# Patient Record
Sex: FEMALE | Race: WHITE | NOT HISPANIC OR LATINO | Employment: OTHER | URBAN - METROPOLITAN AREA
[De-identification: names, ages, dates, MRNs, and addresses within clinical notes are randomized per-mention and may not be internally consistent; named-entity substitution may affect disease eponyms.]

---

## 2017-01-06 ENCOUNTER — PATIENT MESSAGE (OUTPATIENT)
Dept: NEUROLOGY | Facility: CLINIC | Age: 57
End: 2017-01-06

## 2017-01-06 DIAGNOSIS — G35 MS (MULTIPLE SCLEROSIS): ICD-10-CM

## 2017-01-06 RX ORDER — GABAPENTIN 400 MG/1
800 CAPSULE ORAL 3 TIMES DAILY
Qty: 180 CAPSULE | Refills: 5 | Status: SHIPPED | OUTPATIENT
Start: 2017-01-06 | End: 2017-10-22 | Stop reason: SDUPTHER

## 2017-01-31 ENCOUNTER — LAB VISIT (OUTPATIENT)
Dept: LAB | Facility: HOSPITAL | Age: 57
End: 2017-01-31
Attending: FAMILY MEDICINE
Payer: MEDICARE

## 2017-01-31 ENCOUNTER — OFFICE VISIT (OUTPATIENT)
Dept: INTERNAL MEDICINE | Facility: CLINIC | Age: 57
End: 2017-01-31
Payer: MEDICARE

## 2017-01-31 VITALS
HEART RATE: 80 BPM | DIASTOLIC BLOOD PRESSURE: 82 MMHG | BODY MASS INDEX: 28.08 KG/M2 | WEIGHT: 158.5 LBS | TEMPERATURE: 96 F | SYSTOLIC BLOOD PRESSURE: 110 MMHG | HEIGHT: 63 IN

## 2017-01-31 DIAGNOSIS — Z11.59 NEED FOR HEPATITIS C SCREENING TEST: ICD-10-CM

## 2017-01-31 DIAGNOSIS — G35 MS (MULTIPLE SCLEROSIS): Chronic | ICD-10-CM

## 2017-01-31 DIAGNOSIS — F32.0 MAJOR DEPRESSIVE DISORDER, SINGLE EPISODE, MILD: Primary | ICD-10-CM

## 2017-01-31 PROCEDURE — 99499 UNLISTED E&M SERVICE: CPT | Mod: S$GLB,,, | Performed by: FAMILY MEDICINE

## 2017-01-31 PROCEDURE — 86803 HEPATITIS C AB TEST: CPT

## 2017-01-31 PROCEDURE — 36415 COLL VENOUS BLD VENIPUNCTURE: CPT | Mod: PO

## 2017-01-31 PROCEDURE — 99999 PR PBB SHADOW E&M-EST. PATIENT-LVL III: CPT | Mod: PBBFAC,,, | Performed by: FAMILY MEDICINE

## 2017-01-31 PROCEDURE — 99213 OFFICE O/P EST LOW 20 MIN: CPT | Mod: S$GLB,,, | Performed by: FAMILY MEDICINE

## 2017-01-31 PROCEDURE — 1159F MED LIST DOCD IN RCRD: CPT | Mod: S$GLB,,, | Performed by: FAMILY MEDICINE

## 2017-01-31 NOTE — PROGRESS NOTES
Subjective:       Patient ID: Cem Meyers is a 56 y.o. female.    Chief Complaint: Follow-up    HPI Comments: F/U:       Pt is a 56 year old here f/u pt has MS and cholesterol issues. Pt is over all doing healthy life style changes. Pt is taking zocor and paxil.     Review of Systems   Constitutional: Negative.    Respiratory: Negative.    Cardiovascular: Negative.    Neurological: Negative.    Hematological: Negative.        Objective:      Physical Exam   Constitutional: She is oriented to person, place, and time. She appears well-developed and well-nourished.   Cardiovascular: Normal rate and regular rhythm.    Pulmonary/Chest: Effort normal and breath sounds normal.   Abdominal: Soft. Bowel sounds are normal.   Neurological: She is alert and oriented to person, place, and time.   Skin: Skin is warm and dry.   Psychiatric: She has a normal mood and affect.       Assessment:       1. Major depressive disorder, single episode, mild    2. MS (multiple sclerosis)    3. Need for hepatitis C screening test        Plan:       Major depressive disorder, single episode, mild  Comments:  Will continue on the Paxil    MS (multiple sclerosis)  Comments:  Pt will continue to be followed by Neurology    Need for hepatitis C screening test  Comments:  Will do Hep c screen  Orders:  -     Hepatitis C antibody; Future; Expected date: 1/31/17

## 2017-01-31 NOTE — MR AVS SNAPSHOT
[facility] - Internal Medicine  [address] [facility] [address] [place] [address]  Phone: [phone]  Fax: [phone]                  Cem Meyers   2017 10:00 AM   Office Visit    Description:  Female : 1960   Provider:  Adonis Stubbs MD   Department:  [facility] - Internal Medicine           Reason for Visit     Follow-up           Diagnoses this Visit        Comments    Major depressive disorder, single episode, mild    -  Primary Will continue on the Paxil    MS (multiple sclerosis)     Pt will continue to be followed by Neurology    Need for hepatitis C screening test     Will do Hep c screen           To Do List           Future Appointments        Provider Department Dept Phone    3/6/2017 10:45 AM DALE Fink [facility]- Multiple Sclerosis [phone]      Goals (5 Years of Data)     None      Follow-Up and Disposition     Return in about 6 months (around 2017).      [facility]     [facility] On Call Nurse Care Line -  Assistance  Registered nurses in the [facility] provide clinical advisement, health education, appointment booking, and other advisory services.  Call for this free service at [phone].             Medications           Message regarding Medications     Verify the changes and/or additions to your medication regime listed below are the same as discussed with your clinician today.  If any of these changes or additions are incorrect, please notify your healthcare provider.             Verify that the below list of medications is an accurate representation of the medications you are currently taking.  If none reported, the list may be blank. If incorrect, please contact your healthcare provider. Carry this list with you in case of emergency.           Current Medications     baclofen (LIORESAL) 10 MG tablet TAKE 1 - 1 & 1/2 TABLETS BY MOUTH THREE TIMES A DAY AS NEEDED    cholecalciferol, vitamin D3, (VITAMIN D3) 4,000 unit Cap Take 14,000 Units by  "mouth once daily.     fish oil-omega-3 fatty acids 300-1,000 mg capsule Take 2 g by mouth once daily.    gabapentin (NEURONTIN) 400 MG capsule Take 2 capsules (800 mg total) by mouth 3 (three) times daily.    interferon beta-1a, albumin, (REBIF, WITH ALBUMIN,) 44 mcg/0.5 mL injection Inject 0.5 mLs (44 mcg total) into the skin 3 (three) times a week.    levothyroxine (SYNTHROID) 75 MCG tablet TAKE ONE TABLET BY MOUTH EVERY MORNING    multivitamin capsule Take 1 capsule by mouth once daily.    paroxetine (PAXIL) 30 MG tablet Take 1 tablet (30 mg total) by mouth once daily.    simvastatin (ZOCOR) 40 MG tablet TAKE ONE TABLET BY MOUTH EVERY DAY           Clinical Reference Information           Vital Signs - Last Recorded  Most recent update: 1/31/2017 10:26 AM by Bia August    BP Pulse Temp    110/82 (BP Location: Right arm, Patient Position: Sitting, BP Method: Manual) 80 96.2 °F (35.7 °C) (Tympanic)    Ht Wt BMI    5' 3" (1.6 m) 71.9 kg (158 lb 8.2 oz) 28.08 kg/m2      Blood Pressure          Most Recent Value    BP  110/82      Allergies as of 1/31/2017     Latex, Natural Rubber      Immunizations Administered on Date of Encounter - 1/31/2017     None      Orders Placed During Today's Visit     Future Labs/Procedures Expected by Expires    Hepatitis C antibody  1/31/2017 4/1/2018      "

## 2017-02-01 LAB — HCV AB SERPL QL IA: NEGATIVE

## 2017-03-06 ENCOUNTER — PATIENT MESSAGE (OUTPATIENT)
Dept: NEUROLOGY | Facility: CLINIC | Age: 57
End: 2017-03-06

## 2017-03-06 NOTE — TELEPHONE ENCOUNTER
----- Message from Bethanyshanna Gilman sent at 3/6/2017  1:10 PM CST -----  Contact: pt [phone]  Pt called to reschedule her apt that she could not keep for today with Dr Rouse.  Please call pt at [phone]    Thanks  [email or web address]

## 2017-03-23 ENCOUNTER — LAB VISIT (OUTPATIENT)
Dept: LAB | Facility: HOSPITAL | Age: 57
End: 2017-03-23
Attending: PSYCHIATRY & NEUROLOGY
Payer: MEDICARE

## 2017-03-23 ENCOUNTER — OFFICE VISIT (OUTPATIENT)
Dept: NEUROLOGY | Facility: CLINIC | Age: 57
End: 2017-03-23
Payer: MEDICARE

## 2017-03-23 ENCOUNTER — RESEARCH ENCOUNTER (OUTPATIENT)
Dept: RESEARCH | Facility: HOSPITAL | Age: 57
End: 2017-03-23
Payer: MEDICARE

## 2017-03-23 VITALS
DIASTOLIC BLOOD PRESSURE: 84 MMHG | WEIGHT: 160.5 LBS | BODY MASS INDEX: 28.44 KG/M2 | HEIGHT: 63 IN | SYSTOLIC BLOOD PRESSURE: 117 MMHG | HEART RATE: 64 BPM

## 2017-03-23 DIAGNOSIS — G47.30 SLEEP APNEA, UNSPECIFIED TYPE: ICD-10-CM

## 2017-03-23 DIAGNOSIS — M62.838 MUSCLE SPASM: ICD-10-CM

## 2017-03-23 DIAGNOSIS — Z79.899 ENCOUNTER FOR LONG-TERM (CURRENT) USE OF HIGH-RISK MEDICATION: ICD-10-CM

## 2017-03-23 DIAGNOSIS — E55.9 VITAMIN D INSUFFICIENCY: ICD-10-CM

## 2017-03-23 DIAGNOSIS — Z71.89 COUNSELING REGARDING GOALS OF CARE: ICD-10-CM

## 2017-03-23 DIAGNOSIS — R26.9 GAIT DISTURBANCE: ICD-10-CM

## 2017-03-23 DIAGNOSIS — G35 MS (MULTIPLE SCLEROSIS): Primary | Chronic | ICD-10-CM

## 2017-03-23 DIAGNOSIS — G35 MULTIPLE SCLEROSIS: ICD-10-CM

## 2017-03-23 DIAGNOSIS — Z00.6 RESEARCH EXAM: ICD-10-CM

## 2017-03-23 DIAGNOSIS — G35 MS (MULTIPLE SCLEROSIS): Chronic | ICD-10-CM

## 2017-03-23 DIAGNOSIS — Z00.6 RESEARCH EXAM: Primary | ICD-10-CM

## 2017-03-23 LAB
ALBUMIN SERPL BCP-MCNC: 3.4 G/DL
ALP SERPL-CCNC: 68 U/L
ALT SERPL W/O P-5'-P-CCNC: 25 U/L
AST SERPL-CCNC: 38 U/L
BASOPHILS # BLD AUTO: 0.02 K/UL
BASOPHILS NFR BLD: 0.4 %
BILIRUB DIRECT SERPL-MCNC: 0.2 MG/DL
BILIRUB SERPL-MCNC: 0.3 MG/DL
DIFFERENTIAL METHOD: ABNORMAL
EOSINOPHIL # BLD AUTO: 0 K/UL
EOSINOPHIL NFR BLD: 0.8 %
ERYTHROCYTE [DISTWIDTH] IN BLOOD BY AUTOMATED COUNT: 13.1 %
HCT VFR BLD AUTO: 36.2 %
HGB BLD-MCNC: 11.8 G/DL
LYMPHOCYTES # BLD AUTO: 1.7 K/UL
LYMPHOCYTES NFR BLD: 34.5 %
MCH RBC QN AUTO: 31.1 PG
MCHC RBC AUTO-ENTMCNC: 32.6 %
MCV RBC AUTO: 96 FL
MONOCYTES # BLD AUTO: 0.4 K/UL
MONOCYTES NFR BLD: 7.4 %
NEUTROPHILS # BLD AUTO: 2.9 K/UL
NEUTROPHILS NFR BLD: 56.7 %
PLATELET # BLD AUTO: 196 K/UL
PMV BLD AUTO: 10.4 FL
PROT SERPL-MCNC: 7.2 G/DL
RBC # BLD AUTO: 3.79 M/UL
WBC # BLD AUTO: 5.02 K/UL

## 2017-03-23 PROCEDURE — 1160F RVW MEDS BY RX/DR IN RCRD: CPT | Mod: ,,, | Performed by: PHYSICIAN ASSISTANT

## 2017-03-23 PROCEDURE — 99999 PR PBB SHADOW E&M-EST. PATIENT-LVL III: CPT | Mod: PBBFAC,,, | Performed by: PHYSICIAN ASSISTANT

## 2017-03-23 PROCEDURE — 99499 UNLISTED E&M SERVICE: CPT | Mod: S$GLB,,, | Performed by: PHYSICIAN ASSISTANT

## 2017-03-23 PROCEDURE — 36415 COLL VENOUS BLD VENIPUNCTURE: CPT

## 2017-03-23 PROCEDURE — 99215 OFFICE O/P EST HI 40 MIN: CPT | Mod: S$PBB,,, | Performed by: PHYSICIAN ASSISTANT

## 2017-03-23 PROCEDURE — 80076 HEPATIC FUNCTION PANEL: CPT

## 2017-03-23 PROCEDURE — 85025 COMPLETE CBC W/AUTO DIFF WBC: CPT

## 2017-03-23 NOTE — PROGRESS NOTES
Subject seen in clinic today by provider, Rimma Rouse PA-C and asked to participate in the following study:    Exploring the Role of Antibodies Against Myelin in the Development of Multiple Sclerosis  IRB# [id number]    Sponsor: [facility] and [facility]    : Rebeka Smith MD  Sub-Investigators: JETT Valdes MD; Jak Del Rio MD; Reynaldo Schreiber MD; Rimma Rouse PA-C; Karoline Hurst [facility]    Informed Consent  The Informed Consent was reviewed with the subject by Veronica NICHOLS. The subject was given adequate time to review the consent and ask questions. The subject verbalized understanding of all procedures and related timelines and agreed to participate in the study. The subject was given a copy of the signed consent and a scanned copy was uploaded into EPIC. The original copy will be kept in the source binder.    Inclusion/Exclusion Criteria  The subject meets all of the inclusion criteria and none of the exclusion criteria. This has been reviewed by Dr. Smith and is documented on the CRF in source binder.    Blood Draw  Collected by [facility]. Processed and stored in [employer] freezer by Veronica NICHOLS    Lumbar Puncture  Subject will return to clinic on Thursday, 4/27/17 at 2:45 pm for lumbar puncture to be preformed by Rimma Rouse PA-C. The spinal fluid informed consent will be reviewed and signed at this time.

## 2017-03-23 NOTE — PROGRESS NOTES
Subjective:       Patient ID: Cem Meyers is a 56 y.o. female who presents today for a routine clinic visit for MS.    MS HPI:  · DMT: Rebif  · Side effects from DMT? No  · Taking vitamin D3 as recommended? Yes -  Dose: 14,000 IU daily  · L knee pain--she feels due to excess walking with new puppy    SOCIAL HISTORY  Social History   Substance Use Topics    Smoking status: Never Smoker    Smokeless tobacco: Never Used    Alcohol use No     Living arrangements - the patient lives alone .  Employment Disability    MS ROS:  · Fatigue: Yes --stable   · Sleep Disturbance: Yes - has sleep apnea but states she has not gotten new parts  · Bowel Dysfunction: Yes--taking taking Miralax TIW.  Will at times have to use digital stimulation  · Spasticity: Yes - taking baclofen 1 tab, 1 tab, and 2 tabs HS. She states she does occasionally forget meds, but is helpful   · Visual Symptoms: No-stable  · Cognitive: Yes - memory  · Mood Disorder: Yes - Paxil  · Gait Disturbance: Yes - trouble now with L knee after excessive walking  · Falls: yes, in apartment(states it cluttered)  · Hand Dysfunction: No--stiffness, not QOL issue , some numbness that at times effects dexterity  · Sexual Dysfunction: Not Assessed  · Pain: gabapentin 800mg TID--takes regularly  · Skin Breakdown: No  · Tremors: No  · Dysphagia: No  · Dysarthria: No  · Heat sensitivity: Yes - does feels very fatigued after workout  · Any un-met adaptive needs? No  · Copay Assist? Yes - 0$      Objective:        1. 25 foot timed walk:   Timed 25 Foot Walk: 10/24/2016 3/23/2017   Did patient wear an AFO? No No   Was assistive device used? No No   Time for 25 Foot Walk (seconds) 5.5 5.5   Time for 25 Foot Walk (seconds) 5.5 5.3     Neurologic Exam     Mental Status   Oriented to person, place, and time.   Follows 3 step commands.   Speech: speech is normal   Level of consciousness: alert  Normal comprehension.     Cranial Nerves     CN II   Visual acuity: normal with  correction (20/20 OD and OS corrected with Snellen hand held chart at 6 ft)    CN III, IV, VI   Pupils are equal, round, and reactive to light.  Extraocular motions are normal.     CN V   Facial sensation intact.     CN VII   Facial expression full, symmetric.     CN VIII   Hearing: intact (finger rub)    CN IX, X   Palate: symmetric    CN XI   CN XI normal.     CN XII   Tongue deviation: none    Motor Exam   Muscle bulk: normal  Overall muscle tone: normal    Strength   Right deltoid: 5/5  Left deltoid: 5/5  Right triceps: 5/5  Left triceps: 5/5  Right wrist extension: 5/5  Left wrist extension: 5/5  Right interossei: 5/5  Left interossei: 5/5  Right iliopsoas: 5/5  Left iliopsoas: 4/5  Right hamstrin/5  Left hamstrin/5  Right anterior tibial: 5/5  Left anterior tibial: 5/5  Right peroneal: 5/5  Left peroneal: 5/5    Sensory Exam   Light touch normal.   Right leg light touch: numbness foot.  Left leg light touch: numbness to feet.  Right arm vibration: decreased from fingers  Left arm vibration: decreased from wrist  Right leg vibration: decreased from ankle  Left leg vibration: decreased from ankle    Gait, Coordination, and Reflexes     Gait  Gait: circumduction (slightly L hip circumduction)    Coordination   Finger to nose coordination: abnormal (L hand)  Tandem walking coordination: abnormal    Tremor   Resting tremor: absent  Action tremor: absent    Reflexes   Right brachioradialis: 2+  Left brachioradialis: 2+  Right biceps: 2+  Left biceps: 2+  Right triceps: 2+  Left triceps: 2+  Right patellar: 3+  Left patellar: 3+  Right achilles: 3+  Left achilles: 3+  Right plantar: equivocal  Left plantar: equivocal  Right ankle clonus: absent  Left ankle clonus: absent  Right pendular knee jerk: absent  Left pendular knee jerk: absent       Abnormal Heel/toe walk  Slowed on L RSM         Labs:   Ordered today CBC/LFT    Lab Results   Component Value Date    MUZWNTPR59GW 82 2016    XFIQDAUQ57VU 62  08/17/2015    XCWIKOYG28NU 77 10/06/2014     No results found for: JCVINDEX, JCVANTIBODY  No results found for: KH1GGKSV, ABSOLUTECD3, ME0DPJBT, ABSOLUTECD8, RO1JMGNP, ABSOLUTECD4, LABCD48    Diagnosis/Assessment/Plan:    1. Multiple Sclerosis  · Assessment:Patients timed walk is stable today; however, I do detect a mild L HF weakness.   · Imaging:Will plan for MRI in October 2017  · Disease Modifying Therapies: Continue Rebif and high dose Vi tD3. Will check safety labs today.    2. MS Symptom Assessment / Management  · Sleep Disturbance: Advised patient to contact insurance company regarding perferred provider for parts for sleep apnea machine  · Spasticity: Continue Baclofen  · Mood Disorder: continue Paxil  · Gait Disturbance: patient defers formal PT; however, demonstrated L HF exercises to be performed at chair level  · Pain: Continue gabapentin    Over 50% of this 40 minute visit was spent in direct face to face counseling of the patient regarding her current symptoms and management of same.  Follow up in  Patient agreed to POC today.    Attending, Dr. Smith, was available during today's encounter. Any change to plan along with cosign to appear in the EMR.     Rimma Rouse PA-C  MS Center    MS (multiple sclerosis)  -     CBC auto differential; Future; Expected date: 3/23/17  -     Hepatic function panel; Future    Sleep apnea, unspecified type    Vitamin D insufficiency    Muscle spasm    Gait disturbance

## 2017-03-23 NOTE — MR AVS SNAPSHOT
[facility]  [address]  [facility] [address]  Phone: [phone]                  Cem Magana Amar   3/23/2017 9:45 AM   Office Visit    Description:  Female : 1960   Provider:  Rimma Rouse PA-C   Department:  [facility] Multiple S[facility]           Diagnoses this Visit        Comments    MS (multiple sclerosis)    -  Primary     Sleep apnea, unspecified type         Vitamin D insufficiency         Muscle spasm         Gait disturbance                To Do List           Goals (5 Years of Data)     None      Follow-Up and Disposition     Return in about 6 months (around 2017).      [facility]     [facility] Nurse Care Line -  Assistance  Registered nurses in the [facility] provide clinical advisement, health education, appointment booking, and other advisory services.  Call for this free service at [phone].             Medications           Message regarding Medications     Verify the changes and/or additions to your medication regime listed below are the same as discussed with your clinician today.  If any of these changes or additions are incorrect, please notify your healthcare provider.             Verify that the below list of medications is an accurate representation of the medications you are currently taking.  If none reported, the list may be blank. If incorrect, please contact your healthcare provider. Carry this list with you in case of emergency.           Current Medications     baclofen (LIORESAL) 10 MG tablet TAKE 1 - 1 & 1/2 TABLETS BY MOUTH THREE TIMES A DAY AS NEEDED    cholecalciferol, vitamin D3, (VITAMIN D3) 4,000 unit Cap Take 14,000 Units by mouth once daily.     fish oil-omega-3 fatty acids 300-1,000 mg capsule Take 2 g by mouth once daily.    gabapentin (NEURONTIN) 400 MG capsule Take 2 capsules (800 mg total) by mouth 3 (three) times daily.    interferon beta-1a, albumin, (REBIF, WITH ALBUMIN,) 44  "mcg/0.5 mL injection Inject 0.5 mLs (44 mcg total) into the skin 3 (three) times a week.    levothyroxine (SYNTHROID) 75 MCG tablet TAKE ONE TABLET BY MOUTH EVERY MORNING    multivitamin capsule Take 1 capsule by mouth once daily.    paroxetine (PAXIL) 30 MG tablet Take 1 tablet (30 mg total) by mouth once daily.    simvastatin (ZOCOR) 40 MG tablet TAKE ONE TABLET BY MOUTH EVERY DAY           Clinical Reference Information           Your Vitals Were     BP Pulse Height Weight BMI    117/84 64 5' 3" (1.6 m) 72.8 kg (160 lb 8 oz) 28.43 kg/m2      Blood Pressure          Most Recent Value    BP  117/84      Allergies as of 3/23/2017     Latex, Natural Rubber      Immunizations Administered on Date of Encounter - 3/23/2017     None      Orders Placed During Today's Visit     Future Labs/Procedures Expected by Expires    CBC auto differential  3/23/2017 5/22/2018    Hepatic function panel  As directed 3/23/2018      Instructions    L hip flexion exercises as demonstrated in clinic--daily -several times a day       Language Assistance Services     ATTENTION: Language assistance services are available, free of charge. Please call [phone].      ATENCIÓN: Si habla yaañol, tiene a weiss disposición servicios gratuitos de asistencia lingüística. Llame al [phone].     OZIEL Ý: N?u b?n nói Ti?ng Vi?t, có các d?ch v? h? tr? ngôn ng? mi?n phí dành cho b?n. G?i s? [phone].         [facility] complies with applicable Federal civil rights laws and does not discriminate on the basis of race, color, national origin, age, disability, or sex.        "

## 2017-03-24 PROBLEM — Z71.89 COUNSELING REGARDING GOALS OF CARE: Status: ACTIVE | Noted: 2017-03-24

## 2017-03-24 PROBLEM — Z79.899 ENCOUNTER FOR LONG-TERM (CURRENT) USE OF HIGH-RISK MEDICATION: Status: ACTIVE | Noted: 2017-03-24

## 2017-04-11 ENCOUNTER — OFFICE VISIT (OUTPATIENT)
Dept: INTERNAL MEDICINE | Facility: CLINIC | Age: 57
End: 2017-04-11
Payer: MEDICARE

## 2017-04-11 VITALS
HEART RATE: 72 BPM | SYSTOLIC BLOOD PRESSURE: 104 MMHG | DIASTOLIC BLOOD PRESSURE: 72 MMHG | HEIGHT: 63 IN | BODY MASS INDEX: 28.24 KG/M2 | TEMPERATURE: 96 F | WEIGHT: 159.38 LBS

## 2017-04-11 DIAGNOSIS — R68.84 JAW PAIN: Primary | ICD-10-CM

## 2017-04-11 PROCEDURE — 99213 OFFICE O/P EST LOW 20 MIN: CPT | Mod: S$GLB,,, | Performed by: FAMILY MEDICINE

## 2017-04-11 PROCEDURE — 1160F RVW MEDS BY RX/DR IN RCRD: CPT | Mod: S$GLB,,, | Performed by: FAMILY MEDICINE

## 2017-04-11 PROCEDURE — 99999 PR PBB SHADOW E&M-EST. PATIENT-LVL III: CPT | Mod: PBBFAC,,, | Performed by: FAMILY MEDICINE

## 2017-04-11 NOTE — MR AVS SNAPSHOT
[facility] - Internal Medicine  [address] [facility] [address] [place] [address]  Phone: [phone]  Fax: [phone]                  Cem Meyers   2017 9:00 AM   Office Visit    Description:  Female : 1960   Provider:  Adonis Stubbs MD   Department:  [facility] - Internal Medicine           Reason for Visit     Jaw Pain           Diagnoses this Visit        Comments    Jaw pain    -  Primary Will start on NSAID and monitor           To Do List           Future Appointments        Provider Department Dept Phone    2017 2:45 PM DALE Fink- Multiple Sclerosis [phone]      Goals (5 Years of Data)     None      Follow-Up and Disposition     Return in about 6 months (around 10/11/2017).      [facility] On Call     [facility] On Call Nurse Care Line -  Assistance  Unless otherwise directed by your provider, please contact [facility], our nurse care line that is available for  assistance.     Registered nurses in the [facility] On Call Center provide: appointment scheduling, clinical advisement, health education, and other advisory services.  Call: [phone] (toll free)               Medications           Message regarding Medications     Verify the changes and/or additions to your medication regime listed below are the same as discussed with your clinician today.  If any of these changes or additions are incorrect, please notify your healthcare provider.             Verify that the below list of medications is an accurate representation of the medications you are currently taking.  If none reported, the list may be blank. If incorrect, please contact your healthcare provider. Carry this list with you in case of emergency.           Current Medications     baclofen (LIORESAL) 10 MG tablet TAKE 1 - 1 & 1/2 TABLETS BY MOUTH THREE TIMES A DAY AS NEEDED    cholecalciferol, vitamin D3, (VITAMIN D3) 4,000 unit Cap Take 14,000 Units by mouth once daily.     fish oil-omega-3  "fatty acids 300-1,000 mg capsule Take 2 g by mouth once daily.    gabapentin (NEURONTIN) 400 MG capsule Take 2 capsules (800 mg total) by mouth 3 (three) times daily.    interferon beta-1a, albumin, (REBIF, WITH ALBUMIN,) 44 mcg/0.5 mL injection Inject 0.5 mLs (44 mcg total) into the skin 3 (three) times a week.    levothyroxine (SYNTHROID) 75 MCG tablet TAKE ONE TABLET BY MOUTH EVERY MORNING    multivitamin capsule Take 1 capsule by mouth once daily.    paroxetine (PAXIL) 30 MG tablet Take 1 tablet (30 mg total) by mouth once daily.    simvastatin (ZOCOR) 40 MG tablet TAKE ONE TABLET BY MOUTH EVERY DAY           Clinical Reference Information           Your Vitals Were     BP Pulse Temp    104/72 (BP Location: Right arm, Patient Position: Sitting, BP Method: Manual) 72 95.6 °F (35.3 °C) (Tympanic)    Height Weight BMI    5' 3" (1.6 m) 72.3 kg (159 lb 6.3 oz) 28.24 kg/m2      Blood Pressure          Most Recent Value    BP  104/72      Allergies as of 4/11/2017     Latex, Natural Rubber      Immunizations Administered on Date of Encounter - 4/11/2017     None      Language Assistance Services     ATTENTION: Language assistance services are available, free of charge. Please call [phone].      ATENCIÓN: Si damionla annette, tiene a weiss disposición servicios gratuitos de asistencia lingüística. Llame al [phone].     [facility] Ý: N?u b?n nói Ti?ng Vi?t, có các d?ch v? h? tr? ngôn ng? mi?n phí dành cho b?n. G?i s? [phone].         [facility] - Internal Medicine complies with applicable Federal civil rights laws and does not discriminate on the basis of race, color, national origin, age, disability, or sex.        "

## 2017-04-11 NOTE — PROGRESS NOTES
Subjective:       Patient ID: Cem Meyers is a 56 y.o. female.    Chief Complaint: Jaw Pain (right )    HPI Comments: TMJ:      Pt is a 56 year old who reports that last 24 hours found her right jaw was hurting. Pt reports     Review of Systems   Constitutional: Negative.    Respiratory: Negative.    Cardiovascular: Negative.    Genitourinary: Negative.    Musculoskeletal:        Right jaw pain   Neurological: Negative.    Hematological: Negative.    Psychiatric/Behavioral: Negative.        Objective:      Physical Exam   Constitutional: She is oriented to person, place, and time. She appears well-developed and well-nourished.   HENT:   Head:       Cardiovascular: Normal rate and regular rhythm.    Pulmonary/Chest: Effort normal and breath sounds normal.   Neurological: She is alert and oriented to person, place, and time.   Skin: Skin is warm and dry.       Assessment:       1. Jaw pain        Plan:       Jaw pain

## 2017-04-21 DIAGNOSIS — G35 MULTIPLE SCLEROSIS: ICD-10-CM

## 2017-04-25 ENCOUNTER — PATIENT MESSAGE (OUTPATIENT)
Dept: RESEARCH | Facility: HOSPITAL | Age: 57
End: 2017-04-25

## 2017-05-22 RX ORDER — SIMVASTATIN 40 MG/1
TABLET, FILM COATED ORAL
Qty: 30 TABLET | Refills: 6 | Status: SHIPPED | OUTPATIENT
Start: 2017-05-22 | End: 2017-12-26 | Stop reason: SDUPTHER

## 2017-05-23 ENCOUNTER — PATIENT MESSAGE (OUTPATIENT)
Dept: NEUROLOGY | Facility: CLINIC | Age: 57
End: 2017-05-23

## 2017-05-23 DIAGNOSIS — G35 MULTIPLE SCLEROSIS: Primary | ICD-10-CM

## 2017-05-24 ENCOUNTER — PATIENT MESSAGE (OUTPATIENT)
Dept: NEUROLOGY | Facility: CLINIC | Age: 57
End: 2017-05-24

## 2017-05-25 ENCOUNTER — LAB VISIT (OUTPATIENT)
Dept: LAB | Facility: HOSPITAL | Age: 57
End: 2017-05-25
Attending: PSYCHIATRY & NEUROLOGY
Payer: MEDICARE

## 2017-05-25 ENCOUNTER — PATIENT MESSAGE (OUTPATIENT)
Dept: NEUROLOGY | Facility: CLINIC | Age: 57
End: 2017-05-25

## 2017-05-25 ENCOUNTER — OFFICE VISIT (OUTPATIENT)
Dept: URGENT CARE | Facility: CLINIC | Age: 57
End: 2017-05-25
Payer: MEDICARE

## 2017-05-25 VITALS
BODY MASS INDEX: 28 KG/M2 | SYSTOLIC BLOOD PRESSURE: 106 MMHG | HEIGHT: 63 IN | DIASTOLIC BLOOD PRESSURE: 68 MMHG | WEIGHT: 158.06 LBS | OXYGEN SATURATION: 98 % | TEMPERATURE: 98 F | HEART RATE: 77 BPM

## 2017-05-25 DIAGNOSIS — R39.9 UTI SYMPTOMS: ICD-10-CM

## 2017-05-25 DIAGNOSIS — S50.869A: Primary | ICD-10-CM

## 2017-05-25 DIAGNOSIS — R39.9 UTI SYMPTOMS: Primary | ICD-10-CM
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
## 2017-05-25 DIAGNOSIS — L29.9 ITCHING: ICD-10-CM

## 2017-05-25 LAB
BILIRUB UR QL STRIP: NEGATIVE
CLARITY UR: CLEAR
COLOR UR: YELLOW
GLUCOSE UR QL STRIP: NEGATIVE
HGB UR QL STRIP: NEGATIVE
KETONES UR QL STRIP: NEGATIVE
LEUKOCYTE ESTERASE UR QL STRIP: NEGATIVE
NITRITE UR QL STRIP: NEGATIVE
PH UR STRIP: 6 [PH] (ref 5–8)
PROT UR QL STRIP: NEGATIVE
SP GR UR STRIP: 1.02 (ref 1–1.03)
URN SPEC COLLECT METH UR: NORMAL

## 2017-05-25 PROCEDURE — 81003 URINALYSIS AUTO W/O SCOPE: CPT | Mod: PO

## 2017-05-25 PROCEDURE — 99214 OFFICE O/P EST MOD 30 MIN: CPT | Mod: S$GLB,,, | Performed by: NURSE PRACTITIONER

## 2017-05-25 PROCEDURE — 99999 PR PBB SHADOW E&M-EST. PATIENT-LVL IV: CPT | Mod: PBBFAC,,, | Performed by: NURSE PRACTITIONER

## 2017-05-25 PROCEDURE — 87086 URINE CULTURE/COLONY COUNT: CPT

## 2017-05-25 RX ORDER — MUPIROCIN 20 MG/G
OINTMENT TOPICAL 3 TIMES DAILY
Qty: 15 G | Refills: 0 | Status: SHIPPED | OUTPATIENT
Start: 2017-05-25 | End: 2017-08-21

## 2017-05-25 RX ORDER — METHYLPREDNISOLONE 4 MG/1
TABLET ORAL
COMMUNITY
Start: 2017-05-16 | End: 2017-06-08

## 2017-05-25 RX ORDER — CLINDAMYCIN HYDROCHLORIDE 150 MG/1
CAPSULE ORAL
COMMUNITY
Start: 2017-05-16 | End: 2017-06-08

## 2017-05-25 NOTE — PROGRESS NOTES
Subjective:       Patient ID: Cem Meyers is a 56 y.o. female.    Chief Complaint: Insect Bite    56 year old female presents to Urgent Care with reports of insect bite to left forearm that has been present for about 2 days with no improvement. Patient believes bite could be a spider bite but unsure.       Insect Bite   This is a new problem. The current episode started in the past 7 days (2 days). The problem has been gradually worsening. Associated symptoms include a rash. Pertinent negatives include no abdominal pain, chest pain, chills, fever, nausea, numbness, sore throat, vomiting or weakness. Associated symptoms comments: Left fore arm rash . Nothing aggravates the symptoms. She has tried nothing for the symptoms. The treatment provided no relief.     Review of Systems   Constitutional: Negative for appetite change, chills and fever.   HENT: Negative for ear pain, sinus pressure, sore throat and trouble swallowing.    Eyes: Negative for visual disturbance.   Respiratory: Negative for shortness of breath.    Cardiovascular: Negative for chest pain.   Gastrointestinal: Negative for abdominal pain, diarrhea, nausea and vomiting.   Endocrine: Negative for cold intolerance, polyphagia and polyuria.   Genitourinary: Negative for decreased urine volume and dysuria.   Musculoskeletal: Negative for back pain.   Skin: Positive for rash.        To left fore arm    Allergic/Immunologic: Negative for environmental allergies and food allergies.   Neurological: Negative for dizziness, tremors, weakness and numbness.   Hematological: Does not bruise/bleed easily.   Psychiatric/Behavioral: Negative for confusion and hallucinations. The patient is not nervous/anxious and is not hyperactive.    All other systems reviewed and are negative.      Objective:     Physical Exam   Constitutional: She is oriented to person, place, and time. She appears well-developed and well-nourished.   HENT:   Head: Normocephalic and  atraumatic.   Right Ear: External ear normal.   Left Ear: External ear normal.   Nose: Nose normal.   Mouth/Throat: Oropharynx is clear and moist.   Eyes: Conjunctivae and EOM are normal. Pupils are equal, round, and reactive to light.   Neck: Normal range of motion. Neck supple.   Cardiovascular: Normal rate, regular rhythm, normal heart sounds and intact distal pulses.    No murmur heard.  Pulmonary/Chest: Effort normal and breath sounds normal. She has no wheezes.   Abdominal: Soft. Bowel sounds are normal. There is no tenderness.   Musculoskeletal: Normal range of motion.   Neurological: She is alert and oriented to person, place, and time. She has normal reflexes.   Skin: Skin is warm and dry. No rash noted.        Psychiatric: She has a normal mood and affect. Her behavior is normal. Judgment and thought content normal.   Nursing note and vitals reviewed.    Use Dial antibacterial Soap daily.  Bactroban ointment to affected area with Q-tip twice daily x 7 days.  Apply heat to area every 2 hours to promote drainage and healing.  If the area of redness spreads, you develop fever 100.4 or greater, swelling or pain worsens, contact PCP or go to ER immediately.    Assessment:     1. Insect bite forearm    2. Itching      Plan:   Cem was seen today for insect bite.    Diagnoses and all orders for this visit:    Insect bite forearm    Itching    Other orders  -     mupirocin (BACTROBAN) 2 % ointment; Apply topically 3 (three) times daily.

## 2017-05-25 NOTE — TELEPHONE ENCOUNTER
Cem sent the following two message:    Spider bite no big deal. Going for labs.     And     Right leg feeling weird, like it's trying to go numb.

## 2017-05-25 NOTE — TELEPHONE ENCOUNTER
"Not feeling well. For the past week her balance has been off, left arm "not working right." Hasn't been getting enough sleep lately. Taking meds. Denies infection. Symptoms not new, but worse. Pt taking Rebif as prescribed. Admits to spider bite, redness to area with itching. Denies any other infections. Per KK, CBC, CMP, UA and Culture were ordered and scheduled at [facility]. She is also going to urgent care to have her arm looked at from spider bite.     Needs LP and Research appts need to be rescheduled.     "

## 2017-05-26 ENCOUNTER — PATIENT MESSAGE (OUTPATIENT)
Dept: NEUROLOGY | Facility: CLINIC | Age: 57
End: 2017-05-26

## 2017-05-26 ENCOUNTER — DOCUMENTATION ONLY (OUTPATIENT)
Dept: NEUROLOGY | Facility: CLINIC | Age: 57
End: 2017-05-26

## 2017-05-26 LAB — BACTERIA UR CULT: NO GROWTH

## 2017-05-26 NOTE — TELEPHONE ENCOUNTER
Cem sent the following message:    Feeling better today. Got a good night's sleep. All limbs feeling normal. Balance is a little iffy but therapy should help that. Thanks for your help!

## 2017-05-29 ENCOUNTER — TELEPHONE (OUTPATIENT)
Dept: NEUROLOGY | Facility: CLINIC | Age: 57
End: 2017-05-29

## 2017-05-29 NOTE — TELEPHONE ENCOUNTER
----- Message from Stephon Hunt sent at 5/29/2017  1:21 PM CDT -----  Contact: Sandhya in laboratory   Sandhya in the lab called in and stated that Patient's CNP was Hemolized it has to be recollected. Contact info Ext. [phone]

## 2017-05-31 ENCOUNTER — TELEPHONE (OUTPATIENT)
Dept: NEUROLOGY | Facility: CLINIC | Age: 57
End: 2017-05-31

## 2017-05-31 NOTE — TELEPHONE ENCOUNTER
----- Message from Rebeka Smith MD sent at 5/31/2017  9:53 AM CDT -----  Contact: Lab Client Services  Remberto, can you kindly f/u on this  ----- Message -----  From: Calixtomelissa Romo  Sent: 5/26/2017  10:23 AM  To: Rebeka Smith MD    Hi my name is Calixto I work in the lab client services. We had a problem with one of the labs on your patient if you could please call us at [phone] or ext [phone] that would be great. ANYONE in my department can help. Thank You.

## 2017-06-08 ENCOUNTER — OFFICE VISIT (OUTPATIENT)
Dept: DERMATOLOGY | Facility: CLINIC | Age: 57
End: 2017-06-08
Payer: MEDICARE

## 2017-06-08 ENCOUNTER — TELEPHONE (OUTPATIENT)
Dept: NEUROLOGY | Facility: CLINIC | Age: 57
End: 2017-06-08

## 2017-06-08 ENCOUNTER — PATIENT MESSAGE (OUTPATIENT)
Dept: NEUROLOGY | Facility: CLINIC | Age: 57
End: 2017-06-08
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
## 2017-06-08 DIAGNOSIS — G35 MULTIPLE SCLEROSIS: Primary | ICD-10-CM

## 2017-06-08 DIAGNOSIS — D22.9 MULTIPLE NEVI: Primary | ICD-10-CM

## 2017-06-08 PROCEDURE — 99213 OFFICE O/P EST LOW 20 MIN: CPT | Mod: S$GLB,,, | Performed by: DERMATOLOGY

## 2017-06-08 PROCEDURE — 99999 PR PBB SHADOW E&M-EST. PATIENT-LVL II: CPT | Mod: PBBFAC,,, | Performed by: DERMATOLOGY

## 2017-06-08 NOTE — PROGRESS NOTES
Subjective:       Patient ID:  Cem Meyers is a 56 y.o. female who presents for   Chief Complaint   Patient presents with    Spot     c/o spot on left upper arm x 1 month     Hx of multiple sclerosis, last seen on 9/15/16    History of Present Illness: The patient presents with chief complaint of lesion.  Location: left arm  Duration: 1 month  Signs/Symptoms: growing, darker    Prior treatments: none    The patient denies personal history of skin cancer. Father c/ hx of BCC.             Review of Systems   Constitutional: Negative for fever and chills.   Gastrointestinal: Negative for nausea and vomiting.   Skin: Negative for daily sunscreen use, activity-related sunscreen use and recent sunburn.   Hematologic/Lymphatic: Does not bruise/bleed easily.        Objective:    Physical Exam   Constitutional: She appears well-developed and well-nourished. No distress.   Neurological: She is alert and oriented to person, place, and time. She is not disoriented.   Psychiatric: She has a normal mood and affect.   Skin:   Areas Examined (abnormalities noted in diagram):   Head / Face Inspection Performed  Neck Inspection Performed  Chest / Axilla Inspection Performed  Abdomen Inspection Performed  Back Inspection Performed  RUE Inspected  LUE Inspection Performed  Nails and Digits Inspection Performed              Diagram Legend      Pigmented verrucoid papule/plaque c/w seborrheic keratosis      Evenly pigmented macule c/w junctional nevus    Assessment / Plan:        Multiple nevi    Reassurance given.  Discussed ABCDEF of melanoma and changes for patient to look for.  AAD Handout given. Discussed importance of daily use of sunscreen.             Return if symptoms worsen or fail to improve.

## 2017-06-08 NOTE — TELEPHONE ENCOUNTER
I received the following email from the pt:    Having more severe problems with left-side weakness. Fell in the tub this morning and knocked my head (ouch!) and then almost fell in the parking lot (marj I had my cane). Can I do a short course of steroids or something to try and address this? I worked out Monday but not too strenuously and I walked my pup around the property for almost an hour yesterday. I got enough sleep these past few days so I don't know what's going on.

## 2017-06-08 NOTE — PATIENT INSTRUCTIONS
Monitoring Moles  Moles, also called nevi, are small, pigmented (colored) marks on the skin. They have no known purpose. Many moles appear before age 30, but they also increase frequently as people age. Moles most often are benign (not cancer) and harmless. But some become cancerous. Thats why you need to watch the moles on your body and tell your health care provider about any concern you.    What are moles?  Moles are a type of pigmented debbie. Freckles, which often are sprinkled across the bridge of the nose, the cheeks, and the arms, are another type of pigmented debbie. Moles can appear on any part of the body. There are many types, sizes, and shapes of moles. Most moles are solid brown. In most cases, they are flat or dome-shaped, smooth, and have well-defined edges. Freckles are flat.  Why worry about moles?  Most moles are benign and dont require treatment. You can have moles removed if you dont like the way they look or feel. But moles that appear after you are 30 or that change in certain ways may become a problem. These moles may turn into melanoma, a type of skin cancer. Melanoma is one of the fastest growing cancers in the [place], but it is often curable if caught early. But this disease can be life-threatening, particularly when not diagnosed early. The more moles someone has, the higher the risk. Risk is also higher for those who have had more lifetime exposure to the sun, severe blistering sunburns, exposure to tanning beds, a prior personal history of cancer, and those with a family history of skin cancer. To manage your risk, its smart to check your moles for changes and ask your health care provider to perform a thorough skin exam when you have a physical exam. To do this, you first need to learn where your moles are. Then, be sure to check your moles each month.    Checking your moles  You can check many of your moles each month. You can do this right after you shower and before you get  dressed. Check your body from head to toe. Then, make a list of your moles. If you find any new moles or changes in your moles, call your health care provider. To check your moles, youll need:  · A full-length mirror  · A stool or chair to sit on while you check your feet  If you have a lot of moles, take digital photos of them each month. Make sure to take photos both up close and from a distance. These can help you see if any moles change over time.  When to seek medical treatment  See your health care provider if your moles hurt, itch, ooze, bleed, thicken, become crusty, or show other changes. Also, be sure to call your health care provider if your moles show any of the following signs of melanoma:  · A change in size, shape, color, or elevation  · Asymmetry (when the sides dont match)  · Ragged, notched, or blurred borders  · Varied colors within the same mole  · Size is larger than 5 mm or 6 mm in diameter (the size of a pencil eraser)  © 6084-3243 [employer]. [address] [place] [address]. All rights reserved. This information is not intended as a substitute for professional medical care. Always follow your healthcare professional's instructions.

## 2017-06-08 NOTE — TELEPHONE ENCOUNTER
Call placed to [facility] and spoke with Karson. She states that 6/20 is the soonest appt for outpatient stat MRIs. She states that [facility] in [facility] has soon openings. Pt should call [facility] location to schedule at [facility].    VM left for pt to call this office back. [employer] message also sent.

## 2017-06-09 ENCOUNTER — LAB VISIT (OUTPATIENT)
Dept: LAB | Facility: HOSPITAL | Age: 57
End: 2017-06-09
Attending: PSYCHIATRY & NEUROLOGY
Payer: MEDICARE

## 2017-06-09 DIAGNOSIS — G35 MULTIPLE SCLEROSIS: ICD-10-CM

## 2017-06-09 PROCEDURE — 87253 VIRUS INOCULATE TISSUE ADDL: CPT

## 2017-06-09 PROCEDURE — 36415 COLL VENOUS BLD VENIPUNCTURE: CPT | Mod: PO

## 2017-06-11 ENCOUNTER — PATIENT MESSAGE (OUTPATIENT)
Dept: NEUROLOGY | Facility: CLINIC | Age: 57
End: 2017-06-11

## 2017-06-18 DIAGNOSIS — G35 MS (MULTIPLE SCLEROSIS): ICD-10-CM

## 2017-06-18 RX ORDER — LEVOTHYROXINE SODIUM 75 UG/1
TABLET ORAL
Qty: 30 TABLET | Refills: 6 | Status: SHIPPED | OUTPATIENT
Start: 2017-06-18 | End: 2018-01-21 | Stop reason: SDUPTHER

## 2017-06-19 RX ORDER — PAROXETINE 30 MG/1
TABLET, FILM COATED ORAL
Qty: 30 TABLET | Refills: 5 | Status: SHIPPED | OUTPATIENT
Start: 2017-06-19 | End: 2017-11-29 | Stop reason: DRUGHIGH

## 2017-06-27 LAB — NABFERON ANTIBODY TEST: NORMAL

## 2017-07-25 RX ORDER — BACLOFEN 10 MG/1
TABLET ORAL
Qty: 135 TABLET | Refills: 5 | Status: SHIPPED | OUTPATIENT
Start: 2017-07-25 | End: 2018-01-04 | Stop reason: SDUPTHER

## 2017-08-15 ENCOUNTER — PATIENT MESSAGE (OUTPATIENT)
Dept: OBSTETRICS AND GYNECOLOGY | Facility: CLINIC | Age: 57
End: 2017-08-15

## 2017-08-15 ENCOUNTER — PATIENT MESSAGE (OUTPATIENT)
Dept: INTERNAL MEDICINE | Facility: CLINIC | Age: 57
End: 2017-08-15

## 2017-08-15 ENCOUNTER — TELEPHONE (OUTPATIENT)
Dept: OBSTETRICS AND GYNECOLOGY | Facility: CLINIC | Age: 57
End: 2017-08-15

## 2017-08-15 DIAGNOSIS — Z00.00 ANNUAL PHYSICAL EXAM: Primary | ICD-10-CM

## 2017-08-15 DIAGNOSIS — Z12.31 ENCOUNTER FOR SCREENING MAMMOGRAM FOR BREAST CANCER: Primary | ICD-10-CM

## 2017-08-21 ENCOUNTER — OFFICE VISIT (OUTPATIENT)
Dept: GASTROENTEROLOGY | Facility: CLINIC | Age: 57
End: 2017-08-21
Payer: MEDICARE

## 2017-08-21 ENCOUNTER — LAB VISIT (OUTPATIENT)
Dept: LAB | Facility: HOSPITAL | Age: 57
End: 2017-08-21
Attending: FAMILY MEDICINE
Payer: MEDICARE

## 2017-08-21 VITALS
WEIGHT: 159.63 LBS | BODY MASS INDEX: 27.25 KG/M2 | SYSTOLIC BLOOD PRESSURE: 94 MMHG | HEIGHT: 64 IN | DIASTOLIC BLOOD PRESSURE: 62 MMHG | HEART RATE: 72 BPM

## 2017-08-21 DIAGNOSIS — Z86.010 HISTORY OF COLONIC POLYPS: Primary | ICD-10-CM

## 2017-08-21 DIAGNOSIS — Z00.00 ANNUAL PHYSICAL EXAM: ICD-10-CM

## 2017-08-21 PROBLEM — Z11.59 NEED FOR HEPATITIS C SCREENING TEST: Status: RESOLVED | Noted: 2017-01-31 | Resolved: 2017-08-21

## 2017-08-21 LAB
ALBUMIN SERPL BCP-MCNC: 3.4 G/DL
ALP SERPL-CCNC: 66 U/L
ALT SERPL W/O P-5'-P-CCNC: 24 U/L
ANION GAP SERPL CALC-SCNC: 7 MMOL/L
AST SERPL-CCNC: 38 U/L
BASOPHILS # BLD AUTO: 0.02 K/UL
BASOPHILS NFR BLD: 0.5 %
BILIRUB SERPL-MCNC: 0.4 MG/DL
BUN SERPL-MCNC: 18 MG/DL
CALCIUM SERPL-MCNC: 9.7 MG/DL
CHLORIDE SERPL-SCNC: 105 MMOL/L
CHOLEST/HDLC SERPL: 3.2 {RATIO}
CO2 SERPL-SCNC: 26 MMOL/L
CREAT SERPL-MCNC: 1 MG/DL
DIFFERENTIAL METHOD: ABNORMAL
EOSINOPHIL # BLD AUTO: 0.1 K/UL
EOSINOPHIL NFR BLD: 1.1 %
ERYTHROCYTE [DISTWIDTH] IN BLOOD BY AUTOMATED COUNT: 12.8 %
EST. GFR  (AFRICAN AMERICAN): >60 ML/MIN/1.73 M^2
EST. GFR  (NON AFRICAN AMERICAN): >60 ML/MIN/1.73 M^2
GLUCOSE SERPL-MCNC: 75 MG/DL
HCT VFR BLD AUTO: 37.5 %
HDL/CHOLESTEROL RATIO: 31.5 %
HDLC SERPL-MCNC: 178 MG/DL
HDLC SERPL-MCNC: 56 MG/DL
HGB BLD-MCNC: 12.2 G/DL
LDLC SERPL CALC-MCNC: 110.6 MG/DL
LYMPHOCYTES # BLD AUTO: 1.7 K/UL
LYMPHOCYTES NFR BLD: 37.4 %
MCH RBC QN AUTO: 30.6 PG
MCHC RBC AUTO-ENTMCNC: 32.5 G/DL
MCV RBC AUTO: 94 FL
MONOCYTES # BLD AUTO: 0.4 K/UL
MONOCYTES NFR BLD: 9.7 %
NEUTROPHILS # BLD AUTO: 2.3 K/UL
NEUTROPHILS NFR BLD: 51.1 %
NONHDLC SERPL-MCNC: 122 MG/DL
PLATELET # BLD AUTO: 216 K/UL
PMV BLD AUTO: 10.7 FL
POTASSIUM SERPL-SCNC: 4.6 MMOL/L
PROT SERPL-MCNC: 7.4 G/DL
RBC # BLD AUTO: 3.99 M/UL
SODIUM SERPL-SCNC: 138 MMOL/L
TRIGL SERPL-MCNC: 57 MG/DL
WBC # BLD AUTO: 4.44 K/UL

## 2017-08-21 PROCEDURE — 80053 COMPREHEN METABOLIC PANEL: CPT

## 2017-08-21 PROCEDURE — 99999 PR PBB SHADOW E&M-EST. PATIENT-LVL III: CPT | Mod: PBBFAC,,, | Performed by: INTERNAL MEDICINE

## 2017-08-21 PROCEDURE — 80061 LIPID PANEL: CPT

## 2017-08-21 PROCEDURE — 99499 UNLISTED E&M SERVICE: CPT | Mod: S$GLB,,, | Performed by: INTERNAL MEDICINE

## 2017-08-21 PROCEDURE — 85025 COMPLETE CBC W/AUTO DIFF WBC: CPT

## 2017-08-21 PROCEDURE — 36415 COLL VENOUS BLD VENIPUNCTURE: CPT | Mod: PO

## 2017-08-21 RX ORDER — POLYETHYLENE GLYCOL 3350, SODIUM SULFATE ANHYDROUS, SODIUM BICARBONATE, SODIUM CHLORIDE, POTASSIUM CHLORIDE 236; 22.74; 6.74; 5.86; 2.97 G/4L; G/4L; G/4L; G/4L; G/4L
4 POWDER, FOR SOLUTION ORAL ONCE
Qty: 4000 ML | Refills: 0 | Status: SHIPPED | OUTPATIENT
Start: 2017-08-21 | End: 2017-08-21

## 2017-09-01 ENCOUNTER — ANESTHESIA EVENT (OUTPATIENT)
Dept: ENDOSCOPY | Facility: HOSPITAL | Age: 57
End: 2017-09-01
Payer: MEDICARE

## 2017-09-01 ENCOUNTER — SURGERY (OUTPATIENT)
Age: 57
End: 2017-09-01

## 2017-09-01 ENCOUNTER — ANESTHESIA (OUTPATIENT)
Dept: ENDOSCOPY | Facility: HOSPITAL | Age: 57
End: 2017-09-01
Payer: MEDICARE

## 2017-09-01 ENCOUNTER — HOSPITAL ENCOUNTER (OUTPATIENT)
Facility: HOSPITAL | Age: 57
Discharge: HOME | End: 2017-09-01
Attending: INTERNAL MEDICINE | Admitting: INTERNAL MEDICINE
Payer: MEDICARE

## 2017-09-01 VITALS
TEMPERATURE: 98 F | BODY MASS INDEX: 28 KG/M2 | OXYGEN SATURATION: 100 % | WEIGHT: 158 LBS | HEART RATE: 75 BPM | RESPIRATION RATE: 16 BRPM | SYSTOLIC BLOOD PRESSURE: 127 MMHG | HEIGHT: 63 IN | DIASTOLIC BLOOD PRESSURE: 79 MMHG | RESPIRATION RATE: 29 BRPM

## 2017-09-01 DIAGNOSIS — Z86.010 HISTORY OF COLON POLYPS: ICD-10-CM

## 2017-09-01 PROBLEM — Z86.0100 HISTORY OF COLON POLYPS: Status: ACTIVE | Noted: 2017-09-01

## 2017-09-01 PROCEDURE — 45385 COLONOSCOPY W/LESION REMOVAL: CPT | Mod: PT,,, | Performed by: INTERNAL MEDICINE

## 2017-09-01 PROCEDURE — 37000008 HC ANESTHESIA 1ST 15 MINUTES: Performed by: INTERNAL MEDICINE

## 2017-09-01 PROCEDURE — 45381 COLONOSCOPY SUBMUCOUS NJX: CPT | Mod: 51,,, | Performed by: INTERNAL MEDICINE

## 2017-09-01 PROCEDURE — 45381 COLONOSCOPY SUBMUCOUS NJX: CPT | Performed by: INTERNAL MEDICINE

## 2017-09-01 PROCEDURE — 27201089 HC SNARE, DISP (ANY): Performed by: INTERNAL MEDICINE

## 2017-09-01 PROCEDURE — 25000003 PHARM REV CODE 250: Performed by: INTERNAL MEDICINE

## 2017-09-01 PROCEDURE — 63600175 PHARM REV CODE 636 W HCPCS: Performed by: NURSE ANESTHETIST, CERTIFIED REGISTERED

## 2017-09-01 PROCEDURE — 88305 TISSUE EXAM BY PATHOLOGIST: CPT | Mod: 26,,, | Performed by: PATHOLOGY

## 2017-09-01 PROCEDURE — 88305 TISSUE EXAM BY PATHOLOGIST: CPT | Performed by: PATHOLOGY

## 2017-09-01 PROCEDURE — 37000009 HC ANESTHESIA EA ADD 15 MINS: Performed by: INTERNAL MEDICINE

## 2017-09-01 PROCEDURE — 45385 COLONOSCOPY W/LESION REMOVAL: CPT | Performed by: INTERNAL MEDICINE

## 2017-09-01 RX ORDER — MIDAZOLAM HYDROCHLORIDE 1 MG/ML
INJECTION, SOLUTION INTRAMUSCULAR; INTRAVENOUS
Status: DISCONTINUED | OUTPATIENT
Start: 2017-09-01 | End: 2017-09-01

## 2017-09-01 RX ORDER — SODIUM CHLORIDE, SODIUM LACTATE, POTASSIUM CHLORIDE, CALCIUM CHLORIDE 600; 310; 30; 20 MG/100ML; MG/100ML; MG/100ML; MG/100ML
INJECTION, SOLUTION INTRAVENOUS CONTINUOUS
Status: DISCONTINUED | OUTPATIENT
Start: 2017-09-01 | End: 2017-09-01 | Stop reason: HOSPADM

## 2017-09-01 RX ORDER — FENTANYL CITRATE 50 UG/ML
INJECTION, SOLUTION INTRAMUSCULAR; INTRAVENOUS
Status: DISCONTINUED | OUTPATIENT
Start: 2017-09-01 | End: 2017-09-01

## 2017-09-01 RX ADMIN — MIDAZOLAM HYDROCHLORIDE 2 MG: 1 INJECTION, SOLUTION INTRAMUSCULAR; INTRAVENOUS at 12:09

## 2017-09-01 RX ADMIN — FENTANYL CITRATE 50 MCG: 50 INJECTION, SOLUTION INTRAMUSCULAR; INTRAVENOUS at 12:09

## 2017-09-01 RX ADMIN — SODIUM CHLORIDE, SODIUM LACTATE, POTASSIUM CHLORIDE, AND CALCIUM CHLORIDE: .6; .31; .03; .02 INJECTION, SOLUTION INTRAVENOUS at 11:09

## 2017-09-01 NOTE — ANESTHESIA POSTPROCEDURE EVALUATION
"Anesthesia Post Evaluation    Patient: Cem Meyers    Procedure(s) Performed: Procedure(s) (LRB):  COLONOSCOPY (N/A)    Final Anesthesia Type: MAC  Patient location during evaluation: GI PACU  Patient participation: Yes- Able to Participate  Level of consciousness: awake and alert  Post-procedure vital signs: reviewed and stable  Pain management: adequate  Airway patency: patent  PONV status at discharge: No PONV  Anesthetic complications: no      Cardiovascular status: hemodynamically stable and blood pressure returned to baseline  Respiratory status: room air, unassisted and spontaneous ventilation  Hydration status: euvolemic  Follow-up not needed.        Visit Vitals  /72   Pulse 89   Temp 36.6 °C (97.8 °F) (Oral)   Resp 10   Ht 5' 3" (1.6 m)   Wt 71.7 kg (158 lb)   SpO2 100%   Breastfeeding? No   BMI 27.99 kg/m²       Pain/Chandler Score: No Data Recorded      "

## 2017-09-01 NOTE — TRANSFER OF CARE
"Anesthesia Transfer of Care Note    Patient: Cem Meyers    Procedure(s) Performed: Procedure(s) (LRB):  COLONOSCOPY (N/A)    Patient location: Other: GI PACU    Anesthesia Type: MAC    Transport from OR: Transported from OR on room air with adequate spontaneous ventilation    Post pain: adequate analgesia    Post assessment: no apparent anesthetic complications    Post vital signs: stable    Level of consciousness: awake    Nausea/Vomiting: no nausea/vomiting    Complications: none    Transfer of care protocol was followed      Last vitals:   Visit Vitals  /72   Pulse 89   Temp 36.6 °C (97.8 °F) (Oral)   Resp 10   Ht 5' 3" (1.6 m)   Wt 71.7 kg (158 lb)   SpO2 100%   Breastfeeding? No   BMI 27.99 kg/m²     "

## 2017-09-01 NOTE — DISCHARGE INSTRUCTIONS

## 2017-09-01 NOTE — ANESTHESIA RELEASE NOTE
"Anesthesia Release from PACU Note    Patient: Cem Meyers    Procedure(s) Performed: Procedure(s) (LRB):  COLONOSCOPY (N/A)    Anesthesia type: MAC    Post pain: Adequate analgesia    Post assessment: no apparent anesthetic complications, tolerated procedure well and no evidence of recall    Last Vitals:   Visit Vitals  /72   Pulse 89   Temp 36.6 °C (97.8 °F) (Oral)   Resp 10   Ht 5' 3" (1.6 m)   Wt 71.7 kg (158 lb)   SpO2 100%   Breastfeeding? No   BMI 27.99 kg/m²       Post vital signs: stable    Level of consciousness: awake    Nausea/Vomiting: no nausea/no vomiting    Complications: none    Airway Patency: patent    Respiratory: unassisted, spontaneous ventilation, room air    Cardiovascular: stable and blood pressure at baseline    Hydration: euvolemic  "

## 2017-09-01 NOTE — PLAN OF CARE
Dangled and dressed. No complaints. Tolerated PO's. Good verbal understanding of discharge instructions.

## 2017-09-01 NOTE — H&P (VIEW-ONLY)
Clinic Consult:  [facility] Consultation Note    Reason for Consult:  The encounter diagnosis was History of colonic polyps.    PCP: Adonis Stubbs       HPI:  This is a 57 y.o. female here for evaluation of the above issues.  Her last colonoscopy was in early 2014.  At that time she had a large serrated adenoma.  A 3 year repeat was recommended.  She denies any new gastrointestinal problems.  She has some chronic constipation issues but recently she has been adjusting her diet and notes that if she eats less meat she has better bowel movements.  She denies any weight loss, fevers, chills, nausea, vomiting, abdominal pain, diarrhea.    ROS:  CONSTITUTIONAL: Denies weight change,  fatigue, fevers, chills, night sweats.  EYES: No changes in vision.   ENT: No oral lesions or sore throat.  HEMATOLOGICAL/Lymph: Denies bleeding tendency, bruising tendency. No swellings or enlarged lymph nodes.  CARDIOVASCULAR: Denies chest pain, shortness of breath, orthopnea and edema.  RESPIRATORY: Denies cough, hemoptysis, dyspnea, and wheezing.  GI: See HPI.  : Denies dysuria and hematuria  MUSCULOSKELETAL: Denies joint pain or swelling, back pain and muscle pain.  SKIN: Denies rashes.  NEUROLOGIC: Denies headaches, seizures and numbness.  PSYCHIATRIC: Denies depression or anxiety.  ENDOCRINE: Denies heat or cold intolerance and excessive thirst or urination.    Medical History:   Past Medical History:   Diagnosis Date    Broken toe 6/2015    left big toe    Closed left arm fracture     Colon polyp     Depression     Hyperlipidemia     Hypothyroid     MS (multiple sclerosis)     Neurogenic bladder 12/6/2012    Osteoporosis     Polyneuropathy     Sleep apnea     Trouble in sleeping        Surgical History:  Past Surgical History:   Procedure Laterality Date    FRACTURE SURGERY Left 2013    wrist- [facility]    [place] Right [place]    in [place]    TONSILLECTOMY  1966       Family History:   Family History  "  Problem Relation Age of Onset    Pancreatic cancer Mother     Stomach cancer Mother     Cancer Mother      pancreatic, stomach    Hypertension Father     Heart disease Father      MI    Lupus Maternal Aunt     Rheum arthritis Maternal Aunt     Rheum arthritis Sister     Melanoma Neg Hx        Social History:   Social History   Substance Use Topics    Smoking status: Never Smoker    Smokeless tobacco: Never Used    Alcohol use No       Allergies: Reviewed    Home Medications:   Medication List with Changes/Refills   New Medications    POLYETHYLENE GLYCOL (GOLYTELY,NULYTELY) 236-22.74-6.74 -5.86 GRAM SUSPENSION    Take 4,000 mLs (4 L total) by mouth once.   Current Medications    BACLOFEN (LIORESAL) 10 MG TABLET    TAKE 1 - 1 & 1/2 TABLETS BY MOUTH THREE TIMES A DAY AS NEEDED    CALCIUM CITRATE-VITAMIN D2 1,500-200 MG-UNIT TAB        CHOLECALCIFEROL, VITAMIN D3, (VITAMIN D3) 4,000 UNIT CAP    Take 14,000 Units by mouth once daily.     FISH OIL-OMEGA-3 FATTY ACIDS 300-1,000 MG CAPSULE    Take 2 g by mouth once daily.    GABAPENTIN (NEURONTIN) 400 MG CAPSULE    Take 2 capsules (800 mg total) by mouth 3 (three) times daily.    INTERFERON BETA-1A, ALBUMIN, (REBIF) 44 MCG/0.5 ML INJECTION    Inject 0.5 mLs (44 mcg total) into the skin 3 (three) times a week.    LEVOTHYROXINE (SYNTHROID) 75 MCG TABLET    TAKE ONE TABLET BY MOUTH EVERY MORNING    MULTIVITAMIN CAPSULE    Take 1 capsule by mouth once daily.    PAROXETINE (PAXIL) 30 MG TABLET    TAKE ONE TABLET BY MOUTH ONCE DAILY    SIMVASTATIN (ZOCOR) 40 MG TABLET    TAKE ONE TABLET BY MOUTH EVERY DAY   Discontinued Medications    CALCIUM CITRATE-VITAMIN D2 1,500-200 MG-UNIT TAB        CHOLECALCIFERAL (CHOLECALCIFERAL) 100,000 IU/ML INJECTTION        CHOLECALCIFERAL (CHOLECALCIFERAL) 100,000 IU/ML INJECTTION        MUPIROCIN (BACTROBAN) 2 % OINTMENT    Apply topically 3 (three) times daily.         Physical Exam:  Vital Signs:  BP 94/62   Pulse 72   Ht 5' 3.6" " (1.615 m)   Wt 72.4 kg (159 lb 9.8 oz)   BMI 27.74 kg/m²   Body mass index is 27.74 kg/m².      GENERAL: No acute distress, A&Ox4  EYES: Anicteric, no pallor noted.  ENT: OP clear  NECK: Supple, no masses, no thyromegally.  CHEST: Equal breath sounds bilaterally, no wheezing.  CARDIOVASCULAR: Regular rate and rhythm. Murmurs, rubs and gallops absent.  ABDOMEN: soft, non-tender, non-distended, normal bowel sounds, no hepatosplenomegaly   EXTREMITIES: No clubbing, cyanosis or edema.  SKIN: Without lesion.  LYMPH: No cervical, axillary lymphadenopathy palpable.   NEUROLOGICAL: Grossly normal.    Labs: Pertinent labs reviewed.  Normal CBC earlier this year.    Endoscopy:  Not applicable    CRC Screening: Due for colonoscopy    Assessment:  1. History of colonic polyps         Recommendations:  1.  History of polyps: Schedule colonoscopy in the near future.  She has had issues with prep in the past.  We will use GoLYTELY split dose prep as well as have her take a bottle of magnesium citrate at noon the day before the procedure.  This will be combined with a low fiber diet for a week prior to the procedure.    Follow up based on the above evaluation.    Order summary:  No orders of the defined types were placed in this encounter.      Thank you so much for allowing me to participate in the care of Cem Villar MD

## 2017-09-01 NOTE — DISCHARGE SUMMARY
[facility]  Brief Operative Note     SUMMARY     Surgery Date: 9/1/2017     Surgeon(s) and Role:     * Andriy Villar MD - Primary    Assisting Surgeon: None    Pre-op Diagnosis:  History of colonic polyps [Z86.010]    Post-op Diagnosis:  Post-Op Diagnosis Codes:     * History of colonic polyps [Z86.010]    Procedure(s) (LRB):  COLONOSCOPY (N/A)    Anesthesia: Monitor Anesthesia Care    Description of the findings of the procedure: Procedure completed. See Procedure note for details.     Findings/Key Components: Procedure completed. See Procedure note for details.     Prosthesis/Implants: None    Estimated Blood Loss: less than 10         Specimens:   Specimen (12h ago through future)    Start     Ordered    09/01/17 1224  Specimen to Pathology - Surgery  Once     Comments:  1. Ascending colon polyp      09/01/17 1224          Discharge Note    SUMMARY     Admit Date: 9/1/2017    Discharge Date and Time:  09/01/2017 12:26 PM    Hospital Course (synopsis of major diagnoses, care, treatment, and services provided during the course of the hospital stay): Procedure completed. See Procedure note for details.      Final Diagnosis: Post-Op Diagnosis Codes:     * History of colonic polyps [Z86.010]    Disposition: Home or Self Care    Follow Up/Patient Instructions:     Medications:  Reconciled Home Medications:   Current Discharge Medication List      CONTINUE these medications which have NOT CHANGED    Details   baclofen (LIORESAL) 10 MG tablet TAKE 1 - 1 & 1/2 TABLETS BY MOUTH THREE TIMES A DAY AS NEEDED  Qty: 135 tablet, Refills: 5      calcium citrate-vitamin D2 1,500-200 mg-unit Tab       cholecalciferol, vitamin D3, (VITAMIN D3) 4,000 unit Cap Take 14,000 Units by mouth once daily.       fish oil-omega-3 fatty acids 300-1,000 mg capsule Take 2 g by mouth once daily.      gabapentin (NEURONTIN) 400 MG capsule Take 2 capsules (800 mg total) by mouth 3 (three) times daily.  Qty: 180 capsule,  Refills: 5    Associated Diagnoses: MS (multiple sclerosis)      interferon beta-1a, albumin, (REBIF) 44 mcg/0.5 mL injection Inject 0.5 mLs (44 mcg total) into the skin 3 (three) times a week.  Qty: 18 mL, Refills: 1    Associated Diagnoses: Multiple sclerosis      levothyroxine (SYNTHROID) 75 MCG tablet TAKE ONE TABLET BY MOUTH EVERY MORNING  Qty: 30 tablet, Refills: 6      multivitamin capsule Take 1 capsule by mouth once daily.      paroxetine (PAXIL) 30 MG tablet TAKE ONE TABLET BY MOUTH ONCE DAILY  Qty: 30 tablet, Refills: 5    Associated Diagnoses: MS (multiple sclerosis)      simvastatin (ZOCOR) 40 MG tablet TAKE ONE TABLET BY MOUTH EVERY DAY  Qty: 30 tablet, Refills: 6             Discharge Procedure Orders  Diet general     Activity as tolerated       Follow-up Information     Adonis Stubbs MD.    Specialty:  Family Medicine  Contact information:  [address] [address] [phone]

## 2017-09-01 NOTE — ANESTHESIA PREPROCEDURE EVALUATION
09/01/2017  Cem Meyers is a 57 y.o., female.    Anesthesia Evaluation    I have reviewed the Patient Summary Reports.    I have reviewed the Nursing Notes.      Review of Systems  Anesthesia Hx:  No problems with previous Anesthesia Denies Hx of Anesthetic complications  History of prior surgery of interest to airway management or planning: Previous anesthesia: General, MAC Denies Family Hx of Anesthesia complications.   Denies Personal Hx of Anesthesia complications.   Social:  Non-Smoker, No Alcohol Use    Hematology/Oncology:  Hematology Normal   Oncology Normal     Cardiovascular:  Cardiovascular Normal     Pulmonary:  Pulmonary Normal    Renal/:  Renal/ Normal     Hepatic/GI:  Hepatic/GI Normal    Musculoskeletal:  Musculoskeletal Normal    Neurological:   Neuromuscular Disease,    Endocrine:   Hypothyroidism    Dermatological:  Skin Normal    Psych:   depression          Physical Exam  General:  Well nourished    Airway/Jaw/Neck:  Airway Findings: Mouth Opening: Normal Tongue: Normal  General Airway Assessment: Adult  Mallampati: I  TM Distance: Normal, at least 6 cm      Dental:  Dental Findings: In tact   Chest/Lungs:  Chest/Lungs Findings: Clear to auscultation, Normal Respiratory Rate     Heart/Vascular:  Heart Findings: Rate: Normal  Rhythm: Regular Rhythm  Sounds: Normal        Mental Status:  Mental Status Findings:  Cooperative, Alert and Oriented         Anesthesia Plan  Type of Anesthesia, risks & benefits discussed:  Anesthesia Type:  MAC  Patient's Preference:   Intra-op Monitoring Plan: standard ASA monitors  Intra-op Monitoring Plan Comments:   Post Op Pain Control Plan:   Post Op Pain Control Plan Comments:   Induction:   IV  Beta Blocker:  Patient is not currently on a Beta-Blocker (No further documentation required).       Informed Consent: Patient understands risks and  agrees with Anesthesia plan.  Questions answered. Anesthesia consent signed with patient.  ASA Score: 2     Day of Surgery Review of History & Physical: I have interviewed and examined the patient. I have reviewed the patient's H&P dated: 9-1-17.           Ready For Surgery From Anesthesia Perspective.

## 2017-09-05 ENCOUNTER — TELEPHONE (OUTPATIENT)
Dept: RHEUMATOLOGY | Facility: CLINIC | Age: 57
End: 2017-09-05

## 2017-09-05 NOTE — TELEPHONE ENCOUNTER
Spoke with pt and she was returning a call regarding an appointment but she is scheduled for 9.27.17. Advised patient we would see her then.

## 2017-09-05 NOTE — TELEPHONE ENCOUNTER
----- Message from Thania Gil sent at 9/5/2017  2:08 PM CDT -----  Contact: pt  States she's returning a call, she has an appt on 9/27 at 10:30. Thank you

## 2017-09-11 ENCOUNTER — HOSPITAL ENCOUNTER (OUTPATIENT)
Dept: RADIOLOGY | Facility: HOSPITAL | Age: 57
Discharge: HOME | End: 2017-09-11
Attending: NURSE PRACTITIONER
Payer: MEDICARE

## 2017-09-11 VITALS — WEIGHT: 158 LBS | BODY MASS INDEX: 28 KG/M2 | HEIGHT: 63 IN

## 2017-09-11 DIAGNOSIS — Z12.31 ENCOUNTER FOR SCREENING MAMMOGRAM FOR BREAST CANCER: ICD-10-CM

## 2017-09-11 PROCEDURE — 77067 SCR MAMMO BI INCL CAD: CPT | Mod: TC

## 2017-09-11 PROCEDURE — 77067 SCR MAMMO BI INCL CAD: CPT | Mod: 26,,, | Performed by: RADIOLOGY

## 2017-09-11 PROCEDURE — 77063 BREAST TOMOSYNTHESIS BI: CPT | Mod: 26,,, | Performed by: RADIOLOGY

## 2017-09-18 ENCOUNTER — TELEPHONE (OUTPATIENT)
Dept: RHEUMATOLOGY | Facility: HOSPITAL | Age: 57
End: 2017-09-18

## 2017-09-18 NOTE — TELEPHONE ENCOUNTER
Called patient to schedule Reclast infusion; states no longer wants to have infusion; follow up scheduled with Dr DARBY for 9.27.17.

## 2017-09-24 DIAGNOSIS — G35 MULTIPLE SCLEROSIS: ICD-10-CM

## 2017-09-25 RX ORDER — INTERFERON BETA-1A 44 UG/.5ML
INJECTION, SOLUTION SUBCUTANEOUS
Qty: 1 ML | Status: SHIPPED | OUTPATIENT
Start: 2017-09-25 | End: 2018-01-16 | Stop reason: SDUPTHER

## 2017-09-27 ENCOUNTER — OFFICE VISIT (OUTPATIENT)
Dept: RHEUMATOLOGY | Facility: CLINIC | Age: 57
End: 2017-09-27
Payer: MEDICARE

## 2017-09-27 ENCOUNTER — LAB VISIT (OUTPATIENT)
Dept: LAB | Facility: HOSPITAL | Age: 57
End: 2017-09-27
Attending: INTERNAL MEDICINE
Payer: MEDICARE

## 2017-09-27 VITALS
WEIGHT: 158.31 LBS | SYSTOLIC BLOOD PRESSURE: 117 MMHG | BODY MASS INDEX: 28.04 KG/M2 | HEART RATE: 69 BPM | DIASTOLIC BLOOD PRESSURE: 78 MMHG

## 2017-09-27 DIAGNOSIS — M81.0 AGE-RELATED OSTEOPOROSIS WITHOUT CURRENT PATHOLOGICAL FRACTURE: ICD-10-CM

## 2017-09-27 DIAGNOSIS — M81.0 AGE-RELATED OSTEOPOROSIS WITHOUT CURRENT PATHOLOGICAL FRACTURE: Primary | ICD-10-CM

## 2017-09-27 LAB
ALBUMIN SERPL BCP-MCNC: 3.6 G/DL
ALP SERPL-CCNC: 70 U/L
ALT SERPL W/O P-5'-P-CCNC: 18 U/L
ANION GAP SERPL CALC-SCNC: 8 MMOL/L
AST SERPL-CCNC: 35 U/L
BILIRUB SERPL-MCNC: 0.3 MG/DL
BUN SERPL-MCNC: 20 MG/DL
CALCIUM SERPL-MCNC: 9.7 MG/DL
CHLORIDE SERPL-SCNC: 106 MMOL/L
CO2 SERPL-SCNC: 24 MMOL/L
CREAT SERPL-MCNC: 0.9 MG/DL
EST. GFR  (AFRICAN AMERICAN): >60 ML/MIN/1.73 M^2
EST. GFR  (NON AFRICAN AMERICAN): >60 ML/MIN/1.73 M^2
GLUCOSE SERPL-MCNC: 148 MG/DL
POTASSIUM SERPL-SCNC: 4.5 MMOL/L
PROT SERPL-MCNC: 7.4 G/DL
SODIUM SERPL-SCNC: 138 MMOL/L

## 2017-09-27 PROCEDURE — 99214 OFFICE O/P EST MOD 30 MIN: CPT | Mod: S$GLB,,, | Performed by: INTERNAL MEDICINE

## 2017-09-27 PROCEDURE — 36415 COLL VENOUS BLD VENIPUNCTURE: CPT | Mod: PO

## 2017-09-27 PROCEDURE — 99499 UNLISTED E&M SERVICE: CPT | Mod: S$GLB,,, | Performed by: INTERNAL MEDICINE

## 2017-09-27 PROCEDURE — 99999 PR PBB SHADOW E&M-EST. PATIENT-LVL III: CPT | Mod: PBBFAC,,, | Performed by: INTERNAL MEDICINE

## 2017-09-27 PROCEDURE — 80053 COMPREHEN METABOLIC PANEL: CPT | Mod: PO

## 2017-09-27 PROCEDURE — 3008F BODY MASS INDEX DOCD: CPT | Mod: S$GLB,,, | Performed by: INTERNAL MEDICINE

## 2017-09-27 NOTE — PROGRESS NOTES
RHEUMATOLOGY CLINIC FOLLOW UP VISIT  Chief complaints:-  To discuss treatment for osteoporosis.    HPI:-  Cem Fernandes a 57 y.o. pleasant female comes in with above chief complaints.  She has multiple medical problems as reviewed below including multiple sclerosis.  Due to multiple sclerosis and multiple other medical problems she had difficulty taking ORAL FOSAMAX once every week and sitting upright for at least 30 minutes. So she was given reclast last year. She developed arthralgias after reclast which subsided in a week.   She denies any falls or fractures since last visit .  No recurrent invasive dental procedures.     Review of Systems   Constitutional: Positive for malaise/fatigue. Negative for chills, fever and weight loss.   HENT: Negative for ear discharge, ear pain, hearing loss, nosebleeds and sore throat.    Eyes: Negative for blurred vision, double vision, photophobia, discharge and redness.   Respiratory: Negative for cough, hemoptysis, sputum production and shortness of breath.    Cardiovascular: Negative for chest pain, palpitations and claudication.   Gastrointestinal: Negative for abdominal pain, constipation, diarrhea, melena, nausea and vomiting.   Genitourinary: Negative for dysuria, frequency, hematuria and urgency.   Musculoskeletal: Negative for back pain, falls, joint pain, myalgias and neck pain.   Skin: Negative for itching and rash.   Neurological: Positive for tingling, sensory change, focal weakness and weakness. Negative for dizziness, tremors, speech change, seizures, loss of consciousness and headaches.   Endo/Heme/Allergies: Negative for environmental allergies. Does not bruise/bleed easily.   Psychiatric/Behavioral: Negative for hallucinations and memory loss. The patient does not have insomnia.        Past Medical History:   Diagnosis Date    Broken toe 6/2015    left big toe    Closed left arm fracture     Colon polyp     Depression     Hyperlipidemia      Hypothyroid     MS (multiple sclerosis)     Neurogenic bladder 12/6/2012    Osteoporosis     Polyneuropathy     Sleep apnea     Trouble in sleeping        Past Surgical History:   Procedure Laterality Date    COLONOSCOPY N/A 9/1/2017    Procedure: COLONOSCOPY;  Surgeon: Andriy Villar MD;  Location: [facility];  Service: Endoscopy;  Laterality: N/A;    FRACTURE SURGERY Left 2013    wrist- [facility]    [place] Right 1989    in [place]    TONSILLECTOMY  1966        Social History   Substance Use Topics    Smoking status: Never Smoker    Smokeless tobacco: Never Used    Alcohol use No       Family History   Problem Relation Age of Onset    Pancreatic cancer Mother     Stomach cancer Mother     Cancer Mother      pancreatic, stomach    Hypertension Father     Heart disease Father      MI    Lupus Maternal Aunt     Rheum arthritis Maternal Aunt     Rheum arthritis Sister     Melanoma Neg Hx        Allergies   Allergen Reactions    Latex, Natural Rubber Rash           Physical examination:-    There were no vitals filed for this visit.    Physical Exam   Constitutional: She is oriented to person, place, and time and well-developed, well-nourished, and in no distress. No distress.   HENT:   Head: Normocephalic and atraumatic.   Nose: Nose normal.   Mouth/Throat: Oropharynx is clear and moist. No oropharyngeal exudate.   Eyes: Conjunctivae and EOM are normal. Pupils are equal, round, and reactive to light. Right eye exhibits no discharge. Left eye exhibits no discharge. No scleral icterus.   Neck: Normal range of motion. Neck supple.   Cardiovascular: Normal rate and intact distal pulses.    Pulmonary/Chest: Effort normal. No respiratory distress. She exhibits no tenderness.   Abdominal: Soft. There is no tenderness.   Musculoskeletal:   No synovitis over small joints of hands or feet.  No effusion over the last joints.   Lymphadenopathy:     She has no cervical adenopathy.   Neurological: She is alert  and oriented to person, place, and time.   Significant motor weakness over left side from multiple sclerosis.   Skin: Skin is warm. She is not diaphoretic. No erythema. No pallor.   Psychiatric: Mood and affect normal.   Nursing note and vitals reviewed.      Labs:-  Normal vitamin D.  Normal renal functions.    Assessment/Plans:-  # Osteoporosis:-  Significant osteoporosis with difficulty taking oral alendronate therapy due to multiple medical problems including multiple sclerosis . She took reclast and had allergic reaction to it. She does not want to try it again. Try Prolia.  Discussed in detail about all adverse effects of the medication including osteonecrosis of the jaw and atypical femoral fractures.  No plans for invasive dental procedures at this time or any other future.  - Comprehensive metabolic panel; Standing     # RTC in 6 months after first prolia.  Disclaimer: This note was prepared using voice recognition system and is likely to have sound alike errors and is not proof read.  Please call me with any questions.

## 2017-09-27 NOTE — PATIENT INSTRUCTIONS
Denosumab injection  What is this medicine?  DENOSUMAB (den oh sadi mab) slows bone breakdown. Prolia is used to treat osteoporosis in women after menopause and in men. Xgeva is used to prevent bone fractures and other bone problems caused by cancer bone metastases. Xgeva is also used to treat giant cell tumor of the bone.  How should I use this medicine?  This medicine is for injection under the skin. It is given by a health care professional in a hospital or clinic setting.  If you are getting Prolia, a special MedGuide will be given to you by the pharmacist with each prescription and refill. Be sure to read this information carefully each time.  For Prolia, talk to your pediatrician regarding the use of this medicine in children. Special care may be needed. For Xgeva, talk to your pediatrician regarding the use of this medicine in children. While this drug may be prescribed for children as young as 13 years for selected conditions, precautions do apply.  What side effects may I notice from receiving this medicine?  Side effects that you should report to your doctor or health care professional as soon as possible:  · allergic reactions like skin rash, itching or hives, swelling of the face, lips, or tongue  · breathing problems  · chest pain  · fast, irregular heartbeat  · feeling faint or lightheaded, falls  · fever, chills, or any other sign of infection  · muscle spasms, tightening, or twitches  · numbness or tingling  · skin blisters or bumps, or is dry, peels, or red  · slow healing or unexplained pain in the mouth or jaw  · unusual bleeding or bruising  Side effects that usually do not require medical attention (Report these to your doctor or health care professional if they continue or are bothersome.):  · muscle pain  · stomach upset, gas  What may interact with this medicine?  Do not take this medicine with any of the following medications:  · other medicines containing denosumab  This medicine may also  interact with the following medications:  · medicines that suppress the immune system  · medicines that treat cancer  · steroid medicines like prednisone or cortisone  What if I miss a dose?  It is important not to miss your dose. Call your doctor or health care professional if you are unable to keep an appointment.  Where should I keep my medicine?  This medicine is only given in a clinic, doctor's office, or other health care setting and will not be stored at home.  What should I tell my health care provider before I take this medicine?  They need to know if you have any of these conditions:  · dental disease  · eczema  · infection or history of infections  · kidney disease or on dialysis  · low blood calcium or vitamin D  · malabsorption syndrome  · scheduled to have surgery or tooth extraction  · taking medicine that contains denosumab  · thyroid or parathyroid disease  · an unusual reaction to denosumab, other medicines, foods, dyes, or preservatives  · pregnant or trying to get pregnant  · breast-feeding  What should I watch for while using this medicine?  Visit your doctor or health care professional for regular checks on your progress. Your doctor or health care professional may order blood tests and other tests to see how you are doing.  Call your doctor or health care professional if you get a cold or other infection while receiving this medicine. Do not treat yourself. This medicine may decrease your body's ability to fight infection.  You should make sure you get enough calcium and vitamin D while you are taking this medicine, unless your doctor tells you not to. Discuss the foods you eat and the vitamins you take with your health care professional.  See your dentist regularly. Brush and floss your teeth as directed. Before you have any dental work done, tell your dentist you are receiving this medicine.  Do not become pregnant while taking this medicine or for 5 months after stopping it. Women should  inform their doctor if they wish to become pregnant or think they might be pregnant. There is a potential for serious side effects to an unborn child. Talk to your health care professional or pharmacist for more information.  NOTE:This sheet is a summary. It may not cover all possible information. If you have questions about this medicine, talk to your doctor, pharmacist, or health care provider. Copyright© 2017 [employer]        What Is Osteoporosis?  Osteoporosis is a disease that weakens the bones. Weakened bones are more likely to fracture (break). Osteoporosis affects men and women, but postmenopausal women are most at risk. To help prevent osteoporosis, you need to exercise and nourish your bones throughout your life.    Childhood  The body builds the most bone during these years. That's why boys and girls need foods rich in calcium. They also need plenty of exercise. A healthy diet and exercise helps bones grow strong.  Young adulthood to age 30  During young adulthood, bones become their strongest. This is called peak bone mass. The same good habits that kept bones healthy in childhood help keep bone healthy in adulthood.  Age 30 to menopause  Bone mass declines slightly during these years. Your body makes just enough new bone to maintain peak bone mass. To keep your bones at their peak mass, be sure to exercise and get plenty of calcium.  After menopause  Menopause is when a woman stops having monthly periods. After menopause, the body makes less estrogen (female hormone). This increases bone loss. At this point, treatment may be needed to reduce the risk for fracture. Exercise and calcium can also help keep your bones strong.  Later in life  In later years, both men and women need to take extra care of their bones. By this point, the body loses more bone than it makes. If too much bone is lost, you may be at risk for fractures. With age, the quality and quantity of bone declines. You can lessen bone  loss by staying active and increasing your calcium intake. Calcium supplements and other osteoporosis treatments do have risks, so talk to your healthcare provider if you have concerns. If you have osteoporosis, you can also learn ways to increase everyday safety.  Date Last Reviewed: 10/17/2015  ©2007 [employer]. [address] [place] [id number]. All Rights reserved. This information is not intended as a substitute for professional medical care. Always follow your healthcare providers instructions.        Preventing Osteoporosis: Meeting Your Calcium Needs    Your body needs calcium to build and repair bones. But it can't make calcium on its own. That's why it's important to eat calcium-rich foods. Some foods are naturally rich in calcium. Others have calcium added (fortified). It's best to get calcium from the foods you eat. But if you can't get enough, you may want to take calcium supplements. To meet your daily calcium needs, try the foods listed below.  Dairy Fish & beans Other sources      Source   Calcium (mg) per serving   Source   Calcium (mg) per serving   Source   Calcium (mg) per serving      Low-fat yogurt, plain   415 mg/8 oz.   Sardines, Atlantic, canned, with bones   351 mg/3 oz.   Oatmeal, instant, fortified   215 mg/1 cup   Nonfat milk   302 mg/1 cup   [place], sockeye, canned, with bones   239 mg/3 oz.   Tofu made with calcium sulfate   204 mg/3 oz.   Low-fat milk   297 mg/1 cup   Soybeans, fresh, boiled   131 mg/1/2 cup   Collards   179 mg/1/2 cup   Swiss cheese   272 mg/1 oz.   White beans, cooked   81 mg/1/2 cup   English muffin, whole wheat   175 mg/1 muffin   Cheddar cheese   205 mg/1 oz.   Navy beans, cooked   79 mg/1/2 cup   Kale   90 mg/1/2 cup   Ice cream strawberry   79 mg/1/2 cup           Orange, navel   56 mg/1 medium   Note: Calcium levels may vary depending on brand and size.  Daily calcium needs  14-18 years old: 1,300 mg  19-30 years old: 1,000 mg  31-50  years old: 1,000 mg  51-70 years old, women: 1,200 mg  51-70 years old, men: 1,000 mg  Pregnant or nursin-28 years old: 1,300 mg, 19-50 years old: 1,000 mg  Older than 70 (women and men): 1,200 mg   Date Last Reviewed: 10/17/2015  © 7728-6554 [employer]. [address] [place] [address], [place] [address]. All rights reserved. This information is not intended as a substitute for professional medical care. Always follow your healthcare professional's instructions.

## 2017-10-05 ENCOUNTER — CLINICAL SUPPORT (OUTPATIENT)
Dept: RHEUMATOLOGY | Facility: CLINIC | Age: 57
End: 2017-10-05
Payer: MEDICARE

## 2017-10-05 ENCOUNTER — TELEPHONE (OUTPATIENT)
Dept: RHEUMATOLOGY | Facility: CLINIC | Age: 57
End: 2017-10-05

## 2017-10-05 PROCEDURE — 96372 THER/PROPH/DIAG INJ SC/IM: CPT | Mod: S$GLB,,, | Performed by: INTERNAL MEDICINE

## 2017-10-05 NOTE — TELEPHONE ENCOUNTER
Missael told me that pt was not feeling good. Pt stated she was light headed after receiving prolia injection earlier this morning. Took pts vitals B/P 105/71 Pulse 70 pt states that is what it usually runs. Gave pt some water and told Susan Tristan findings. Susan stated she will see pt . Also CC Dr.T. Davis went in to assess pt pt vitals were 113/77 pulse 65 pt was starting to feel better, told Susan she was going home and rest. Susan told pt to make sure she eats and when she returns in 6 months to make sure she eats and bring someone with her to her appt. Pt verbalized understanding.

## 2017-10-05 NOTE — PROGRESS NOTES
Administered 1cc Prolia 60mg/cc to RLQ of abdomen. Pt. Tolerated well. No acute reaction noted to site. Pt. Instructed on S/S of reaction to report. Pt. Verbalized understanding.    Lot:[id number]  Exp:10/19  Manu:valarie

## 2017-10-09 ENCOUNTER — TELEPHONE (OUTPATIENT)
Dept: RHEUMATOLOGY | Facility: CLINIC | Age: 57
End: 2017-10-09

## 2017-10-09 NOTE — TELEPHONE ENCOUNTER
Called pt to check on her after getting her prolia injection. Pt states she is feeling better. No further s/s npoed after receiving her injection.

## 2017-10-13 ENCOUNTER — PATIENT MESSAGE (OUTPATIENT)
Dept: RHEUMATOLOGY | Facility: CLINIC | Age: 57
End: 2017-10-13

## 2017-10-17 ENCOUNTER — TELEPHONE (OUTPATIENT)
Dept: RHEUMATOLOGY | Facility: CLINIC | Age: 57
End: 2017-10-17

## 2017-10-17 NOTE — TELEPHONE ENCOUNTER
Called patient regarding appointment on 4.6.18 at 11 am needing to be rescheduled due to Dr. Chang being out of clinic that day. No answer, left message for pt to call clinic back.

## 2017-10-18 NOTE — TELEPHONE ENCOUNTER
Called patient regarding appointment on 4.6.18 at 11 am needing to be rescheduled due to Dr. Chang being out of clinic that day. Left message for pt to call clinic back.

## 2017-10-22 DIAGNOSIS — G35 MS (MULTIPLE SCLEROSIS): ICD-10-CM

## 2017-10-24 RX ORDER — GABAPENTIN 400 MG/1
CAPSULE ORAL
Qty: 180 CAPSULE | Refills: 5 | Status: SHIPPED | OUTPATIENT
Start: 2017-10-24 | End: 2018-03-05 | Stop reason: SDUPTHER

## 2017-10-31 ENCOUNTER — PATIENT MESSAGE (OUTPATIENT)
Dept: NEUROLOGY | Facility: CLINIC | Age: 57
End: 2017-10-31

## 2017-11-02 ENCOUNTER — OFFICE VISIT (OUTPATIENT)
Dept: INTERNAL MEDICINE | Facility: CLINIC | Age: 57
End: 2017-11-02
Payer: MEDICARE

## 2017-11-02 VITALS
HEART RATE: 86 BPM | OXYGEN SATURATION: 98 % | BODY MASS INDEX: 28.05 KG/M2 | WEIGHT: 158.31 LBS | DIASTOLIC BLOOD PRESSURE: 76 MMHG | SYSTOLIC BLOOD PRESSURE: 118 MMHG | HEIGHT: 63 IN

## 2017-11-02 DIAGNOSIS — F32.0 MAJOR DEPRESSIVE DISORDER, SINGLE EPISODE, MILD: ICD-10-CM

## 2017-11-02 DIAGNOSIS — Z86.010 HISTORY OF COLON POLYPS: ICD-10-CM

## 2017-11-02 DIAGNOSIS — M62.838 MUSCLE SPASM: ICD-10-CM

## 2017-11-02 DIAGNOSIS — M81.0 AGE-RELATED OSTEOPOROSIS WITHOUT CURRENT PATHOLOGICAL FRACTURE: ICD-10-CM

## 2017-11-02 DIAGNOSIS — E03.4 HYPOTHYROIDISM DUE TO ACQUIRED ATROPHY OF THYROID: Chronic | ICD-10-CM

## 2017-11-02 DIAGNOSIS — Z00.00 ENCOUNTER FOR PREVENTIVE HEALTH EXAMINATION: Primary | ICD-10-CM

## 2017-11-02 DIAGNOSIS — E55.9 VITAMIN D INSUFFICIENCY: ICD-10-CM

## 2017-11-02 DIAGNOSIS — R26.9 GAIT DISTURBANCE: ICD-10-CM

## 2017-11-02 DIAGNOSIS — G47.30 SLEEP APNEA, UNSPECIFIED TYPE: ICD-10-CM

## 2017-11-02 DIAGNOSIS — E78.5 HYPERLIPIDEMIA, UNSPECIFIED HYPERLIPIDEMIA TYPE: Chronic | ICD-10-CM

## 2017-11-02 DIAGNOSIS — G35 MS (MULTIPLE SCLEROSIS): Chronic | ICD-10-CM

## 2017-11-02 DIAGNOSIS — G62.9 POLYNEUROPATHY: ICD-10-CM

## 2017-11-02 PROBLEM — Z71.89 COUNSELING REGARDING GOALS OF CARE: Status: RESOLVED | Noted: 2017-03-24 | Resolved: 2017-11-02

## 2017-11-02 PROBLEM — R68.84 JAW PAIN: Status: RESOLVED | Noted: 2017-04-11 | Resolved: 2017-11-02

## 2017-11-02 PROCEDURE — 99999 PR PBB SHADOW E&M-EST. PATIENT-LVL IV: CPT | Mod: PBBFAC,,, | Performed by: NURSE PRACTITIONER

## 2017-11-02 PROCEDURE — 99499 UNLISTED E&M SERVICE: CPT | Mod: S$GLB,,, | Performed by: NURSE PRACTITIONER

## 2017-11-02 PROCEDURE — G0402 INITIAL PREVENTIVE EXAM: HCPCS | Mod: S$GLB,,, | Performed by: NURSE PRACTITIONER

## 2017-11-02 NOTE — Clinical Note
Your patient was seen today for a HRA visit. Abnormalities have been identified during this visit that may require additional testing and follow up. I have included a copy of my visit note, please review the note and feel free to contact me with any questions.  Thank you for allowing me to participate in the care of your patients.  Yana Olson NP

## 2017-11-02 NOTE — PATIENT INSTRUCTIONS
Counseling and Referral of Other Preventative  (Italic type indicates deductible and co-insurance are waived)    Patient Name: Cem Meyers  Today's Date: 11/2/2017      SERVICE LIMITATIONS RECOMMENDATION    Vaccines    · Pneumococcal (once after 65)    · Influenza (annually)    · Hepatitis B (if medium/high risk)    · Prevnar 13      Hepatitis B medium/high risk factors:       - End-stage renal disease       - Hemophiliacs who received Factor VII or         IX concentrates       - Clients of institutions for the mentally             retarded       - Persons who live in the same house as          a HepB carrier       - Homosexual men       - Illicit injectable drug abusers     Pneumococcal: N/A     Influenza: Done, repeat in one year     Hepatitis B: N/A     Prevnar 13: N/A    Mammogram (biennial age 50-74)  Annually (age 40 or over)  Recommended to patient, declined    Pap (up to age 70 and after 70 if unknown history or abnormal study last 10 years)    7/ 2015 due agin  7/ 2018     The USPSTF recommends against screening for cervical cancer in women older than age 65 years who have had adequate prior screening and are not otherwise at high risk for cervical cancer.      Colorectal cancer screening (to age 75)    · Fecal occult blood test (annual)  · Flexible sigmoidoscopy (5y)  · Screening colonoscopy (10y)  · Barium enema   9/ 2017  Due in 9/ 2020    Diabetes self-management training (no USPSTF recommendations)  Requires referral by treating physician for patient with diabetes or renal disease. 10 hours of initial DSMT sessions of no less than 30 minutes each in a continuous 12-month period. 2 hours of follow-up DSMT in subsequent years.  N/A    Bone mass measurements (age 65 & older, biennial)  Requires diagnosis related to osteoporosis or estrogen deficiency. Biennial benefit unless patient has history of long-term glucocorticoid  9/ 2016 due  9/ 2018    Glaucoma screening (no USPSTF recommendation)  Diabetes  mellitus, family history   , age 50 or over    American, age 65 or over  x 2 years - decline this year    Medical nutrition therapy for diabetes or renal disease (no recommended schedule)  Requires referral by treating physician for patient with diabetes or renal disease or kidney transplant within the past 3 years.  Can be provided in same year as diabetes self-management training (DSMT), and CMS recommends medical nutrition therapy take place after DSMT. Up to 3 hours for initial year and 2 hours in subsequent years.  N/A    Cardiovascular screening blood tests (every 5 years)  · Fasting lipid panel  Order as a panel if possible  Done this year, repeat every year    Diabetes screening tests (at least every 3 years, Medicare covers annually or at 6-month intervals for prediabetic patients)  · Fasting blood sugar (FBS) or glucose tolerance test (GTT)  Patient must be diagnosed with one of the following:       - Hypertension       - Dyslipidemia       - Obesity (BMI 30kg/m2)       - Previous elevated impaired FBS or GTT       ... or any two of the following:       - Overweight (BMI 25 but <30)       - Family history of diabetes       - Age 65 or older       - History of gestational diabetes or birth of baby weighing more than 9 pounds  Done this year, repeat every year    HIV screening (annually for increased risk patients)  · HIV-1 and HIV-2 by EIA, or JERRY, rapid antibody test or oral mucosa transudate  Patients must be at increased risk for HIV infection per USPSTF guidelines or pregnant. Tests covered annually for patient at increased risk or as requested by the patient. Pregnant patients may receive up to 3 tests during pregnancy.  Risks discussed, screening is not recommended    Smoking cessation counseling (up to 8 sessions per year)  Patients must be asymptomatic of tobacco-related conditions to receive as a preventative service.  Non-smoker    Subsequent annual wellness visit  At  least 12 months since last AWV  Return in one year     The following information is provided to all patients.  This information is to help you find resources for any of the problems found today that may be affecting your health:                Living healthy guide: www.[facility].[place].[facility]      Understanding Diabetes: www.diabetes.org      Eating healthy: www.cdc.gov/healthyweight      [facility] home safety checklist: www.cdc.gov/steadi/patient.html      Agency on Aging: www.goea.[place].[facility]      Alcoholics anonymous (AA): www.aa.org      Physical Activity: www.geraldo.nih.gov/ho5punq      Tobacco use: www.quitwithusla.org

## 2017-11-02 NOTE — PROGRESS NOTES
"Cem Meyers presented for a  Medicare AWV and comprehensive Health Risk Assessment today. The following components were reviewed and updated:    · Medical history  · Family History  · Social history  · Allergies and Current Medications  · Health Risk Assessment  · Health Maintenance  · Care Team     ** See Completed Assessments for Annual Wellness Visit within the encounter summary.**       The following assessments were completed:  · Living Situation  · CAGE  · Depression Screening  · Timed Get Up and Go  · Whisper Test  · Cognitive Function Screening  · Nutrition Screening  · ADL Screening  · PAQ Screening    Vitals:    11/02/17 1243   BP: 118/76   BP Location: Left arm   Patient Position: Sitting   BP Method: Large (Manual)   Pulse: 86   SpO2: 98%   Weight: 71.8 kg (158 lb 4.6 oz)   Height: 5' 3" (1.6 m)     Body mass index is 28.04 kg/m².  Physical Exam   Constitutional: She appears well-developed and well-nourished.   HENT:   Head: Normocephalic and atraumatic.   Eyes: Pupils are equal, round, and reactive to light.   Neck: Carotid bruit is not present.   Cardiovascular: Normal rate, regular rhythm, normal heart sounds, intact distal pulses and normal pulses.  Exam reveals no gallop.    No murmur heard.  Pulmonary/Chest: Effort normal and breath sounds normal.   Abdominal: Soft. Normal appearance and bowel sounds are normal. She exhibits no distension. There is no tenderness.   Musculoskeletal: Normal range of motion. She exhibits no edema or tenderness.   Neurological: She is alert. She exhibits normal muscle tone.   Left arm  And left leg weakness   Skin: Skin is warm, dry and intact.   Psychiatric: She has a normal mood and affect. Her speech is normal and behavior is normal. Judgment and thought content normal. Cognition and memory are normal.   Nursing note and vitals reviewed.        Diagnoses and health risks identified today and associated recommendations/orders:    1. Encounter for preventive health " examination    2. MS (multiple sclerosis)  Diagnosed 1989. Last MRI brain 10/ 2016 : Unchanged appearance of multiple scattered foci of white matter signal abnormality as detailed above, in keeping with demyelinating plaque in this patient with known history of multiple sclerosis.  No new lesions or evidence of active demyelination.  Chronic and stable. Currently on Rebif injection 3 weeks- states left side extremities weaker . Continue current treatment plan as previously prescribed with your neurologist. Pt states she will call and schedule a follow up     3. Major depressive disorder, single episode, mild  Stable and controlled on Paxil. Denies feeling depressed - in remission at this time . Continue current treatment plan as previously prescribed with your PCP.     4. Age-related osteoporosis without current pathological fracture  DEXA 8/ 2016 due again in  2 years. Stable and controlled on Prolia injection every 6 months and vitamin D and calcium daily . Continue current treatment plan as previously prescribed with your rheumatologist    5. Polyneuropathy  Stable and controlled on Neurontin. States tingling resolved but still with numbness. Continue current treatment plan as previously prescribed with your neurologist       6. Hyperlipidemia, unspecified hyperlipidemia type  Component      Latest Ref Rng & Units 8/21/2017   Cholesterol      120 - 199 mg/dL 178   Triglycerides      30 - 150 mg/dL 57   HDL      40 - 75 mg/dL 56   LDL Cholesterol      63.0 - 159.0 mg/dL 110.6   HDL/Chol Ratio      20.0 - 50.0 % 31.5   Total Cholesterol/HDL Ratio      2.0 - 5.0 3.2   Non-HDL Cholesterol      mg/dL 122   Stable and controlled on Zocor. Continue current treatment plan as previously prescribed with your PCP.     7. Hypothyroidism due to acquired atrophy of thyroid  Stable and controlled on Synthroid . Continue current treatment plan as previously prescribed with your PCP.    8. Muscle spasm  Chronic and stable on  Baclofen due to MS. States muscle spasm comes and goes. Followed by neurologist     9. Sleep apnea, unspecified type  This problem is currently not controlled.  Pt states she has been off of CPAP for a while - will need new equipment.  Feel its stable and decline appt to see pulmonologist  Please follow up with your PCP as planned to discuss adjustments to your treatment plan.    10. Vitamin D insufficiency  Component      Latest Ref Rng & Units 9/9/2016   Vit D, 25-Hydroxy      30 - 96 ng/mL 82   Stable and controlled on Vitamin D . Continue current treatment plan as previously prescribed with your rheumatologist .     11. Gait disturbance  Chronic and ongoing- worse with left side weakness. States frequent falls but no injuries. States she  Has participated in therapy in past with no significant improvement. Followed by PCP  And  neurologist    12. History of colon polyps  Stable and controlled. Last colonoscopy  9/ 2017 which was normal but  due again in  3 yrs due to a previous polyps. Followed by  GI  And PCP     Up to date in health screenings    Provided Cem with a 5-10 year written screening schedule and personal prevention plan. Recommendations were developed using the USPSTF age appropriate recommendations. Education, counseling, and referrals were provided as needed. After Visit Summary printed and given to patient which includes a list of additional screenings\tests needed.    Return in about 1 year (around 11/2/2018).    Yana Olson NP

## 2017-11-13 ENCOUNTER — PATIENT MESSAGE (OUTPATIENT)
Dept: NEUROLOGY | Facility: CLINIC | Age: 57
End: 2017-11-13

## 2017-11-29 ENCOUNTER — OFFICE VISIT (OUTPATIENT)
Dept: NEUROLOGY | Facility: CLINIC | Age: 57
End: 2017-11-29
Payer: MEDICARE

## 2017-11-29 ENCOUNTER — LAB VISIT (OUTPATIENT)
Dept: LAB | Facility: HOSPITAL | Age: 57
End: 2017-11-29
Payer: MEDICARE

## 2017-11-29 VITALS
DIASTOLIC BLOOD PRESSURE: 80 MMHG | WEIGHT: 158 LBS | BODY MASS INDEX: 28 KG/M2 | SYSTOLIC BLOOD PRESSURE: 110 MMHG | HEIGHT: 63 IN | HEART RATE: 70 BPM

## 2017-11-29 DIAGNOSIS — G35 MULTIPLE SCLEROSIS: Primary | ICD-10-CM

## 2017-11-29 DIAGNOSIS — Z71.89 COUNSELING REGARDING GOALS OF CARE: ICD-10-CM

## 2017-11-29 DIAGNOSIS — G35 MULTIPLE SCLEROSIS: ICD-10-CM

## 2017-11-29 DIAGNOSIS — E55.9 VITAMIN D INSUFFICIENCY: ICD-10-CM

## 2017-11-29 DIAGNOSIS — M62.838 MUSCLE SPASM: ICD-10-CM

## 2017-11-29 DIAGNOSIS — M79.2 NEUROPATHIC PAIN: ICD-10-CM

## 2017-11-29 DIAGNOSIS — G35 MS (MULTIPLE SCLEROSIS): ICD-10-CM

## 2017-11-29 DIAGNOSIS — R26.9 GAIT DISTURBANCE: ICD-10-CM

## 2017-11-29 DIAGNOSIS — Z79.899 ENCOUNTER FOR LONG-TERM (CURRENT) USE OF HIGH-RISK MEDICATION: ICD-10-CM

## 2017-11-29 DIAGNOSIS — F32.A DEPRESSION, UNSPECIFIED DEPRESSION TYPE: ICD-10-CM

## 2017-11-29 LAB
25(OH)D3+25(OH)D2 SERPL-MCNC: 57 NG/ML
BASOPHILS # BLD AUTO: 0.02 K/UL
BASOPHILS NFR BLD: 0.5 %
DIFFERENTIAL METHOD: NORMAL
EOSINOPHIL # BLD AUTO: 0 K/UL
EOSINOPHIL NFR BLD: 0.7 %
ERYTHROCYTE [DISTWIDTH] IN BLOOD BY AUTOMATED COUNT: 12.9 %
HCT VFR BLD AUTO: 39.7 %
HGB BLD-MCNC: 12.8 G/DL
IMM GRANULOCYTES # BLD AUTO: 0.01 K/UL
IMM GRANULOCYTES NFR BLD AUTO: 0.2 %
LYMPHOCYTES # BLD AUTO: 2 K/UL
LYMPHOCYTES NFR BLD: 44.4 %
MCH RBC QN AUTO: 31 PG
MCHC RBC AUTO-ENTMCNC: 32.2 G/DL
MCV RBC AUTO: 96 FL
MONOCYTES # BLD AUTO: 0.5 K/UL
MONOCYTES NFR BLD: 10.2 %
NEUTROPHILS # BLD AUTO: 1.9 K/UL
NEUTROPHILS NFR BLD: 44 %
NRBC BLD-RTO: 0 /100 WBC
PLATELET # BLD AUTO: 196 K/UL
PMV BLD AUTO: 10.1 FL
RBC # BLD AUTO: 4.13 M/UL
WBC # BLD AUTO: 4.41 K/UL

## 2017-11-29 PROCEDURE — 36415 COLL VENOUS BLD VENIPUNCTURE: CPT

## 2017-11-29 PROCEDURE — 99999 PR PBB SHADOW E&M-EST. PATIENT-LVL IV: CPT | Mod: PBBFAC,,, | Performed by: PHYSICIAN ASSISTANT

## 2017-11-29 PROCEDURE — 87253 VIRUS INOCULATE TISSUE ADDL: CPT

## 2017-11-29 PROCEDURE — 99215 OFFICE O/P EST HI 40 MIN: CPT | Mod: S$GLB,,, | Performed by: PHYSICIAN ASSISTANT

## 2017-11-29 PROCEDURE — 99499 UNLISTED E&M SERVICE: CPT | Mod: S$GLB,,, | Performed by: PHYSICIAN ASSISTANT

## 2017-11-29 PROCEDURE — 85025 COMPLETE CBC W/AUTO DIFF WBC: CPT

## 2017-11-29 PROCEDURE — 82306 VITAMIN D 25 HYDROXY: CPT

## 2017-11-29 RX ORDER — PAROXETINE HYDROCHLORIDE 40 MG/1
40 TABLET, FILM COATED ORAL EVERY MORNING
Qty: 30 TABLET | Refills: 5 | Status: SHIPPED | OUTPATIENT
Start: 2017-11-29 | End: 2018-06-06 | Stop reason: SDUPTHER

## 2017-11-29 NOTE — PROGRESS NOTES
Subjective:       Patient ID: Cem Meyers is a 57 y.o. female who presents today for a routine clinic visit for MS.  Last seen in March of 2017.     MS HPI:  · DMT: Rebif  · Side effects from DMT? No  · Taking vitamin D3 as recommended? Yes -  Dose: 2,000 IU daily  · Overall, patient states that she is getting weaker.   · She is going 1-2 times a week to gym  · She states she feels she is not as motivated and is more depressed--would like an increase in her Paxil.   · Continues to have numbness in hands/feet  · Seeing rheumatology and now receiving infusion for osteoporosis    SOCIAL HISTORY  Social History   Substance Use Topics    Smoking status: Never Smoker    Smokeless tobacco: Never Used    Alcohol use No     Living arrangements - the patient lives alone.      MS ROS:    · Fatigue: Yes --stable   · Sleep Disturbance: Yes - has sleep apnea but states she has not gotten new parts.   · Bowel Dysfunction: Yes--no longer needing Miralax-states she is eating better.  Will at times have to use digital stimulation  · Spasticity: Yes - taking baclofen 1 tab, 1 tab, and 2 tabs HS. She states she does occasionally forget meds, but is helpful   · Visual Symptoms: No-stable  · Cognitive: Yes - memory  · Mood Disorder: Yes - Paxil--she is worried about financial aspects of her life   · Gait Disturbance: Yes - L LE continues to be an issues  · Falls: yes, in apartment(states it cluttered)  · Hand Dysfunction: No--stiffness, not QOL issue , some numbness that at times effects dexterity  · Sexual Dysfunction: Not Assessed  · Pain: gabapentin 800mg TID--takes regularly  · Skin Breakdown: No  · Tremors: No  · Dysphagia: No  · Dysarthria: No  · Heat sensitivity: Yes -really was affected by heat this summer  · Any un-met adaptive needs? No  · Copay Assist? Yes - 0$          Objective:        1. 25 foot timed walk:6.8s today  Timed 25 Foot Walk: 10/24/2016 3/23/2017   Did patient wear an AFO? No No   Was assistive device  used? No No   Time for 25 Foot Walk (seconds) 5.5 5.5   Time for 25 Foot Walk (seconds) 5.5 5.3       Neurologic Exam     Mental Status   Oriented to person, place, and time.   Follows 3 step commands.   Speech: speech is normal   Level of consciousness: alert  Normal comprehension.     Cranial Nerves     CN II   Visual acuity: normal with correction (20/20 OD and OS corrected with Snellen hand held chart at 6 ft)    CN III, IV, VI   Pupils are equal, round, and reactive to light.  Extraocular motions are normal.     CN V   Facial sensation intact.     CN VII   Facial expression full, symmetric.     CN VIII   Hearing: intact (finger rub)    CN IX, X   Palate: symmetric    CN XI   CN XI normal.     CN XII   Tongue deviation: none    Motor Exam   Muscle bulk: normal  Overall muscle tone: normal    Strength   Right deltoid: 5/5  Left deltoid: 5/5  Right triceps: 5/5  Left triceps: 5/5  Right wrist extension: 5/5  Left wrist extension: 5/5  Right interossei: 5/5  Left interossei: 4/5  Right iliopsoas: 5/5  Left iliopsoas: 2/5  Right hamstrin/5  Left hamstrin/5  Right anterior tibial: 5/5  Left anterior tibial: 5/5  Right peroneal: 5/5  Left peroneal: 5/5L triceps 5-/5     Sensory Exam   Light touch normal.   Right leg light touch: numbness foot.  Left leg light touch: numbness to feet.  Right arm vibration: decreased from fingers  Left arm vibration: decreased from fingers  Right leg vibration: decreased from ankle  Left leg vibration: decreased from ankle    Gait, Coordination, and Reflexes     Gait  Gait: circumduction and wide-based (slightly L hip circumduction)    Coordination   Finger to nose coordination: abnormal (L hand)    Tremor   Resting tremor: absent  Action tremor: absent    Reflexes   Right brachioradialis: 2+  Left brachioradialis: 2+  Right biceps: 2+  Left biceps: 2+  Right triceps: 2+  Left triceps: 2+  Right patellar: 3+  Left patellar: 3+  Right achilles: 3+  Left achilles: 3+  Right  plantar: equivocal  Left plantar: equivocal  Right ankle clonus: absent  Left ankle clonus: absent  Right pendular knee jerk: absent  Left pendular knee jerk: absentAbnormal Heel/toe walk  Slowed on L RSM         Imaging:     Results for orders placed during the hospital encounter of 10/14/16   MRI Brain W WO Contrast    Impression      Unchanged appearance of multiple scattered foci of white matter signal abnormality as detailed above, in keeping with demyelinating plaque in this patient with known history of multiple sclerosis.  No new lesions or evidence of active demyelination.      Electronically signed by: KANE MONTOYA MD  Date:     10/14/16  Time:    11:16      Results for orders placed during the hospital encounter of 10/14/16   MRI Cervical Spine W WO Cont    Impression Multifocal signal abnormality involving the cervical cord and cervical medullary junction in keeping with demyelinating plaque in this patient with known history of multiple sclerosis.  Apparent slight interval enlargement and increase T2 signal involving the largest lesion at the level of C2-C3 although findings may be potentially related to technical differences between current and prior exams.  No associated enhancement to suggest active demyelination.      Electronically signed by: KANE MONTOYA MD  Date:     10/14/16  Time:    10:49      Results for orders placed during the hospital encounter of 12/02/13   MRI Thoracic Spine W WO Cont    Impression       1.  Stable exam demonstrating multilevel nonenhancing foci of intermediate/increased T2/stir signal within the cord consistent with demyelinating plaques this patient with known MS.    2.  No other significant findings.  See above.      Electronically signed by: EMERSON BARTH III, MD  Date:     12/03/13  Time:    13:07          Labs:     Lab Results   Component Value Date    [id number] 82 09/09/2016    YPKKVCFY04AH 62 08/17/2015    LLBMVZYY19XN 77 10/06/2014     No results  found for: JCVINDEX, JCVANTIBODY  No results found for: ME6KIOQM, ABSOLUTECD3, DQ7ROLFY, ABSOLUTECD8, KQ7QSPTI, ABSOLUTECD4, LABCD48  Lab Results   Component Value Date    WBC 4.44 08/21/2017    RBC 3.99 (L) 08/21/2017    HGB 12.2 08/21/2017    HCT 37.5 08/21/2017    MCV 94 08/21/2017    MCH 30.6 08/21/2017    MCHC 32.5 08/21/2017    RDW 12.8 08/21/2017     08/21/2017    MPV 10.7 08/21/2017    GRAN 2.3 08/21/2017    GRAN 51.1 08/21/2017    LYMPH 1.7 08/21/2017    LYMPH 37.4 08/21/2017    MONO 0.4 08/21/2017    MONO 9.7 08/21/2017    EOS 0.1 08/21/2017    BASO 0.02 08/21/2017    EOSINOPHIL 1.1 08/21/2017    BASOPHIL 0.5 08/21/2017     Sodium   Date Value Ref Range Status   09/27/2017 138 136 - 145 mmol/L Final     Potassium   Date Value Ref Range Status   09/27/2017 4.5 3.5 - 5.1 mmol/L Final     Chloride   Date Value Ref Range Status   09/27/2017 106 95 - 110 mmol/L Final     CO2   Date Value Ref Range Status   09/27/2017 24 23 - 29 mmol/L Final     Glucose   Date Value Ref Range Status   09/27/2017 148 (H) 70 - 110 mg/dL Final     BUN, Bld   Date Value Ref Range Status   09/27/2017 20 6 - 20 mg/dL Final     Creatinine   Date Value Ref Range Status   09/27/2017 0.9 0.5 - 1.4 mg/dL Final     Calcium   Date Value Ref Range Status   09/27/2017 9.7 8.7 - 10.5 mg/dL Final     Total Protein   Date Value Ref Range Status   09/27/2017 7.4 6.0 - 8.4 g/dL Final     Albumin   Date Value Ref Range Status   09/27/2017 3.6 3.5 - 5.2 g/dL Final     Total Bilirubin   Date Value Ref Range Status   09/27/2017 0.3 0.1 - 1.0 mg/dL Final     Comment:     For infants and newborns, interpretation of results should be based  on gestational age, weight and in agreement with clinical  observations.  Premature Infant recommended reference ranges:  Up to 24 hours.............<8.0 mg/dL  Up to 48 hours............<12.0 mg/dL  3-5 days..................<15.0 mg/dL  6-29 days.................<15.0 mg/dL       Alkaline Phosphatase   Date  Value Ref Range Status   09/27/2017 70 55 - 135 U/L Final     AST   Date Value Ref Range Status   09/27/2017 35 10 - 40 U/L Final     ALT   Date Value Ref Range Status   09/27/2017 18 10 - 44 U/L Final     Anion Gap   Date Value Ref Range Status   09/27/2017 8 8 - 16 mmol/L Final     eGFR if    Date Value Ref Range Status   09/27/2017 >60 >60 mL/min/1.73 m^2 Final     eGFR if non    Date Value Ref Range Status   09/27/2017 >60 >60 mL/min/1.73 m^2 Final     Comment:     Calculation used to obtain the estimated glomerular filtration  rate (eGFR) is the CKD-EPI equation. Since race is unknown   in our information system, the eGFR values for   -American and Non--American patients are given   for each creatinine result.         Diagnosis/Assessment/Plan:    1. Multiple Sclerosis  · Assessment: Patient's timed walk is slower and I do appreciate an increased weakness in L UE and LE. I have ordered labs including NAB's to check for immunity to Rebif, will get MRI's of Brain, C and T spine, referred to PT([facility]i wang-Aleja hernandez). Patient feels this is due to inactivity from worsening depression; this is unclear to me(although she does appear more depressed) and I am concerned her MS is in fact worsening. She will follow up in one month with me.   · Imaging:Brain C and T spine at [facility]. Brain and C spine MRI's ordered previously. T-spine ordered today.  · Disease Modifying Therapies: She will continue Rebif for now along with Vit D3; however, we may need to change DMT.     2. MS Symptom Assessment / Management  · Mood Disorder: increased Paxil to 40mg  · Gait Disturbance: referred to PT -Aleja Hernandez(Christi Wang)    Over 50% of this 40 minute visit was spent in direct face to face counseling of the patient about MS, DMT considerations, and MS symptom management.     Return in about 1 month (around 12/29/2017) for follow up with me(after MRI's).  Patient agreed to POC  today.    Attending, Dr. Smith, was available during today's encounter. Any change to plan along with cosign to appear in the EMR.     Rimma Rouse PA-C  [facility]      Multiple sclerosis  -     Ambulatory Referral to Physical/Occupational Therapy  -     paroxetine (PAXIL) 40 MG tablet; Take 1 tablet (40 mg total) by mouth every morning.  Dispense: 30 tablet; Refill: 5  -     Nabferon Antibody; Future; Expected date: 11/29/2017  -     Vitamin D; Future  -     CBC auto differential; Future; Expected date: 11/29/2017  -     MRI Thoracic Spine W WO Cont; Future; Expected date: 11/29/2017    Counseling regarding goals of care    Gait disturbance  -     Ambulatory Referral to Physical/Occupational Therapy    Neuropathic pain    Muscle spasm  -     Ambulatory Referral to Physical/Occupational Therapy    MS (multiple sclerosis)    Depression, unspecified depression type  -     paroxetine (PAXIL) 40 MG tablet; Take 1 tablet (40 mg total) by mouth every morning.  Dispense: 30 tablet; Refill: 5    Vitamin D insufficiency  -     Vitamin D; Future    Encounter for long-term (current) use of high-risk medication

## 2017-12-06 ENCOUNTER — TELEPHONE (OUTPATIENT)
Dept: RADIOLOGY | Facility: HOSPITAL | Age: 57
End: 2017-12-06

## 2017-12-07 ENCOUNTER — HOSPITAL ENCOUNTER (OUTPATIENT)
Dept: RADIOLOGY | Facility: HOSPITAL | Age: 57
Discharge: HOME | End: 2017-12-07
Attending: PSYCHIATRY & NEUROLOGY
Payer: MEDICARE

## 2017-12-07 ENCOUNTER — HOSPITAL ENCOUNTER (OUTPATIENT)
Dept: RADIOLOGY | Facility: HOSPITAL | Age: 57
Discharge: HOME | End: 2017-12-07
Attending: PHYSICIAN ASSISTANT
Payer: MEDICARE

## 2017-12-07 DIAGNOSIS — G35 MULTIPLE SCLEROSIS: ICD-10-CM
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
## 2017-12-07 LAB — NABFERON ANTIBODY TEST: NORMAL

## 2017-12-07 PROCEDURE — A9585 GADOBUTROL INJECTION: HCPCS | Mod: PO | Performed by: PHYSICIAN ASSISTANT

## 2017-12-07 PROCEDURE — 70553 MRI BRAIN STEM W/O & W/DYE: CPT | Mod: TC,PO

## 2017-12-07 PROCEDURE — 72156 MRI NECK SPINE W/O & W/DYE: CPT | Mod: 26,,, | Performed by: RADIOLOGY

## 2017-12-07 PROCEDURE — 70553 MRI BRAIN STEM W/O & W/DYE: CPT | Mod: 26,,, | Performed by: RADIOLOGY

## 2017-12-07 PROCEDURE — 72156 MRI NECK SPINE W/O & W/DYE: CPT | Mod: TC,PO

## 2017-12-07 PROCEDURE — 25500020 PHARM REV CODE 255: Mod: PO | Performed by: PHYSICIAN ASSISTANT

## 2017-12-07 PROCEDURE — 72157 MRI CHEST SPINE W/O & W/DYE: CPT | Mod: TC,PO

## 2017-12-07 PROCEDURE — 72157 MRI CHEST SPINE W/O & W/DYE: CPT | Mod: 26,,, | Performed by: RADIOLOGY

## 2017-12-07 RX ORDER — GADOBUTROL 604.72 MG/ML
7 INJECTION INTRAVENOUS
Status: COMPLETED | OUTPATIENT
Start: 2017-12-07 | End: 2017-12-07

## 2017-12-07 RX ADMIN — GADOBUTROL 7 ML: 604.72 INJECTION INTRAVENOUS at 02:12

## 2017-12-21 ENCOUNTER — PATIENT OUTREACH (OUTPATIENT)
Dept: ADMINISTRATIVE | Facility: HOSPITAL | Age: 57
End: 2017-12-21

## 2017-12-26 RX ORDER — SIMVASTATIN 40 MG/1
TABLET, FILM COATED ORAL
Qty: 30 TABLET | Refills: 6 | Status: SHIPPED | OUTPATIENT
Start: 2017-12-26 | End: 2018-02-23 | Stop reason: SDUPTHER

## 2017-12-29 ENCOUNTER — OFFICE VISIT (OUTPATIENT)
Dept: NEUROLOGY | Facility: CLINIC | Age: 57
End: 2017-12-29
Payer: MEDICARE

## 2017-12-29 VITALS
SYSTOLIC BLOOD PRESSURE: 107 MMHG | HEIGHT: 63 IN | HEART RATE: 74 BPM | DIASTOLIC BLOOD PRESSURE: 69 MMHG | WEIGHT: 158.63 LBS | BODY MASS INDEX: 28.11 KG/M2

## 2017-12-29 DIAGNOSIS — Z79.899 ENCOUNTER FOR LONG-TERM (CURRENT) USE OF HIGH-RISK MEDICATION: ICD-10-CM

## 2017-12-29 DIAGNOSIS — M79.2 NEUROPATHIC PAIN: ICD-10-CM

## 2017-12-29 DIAGNOSIS — G35 MULTIPLE SCLEROSIS: Primary | ICD-10-CM

## 2017-12-29 DIAGNOSIS — R26.9 GAIT DISTURBANCE: ICD-10-CM

## 2017-12-29 DIAGNOSIS — Z71.89 COUNSELING REGARDING GOALS OF CARE: ICD-10-CM

## 2017-12-29 DIAGNOSIS — F32.0 MAJOR DEPRESSIVE DISORDER, SINGLE EPISODE, MILD: ICD-10-CM

## 2017-12-29 PROCEDURE — 99499 UNLISTED E&M SERVICE: CPT | Mod: S$GLB,,, | Performed by: PHYSICIAN ASSISTANT

## 2017-12-29 PROCEDURE — 99215 OFFICE O/P EST HI 40 MIN: CPT | Mod: S$GLB,,, | Performed by: PHYSICIAN ASSISTANT

## 2017-12-29 PROCEDURE — 99999 PR PBB SHADOW E&M-EST. PATIENT-LVL III: CPT | Mod: PBBFAC,,, | Performed by: PHYSICIAN ASSISTANT

## 2017-12-29 NOTE — PATIENT INSTRUCTIONS
https://[email or web address]=sr_[email or web address]_19_[email or web address]=[email or web address]&sr=8-19-spons&keywords=hand+exerciser&psc=1    Theraputty---red/green    Weight machine---focus on triceps, hip and knee flexors

## 2017-12-29 NOTE — PROGRESS NOTES
"Subjective:       Patient ID: Cem Meyers is a 57 y.o. female who presents today for a one month follow up to review MRI    MS HPI:  · DMT: Rebif  · Side effects from DMT? No  · Taking vitamin D3 as recommended? Yes - 4,000IU/d   · She does feel improved. She feels like she is not as "down in the dumps" as she was--increase in Paxil seems to be helping.     SOCIAL HISTORY  Social History   Substance Use Topics    Smoking status: Never Smoker    Smokeless tobacco: Never Used    Alcohol use No     Living arrangements - the patient lives alone.  Employment     MS ROS:  As above        Objective:        1. 25 foot timed walk:5.5s today, was 6.8s last visit  Timed 25 Foot Walk: 10/24/2016 3/23/2017   Did patient wear an AFO? No No   Was assistive device used? No No   Time for 25 Foot Walk (seconds) 5.5 5.5   Time for 25 Foot Walk (seconds) 5.5 5.3     Neurologic Exam      Gait  Gait: circumduction and wide-based (slightly L hip circumduction)     Coordination   Finger to nose coordination: abnormal (L hand)  Right deltoid: 5/5  Left deltoid: 5/5  Right triceps: 5/5  Left triceps: 5/5  Right wrist extension: 5/5  Left wrist extension: 5/5  Right interossei: 5/5  Left interossei: 4/5  Right iliopsoas: 5/5  Left iliopsoas: 2/5  Right hamstrin/5  Left hamstrin/5  Right anterior tibial: 5/5  Left anterior tibial: 5/5  Right peroneal: 5/5  Left peroneal: 5/5L triceps 5-/5        Imaging:     Results for orders placed during the hospital encounter of 17   MRI Brain W WO Contrast    Impression 1. White matter changes much greater than expected for age with a pericallosal periventricular white distribution consistent with patient's history of multiple sclerosis without detrimental change since 10/14/16. No enhancement or diffusion positivity is noted      Electronically signed by: Ye Griffiths MD  Date:     17  Time:    16:05      Results for orders placed during the hospital encounter of 17 "   MRI Cervical Spine W WO Cont    Impression  Multilevel white matter foci of signal abnormality suggestive of demyelinating plaques consistent with the patient's history of multiple sclerosis without detrimental change since 12/2/15.        Electronically signed by: Ye Griffiths MD  Date:     12/07/17  Time:    15:37      Results for orders placed during the hospital encounter of 12/07/17   MRI Thoracic Spine W WO Cont    Impression      Multiple foci of white matter signal abnormality suggestive of demyelinating plaques consistent with the patient's history of multiple sclerosis without definite change since 12/2/3.          Electronically signed by: Ye Griffiths MD  Date:     12/07/17  Time:    15:56        Labs:     Lab Results   Component Value Date    [id number] 57 11/29/2017    [id number] 82 09/09/2016    [id number] 62 08/17/2015     No results found for: JCVINDEX, JCVANTIBODY  No results found for: RU0KGGOL, ABSOLUTECD3, EA5BONCB, ABSOLUTECD8, JI0LPAYB, ABSOLUTECD4, LABCD48  Lab Results   Component Value Date    WBC 4.41 11/29/2017    RBC 4.13 11/29/2017    HGB 12.8 11/29/2017    HCT 39.7 11/29/2017    MCV 96 11/29/2017    MCH 31.0 11/29/2017    MCHC 32.2 11/29/2017    RDW 12.9 11/29/2017     11/29/2017    MPV 10.1 11/29/2017    GRAN 1.9 11/29/2017    GRAN 44.0 11/29/2017    LYMPH 2.0 11/29/2017    LYMPH 44.4 11/29/2017    MONO 0.5 11/29/2017    MONO 10.2 11/29/2017    EOS 0.0 11/29/2017    BASO 0.02 11/29/2017    EOSINOPHIL 0.7 11/29/2017    BASOPHIL 0.5 11/29/2017     Sodium   Date Value Ref Range Status   09/27/2017 138 136 - 145 mmol/L Final     Potassium   Date Value Ref Range Status   09/27/2017 4.5 3.5 - 5.1 mmol/L Final     Chloride   Date Value Ref Range Status   09/27/2017 106 95 - 110 mmol/L Final     CO2   Date Value Ref Range Status   09/27/2017 24 23 - 29 mmol/L Final     Glucose   Date Value Ref Range Status   09/27/2017 148 (H) 70 - 110 mg/dL Final     BUN, Bld   Date Value Ref  Range Status   09/27/2017 20 6 - 20 mg/dL Final     Creatinine   Date Value Ref Range Status   09/27/2017 0.9 0.5 - 1.4 mg/dL Final     Calcium   Date Value Ref Range Status   09/27/2017 9.7 8.7 - 10.5 mg/dL Final     Total Protein   Date Value Ref Range Status   09/27/2017 7.4 6.0 - 8.4 g/dL Final     Albumin   Date Value Ref Range Status   09/27/2017 3.6 3.5 - 5.2 g/dL Final     Total Bilirubin   Date Value Ref Range Status   09/27/2017 0.3 0.1 - 1.0 mg/dL Final     Comment:     For infants and newborns, interpretation of results should be based  on gestational age, weight and in agreement with clinical  observations.  Premature Infant recommended reference ranges:  Up to 24 hours.............<8.0 mg/dL  Up to 48 hours............<12.0 mg/dL  3-5 days..................<15.0 mg/dL  6-29 days.................<15.0 mg/dL       Alkaline Phosphatase   Date Value Ref Range Status   09/27/2017 70 55 - 135 U/L Final     AST   Date Value Ref Range Status   09/27/2017 35 10 - 40 U/L Final     ALT   Date Value Ref Range Status   09/27/2017 18 10 - 44 U/L Final     Anion Gap   Date Value Ref Range Status   09/27/2017 8 8 - 16 mmol/L Final     eGFR if    Date Value Ref Range Status   09/27/2017 >60 >60 mL/min/1.73 m^2 Final     eGFR if non    Date Value Ref Range Status   09/27/2017 >60 >60 mL/min/1.73 m^2 Final     Comment:     Calculation used to obtain the estimated glomerular filtration  rate (eGFR) is the CKD-EPI equation. Since race is unknown   in our information system, the eGFR values for   -American and Non--American patients are given   for each creatinine result.         Diagnosis/Assessment/Plan:    1. Multiple Sclerosis  · Assessment: She is improved back to her baseline and T-spine MRI stable. Perhaps her recent decline was related to depression which does appear improved.   · Imaging: MRI's of Brain, C and T spine reviewed with patient in clinic(report and images)  -stable.   · Disease Modifying Therapies: continue Rebif--recent NAB testing was negative.     2. MS Symptom Assessment / Management  · Mood Disorder: Increase in Paxil to 40mg appears beneficial   · Gait Disturbance: encouraged continued work with HF/KF on L  · Hand Dysfunction: encouraged focusing on strengthening L Triceps and hand(recommended use of theraputty and hand exerciser)-written information on AVS     Over 50% of this 40 minute visit was spent in direct face to face counseling of the patient about MS, DMT considerations, and MS symptom management.   Return in about 6 months (around 6/29/2018) for follow up with Dr. Smith.  Patient agreed to POC today.    Attending, Dr. Smith, was available during today's encounter. Any change to plan along with cosign to appear in the EMR.     Rimma Rouse PA-C  [facility]        Multiple sclerosis    Counseling regarding goals of care    Encounter for long-term (current) use of high-risk medication    Gait disturbance    Neuropathic pain

## 2018-01-04 ENCOUNTER — OFFICE VISIT (OUTPATIENT)
Dept: INTERNAL MEDICINE | Facility: CLINIC | Age: 58
End: 2018-01-04
Payer: MEDICARE

## 2018-01-04 VITALS
SYSTOLIC BLOOD PRESSURE: 106 MMHG | HEART RATE: 91 BPM | TEMPERATURE: 96 F | DIASTOLIC BLOOD PRESSURE: 78 MMHG | BODY MASS INDEX: 27.77 KG/M2 | HEIGHT: 63 IN | WEIGHT: 156.75 LBS

## 2018-01-04 DIAGNOSIS — G35 MS (MULTIPLE SCLEROSIS): Chronic | ICD-10-CM

## 2018-01-04 DIAGNOSIS — E03.4 HYPOTHYROIDISM DUE TO ACQUIRED ATROPHY OF THYROID: Primary | Chronic | ICD-10-CM

## 2018-01-04 DIAGNOSIS — M62.838 MUSCLE SPASMS OF BOTH LOWER EXTREMITIES: ICD-10-CM

## 2018-01-04 DIAGNOSIS — F32.0 MAJOR DEPRESSIVE DISORDER, SINGLE EPISODE, MILD: ICD-10-CM

## 2018-01-04 PROCEDURE — 99214 OFFICE O/P EST MOD 30 MIN: CPT | Mod: S$GLB,,, | Performed by: FAMILY MEDICINE

## 2018-01-04 PROCEDURE — 99499 UNLISTED E&M SERVICE: CPT | Mod: S$GLB,,, | Performed by: FAMILY MEDICINE

## 2018-01-04 PROCEDURE — 99999 PR PBB SHADOW E&M-EST. PATIENT-LVL III: CPT | Mod: PBBFAC,,, | Performed by: FAMILY MEDICINE

## 2018-01-04 RX ORDER — BACLOFEN 10 MG/1
TABLET ORAL
Qty: 135 TABLET | Refills: 5 | Status: SHIPPED | OUTPATIENT
Start: 2018-01-04 | End: 2018-02-26 | Stop reason: SDUPTHER

## 2018-01-04 NOTE — PROGRESS NOTES
Subjective:       Patient ID: Cem Meyers is a 57 y.o. female.    Chief Complaint: Annual Exam    Annual exam      Review of Systems   Constitutional: Negative for activity change and unexpected weight change.   HENT: Negative for hearing loss, rhinorrhea and trouble swallowing.    Eyes: Negative for discharge and visual disturbance.   Respiratory: Negative for chest tightness and wheezing.    Cardiovascular: Negative for chest pain and palpitations.   Gastrointestinal: Negative for blood in stool, constipation, diarrhea and vomiting.   Endocrine: Negative for polydipsia and polyuria.   Genitourinary: Negative for difficulty urinating, dysuria, hematuria and menstrual problem.   Musculoskeletal: Negative for arthralgias, joint swelling and neck pain.   Neurological: Negative for weakness and headaches.   Psychiatric/Behavioral: Negative for confusion and dysphoric mood.       Objective:      Physical Exam   Constitutional: She is oriented to person, place, and time. She appears well-developed and well-nourished.   Cardiovascular: Normal rate and regular rhythm.    Pulmonary/Chest: Effort normal and breath sounds normal.   Abdominal: Soft. Bowel sounds are normal.   Neurological: She is alert and oriented to person, place, and time.   Skin: Skin is warm and dry.       Assessment:       1. Hypothyroidism due to acquired atrophy of thyroid    2. Major depressive disorder, single episode, mild    3. MS (multiple sclerosis)    4. Muscle spasms of both lower extremities        Plan:       Hypothyroidism due to acquired atrophy of thyroid  Comments:  Will do TSH and Free T4    Major depressive disorder, single episode, mild  Comments:  Will continue with paxil    MS (multiple sclerosis)  Comments:  Stable at this point    Muscle spasms of both lower extremities  Comments:  Will try lidoderm

## 2018-01-10 ENCOUNTER — PATIENT MESSAGE (OUTPATIENT)
Dept: NEUROLOGY | Facility: CLINIC | Age: 58
End: 2018-01-10

## 2018-01-16 DIAGNOSIS — G35 MULTIPLE SCLEROSIS: ICD-10-CM

## 2018-01-16 NOTE — TELEPHONE ENCOUNTER
It's not recommended that DMT be discontinued, is it possible to get free drug for the next couple of weeks until Assistance fund is issued???

## 2018-01-16 NOTE — TELEPHONE ENCOUNTER
"Call placed to [facility] and spoke with Janel. She states their pharmacy can send patient a "bridge" to be sure she stays on drug while waiting for assistance program. Rx to be faxed once signed.  "

## 2018-01-19 ENCOUNTER — DOCUMENTATION ONLY (OUTPATIENT)
Dept: NEUROLOGY | Facility: CLINIC | Age: 58
End: 2018-01-19

## 2018-01-19 NOTE — PROGRESS NOTES
Faxed signed [facility] [phone]

## 2018-01-21 RX ORDER — LEVOTHYROXINE SODIUM 75 UG/1
TABLET ORAL
Qty: 90 TABLET | Refills: 2 | Status: SHIPPED | OUTPATIENT
Start: 2018-01-21 | End: 2018-02-23 | Stop reason: SDUPTHER

## 2018-02-05 ENCOUNTER — OFFICE VISIT (OUTPATIENT)
Dept: CARDIOLOGY | Facility: CLINIC | Age: 58
End: 2018-02-05
Payer: MEDICARE

## 2018-02-05 VITALS
DIASTOLIC BLOOD PRESSURE: 74 MMHG | HEIGHT: 63 IN | BODY MASS INDEX: 27.46 KG/M2 | HEART RATE: 61 BPM | WEIGHT: 155 LBS | SYSTOLIC BLOOD PRESSURE: 106 MMHG

## 2018-02-05 DIAGNOSIS — G35 MS (MULTIPLE SCLEROSIS): Chronic | ICD-10-CM

## 2018-02-05 DIAGNOSIS — R07.89 OTHER CHEST PAIN: ICD-10-CM

## 2018-02-05 DIAGNOSIS — E78.5 HYPERLIPIDEMIA, UNSPECIFIED HYPERLIPIDEMIA TYPE: Primary | Chronic | ICD-10-CM

## 2018-02-05 DIAGNOSIS — E03.4 HYPOTHYROIDISM DUE TO ACQUIRED ATROPHY OF THYROID: Chronic | ICD-10-CM

## 2018-02-05 DIAGNOSIS — G47.30 SLEEP APNEA, UNSPECIFIED TYPE: ICD-10-CM

## 2018-02-05 PROCEDURE — 3008F BODY MASS INDEX DOCD: CPT | Mod: S$GLB,,, | Performed by: INTERNAL MEDICINE

## 2018-02-05 PROCEDURE — 99499 UNLISTED E&M SERVICE: CPT | Mod: S$GLB,,, | Performed by: INTERNAL MEDICINE

## 2018-02-05 PROCEDURE — 99204 OFFICE O/P NEW MOD 45 MIN: CPT | Mod: S$GLB,,, | Performed by: INTERNAL MEDICINE

## 2018-02-05 PROCEDURE — 93000 ELECTROCARDIOGRAM COMPLETE: CPT | Mod: S$GLB,,, | Performed by: NUCLEAR MEDICINE

## 2018-02-05 PROCEDURE — 99999 PR PBB SHADOW E&M-EST. PATIENT-LVL III: CPT | Mod: PBBFAC,,, | Performed by: INTERNAL MEDICINE

## 2018-02-05 NOTE — LETTER
February 5, 2018      Adonis Stubbs MD  [address]  [facility] [address]           [facility]  [id number] [place] [address]  Phone: [phone]  Fax: [phone]          Patient: Cem Meyers   MR Number: [id number]   YOB: 1960   Date of Visit: 2/5/2018       Dear Dr. Adonis Stubbs:    Thank you for referring Cem Meyers to me for evaluation. Attached you will find relevant portions of my assessment and plan of care.    If you have questions, please do not hesitate to call me. I look forward to following Cem Meyers along with you.    Sincerely,    Spencer Corey MD    Enclosure  CC:  No Recipients    If you would like to receive this communication electronically, please contact [email or web address] or [phone] to request more information on [employer] Link access.    For providers and/or their staff who would like to refer a patient to [facility], please contact us through our one-stop-shop provider referral line, [facility] , at [phone].    If you feel you have received this communication in error or would no longer like to receive these types of communications, please e-mail [email or web address]

## 2018-02-05 NOTE — PROGRESS NOTES
Subjective:   Patient ID:  Cem Meyers is a 57 y.o. female who presents for cardiac consult of Chest Pain and Establish Care      HPI  The patient came in today for cardiac consult of Chest Pain and Establish Care    Referring Provider: Adonis Stubbs MD   Reason for consult: chest pain    2/5/18  This is a 57 year old female pt PMHx HLD, hypothyroidism, MS, MAYKEL, depression presents for initial CV evaluation of chest pain.     Pt complains of left sided sharp chest pain. Happened twice so far, intermittent, happens at rest. Pt has MS with heat sensitive, does not do much activity. Has been more sedentary. Occasionally has mild SOB, diagnosed with mild MAYKEL 2008/2009, used CPAP initially but has not used since then. Very min snoring these days.     Patient feels no leg swelling, no PND, no palpitation, no syncope, no CNS symptoms.    Patient is compliant with medications.    2/5/18 - ECG - NSR, no ischemia    Past Medical History:   Diagnosis Date    Broken toe 6/2015    left big toe    Closed left arm fracture     Colon polyp     Depression     Hyperlipidemia     Hypothyroid     MS (multiple sclerosis)     Neurogenic bladder 12/6/2012    Osteoporosis     Polyneuropathy     Sleep apnea     Trouble in sleeping        Past Surgical History:   Procedure Laterality Date    COLONOSCOPY N/A 9/1/2017    Procedure: COLONOSCOPY;  Surgeon: Andriy Villar MD;  Location: [facility];  Service: Endoscopy;  Laterality: N/A;    FRACTURE SURGERY Left 2013    wrist- [facility]    [place]EE [place] Right 1989    in [place]    TONSILLECTOMY  1966       Social History   Substance Use Topics    Smoking status: Never Smoker    Smokeless tobacco: Never Used    Alcohol use No       Family History   Problem Relation Age of Onset    Pancreatic cancer Mother     Stomach cancer Mother     Cancer Mother      pancreatic, stomach    Hypertension Father     Heart disease Father      MI    Lupus Maternal Aunt     Rheum arthritis  Maternal Aunt     Rheum arthritis Sister     Melanoma Neg Hx        Patient's Medications   New Prescriptions    No medications on file   Previous Medications    BACLOFEN (LIORESAL) 10 MG TABLET    TAKE 1 - 1 & 1/2 TABLETS BY MOUTH THREE TIMES A DAY AS NEEDED    CALCIUM CITRATE-VITAMIN D2 1,500-200 MG-UNIT TAB        CHOLECALCIFEROL, VITAMIN D3, (VITAMIN D3) 4,000 UNIT CAP    Take 4,000 Units by mouth once daily.     DENOSUMAB (PROLIA) 60 MG/ML SYRG    Inject 60 mg into the skin.    FISH OIL-OMEGA-3 FATTY ACIDS 300-1,000 MG CAPSULE    Take 2 g by mouth once daily.    GABAPENTIN (NEURONTIN) 400 MG CAPSULE    TAKE TWO CAPSULES BY MOUTH THREE TIMES A DAY    INTERFERON BETA-1A, ALBUMIN, (REBIF, WITH ALBUMIN,) 44 MCG/0.5 ML INJECTION    Inject 0.5 mLs (44 mcg total) into the skin 3 (three) times a week.    LEVOTHYROXINE (SYNTHROID) 75 MCG TABLET    TAKE ONE TABLET BY MOUTH EVERY MORNING    MULTIVITAMIN CAPSULE    Take 1 capsule by mouth once daily.    PAROXETINE (PAXIL) 40 MG TABLET    Take 1 tablet (40 mg total) by mouth every morning.    SIMVASTATIN (ZOCOR) 40 MG TABLET    TAKE ONE TABLET BY MOUTH ONCE DAILY   Modified Medications    No medications on file   Discontinued Medications    No medications on file       Review of Systems   Constitutional: Negative.    HENT: Negative.    Eyes: Negative.    Respiratory: Positive for shortness of breath.    Cardiovascular: Positive for chest pain. Negative for palpitations.   Gastrointestinal: Negative.    Genitourinary: Negative.    Musculoskeletal: Negative.    Skin: Negative.    Neurological: Positive for dizziness. Negative for headaches.   Endo/Heme/Allergies: Negative.    Psychiatric/Behavioral: Negative.    All 12 systems otherwise negative.      Wt Readings from Last 3 Encounters:   02/05/18 70.3 kg (154 lb 15.7 oz)   01/04/18 71.1 kg (156 lb 12 oz)   12/29/17 71.9 kg (158 lb 9.6 oz)     Temp Readings from Last 3 Encounters:   01/04/18 96.1 °F (35.6 °C) (Tympanic)  "  09/01/17 98 °F (36.7 °C) (Oral)   05/25/17 97.5 °F (36.4 °C) (Tympanic)     BP Readings from Last 3 Encounters:   02/05/18 106/74   01/04/18 106/78   12/29/17 107/69     Pulse Readings from Last 3 Encounters:   02/05/18 61   01/04/18 91   12/29/17 74       /74 Comment: LUE  Pulse 61 Comment: by ekg today  Ht 5' 3" (1.6 m)   Wt 70.3 kg (154 lb 15.7 oz)   BMI 27.45 kg/m²     Objective:   Physical Exam   Constitutional: She is oriented to person, place, and time. She appears well-developed and well-nourished. No distress.   HENT:   Head: Normocephalic and atraumatic.   Nose: Nose normal.   Mouth/Throat: Oropharynx is clear and moist.   Eyes: Conjunctivae and EOM are normal. No scleral icterus.   Neck: Normal range of motion. Neck supple. No JVD present. No thyromegaly present.   Cardiovascular: Normal rate, regular rhythm, S1 normal and S2 normal.  Exam reveals no gallop, no S3, no S4 and no friction rub.    No murmur heard.  Pulmonary/Chest: Effort normal and breath sounds normal. No stridor. No respiratory distress. She has no wheezes. She has no rales. She exhibits no tenderness.   Abdominal: Soft. Bowel sounds are normal. She exhibits no distension and no mass. There is no tenderness. There is no rebound.   Genitourinary:   Genitourinary Comments: Deferred   Musculoskeletal: Normal range of motion. She exhibits no edema, tenderness or deformity.   Lymphadenopathy:     She has no cervical adenopathy.   Neurological: She is alert and oriented to person, place, and time. She exhibits normal muscle tone. Coordination normal.   Skin: Skin is warm and dry. No rash noted. She is not diaphoretic. No erythema. No pallor.   Psychiatric: She has a normal mood and affect. Her behavior is normal. Judgment and thought content normal.   Nursing note and vitals reviewed.      Lab Results   Component Value Date     09/27/2017    K 4.5 09/27/2017     09/27/2017    CO2 24 09/27/2017    BUN 20 09/27/2017    " CREATININE 0.9 09/27/2017     (H) 09/27/2017    AST 35 09/27/2017    ALT 18 09/27/2017    ALBUMIN 3.6 09/27/2017    PROT 7.4 09/27/2017    BILITOT 0.3 09/27/2017    WBC 4.41 11/29/2017    HGB 12.8 11/29/2017    HCT 39.7 11/29/2017    MCV 96 11/29/2017     11/29/2017    TSH 1.490 04/01/2016    CHOL 178 08/21/2017    HDL 56 08/21/2017    LDLCALC 110.6 08/21/2017    TRIG 57 08/21/2017     Assessment:      1. Hyperlipidemia, unspecified hyperlipidemia type    2. Hypothyroidism due to acquired atrophy of thyroid    3. Sleep apnea, unspecified type    4. MS (multiple sclerosis)        Plan:   1. Chest pain, atypical  - will order 2D ECHO  - if negative consider other causes of CP - MSK/esoph/pulm/anxiety    2. HLD  - cont statin    3. Hypothyrodism  - cont synthroid, check TSH    4. MS  - cont meds per PCP/Neuro    5. MAYKEL  - will refer for sleep eval    Thank you for allowing me to participate in this patient's care. Please do not hesitate to contact me with any questions or concerns. Consult note has been forwarded to the referral physician.

## 2018-02-06 ENCOUNTER — TELEPHONE (OUTPATIENT)
Dept: PULMONOLOGY | Facility: CLINIC | Age: 58
End: 2018-02-06

## 2018-02-06 NOTE — TELEPHONE ENCOUNTER
----- Message from Claudia Cheung LPN sent at 2/5/2018  1:46 PM CST -----  Regarding: sleep eval  Sleep eval needed. Please advise

## 2018-02-07 ENCOUNTER — CLINICAL SUPPORT (OUTPATIENT)
Dept: CARDIOLOGY | Facility: CLINIC | Age: 58
End: 2018-02-07
Attending: INTERNAL MEDICINE
Payer: MEDICARE

## 2018-02-07 DIAGNOSIS — E78.5 HYPERLIPIDEMIA, UNSPECIFIED HYPERLIPIDEMIA TYPE: Chronic | ICD-10-CM

## 2018-02-07 LAB
DIASTOLIC DYSFUNCTION: YES
ESTIMATED PA SYSTOLIC PRESSURE: 23.43
RETIRED EF AND QEF - SEE NOTES: 60 (ref 55–65)

## 2018-02-07 PROCEDURE — 93306 TTE W/DOPPLER COMPLETE: CPT | Mod: S$GLB,,, | Performed by: INTERNAL MEDICINE

## 2018-02-22 ENCOUNTER — TELEPHONE (OUTPATIENT)
Dept: CARDIOLOGY | Facility: CLINIC | Age: 58
End: 2018-02-22

## 2018-02-22 NOTE — TELEPHONE ENCOUNTER
----- Message from Spencer Corey MD sent at 2/7/2018  3:13 PM CST -----  Please call patient regarding normal result of heart function with normal valves. If he/she has any questions please let me know or have him/her schedule an appt to see me soon to discuss. Thank you

## 2018-02-23 ENCOUNTER — OFFICE VISIT (OUTPATIENT)
Dept: PULMONOLOGY | Facility: CLINIC | Age: 58
End: 2018-02-23
Payer: MEDICARE

## 2018-02-23 VITALS
RESPIRATION RATE: 18 BRPM | DIASTOLIC BLOOD PRESSURE: 74 MMHG | SYSTOLIC BLOOD PRESSURE: 110 MMHG | HEIGHT: 63 IN | BODY MASS INDEX: 26.98 KG/M2 | OXYGEN SATURATION: 95 % | HEART RATE: 79 BPM | WEIGHT: 152.25 LBS

## 2018-02-23 DIAGNOSIS — G47.33 MILD OBSTRUCTIVE SLEEP APNEA: Primary | ICD-10-CM

## 2018-02-23 DIAGNOSIS — G35 MS (MULTIPLE SCLEROSIS): Chronic | ICD-10-CM

## 2018-02-23 DIAGNOSIS — M62.838 MUSCLE SPASM: ICD-10-CM

## 2018-02-23 DIAGNOSIS — F32.0 MAJOR DEPRESSIVE DISORDER, SINGLE EPISODE, MILD: ICD-10-CM

## 2018-02-23 PROCEDURE — 99999 PR PBB SHADOW E&M-EST. PATIENT-LVL IV: CPT | Mod: PBBFAC,,, | Performed by: NURSE PRACTITIONER

## 2018-02-23 PROCEDURE — 3008F BODY MASS INDEX DOCD: CPT | Mod: S$GLB,,, | Performed by: NURSE PRACTITIONER

## 2018-02-23 PROCEDURE — 99204 OFFICE O/P NEW MOD 45 MIN: CPT | Mod: S$GLB,,, | Performed by: NURSE PRACTITIONER

## 2018-02-23 PROCEDURE — 99499 UNLISTED E&M SERVICE: CPT | Mod: S$GLB,,, | Performed by: NURSE PRACTITIONER

## 2018-02-23 NOTE — LETTER
February 23, 2018      Spencer Corey MD  [address]  [facility] [address]           [facility]  [address] [place] [address]  Phone: [phone]  Fax: [phone]          Patient: Cem Meyers   MR Number: [id number]   YOB: 1960   Date of Visit: 2/23/2018       Dear Dr. Spencer Corey:    Thank you for referring Cem Meyers to me for evaluation. Attached you will find relevant portions of my assessment and plan of care.    If you have questions, please do not hesitate to call me. I look forward to following Cem Meyers along with you.    Sincerely,    Madisyn Sullivan, NP    Enclosure  CC:  No Recipients    If you would like to receive this communication electronically, please contact [email or web address] or [phone] to request more information on [employer] Link access.    For providers and/or their staff who would like to refer a patient to [facility], please contact us through our one-stop-shop provider referral line, [facility] , at [phone].    If you feel you have received this communication in error or would no longer like to receive these types of communications, please e-mail [email or web address]

## 2018-02-23 NOTE — ASSESSMENT & PLAN NOTE
Stable on treatment, followed by Rebeka [place] MD and Rimma Rouse PA-C Neurology, [facility]

## 2018-02-23 NOTE — PATIENT INSTRUCTIONS
What Are Snoring and Obstructive Sleep Apnea?  If youve ever had a stuffed-up nose, you know the feeling of trying to breathe through a very narrow passageway. This is what happens in your throat when you snore. While you sleep, structures in your throat partially block your air passage, making the passage narrow and hard to breathe through. If the entire passage becomes blocked and you cant breathe at all, you have sleep apnea.      Snoring Obstructive sleep apnea   Snoring  If your throat structures are too large or the muscles relax too much during sleep, the air passage may be partially blocked. As air from the nose or mouth passes around this blockage, the throat structures vibrate, causing the familiar sound of snoring. At times, this sound can be so loud that snorers wake up others, or even themselves, during the night. Snoring gets worse as more and more of the air passage is blocked.  Obstructive sleep apnea  If the structures completely block the throat, air cant flow to the lungs at all. This is called apnea (meaning no breathing). Since the lungs arent getting fresh air, the brain tells the body to wake up just enough to tighten the muscles and unblock the air passage. With a loud gasp, breathing begins again. This process may be repeated over and over again throughout the night, making your sleep fragmented with a lighter stage of sleep. Even though you do not remember waking up many times during the night to a lighter sleep, you feel tired the next day. The lack of sleep and fresh air can also strain your lungs, heart, and other organs, leading to problems such as high blood pressure, heart attack, or stroke.  Problems in the nose and jaw  Problems in the structure of the nose may obstruct breathing. A crooked (deviated) septum or swollen turbinates can make snoring worse or lead to apnea. Also, a receding jaw may make the tongue sit too far back, so its more likely to block the airway when  youre asleep.        Date Last Reviewed: 7/18/2015  © 5315-5818 [employer]. [address] [place] [address]. All rights reserved. This information is not intended as a substitute for professional medical care. Always follow your healthcare professional's instructions.        Continuous Positive Air Pressure (CPAP)     A mask over the nose gently directs air into the throat to keep the airway open.   Continuous positive air pressure (CPAP) uses gentle air pressure to hold the airway open. CPAP is often the most effective treatment for sleep apnea and severe snoring. It works very well for many people. But keep in mind that it can take several adjustments before the setup is right for you.  How CPAP works  The CPAP machine  is a small portable pump beside the bed. The pump sends air through a hose, which is held over your nose and mouth by a mask. Mild air pressure is gently pushed through your airway. The air pressure nudges sagging tissues aside. This widens the airway so you can breathe better. CPAP may be combined with other kinds of therapy for sleep apnea.  Types of air pressure treatments  There are different types of CPAP. Your doctor or CPAP technician will help you decide which type is best for you:  · Basic CPAP keeps the pressure constant all night long.  · A bilevel device (BiPAP) provides more pressure when you breathe in and less when you breathe out. A BiPAP machine also may be set to provide automatic breaths to maintain breathing if you stop breathing while sleeping.  · An autoCPAP device automatically adjusts pressure throughout the night and in response to changes such as body position, sleep stage, and snoring.  Date Last Reviewed: 8/10/2015  © 5511-2267 [employer]. [address] [place], [place] [id number]. All rights reserved. This information is not intended as a substitute for professional medical care. Always follow your healthcare professional's  instructions.        Visiting a Sleep Clinic    Your provider has scheduled you for a sleep study.   You should be receiving a phone call from the sleep lab shortly after your study has been approved by your insurance. Please make sure you have your current phone numbers in the [facility] system. If you do not hear from anyone in the next 10 -14 business days, please call the sleep lab at [phone] to schedule your sleep study. The sleep studies are performed at [facility] seven nights a week.  When you are scheduling your sleep study, they will also make you a follow up appointment with your provider. This follow up appointment will be 10-14 days after your sleep study to review the results. If it is noted that you do have sleep apnea on your initial sleep study, you may receive a call back for a second night study with the CPAP before you come back to the office.   Are you worried about having a sleep study? Talk to your healthcare provider if you have any concerns. Learn what to expect at the sleep clinic and try to relax before you come.  Before your study  Your healthcare provider will tell you how to prepare. Ask if you should take your usual medicines. Also:  · Avoid napping.  · Avoid caffeine and alcohol.  · Take a shower and wash your hair (dont use hair conditioner, hair spray, and skin lotions).  · Eat dinner before you come to the sleep clinic. Pack a snack if you need one before bedtime.  · Bring what will make you comfortable, such as your pajamas, robe, slippers, hygiene items, and even your own pillow.  What you can expect  When you arrive at the sleep clinic, you will usually be checked in by a . A specialized sleep technologist will meet you in your room. Then you may change into your nightclothes. Small sensors are placed on your head and body with tape and gel. The sensors are then plugged into a machine that will monitor your sleep. If you need to use a  restroom, the sensors can be unplugged. A camera in your room will record your body movements. The technologist will stay in a nearby room. If you need to talk to him or her, use the intercom.  What a sleep study does  A sleep study monitors all the stages of your sleep. To do this, the following are recorded:  · Eye movements  · Heart rate, brain waves, and muscle activity  · Level of oxygen in your blood  · Breathing and snoring  · Sudden leg or body movements  If you have breathing problems, Continuous Positive Airway Pressure (CPAP) may be used. CPAP is a device that can help you breathe by adding mild air pressure to your airway passages and improve your sleep. It may be used during the second half of your study or on another night.  Getting your results  The technologist can answer some of your questions about the sleep study. But only your healthcare provider can explain the results. He or she will have the report of your sleep study within 1 to 2 weeks. Then your treatment options can be discussed with your healthcare provider, or you may be referred to a sleep disorders specialist.  Date Last Reviewed: 8/9/2015 © 2000-2017 [employer]. [address] [place] [address], [place] [address]. All rights reserved. This information is not intended as a substitute for professional medical care. Always follow your healthcare professional's instructions.

## 2018-02-23 NOTE — PROGRESS NOTES
Subjective:      Patient ID: Cem Meyers is a 57 y.o. female.    Patient Active Problem List   Diagnosis    Muscle spasm    Gait disturbance    MS (multiple sclerosis)    Hypothyroid    Hyperlipidemia    Pes planus (flat feet)    Vitamin D insufficiency    Osteoporosis    Mild obstructive sleep apnea    Major depressive disorder, single episode, mild    Polyneuropathy    Encounter for long-term (current) use of high-risk medication    History of colon polyps    Muscle spasms of both lower extremities     Problem list has been reviewed.    she has been referred by Spencer Corey MD for evaluation and management for   Chief Complaint   Patient presents with    Sleep Apnea     Chief Complaint: Sleep Apnea    HPI:  She presents for a sleep evaluation related to diagnosis of sleep apnea in about 6861-7883 while living in [place].   She saw cardiology related to left lateral chest pain with negative cardiac work up.   Patient has observed snoring only if she very tired. She never used CPAP regularly and completely stopped in 2014.   She was advised by cardiologist, Dr. Corey of importance of treating sleep apnea if still present.   She has lost 30 lbs since her sleep study in 2008.   Patient reports non restful sleep, reports she has kittens that cry at night, so she awakens to tend to the cats.  She denies morning headache, has occasional sinus or cervical headache.    She reports day time napping; duration 1 Hour  She denies recent weight gain, lost 30 lbs with exercise and calorie reduction.  Cardiovascular risk factors: hyperlipidemia  Bed time is 1400  Wake time is 1000  Sleep onset is within 15 Minutes.  Sleep maintenance difficulties related to non-restful sleep awakening with cats.   Wake after sleep onset occurs two times a night related to cats.  Nocturia occurs two times a night only if awakens to care for cats   Sleep aids :  NO  Dry mouth :  NO  Sleep walking:  NO  Sleep talking :   NO  Sleep eating: NO  Vivid Dreams :  NO  Cataplexy :  NO    McWilliams sleepiness score was 7.  Neck circumference is 33 cm. (13 inches).  Mallampati score 1    STOP - BANG Questionnaire:   1. Snoring : Do you snore loudly ?    NO  2. Tired : Do you often feel tired, fatigued, or sleepy during daytime?  NO  3. Observed: Has anyone observed you stop breathing during your sleep?    NO, not since initial sleep testing 2008, has lost 30 lbs since   4. Blood pressure : Do you have or are you being treated for high blood pressure?   NO  5. BMI :BMI more than 35 kg/m2?   NO  6. Age : Age over 50 yr old?    YES  7. Neck circumference: Neck circumference greater than 40 cm?   NO  8. Gender: Gender male?   NO    SCORE: 1    High risk of MAYKEL: Yes 5 - 8  Intermediate risk of MAYKEL: Yes 3 - 4  Low risk of MAYKEL: Yes 0 - 2    Previous Report Reviewed: lab reports and office notes     Past Medical History: The following portions of the patient's history were reviewed and updated as appropriate:   She  has a past surgical history that includes Knee surgery (Right, 1989); Fracture surgery (Left, 2013); Tonsillectomy (1966); and Colonoscopy (N/A, 9/1/2017).  Her family history includes Cancer in her mother; Heart disease in her father; Hypertension in her father; Lupus in her maternal aunt; Pancreatic cancer in her mother; Rheum arthritis in her maternal aunt and sister; Stomach cancer in her mother.  She  reports that she has never smoked. She has never used smokeless tobacco. She reports that she does not drink alcohol or use drugs.  She has a current medication list which includes the following prescription(s): baclofen, calcium citrate-vitamin d2, cholecalciferol (vitamin d3), denosumab, fish oil-omega-3 fatty acids, gabapentin, interferon beta-1a (albumin), levothyroxine, multivitamin, paroxetine, and simvastatin.  She is allergic to latex, natural rubber..    Review of Systems   Constitutional: Negative for fever, chills, weight  loss, weight gain, activity change, appetite change, fatigue and night sweats.   HENT: Negative for postnasal drip, rhinorrhea, sinus pressure, voice change and congestion.    Eyes: Negative for redness and itching.   Respiratory: Negative for snoring, cough, sputum production, chest tightness, shortness of breath, wheezing, orthopnea, asthma nighttime symptoms, dyspnea on extertion, use of rescue inhaler and somnolence.    Cardiovascular: Negative.  Negative for chest pain, palpitations and leg swelling.   Genitourinary: Negative for difficulty urinating and hematuria.   Endocrine: Negative for cold intolerance and heat intolerance.    Musculoskeletal: Negative for arthralgias, gait problem, joint swelling and myalgias.   Skin: Negative.    Gastrointestinal: Negative for nausea, vomiting, abdominal pain and acid reflux.   Neurological: Negative for dizziness, weakness, light-headedness and headaches.   Hematological: Negative for adenopathy. No excessive bruising.   All other systems reviewed and are negative.     Objective:     Physical Exam   Constitutional: She is oriented to person, place, and time. She appears well-developed and well-nourished. She is active and cooperative.  Non-toxic appearance. She does not appear ill. No distress.   HENT:   Head: Normocephalic.   Right Ear: External ear normal.   Left Ear: External ear normal.   Nose: Nose normal.   Mouth/Throat: Oropharynx is clear and moist. No oropharyngeal exudate.   Eyes: Conjunctivae are normal.   Neck: Normal range of motion. Neck supple.   Cardiovascular: Normal rate, regular rhythm, normal heart sounds and intact distal pulses.    Pulmonary/Chest: Effort normal and breath sounds normal. No stridor.   Abdominal: Soft.   Musculoskeletal: Normal range of motion.   Lymphadenopathy:     She has no cervical adenopathy.   Neurological: She is alert and oriented to person, place, and time.   Skin: Skin is warm and dry.   Psychiatric: She has a normal mood  "and affect. Her behavior is normal. Judgment and thought content normal.   Vitals reviewed.    /74   Pulse 79   Resp 18   Ht 5' 3" (1.6 m)   Wt 69 kg (152 lb 3.6 oz)   SpO2 95%   BMI 26.97 kg/m²     Personal Diagnostic Review  Review of labs, xray's, cardiology reports.   Compliance Summary  3/7/2014 - 4/5/2014 (30 days)  Days with Device Usage 2 days  Days without Device Usage 28 days  Percent Days with Device Usage 6.7%  Cumulative Usage 11 hrs. 38 mins. 49 secs.  Maximum Usage (1 Day) 6 hrs. 14 mins. 56 secs.  Average Usage (All Days) 23 mins. 17 secs.  Average Usage (Days Used) 5 hrs. 49 mins. 24 secs.  Minimum Usage (1 Day) 5 hrs. 23 mins. 53 secs.  Percent of Days with Usage >= 4 Hours 6.7%  Percent of Days with Usage < 4 Hours 93.3%  Date Range  Total Blower Time 11 hrs. 38 mins. 49 secs.  Sleep Therapy Statistics ([employer])  Average Time in Large Leak Per Day 0 secs.  Average AHI 2.3  CPAP 8.0 cmH2O    Assessment:     1. Mild obstructive sleep apnea    2. MS (multiple sclerosis)    3. Major depressive disorder, single episode, mild    4. Muscle spasm      Orders Placed This Encounter   Procedures    Polysomnogram (CPAP will be added if patient meets diagnostic criteria.)     Standing Status:   Future     Standing Expiration Date:   2/23/2019     Plan:     Discussed diagnosis, its evaluation, treatment and usual course. All questions answered.    Problem List Items Addressed This Visit     Major depressive disorder, single episode, mild     Stable, followed by neurology, on paxil          Mild obstructive sleep apnea - Primary     Diagnosis in 4011-0651 while in [place], told mild obstructive sleep apnea with treatment CPAP  8 cm.  Has not used CPAP since 2014, sporadic use from 0750-7733.   Lost 30 lbs since prior testing   After seeing cardiology 2005 for chest pain told needed re-evaluation for sleep apnea.   Proceed with PSG           Relevant Orders    Polysomnogram (CPAP will be " added if patient meets diagnostic criteria.)    MS (multiple sclerosis) (Chronic)     Stable on treatment, followed by Rebeka Smith MD and Rimma Rouse PA-C Neurology, [facility]         Muscle spasm     Improved/controlled on Balcofen              Follow-up for Sleep Study Follow Up.

## 2018-02-23 NOTE — ASSESSMENT & PLAN NOTE
Diagnosis in 4981-0698 while in [place], told mild obstructive sleep apnea with treatment CPAP  8 cm.  Has not used CPAP since 2014, sporadic use from 3354-1005.   Lost 30 lbs since prior testing   After seeing cardiology 2005 for chest pain told needed re-evaluation for sleep apnea.   Proceed with PSG

## 2018-02-25 RX ORDER — LEVOTHYROXINE SODIUM 75 UG/1
75 TABLET ORAL EVERY MORNING
Qty: 90 TABLET | Refills: 2 | Status: SHIPPED | OUTPATIENT
Start: 2018-02-25 | End: 2018-11-07 | Stop reason: SDUPTHER

## 2018-02-25 RX ORDER — SIMVASTATIN 40 MG/1
40 TABLET, FILM COATED ORAL DAILY
Qty: 90 TABLET | Refills: 3 | Status: SHIPPED | OUTPATIENT
Start: 2018-02-25 | End: 2019-03-25 | Stop reason: SDUPTHER

## 2018-02-27 RX ORDER — BACLOFEN 10 MG/1
TABLET ORAL
Qty: 135 TABLET | Refills: 5 | Status: SHIPPED | OUTPATIENT
Start: 2018-02-27 | End: 2018-10-27 | Stop reason: SDUPTHER

## 2018-03-01 ENCOUNTER — TELEPHONE (OUTPATIENT)
Dept: RHEUMATOLOGY | Facility: CLINIC | Age: 58
End: 2018-03-01

## 2018-03-01 DIAGNOSIS — M81.0 OSTEOPOROSIS WITHOUT CURRENT PATHOLOGICAL FRACTURE, UNSPECIFIED OSTEOPOROSIS TYPE: Primary | ICD-10-CM

## 2018-03-05 DIAGNOSIS — G35 MS (MULTIPLE SCLEROSIS): ICD-10-CM

## 2018-03-05 DIAGNOSIS — M79.2 NEUROPATHIC PAIN: Primary | ICD-10-CM

## 2018-03-05 NOTE — TELEPHONE ENCOUNTER
----- Message from Peter Randolph sent at 3/5/2018  3:12 PM CST -----  Contact: Dianna Hernández Mail Ramon @ [phone]   Caller is calling to get an update on the medication refill (gabapentin (NEURONTIN) 400 MG capsule ) (CITALOPRAM )

## 2018-03-06 RX ORDER — GABAPENTIN 400 MG/1
800 CAPSULE ORAL 3 TIMES DAILY
Qty: 180 CAPSULE | Refills: 5 | Status: SHIPPED | OUTPATIENT
Start: 2018-03-06 | End: 2018-04-29 | Stop reason: SDUPTHER

## 2018-03-17 ENCOUNTER — HOSPITAL ENCOUNTER (OUTPATIENT)
Dept: SLEEP MEDICINE | Facility: HOSPITAL | Age: 58
Discharge: HOME | End: 2018-03-17
Attending: FAMILY MEDICINE
Payer: MEDICARE

## 2018-03-17 DIAGNOSIS — G47.33 MILD OBSTRUCTIVE SLEEP APNEA: Primary | ICD-10-CM

## 2018-03-17 DIAGNOSIS — F51.04 PSYCHOPHYSIOLOGICAL INSOMNIA: ICD-10-CM

## 2018-03-17 DIAGNOSIS — Z72.821 INADEQUATE SLEEP HYGIENE: ICD-10-CM

## 2018-03-17 PROCEDURE — 95810 POLYSOM 6/> YRS 4/> PARAM: CPT | Mod: 26,,, | Performed by: PSYCHOLOGIST

## 2018-03-17 PROCEDURE — 95810 POLYSOM 6/> YRS 4/> PARAM: CPT

## 2018-03-21 ENCOUNTER — PATIENT MESSAGE (OUTPATIENT)
Dept: RHEUMATOLOGY | Facility: CLINIC | Age: 58
End: 2018-03-21

## 2018-03-21 DIAGNOSIS — G47.33 MILD OBSTRUCTIVE SLEEP APNEA: Primary | ICD-10-CM

## 2018-03-21 NOTE — PROGRESS NOTES
PSG 3/17/2018  The diagnostic polysomnography revealed a borderline to mild sleep apnea / hypopnea syndrome (A + H Index = 7.4 events /  hr asleep (mixed obstructive and central, mostly post - arousal / awakening) with 1,2 respiratory event - related arousals /  hr asleep for the study, plus an additional 1.3 RERAs / hr asleep (respiratory effort - related arousals). The mean SpO2  value was 92.4 %, moderate, minimum oxygen saturation during sleep was 85.0 %, and waking baseline SpO2 was 96 %.  No snoring was noted. A CPAP titration polysomnography is recommended.    Orders Placed This Encounter   Procedures    CPAP Titration (Must have dx of MAYKEL from previous sleep study.)     Standing Status:   Future     Standing Expiration Date:   3/21/2019

## 2018-03-21 NOTE — TELEPHONE ENCOUNTER
Sure. Please try to get it as soon as possible so that we could restart prolia at the earliest. Let us know when you complete the dental procedures so that we could reschedule prolia injection 4 weeks after that.

## 2018-04-16 ENCOUNTER — HOSPITAL ENCOUNTER (OUTPATIENT)
Dept: SLEEP MEDICINE | Facility: HOSPITAL | Age: 58
Discharge: HOME | End: 2018-04-16
Attending: FAMILY MEDICINE
Payer: MEDICARE

## 2018-04-16 DIAGNOSIS — G47.33 MILD OBSTRUCTIVE SLEEP APNEA: ICD-10-CM

## 2018-04-16 PROCEDURE — 95811 POLYSOM 6/>YRS CPAP 4/> PARM: CPT

## 2018-04-16 PROCEDURE — 95811 POLYSOM 6/>YRS CPAP 4/> PARM: CPT | Mod: 26,,, | Performed by: INTERNAL MEDICINE

## 2018-04-16 NOTE — Clinical Note
"SUMMARY STATEMENTS: 1. Findings related to sleep diagnoses: " This was a full night CPAP titration study.  CPAP was initiated at 4.0cm, with a Mirage FX Standard mask.  C-flex (set to 3) and heated humidification were used throughout.   " CPAP 7.0 cm was well tolerated. " Supine sleep was 26.2%. 2. EEG abnormalities: " Sleep efficiency was delayed. Wake after sleep onset was low. " Arousal was mild. REM sleep was reduced. 3. ECG abnormalities: " Heart rate variability.    RECOMMENDATIONS:   1. Treatment with CPAP 7.0 cm with standard mirage FX nasal mask, c flex set at 3 and heated humidification  Yours Sincerely,  Dr. Navneet Corcoran, Medical Director Sleep Laboratory    "

## 2018-04-20 ENCOUNTER — OFFICE VISIT (OUTPATIENT)
Dept: PULMONOLOGY | Facility: CLINIC | Age: 58
End: 2018-04-20
Payer: MEDICARE

## 2018-04-20 VITALS
WEIGHT: 157.31 LBS | RESPIRATION RATE: 18 BRPM | BODY MASS INDEX: 26.85 KG/M2 | SYSTOLIC BLOOD PRESSURE: 118 MMHG | OXYGEN SATURATION: 96 % | DIASTOLIC BLOOD PRESSURE: 78 MMHG | HEIGHT: 64 IN | HEART RATE: 64 BPM

## 2018-04-20 DIAGNOSIS — Z72.821 INADEQUATE SLEEP HYGIENE: ICD-10-CM

## 2018-04-20 DIAGNOSIS — F51.04 PSYCHOPHYSIOLOGICAL INSOMNIA: ICD-10-CM

## 2018-04-20 DIAGNOSIS — G35 MS (MULTIPLE SCLEROSIS): Chronic | ICD-10-CM

## 2018-04-20 DIAGNOSIS — M62.838 MUSCLE SPASM: ICD-10-CM

## 2018-04-20 DIAGNOSIS — F32.0 MAJOR DEPRESSIVE DISORDER, SINGLE EPISODE, MILD: ICD-10-CM

## 2018-04-20 DIAGNOSIS — G47.33 MILD OBSTRUCTIVE SLEEP APNEA: Primary | ICD-10-CM

## 2018-04-20 PROCEDURE — 99999 PR PBB SHADOW E&M-EST. PATIENT-LVL IV: CPT | Mod: PBBFAC,,, | Performed by: NURSE PRACTITIONER

## 2018-04-20 PROCEDURE — 99499 UNLISTED E&M SERVICE: CPT | Mod: S$PBB,,, | Performed by: NURSE PRACTITIONER

## 2018-04-20 PROCEDURE — 99213 OFFICE O/P EST LOW 20 MIN: CPT | Mod: S$GLB,,, | Performed by: NURSE PRACTITIONER

## 2018-04-20 NOTE — ASSESSMENT & PLAN NOTE
Takes neurontin, baclofen, and paxil  Defer any additional sleep medication to her MS doctors.  Recommend trial of melatonin

## 2018-04-20 NOTE — PROCEDURES
"SUMMARY STATEMENTS:  1. Findings related to sleep diagnoses:   This was a full night CPAP titration study.  CPAP was initiated at 4.0cm, with a Mirage FX Standard mask.  C-flex (set to 3) and heated humidification were used throughout.     CPAP 7.0 cm was well tolerated.   Supine sleep was 26.2%.  2. EEG abnormalities:   Sleep efficiency was delayed. Wake after sleep onset was low.   Arousal was mild. REM sleep was reduced.  3. ECG abnormalities:   Heart rate variability.       RECOMMENDATIONS:     1. Treatment with CPAP 7.0 cm with standard mirage FX nasal mask, c flex set at 3 and heated humidification    Yours Sincerely,    Dr. Navneet Corcoran,  Medical Director  Sleep Laboratory          See imported Sleep Study result in "Chart Review" under the   "Media tab".      (This Sleep Study was interpreted by a Board Certified Sleep   Specialist who conducted an epoch-by-epoch review of the entire   raw data recording.)     (The indication for this sleep study was reviewed and deemed   appropriate by [facility] Practice Parameters or other reasons by a   Board Certified Sleep Specialist.)    Navneet Corcoran MD      "

## 2018-04-20 NOTE — PROGRESS NOTES
Subjective:      Patient ID: Cem Meyers is a 57 y.o. female.    Chief Complaint: Sleep Apnea    HPI: Cem Meyers is here for follow up for MAYKEL with review of PSG and CPAP titration studies.  PSG 3/17/2018 revealed AHI 7.4. Cpap titration 4/16/2018 revealed 7 cm optimal.  Patient was on 8 cm when on CPAP in 2014, she recall liking that air flow and request resuming 8 cm.   When on CPAP 8 in past last download 3/7/2014 - 4/5/2014 (30 days) AHI 2.3   Begin CPAP 8 cm   [place] 9    Previous Report Reviewed: lab reports and office notes     Past Medical History: The following portions of the patient's history were reviewed and updated as appropriate:   She  has a past surgical history that includes Knee surgery (Right, 1989); Fracture surgery (Left, 2013); Tonsillectomy (1966); and Colonoscopy (N/A, 9/1/2017).  Her family history includes Cancer in her mother; Heart disease in her father; Hypertension in her father; Lupus in her maternal aunt; Pancreatic cancer in her mother; Rheum arthritis in her maternal aunt and sister; Stomach cancer in her mother.  She  reports that she has never smoked. She has never used smokeless tobacco. She reports that she does not drink alcohol or use drugs.  She has a current medication list which includes the following prescription(s): baclofen, calcium citrate-vitamin d2, cholecalciferol (vitamin d3), fish oil-omega-3 fatty acids, gabapentin, interferon beta-1a (albumin), levothyroxine, multivitamin, paroxetine, simvastatin, and denosumab.  She is allergic to latex, natural rubber.    The following portions of the patient's history were reviewed and updated as appropriate: allergies, current medications, past family history, past medical history, past social history, past surgical history and problem list.    Review of Systems   Constitutional: Negative for fever, chills, weight loss, weight gain, activity change, appetite change, fatigue and night sweats.   HENT: Negative  "for postnasal drip, rhinorrhea, sinus pressure, voice change and congestion.    Eyes: Negative for redness and itching.   Respiratory: Negative for snoring, cough, sputum production, chest tightness, shortness of breath, wheezing, orthopnea, asthma nighttime symptoms, dyspnea on extertion, use of rescue inhaler and somnolence.    Cardiovascular: Negative.  Negative for chest pain, palpitations and leg swelling.   Genitourinary: Negative for difficulty urinating and hematuria.   Endocrine: Negative for cold intolerance and heat intolerance.    Musculoskeletal: Negative for arthralgias, gait problem, joint swelling and myalgias.   Skin: Negative.    Gastrointestinal: Negative for nausea, vomiting, abdominal pain and acid reflux.   Neurological: Negative for dizziness, weakness, light-headedness and headaches.   Hematological: Negative for adenopathy. No excessive bruising.   All other systems reviewed and are negative.     Objective:   /78 (BP Location: Right arm, Patient Position: Sitting)   Pulse 64   Resp 18   Ht 5' 3.6" (1.615 m)   Wt 71.3 kg (157 lb 4.8 oz)   SpO2 96%   BMI 27.34 kg/m²   Physical Exam   Constitutional: She is oriented to person, place, and time. She appears well-developed and well-nourished. She is active and cooperative.  Non-toxic appearance. She does not appear ill. No distress.   HENT:   Head: Normocephalic.   Right Ear: External ear normal.   Left Ear: External ear normal.   Nose: Nose normal.   Mouth/Throat: Oropharynx is clear and moist. No oropharyngeal exudate.   Eyes: Conjunctivae are normal.   Neck: Normal range of motion. Neck supple.   Cardiovascular: Normal rate, regular rhythm, normal heart sounds and intact distal pulses.    Pulmonary/Chest: Effort normal and breath sounds normal. No stridor.   Abdominal: Soft.   Musculoskeletal: Normal range of motion.   Lymphadenopathy:     She has no cervical adenopathy.   Neurological: She is alert and oriented to person, place, " and time.   Skin: Skin is warm and dry.   Psychiatric: She has a normal mood and affect. Her behavior is normal. Judgment and thought content normal.   Vitals reviewed.    Personal Diagnostic Review  CPAP titration 4/16/2018  SUMMARY STATEMENTS:  1. Findings related to sleep diagnoses:  · This was a full night CPAP titration study.  CPAP was initiated at 4.0cm, with a Mirage FX Standard mask.  C-flex (set to 3) and heated humidification were used throughout.    · CPAP 7.0 cm was well tolerated.  · Supine sleep was 26.2%.  2. EEG abnormalities:  · Sleep efficiency was delayed. Wake after sleep onset was low.  · Arousal was mild. REM sleep was reduced.  3. ECG abnormalities:  · Heart rate variability.        RECOMMENDATIONS:      1. Treatment with CPAP 7.0 cm with standard mirage FX nasal mask, c flex set at 3 and heated humidification    PSG 3/17/2018   The diagnostic polysomnography revealed a borderline to mild sleep apnea / hypopnea syndrome (A + H Index = 7.4 events /  hr asleep (mixed obstructive and central, mostly post - arousal / awakening) with 1,2 respiratory event - related arousals /  hr asleep for the study, plus an additional 1.3 RERAs / hr asleep (respiratory effort - related arousals). The mean SpO2  value was 92.4 %, moderate, minimum oxygen saturation during sleep was 85.0 %, and waking baseline SpO2 was 96 %.  No snoring was noted. A CPAP titration polysomnography is recommended    Assessment:     1. Mild obstructive sleep apnea    2. Psychophysiological insomnia    3. MS (multiple sclerosis)    4. Muscle spasm    5. Major depressive disorder, single episode, mild    6. Inadequate sleep hygiene        Orders Placed This Encounter   Procedures    CPAP FOR HOME USE     Order Specific Question:   Type:     Answer:   CPAP     Order Specific Question:   CPAP setting (cmH20):     Answer:   8     Order Specific Question:   Length of need (1-99 months):     Answer:   99     Order Specific Question:    Humidification:     Answer:   Heated     Order Specific Question:   Type of mask:     Answer:   Nasal     Order Specific Question:   Headgear?     Answer:   Yes     Order Specific Question:   Tubing?     Answer:   Yes     Order Specific Question:   Humidifier chamber?     Answer:   Yes     Order Specific Question:   Chin strap?     Answer:   Yes     Order Specific Question:   Filters?     Answer:   Yes    CPAP/BIPAP SUPPLIES     90 day supply. 4 refills.     Order Specific Question:   Type of mask:     Answer:   Nasal     Order Specific Question:   Headgear?     Answer:   Yes     Order Specific Question:   Tubing?     Answer:   Yes     Order Specific Question:   Humidifier chamber?     Answer:   Yes     Order Specific Question:   Chin strap?     Answer:   Yes     Order Specific Question:   Filters?     Answer:   Yes     Order Specific Question:   Length of need (1-99 months):     Answer:   99     Plan:     Problem List Items Addressed This Visit     Inadequate sleep hygiene     Improve   Turn off all electronics at bedtime          Major depressive disorder, single episode, mild     Stable, followed by neurology, on paxil          Mild obstructive sleep apnea - Primary     PSG 3/17/2018 revealed AHI 7.4. Cpap titration 4/16/2018 revealed 7 cm optimal.  Patient was on 8 cm when on CPAP in 2014, she recall liking that air flow and request resuming 8 cm.   Begin CPAP 8 cm   [place] 9           Relevant Orders    CPAP FOR HOME USE    CPAP/BIPAP SUPPLIES    MS (multiple sclerosis) (Chronic)     Continues to follow with neurology, [place]         Muscle spasm     Improved/controlled with Balcofen         Psychophysiological insomnia     Takes neurontin, baclofen, and paxil  Defer any additional sleep medication to her MS doctors.  Recommend trial of melatonin               (DME - [facility]  Reviewed therapeutic goals for positive airway pressure therapy CPAP  Ideal is usage 100% of nights for 6 - 8 hours per night.  Minimum usage is 70% of night for at least 4 hours per night used. Pateint expressed understanding.     Follow-up in about 8 weeks (around 6/15/2018) for CPAP compliance download after initial set up.

## 2018-04-20 NOTE — PATIENT INSTRUCTIONS
What Are Snoring and Obstructive Sleep Apnea?  If youve ever had a stuffed-up nose, you know the feeling of trying to breathe through a very narrow passageway. This is what happens in your throat when you snore. While you sleep, structures in your throat partially block your air passage, making the passage narrow and hard to breathe through. If the entire passage becomes blocked and you cant breathe at all, you have sleep apnea.      Snoring Obstructive sleep apnea   Snoring  If your throat structures are too large or the muscles relax too much during sleep, the air passage may be partially blocked. As air from the nose or mouth passes around this blockage, the throat structures vibrate, causing the familiar sound of snoring. At times, this sound can be so loud that snorers wake up others, or even themselves, during the night. Snoring gets worse as more and more of the air passage is blocked.  Obstructive sleep apnea  If the structures completely block the throat, air cant flow to the lungs at all. This is called apnea (meaning no breathing). Since the lungs arent getting fresh air, the brain tells the body to wake up just enough to tighten the muscles and unblock the air passage. With a loud gasp, breathing begins again. This process may be repeated over and over again throughout the night, making your sleep fragmented with a lighter stage of sleep. Even though you do not remember waking up many times during the night to a lighter sleep, you feel tired the next day. The lack of sleep and fresh air can also strain your lungs, heart, and other organs, leading to problems such as high blood pressure, heart attack, or stroke.  Problems in the nose and jaw  Problems in the structure of the nose may obstruct breathing. A crooked (deviated) septum or swollen turbinates can make snoring worse or lead to apnea. Also, a receding jaw may make the tongue sit too far back, so its more likely to block the airway when  youre asleep.        Date Last Reviewed: 7/18/2015  © 7106-3834 [employer]. [address] [place] [address]. All rights reserved. This information is not intended as a substitute for professional medical care. Always follow your healthcare professional's instructions.        Continuous Positive Air Pressure (CPAP)     A mask over the nose gently directs air into the throat to keep the airway open.   Continuous positive air pressure (CPAP) uses gentle air pressure to hold the airway open. CPAP is often the most effective treatment for sleep apnea and severe snoring. It works very well for many people. But keep in mind that it can take several adjustments before the setup is right for you.  How CPAP works  The CPAP machine  is a small portable pump beside the bed. The pump sends air through a hose, which is held over your nose and mouth by a mask. Mild air pressure is gently pushed through your airway. The air pressure nudges sagging tissues aside. This widens the airway so you can breathe better. CPAP may be combined with other kinds of therapy for sleep apnea.  Types of air pressure treatments  There are different types of CPAP. Your doctor or CPAP technician will help you decide which type is best for you:  · Basic CPAP keeps the pressure constant all night long.  · A bilevel device (BiPAP) provides more pressure when you breathe in and less when you breathe out. A BiPAP machine also may be set to provide automatic breaths to maintain breathing if you stop breathing while sleeping.  · An autoCPAP device automatically adjusts pressure throughout the night and in response to changes such as body position, sleep stage, and snoring.  Date Last Reviewed: 8/10/2015  © 0044-0556 [employer]. [address] [place], [place] [id number]. All rights reserved. This information is not intended as a substitute for professional medical care. Always follow your healthcare professional's  instructions.

## 2018-04-20 NOTE — ASSESSMENT & PLAN NOTE
PSG 3/17/2018 revealed AHI 7.4. Cpap titration 4/16/2018 revealed 7 cm optimal.  Patient was on 8 cm when on CPAP in 2014, she recall liking that air flow and request resuming 8 cm.   Begin CPAP 8 cm   [place] 9

## 2018-04-26 DIAGNOSIS — G35 MS (MULTIPLE SCLEROSIS): ICD-10-CM

## 2018-04-26 DIAGNOSIS — M79.2 NEUROPATHIC PAIN: ICD-10-CM

## 2018-04-29 RX ORDER — GABAPENTIN 400 MG/1
CAPSULE ORAL
Qty: 180 CAPSULE | Refills: 1 | Status: SHIPPED | OUTPATIENT
Start: 2018-04-29 | End: 2018-06-27 | Stop reason: SDUPTHER

## 2018-05-08 ENCOUNTER — OFFICE VISIT (OUTPATIENT)
Dept: CARDIOLOGY | Facility: CLINIC | Age: 58
End: 2018-05-08
Payer: MEDICARE

## 2018-05-08 VITALS
SYSTOLIC BLOOD PRESSURE: 120 MMHG | DIASTOLIC BLOOD PRESSURE: 60 MMHG | HEIGHT: 63 IN | BODY MASS INDEX: 29.02 KG/M2 | HEART RATE: 72 BPM | WEIGHT: 163.81 LBS

## 2018-05-08 DIAGNOSIS — E03.4 HYPOTHYROIDISM DUE TO ACQUIRED ATROPHY OF THYROID: Chronic | ICD-10-CM

## 2018-05-08 DIAGNOSIS — I50.32 CHRONIC DIASTOLIC HEART FAILURE: ICD-10-CM

## 2018-05-08 DIAGNOSIS — E78.49 OTHER HYPERLIPIDEMIA: Chronic | ICD-10-CM

## 2018-05-08 DIAGNOSIS — R07.89 OTHER CHEST PAIN: Primary | ICD-10-CM

## 2018-05-08 DIAGNOSIS — G47.33 MILD OBSTRUCTIVE SLEEP APNEA: ICD-10-CM

## 2018-05-08 DIAGNOSIS — G35 MS (MULTIPLE SCLEROSIS): Chronic | ICD-10-CM

## 2018-05-08 PROCEDURE — 99214 OFFICE O/P EST MOD 30 MIN: CPT | Mod: S$GLB,,, | Performed by: INTERNAL MEDICINE

## 2018-05-08 PROCEDURE — 99499 UNLISTED E&M SERVICE: CPT | Mod: S$PBB,,, | Performed by: INTERNAL MEDICINE

## 2018-05-08 PROCEDURE — 3008F BODY MASS INDEX DOCD: CPT | Mod: CPTII,S$GLB,, | Performed by: INTERNAL MEDICINE

## 2018-05-08 PROCEDURE — 99999 PR PBB SHADOW E&M-EST. PATIENT-LVL III: CPT | Mod: PBBFAC,,, | Performed by: INTERNAL MEDICINE

## 2018-05-08 NOTE — PROGRESS NOTES
Subjective:   Patient ID:  Cem Meyers is a 57 y.o. female who presents for cardiac consult of Follow-up and Chest Pain      HPI  The patient came in today for cardiac consult of Follow-up and Chest Pain    Referring Provider: Adonis Stubbs MD   Reason for consult: chest pain    This is a 57 year old female pt PMHx HLD, hypothyroidism, MS, MAYKEL, depression presents for follow up CV evaluation.      2/5/18  Pt complains of left sided sharp chest pain. Happened twice so far, intermittent, happens at rest. Pt has MS with heat sensitive, does not do much activity. Has been more sedentary. Occasionally has mild SOB, diagnosed with mild MAYKEL 2008/2009, used CPAP initially but has not used since then. Very min snoring these days.     5/8/18  After last visit had 2D ECHO which revealed normal BiV function with grade 1 DD, normal valves. Had sleep study which was positive for MAYKEL and has started CPAP. Wants a nose mask instead of face mask. Walks dogs, feels fatigue but not chest pain. NO further episodes of CP, thinks it may have been due to some exercise/MS symptoms.     Patient feels no leg swelling, no PND, no palpitation, no syncope, no CNS symptoms.    Patient is compliant with medications.    2/5/18 - ECG - NSR, no ischemia    2D ECHO 2/7/18  CONCLUSIONS     1 - No wall motion abnormalities.     2 - Normal left ventricular systolic function (EF 60-65%).     3 - Impaired LV relaxation, normal LAP (grade 1 diastolic dysfunction).     4 - Normal right ventricular systolic function .     5 - The estimated PA systolic pressure is greater than 23 mmHg.         Past Medical History:   Diagnosis Date    Broken toe 6/2015    left big toe    Chronic diastolic heart failure 5/8/2018    Closed left arm fracture     Colon polyp     Depression     Hyperlipidemia     Hypothyroid     MS (multiple sclerosis)     Neurogenic bladder 12/6/2012    Osteoporosis     Polyneuropathy     Sleep apnea     Trouble in sleeping         Past Surgical History:   Procedure Laterality Date    COLONOSCOPY N/A 9/1/2017    Procedure: COLONOSCOPY;  Surgeon: Andriy Villar MD;  Location[facility];  Service: Endoscopy;  Laterality: N/A;    FRACTURE SURGERY Left 2013    wrist- [facility]    [place] Right [place]    TONSILLECTOMY  1966       Social History   Substance Use Topics    Smoking status: Never Smoker    Smokeless tobacco: Never Used    Alcohol use No       Family History   Problem Relation Age of Onset    Pancreatic cancer Mother     Stomach cancer Mother     Cancer Mother         pancreatic, stomach    Hypertension Father     Heart disease Father         MI    Lupus Maternal Aunt     Rheum arthritis Maternal Aunt     Rheum arthritis Sister     Melanoma Neg Hx        Patient's Medications   New Prescriptions    No medications on file   Previous Medications    BACLOFEN (LIORESAL) 10 MG TABLET    TAKE 1 - 1 & 1/2 TABLETS BY MOUTH THREE TIMES A DAY AS NEEDED    CALCIUM CITRATE-VITAMIN D2 1,500-200 MG-UNIT TAB        CHOLECALCIFEROL, VITAMIN D3, (VITAMIN D3) 4,000 UNIT CAP    Take 4,000 Units by mouth once daily.     DENOSUMAB (PROLIA) 60 MG/ML SYRG    Inject 60 mg into the skin.    FISH OIL-OMEGA-3 FATTY ACIDS 300-1,000 MG CAPSULE    Take 2 g by mouth once daily.    GABAPENTIN (NEURONTIN) 400 MG CAPSULE    TAKE 2 CAPSULES BY MOUTH THREE TIMES DAILY    INTERFERON BETA-1A, ALBUMIN, (REBIF, WITH ALBUMIN,) 44 MCG/0.5 ML INJECTION    Inject 0.5 mLs (44 mcg total) into the skin 3 (three) times a week.    LEVOTHYROXINE (SYNTHROID) 75 MCG TABLET    Take 1 tablet (75 mcg total) by mouth every morning.    MULTIVITAMIN CAPSULE    Take 1 capsule by mouth once daily.    PAROXETINE (PAXIL) 40 MG TABLET    Take 1 tablet (40 mg total) by mouth every morning.    SIMVASTATIN (ZOCOR) 40 MG TABLET    Take 1 tablet (40 mg total) by mouth once daily.   Modified Medications    No medications on file   Discontinued Medications    No  "medications on file       Review of Systems   Constitutional: Negative.    HENT: Negative.    Eyes: Negative.    Respiratory: Negative for shortness of breath.    Cardiovascular: Negative for chest pain and palpitations.   Gastrointestinal: Negative.    Genitourinary: Negative.    Musculoskeletal: Positive for falls.   Skin: Negative.    Neurological: Positive for dizziness. Negative for headaches.   Endo/Heme/Allergies: Negative.    Psychiatric/Behavioral: Negative.    All 12 systems otherwise negative.      Wt Readings from Last 3 Encounters:   05/08/18 74.3 kg (163 lb 12.8 oz)   04/20/18 71.3 kg (157 lb 4.8 oz)   02/23/18 69 kg (152 lb 3.6 oz)     Temp Readings from Last 3 Encounters:   01/04/18 96.1 °F (35.6 °C) (Tympanic)   09/01/17 98 °F (36.7 °C) (Oral)   05/25/17 97.5 °F (36.4 °C) (Tympanic)     BP Readings from Last 3 Encounters:   05/08/18 120/60   04/20/18 118/78   02/23/18 110/74     Pulse Readings from Last 3 Encounters:   05/08/18 72   04/20/18 64   02/23/18 79       /60 (BP Location: Left arm, Patient Position: Sitting, BP Method: Medium (Manual))   Pulse 72   Ht 5' 3" (1.6 m)   Wt 74.3 kg (163 lb 12.8 oz)   BMI 29.02 kg/m²     Objective:   Physical Exam   Constitutional: She is oriented to person, place, and time. She appears well-developed and well-nourished. No distress.   HENT:   Head: Normocephalic and atraumatic.   Nose: Nose normal.   Mouth/Throat: Oropharynx is clear and moist.   Eyes: Conjunctivae and EOM are normal. No scleral icterus.   Neck: Normal range of motion. Neck supple. No JVD present. No thyromegaly present.   Cardiovascular: Normal rate, regular rhythm, S1 normal and S2 normal.  Exam reveals no gallop, no S3, no S4 and no friction rub.    No murmur heard.  Pulmonary/Chest: Effort normal and breath sounds normal. No stridor. No respiratory distress. She has no wheezes. She has no rales. She exhibits no tenderness.   Abdominal: Soft. Bowel sounds are normal. She exhibits " no distension and no mass. There is no tenderness. There is no rebound.   Genitourinary:   Genitourinary Comments: Deferred   Musculoskeletal: Normal range of motion. She exhibits no edema, tenderness or deformity.   Lymphadenopathy:     She has no cervical adenopathy.   Neurological: She is alert and oriented to person, place, and time. She exhibits normal muscle tone. Coordination normal.   Skin: Skin is warm and dry. No rash noted. She is not diaphoretic. No erythema. No pallor.   Psychiatric: She has a normal mood and affect. Her behavior is normal. Judgment and thought content normal.   Nursing note and vitals reviewed.      Lab Results   Component Value Date     09/27/2017    K 4.5 09/27/2017     09/27/2017    CO2 24 09/27/2017    BUN 20 09/27/2017    CREATININE 0.9 09/27/2017     (H) 09/27/2017    AST 35 09/27/2017    ALT 18 09/27/2017    ALBUMIN 3.6 09/27/2017    PROT 7.4 09/27/2017    BILITOT 0.3 09/27/2017    WBC 4.41 11/29/2017    HGB 12.8 11/29/2017    HCT 39.7 11/29/2017    MCV 96 11/29/2017     11/29/2017    TSH 1.490 04/01/2016    CHOL 178 08/21/2017    HDL 56 08/21/2017    LDLCALC 110.6 08/21/2017    TRIG 57 08/21/2017     Assessment:      1. Other chest pain    2. Other hyperlipidemia    3. Hypothyroidism due to acquired atrophy of thyroid    4. Mild obstructive sleep apnea    5. MS (multiple sclerosis)    6. Chronic diastolic heart failure        Plan:   1. Chest pain, atypical - resolved  - 2D ECHO overall normal function without valve disease  - diastolic HF - euvolemic  - consider other causes of CP - MSK/esoph/pulm/anxiety    2. HLD  - cont statin    3. Hypothyrodism  - cont synthroid, check TSH    4. MS  - cont meds per PCP/Neuro    5. MAYKEL  - cont CPAP    6. Diastolic HF  - pt euvolemic  - cont to monitor    Thank you for allowing me to participate in this patient's care. Please do not hesitate to contact me with any questions or concerns. Consult note has been  forwarded to the referral physician.

## 2018-05-08 NOTE — PATIENT INSTRUCTIONS
Left- or Right- Side Congestive Heart Failure (CHF)    The heart is a large muscle. It is a pump that circulates blood throughout the body. Blood carries oxygen to all of the organs, including the brain, muscles, and skin. After your body takes the oxygen out of the blood, the blood returns to the heart. The right side of the heart collects the blood from the body and pumps it to the lungs. In the lungs, it gets fresh oxygen and gives up carbon dioxide. The oxygen-rich blood from the lungs then returns to the left side of the heart, where it is pumped back out to the rest of your body, starting the process all over.  Congestive heart failure (CHF) occurs when the heart muscle is weakened. This affects the pumping action of the heart. Heart failure can affect the right side of the heart or the left side. But heart failure may affect not only the right side of the heart or only the left side. Although it may have started on one side, it can and often eventually does affect both sides.  Right-side heart failure  When the right side of the heart is weakened, it cant handle the blood it is getting from the rest of the body. This blood returns to the heart through veins. When too much pressure builds up in the veins, fluid leaks out into the tissues. Gravity then causes that fluid to move to those parts of the body that are the lowest. So one of the first symptoms of right-side CHF can include swelling in the feet and ankles. If the condition gets worse, the swelling can even go up past the knees. Sometimes it gets so severe, the liver can get congested as well.  Left-side heart failure  When the left side of the heart is weakened, it cant handle the blood it gets from the lungs. Pressure then builds up in the veins of the lungs, causing fluid to leak into the lung tissues. This may cause CHF and pulmonary edema. This causes you to feel short of breath, weak, or dizzy. These symptoms are often worse with exertion,  such as when climbing stairs or walking up hills. Lying with your head flat is uncomfortable and can make your breathing worse. This may make sleeping difficult. You may need to use extra pillows to elevate your upper body to sleep well. The same is true when just resting during the daytime.  There are many causes of heart failure including:  · Coronary artery disease  · Past heart attack (also known as acute myocardial infarction, or AMI)  · High blood pressure  · Damaged heart valve  · Diabetes  · Obesity  · Cigarette smoking  · Alcohol abuse  Heart failure is a chronic condition. There is no cure. The purpose of medical treatment is to improve the pumping action of the heart. The main way to do this is to remove excess water from the body. A number of medicines can help reach this goal, improve symptoms, and prevent the heart from becoming weaker. Sometimes, heart failure can become so severe that a device is placed in the heart to help with pumping. Another major goal is to better treat the causes of heart failure, such as diabetes and high blood pressure, by making changes in your lifestyle and maximizing medical control when needed.  Home care  Follow these guidelines when caring for yourself at home:  · Check your weight every day. This is very important because a sudden increase in weight gain could mean worsening heart failure. Keep these things in mind:  ? Use the same scale every day.  ? Weigh yourself at the same time every day.  ? Make sure the scale is on a hard floor surface, not on a rug or carpet.  ? Keep a record of your weight every day so your healthcare provider can see it. If you are not given a log sheet for this, keep a separate journal for this purpose.   · Cut back on the amount of salt (sodium) you eat. Follow your healthcare provider's recommendation on how much salt or sodium you should have each day.  ? Avoid high-salt foods. These include olives, pickles, smoked meats, salted potato  chips, and most prepared foods.  ? Don't add salt to your food at the table. Use only small amounts of salt when cooking.  ? Read the labels carefully on food packages to learn how much salt or sodium is in each serving in the package. Remember, a can or package of food may contain more than 1 serving. So if you eat all the food in the package, you may be getting more salt than you think.  · Follow your healthcare provider's recommendations about how much fluid you should have. Be aware that some foods, such as soup, pudding, and juicy fruits like oranges or melons, contain liquid. You'll need to count the liquid in those foods as part of your daily fluid intake. Your provider can help you with this.  · Stop smoking.  · Cut back on how much alcohol you drink.  · Lose weight if you are overweight. The excess weight adds a lot of stress on the workload of the heart.  · Stay active. Talk with your provider about an exercise program that is safe for your heart.  · Keep your feet elevated to reduce swelling. Ask your provider about support hose as a preventive treatment for daytime leg swelling.  Besides taking your medicine as instructed, an important part of treatment is lifestyle changes. These include diet, physical activity, stopping smoking, and weight control.  Improve your diet by including more fresh foods, cutting back on how much sugar and saturated fat you eat, and eating fewer processed foods and less salt.  Follow-up care  Follow up with your healthcare provider, or as advised.  Make sure to keep any appointments that were made for you. These can help better control your congestive heart failure. You will need to follow up with your provider on a routine basis to make sure your heart failure is well managed.  If an X-ray, ECG, or other tests were done, you will be told of any new findings that may affect your care.  Call 911  Call 911 if you:  · Become severely short of breath  · Feel lightheaded, or feel  like you might pass out or faint  · Have chest pain or discomfort that is different than usual, the medicines your doctor told you to use for this don't help, or the pain lasts longer than 10 to 15 minutes  · You suddenly develop a rapid heart rate  When to seek medical advice  The following may be signs that your heart failure is getting worse. Call your healthcare provider right away if any of these happen:  · Sudden weight gain. This means 3 or more pounds in one day, or 5 or more pounds in 1 week.  · Trouble breathing not related to being active  · New or increased swelling of your legs or ankles  · Swelling or pain in your abdomen  · Breathing trouble at night. This means waking up short of breath or needing more pillows to breathe.  · Frequent coughing that doesnt go away  · Feeling much more tired than usual  Date Last Reviewed: 1/4/2016  © 1266-7228 [employer]. [address] [place] [address], [place] [address]. All rights reserved. This information is not intended as a substitute for professional medical care. Always follow your healthcare professional's instructions.

## 2018-05-11 ENCOUNTER — PATIENT MESSAGE (OUTPATIENT)
Dept: PULMONOLOGY | Facility: CLINIC | Age: 58
End: 2018-05-11

## 2018-05-31 ENCOUNTER — LAB VISIT (OUTPATIENT)
Dept: LAB | Facility: HOSPITAL | Age: 58
End: 2018-05-31
Attending: INTERNAL MEDICINE
Payer: MEDICARE

## 2018-05-31 ENCOUNTER — OFFICE VISIT (OUTPATIENT)
Dept: RHEUMATOLOGY | Facility: CLINIC | Age: 58
End: 2018-05-31
Payer: MEDICARE

## 2018-05-31 VITALS
WEIGHT: 158.5 LBS | SYSTOLIC BLOOD PRESSURE: 121 MMHG | DIASTOLIC BLOOD PRESSURE: 79 MMHG | BODY MASS INDEX: 28.08 KG/M2 | HEART RATE: 75 BPM

## 2018-05-31 DIAGNOSIS — M81.0 AGE-RELATED OSTEOPOROSIS WITHOUT CURRENT PATHOLOGICAL FRACTURE: Primary | ICD-10-CM

## 2018-05-31 DIAGNOSIS — E03.4 HYPOTHYROIDISM DUE TO ACQUIRED ATROPHY OF THYROID: Chronic | ICD-10-CM

## 2018-05-31 DIAGNOSIS — M81.0 AGE-RELATED OSTEOPOROSIS WITHOUT CURRENT PATHOLOGICAL FRACTURE: ICD-10-CM

## 2018-05-31 LAB
ALBUMIN SERPL BCP-MCNC: 3.6 G/DL
ALP SERPL-CCNC: 68 U/L
ALT SERPL W/O P-5'-P-CCNC: 19 U/L
ANION GAP SERPL CALC-SCNC: 9 MMOL/L
AST SERPL-CCNC: 32 U/L
BILIRUB SERPL-MCNC: 0.4 MG/DL
BUN SERPL-MCNC: 21 MG/DL
CALCIUM SERPL-MCNC: 9.8 MG/DL
CHLORIDE SERPL-SCNC: 106 MMOL/L
CO2 SERPL-SCNC: 24 MMOL/L
CREAT SERPL-MCNC: 0.9 MG/DL
EST. GFR  (AFRICAN AMERICAN): >60 ML/MIN/1.73 M^2
EST. GFR  (NON AFRICAN AMERICAN): >60 ML/MIN/1.73 M^2
GLUCOSE SERPL-MCNC: 104 MG/DL
POTASSIUM SERPL-SCNC: 3.9 MMOL/L
PROT SERPL-MCNC: 7.3 G/DL
SODIUM SERPL-SCNC: 139 MMOL/L

## 2018-05-31 PROCEDURE — 96372 THER/PROPH/DIAG INJ SC/IM: CPT | Mod: S$GLB,,, | Performed by: INTERNAL MEDICINE

## 2018-05-31 PROCEDURE — 84439 ASSAY OF FREE THYROXINE: CPT

## 2018-05-31 PROCEDURE — 99999 PR PBB SHADOW E&M-EST. PATIENT-LVL III: CPT | Mod: PBBFAC,,, | Performed by: INTERNAL MEDICINE

## 2018-05-31 PROCEDURE — 99499 UNLISTED E&M SERVICE: CPT | Mod: S$PBB,,, | Performed by: INTERNAL MEDICINE

## 2018-05-31 PROCEDURE — 99213 OFFICE O/P EST LOW 20 MIN: CPT | Mod: 25,S$GLB,, | Performed by: INTERNAL MEDICINE

## 2018-05-31 PROCEDURE — 80053 COMPREHEN METABOLIC PANEL: CPT | Mod: PO

## 2018-05-31 PROCEDURE — 84443 ASSAY THYROID STIM HORMONE: CPT

## 2018-05-31 PROCEDURE — 36415 COLL VENOUS BLD VENIPUNCTURE: CPT | Mod: PO

## 2018-05-31 PROCEDURE — 3008F BODY MASS INDEX DOCD: CPT | Mod: CPTII,S$GLB,, | Performed by: INTERNAL MEDICINE

## 2018-05-31 NOTE — PROGRESS NOTES
Allergies and medications reviewed. Per Dr. Chang, it is okay to give Prolia prior to result of labs.     Administered 1cc Prolia 60mg/cc to LLQ of abdomen. Labs reviewed: Calcium???, Creatinine??? . Pt tolerated well. No acute reaction noted to site. Pt instructed on S/S to report. Pt verbalized understanding. Patient waited 15 mins.    Lot:[id number]  Exp:08/2020  Manu:Consuelo

## 2018-05-31 NOTE — PROGRESS NOTES
RHEUMATOLOGY CLINIC FOLLOW UP VISIT  Chief complaints:-  To follow up for osteoporosis.    HPI:-  Cem Fernandes a 57 y.o. pleasant female comes in with above chief complaints.  She has multiple medical problems as reviewed below including multiple sclerosis.  Due to multiple sclerosis and multiple other medical problems she had difficulty taking ORAL FOSAMAX once every week and sitting upright for at least 30 minutes. So she was given reclast last year. She developed severe arthralgias after reclast which subsided in a week. So she was switched to prolia. She received first injection in last visit without any significant problems other than mild arthralgias for a couple of days.  She denies any falls or fractures since last visit .  No recurrent invasive dental procedures.     Review of Systems   Constitutional: Negative for chills, fever, malaise/fatigue and weight loss.   HENT: Negative for ear discharge, ear pain, hearing loss, nosebleeds and sore throat.    Eyes: Negative for blurred vision, double vision, photophobia, discharge and redness.   Respiratory: Negative for cough, hemoptysis, sputum production and shortness of breath.    Cardiovascular: Negative for chest pain, palpitations and claudication.   Gastrointestinal: Negative for abdominal pain, constipation, diarrhea, melena, nausea and vomiting.   Genitourinary: Negative for dysuria, frequency, hematuria and urgency.   Musculoskeletal: Negative for back pain, falls, joint pain, myalgias and neck pain.   Skin: Negative for itching and rash.   Neurological: Positive for sensory change and focal weakness. Negative for dizziness, tingling, tremors, speech change, seizures, loss of consciousness, weakness and headaches.   Endo/Heme/Allergies: Negative for environmental allergies. Does not bruise/bleed easily.   Psychiatric/Behavioral: Negative for hallucinations and memory loss. The patient does not have insomnia.        Past Medical History:    Diagnosis Date    Broken toe 6/2015    left big toe    Chronic diastolic heart failure 5/8/2018    Closed left arm fracture     Colon polyp     Depression     Hyperlipidemia     Hypothyroid     MS (multiple sclerosis)     Neurogenic bladder 12/6/2012    Osteoporosis     Polyneuropathy     Sleep apnea     Trouble in sleeping        Past Surgical History:   Procedure Laterality Date    COLONOSCOPY N/A 9/1/2017    Procedure: COLONOSCOPY;  Surgeon: Andriy Villar MD;  Location: [facility];  Service: Endoscopy;  Laterality: N/A;    FRACTURE SURGERY Left 2013    wrist- [facility]    [place] Right [place]    in [place]    TONSILLECTOMY  1966        Social History   Substance Use Topics    Smoking status: Never Smoker    Smokeless tobacco: Never Used    Alcohol use No       Family History   Problem Relation Age of Onset    Pancreatic cancer Mother     Stomach cancer Mother     Cancer Mother         pancreatic, stomach    Hypertension Father     Heart disease Father         MI    Lupus Maternal Aunt     Rheum arthritis Maternal Aunt     Rheum arthritis Sister     Melanoma Neg Hx        Allergies   Allergen Reactions    Latex, Natural Rubber Rash           Physical examination:-    Vitals:    05/31/18 1639   BP: 121/79   Pulse: 75   Weight: 71.9 kg (158 lb 8.2 oz)   PainSc: 0-No pain       Physical Exam   Constitutional: She is oriented to person, place, and time and well-developed, well-nourished, and in no distress. No distress.   HENT:   Head: Normocephalic and atraumatic.   Nose: Nose normal.   Mouth/Throat: Oropharynx is clear and moist. No oropharyngeal exudate.   Eyes: Conjunctivae and EOM are normal. Pupils are equal, round, and reactive to light. Right eye exhibits no discharge. Left eye exhibits no discharge. No scleral icterus.   Neck: Normal range of motion. Neck supple.   Cardiovascular: Normal rate and intact distal pulses.    Pulmonary/Chest: Effort normal. No respiratory distress. She  exhibits no tenderness.   Abdominal: Soft. There is no tenderness.   Musculoskeletal:   No synovitis over small joints of hands or feet.  No effusion over the last joints.   Lymphadenopathy:     She has no cervical adenopathy.   Neurological: She is alert and oriented to person, place, and time.   Significant motor weakness over left side from multiple sclerosis.   Skin: Skin is warm. She is not diaphoretic. No erythema. No pallor.   Psychiatric: Mood and affect normal.   Nursing note and vitals reviewed.      Labs:-  Normal vitamin D.  Normal renal functions.    Assessment/Plans:-  # Osteoporosis:-  Significant osteoporosis with intolerance to bisphosphonate now on prolia. Tolerating well. Continue prolia.   Discussed in detail about all adverse effects of the medication including osteonecrosis of the jaw and atypical femoral fractures.  No plans for invasive dental procedures at this time or any other future.  - Comprehensive metabolic panel; Standing     # RTC in 6 months  Disclaimer: This note was prepared using voice recognition system and is likely to have sound alike errors and is not proof read.  Please call me with any questions.

## 2018-06-01 LAB
T4 FREE SERPL-MCNC: 1.08 NG/DL
TSH SERPL DL<=0.005 MIU/L-ACNC: 1.65 UIU/ML

## 2018-06-06 DIAGNOSIS — G35 MULTIPLE SCLEROSIS: ICD-10-CM

## 2018-06-06 DIAGNOSIS — F32.A DEPRESSION, UNSPECIFIED DEPRESSION TYPE: ICD-10-CM

## 2018-06-06 RX ORDER — PAROXETINE HYDROCHLORIDE 40 MG/1
40 TABLET, FILM COATED ORAL EVERY MORNING
Qty: 30 TABLET | Refills: 5 | Status: SHIPPED | OUTPATIENT
Start: 2018-06-06 | End: 2018-12-05 | Stop reason: SDUPTHER

## 2018-06-06 RX ORDER — PAROXETINE HYDROCHLORIDE 40 MG/1
TABLET, FILM COATED ORAL
Qty: 30 TABLET | Refills: 2 | Status: CANCELLED | OUTPATIENT
Start: 2018-06-06

## 2018-06-06 NOTE — TELEPHONE ENCOUNTER
----- Message from Katherin Miller sent at 6/6/2018 11:19 AM CDT -----  Contact: self @ [phone]  Pt is requesting a refill for paroxetine (PAXIL) 40 MG tablet.        [address] [phone] (Phone)            [phone] (Fax)

## 2018-06-18 ENCOUNTER — PES CALL (OUTPATIENT)
Dept: ADMINISTRATIVE | Facility: CLINIC | Age: 58
End: 2018-06-18

## 2018-06-20 ENCOUNTER — OFFICE VISIT (OUTPATIENT)
Dept: PULMONOLOGY | Facility: CLINIC | Age: 58
End: 2018-06-20
Payer: MEDICARE

## 2018-06-20 VITALS
BODY MASS INDEX: 27.95 KG/M2 | SYSTOLIC BLOOD PRESSURE: 110 MMHG | HEIGHT: 63 IN | OXYGEN SATURATION: 98 % | RESPIRATION RATE: 16 BRPM | WEIGHT: 157.75 LBS | DIASTOLIC BLOOD PRESSURE: 78 MMHG | HEART RATE: 75 BPM

## 2018-06-20 DIAGNOSIS — I50.32 CHRONIC DIASTOLIC HEART FAILURE: ICD-10-CM

## 2018-06-20 DIAGNOSIS — Z72.821 INADEQUATE SLEEP HYGIENE: ICD-10-CM

## 2018-06-20 DIAGNOSIS — G47.33 OSA ON CPAP: Primary | ICD-10-CM

## 2018-06-20 DIAGNOSIS — F51.04 PSYCHOPHYSIOLOGICAL INSOMNIA: ICD-10-CM

## 2018-06-20 PROCEDURE — 99499 UNLISTED E&M SERVICE: CPT | Mod: S$PBB,,, | Performed by: NURSE PRACTITIONER

## 2018-06-20 PROCEDURE — 3008F BODY MASS INDEX DOCD: CPT | Mod: CPTII,S$GLB,, | Performed by: NURSE PRACTITIONER

## 2018-06-20 PROCEDURE — 99999 PR PBB SHADOW E&M-EST. PATIENT-LVL IV: CPT | Mod: PBBFAC,,, | Performed by: NURSE PRACTITIONER

## 2018-06-20 PROCEDURE — 99213 OFFICE O/P EST LOW 20 MIN: CPT | Mod: S$GLB,,, | Performed by: NURSE PRACTITIONER

## 2018-06-20 NOTE — PROGRESS NOTES
Subjective:      Patient ID: Cem Meyers is a 57 y.o. female.    Chief Complaint: Sleep Apnea    HPI: Cem Meyers is here for follow up for MAYKEL and CPAP complaince assessment. She is on CPAP of 8 cmH2O pressure.  She is compliant with CPAP use. Complaince download today reveals 100% of days with greater than 4 hours of device use.   Patient reports benefit from CPAP use and denies snoring and daytime sleepiness and awakening refreshed since cpap. [place] 2.   Patient reports complaint of she is burping with 8 cm cpap, she would like flow reduced, cpap 7 cm optimal with titration study, reduced today to cpap 7 cm per pt request.        Previous Report Reviewed: lab reports and office notes     Past Medical History: The following portions of the patient's history were reviewed and updated as appropriate:   She  has a past surgical history that includes Knee surgery (Right, 1989); Fracture surgery (Left, 2013); Tonsillectomy (1966); and Colonoscopy (N/A, 9/1/2017).  Her family history includes Cancer in her mother; Heart disease in her father; Hypertension in her father; Lupus in her maternal aunt; Pancreatic cancer in her mother; Rheum arthritis in her maternal aunt and sister; Stomach cancer in her mother.  She  reports that she has never smoked. She has never used smokeless tobacco. She reports that she does not drink alcohol or use drugs.  She has a current medication list which includes the following prescription(s): baclofen, calcium citrate-vitamin d2, cholecalciferol (vitamin d3), denosumab, fish oil-omega-3 fatty acids, gabapentin, interferon beta-1a (albumin), levothyroxine, multivitamin, paroxetine, and simvastatin, and the following Facility-Administered Medications: denosumab.  She is allergic to latex, natural rubber..    The following portions of the patient's history were reviewed and updated as appropriate: allergies, current medications, past family history, past medical history, past  "social history, past surgical history and problem list.    Review of Systems   Constitutional: Negative for fever, chills, weight loss, weight gain, activity change, appetite change, fatigue and night sweats.   HENT: Negative for postnasal drip, rhinorrhea, sinus pressure, voice change and congestion.    Eyes: Negative for redness and itching.   Respiratory: Negative for snoring, cough, sputum production, chest tightness, shortness of breath, wheezing, orthopnea, asthma nighttime symptoms, dyspnea on extertion, use of rescue inhaler and somnolence.    Cardiovascular: Negative.  Negative for chest pain, palpitations and leg swelling.   Genitourinary: Negative for difficulty urinating and hematuria.   Endocrine: Negative for cold intolerance and heat intolerance.    Musculoskeletal: Negative for arthralgias, gait problem, joint swelling and myalgias.   Skin: Negative.    Gastrointestinal: Negative for nausea, vomiting, abdominal pain and acid reflux.   Neurological: Negative for dizziness, weakness, light-headedness and headaches.   Hematological: Negative for adenopathy. No excessive bruising.   All other systems reviewed and are negative.     Objective:   /78 (BP Location: Right arm, Patient Position: Sitting)   Pulse 75   Resp 16   Ht 5' 3" (1.6 m)   Wt 71.6 kg (157 lb 11.8 oz)   SpO2 98%   BMI 27.94 kg/m²   Physical Exam   Constitutional: She is oriented to person, place, and time. Vital signs are normal. She appears well-developed and well-nourished. She is active and cooperative.  Non-toxic appearance. She does not appear ill. No distress.   HENT:   Head: Normocephalic.   Right Ear: External ear normal.   Left Ear: External ear normal.   Nose: Nose normal.   Mouth/Throat: Oropharynx is clear and moist. No oropharyngeal exudate.   Eyes: Conjunctivae are normal.   Neck: Normal range of motion. Neck supple.   Cardiovascular: Normal rate, regular rhythm, normal heart sounds and intact distal pulses.  " "  Pulmonary/Chest: Effort normal and breath sounds normal. No stridor.   Abdominal: Soft.   Musculoskeletal: Normal range of motion.   Lymphadenopathy:     She has no cervical adenopathy.   Neurological: She is alert and oriented to person, place, and time.   Skin: Skin is warm and dry.   Psychiatric: She has a normal mood and affect. Her behavior is normal. Judgment and thought content normal.   Vitals reviewed.    Personal Diagnostic Review    CPAP download  CPAP 8 cm  Compliance Summary  5/20/2018 - 6/18/2018 (30 days)  Days with Device Usage 30 days  Days without Device Usage 0 days  Percent Days with Device Usage 100.0%  Cumulative Usage 9 days 13 hrs. 52 mins. 55 secs.  Maximum Usage (1 Day) 10 hrs. 28 mins. 30 secs.  Average Usage (All Days) 7 hrs. 39 mins. 45 secs.  Average Usage (Days Used) 7 hrs. 39 mins. 45 secs.  Minimum Usage (1 Day) 5 hrs. 23 mins. 48 secs.  Percent of Days with Usage >= 4 Hours 100.0%  Percent of Days with Usage < 4 Hours 0.0%  Date Range  Total Blower Time 9 days 14 hrs. 12 mins. 8 secs.  CPAP Summary  Average Time in Large Leak Per Day 14 mins. 12 secs.  Average AHI 5.4  CPAP 8.0 cmH2O    Assessment:     1. MAYKEL on CPAP    2. Psychophysiological insomnia    3. Inadequate sleep hygiene    4. Chronic diastolic heart failure      Orders Placed This Encounter   Procedures    HME - OTHER     Change remotely to CPAP 7 cm  HME: [facility] location/[place]     Order Specific Question:   Type of Equipment:     Answer:   cpap     Order Specific Question:   Height:     Answer:   5' 3" (1.6 m)     Order Specific Question:   Weight:     Answer:   71.6 kg (157 lb 11.8 oz)     Plan:     Problem List Items Addressed This Visit     Chronic diastolic heart failure     Stable, followed by cardiology         RESOLVED: Inadequate sleep hygiene     Improved with turning off electronics  Resuming CPAP has improved day time sleepiness         MAYKEL on CPAP - Primary (Chronic)     Benefits and compliant " CPAP 8 cm   AHI 5.4  [place] 2  Patient request lower pressure r/t burping at night.  CPAP 7 cm optimal on CPAP titration   Changed to CPAP 7 cm             Relevant Orders    HME - OTHER    Psychophysiological insomnia     controlled on Neurontin, baclofen, and paxil              (DME - [facility]  Reviewed therapeutic goals for positive airway pressure therapy CPAP  Ideal is usage 100% of nights for 6 - 8 hours per night. Minimum usage is 70% of night for at least 4 hours per night used. Pateint expressed understanding.     Follow-up in about 6 months (around 12/20/2018) for CPAP 6 mo compliance download.

## 2018-06-20 NOTE — ASSESSMENT & PLAN NOTE
Benefits and compliant CPAP 8 cm   AHI 5.4  [place] 2  Patient request lower pressure r/t burping at night.  CPAP 7 cm optimal on CPAP titration   Changed to CPAP 7 cm

## 2018-06-21 ENCOUNTER — DOCUMENTATION ONLY (OUTPATIENT)
Dept: NEUROLOGY | Facility: CLINIC | Age: 58
End: 2018-06-21

## 2018-06-21 NOTE — PROGRESS NOTES
Notified via fax from [facility] that Rebif has been approved through 6/20/2020.

## 2018-06-27 ENCOUNTER — TELEPHONE (OUTPATIENT)
Dept: NEUROLOGY | Facility: CLINIC | Age: 58
End: 2018-06-27

## 2018-06-27 DIAGNOSIS — G35 MS (MULTIPLE SCLEROSIS): ICD-10-CM

## 2018-06-27 DIAGNOSIS — M79.2 NEUROPATHIC PAIN: ICD-10-CM

## 2018-06-27 RX ORDER — GABAPENTIN 400 MG/1
CAPSULE ORAL
Qty: 180 CAPSULE | Refills: 1 | Status: SHIPPED | OUTPATIENT
Start: 2018-06-27 | End: 2018-08-29 | Stop reason: SDUPTHER

## 2018-06-27 NOTE — TELEPHONE ENCOUNTER
----- Message from Katherin Miller sent at 6/27/2018  9:50 AM CDT -----  Contact: self @ [phone]  Pt is calling to reschedule her appt with Rimma on tomorrow.  She says she has a conflict.  Pls call with a new appt.

## 2018-07-02 ENCOUNTER — PATIENT MESSAGE (OUTPATIENT)
Dept: PULMONOLOGY | Facility: CLINIC | Age: 58
End: 2018-07-02

## 2018-07-02 DIAGNOSIS — G47.33 OSA ON CPAP: Primary | ICD-10-CM

## 2018-07-03 NOTE — TELEPHONE ENCOUNTER
Per patient request, not tolerating CPAP request lower pressures, change to auto CPAP 4-6 cm. Remote download in 3 weeks.

## 2018-07-17 ENCOUNTER — LAB VISIT (OUTPATIENT)
Dept: LAB | Facility: HOSPITAL | Age: 58
End: 2018-07-17
Payer: MEDICARE

## 2018-07-17 ENCOUNTER — OFFICE VISIT (OUTPATIENT)
Dept: NEUROLOGY | Facility: CLINIC | Age: 58
End: 2018-07-17
Payer: MEDICARE

## 2018-07-17 VITALS — WEIGHT: 155.88 LBS | BODY MASS INDEX: 27.62 KG/M2 | HEIGHT: 63 IN

## 2018-07-17 DIAGNOSIS — G47.30 SLEEP APNEA, UNSPECIFIED TYPE: ICD-10-CM

## 2018-07-17 DIAGNOSIS — Z79.899 ENCOUNTER FOR LONG-TERM (CURRENT) USE OF HIGH-RISK MEDICATION: ICD-10-CM

## 2018-07-17 DIAGNOSIS — M79.2 NEUROPATHIC PAIN: ICD-10-CM

## 2018-07-17 DIAGNOSIS — E55.9 VITAMIN D INSUFFICIENCY: ICD-10-CM

## 2018-07-17 DIAGNOSIS — F32.A DEPRESSION, UNSPECIFIED DEPRESSION TYPE: ICD-10-CM

## 2018-07-17 DIAGNOSIS — G35 MULTIPLE SCLEROSIS: Primary | ICD-10-CM

## 2018-07-17 DIAGNOSIS — R25.2 SPASTICITY: ICD-10-CM

## 2018-07-17 DIAGNOSIS — R26.9 NEUROLOGIC GAIT DYSFUNCTION: ICD-10-CM

## 2018-07-17 DIAGNOSIS — G35 MULTIPLE SCLEROSIS: ICD-10-CM

## 2018-07-17 DIAGNOSIS — Z71.89 COUNSELING REGARDING GOALS OF CARE: ICD-10-CM

## 2018-07-17 LAB
BASOPHILS # BLD AUTO: 0.03 K/UL
BASOPHILS NFR BLD: 0.6 %
DIFFERENTIAL METHOD: ABNORMAL
EOSINOPHIL # BLD AUTO: 0.1 K/UL
EOSINOPHIL NFR BLD: 0.9 %
ERYTHROCYTE [DISTWIDTH] IN BLOOD BY AUTOMATED COUNT: 12 %
HCT VFR BLD AUTO: 39.1 %
HGB BLD-MCNC: 12.5 G/DL
IMM GRANULOCYTES # BLD AUTO: 0.01 K/UL
IMM GRANULOCYTES NFR BLD AUTO: 0.2 %
LYMPHOCYTES # BLD AUTO: 1.8 K/UL
LYMPHOCYTES NFR BLD: 34.7 %
MCH RBC QN AUTO: 31.8 PG
MCHC RBC AUTO-ENTMCNC: 32 G/DL
MCV RBC AUTO: 100 FL
MONOCYTES # BLD AUTO: 0.7 K/UL
MONOCYTES NFR BLD: 12.8 %
NEUTROPHILS # BLD AUTO: 2.7 K/UL
NEUTROPHILS NFR BLD: 50.8 %
NRBC BLD-RTO: 0 /100 WBC
PLATELET # BLD AUTO: 203 K/UL
PMV BLD AUTO: 10 FL
RBC # BLD AUTO: 3.93 M/UL
WBC # BLD AUTO: 5.31 K/UL

## 2018-07-17 PROCEDURE — 36415 COLL VENOUS BLD VENIPUNCTURE: CPT

## 2018-07-17 PROCEDURE — 99999 PR PBB SHADOW E&M-EST. PATIENT-LVL III: CPT | Mod: PBBFAC,,, | Performed by: PHYSICIAN ASSISTANT

## 2018-07-17 PROCEDURE — 99215 OFFICE O/P EST HI 40 MIN: CPT | Mod: S$GLB,,, | Performed by: PHYSICIAN ASSISTANT

## 2018-07-17 PROCEDURE — 85025 COMPLETE CBC W/AUTO DIFF WBC: CPT

## 2018-07-17 PROCEDURE — 3008F BODY MASS INDEX DOCD: CPT | Mod: CPTII,S$GLB,, | Performed by: PHYSICIAN ASSISTANT

## 2018-07-17 NOTE — PATIENT INSTRUCTIONS
Please message in 2 month to schedule follow up with Dr. Smith in Mid January  Please message about PT as well-can send referral

## 2018-07-17 NOTE — PROGRESS NOTES
"Subjective:       Patient ID: Cem Meyers is a 57 y.o. female who presents today for a routine clinic visit for MS.      MS HPI:  · DMT: Rebif  · Side effects from DMT? Yes - some injection fatigue  · Taking vitamin D3 as recommended? Yes    · TIW swimming for about an hour  · Feels stable overall    SOCIAL HISTORY  Social History   Substance Use Topics    Smoking status: Never Smoker    Smokeless tobacco: Never Used    Alcohol use No     Living arrangements - the patient lives alone.  Employment:  [employer]    MS ROS:  · Fatigue: Yes --fatigued- not sleeping well.   · Sleep Disturbance: Yes -managed by Pulmonology--has not been on CPAP due to waiting for pressure to be adjusted. She states it's making her "burp"  · Bladder Dysfunction: had one incidence of urinary incontinence -felt like she waited too long and had to adjust clothing , etc.   · Bowel Dysfunction: Yes--no longer needing Miralax as long as she is drinking enough water.  Will at times have to use digital stimulation  · Spasticity: Yes - taking baclofen 1 tab, 1 tab, and 2 tabs HS. She states she does occasionally forget meds, but does feel it's helpful   · Visual Symptoms: No-stable  · Cognitive: Yes - memory  · Mood Disorder: Yes - Paxil--she is worried about financial aspects of her life   · Gait Disturbance: Yes - L LE continues to be an issue-continues to have balance issues-feels like today is not a good day  · Falls: yes, on sidewalk, has not had any falls in apartment recently but admits it's still cluttered  · Hand Dysfunction: No--stiffness, not QOL issue , some numbness that at times effects dexterity  · Sexual Dysfunction: Not Assessed  · Pain: gabapentin 800mg TID--takes regularly  · Skin Breakdown: No  · Tremors: No  · Dysphagia: No  · Dysarthria: No  · Heat sensitivity: Yes -really was affected by heat this summer  · Any un-met adaptive needs? No  · Copay Assist? Yes - 0$           Objective:        1. 25 foot timed " walk: 5.5s last visit; 5.7s today  Timed 25 Foot Walk: 10/24/2016 3/23/2017   Did patient wear an AFO? No No   Was assistive device used? No No   Time for 25 Foot Walk (seconds) 5.5 5.5   Time for 25 Foot Walk (seconds) 5.5 5.3       Neurologic Exam     Mental Status   Oriented to person, place, and time.   Follows 3 step commands.   Speech: speech is normal   Level of consciousness: alert  Normal comprehension.     Cranial Nerves     CN II   Visual acuity: normal with correction (20/20 OD and OS corrected with Snellen hand held chart at 6 ft)    CN III, IV, VI   Pupils are equal, round, and reactive to light.  Extraocular motions are normal.     CN V   Facial sensation intact.     CN VII   Facial expression full, symmetric.     CN VIII   Hearing: intact (finger rub)    CN IX, X   Palate: symmetric    CN XI   CN XI normal.     CN XII   Tongue deviation: none    Motor Exam   Muscle bulk: normal  Overall muscle tone: normal    Strength   Right deltoid: 5/5  Left deltoid: 5/5  Right triceps: 5/5  Left triceps: 5/5  Right wrist extension: 5/5  Right interossei: 5/5  Left interossei: 4/5  Right iliopsoas: 5/5  Left iliopsoas: 2/5  Right hamstrin/5  Left hamstrin/5  Right anterior tibial: 5/5  Left anterior tibial: 5/5  Right peroneal: 5/5  Left peroneal: 5/5L wrist extensors/triceps 5-/5     Sensory Exam   Light touch normal.   Right leg light touch: numbness foot.  Left leg light touch: numbness to feet.  Right arm vibration: decreased from fingers  Left arm vibration: decreased from fingers  Right leg vibration: decreased from ankle  Left leg vibration: decreased from ankle    Gait, Coordination, and Reflexes     Gait  Gait: circumduction and wide-based (slightly L hip circumduction)    Coordination   Finger to nose coordination: abnormal (L hand)    Tremor   Resting tremor: absent  Action tremor: absent    Reflexes   Right brachioradialis: 2+  Left brachioradialis: 2+  Right biceps: 2+  Left biceps: 2+  Right  triceps: 2+  Left triceps: 2+  Right patellar: 3+  Left patellar: 3+  Right achilles: 3+  Left achilles: 3+  Right plantar: equivocal  Left plantar: equivocal  Right ankle clonus: absent  Left ankle clonus: absent  Right pendular knee jerk: absent  Left pendular knee jerk: absentAbnormal Heel/toe walk  Slowed on L RSM         Imaging:     Results for orders placed during the hospital encounter of 12/07/17   MRI Brain W WO Contrast    Impression 1. White matter changes much greater than expected for age with a pericallosal periventricular white distribution consistent with patient's history of multiple sclerosis without detrimental change since 10/14/16. No enhancement or diffusion positivity is noted      Electronically signed by: Ye Griffiths MD  Date:     12/07/17  Time:    16:05      Results for orders placed during the hospital encounter of 12/07/17   MRI Cervical Spine W WO Cont    Impression  Multilevel white matter foci of signal abnormality suggestive of demyelinating plaques consistent with the patient's history of multiple sclerosis without detrimental change since 12/2/15.        Electronically signed by: Ye Griffiths MD  Date:     12/07/17  Time:    15:37      Results for orders placed during the hospital encounter of 12/07/17   MRI Thoracic Spine W WO Cont    Impression      Multiple foci of white matter signal abnormality suggestive of demyelinating plaques consistent with the patient's history of multiple sclerosis without definite change since 12/2/3.          Electronically signed by: Ye Griffiths MD  Date:     12/07/17  Time:    15:56      Labs:     Lab Results   Component Value Date    ONRYSCTZ25EZ 57 11/29/2017    JKAHGCPL13BK 82 09/09/2016    UPWPUINO27CZ 62 08/17/2015     No results found for: JCVINDEX, JCVANTIBODY  No results found for: NS3YFTRH, ABSOLUTECD3, ER2GXMQN, ABSOLUTECD8, AU4JCHJT, ABSOLUTECD4, LABCD48  Lab Results   Component Value Date    WBC 5.31 07/17/2018    RBC 3.93 (L)  07/17/2018    HGB 12.5 07/17/2018    HCT 39.1 07/17/2018     (H) 07/17/2018    MCH 31.8 (H) 07/17/2018    MCHC 32.0 07/17/2018    RDW 12.0 07/17/2018     07/17/2018    MPV 10.0 07/17/2018    GRAN 2.7 07/17/2018    GRAN 50.8 07/17/2018    LYMPH 1.8 07/17/2018    LYMPH 34.7 07/17/2018    MONO 0.7 07/17/2018    MONO 12.8 07/17/2018    EOS 0.1 07/17/2018    BASO 0.03 07/17/2018    EOSINOPHIL 0.9 07/17/2018    BASOPHIL 0.6 07/17/2018     Sodium   Date Value Ref Range Status   05/31/2018 139 136 - 145 mmol/L Final     Potassium   Date Value Ref Range Status   05/31/2018 3.9 3.5 - 5.1 mmol/L Final     Chloride   Date Value Ref Range Status   05/31/2018 106 95 - 110 mmol/L Final     CO2   Date Value Ref Range Status   05/31/2018 24 23 - 29 mmol/L Final     Glucose   Date Value Ref Range Status   05/31/2018 104 70 - 110 mg/dL Final     BUN, Bld   Date Value Ref Range Status   05/31/2018 21 (H) 6 - 20 mg/dL Final     Creatinine   Date Value Ref Range Status   05/31/2018 0.9 0.5 - 1.4 mg/dL Final     Calcium   Date Value Ref Range Status   05/31/2018 9.8 8.7 - 10.5 mg/dL Final     Total Protein   Date Value Ref Range Status   05/31/2018 7.3 6.0 - 8.4 g/dL Final     Albumin   Date Value Ref Range Status   05/31/2018 3.6 3.5 - 5.2 g/dL Final     Total Bilirubin   Date Value Ref Range Status   05/31/2018 0.4 0.1 - 1.0 mg/dL Final     Comment:     For infants and newborns, interpretation of results should be based  on gestational age, weight and in agreement with clinical  observations.  Premature Infant recommended reference ranges:  Up to 24 hours.............<8.0 mg/dL  Up to 48 hours............<12.0 mg/dL  3-5 days..................<15.0 mg/dL  6-29 days.................<15.0 mg/dL       Alkaline Phosphatase   Date Value Ref Range Status   05/31/2018 68 55 - 135 U/L Final     AST   Date Value Ref Range Status   05/31/2018 32 10 - 40 U/L Final     ALT   Date Value Ref Range Status   05/31/2018 19 10 - 44 U/L  Final     Anion Gap   Date Value Ref Range Status   05/31/2018 9 8 - 16 mmol/L Final     eGFR if    Date Value Ref Range Status   05/31/2018 >60 >60 mL/min/1.73 m^2 Final     eGFR if non    Date Value Ref Range Status   05/31/2018 >60 >60 mL/min/1.73 m^2 Final     Comment:     Calculation used to obtain the estimated glomerular filtration  rate (eGFR) is the CKD-EPI equation.          Diagnosis/Assessment/Plan:    1. Multiple Sclerosis  · Assessment: Patient appears clinically stable on Rebif, she is having some injection fatigue.   · Imaging:MRI Brain in December  · Disease Modifying Therapies: She will continue on Rebif for now; however, we did discuss changing to Avonex or Plegridy as she has remained stable on interferon for quite some time. She will consider and let us know. Continue Vit D3. LFT's normal in May, will check CBC today.     2. MS Symptom Assessment / Management  · Gait Disturbance: recommended PT-she will let us know. It is a financial hardship-I recommended applying for [facility] financial assistance. If she qualifies we will plan to refer.     Over 50% of this 40 minute visit was spent in direct face to face counseling of the patient about MS, DMT considerations, and MS symptom management.   Follow-up in about 6 months (around 1/17/2019) for follow up with Dr. Smith.  Patient agreed to POC today.    Attending, Dr. Smith, was available during today's encounter. Any change to plan along with cosign to appear in the EMR.     Rimma Rouse PA-C  [facility]        Multiple sclerosis  -     CBC auto differential; Future; Expected date: 07/17/2018  -     MRI Brain Demyelinating W W/O Contrast; Future; Expected date: 12/10/2018    Counseling regarding goals of care    Encounter for long-term (current) use of high-risk medication  -     CBC auto differential; Future; Expected date: 07/17/2018    Neurologic gait dysfunction    Vitamin D insufficiency    Sleep apnea,  unspecified type    Depression, unspecified depression type    Neuropathic pain    Spasticity

## 2018-07-23 ENCOUNTER — PATIENT MESSAGE (OUTPATIENT)
Dept: OBSTETRICS AND GYNECOLOGY | Facility: CLINIC | Age: 58
End: 2018-07-23

## 2018-07-23 ENCOUNTER — PATIENT MESSAGE (OUTPATIENT)
Dept: NEUROLOGY | Facility: CLINIC | Age: 58
End: 2018-07-23

## 2018-07-25 ENCOUNTER — PATIENT MESSAGE (OUTPATIENT)
Dept: OBSTETRICS AND GYNECOLOGY | Facility: CLINIC | Age: 58
End: 2018-07-25

## 2018-07-25 DIAGNOSIS — Z12.39 SCREENING FOR BREAST CANCER: Primary | ICD-10-CM

## 2018-08-02 ENCOUNTER — PATIENT MESSAGE (OUTPATIENT)
Dept: NEUROLOGY | Facility: CLINIC | Age: 58
End: 2018-08-02

## 2018-08-09 ENCOUNTER — OFFICE VISIT (OUTPATIENT)
Dept: OBSTETRICS AND GYNECOLOGY | Facility: CLINIC | Age: 58
End: 2018-08-09
Payer: MEDICARE

## 2018-08-09 VITALS
SYSTOLIC BLOOD PRESSURE: 121 MMHG | WEIGHT: 147.06 LBS | BODY MASS INDEX: 26.06 KG/M2 | DIASTOLIC BLOOD PRESSURE: 82 MMHG | HEIGHT: 63 IN

## 2018-08-09 DIAGNOSIS — Z12.31 ENCOUNTER FOR SCREENING MAMMOGRAM FOR BREAST CANCER: ICD-10-CM

## 2018-08-09 DIAGNOSIS — Z01.419 ENCOUNTER FOR GYNECOLOGICAL EXAMINATION WITHOUT ABNORMAL FINDING: Primary | ICD-10-CM

## 2018-08-09 PROCEDURE — 99999 PR PBB SHADOW E&M-EST. PATIENT-LVL IV: CPT | Mod: PBBFAC,,, | Performed by: NURSE PRACTITIONER

## 2018-08-09 PROCEDURE — 88175 CYTOPATH C/V AUTO FLUID REDO: CPT

## 2018-08-09 PROCEDURE — G0101 CA SCREEN;PELVIC/BREAST EXAM: HCPCS | Mod: S$GLB,,, | Performed by: NURSE PRACTITIONER

## 2018-08-09 NOTE — PROGRESS NOTES
"CC: Well woman exam    Cem Meyers is a 58 y.o. female  presents for well woman exam.  LMP: No LMP recorded. Patient is postmenopausal..  No issues, problems, or complaints.      Past Medical History:   Diagnosis Date    Broken toe 2015    left big toe    Chronic diastolic heart failure 2018    Closed left arm fracture     Colon polyp     Depression     Hyperlipidemia     Hypothyroid     MS (multiple sclerosis)     Neurogenic bladder 2012    Osteoporosis     Polyneuropathy     Sleep apnea     Trouble in sleeping      Past Surgical History:   Procedure Laterality Date    COLONOSCOPY N/A 2017    Procedure: COLONOSCOPY;  Surgeon: Andriy Villar MD;  Location[facility];  Service: Endoscopy;  Laterality: N/A;    FRACTURE SURGERY Left     wrist- [facility]    KNEE SURGERY Right     in [place]    TONSILLECTOMY  1966     Social History     Social History    Marital status:      Spouse name: N/A    Number of children: N/A    Years of education: N/A     Occupational History    Not on file.     Social History Main Topics    Smoking status: Never Smoker    Smokeless tobacco: Never Used    Alcohol use No    Drug use: No    Sexual activity: Not Currently     Other Topics Concern    Are You Pregnant Or Think You May Be? No    Breast-Feeding No     Social History Narrative    No narrative on file     Family History   Problem Relation Age of Onset    Pancreatic cancer Mother     Stomach cancer Mother     Cancer Mother         pancreatic, stomach    Hypertension Father     Heart disease Father         MI    Lupus Maternal Aunt     Rheum arthritis Maternal Aunt     Rheum arthritis Sister     Melanoma Neg Hx      OB History      Para Term  AB Living    0 0 0 0 0 0    SAB TAB Ectopic Multiple Live Births    0 0 0 0            /82   Ht 5' 3" (1.6 m)   Wt 66.7 kg (147 lb 0.8 oz)   BMI 26.05 kg/m²       ROS:  GENERAL: Denies weight gain or " weight loss. Feeling well overall.   SKIN: Denies rash or lesions.   HEAD: Denies head injury or headache.   NODES: Denies enlarged lymph nodes.   CHEST: Denies chest pain or shortness of breath.   CARDIOVASCULAR: Denies palpitations or left sided chest pain.   ABDOMEN: No abdominal pain, constipation, diarrhea, nausea, vomiting or rectal bleeding.   URINARY: No frequency, dysuria, hematuria, or burning on urination.  REPRODUCTIVE: See HPI.   BREASTS: The patient performs breast self-examination and denies pain, lumps, or nipple discharge.   HEMATOLOGIC: No easy bruisability or excessive bleeding.   MUSCULOSKELETAL: Denies joint pain or swelling.   NEUROLOGIC: Denies syncope or weakness.   PSYCHIATRIC: Denies depression, anxiety or mood swings.    PHYSICAL EXAM:  APPEARANCE: Well nourished, well developed, in no acute distress.  AFFECT: WNL, alert and oriented x 3  SKIN: No acne or hirsutism  NECK: Neck symmetric without masses or thyromegaly  NODES: No inguinal, cervical, axillary, or femoral lymph node enlargement  CHEST: Good respiratory effect  ABDOMEN: Soft.  No tenderness or masses.  No hepatosplenomegaly.  No hernias.  BREASTS: Symmetrical, no skin changes or visible lesions.  No palpable masses, nipple discharge bilaterally.  PELVIC: Normal external genitalia without lesions.  Normal hair distribution.  Adequate perineal body, normal urethral meatus.  Vagina atrophic  without lesions or discharge.  Cervix stenotic and atrophic, without lesions, discharge or tenderness.  No significant cystocele or rectocele.  Bimanual exam shows uterus to be normal size, regular, mobile and nontender.  Adnexa without masses or tenderness.    EXTREMITIES: No edema.  Physical Exam    1. Encounter for gynecological examination without abnormal finding  Liquid-based pap smear, screening   2. Encounter for screening mammogram for breast cancer      AND PLAN:    Patient was counseled today on A.C.S. Pap guidelines and  recommendations for yearly pelvic exams, mammograms and monthly self breast exams; to see her PCP for other health maintenance.

## 2018-08-09 NOTE — PATIENT INSTRUCTIONS

## 2018-08-13 ENCOUNTER — PATIENT OUTREACH (OUTPATIENT)
Dept: ADMINISTRATIVE | Facility: HOSPITAL | Age: 58
End: 2018-08-13

## 2018-08-14 ENCOUNTER — PATIENT MESSAGE (OUTPATIENT)
Dept: OBSTETRICS AND GYNECOLOGY | Facility: CLINIC | Age: 58
End: 2018-08-14

## 2018-08-21 ENCOUNTER — OFFICE VISIT (OUTPATIENT)
Dept: INTERNAL MEDICINE | Facility: CLINIC | Age: 58
End: 2018-08-21
Payer: MEDICARE

## 2018-08-21 VITALS
HEIGHT: 63 IN | WEIGHT: 159.63 LBS | DIASTOLIC BLOOD PRESSURE: 70 MMHG | HEART RATE: 74 BPM | OXYGEN SATURATION: 98 % | SYSTOLIC BLOOD PRESSURE: 104 MMHG | BODY MASS INDEX: 28.29 KG/M2 | TEMPERATURE: 97 F

## 2018-08-21 DIAGNOSIS — M62.838 MUSCLE SPASMS OF BOTH LOWER EXTREMITIES: ICD-10-CM

## 2018-08-21 DIAGNOSIS — I50.32 CHRONIC DIASTOLIC HEART FAILURE: ICD-10-CM

## 2018-08-21 DIAGNOSIS — E78.49 OTHER HYPERLIPIDEMIA: Chronic | ICD-10-CM

## 2018-08-21 DIAGNOSIS — R26.9 GAIT DISTURBANCE: ICD-10-CM

## 2018-08-21 DIAGNOSIS — G47.33 OSA ON CPAP: Chronic | ICD-10-CM

## 2018-08-21 DIAGNOSIS — M21.42 ACQUIRED PES PLANUS OF BOTH FEET: ICD-10-CM

## 2018-08-21 DIAGNOSIS — G62.9 POLYNEUROPATHY: ICD-10-CM

## 2018-08-21 DIAGNOSIS — E03.4 HYPOTHYROIDISM DUE TO ACQUIRED ATROPHY OF THYROID: Chronic | ICD-10-CM

## 2018-08-21 DIAGNOSIS — Z79.899 ENCOUNTER FOR LONG-TERM (CURRENT) USE OF HIGH-RISK MEDICATION: ICD-10-CM

## 2018-08-21 DIAGNOSIS — F51.04 PSYCHOPHYSIOLOGICAL INSOMNIA: ICD-10-CM

## 2018-08-21 DIAGNOSIS — G35 MS (MULTIPLE SCLEROSIS): Chronic | ICD-10-CM

## 2018-08-21 DIAGNOSIS — Z00.00 ENCOUNTER FOR PREVENTIVE HEALTH EXAMINATION: Primary | ICD-10-CM

## 2018-08-21 DIAGNOSIS — Z86.010 HISTORY OF COLON POLYPS: ICD-10-CM

## 2018-08-21 DIAGNOSIS — M21.41 ACQUIRED PES PLANUS OF BOTH FEET: ICD-10-CM

## 2018-08-21 DIAGNOSIS — E55.9 VITAMIN D INSUFFICIENCY: ICD-10-CM

## 2018-08-21 DIAGNOSIS — M81.0 AGE-RELATED OSTEOPOROSIS WITHOUT CURRENT PATHOLOGICAL FRACTURE: ICD-10-CM

## 2018-08-21 DIAGNOSIS — F32.0 MAJOR DEPRESSIVE DISORDER, SINGLE EPISODE, MILD: ICD-10-CM

## 2018-08-21 PROCEDURE — G0439 PPPS, SUBSEQ VISIT: HCPCS | Mod: S$GLB,,, | Performed by: PHYSICIAN ASSISTANT

## 2018-08-21 PROCEDURE — 99999 PR PBB SHADOW E&M-EST. PATIENT-LVL V: CPT | Mod: PBBFAC,,, | Performed by: PHYSICIAN ASSISTANT

## 2018-08-21 NOTE — PATIENT INSTRUCTIONS
Counseling and Referral of Other Preventative  (Italic type indicates deductible and co-insurance are waived)    Patient Name: Cem Meyers  Today's Date: 8/21/2018    Health Maintenance       Date Due Completion Date    Pneumococcal PPSV23 (Medium Risk) (1) 07/30/1979 ---    Influenza Vaccine 08/01/2018 10/30/2017 (Done)    [facility] on 10/30/2017: [facility]    [facility] 10/14/2015: [facility]    Lipid Panel 08/21/2018 8/21/2017    DEXA SCAN 09/07/2018 9/7/2016    Override on 3/27/2013: Done (REPEAT 2 YRS)    Override on 1/31/2011: Done    Mammogram 09/11/2018 9/11/2017    Override on 10/24/2012: (N/S)    Override on 10/18/2011: Done    Colonoscopy 09/01/2020 9/1/2017    Pap Smear with HPV Cotest 08/09/2021 8/9/2018    TETANUS VACCINE 09/07/2022 9/7/2012        No orders of the defined types were placed in this encounter.    The following information is provided to all patients.  This information is to help you find resources for any of the problems found today that may be affecting your health:                Living healthy guide: www.[facility].[place].gov      Understanding Diabetes: www.diabetes.org      Eating healthy: www.cdc.gov/healthyweight      CDC home safety checklist: www.cdc.gov/steadi/patient.html      Agency on Aging: www.goea.[place].[facility]      Alcoholics anonymous (AA): www.aa.org      Physical Activity: www.geraldo.nih.gov/uw2xmxk      Tobacco use: www.quitwithusla.org

## 2018-08-21 NOTE — PROGRESS NOTES
"MD Cem Rebollar presented for a  Medicare AWV and comprehensive Health Risk Assessment today. The following components were reviewed and updated:    · Medical history  · Family History  · Social history  · Allergies and Current Medications  · Health Risk Assessment  · Health Maintenance  · Care Team     ** See Completed Assessments for Annual Wellness Visit within the encounter summary.**       The following assessments were completed:  · Living Situation  · CAGE  · Depression Screening  · Timed Get Up and Go  · Whisper Test  · Cognitive Function Screening  · Nutrition Screening  · ADL Screening  · PAQ Screening    Vitals:    08/21/18 0909   BP: 104/70   BP Location: Right arm   Patient Position: Sitting   BP Method: Medium (Manual)   Pulse: 74   Temp: 96.5 °F (35.8 °C)   TempSrc: Tympanic   SpO2: 98%   Weight: 72.4 kg (159 lb 9.8 oz)   Height: 5' 3" (1.6 m)     Body mass index is 28.27 kg/m².  Physical Exam   Constitutional: She appears well-developed and well-nourished. No distress.   HENT:   Head: Normocephalic and atraumatic.   Cardiovascular: Normal rate and regular rhythm. Exam reveals no gallop and no friction rub.   No murmur heard.  Pulmonary/Chest: Effort normal and breath sounds normal. No stridor. No respiratory distress. She has no wheezes. She has no rales. She exhibits no tenderness.   Abdominal: Soft. There is no tenderness.   Skin: She is not diaphoretic.   Nursing note and vitals reviewed.        Diagnoses and health risks identified today and associated recommendations/orders:    Problem List Items Addressed This Visit     Vitamin D insufficiency    Overview     Takes Vitamin D         Current Assessment & Plan     The current medical regimen is effective;  continue present plan and medications.         Psychophysiological insomnia    Current Assessment & Plan     The current medical regimen is effective;  continue present plan and medications.         " Polyneuropathy    Overview     Treated with Neurontin         Current Assessment & Plan     The current medical regimen is effective;  continue present plan and medications.         Osteoporosis    Overview     Dexa 3/27/13 due next month patient declines ordering the test due to cost         Current Assessment & Plan     Follow up with Rheumatology          MAYKEL on CPAP (Chronic)    Overview     Diagnosis in [id number] while in [place], told mild obstructive sleep apnea with treatment CPAP  8 cm.  Has not used CPAP since 2014, sporadic use from [id number]. Lost 30 lbs since last PSG.   PSG 3/17/2018 revealed AHI 7.4. Cpap titration 4/16/2018 revealed 7 cm optimal.  Patient was on 8 cm when on CPAP in 2014, she recall liking that air flow and request resuming 8 cm.   4/20/2018 resume CPAP 8 cm            Current Assessment & Plan     The current medical regimen is effective;  continue present plan and medications.         Muscle spasms of both lower extremities    Overview     Will try lidoderm         Current Assessment & Plan     The current medical regimen is effective;  continue present plan and medications.         MS (multiple sclerosis) (Chronic)    Overview     Stable on treatment, followed by Rebeka Smith MD and Rimma Rouse PA-C Neurology, [facility]         Current Assessment & Plan     The current medical regimen is effective;  continue present plan and medications.         Major depressive disorder, single episode, mild    Overview     Treated with Paxil         Current Assessment & Plan     The current medical regimen is effective;  continue present plan and medications.         Hypothyroid (Chronic)    Overview     Treated with levothyroxin         Current Assessment & Plan     The current medical regimen is effective;  continue present plan and medications.         Hyperlipidemia (Chronic)    Overview     Due for a lipid         Current Assessment & Plan     Due for a lipid. Patient  desires to wait due to cost         History of colon polyps    Overview     Followed by GI         Current Assessment & Plan     The current medical regimen is effective;  continue present plan and medications.         Gait disturbance    Overview     Followed by neurology and uses a cane         Current Assessment & Plan     The current medical regimen is effective;  continue present plan and medications.         Encounter for long-term (current) use of high-risk medication    Overview     Followed by Neurology         Current Assessment & Plan     The current medical regimen is effective;  continue present plan and medications.         Chronic diastolic heart failure    Overview     2D echo with color flow doppler   Order: [id number]   Status:  Final result   Visible to patient:  Yes (Patient Portal) Next appt:  09/14/2018 at 09:45 AM in Radiology ([place] [id number]) Dx:  Hyperlipidemia, unspecified hyperlipi...   Details        Narrative     Date of Procedure: 02/07/2018        TEST DESCRIPTION       Aorta: The aortic root is normal in size, measuring 2.3 cm at sinotubular junction and 2.6 cm at Sinuses of Valsalva. The proximal ascending aorta is normal in size, measuring 2.5 cm across.     Left Atrium: The left atrial volume index is normal, measuring 17.35 cc/m2.     Left Ventricle: The left ventricle is normal in size, with an end-diastolic diameter of 4.5 cm, and an end-systolic diameter of 3.1 cm. LV wall thickness is normal, with the septum measuring 0.9 cm and the posterior wall measuring 0.8 cm across. Relative   wall thickness was normal at 0.36, and the LV mass index was 80.6 g/m2 consistent with normal left ventricular mass. There are no regional wall motion abnormalities. Left ventricular systolic function appears normal. Visually estimated ejection fraction   is 60-65%. The LV Doppler derived stroke volume equals 51.0 ccs.     Diastolic indices: E wave velocity 0.6 m/s, E/A ratio 0.9,  msec.,  E/e' ratio(avg) 8. There is diastolic dysfunction secondary to relaxation abnormality.     Right Atrium: The right atrium is normal in size, measuring 3.9 cm in length and 2.9 cm in width in the apical view.     Right Ventricle: The right ventricle is normal in size measuring 2.6 cm at the base in the apical right ventricle-focused view. Global right ventricular systolic function appears normal. The estimated PA systolic pressure is greater than 23 mmHg.     Aortic Valve:  Aortic valve is normal in structure with normal leaflet mobility.     Mitral Valve:  Mitral valve is normal in structure with normal leaflet mobility.     Tricuspid Valve:  Tricuspid valve is normal in structure with normal leaflet mobility.     Pulmonary Valve:  Pulmonary valve is normal in structure with normal leaflet mobility.     Intracavitary: There is no evidence of pericardial effusion, intracavity mass, thrombi, or vegetation.         CONCLUSIONS     1 - No wall motion abnormalities.     2 - Normal left ventricular systolic function (EF 60-65%).     3 - Impaired LV relaxation, normal LAP (grade 1 diastolic dysfunction).     4 - Normal right ventricular systolic function .     5 - The estimated PA systolic pressure is greater than 23 mmHg.             This document has been electronically    SIGNED BY: Aren Gold MD On: 02/07/2018 11:53       Ref Range & Units 6mo ago   EF 55 - 65 60    Diastolic Dysfunction  Yes Abnormal     Est. PA Systolic Pressure  23.43    Resulting Agency  [facility]         Specimen Collected: 02/07/18 10:00 Last Resulted: 02/07/18 11:59 Lab Flowsheet                    Current Assessment & Plan     The current medical regimen is effective;  continue present plan and medications.           Acquired pes planus of both feet    Current Assessment & Plan     The current medical regimen is effective;  continue present plan and medications.           Other Visit Diagnoses     Encounter for preventive health examination     -  Primary          Provided Cem with a 5-10 year written screening schedule and personal prevention plan. Recommendations were developed using the USPSTF age appropriate recommendations. Education, counseling, and referrals were provided as needed. After Visit Summary printed and given to patient which includes a list of additional screenings\tests needed.      Patient will need to get a fasting lipid, Mammogram, and a Dexa scan when financially able. She declines today.       No Follow-up on file.    Blake Bustos Iii, PAVelvetC

## 2018-08-24 ENCOUNTER — PATIENT MESSAGE (OUTPATIENT)
Dept: NEUROLOGY | Facility: CLINIC | Age: 58
End: 2018-08-24

## 2018-08-29 DIAGNOSIS — G35 MS (MULTIPLE SCLEROSIS): ICD-10-CM

## 2018-08-29 DIAGNOSIS — M79.2 NEUROPATHIC PAIN: ICD-10-CM

## 2018-08-29 RX ORDER — GABAPENTIN 400 MG/1
CAPSULE ORAL
Qty: 540 CAPSULE | Refills: 1 | Status: SHIPPED | OUTPATIENT
Start: 2018-08-29 | End: 2018-11-12 | Stop reason: DRUGHIGH

## 2018-09-14 ENCOUNTER — HOSPITAL ENCOUNTER (OUTPATIENT)
Dept: RADIOLOGY | Facility: HOSPITAL | Age: 58
Discharge: HOME | End: 2018-09-14
Attending: NURSE PRACTITIONER
Payer: MEDICARE

## 2018-09-14 VITALS — BODY MASS INDEX: 28.17 KG/M2 | HEIGHT: 63 IN | WEIGHT: 159 LBS

## 2018-09-14 DIAGNOSIS — Z12.39 SCREENING FOR BREAST CANCER: ICD-10-CM

## 2018-09-14 PROCEDURE — 77063 BREAST TOMOSYNTHESIS BI: CPT | Mod: 26,,, | Performed by: RADIOLOGY

## 2018-09-14 PROCEDURE — 77067 SCR MAMMO BI INCL CAD: CPT | Mod: TC,PO

## 2018-09-14 PROCEDURE — 77067 SCR MAMMO BI INCL CAD: CPT | Mod: 26,,, | Performed by: RADIOLOGY

## 2018-09-26 DIAGNOSIS — G35 MULTIPLE SCLEROSIS: ICD-10-CM

## 2018-10-03 ENCOUNTER — TELEPHONE (OUTPATIENT)
Dept: NEUROLOGY | Facility: CLINIC | Age: 58
End: 2018-10-03

## 2018-10-03 NOTE — TELEPHONE ENCOUNTER
----- Message from Cam Lugo sent at 10/3/2018 12:41 PM CDT -----  Contact: Pt. [phone]  Needs Advice    Reason for call: The patient would like to speak to someone regarding scheduling an appointment for January. Please contact the patient to discuss further.          Communication Preference:PHONE     Additional Information:

## 2018-10-12 ENCOUNTER — PATIENT MESSAGE (OUTPATIENT)
Dept: NEUROLOGY | Facility: CLINIC | Age: 58
End: 2018-10-12

## 2018-10-12 DIAGNOSIS — E03.8 OTHER SPECIFIED HYPOTHYROIDISM: Chronic | ICD-10-CM

## 2018-10-12 DIAGNOSIS — M62.838 MUSCLE SPASMS OF BOTH LOWER EXTREMITIES: ICD-10-CM

## 2018-10-12 DIAGNOSIS — I50.32 CHRONIC DIASTOLIC HEART FAILURE: ICD-10-CM

## 2018-10-12 DIAGNOSIS — G35 MS (MULTIPLE SCLEROSIS): Primary | ICD-10-CM

## 2018-10-12 DIAGNOSIS — G62.9 POLYNEUROPATHY: ICD-10-CM

## 2018-10-12 DIAGNOSIS — M79.2 NEUROPATHIC PAIN: ICD-10-CM

## 2018-10-12 DIAGNOSIS — F32.0 MAJOR DEPRESSIVE DISORDER, SINGLE EPISODE, MILD: ICD-10-CM

## 2018-10-12 DIAGNOSIS — R53.82 CHRONIC FATIGUE: ICD-10-CM

## 2018-10-16 ENCOUNTER — TELEPHONE (OUTPATIENT)
Dept: NEUROLOGY | Facility: CLINIC | Age: 58
End: 2018-10-16

## 2018-10-16 ENCOUNTER — LAB VISIT (OUTPATIENT)
Dept: LAB | Facility: HOSPITAL | Age: 58
End: 2018-10-16
Attending: PHYSICIAN ASSISTANT
Payer: MEDICARE

## 2018-10-16 DIAGNOSIS — G62.9 POLYNEUROPATHY: ICD-10-CM

## 2018-10-16 DIAGNOSIS — E03.8 OTHER SPECIFIED HYPOTHYROIDISM: Chronic | ICD-10-CM

## 2018-10-16 DIAGNOSIS — F32.0 MAJOR DEPRESSIVE DISORDER, SINGLE EPISODE, MILD: ICD-10-CM

## 2018-10-16 DIAGNOSIS — G35 MS (MULTIPLE SCLEROSIS): ICD-10-CM

## 2018-10-16 DIAGNOSIS — R53.82 CHRONIC FATIGUE: ICD-10-CM

## 2018-10-16 DIAGNOSIS — M62.838 MUSCLE SPASMS OF BOTH LOWER EXTREMITIES: ICD-10-CM

## 2018-10-16 DIAGNOSIS — M79.2 NEUROPATHIC PAIN: ICD-10-CM

## 2018-10-16 DIAGNOSIS — I50.32 CHRONIC DIASTOLIC HEART FAILURE: ICD-10-CM

## 2018-10-16 LAB
ALBUMIN SERPL BCP-MCNC: 3.7 G/DL
ALP SERPL-CCNC: 67 U/L
ALT SERPL W/O P-5'-P-CCNC: 24 U/L
ANION GAP SERPL CALC-SCNC: 9 MMOL/L
AST SERPL-CCNC: 41 U/L
BILIRUB SERPL-MCNC: 0.5 MG/DL
BUN SERPL-MCNC: 11 MG/DL
CALCIUM SERPL-MCNC: 9.5 MG/DL
CHLORIDE SERPL-SCNC: 107 MMOL/L
CO2 SERPL-SCNC: 24 MMOL/L
CREAT SERPL-MCNC: 1 MG/DL
EST. GFR  (AFRICAN AMERICAN): >60 ML/MIN/1.73 M^2
EST. GFR  (NON AFRICAN AMERICAN): >60 ML/MIN/1.73 M^2
FERRITIN SERPL-MCNC: 307 NG/ML
GLUCOSE SERPL-MCNC: 76 MG/DL
IRON SERPL-MCNC: 79 UG/DL
MAGNESIUM SERPL-MCNC: 2.3 MG/DL
POTASSIUM SERPL-SCNC: 4.2 MMOL/L
PROT SERPL-MCNC: 7.5 G/DL
SATURATED IRON: 24 %
SODIUM SERPL-SCNC: 140 MMOL/L
TOTAL IRON BINDING CAPACITY: 330 UG/DL
TRANSFERRIN SERPL-MCNC: 223 MG/DL
TSH SERPL DL<=0.005 MIU/L-ACNC: 0.61 UIU/ML

## 2018-10-16 PROCEDURE — 82728 ASSAY OF FERRITIN: CPT

## 2018-10-16 PROCEDURE — 80053 COMPREHEN METABOLIC PANEL: CPT

## 2018-10-16 PROCEDURE — 84443 ASSAY THYROID STIM HORMONE: CPT

## 2018-10-16 PROCEDURE — 83540 ASSAY OF IRON: CPT

## 2018-10-16 PROCEDURE — 36415 COLL VENOUS BLD VENIPUNCTURE: CPT | Mod: PO

## 2018-10-16 PROCEDURE — 83735 ASSAY OF MAGNESIUM: CPT

## 2018-10-16 NOTE — TELEPHONE ENCOUNTER
Left VM for patient to give our office a call about her f/u appointment with Dr. Smith.   Appointment scheduled for 1/22/2018 at 2:20PM

## 2018-10-16 NOTE — TELEPHONE ENCOUNTER
----- Message from Katherin Miller sent at 10/16/2018 10:27 AM CDT -----  Contact: self @ [phone]  Pt is returning Sammi's call.  She says her appt is ok.

## 2018-10-17 ENCOUNTER — PATIENT MESSAGE (OUTPATIENT)
Dept: NEUROLOGY | Facility: CLINIC | Age: 58
End: 2018-10-17

## 2018-10-17 ENCOUNTER — PATIENT MESSAGE (OUTPATIENT)
Dept: INTERNAL MEDICINE | Facility: CLINIC | Age: 58
End: 2018-10-17

## 2018-10-18 ENCOUNTER — PATIENT MESSAGE (OUTPATIENT)
Dept: NEUROLOGY | Facility: CLINIC | Age: 58
End: 2018-10-18

## 2018-10-25 ENCOUNTER — OFFICE VISIT (OUTPATIENT)
Dept: URGENT CARE | Facility: CLINIC | Age: 58
End: 2018-10-25
Payer: MEDICARE

## 2018-10-25 ENCOUNTER — HOSPITAL ENCOUNTER (OUTPATIENT)
Dept: RADIOLOGY | Facility: HOSPITAL | Age: 58
Discharge: HOME | End: 2018-10-25
Attending: NURSE PRACTITIONER
Payer: MEDICARE

## 2018-10-25 ENCOUNTER — TELEPHONE (OUTPATIENT)
Dept: NEUROLOGY | Facility: CLINIC | Age: 58
End: 2018-10-25

## 2018-10-25 VITALS
HEIGHT: 63 IN | DIASTOLIC BLOOD PRESSURE: 82 MMHG | TEMPERATURE: 100 F | RESPIRATION RATE: 18 BRPM | OXYGEN SATURATION: 98 % | HEART RATE: 115 BPM | SYSTOLIC BLOOD PRESSURE: 138 MMHG | BODY MASS INDEX: 27.64 KG/M2 | WEIGHT: 156 LBS

## 2018-10-25 DIAGNOSIS — R05.9 COUGH: ICD-10-CM

## 2018-10-25 DIAGNOSIS — R05.9 COUGH: Primary | ICD-10-CM

## 2018-10-25 PROCEDURE — 99214 OFFICE O/P EST MOD 30 MIN: CPT | Mod: S$PBB,,, | Performed by: NURSE PRACTITIONER

## 2018-10-25 PROCEDURE — 3008F BODY MASS INDEX DOCD: CPT | Mod: CPTII,,, | Performed by: NURSE PRACTITIONER

## 2018-10-25 PROCEDURE — 99215 OFFICE O/P EST HI 40 MIN: CPT | Mod: PBBFAC,PO,25 | Performed by: NURSE PRACTITIONER

## 2018-10-25 PROCEDURE — 71046 X-RAY EXAM CHEST 2 VIEWS: CPT | Mod: 26,,, | Performed by: RADIOLOGY

## 2018-10-25 PROCEDURE — 71046 X-RAY EXAM CHEST 2 VIEWS: CPT | Mod: TC,FY,PO

## 2018-10-25 PROCEDURE — 99999 PR PBB SHADOW E&M-EST. PATIENT-LVL V: CPT | Mod: PBBFAC,,, | Performed by: NURSE PRACTITIONER

## 2018-10-25 RX ORDER — BENZONATATE 100 MG/1
100 CAPSULE ORAL 3 TIMES DAILY PRN
Qty: 30 CAPSULE | Refills: 0 | Status: SHIPPED | OUTPATIENT
Start: 2018-10-25 | End: 2018-11-04

## 2018-10-25 RX ORDER — PROMETHAZINE HYDROCHLORIDE AND DEXTROMETHORPHAN HYDROBROMIDE 6.25; 15 MG/5ML; MG/5ML
5 SYRUP ORAL NIGHTLY PRN
Qty: 118 ML | Refills: 0 | Status: SHIPPED | OUTPATIENT
Start: 2018-10-25 | End: 2018-11-17

## 2018-10-25 NOTE — PROGRESS NOTES
"Subjective:       Patient ID: Cem Meyers is a 58 y.o. female.    Chief Complaint: Cough    Pt is a 58 year old female to clinic today with complaints of a productive cough that began 5 days ago which is "aggrivating" her MS. Pt states she is unable to rest.       Cough   This is a new problem. The current episode started in the past 7 days. The problem has been unchanged. The problem occurs every few minutes. The cough is productive of sputum. Associated symptoms include myalgias (MS left side), nasal congestion, postnasal drip and a sore throat. Pertinent negatives include no chest pain, chills, ear congestion, ear pain, fever, headaches, heartburn, hemoptysis, rash, rhinorrhea, shortness of breath, sweats, weight loss or wheezing. Nothing aggravates the symptoms. Treatments tried: sudafed. The treatment provided no relief.     Review of Systems   Constitutional: Negative for chills, diaphoresis, fatigue, fever and weight loss.   HENT: Positive for congestion, postnasal drip, sinus pressure and sore throat. Negative for ear discharge, ear pain, rhinorrhea, sinus pain, sneezing and trouble swallowing.    Respiratory: Positive for cough. Negative for hemoptysis, chest tightness, shortness of breath and wheezing.    Cardiovascular: Negative for chest pain and palpitations.   Gastrointestinal: Negative for abdominal pain, diarrhea, heartburn, nausea and vomiting.   Musculoskeletal: Positive for myalgias (MS left side). Negative for back pain.   Skin: Negative for rash.   Neurological: Negative for dizziness, light-headedness and headaches.       Objective:      Physical Exam   Constitutional: She is oriented to person, place, and time. She appears well-developed and well-nourished. No distress.   HENT:   Head: Normocephalic.   Right Ear: Tympanic membrane, external ear and ear canal normal.   Left Ear: Tympanic membrane, external ear and ear canal normal.   Nose: Nose normal. No mucosal edema or rhinorrhea. " Right sinus exhibits no maxillary sinus tenderness and no frontal sinus tenderness. Left sinus exhibits no maxillary sinus tenderness and no frontal sinus tenderness.   Mouth/Throat: Uvula is midline, oropharynx is clear and moist and mucous membranes are normal. No oropharyngeal exudate, posterior oropharyngeal edema or posterior oropharyngeal erythema.   PND noted   Eyes: Conjunctivae and EOM are normal. Pupils are equal, round, and reactive to light.   Neck: Normal range of motion. Neck supple.   Cardiovascular: Normal rate, regular rhythm and normal heart sounds. Exam reveals no gallop and no friction rub.   No murmur heard.  Pulmonary/Chest: Effort normal and breath sounds normal. No accessory muscle usage or stridor. No apnea, no tachypnea and no bradypnea. No respiratory distress. She has no decreased breath sounds. She has no wheezes. She has no rhonchi. She has no rales.   Lymphadenopathy:        Head (right side): No submental, no submandibular and no tonsillar adenopathy present.        Head (left side): No submental, no submandibular and no tonsillar adenopathy present.     She has no cervical adenopathy.   Neurological: She is alert and oriented to person, place, and time.   Skin: Skin is warm and dry. No rash noted. She is not diaphoretic.   Psychiatric: She has a normal mood and affect. Her speech is normal and behavior is normal. Thought content normal.   Nursing note and vitals reviewed.      Assessment:       1. Cough        Plan:   Cough  -     X-Ray Chest PA And Lateral; Future; Expected date: 10/25/2018  -     promethazine-dextromethorphan (PROMETHAZINE-DM) 6.25-15 mg/5 mL Syrp; Take 5 mLs by mouth nightly as needed.  Dispense: 118 mL; Refill: 0  -     benzonatate (TESSALON) 100 MG capsule; Take 1 capsule (100 mg total) by mouth 3 (three) times daily as needed for Cough (Do not take more than 6 capsules in a 24 hour period.).  Dispense: 30 capsule; Refill: 0     Will notify of abnormal xray  results.  Promethazine DM causes drowsiness, do not take while driving.    Follow prescribed treatment plan as directed.  Stay hydrated and rest.  Report to ER if symptoms worsen.  Follow up with PCP in 2-3 days or sooner if symptoms do not improve.

## 2018-10-25 NOTE — TELEPHONE ENCOUNTER
----- Message from Kd Boateng sent at 10/25/2018  9:40 AM CDT -----  Contact: Pt   MS flaring up due chest cold ,Currently in urgent care    Callback: [phone]

## 2018-10-25 NOTE — TELEPHONE ENCOUNTER
Pt went to the urgent for cold symptoms. Denies fever. Chest X-ray negative. Pt started having weakness in her left arm and leg last night, which are not new symptoms. Informed her she's likely having a pseudo exacerbation because she is ill. Pt aware to let us know if the weakness does not improve once her cold symptoms have resolved.

## 2018-10-25 NOTE — PATIENT INSTRUCTIONS

## 2018-10-27 ENCOUNTER — PATIENT MESSAGE (OUTPATIENT)
Dept: INTERNAL MEDICINE | Facility: CLINIC | Age: 58
End: 2018-10-27

## 2018-10-27 ENCOUNTER — PATIENT MESSAGE (OUTPATIENT)
Dept: NEUROLOGY | Facility: CLINIC | Age: 58
End: 2018-10-27

## 2018-10-28 RX ORDER — BACLOFEN 10 MG/1
TABLET ORAL
Qty: 135 TABLET | Refills: 4 | Status: SHIPPED | OUTPATIENT
Start: 2018-10-28 | End: 2018-10-31 | Stop reason: SDUPTHER

## 2018-10-29 ENCOUNTER — PATIENT MESSAGE (OUTPATIENT)
Dept: INTERNAL MEDICINE | Facility: CLINIC | Age: 58
End: 2018-10-29

## 2018-10-29 ENCOUNTER — PATIENT MESSAGE (OUTPATIENT)
Dept: RHEUMATOLOGY | Facility: CLINIC | Age: 58
End: 2018-10-29

## 2018-10-29 ENCOUNTER — OFFICE VISIT (OUTPATIENT)
Dept: OBSTETRICS AND GYNECOLOGY | Facility: CLINIC | Age: 58
End: 2018-10-29
Payer: MEDICARE

## 2018-10-29 VITALS
BODY MASS INDEX: 28 KG/M2 | WEIGHT: 158.06 LBS | DIASTOLIC BLOOD PRESSURE: 62 MMHG | HEIGHT: 63 IN | SYSTOLIC BLOOD PRESSURE: 112 MMHG

## 2018-10-29 DIAGNOSIS — K59.00 CONSTIPATION, UNSPECIFIED CONSTIPATION TYPE: ICD-10-CM

## 2018-10-29 DIAGNOSIS — N81.6 RECTOCELE: ICD-10-CM

## 2018-10-29 DIAGNOSIS — G35 MS (MULTIPLE SCLEROSIS): Primary | Chronic | ICD-10-CM

## 2018-10-29 PROCEDURE — 99999 PR PBB SHADOW E&M-EST. PATIENT-LVL III: CPT | Mod: PBBFAC,,, | Performed by: OBSTETRICS & GYNECOLOGY

## 2018-10-29 PROCEDURE — 99214 OFFICE O/P EST MOD 30 MIN: CPT | Mod: S$PBB,,, | Performed by: OBSTETRICS & GYNECOLOGY

## 2018-10-29 PROCEDURE — 3008F BODY MASS INDEX DOCD: CPT | Mod: CPTII,,, | Performed by: OBSTETRICS & GYNECOLOGY

## 2018-10-29 PROCEDURE — 99213 OFFICE O/P EST LOW 20 MIN: CPT | Mod: PBBFAC | Performed by: OBSTETRICS & GYNECOLOGY

## 2018-10-29 NOTE — PROGRESS NOTES
CHIEF COMPLAINT:   Chief Complaint   Patient presents with    Rectocele       HISTORY OF PRESENT ILLNESS    Cem Meyers 58 y.o. G0 complains of: possible rectocele.  She has had troubles with passing her bowel movements for a long time, worsened due to MS.    She has been manually extracting her stool for last 6mo-1yr. She  Wanted to try a new way to have a BM to help relieve her severe chronic straining to pass stool for past several years.   She has a BM this way every 2-3d.  She is not sure if the posterior buldge is BM or rectocele.   She had worked w her Neurologist for constipation relief with a multitude of remdies and meds.   May get fecal incontinence when stool is too soft w metamucil.  There is no urinary incontinence. No genital bleeding. She is not sexually active.  She get her pap smear, mmg  and HM w PCP.    HISTORY  Patient Active Problem List   Diagnosis    Gait disturbance    MS (multiple sclerosis)    Hypothyroid    Hyperlipidemia    Acquired pes planus of both feet    Vitamin D insufficiency    Osteoporosis    MAYKEL on CPAP    Major depressive disorder, single episode, mild    Polyneuropathy    Encounter for long-term (current) use of high-risk medication    History of colon polyps    Muscle spasms of both lower extremities    Psychophysiological insomnia    Chronic diastolic heart failure       Past Medical History:   Diagnosis Date    Broken toe 6/2015    left big toe    Chronic diastolic heart failure 5/8/2018    Closed left arm fracture     Colon polyp     Depression     Hyperlipidemia     Hypothyroid     MS (multiple sclerosis)     Neurogenic bladder 12/6/2012    Osteoporosis     Polyneuropathy     Sleep apnea     Trouble in sleeping        Past Surgical History:   Procedure Laterality Date    COLONOSCOPY N/A 9/1/2017    Procedure: COLONOSCOPY;  Surgeon: Andriy Villar MD;  Location: [facility];  Service: Endoscopy;  Laterality: N/A;    COLONOSCOPY N/A  9/1/2017    [facility]    COLONOSCOPY N/A 6/20/2014    [facility] at [facility]    FRACTURE SURGERY Left 2013    wrist- [facility]    [place] Right 1989    in [place]    TONSILLECTOMY  1966       Family History   Problem Relation Age of Onset    Pancreatic cancer Mother     Stomach cancer Mother     Cancer Mother         pancreatic, stomach    Hypertension Father     Heart disease Father         MI    Lupus Maternal Aunt     Rheum arthritis Maternal Aunt     Rheum arthritis Sister     Melanoma Neg Hx        Social History     Socioeconomic History    Marital status:      Spouse name: None    Number of children: None    Years of education: None    Highest education level: None   Social Needs    Financial resource strain: None    Food insecurity - worry: None    Food insecurity - inability: None    Transportation needs - medical: None    Transportation needs - non-medical: None   Occupational History    None   Tobacco Use    Smoking status: Never Smoker    Smokeless tobacco: Never Used   Substance and Sexual Activity    Alcohol use: No     Alcohol/week: 0.0 oz    Drug use: No    Sexual activity: Not Currently   Other Topics Concern    Are you pregnant or think you may be? No    Breast-feeding No   Social History Narrative    None       Current Outpatient Medications   Medication Sig Dispense Refill    AFLURIA QUAD [id number], PF, 60 mcg/0.5 mL vaccine ADM 0.5ML IM UTD  0    baclofen (LIORESAL) 10 MG tablet TAKE 1 TO 1 & 1/2 (ONE TO ONE & ONE-HALF) TABLETS BY MOUTH THREE TIMES DAILY AS NEEDED 135 tablet 4    benzonatate (TESSALON) 100 MG capsule Take 1 capsule (100 mg total) by mouth 3 (three) times daily as needed for Cough (Do not take more than 6 capsules in a 24 hour period.). 30 capsule 0    calcium citrate-vitamin D2 1,500-200 mg-unit Tab       calcium citrate-vitamin D2 1,500-200 mg-unit Tab       cholecalciferol,  vitamin D3, (VITAMIN D3) 4,000 unit Cap Take 4,000 Units by mouth once daily.       denosumab (PROLIA) 60 mg/mL Syrg Inject 60 mg into the skin.      fish oil-omega-3 fatty acids 300-1,000 mg capsule Take 2 g by mouth once daily.      gabapentin (NEURONTIN) 400 MG capsule TAKE 2 CAPSULES BY MOUTH THREE TIMES DAILY 540 capsule 1    interferon beta-1a, albumin, (REBIF, WITH ALBUMIN,) 44 mcg/0.5 mL injection Inject 0.5 mLs (44 mcg total) into the skin 3 (three) times a week. 18 mL 0    levothyroxine (SYNTHROID) 75 MCG tablet Take 1 tablet (75 mcg total) by mouth every morning. 90 tablet 2    multivitamin capsule Take 1 capsule by mouth once daily.      paroxetine (PAXIL) 40 MG tablet Take 1 tablet (40 mg total) by mouth every morning. 30 tablet 5    promethazine-dextromethorphan (PROMETHAZINE-DM) 6.25-15 mg/5 mL Syrp Take 5 mLs by mouth nightly as needed. 118 mL 0    simvastatin (ZOCOR) 40 MG tablet Take 1 tablet (40 mg total) by mouth once daily. 90 tablet 3     No current facility-administered medications for this visit.        Review of patient's allergies indicates:   Allergen Reactions    Latex, natural rubber Rash           PHYSICAL EXAM     Vitals:    10/29/18 1416   BP: 112/62       PAIN SCALE: 0/10 None    ROS:  GENERAL: No fever, chills, fatigability or weight loss.  ABDOMEN: Appetite fine. No weight loss. Denies diarrhea, abdominal pain, hematemesis or blood in stool.  URINARY: No flank pain, dysuria or hematuria.  REPRODUCTIVE: No abnormal vaginal bleeding.  BREASTS: Breasts symmetric, nontender and no lumps detected.    PE:   APPEARANCE: Well nourished, well developed, in no acute distress.  ABDOMEN: Soft. No tenderness or masses. No hepatosplenomegaly. No hernias.  PELVIC:   VULVA: No lesions. Atrophic but normal female genitalia.  URETHRAL MEATUS: Normal size and location, no lesions, no prolapse.  URETHRA: No masses, tenderness, prolapse or scarring.  VAGINA: dry and narrow,  no discharge,  there is a mild midline cystocele to -2, but no rectocele noted, incl w strong valsalva effort.  CERVIX: No lesions, normal diameter, no stenosis, no cervical motion tenderness.  UTERUS: 6 week size, regular shape, mobile, non-tender, normal position, good support.  ADNEXA: No masses, tenderness or CDS nodularity.  ANUS PERINEUM: No lesions, no relaxation, no external hemorrhoids.      DIAGNOSIS:   1. Chronic constipation  2. MS      PLAN:   Reviewed physcial findings. Pt was relieved she did not need surgery. Encouraged her to cont to work on finding a good remedy for the constinpation but may continue gentle extraction, but needs to pair this with daily kegels        MEDICATIONS PRESCRIBED:   none    COUNSELING:  Patient was counseled today on A.C.S. Pap guidelines and recommendations for yearly pelvic exams, mammograms and monthly self breast exams; to see her PCP for other health maintenance, paps and pelvics.

## 2018-10-30 ENCOUNTER — PATIENT MESSAGE (OUTPATIENT)
Dept: INTERNAL MEDICINE | Facility: CLINIC | Age: 58
End: 2018-10-30

## 2018-10-30 DIAGNOSIS — E78.49 OTHER HYPERLIPIDEMIA: Primary | Chronic | ICD-10-CM

## 2018-11-01 ENCOUNTER — OFFICE VISIT (OUTPATIENT)
Dept: CARDIOLOGY | Facility: CLINIC | Age: 58
End: 2018-11-01
Payer: MEDICARE

## 2018-11-01 VITALS
HEART RATE: 80 BPM | BODY MASS INDEX: 28.08 KG/M2 | SYSTOLIC BLOOD PRESSURE: 132 MMHG | HEIGHT: 63 IN | DIASTOLIC BLOOD PRESSURE: 86 MMHG | WEIGHT: 158.5 LBS

## 2018-11-01 DIAGNOSIS — I50.32 CHRONIC DIASTOLIC HEART FAILURE: ICD-10-CM

## 2018-11-01 DIAGNOSIS — R07.89 OTHER CHEST PAIN: ICD-10-CM

## 2018-11-01 DIAGNOSIS — G47.33 OSA ON CPAP: Chronic | ICD-10-CM

## 2018-11-01 DIAGNOSIS — E78.49 OTHER HYPERLIPIDEMIA: Primary | Chronic | ICD-10-CM

## 2018-11-01 PROCEDURE — 99213 OFFICE O/P EST LOW 20 MIN: CPT | Mod: PBBFAC | Performed by: INTERNAL MEDICINE

## 2018-11-01 PROCEDURE — 3008F BODY MASS INDEX DOCD: CPT | Mod: CPTII,S$GLB,, | Performed by: INTERNAL MEDICINE

## 2018-11-01 PROCEDURE — 99999 PR PBB SHADOW E&M-EST. PATIENT-LVL III: CPT | Mod: PBBFAC,,, | Performed by: INTERNAL MEDICINE

## 2018-11-01 PROCEDURE — 99214 OFFICE O/P EST MOD 30 MIN: CPT | Mod: S$GLB,,, | Performed by: INTERNAL MEDICINE

## 2018-11-01 RX ORDER — BACLOFEN 10 MG/1
TABLET ORAL
Qty: 135 TABLET | Refills: 4 | Status: SHIPPED | OUTPATIENT
Start: 2018-11-01 | End: 2018-11-20 | Stop reason: DRUGHIGH

## 2018-11-01 NOTE — PATIENT INSTRUCTIONS
Noncardiac Chest Pain    Based on your visit today, the healthcare provider doesnt know what is causing your chest pain. In most cases, people who come to the emergency department with chest pain dont have a problem with their heart. Instead, the pain is caused by other conditions. It's important for the healthcare team to be sure you are not having a life threatening cause for chest pain such as a heart attack, blood clot in the lungs, collapsed lung, ruptured esophagus, or tearing of the aorta. Once these major causes have been ruled out, you may have further evaluation for non-heart causes of chest pain. These may be problems with the lungs, muscles, bones, digestive tract, nerves, or mental health.  Lung problems  · Inflammation around the lungs (pleurisy)  · Collapsed lung (pneumothorax)  · Fluid around the lungs (pleural effusion)  · Lung cancer (a rare cause of chest pain)  Muscle or bone problems  · Inflamed cartilage between the ribs (costochondritis)  · Fibromyalgia  · Rheumatoid arthritis  · Chest wall strain  Digestive system problems  · Reflux  · Stomach ulcer  · Spasms of the esophagus  · Gall stones  · Gallbladder inflammation  Mental health conditions  · Panic or anxiety attacks  · Emotional distress  Your condition doesnt seem serious and your pain doesnt appear to be coming from your heart. But sometimes the signs of a serious problem take more time to appear. Watch for the warning signs listed below.  Home care  Follow these guidelines when caring for yourself at home:  · Rest today and avoid strenuous activity.  · Take any prescribed medicine as directed.  Follow-up care  Follow up with your healthcare provider, or as advised, if you dont start to feel better within 24 hours.  When to seek medical advice  Call your healthcare provider right away if any of these occur:  · A change in the type of pain. Call if it feels different, becomes more serious, lasts longer, or begins to spread into  your shoulder, arm, neck, jaw, or back.  · Shortness of breath  · You feel more pain when you breathe  · Cough with dark-colored mucus or blood  · Weakness, dizziness, or fainting  · Fever of 100.4ºF (38ºC) or higher, or as directed by your healthcare provider  · Swelling, pain, or redness in one leg  Date Last Reviewed: 12/1/2016  © 7943-7229 [employer]. [address] [place] [address], [place] [address]. All rights reserved. This information is not intended as a substitute for professional medical care. Always follow your healthcare professional's instructions.

## 2018-11-01 NOTE — PROGRESS NOTES
Subjective:   Patient ID:  Cem Meyers is a 58 y.o. female who presents for cardiac consult of Hyperlipidemia and Chest Pain      Hyperlipidemia   Associated symptoms include chest pain and myalgias. Pertinent negatives include no shortness of breath.   Chest Pain    Associated symptoms include back pain. Pertinent negatives include no dizziness, headaches, palpitations or shortness of breath.   Her past medical history is significant for hyperlipidemia.     The patient came in today for cardiac consult of Hyperlipidemia and Chest Pain    Referring Provider: Adonis Stubbs MD   Reason for consult: chest pain    This is a 57 year old female pt with current medical conditions HFpEF, HLD, hypothyroidism, MS, MAYKEL, depression presents for follow up CV evaluation.      2/5/18  Pt complains of left sided sharp chest pain. Happened twice so far, intermittent, happens at rest. Pt has MS with heat sensitive, does not do much activity. Has been more sedentary. Occasionally has mild SOB, diagnosed with mild MAYKEL 2008/2009, used CPAP initially but has not used since then. Very min snoring these days.     5/8/18  After last visit had 2D ECHO which revealed normal BiV function with grade 1 DD, normal valves. Had sleep study which was positive for MAYKEL and has started CPAP. Wants a nose mask instead of face mask. Walks dogs, feels fatigue but not chest pain. NO further episodes of CP, thinks it may have been due to some exercise/MS symptoms.     11/1/18  Pt has been having more muscle spasms and increased Baclofen/pump. Occ chest pain - feels like pressure. Has been using CPAP, overall doing ok. No palpitations/LE swelling.     Patient feels no leg swelling, no PND, no palpitation, no syncope, no CNS symptoms.    Patient is compliant with medications.    2/5/18 - ECG - NSR, no ischemia    2D ECHO 2/7/18  CONCLUSIONS     1 - No wall motion abnormalities.     2 - Normal left ventricular systolic function (EF 60-65%).     3 -  Impaired LV relaxation, normal LAP (grade 1 diastolic dysfunction).     4 - Normal right ventricular systolic function .     5 - The estimated PA systolic pressure is greater than 23 mmHg.         Past Medical History:   Diagnosis Date    Broken toe 6/2015    left big toe    Chronic diastolic heart failure 5/8/2018    Closed left arm fracture     Colon polyp     Depression     Hyperlipidemia     Hypothyroid     MS (multiple sclerosis)     Neurogenic bladder 12/6/2012    Osteoporosis     Polyneuropathy     Sleep apnea     Trouble in sleeping        Past Surgical History:   Procedure Laterality Date    COLONOSCOPY N/A 9/1/2017    Procedure: COLONOSCOPY;  Surgeon: Andriy Villar MD;  Location[facility];  Service: Endoscopy;  Laterality: N/A;    COLONOSCOPY N/A 9/1/2017    [facility] at [facility]    COLONOSCOPY N/A 6/20/2014    Per[facility] by [facility] MD at [facility] ENDO    FRACTURE SURGERY Left 2013    wrist- [facility]    [place] Right [place]    in [place]    TONSILLECTOMY  1966       Social History     Tobacco Use    Smoking status: Never Smoker    Smokeless tobacco: Never Used   Substance Use Topics    Alcohol use: No     Alcohol/week: 0.0 oz    Drug use: No       Family History   Problem Relation Age of Onset    Pancreatic cancer Mother     Stomach cancer Mother     Cancer Mother         pancreatic, stomach    Hypertension Father     Heart disease Father         MI    Lupus Maternal Aunt     Rheum arthritis Maternal Aunt     Rheum arthritis Sister     Melanoma Neg Hx           Medication List           Accurate as of 11/1/18 12:42 PM. If you have any questions, ask your nurse or doctor.               CONTINUE taking these medications    AFLURIA QUAD 7058-8058 (PF) 60 mcg/0.5 mL vaccine  Generic drug:  influenza     baclofen 10 MG tablet  Commonly known as:  LIORESAL  TAKE 1 TO 1 & 1/2 (ONE TO ONE & ONE-HALF) TABLETS BY MOUTH THREE TIMES DAILY AS NEEDED      benzonatate 100 MG capsule  Commonly known as:  TESSALON  Take 1 capsule (100 mg total) by mouth 3 (three) times daily as needed for Cough (Do not take more than 6 capsules in a 24 hour period.).     calcium citrate-vitamin D2 1,500-200 mg-unit Tab     denosumab 60 mg/mL Syrg  Commonly known as:  PROLIA     fish oil-omega-3 fatty acids 300-1,000 mg capsule     gabapentin 400 MG capsule  Commonly known as:  NEURONTIN  TAKE 2 CAPSULES BY MOUTH THREE TIMES DAILY     interferon beta-1a (albumin) 44 mcg/0.5 mL injection  Commonly known as:  REBIF (WITH ALBUMIN)  Inject 0.5 mLs (44 mcg total) into the skin 3 (three) times a week.     levothyroxine 75 MCG tablet  Commonly known as:  SYNTHROID  Take 1 tablet (75 mcg total) by mouth every morning.     multivitamin capsule     paroxetine 40 MG tablet  Commonly known as:  PAXIL  Take 1 tablet (40 mg total) by mouth every morning.     promethazine-dextromethorphan 6.25-15 mg/5 mL Syrp  Commonly known as:  PROMETHAZINE-DM  Take 5 mLs by mouth nightly as needed.     simvastatin 40 MG tablet  Commonly known as:  ZOCOR  Take 1 tablet (40 mg total) by mouth once daily.     VITAMIN D3 4,000 unit Cap  Generic drug:  cholecalciferol (vitamin D3)            Review of Systems   Constitutional: Negative.    HENT: Negative.    Eyes: Negative.    Respiratory: Negative for shortness of breath.    Cardiovascular: Positive for chest pain. Negative for palpitations.   Gastrointestinal: Negative.    Genitourinary: Negative.    Musculoskeletal: Positive for back pain, falls, joint pain and myalgias.   Skin: Negative.    Neurological: Negative for dizziness and headaches.   Endo/Heme/Allergies: Negative.    Psychiatric/Behavioral: Negative.    All 12 systems otherwise negative.      Wt Readings from Last 3 Encounters:   11/01/18 71.9 kg (158 lb 8.2 oz)   10/29/18 71.7 kg (158 lb 1.1 oz)   10/25/18 70.8 kg (155 lb 15.6 oz)     Temp Readings from Last 3 Encounters:   10/25/18 100.1 °F (37.8 °C)  "  08/21/18 96.5 °F (35.8 °C) (Tympanic)   01/04/18 96.1 °F (35.6 °C) (Tympanic)     BP Readings from Last 3 Encounters:   11/01/18 132/86   10/29/18 112/62   10/25/18 138/82     Pulse Readings from Last 3 Encounters:   11/01/18 80   10/25/18 (!) 115   08/21/18 74       /86 (BP Location: Right arm, Patient Position: Sitting, BP Method: Medium (Manual))   Pulse 80   Ht 5' 3" (1.6 m)   Wt 71.9 kg (158 lb 8.2 oz)   BMI 28.08 kg/m²     Objective:   Physical Exam   Constitutional: She is oriented to person, place, and time. She appears well-developed and well-nourished. No distress.   HENT:   Head: Normocephalic and atraumatic.   Nose: Nose normal.   Mouth/Throat: Oropharynx is clear and moist.   Eyes: Conjunctivae and EOM are normal. No scleral icterus.   Neck: Normal range of motion. Neck supple. No JVD present. No thyromegaly present.   Cardiovascular: Normal rate, regular rhythm, S1 normal and S2 normal. Exam reveals no gallop, no S3, no S4 and no friction rub.   No murmur heard.  Pulmonary/Chest: Effort normal and breath sounds normal. No stridor. No respiratory distress. She has no wheezes. She has no rales. She exhibits no tenderness.   Abdominal: Soft. Bowel sounds are normal. She exhibits no distension and no mass. There is no tenderness. There is no rebound.   Genitourinary:   Genitourinary Comments: Deferred   Musculoskeletal: Normal range of motion. She exhibits no edema, tenderness or deformity.   Lymphadenopathy:     She has no cervical adenopathy.   Neurological: She is alert and oriented to person, place, and time. She exhibits normal muscle tone. Coordination normal.   Skin: Skin is warm and dry. No rash noted. She is not diaphoretic. No erythema. No pallor.   Psychiatric: She has a normal mood and affect. Her behavior is normal. Judgment and thought content normal.   Nursing note and vitals reviewed.      Lab Results   Component Value Date     10/16/2018    K 4.2 10/16/2018     " 10/16/2018    CO2 24 10/16/2018    BUN 11 10/16/2018    CREATININE 1.0 10/16/2018    GLU 76 10/16/2018    MG 2.3 10/16/2018    AST 41 (H) 10/16/2018    ALT 24 10/16/2018    ALBUMIN 3.7 10/16/2018    PROT 7.5 10/16/2018    BILITOT 0.5 10/16/2018    WBC 5.31 07/17/2018    HGB 12.5 07/17/2018    HCT 39.1 07/17/2018     (H) 07/17/2018     07/17/2018    TSH 0.611 10/16/2018    CHOL 178 08/21/2017    HDL 56 08/21/2017    LDLCALC 110.6 08/21/2017    TRIG 57 08/21/2017     Assessment:      1. Other hyperlipidemia    2. Chronic diastolic heart failure    3. MAYKEL on CPAP    4. Other chest pain        Plan:   1. Chest pain, atypical   - 2D ECHO overall normal function without valve disease  - diastolic HF - euvolemic  - discussed stress test if more symptomatic/worse  - consider other causes of CP - MSK/esoph/pulm/anxiety    2. HLD  - cont statin    3. Hypothyrodism  - cont synthroid    4. MS  - cont meds per PCP/Neuro    5. MAYKEL  - cont CPAP    6. Diastolic HF  - pt euvolemic  - cont to monitor    Thank you for allowing me to participate in this patient's care. Please do not hesitate to contact me with any questions or concerns. Consult note has been forwarded to the referral physician.

## 2018-11-08 RX ORDER — LEVOTHYROXINE SODIUM 75 UG/1
TABLET ORAL
Qty: 90 TABLET | Refills: 1 | Status: SHIPPED | OUTPATIENT
Start: 2018-11-08 | End: 2019-05-17 | Stop reason: SDUPTHER

## 2018-11-09 ENCOUNTER — PATIENT MESSAGE (OUTPATIENT)
Dept: NEUROLOGY | Facility: CLINIC | Age: 58
End: 2018-11-09

## 2018-11-09 DIAGNOSIS — M79.2 NEUROPATHIC PAIN: ICD-10-CM

## 2018-11-09 DIAGNOSIS — G35 MULTIPLE SCLEROSIS: Primary | ICD-10-CM

## 2018-11-12 RX ORDER — GABAPENTIN 100 MG/1
CAPSULE ORAL
Qty: 60 CAPSULE | Refills: 11 | Status: SHIPPED | OUTPATIENT
Start: 2018-11-12 | End: 2019-06-21 | Stop reason: SDUPTHER

## 2018-11-12 RX ORDER — GABAPENTIN 800 MG/1
800 TABLET ORAL 3 TIMES DAILY
Qty: 90 TABLET | Refills: 11 | Status: SHIPPED | OUTPATIENT
Start: 2018-11-12 | End: 2019-12-07 | Stop reason: SDUPTHER

## 2018-11-15 ENCOUNTER — PATIENT MESSAGE (OUTPATIENT)
Dept: NEUROLOGY | Facility: CLINIC | Age: 58
End: 2018-11-15

## 2018-11-15 DIAGNOSIS — R26.9 NEUROLOGIC GAIT DYSFUNCTION: ICD-10-CM

## 2018-11-15 DIAGNOSIS — G35 MULTIPLE SCLEROSIS: Primary | ICD-10-CM

## 2018-11-20 ENCOUNTER — PATIENT MESSAGE (OUTPATIENT)
Dept: NEUROLOGY | Facility: CLINIC | Age: 58
End: 2018-11-20

## 2018-11-20 DIAGNOSIS — G35 MULTIPLE SCLEROSIS: Primary | ICD-10-CM

## 2018-11-20 DIAGNOSIS — R25.2 SPASTICITY: ICD-10-CM

## 2018-11-20 RX ORDER — BACLOFEN 10 MG/1
TABLET ORAL
Qty: 150 TABLET | Refills: 6 | Status: SHIPPED | OUTPATIENT
Start: 2018-11-20 | End: 2019-07-05 | Stop reason: SDUPTHER

## 2018-11-24 ENCOUNTER — PATIENT MESSAGE (OUTPATIENT)
Dept: NEUROLOGY | Facility: CLINIC | Age: 58
End: 2018-11-24

## 2018-11-29 ENCOUNTER — PATIENT MESSAGE (OUTPATIENT)
Dept: NEUROLOGY | Facility: CLINIC | Age: 58
End: 2018-11-29

## 2018-12-04 ENCOUNTER — OFFICE VISIT (OUTPATIENT)
Dept: RHEUMATOLOGY | Facility: CLINIC | Age: 58
End: 2018-12-04
Payer: MEDICARE

## 2018-12-04 ENCOUNTER — LAB VISIT (OUTPATIENT)
Dept: LAB | Facility: HOSPITAL | Age: 58
End: 2018-12-04
Attending: INTERNAL MEDICINE
Payer: MEDICARE

## 2018-12-04 VITALS
WEIGHT: 160.25 LBS | BODY MASS INDEX: 28.39 KG/M2 | SYSTOLIC BLOOD PRESSURE: 116 MMHG | HEART RATE: 71 BPM | HEIGHT: 63 IN | DIASTOLIC BLOOD PRESSURE: 77 MMHG

## 2018-12-04 DIAGNOSIS — E78.49 OTHER HYPERLIPIDEMIA: Chronic | ICD-10-CM

## 2018-12-04 DIAGNOSIS — M81.0 AGE-RELATED OSTEOPOROSIS WITHOUT CURRENT PATHOLOGICAL FRACTURE: ICD-10-CM

## 2018-12-04 DIAGNOSIS — M81.0 AGE-RELATED OSTEOPOROSIS WITHOUT CURRENT PATHOLOGICAL FRACTURE: Primary | ICD-10-CM

## 2018-12-04 LAB
ALBUMIN SERPL BCP-MCNC: 3.5 G/DL
ALP SERPL-CCNC: 63 U/L
ALT SERPL W/O P-5'-P-CCNC: 17 U/L
ANION GAP SERPL CALC-SCNC: 6 MMOL/L
AST SERPL-CCNC: 33 U/L
BILIRUB SERPL-MCNC: 0.4 MG/DL
BUN SERPL-MCNC: 16 MG/DL
CALCIUM SERPL-MCNC: 9.5 MG/DL
CHLORIDE SERPL-SCNC: 105 MMOL/L
CHOLEST SERPL-MCNC: 173 MG/DL
CHOLEST/HDLC SERPL: 3.3 {RATIO}
CO2 SERPL-SCNC: 27 MMOL/L
CREAT SERPL-MCNC: 1 MG/DL
EST. GFR  (AFRICAN AMERICAN): >60 ML/MIN/1.73 M^2
EST. GFR  (NON AFRICAN AMERICAN): >60 ML/MIN/1.73 M^2
GLUCOSE SERPL-MCNC: 87 MG/DL
HDLC SERPL-MCNC: 53 MG/DL
HDLC SERPL: 30.6 %
LDLC SERPL CALC-MCNC: 104.6 MG/DL
NONHDLC SERPL-MCNC: 120 MG/DL
POTASSIUM SERPL-SCNC: 4.3 MMOL/L
PROT SERPL-MCNC: 7.4 G/DL
SODIUM SERPL-SCNC: 138 MMOL/L
TRIGL SERPL-MCNC: 77 MG/DL

## 2018-12-04 PROCEDURE — 99214 OFFICE O/P EST MOD 30 MIN: CPT | Mod: HCNC,25,S$GLB, | Performed by: INTERNAL MEDICINE

## 2018-12-04 PROCEDURE — 80061 LIPID PANEL: CPT | Mod: HCNC,PO

## 2018-12-04 PROCEDURE — 99999 PR PBB SHADOW E&M-EST. PATIENT-LVL III: CPT | Mod: PBBFAC,HCNC,, | Performed by: INTERNAL MEDICINE

## 2018-12-04 PROCEDURE — 96372 THER/PROPH/DIAG INJ SC/IM: CPT | Mod: HCNC,S$GLB,, | Performed by: INTERNAL MEDICINE

## 2018-12-04 PROCEDURE — 3008F BODY MASS INDEX DOCD: CPT | Mod: CPTII,HCNC,S$GLB, | Performed by: INTERNAL MEDICINE

## 2018-12-04 PROCEDURE — 80053 COMPREHEN METABOLIC PANEL: CPT | Mod: HCNC,PO

## 2018-12-04 PROCEDURE — 36415 COLL VENOUS BLD VENIPUNCTURE: CPT | Mod: HCNC,PO

## 2018-12-04 NOTE — PROGRESS NOTES
Administered 1 cc Prolia 60mg/cc  to RUQ of abdomen. Pt tolerated well. No acute reaction noted to site. Pt instructed on S/S to report. Advised patient to wait in lobby 15 minutes after receiving injection to monitor for any reactions. Pt verbalized understanding.       Calcium: per Dr. ponce  Creatinine: per   Lot: [id number]  Exp: 01/21

## 2018-12-04 NOTE — PROGRESS NOTES
RHEUMATOLOGY CLINIC FOLLOW UP VISIT  Chief complaints:-  To follow up for osteoporosis.    HPI:-  Cem Fernandes a 58 y.o. pleasant female comes in with above chief complaints.  She has multiple medical problems as reviewed below including multiple sclerosis.  Due to multiple sclerosis and multiple other medical problems she had difficulty taking ORAL FOSAMAX once every week and sitting upright for at least 30 minutes. So she was given reclast last year. She developed severe arthralgias after reclast which subsided in a week. So she was switched to prolia. She received first injection in 10/2017 without any significant problems other than mild arthralgias for a couple of days.  She denies any falls or fractures since last visit .  No recurrent invasive dental procedures.     Review of Systems   Constitutional: Negative for chills, fever, malaise/fatigue and weight loss.   HENT: Negative for ear discharge, ear pain, hearing loss, nosebleeds and sore throat.    Eyes: Negative for blurred vision, double vision, photophobia, discharge and redness.   Respiratory: Negative for cough, hemoptysis, sputum production and shortness of breath.    Cardiovascular: Negative for chest pain, palpitations and claudication.   Gastrointestinal: Negative for abdominal pain, constipation, diarrhea, melena, nausea and vomiting.   Genitourinary: Negative for dysuria, frequency, hematuria and urgency.   Musculoskeletal: Negative for back pain, falls, joint pain, myalgias and neck pain.   Skin: Negative for itching and rash.   Neurological: Positive for sensory change and focal weakness. Negative for dizziness, tingling, tremors, speech change, seizures, loss of consciousness, weakness and headaches.   Endo/Heme/Allergies: Negative for environmental allergies. Does not bruise/bleed easily.   Psychiatric/Behavioral: Negative for hallucinations and memory loss. The patient does not have insomnia.        Past Medical History:   Diagnosis  "Date    Broken toe 6/2015    left big toe    Chronic diastolic heart failure 5/8/2018    Closed left arm fracture     Colon polyp     Depression     Hyperlipidemia     Hypothyroid     MS (multiple sclerosis)     Neurogenic bladder 12/6/2012    Osteoporosis     Polyneuropathy     Sleep apnea     Trouble in sleeping        Past Surgical History:   Procedure Laterality Date    COLONOSCOPY N/A 9/1/2017    Procedure: COLONOSCOPY;  Surgeon: Andriy Villar MD;  Location: [facility];  Service: Endoscopy;  Laterality: N/A;    COLONOSCOPY N/A 9/1/2017    [facility] ENDO    COLONOSCOPY N/A 6/20/2014    [facility] by [facility] at [facility] ENDO    FRACTURE SURGERY Left 2013    wrist- [facility]    [place] Right [place]    in [place]    TONSILLECTOMY  1966        Social History     Tobacco Use    Smoking status: Never Smoker    Smokeless tobacco: Never Used   Substance Use Topics    Alcohol use: No     Alcohol/week: 0.0 oz    Drug use: No       Family History   Problem Relation Age of Onset    Pancreatic cancer Mother     Stomach cancer Mother     Cancer Mother         pancreatic, stomach    Hypertension Father     Heart disease Father         MI    Lupus Maternal Aunt     Rheum arthritis Maternal Aunt     Rheum arthritis Sister     Melanoma Neg Hx        Allergies   Allergen Reactions    Latex, Natural Rubber Rash           Physical examination:-    Vitals:    12/04/18 1052   BP: 116/77   Pulse: 71   Weight: 72.7 kg (160 lb 4.4 oz)   Height: 5' 3" (1.6 m)       Physical Exam   Constitutional: She is oriented to person, place, and time and well-developed, well-nourished, and in no distress. No distress.   HENT:   Head: Normocephalic and atraumatic.   Nose: Nose normal.   Mouth/Throat: Oropharynx is clear and moist. No oropharyngeal exudate.   Eyes: Conjunctivae and EOM are normal. Pupils are equal, round, and reactive to light. Right eye exhibits no discharge. " Left eye exhibits no discharge. No scleral icterus.   Neck: Normal range of motion. Neck supple.   Cardiovascular: Normal rate and intact distal pulses.   Pulmonary/Chest: Effort normal. No respiratory distress. She exhibits no tenderness.   Abdominal: Soft. There is no tenderness.   Musculoskeletal:   No synovitis over small joints of hands or feet.  No effusion over the last joints.   Lymphadenopathy:     She has no cervical adenopathy.   Neurological: She is alert and oriented to person, place, and time.   Significant motor weakness over left side from multiple sclerosis.   Skin: Skin is warm. She is not diaphoretic. No erythema. No pallor.   Psychiatric: Mood and affect normal.   Nursing note and vitals reviewed.      Labs:-  Normal vitamin D.  Normal renal functions.     Assessment/Plans:-  # Osteoporosis:-  Significant osteoporosis with intolerance to bisphosphonate now on prolia. Tolerating well. Continue prolia.   Discussed in detail about all adverse effects of the medication including osteonecrosis of the jaw and atypical femoral fractures.  No plans for invasive dental procedures at this time or any other future.   - Comprehensive metabolic panel; Standing     # RTC in 6 months  Disclaimer: This note was prepared using voice recognition system and is likely to have sound alike errors and is not proof read.  Please call me with any questions.

## 2018-12-05 DIAGNOSIS — G35 MULTIPLE SCLEROSIS: ICD-10-CM

## 2018-12-05 DIAGNOSIS — F32.A DEPRESSION, UNSPECIFIED DEPRESSION TYPE: ICD-10-CM

## 2018-12-05 RX ORDER — PAROXETINE HYDROCHLORIDE 40 MG/1
TABLET, FILM COATED ORAL
Qty: 30 TABLET | Refills: 5 | Status: SHIPPED | OUTPATIENT
Start: 2018-12-05 | End: 2019-06-11 | Stop reason: SDUPTHER

## 2018-12-07 ENCOUNTER — TELEPHONE (OUTPATIENT)
Dept: NEUROLOGY | Facility: CLINIC | Age: 58
End: 2018-12-07

## 2018-12-07 NOTE — TELEPHONE ENCOUNTER
Call returned to [employer]. States that auth must be renewed for new plan year. Auth initiated via [email or web address]

## 2018-12-07 NOTE — TELEPHONE ENCOUNTER
----- Message from Peter Randolph sent at 12/7/2018  9:26 AM CST -----  Contact: [employer] [facility] [employer]  @ [phone]  Caller requesting a return call regarding a prior authorization ( if any is in the process ) for (interferon beta-1a, albumin, (REBIF, WITH ALBUMIN,) 44 mcg/0.5 mL injection ) to continue w therapy, pls call to advise

## 2018-12-17 DIAGNOSIS — G35 MULTIPLE SCLEROSIS: ICD-10-CM

## 2018-12-18 RX ORDER — INTERFERON BETA-1A 44 UG/.5ML
INJECTION, SOLUTION SUBCUTANEOUS
Qty: 6 ML | Refills: 0 | Status: SHIPPED | OUTPATIENT
Start: 2018-12-18 | End: 2019-01-12 | Stop reason: SDUPTHER

## 2018-12-21 ENCOUNTER — HOSPITAL ENCOUNTER (OUTPATIENT)
Dept: RADIOLOGY | Facility: HOSPITAL | Age: 58
Discharge: HOME | End: 2018-12-21
Attending: PHYSICIAN ASSISTANT
Payer: MEDICARE

## 2018-12-21 ENCOUNTER — OFFICE VISIT (OUTPATIENT)
Dept: PULMONOLOGY | Facility: CLINIC | Age: 58
End: 2018-12-21
Payer: MEDICARE

## 2018-12-21 VITALS
HEART RATE: 68 BPM | HEIGHT: 63 IN | OXYGEN SATURATION: 97 % | BODY MASS INDEX: 28.9 KG/M2 | DIASTOLIC BLOOD PRESSURE: 76 MMHG | WEIGHT: 163.13 LBS | RESPIRATION RATE: 16 BRPM | SYSTOLIC BLOOD PRESSURE: 116 MMHG

## 2018-12-21 DIAGNOSIS — G35 MULTIPLE SCLEROSIS: ICD-10-CM

## 2018-12-21 DIAGNOSIS — F32.0 MAJOR DEPRESSIVE DISORDER, SINGLE EPISODE, MILD: ICD-10-CM

## 2018-12-21 DIAGNOSIS — F51.04 PSYCHOPHYSIOLOGICAL INSOMNIA: ICD-10-CM

## 2018-12-21 DIAGNOSIS — G47.33 OSA ON CPAP: Primary | Chronic | ICD-10-CM

## 2018-12-21 PROCEDURE — 99999 PR PBB SHADOW E&M-EST. PATIENT-LVL IV: CPT | Mod: PBBFAC,HCNC,, | Performed by: NURSE PRACTITIONER

## 2018-12-21 PROCEDURE — 99213 OFFICE O/P EST LOW 20 MIN: CPT | Mod: HCNC,S$GLB,, | Performed by: NURSE PRACTITIONER

## 2018-12-21 PROCEDURE — A9585 GADOBUTROL INJECTION: HCPCS | Mod: HCNC | Performed by: PHYSICIAN ASSISTANT

## 2018-12-21 PROCEDURE — 3008F BODY MASS INDEX DOCD: CPT | Mod: CPTII,HCNC,S$GLB, | Performed by: NURSE PRACTITIONER

## 2018-12-21 PROCEDURE — 70553 MRI BRAIN STEM W/O & W/DYE: CPT | Mod: TC,HCNC

## 2018-12-21 PROCEDURE — 25500020 PHARM REV CODE 255: Mod: HCNC | Performed by: PHYSICIAN ASSISTANT

## 2018-12-21 RX ORDER — GADOBUTROL 604.72 MG/ML
7 INJECTION INTRAVENOUS
Status: COMPLETED | OUTPATIENT
Start: 2018-12-21 | End: 2018-12-21

## 2018-12-21 RX ADMIN — GADOBUTROL 7 ML: 604.72 INJECTION INTRAVENOUS at 08:12

## 2018-12-21 NOTE — ASSESSMENT & PLAN NOTE
Benefits and compliant auto CPAP 4-6 cm  [place] 4  Average AHI 5.4  Auto-CPAP Summary  Auto-CPAP Mean Pressure 4.8 cmH2O  Auto-CPAP Peak Average Pressure 5.6 cmH2O  Average Device Pressure <= 90% of Time 5.7 cmH2O  Continue present settings auto CPAP 4-6 cm with Nasal wisp mask  Yearly supply order  HME: [facility]

## 2018-12-21 NOTE — PROGRESS NOTES
Subjective:      Patient ID: Cem Meyers is a 58 y.o. female.    Chief Complaint: Sleep Apnea    HPI: Cem Meyers is here for follow up for MAYKEL and CPAP complaince assessment.  She is on Auto CPAP of 4-6 cmH2O pressure which is optimally controlling sleep apnea with apneic index (AHI) 5.4 events an hour.   She is compliant with CPAP use. Complaince download today reveals 73.3% of days with greater than 4 hours of device use.   Patient reports benefit from CPAP use and denies snoring and excessive daytime sleepiness.  Patient reports no complaints happy with 4-6 cm, no longer burping. Nasal wisp mask is used.   [place] 4    Previous Report Reviewed: lab reports and office notes     Past Medical History: The following portions of the patient's history were reviewed and updated as appropriate:   She  has a past surgical history that includes Knee surgery (Right, 1989); Fracture surgery (Left, 2013); Tonsillectomy (1966); and Colonoscopy (N/A, 9/1/2017).  Her family history includes Cancer in her mother; Heart disease in her father; Hypertension in her father; Lupus in her maternal aunt; Pancreatic cancer in her mother; Rheum arthritis in her maternal aunt and sister; Stomach cancer in her mother.  She  reports that  has never smoked. she has never used smokeless tobacco. She reports that she does not drink alcohol or use drugs.  She has a current medication list which includes the following prescription(s): baclofen, calcium citrate-vitamin d2, cholecalciferol (vitamin d3), fish oil-omega-3 fatty acids, gabapentin, gabapentin, levothyroxine, multivitamin, paroxetine, rebif (with albumin), and simvastatin, and the following Facility-Administered Medications: denosumab.  She is allergic to latex, natural rubber..    The following portions of the patient's history were reviewed and updated as appropriate: allergies, current medications, past family history, past medical history, past social history, past  "surgical history and problem list.    Review of Systems   Constitutional: Negative for fever, chills, weight loss, weight gain, activity change, appetite change, fatigue and night sweats.   HENT: Negative for postnasal drip, rhinorrhea, sinus pressure, voice change and congestion.    Eyes: Negative for redness and itching.   Respiratory: Negative for snoring, cough, sputum production, chest tightness, shortness of breath, wheezing, orthopnea, asthma nighttime symptoms, dyspnea on extertion, use of rescue inhaler and somnolence.    Cardiovascular: Negative.  Negative for chest pain, palpitations and leg swelling.   Genitourinary: Negative for difficulty urinating and hematuria.   Endocrine: Negative for cold intolerance and heat intolerance.    Musculoskeletal: Negative for arthralgias, gait problem, joint swelling and myalgias.   Skin: Negative.    Gastrointestinal: Negative for nausea, vomiting, abdominal pain and acid reflux.   Neurological: Negative for dizziness, weakness, light-headedness and headaches.   Hematological: Negative for adenopathy. No excessive bruising.   All other systems reviewed and are negative.     Objective:   /76   Pulse 68   Resp 16   Ht 5' 3" (1.6 m)   Wt 74 kg (163 lb 2.3 oz)   SpO2 97%   BMI 28.90 kg/m²   Physical Exam   Constitutional: She is oriented to person, place, and time. She appears well-developed and well-nourished. She is active and cooperative.  Non-toxic appearance. She does not appear ill. No distress.   HENT:   Head: Normocephalic.   Right Ear: External ear normal.   Left Ear: External ear normal.   Nose: Nose normal.   Mouth/Throat: Oropharynx is clear and moist. No oropharyngeal exudate.   Eyes: Conjunctivae are normal.   Neck: Normal range of motion. Neck supple.   Cardiovascular: Normal rate, regular rhythm, normal heart sounds and intact distal pulses.   Pulmonary/Chest: Effort normal and breath sounds normal. No stridor.   Abdominal: Soft. "   Musculoskeletal: Normal range of motion.   Lymphadenopathy:     She has no cervical adenopathy.   Neurological: She is alert and oriented to person, place, and time.   Skin: Skin is warm and dry.   Psychiatric: She has a normal mood and affect. Her behavior is normal. Judgment and thought content normal.   Vitals reviewed.    Personal Diagnostic Review  CPAP download  APAP 4-6 cm  Compliance Summary  11/19/2018 - 12/18/2018 (30 days)  Days with Device Usage 28 days  Days without Device Usage 2 days  Percent Days with Device Usage 93.3%  Cumulative Usage 5 days 9 hrs. 17 mins. 57 secs.  Maximum Usage (1 Day) 6 hrs. 41 mins. 14 secs.  Average Usage (All Days) 4 hrs. 18 mins. 35 secs.  Average Usage (Days Used) 4 hrs. 37 mins. 4 secs.  Minimum Usage (1 Day) 52 secs.  Percent of Days with Usage >= 4 Hours 73.3%  Percent of Days with Usage < 4 Hours 26.7%  Date Range  Total Blower Time 5 days 10 hrs. 11 mins. 49 secs.  Average AHI 5.4  Auto-CPAP Summary  Auto-CPAP Mean Pressure 4.8 cmH2O  Auto-CPAP Peak Average Pressure 5.6 cmH2O  Average Device Pressure <= 90% of Time 5.7 cmH2O  Average Time in Large Leak Per Day 2 mins. 41 secs.    Assessment:     1. MAYKEL on CPAP    2. Psychophysiological insomnia    3. Major depressive disorder, single episode, mild      Orders Placed This Encounter   Procedures    CPAP/BIPAP SUPPLIES     Benefits and compliant   Yearly supply order  Nasal wisp mask  90 day supply. 4 refills.  HME: [facility]     Order Specific Question:   Type of mask:     Answer:   Nasal     Order Specific Question:   Headgear?     Answer:   Yes     Order Specific Question:   Tubing?     Answer:   Yes     Order Specific Question:   Humidifier chamber?     Answer:   Yes     Order Specific Question:   Chin strap?     Answer:   Yes     Order Specific Question:   Filters?     Answer:   Yes     Order Specific Question:   Length of need (1-99 months):     Answer:   99     Plan:     Problem List Items Addressed This Visit      MAYEKL on CPAP - Primary (Chronic)     Benefits and compliant auto CPAP 4-6 cm  [place] 4  Average AHI 5.4  Auto-CPAP Summary  Auto-CPAP Mean Pressure 4.8 cmH2O  Auto-CPAP Peak Average Pressure 5.6 cmH2O  Average Device Pressure <= 90% of Time 5.7 cmH2O  Continue present settings auto CPAP 4-6 cm with Nasal wisp mask  Yearly supply order  HME: [facility]         Relevant Orders    CPAP/BIPAP SUPPLIES    Psychophysiological insomnia     Improved with turning off electronics and resuming cpap         Major depressive disorder, single episode, mild     Controlled with increased dose of Paxil 40 mg              (DME) - [facility]  Reviewed therapeutic goals for positive airway pressure therapy Auto CPAP  Ideal is usage 100% of nights for 6 - 8 hours per night. Minimum usage is 70% of night for at least 4 hours per night used.     Follow-up for CPAP 1 year compliance download.

## 2018-12-27 ENCOUNTER — TELEPHONE (OUTPATIENT)
Dept: NEUROLOGY | Facility: CLINIC | Age: 58
End: 2018-12-27

## 2018-12-27 NOTE — TELEPHONE ENCOUNTER
----- Message from Peter Randolph sent at 12/27/2018 10:54 AM CST -----  Contact: [employer] [facility] [employer] [facility] @ [phone]   Caller requesting a return call regarding the PA for the non formulary  (REBIF, )

## 2019-01-01 ENCOUNTER — PATIENT MESSAGE (OUTPATIENT)
Dept: NEUROLOGY | Facility: CLINIC | Age: 59
End: 2019-01-01

## 2019-01-02 NOTE — TELEPHONE ENCOUNTER
Called pt. She said she has a cold and thinks he may have been running fever yesterday. She said she is feeling much better and her balance has improved.

## 2019-01-03 ENCOUNTER — PATIENT MESSAGE (OUTPATIENT)
Dept: NEUROLOGY | Facility: CLINIC | Age: 59
End: 2019-01-03

## 2019-01-05 ENCOUNTER — HOSPITAL ENCOUNTER (EMERGENCY)
Facility: HOSPITAL | Age: 59
Discharge: HOME | End: 2019-01-05
Attending: EMERGENCY MEDICINE
Payer: MEDICARE

## 2019-01-05 VITALS
DIASTOLIC BLOOD PRESSURE: 81 MMHG | TEMPERATURE: 99 F | RESPIRATION RATE: 14 BRPM | OXYGEN SATURATION: 98 % | SYSTOLIC BLOOD PRESSURE: 126 MMHG | HEART RATE: 66 BPM

## 2019-01-05 DIAGNOSIS — N39.0 ACUTE LOWER UTI: Primary | ICD-10-CM

## 2019-01-05 DIAGNOSIS — R05.9 COUGH: ICD-10-CM

## 2019-01-05 LAB
ALBUMIN SERPL BCP-MCNC: 3.3 G/DL
ALP SERPL-CCNC: 66 U/L
ALT SERPL W/O P-5'-P-CCNC: 23 U/L
ANION GAP SERPL CALC-SCNC: 11 MMOL/L
AST SERPL-CCNC: 66 U/L
BACTERIA #/AREA URNS HPF: ABNORMAL /HPF
BASOPHILS # BLD AUTO: 0.02 K/UL
BASOPHILS NFR BLD: 0.3 %
BILIRUB SERPL-MCNC: 0.3 MG/DL
BILIRUB UR QL STRIP: NEGATIVE
BUN SERPL-MCNC: 17 MG/DL
CALCIUM SERPL-MCNC: 9.3 MG/DL
CHLORIDE SERPL-SCNC: 105 MMOL/L
CLARITY UR: CLEAR
CO2 SERPL-SCNC: 24 MMOL/L
COLOR UR: YELLOW
CREAT SERPL-MCNC: 1.1 MG/DL
DEPRECATED S PYO AG THROAT QL EIA: NEGATIVE
DIFFERENTIAL METHOD: ABNORMAL
EOSINOPHIL # BLD AUTO: 0.1 K/UL
EOSINOPHIL NFR BLD: 1.3 %
ERYTHROCYTE [DISTWIDTH] IN BLOOD BY AUTOMATED COUNT: 13.2 %
EST. GFR  (AFRICAN AMERICAN): >60 ML/MIN/1.73 M^2
EST. GFR  (NON AFRICAN AMERICAN): 56 ML/MIN/1.73 M^2
GLUCOSE SERPL-MCNC: 88 MG/DL
GLUCOSE UR QL STRIP: NEGATIVE
HCT VFR BLD AUTO: 37.5 %
HGB BLD-MCNC: 12 G/DL
HGB UR QL STRIP: NEGATIVE
INFLUENZA A, MOLECULAR: NEGATIVE
INFLUENZA B, MOLECULAR: NEGATIVE
KETONES UR QL STRIP: NEGATIVE
LEUKOCYTE ESTERASE UR QL STRIP: ABNORMAL
LYMPHOCYTES # BLD AUTO: 1.6 K/UL
LYMPHOCYTES NFR BLD: 20.5 %
MCH RBC QN AUTO: 31.5 PG
MCHC RBC AUTO-ENTMCNC: 32 G/DL
MCV RBC AUTO: 98 FL
MICROSCOPIC COMMENT: ABNORMAL
MONOCYTES # BLD AUTO: 1 K/UL
MONOCYTES NFR BLD: 12.8 %
NEUTROPHILS # BLD AUTO: 5 K/UL
NEUTROPHILS NFR BLD: 65.1 %
NITRITE UR QL STRIP: POSITIVE
PH UR STRIP: 6 [PH] (ref 5–8)
PLATELET # BLD AUTO: 216 K/UL
PMV BLD AUTO: 9.9 FL
POTASSIUM SERPL-SCNC: 3.8 MMOL/L
PROT SERPL-MCNC: 7.1 G/DL
PROT UR QL STRIP: NEGATIVE
RBC # BLD AUTO: 3.81 M/UL
RBC #/AREA URNS HPF: 0 /HPF (ref 0–4)
SODIUM SERPL-SCNC: 140 MMOL/L
SP GR UR STRIP: <=1.005 (ref 1–1.03)
SPECIMEN SOURCE: NORMAL
SQUAMOUS #/AREA URNS HPF: 0 /HPF
URN SPEC COLLECT METH UR: ABNORMAL
UROBILINOGEN UR STRIP-ACNC: NEGATIVE EU/DL
WBC # BLD AUTO: 7.65 K/UL
WBC #/AREA URNS HPF: 10 /HPF (ref 0–5)
WBC CLUMPS URNS QL MICRO: ABNORMAL

## 2019-01-05 PROCEDURE — 99284 EMERGENCY DEPT VISIT MOD MDM: CPT | Mod: 25,HCNC

## 2019-01-05 PROCEDURE — 87880 STREP A ASSAY W/OPTIC: CPT | Mod: HCNC

## 2019-01-05 PROCEDURE — 87502 INFLUENZA DNA AMP PROBE: CPT | Mod: HCNC

## 2019-01-05 PROCEDURE — 87081 CULTURE SCREEN ONLY: CPT | Mod: HCNC

## 2019-01-05 PROCEDURE — 81000 URINALYSIS NONAUTO W/SCOPE: CPT | Mod: HCNC

## 2019-01-05 PROCEDURE — 80053 COMPREHEN METABOLIC PANEL: CPT | Mod: HCNC

## 2019-01-05 PROCEDURE — 96365 THER/PROPH/DIAG IV INF INIT: CPT | Mod: HCNC

## 2019-01-05 PROCEDURE — 96361 HYDRATE IV INFUSION ADD-ON: CPT | Mod: HCNC

## 2019-01-05 PROCEDURE — 25000003 PHARM REV CODE 250: Mod: HCNC | Performed by: EMERGENCY MEDICINE

## 2019-01-05 PROCEDURE — 63600175 PHARM REV CODE 636 W HCPCS: Mod: HCNC | Performed by: EMERGENCY MEDICINE

## 2019-01-05 PROCEDURE — 85025 COMPLETE CBC W/AUTO DIFF WBC: CPT | Mod: HCNC

## 2019-01-05 RX ORDER — CEPHALEXIN 500 MG/1
500 CAPSULE ORAL 4 TIMES DAILY
Qty: 28 CAPSULE | Refills: 0 | Status: SHIPPED | OUTPATIENT
Start: 2019-01-05 | End: 2019-01-12

## 2019-01-05 RX ADMIN — SODIUM CHLORIDE 1000 ML: 0.9 INJECTION, SOLUTION INTRAVENOUS at 08:01

## 2019-01-05 RX ADMIN — CEFTRIAXONE 1 G: 1 INJECTION, SOLUTION INTRAVENOUS at 11:01

## 2019-01-05 NOTE — ED NOTES
Patient transported to restroom via wheelchair to collect urine specimen. Patient states she was unable to urinate because she was unable to focus.

## 2019-01-05 NOTE — ED PROVIDER NOTES
"SCRIBE #1 NOTE: I, Corinne Mack, am scribing for, and in the presence of, Juliano Callahan Jr., MD. I have scribed the entire note.      History      Chief Complaint   Patient presents with    Fever     weakness       Review of patient's allergies indicates:   Allergen Reactions    Latex, natural rubber Rash        HPI   HPI    1/5/2019, 8:13 AM   History obtained from the patient      History of Present Illness: Cem Meyers is a 58 y.o. female patient with PMHx of MS, osteoporosis, and sleep apnea who presents to the Emergency Department for subjective fever which onset gradually 3 days ago. Pt has not formally measured her temperature, but she states that she "feels hot", especially when she wakes up. Symptoms are intermittent and moderate in severity.  No sinus congestion nasal pain. Notes mild dizziness over the past 3 days when she moves around.  No chest pain.  No mitigating or exacerbating factors reported. Associated sxs include congestion, dizziness, and recent falls. Patient denies any chills, head injury, sore throat, SOB, N/V/D, abd pain, back pain, neck pain, HA, numbness, syncope, wounds, and all other sxs at this time. No prior Tx reported. No further complaints or concerns at this time.         Arrival mode: EMS    PCP: Adonis Stubbs MD       Past Medical History:  Past Medical History:   Diagnosis Date    Broken toe 6/2015    left big toe    Chronic diastolic heart failure 5/8/2018    Closed left arm fracture     Colon polyp     Depression     Hyperlipidemia     Hypothyroid     MS (multiple sclerosis)     Neurogenic bladder 12/6/2012    Osteoporosis     Polyneuropathy     Sleep apnea     Trouble in sleeping        Past Surgical History:  Past Surgical History:   Procedure Laterality Date    COLONOSCOPY N/A 9/1/2017    [facility]    COLONOSCOPY N/A 6/20/2014    [facility]    FRACTURE SURGERY Left " 2013    wrist- [facility]    KNEE SURGERY Right 1989    in [place]    TONSILLECTOMY  1966         Family History:  Family History   Problem Relation Age of Onset    Pancreatic cancer Mother     Stomach cancer Mother     Cancer Mother         pancreatic, stomach    Hypertension Father     Heart disease Father         MI    Lupus Maternal Aunt     Rheum arthritis Maternal Aunt     Rheum arthritis Sister     Melanoma Neg Hx        Social History:  Social History     Tobacco Use    Smoking status: Never Smoker    Smokeless tobacco: Never Used   Substance and Sexual Activity    Alcohol use: No     Alcohol/week: 0.0 oz    Drug use: No    Sexual activity: Not Currently       ROS   Review of Systems   Constitutional: Positive for fever (subjective). Negative for chills, diaphoresis and fatigue.   HENT: Positive for congestion. Negative for nosebleeds, rhinorrhea and tinnitus.         (-) head injury   Respiratory: Negative for cough and shortness of breath.    Cardiovascular: Negative for chest pain and leg swelling.   Gastrointestinal: Negative for abdominal pain, diarrhea, nausea and vomiting.   Musculoskeletal: Negative for back pain, neck pain and neck stiffness.        (+) falls   Skin: Negative for rash and wound.   Neurological: Positive for dizziness. Negative for syncope, weakness, light-headedness, numbness and headaches.   All other systems reviewed and are negative.    Physical Exam      Initial Vitals [01/05/19 0755]   BP Pulse Resp Temp SpO2   133/80 95 18 98.8 °F (37.1 °C) 95 %      MAP       --          Physical Exam  Nursing Notes and Vital Signs Reviewed.  Constitutional: Patient is in no acute distress. Well-developed and well-nourished.  Head: Atraumatic. Normocephalic.  Eyes: PERRL. EOM intact. Conjunctivae are not pale. No scleral icterus. No nystagmus.  ENT: Mucous membranes are moist. Oropharynx is clear and symmetric.  mild nasal congestion.  Mild sinus tenderness bilateral maxillary sinuses.   No frontal sinus tenderness.  Neck: Supple; no nuchal rigidity. Full ROM. No lymphadenopathy. No meningismus.  Cardiovascular: Regular rate. Regular rhythm. No murmurs, rubs, or gallops. Distal pulses are 2+ and symmetric.  Pulmonary/Chest: No respiratory distress. Clear to auscultation bilaterally. No wheezing or rales.  Abdominal: Soft and non-distended.  There is no tenderness.  No rebound, guarding, or rigidity.   Musculoskeletal: Moves all extremities. No obvious deformities. No edema. 5 x 5 strength in all extremities.  Skin: Warm and dry. No cellulitis.  Neurological: Patient is alert and oriented to person, place and time. Pupils ERRL and EOM normal. Cranial nerves II-XII are intact. Speech is clear and normal. No acute focal neurological deficits noted. 2 through 12 intact bilaterally.  GCS of 15.  Psychiatric: Normal affect. Good eye contact. Appropriate in content.    ED Course    Procedures  ED Vital Signs:  Vitals:    01/05/19 0755 01/05/19 0817 01/05/19 0828 01/05/19 0830   BP: 133/80 133/85 119/80 116/79   Pulse: 95 84 78 87   Resp: 18 18 15 17   Temp: 98.8 °F (37.1 °C)      TempSrc: Oral      SpO2: 95% 97% 95% 95%    01/05/19 0831 01/05/19 0901 01/05/19 0917 01/05/19 0932   BP: 117/78 126/89 123/79 120/81   Pulse: 91 80 72 73   Resp: 18 15 20 15   Temp:       TempSrc:       SpO2: (!) 94% 99% 100% 100%    01/05/19 0947 01/05/19 1009 01/05/19 1018 01/05/19 1033   BP: 129/78 125/81 129/77 123/75   Pulse: 70 78 67 68   Resp: 16 20 14 20   Temp:       TempSrc:       SpO2: 100% 98% 99% 100%       Abnormal Lab Results:  Labs Reviewed   CBC W/ AUTO DIFFERENTIAL - Abnormal; Notable for the following components:       Result Value    RBC 3.81 (*)     MCH 31.5 (*)     All other components within normal limits   COMPREHENSIVE METABOLIC PANEL - Abnormal; Notable for the following components:    Albumin 3.3 (*)     AST 66 (*)     eGFR if non  56 (*)     All other components within normal limits    URINALYSIS, REFLEX TO URINE CULTURE - Abnormal; Notable for the following components:    Specific Gravity, UA <=1.005 (*)     Nitrite, UA Positive (*)     Leukocytes, UA 1+ (*)     All other components within normal limits    Narrative:     Preferred Collection Type->Urine, Clean Catch   URINALYSIS MICROSCOPIC - Abnormal; Notable for the following components:    WBC, UA 10 (*)     WBC Clumps, UA Occasional (*)     Bacteria, UA Many (*)     All other components within normal limits    Narrative:     Preferred Collection Type->Urine, Clean Catch   INFLUENZA A & B BY MOLECULAR   THROAT SCREEN, RAPID   CULTURE, STREP A,  THROAT        All Lab Results:  Results for orders placed or performed during the hospital encounter of 01/05/19   Influenza A & B by Molecular   Result Value Ref Range    Influenza A, Molecular Negative Negative    Influenza B, Molecular Negative Negative    Flu A & B Source NP    Rapid strep screen   Result Value Ref Range    Rapid Strep A Screen Negative Negative   CBC auto differential   Result Value Ref Range    WBC 7.65 3.90 - 12.70 K/uL    RBC 3.81 (L) 4.00 - 5.40 M/uL    Hemoglobin 12.0 12.0 - 16.0 g/dL    Hematocrit 37.5 37.0 - 48.5 %    MCV 98 82 - 98 fL    MCH 31.5 (H) 27.0 - 31.0 pg    MCHC 32.0 32.0 - 36.0 g/dL    RDW 13.2 11.5 - 14.5 %    Platelets 216 150 - 350 K/uL    MPV 9.9 9.2 - 12.9 fL    Gran # (ANC) 5.0 1.8 - 7.7 K/uL    Lymph # 1.6 1.0 - 4.8 K/uL    Mono # 1.0 0.3 - 1.0 K/uL    Eos # 0.1 0.0 - 0.5 K/uL    Baso # 0.02 0.00 - 0.20 K/uL    Gran% 65.1 38.0 - 73.0 %    Lymph% 20.5 18.0 - 48.0 %    Mono% 12.8 4.0 - 15.0 %    Eosinophil% 1.3 0.0 - 8.0 %    Basophil% 0.3 0.0 - 1.9 %    Differential Method Automated    Comprehensive metabolic panel   Result Value Ref Range    Sodium 140 136 - 145 mmol/L    Potassium 3.8 3.5 - 5.1 mmol/L    Chloride 105 95 - 110 mmol/L    CO2 24 23 - 29 mmol/L    Glucose 88 70 - 110 mg/dL    BUN, Bld 17 6 - 20 mg/dL    Creatinine 1.1 0.5 - 1.4 mg/dL     Calcium 9.3 8.7 - 10.5 mg/dL    Total Protein 7.1 6.0 - 8.4 g/dL    Albumin 3.3 (L) 3.5 - 5.2 g/dL    Total Bilirubin 0.3 0.1 - 1.0 mg/dL    Alkaline Phosphatase 66 55 - 135 U/L    AST 66 (H) 10 - 40 U/L    ALT 23 10 - 44 U/L    Anion Gap 11 8 - 16 mmol/L    eGFR if African American >60 >60 mL/min/1.73 m^2    eGFR if non African American 56 (A) >60 mL/min/1.73 m^2   Urinalysis, Reflex to Urine Culture Urine, Clean Catch   Result Value Ref Range    Specimen UA Urine, Clean Catch     Color, UA Yellow Yellow, Straw, Karoline    Appearance, UA Clear Clear    pH, UA 6.0 5.0 - 8.0    Specific Gravity, UA <=1.005 (A) 1.005 - 1.030    Protein, UA Negative Negative    Glucose, UA Negative Negative    Ketones, UA Negative Negative    Bilirubin (UA) Negative Negative    Occult Blood UA Negative Negative    Nitrite, UA Positive (A) Negative    Urobilinogen, UA Negative <2.0 EU/dL    Leukocytes, UA 1+ (A) Negative   Urinalysis Microscopic   Result Value Ref Range    RBC, UA 0 0 - 4 /hpf    WBC, UA 10 (H) 0 - 5 /hpf    WBC Clumps, UA Occasional (A) None-Rare    Bacteria, UA Many (A) None-Occ /hpf    Squam Epithel, UA 0 /hpf    Microscopic Comment SEE COMMENT        Imaging Results:  Imaging Results          X-Ray Chest PA And Lateral (Final result)  Result time 01/05/19 09:12:02    Final result by Ye Sam MD (01/05/19 09:12:02)                 Impression:      No acute findings.      Electronically signed by: Ye Sam MD  Date:    01/05/2019  Time:    09:12             Narrative:    EXAMINATION:  XR CHEST PA AND LATERAL    CLINICAL HISTORY  Cough and congestion,    COMPARISON:  10/25/2018    FINDINGS:  The heart size is normal.  The lung fields are clear.  No acute cardiopulmonary infiltrative.                                      The Emergency Provider reviewed the vital signs and test results, which are outlined above.    ED Discussion     10:53 AM: Reassessed pt at this time. Pt is awake, alert, and in no  distress. Discussed with pt all pertinent ED information and results. Discussed pt dx and plan of tx. Gave pt all f/u and return to the ED instructions. All questions and concerns were addressed at this time. Pt expresses understanding of information and instructions, and is comfortable with plan to discharge. Pt is stable for discharge.    I discussed with patient and/or family/caretaker that evaluation in the ED does not suggest any emergent or life threatening medical conditions requiring immediate intervention beyond what was provided in the ED, and I believe patient is safe for discharge.  Regardless, an unremarkable evaluation in the ED does not preclude the development or presence of a serious of life threatening condition. As such, patient was instructed to return immediately for any worsening or change in current symptoms.    10:55 AM  The patient is stable nontoxic.  She has positive UA for urinary tract infection.  This likely the cause of her generalized weakness.  She is not toxic.  She is stable. She is not septic on exam.  She has very good follow-up.  I discussed all findings with the patient and family.  They verbalized understanding agreement will follow up primary care on Monday for re-evaluation.  They will Return if her symptoms worsen in any way.    ED Medication(s):  Medications   cefTRIAXone (ROCEPHIN) 1 g in dextrose 5 % 50 mL IVPB (not administered)   sodium chloride 0.9% bolus 1,000 mL (0 mLs Intravenous Stopped 1/5/19 1005)          Medication List      START taking these medications    cephALEXin 500 MG capsule  Commonly known as:  KEFLEX  Take 1 capsule (500 mg total) by mouth 4 (four) times daily. for 7 days        ASK your doctor about these medications    baclofen 10 MG tablet  Commonly known as:  LIORESAL  Take one tablet(10mg) in AM, one tablet in the afternoon(10mg), and up to 3 tablets HS(30mg) as needed.     calcium citrate-vitamin D2 1,500-200 mg-unit Tab     fish oil-omega-3  fatty acids 300-1,000 mg capsule     * gabapentin 800 MG tablet  Commonly known as:  NEURONTIN  Take 1 tablet (800 mg total) by mouth 3 (three) times daily.     * gabapentin 100 MG capsule  Commonly known as:  NEURONTIN  Take 1-2 tablets PO HS     levothyroxine 75 MCG tablet  Commonly known as:  SYNTHROID  TAKE 1 TABLET BY MOUTH IN THE MORNING     multivitamin capsule     paroxetine 40 MG tablet  Commonly known as:  PAXIL  TAKE 1 TABLET BY MOUTH ONCE DAILY IN THE MORNING     REBIF (WITH ALBUMIN) 44 mcg/0.5 mL injection  Generic drug:  interferon beta-1a (albumin)  INJECT 44MCG (1 PREFILLED SYRINGE) SUBCUTANEOUSLY THREE TIMES WEEKLY.     simvastatin 40 MG tablet  Commonly known as:  ZOCOR  Take 1 tablet (40 mg total) by mouth once daily.     VITAMIN D3 4,000 unit Cap  Generic drug:  cholecalciferol (vitamin D3)         * This list has 2 medication(s) that are the same as other medications prescribed for you. Read the directions carefully, and ask your doctor or other care provider to review them with you.               Where to Get Your Medications      You can get these medications from any pharmacy    Bring a paper prescription for each of these medications  · cephALEXin 500 MG capsule         Follow-up Information     Adonis Stubbs MD In 2 days.    Specialty:  Family Medicine  Contact information:  [address] [facility] [address] [phone]                     Medical Decision Making    Medical Decision Making:   Clinical Tests:   Lab Tests: Ordered and Reviewed  Radiological Study: Ordered and Reviewed           Scribe Attestation:   Scribe #1: I performed the above scribed service and the documentation accurately describes the services I performed. I attest to the accuracy of the note 01/05/2019 10:54 AM.    Attending:   Physician Attestation Statement for Scribe #1: I, Juliano Callaahn Jr., MD, personally performed the services described in this documentation, as scribed by Corinne Mack, in my  presence, and it is both accurate and complete.          Clinical Impression       ICD-10-CM ICD-9-CM   1. Acute lower UTI N39.0 599.0   2. Cough R05 786.2       Disposition:   Disposition: Discharged  Condition: Stable           Juliano Callahan Jr., MD  01/05/19 1055

## 2019-01-05 NOTE — DISCHARGE INSTRUCTIONS
Keflex as prescribed for infection.  Cultures pending on her urine.  Drink plenty of fluids.  Motrin Tylenol for fever and body aches.  Return as needed for any worsening symptoms, problems, questions or concerns.

## 2019-01-07 LAB — BACTERIA THROAT CULT: NORMAL

## 2019-01-08 ENCOUNTER — OFFICE VISIT (OUTPATIENT)
Dept: INTERNAL MEDICINE | Facility: CLINIC | Age: 59
End: 2019-01-08
Payer: MEDICARE

## 2019-01-08 VITALS
DIASTOLIC BLOOD PRESSURE: 80 MMHG | HEIGHT: 63 IN | SYSTOLIC BLOOD PRESSURE: 102 MMHG | WEIGHT: 158.94 LBS | OXYGEN SATURATION: 98 % | TEMPERATURE: 97 F | HEART RATE: 81 BPM | BODY MASS INDEX: 28.16 KG/M2

## 2019-01-08 DIAGNOSIS — N39.0 URINARY TRACT INFECTION WITHOUT HEMATURIA, SITE UNSPECIFIED: ICD-10-CM

## 2019-01-08 DIAGNOSIS — Z09 HOSPITAL DISCHARGE FOLLOW-UP: Primary | ICD-10-CM

## 2019-01-08 PROCEDURE — 99999 PR PBB SHADOW E&M-EST. PATIENT-LVL V: CPT | Mod: PBBFAC,HCNC,, | Performed by: NURSE PRACTITIONER

## 2019-01-08 PROCEDURE — 99213 OFFICE O/P EST LOW 20 MIN: CPT | Mod: HCNC,S$GLB,, | Performed by: NURSE PRACTITIONER

## 2019-01-08 PROCEDURE — 99213 PR OFFICE/OUTPT VISIT, EST, LEVL III, 20-29 MIN: ICD-10-PCS | Mod: HCNC,S$GLB,, | Performed by: NURSE PRACTITIONER

## 2019-01-08 PROCEDURE — 99999 PR PBB SHADOW E&M-EST. PATIENT-LVL V: ICD-10-PCS | Mod: PBBFAC,HCNC,, | Performed by: NURSE PRACTITIONER

## 2019-01-08 PROCEDURE — 3008F PR BODY MASS INDEX (BMI) DOCUMENTED: ICD-10-PCS | Mod: CPTII,HCNC,S$GLB, | Performed by: NURSE PRACTITIONER

## 2019-01-08 PROCEDURE — 3008F BODY MASS INDEX DOCD: CPT | Mod: CPTII,HCNC,S$GLB, | Performed by: NURSE PRACTITIONER

## 2019-01-08 NOTE — PROGRESS NOTES
Subjective:       Patient ID: Cem Meyers is a 58 y.o. female.    Chief Complaint: Follow-up (UTI)    Patient presents for a hospital follow up with UTI.  Taking Keflex.  Feeling a little better but still having some urinary symptoms.   MS symptoms has improved.        Dysuria    This is a new problem. The current episode started yesterday. The problem occurs intermittently. The problem has been rapidly improving. The quality of the pain is described as aching. The pain is at a severity of 0/10. The patient is experiencing no pain. The maximum temperature recorded prior to her arrival was 100 - 100.9 F. The fever has been present for less than 1 day. She is not sexually active. There is no history of pyelonephritis. Associated symptoms include behavior changes, frequency, hesitancy and urgency. Pertinent negatives include no chills, discharge, flank pain, hematuria, nausea, possible pregnancy, sweats, vomiting, weight loss, constipation, rash or withholding. She has tried antibiotics for the symptoms. The treatment provided significant relief. There is no history of catheterization, diabetes insipidus, diabetes mellitus, genitourinary reflux, hypertension, kidney stones, recurrent UTIs, a single kidney, STD, urinary stasis or a urological procedure.     Review of Systems   Constitutional: Negative for chills and weight loss.   Gastrointestinal: Negative for constipation, nausea and vomiting.   Genitourinary: Positive for dysuria, frequency, hesitancy and urgency. Negative for flank pain and hematuria.   Skin: Negative for rash.       Objective:      Physical Exam   Constitutional: She is oriented to person, place, and time. Vital signs are normal. She appears well-developed and well-nourished.   HENT:   Head: Normocephalic and atraumatic.   Neck: Normal range of motion.   Cardiovascular: Normal rate and regular rhythm.   Pulmonary/Chest: Effort normal and breath sounds normal.   Musculoskeletal: Normal range  of motion. She exhibits no tenderness.   Neurological: She is alert and oriented to person, place, and time.   Skin: Skin is warm.   Psychiatric: She has a normal mood and affect. Her behavior is normal.       Assessment:       1. Hospital discharge follow-up    2. Urinary tract infection without hematuria, site unspecified        Plan:         Hospital discharge follow-up    Urinary tract infection without hematuria, site unspecified  -     Urinalysis; Future; Expected date: 01/15/2019      Recheck urine next after completion of antibiotic.  Continue medications. Follow up if symptoms change.

## 2019-01-12 DIAGNOSIS — G35 MULTIPLE SCLEROSIS: ICD-10-CM

## 2019-01-15 ENCOUNTER — LAB VISIT (OUTPATIENT)
Dept: LAB | Facility: HOSPITAL | Age: 59
End: 2019-01-15
Attending: NURSE PRACTITIONER
Payer: MEDICARE

## 2019-01-15 DIAGNOSIS — N39.0 URINARY TRACT INFECTION WITHOUT HEMATURIA, SITE UNSPECIFIED: ICD-10-CM

## 2019-01-15 LAB
BILIRUB UR QL STRIP: NEGATIVE
CLARITY UR: CLEAR
COLOR UR: YELLOW
GLUCOSE UR QL STRIP: NEGATIVE
HGB UR QL STRIP: NEGATIVE
KETONES UR QL STRIP: NEGATIVE
LEUKOCYTE ESTERASE UR QL STRIP: NEGATIVE
NITRITE UR QL STRIP: NEGATIVE
PH UR STRIP: 7 [PH] (ref 5–8)
PROT UR QL STRIP: NEGATIVE
SP GR UR STRIP: <=1.005 (ref 1–1.03)
URN SPEC COLLECT METH UR: NORMAL

## 2019-01-15 PROCEDURE — 81003 URINALYSIS AUTO W/O SCOPE: CPT | Mod: HCNC

## 2019-01-21 RX ORDER — INTERFERON BETA-1A 44 UG/.5ML
INJECTION, SOLUTION SUBCUTANEOUS
Qty: 6 ML | Refills: 4 | Status: SHIPPED | OUTPATIENT
Start: 2019-01-21 | End: 2019-03-18

## 2019-01-22 ENCOUNTER — OFFICE VISIT (OUTPATIENT)
Dept: NEUROLOGY | Facility: CLINIC | Age: 59
End: 2019-01-22
Payer: MEDICARE

## 2019-01-22 VITALS
BODY MASS INDEX: 27.73 KG/M2 | WEIGHT: 156.5 LBS | DIASTOLIC BLOOD PRESSURE: 99 MMHG | HEIGHT: 63 IN | HEART RATE: 89 BPM | SYSTOLIC BLOOD PRESSURE: 137 MMHG

## 2019-01-22 DIAGNOSIS — R26.9 GAIT DISTURBANCE: ICD-10-CM

## 2019-01-22 DIAGNOSIS — F32.A DEPRESSION, UNSPECIFIED DEPRESSION TYPE: Primary | ICD-10-CM

## 2019-01-22 DIAGNOSIS — G35 MS (MULTIPLE SCLEROSIS): ICD-10-CM

## 2019-01-22 DIAGNOSIS — R26.9 NEUROLOGIC GAIT DYSFUNCTION: ICD-10-CM

## 2019-01-22 DIAGNOSIS — D84.9 IMMUNOSUPPRESSION: ICD-10-CM

## 2019-01-22 DIAGNOSIS — Z71.89 COUNSELING REGARDING GOALS OF CARE: ICD-10-CM

## 2019-01-22 DIAGNOSIS — R25.2 SPASTICITY: ICD-10-CM

## 2019-01-22 DIAGNOSIS — Z11.4 ENCOUNTER FOR SCREENING FOR HIV: ICD-10-CM

## 2019-01-22 PROCEDURE — 3008F BODY MASS INDEX DOCD: CPT | Mod: HCNC,CPTII,S$GLB, | Performed by: PSYCHIATRY & NEUROLOGY

## 2019-01-22 PROCEDURE — 99215 PR OFFICE/OUTPT VISIT, EST, LEVL V, 40-54 MIN: ICD-10-PCS | Mod: HCNC,S$GLB,, | Performed by: PSYCHIATRY & NEUROLOGY

## 2019-01-22 PROCEDURE — 99499 RISK ADDL DX/OHS AUDIT: ICD-10-PCS | Mod: HCNC,S$GLB,, | Performed by: PSYCHIATRY & NEUROLOGY

## 2019-01-22 PROCEDURE — 3008F PR BODY MASS INDEX (BMI) DOCUMENTED: ICD-10-PCS | Mod: HCNC,CPTII,S$GLB, | Performed by: PSYCHIATRY & NEUROLOGY

## 2019-01-22 PROCEDURE — 99999 PR PBB SHADOW E&M-EST. PATIENT-LVL IV: ICD-10-PCS | Mod: PBBFAC,HCNC,, | Performed by: PSYCHIATRY & NEUROLOGY

## 2019-01-22 PROCEDURE — 99499 UNLISTED E&M SERVICE: CPT | Mod: HCNC,S$GLB,, | Performed by: PSYCHIATRY & NEUROLOGY

## 2019-01-22 PROCEDURE — 99999 PR PBB SHADOW E&M-EST. PATIENT-LVL IV: CPT | Mod: PBBFAC,HCNC,, | Performed by: PSYCHIATRY & NEUROLOGY

## 2019-01-22 PROCEDURE — 99215 OFFICE O/P EST HI 40 MIN: CPT | Mod: HCNC,S$GLB,, | Performed by: PSYCHIATRY & NEUROLOGY

## 2019-01-22 RX ORDER — ARIPIPRAZOLE 5 MG/1
5 TABLET ORAL DAILY
Qty: 30 TABLET | Refills: 11 | Status: SHIPPED | OUTPATIENT
Start: 2019-01-22 | End: 2019-02-05

## 2019-01-24 ENCOUNTER — PATIENT MESSAGE (OUTPATIENT)
Dept: NEUROLOGY | Facility: CLINIC | Age: 59
End: 2019-01-24

## 2019-01-25 ENCOUNTER — PATIENT MESSAGE (OUTPATIENT)
Dept: NEUROLOGY | Facility: CLINIC | Age: 59
End: 2019-01-25

## 2019-01-25 ENCOUNTER — LAB VISIT (OUTPATIENT)
Dept: LAB | Facility: HOSPITAL | Age: 59
End: 2019-01-25
Attending: PSYCHIATRY & NEUROLOGY
Payer: MEDICARE

## 2019-01-25 DIAGNOSIS — D84.9 IMMUNOSUPPRESSION: ICD-10-CM

## 2019-01-25 DIAGNOSIS — G35 MS (MULTIPLE SCLEROSIS): ICD-10-CM

## 2019-01-25 PROCEDURE — 36415 COLL VENOUS BLD VENIPUNCTURE: CPT | Mod: HCNC

## 2019-01-25 PROCEDURE — 86480 TB TEST CELL IMMUN MEASURE: CPT | Mod: HCNC

## 2019-01-28 PROBLEM — F32.A DEPRESSION: Status: ACTIVE | Noted: 2019-01-28

## 2019-01-28 PROBLEM — D84.9 IMMUNOSUPPRESSION: Status: ACTIVE | Noted: 2019-01-28

## 2019-01-29 LAB
M TB IFN-G CD4+ BCKGRND COR BLD-ACNC: 0 IU/ML
MITOGEN IGNF BCKGRD COR BLD-ACNC: >10 IU/ML
MITOGEN IGNF BCKGRD COR BLD-ACNC: NEGATIVE [IU]/ML
NIL: 0.03 IU/ML
TB2 - NIL: 0 IU/ML

## 2019-01-31 ENCOUNTER — TELEPHONE (OUTPATIENT)
Dept: NEUROLOGY | Facility: CLINIC | Age: 59
End: 2019-01-31

## 2019-01-31 DIAGNOSIS — G35 MULTIPLE SCLEROSIS: Primary | ICD-10-CM

## 2019-02-01 NOTE — TELEPHONE ENCOUNTER
----- Message from Nancy Martínez RN sent at 1/25/2019  3:34 PM CST -----      ----- Message -----  From: Rebeka Smith MD  Sent: 1/22/2019   4:18 PM  To: Sarah ARCINIEGA Staff    Team, transitioning to Ocrevus: referring to ID; labs this week; would like to schedule in [place];

## 2019-02-05 ENCOUNTER — OFFICE VISIT (OUTPATIENT)
Dept: INFECTIOUS DISEASES | Facility: CLINIC | Age: 59
End: 2019-02-05
Payer: MEDICARE

## 2019-02-05 ENCOUNTER — CLINICAL SUPPORT (OUTPATIENT)
Dept: INFECTIOUS DISEASES | Facility: CLINIC | Age: 59
End: 2019-02-05
Payer: MEDICARE

## 2019-02-05 VITALS
DIASTOLIC BLOOD PRESSURE: 80 MMHG | SYSTOLIC BLOOD PRESSURE: 111 MMHG | HEART RATE: 75 BPM | BODY MASS INDEX: 29.06 KG/M2 | TEMPERATURE: 99 F | HEIGHT: 63 IN | WEIGHT: 164 LBS

## 2019-02-05 DIAGNOSIS — G35 MS (MULTIPLE SCLEROSIS): Chronic | ICD-10-CM

## 2019-02-05 DIAGNOSIS — Z23 NEED FOR HEPATITIS B VACCINATION: ICD-10-CM

## 2019-02-05 DIAGNOSIS — D84.9 IMMUNOCOMPROMISED: ICD-10-CM

## 2019-02-05 DIAGNOSIS — Z23 NEED FOR VACCINATION FOR PNEUMOCOCCUS: ICD-10-CM

## 2019-02-05 DIAGNOSIS — Z71.85 VACCINE COUNSELING: ICD-10-CM

## 2019-02-05 DIAGNOSIS — G35 MS (MULTIPLE SCLEROSIS): Primary | Chronic | ICD-10-CM

## 2019-02-05 PROCEDURE — 90670 PNEUMOCOCCAL CONJUGATE VACCINE 13-VALENT LESS THAN 5YO & GREATER THAN: ICD-10-PCS | Mod: HCNC,S$GLB,, | Performed by: INTERNAL MEDICINE

## 2019-02-05 PROCEDURE — 3008F PR BODY MASS INDEX (BMI) DOCUMENTED: ICD-10-PCS | Mod: HCNC,CPTII,S$GLB, | Performed by: INTERNAL MEDICINE

## 2019-02-05 PROCEDURE — 99499 RISK ADDL DX/OHS AUDIT: ICD-10-PCS | Mod: HCNC,S$GLB,, | Performed by: INTERNAL MEDICINE

## 2019-02-05 PROCEDURE — 99204 PR OFFICE/OUTPT VISIT, NEW, LEVL IV, 45-59 MIN: ICD-10-PCS | Mod: HCNC,25,S$GLB, | Performed by: INTERNAL MEDICINE

## 2019-02-05 PROCEDURE — 99999 PR PBB SHADOW E&M-EST. PATIENT-LVL III: CPT | Mod: PBBFAC,HCNC,, | Performed by: INTERNAL MEDICINE

## 2019-02-05 PROCEDURE — 99204 OFFICE O/P NEW MOD 45 MIN: CPT | Mod: HCNC,25,S$GLB, | Performed by: INTERNAL MEDICINE

## 2019-02-05 PROCEDURE — 3008F BODY MASS INDEX DOCD: CPT | Mod: HCNC,CPTII,S$GLB, | Performed by: INTERNAL MEDICINE

## 2019-02-05 PROCEDURE — 90739 HEPATITIS B (RECOMBINANT) ADJUVANTED, 2 DOSE: ICD-10-PCS | Mod: HCNC,S$GLB,, | Performed by: INTERNAL MEDICINE

## 2019-02-05 PROCEDURE — 99499 UNLISTED E&M SERVICE: CPT | Mod: HCNC,S$GLB,, | Performed by: INTERNAL MEDICINE

## 2019-02-05 PROCEDURE — 99999 PR PBB SHADOW E&M-EST. PATIENT-LVL III: ICD-10-PCS | Mod: PBBFAC,HCNC,, | Performed by: INTERNAL MEDICINE

## 2019-02-05 PROCEDURE — 99999 PR PBB SHADOW E&M-EST. PATIENT-LVL I: ICD-10-PCS | Mod: PBBFAC,HCNC,,

## 2019-02-05 PROCEDURE — G0010 PR ADMIN HEPATITIS B VACCINE: ICD-10-PCS | Mod: HCNC,S$GLB,, | Performed by: INTERNAL MEDICINE

## 2019-02-05 PROCEDURE — G0009 ADMIN PNEUMOCOCCAL VACCINE: HCPCS | Mod: HCNC,S$GLB,, | Performed by: INTERNAL MEDICINE

## 2019-02-05 PROCEDURE — 90670 PCV13 VACCINE IM: CPT | Mod: HCNC,S$GLB,, | Performed by: INTERNAL MEDICINE

## 2019-02-05 PROCEDURE — 90739 HEPB VACC 2/4 DOSE ADULT IM: CPT | Mod: HCNC,S$GLB,, | Performed by: INTERNAL MEDICINE

## 2019-02-05 PROCEDURE — 99999 PR PBB SHADOW E&M-EST. PATIENT-LVL I: CPT | Mod: PBBFAC,HCNC,,

## 2019-02-05 PROCEDURE — G0010 ADMIN HEPATITIS B VACCINE: HCPCS | Mod: HCNC,S$GLB,, | Performed by: INTERNAL MEDICINE

## 2019-02-05 PROCEDURE — G0009 PNEUMOCOCCAL CONJUGATE VACCINE 13-VALENT LESS THAN 5YO & GREATER THAN: ICD-10-PCS | Mod: HCNC,S$GLB,, | Performed by: INTERNAL MEDICINE

## 2019-02-05 NOTE — PROGRESS NOTES
Pre Biologic Response Modifier Therapy Consult  BMR Recipient Evaluation    Requesting Physician: Dr. Smith    Reason for Visit: Vaccine counseling    History of Present Illness  58 y.o. female with advanced Multiple Sclerosis presents for vaccine counseling.  Patient has been on interferon beta with continued decline in her functional status.  She is planning on starting ocrelizumab    Patient denies any recent fever, chills, or infective infective illnesses.    1) Do you have a history of:         YES NO   Diabetes   []        [x]     Autoimmune disease  [x]        []   Cancer              []        [x]   Surgical Removal of Spleen []        [x]       2) Have you had recurrent infections:             YES NO  Sinus infections  [x]        []   Lung infections  []        [x]              Urinary Tract Infections []        [x]                                              Intestinal Infections  []        [x]      Skin Infections   []        [x]       Musculoskeletal Infections    []        [x]   Reproductive Infections []        [x]   Periodontal Disease  []        [x]        3)Have you ever had: YES     NO       Chicken Pox   [x]         []          Shingles   []         [x]            Orolabial Herpes             []         [x]          Genital Herpes  []         [x]           Genital Warts   [x]         []             Cytomegalovirus  []         [x]          Dar-Barr Virus  []         [x]            Hepatitis A   []         [x]          Hepatitis B   []         [x]          Hepatitis C   []         [x]            Syphilis   []         [x]          Gonorrhea   []         [x]         Chlamydia    []         [x]           Parasites / worms  []         [x]         Fungal Infections  []         [x]         Bloodstream Infections []         [x]             4) Tuberculosis             YES NO  Exposure to person with active TB?  []         [x]   Have you ever had a positive PPD?      []         [x]   If yes, what  treatment did you receive:     5) Travel    What states have you lived in?  [place], [place], [place]     What countries have you visited for more than 2 weeks?       [place], [place], [place], [place], [place], [place]                            YES     NO  Did you have any associated infections?   []         [x]     Are you planning to travel outside of [place]?    [x]          []     6) Animal Exposure                  YES NO  Do you have pets living in your house?   [x]         []   If yes, describe: One dog and 2 cats    Do you spend time or live on a farm?    []         [x]   If yes, which ones:    Do you have a fish tank?         []  [x]    Do you have a litter box?     [x]         []     Do you fish or hunt?      []         [x]   Do you clean or skin fish or animals?    []         [x]     Consume raw or undercooked meat, fish, shellfish?  []         [x]       7) What occupations have you had?  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   []         [x]   Do you hike, camp, or spend time in wooded areas?  []         [x]       9) The patient's immunization history was reviewed.     Have you ever received:  YES NO DATES  Routine Childhood vaccines  [x]         []       Influenza vaccine   [x]         []     Prevnar    []         [x]     Pneumovax    []         [x]     Tetanus-diptheria -pertussis  [x]         []     Hepatitis A vaccine series       []         [x]     Hepatitis B vaccine series         []         [x]       Zoster vaccine    []         [x]            Review of Systems   Constitution: Negative for chills, decreased appetite, fever, weakness, malaise/fatigue, night sweats, weight gain and weight loss.   HENT: Negative for congestion, ear pain, hearing loss, hoarse voice, sore throat and tinnitus.    Eyes: Negative for blurred vision, redness and visual disturbance.   Cardiovascular: Negative for chest  pain, leg swelling and palpitations.   Respiratory: Negative for cough, hemoptysis, shortness of breath and sputum production.    Hematologic/Lymphatic: Negative for adenopathy. Does not bruise/bleed easily.   Skin: Negative for dry skin, itching, rash and suspicious lesions.   Musculoskeletal: Positive for neck pain. Negative for back pain, joint pain and myalgias.   Gastrointestinal: Negative for abdominal pain, constipation, diarrhea, heartburn, nausea and vomiting.   Genitourinary: Positive for hesitancy. Negative for dysuria, flank pain, frequency, hematuria and urgency.   Neurological: Positive for dizziness, numbness and paresthesias. Negative for headaches.   Psychiatric/Behavioral: Positive for memory loss. Negative for depression. The patient does not have insomnia and is not nervous/anxious.      Objective:   Physical Exam   Constitutional: She is oriented to person, place, and time. She appears well-developed and well-nourished. No distress.   HENT:   Head: Normocephalic and atraumatic.   Eyes: Conjunctivae and EOM are normal.   Neck: Normal range of motion. Neck supple.   Pulmonary/Chest: Effort normal. No respiratory distress.   Abdominal: Soft. She exhibits no distension.   Musculoskeletal: Normal range of motion. She exhibits no edema.   Walks with cane   Neurological: She is alert and oriented to person, place, and time.   Skin: Skin is warm and dry. No rash noted. She is not diaphoretic. No erythema.   Psychiatric: She has a normal mood and affect. Her behavior is normal.      Significant labs reviewed:  HepA Ab neg  HepBs Ab neg  HepBs Ag neg  HepBc Ab neg  HepC Ab neg    HIV neg  RPR neg  Quant gold neg    Assessment:   58-year-old female  - MS, on interferon beta, plans to start ocrevus  - Vaccine counseling - need for pneumonia, hepatitis b, shingles vaccination    Plan:   Counseling:  - I discussed with the patient the risk for increased susceptibility to infections following BRM therapy  including increased risk for infection.    - Specific guidance has been provided to the patient regarding the patients occupation, hobbies and activities to avoid future infectious complications including but not limited to avoiding undercooked meats and seafood, proper hygiene, and contact with animals.  - The patients has been counseled on the importance of vaccinations including but not limited to a yearly flu vaccine.    Immunizations:  Based on the patients immunization history and serologies, immunizations were ordered:  - Prevnar  - Pneumovax 2 months  - Hepatitis B 0, 1 months  - Shingrix 0, 2 months wrote prescription for pharmacy              The patient was encouraged to contact us about any problems that may develop after immunization and possible side effects were reviewed.     - No immunity to hepatitis A, but patient has allergy to latex which is a component of twinrix and hepatitis A vaccine.       Alessandra Horan MD MPH  Infectious Diseases [facility]

## 2019-02-05 NOTE — LETTER
February 5, 2019      Rebeka Smith MD  [address]  [facility] [address]           [facility]  [address]  [facility] [address]  Phone: [phone]  Fax: [phone]          Patient: Cem Meyers   MR Number: [id number]   YOB: 1960   Date of Visit: 2/5/2019       Dear Dr. Rebeka Smith:    Thank you for referring Cem Meyers to me for evaluation. Attached you will find relevant portions of my assessment and plan of care.    If you have questions, please do not hesitate to call me. I look forward to following Cem Meyers along with you.    Sincerely,    Alessandra Horan MD    Enclosure  CC:  No Recipients    If you would like to receive this communication electronically, please contact [email or web address] or [phone] to request more information on [facility] Link access.    For providers and/or their staff who would like to refer a patient to [facility], please contact us through our one-stop-shop provider referral line, [facility], at [phone].    If you feel you have received this communication in error or would no longer like to receive these types of communications, please e-mail [email or web address]

## 2019-02-05 NOTE — PROGRESS NOTES
Ms. Meyers has received first dose of  Heplisav and Prevnar 13 vaccines  Tolerated well  Left unit in NAD

## 2019-02-05 NOTE — PATIENT INSTRUCTIONS
Today:  - Heplisav (Hepatitis B)  - Prevnar (Pneumonia #1)    2 months:  - Pneumovax (Pneumonia #2)  - Heplisav (Hepatitis B #2)    Shingrix 0, 2 months (Shingles)

## 2019-02-15 NOTE — TELEPHONE ENCOUNTER
Pt approved for free drug through [employer]. Left VM with pt requesting return call to schedule initial Ocrevus infusions. Message sent pre-service requesting status update on administration cost.

## 2019-02-18 ENCOUNTER — TELEPHONE (OUTPATIENT)
Dept: NEUROLOGY | Facility: CLINIC | Age: 59
End: 2019-02-18

## 2019-02-18 NOTE — TELEPHONE ENCOUNTER
----- Message from Maryann Moran sent at 2/18/2019  8:15 AM CST -----  Needs Advice    Reason for call:asking to speak with someone in Dr. Smith's office regarding the use of rebif maybe needed. Please call.        Communication Preference:pt @ [phone]    Additional Information:

## 2019-02-18 NOTE — TELEPHONE ENCOUNTER
Pt is scheduled for her initial Ocrevus infusions on 2/25/19 and 3/11/19 at [facility]. Left  for pt requesting return call. Also sent [employer] message advising pt to stop Rebif on Friday. Pt to receive free drug. Will fax order to [employer] to ship upfront doses to Travis at [facility] chemo pharmacy.

## 2019-02-20 ENCOUNTER — PATIENT MESSAGE (OUTPATIENT)
Dept: NEUROLOGY | Facility: CLINIC | Age: 59
End: 2019-02-20

## 2019-02-22 NOTE — TELEPHONE ENCOUNTER
I spoke with Ngozi at [facility]. Advised Ngozi and Travis, Pharmacist, Ocrevus upfront shipment will arrive on Tuesday per denys Bartlett at [facility]. Pt has been rescheduled to infuse on Wednesday, 2/27/19 and 3/13/19. Pt is aware of new appts dates and times. Confirmed pt does not need to be seen prior to infusion.

## 2019-02-26 ENCOUNTER — OFFICE VISIT (OUTPATIENT)
Dept: INTERNAL MEDICINE | Facility: CLINIC | Age: 59
End: 2019-02-26
Payer: MEDICARE

## 2019-02-26 ENCOUNTER — PATIENT MESSAGE (OUTPATIENT)
Dept: NEUROLOGY | Facility: CLINIC | Age: 59
End: 2019-02-26

## 2019-02-26 VITALS
SYSTOLIC BLOOD PRESSURE: 112 MMHG | WEIGHT: 164.44 LBS | HEART RATE: 74 BPM | HEIGHT: 63 IN | BODY MASS INDEX: 29.14 KG/M2 | DIASTOLIC BLOOD PRESSURE: 86 MMHG | TEMPERATURE: 97 F | OXYGEN SATURATION: 96 %

## 2019-02-26 DIAGNOSIS — Z87.440 HISTORY OF UTI: Primary | ICD-10-CM

## 2019-02-26 DIAGNOSIS — R82.90 ABNORMAL FINDING IN URINE: ICD-10-CM

## 2019-02-26 DIAGNOSIS — M77.11 RIGHT LATERAL EPICONDYLITIS: ICD-10-CM

## 2019-02-26 DIAGNOSIS — G35 MS (MULTIPLE SCLEROSIS): Chronic | ICD-10-CM

## 2019-02-26 DIAGNOSIS — D84.9 IMMUNOSUPPRESSION: ICD-10-CM

## 2019-02-26 PROCEDURE — 99499 UNLISTED E&M SERVICE: CPT | Mod: HCNC,S$GLB,, | Performed by: FAMILY MEDICINE

## 2019-02-26 PROCEDURE — 3008F BODY MASS INDEX DOCD: CPT | Mod: HCNC,CPTII,S$GLB, | Performed by: FAMILY MEDICINE

## 2019-02-26 PROCEDURE — 99999 PR PBB SHADOW E&M-EST. PATIENT-LVL IV: ICD-10-PCS | Mod: PBBFAC,HCNC,, | Performed by: FAMILY MEDICINE

## 2019-02-26 PROCEDURE — 99213 OFFICE O/P EST LOW 20 MIN: CPT | Mod: HCNC,S$GLB,, | Performed by: FAMILY MEDICINE

## 2019-02-26 PROCEDURE — 99999 PR PBB SHADOW E&M-EST. PATIENT-LVL IV: CPT | Mod: PBBFAC,HCNC,, | Performed by: FAMILY MEDICINE

## 2019-02-26 PROCEDURE — 99499 RISK ADDL DX/OHS AUDIT: ICD-10-PCS | Mod: HCNC,S$GLB,, | Performed by: FAMILY MEDICINE

## 2019-02-26 PROCEDURE — 3008F PR BODY MASS INDEX (BMI) DOCUMENTED: ICD-10-PCS | Mod: HCNC,CPTII,S$GLB, | Performed by: FAMILY MEDICINE

## 2019-02-26 PROCEDURE — 99213 PR OFFICE/OUTPT VISIT, EST, LEVL III, 20-29 MIN: ICD-10-PCS | Mod: HCNC,S$GLB,, | Performed by: FAMILY MEDICINE

## 2019-02-26 RX ORDER — FAMOTIDINE 10 MG/ML
20 INJECTION INTRAVENOUS
Status: CANCELLED | OUTPATIENT
Start: 2019-02-26

## 2019-02-26 RX ORDER — SODIUM CHLORIDE 0.9 % (FLUSH) 0.9 %
10 SYRINGE (ML) INJECTION
Status: CANCELLED | OUTPATIENT
Start: 2019-02-26

## 2019-02-26 RX ORDER — ACETAMINOPHEN 325 MG/1
1000 TABLET ORAL
Status: CANCELLED | OUTPATIENT
Start: 2019-02-26

## 2019-02-26 RX ORDER — HEPARIN 100 UNIT/ML
100 SYRINGE INTRAVENOUS
Status: CANCELLED | OUTPATIENT
Start: 2019-02-26

## 2019-02-26 NOTE — PATIENT INSTRUCTIONS
Understanding Lateral Epicondylitis    Tendons are strong bands of tissue that connect muscles to bones. Lateral epicondylitis affects the tendons that connect muscles in the forearm to the lateral epicondyle. This is the bony knob on the outer side of the elbow. The condition occurs if the extensor tendons of the wrist become red and swollen (irritated). This can cause pain in the elbow, forearm, and wrist. Because the condition is sometimes caused by playing tennis, it is also known as tennis elbow.  How to say it  LA-tuhr-april bx-ut-HED-duh-LY-tis   What causes lateral epicondylitis?  The condition most often occurs because of overuse. This can be from any activity that repeatedly puts stress on the forearm extensor muscles or tendons and wrist. For instance, playing tennis, lifting weights, cutting meat, painting, and typing can all cause the condition. Wear and tear of the tendons from aging or an injury to the tendons can also cause the condition.  Symptoms of lateral epicondylitis  The most common symptom is pain. You may feel it on the outer side of the elbow and down the back of the forearm. It may be worse when moving or using the elbow, forearm, or wrist. You may also feel pain when gripping or lifting things.  Treatment for lateral epicondylitis  Treatments may include:  · Resting the elbow, forearm, and wrist. Youll need to avoid movements that can make your symptoms worse. You also may need to avoid certain sports and types of work for a time. This helps relieve symptoms and prevent further damage to the tendons.  · Changing the action that caused the problem. For instance, if the tendons were damaged from playing tennis, it may help to change your playing technique or use different equipment. This helps prevent further damage to the tendons.  · Using cold packs. Putting an ice pack on the injured area can help reduce pain and swelling.  · Taking pain medicines. Taking prescription or  over-the-counter pain medicines may help reduce pain and swelling.    · Wearing a brace. This helps reduce strain on the muscles and tendons in the forearm, which may relieve symptoms. It is very important to wear the brace properly.  · Doing exercises and physical therapy. These help improve strength and range of motion in the elbow, forearm, and wrist.  · Getting shots of medicine into the injured area. These may help relieve symptoms for a time.  · Having surgery. This may be an option if other treatments fail to relieve symptoms. In many cases, the surgeon removes the damaged tissue.  Possible complications of lateral epicondylitis  If the tendons involved dont heal properly, symptoms may return or get worse. To help prevent this, follow your treatment plan as directed.  When to call your healthcare provider  Call your healthcare provider right away if you have any of these:  · Fever of 100.4°F (38°C) or higher, or as directed  · Redness, swelling, or warmth in the elbow or forearm that gets worse  · Symptoms that dont get better with treatment, or get worse  · New symptoms   Date Last Reviewed: 3/10/2016  © 4719-7445 [employer]. [address] [place] [address]. All rights reserved. This information is not intended as a substitute for professional medical care. Always follow your healthcare professional's instructions.

## 2019-02-26 NOTE — PROGRESS NOTES
"Subjective:   Patient ID: Cem Meyers is a 58 y.o. female.  Chief Complaint:  Urinary Tract Infection and Joint Swelling (Right)      PCP Dr. Stubbs.    Patient presents with 2 concerns  History UTI.  Immunosuppressed on multiple medications multiple sclerosis.  January had fever and exacerbation of MS.  Felt to be UTI related.  Urinalysis abnormal.  Culture not done.  Treated with Keflex.  Repeat urinalysis 1/15/2019 normal.  Planning to restart additional immunosuppressive medications and concerned has underlying infection although no symptoms.    Right elbow pain.  Months.  Lateral epicondyle.  Thinks tennis elbow.  Questions what else she can do.  No trauma.  No additional complaints concerns today.      Review of Systems   Constitutional: Negative for chills and fever.   Respiratory: Negative for cough and shortness of breath.    Cardiovascular: Negative for chest pain and leg swelling.   Gastrointestinal: Negative for abdominal pain, diarrhea, nausea and vomiting.   Genitourinary: Negative for decreased urine volume, difficulty urinating, dysuria, enuresis, flank pain, frequency, hematuria and urgency.   Musculoskeletal: Positive for arthralgias (Right elbow). Negative for myalgias.   Skin: Negative for rash.   Neurological: Positive for weakness (Stable MS). Negative for headaches.   Psychiatric/Behavioral: Negative for sleep disturbance.     Objective:   /86 (BP Location: Left arm, Patient Position: Sitting)   Pulse 74   Temp 96.9 °F (36.1 °C) (Tympanic)   Ht 5' 3" (1.6 m)   Wt 74.6 kg (164 lb 7.4 oz)   SpO2 96%   BMI 29.13 kg/m²     Physical Exam   Constitutional: Vital signs are normal. She appears well-developed and well-nourished.   Cardiovascular: Normal rate and regular rhythm.   Pulmonary/Chest: Effort normal. No respiratory distress.   Musculoskeletal: She exhibits no edema.        Right elbow: She exhibits normal range of motion, no swelling, no effusion and no deformity. Tenderness " found. Lateral epicondyle tenderness noted.   FROM in elbow.  No gross abnormality noted.  No redness, warmth or swelling.  Tenderness to palpation Lateral Epicondyle area  Increase pain with pronation and supination.  Improved with Brachioradialis support  Pain increased with resisted wrist extension   Skin: Skin is warm and dry. No rash noted.   Psychiatric: She has a normal mood and affect. Her behavior is normal. Judgment and thought content normal.   Nursing note and vitals reviewed.    Assessment:       ICD-10-CM ICD-9-CM   1. History of UTI Z87.440 V13.02   2. Abnormal finding in urine  R82.90 791.9   3. Immunosuppression D89.9 279.9   4. MS (multiple sclerosis) G35 340   5. Right lateral epicondylitis M77.11 726.32     Plan:   History of UTI  Abnormal finding in urine   Immunosuppression  MS (multiple sclerosis)  -     Urinalysis; Future; Expected date: 02/26/2019  -     Urine culture; Future; Expected date: 02/26/2019  Check urine studies.    Treat as indicated.      Right lateral epicondylitis  Patient education/ handout.    Decrease trigger activities, brachioradialis Port, Tylenol / Motrin for pain, ice pack to 3 times daily  If no significant improvement recommend physical therapy.      Ffollow up with all specialists as scheduled.    Return to clinic as needed.

## 2019-02-27 ENCOUNTER — INFUSION (OUTPATIENT)
Dept: INFUSION THERAPY | Facility: HOSPITAL | Age: 59
End: 2019-02-27
Attending: INTERNAL MEDICINE
Payer: MEDICARE

## 2019-02-27 VITALS
WEIGHT: 164 LBS | TEMPERATURE: 98 F | HEART RATE: 68 BPM | RESPIRATION RATE: 18 BRPM | HEIGHT: 63 IN | OXYGEN SATURATION: 96 % | BODY MASS INDEX: 29.06 KG/M2 | DIASTOLIC BLOOD PRESSURE: 81 MMHG | SYSTOLIC BLOOD PRESSURE: 114 MMHG

## 2019-02-27 DIAGNOSIS — G35 MS (MULTIPLE SCLEROSIS): Primary | ICD-10-CM

## 2019-02-27 PROCEDURE — 96366 THER/PROPH/DIAG IV INF ADDON: CPT | Mod: HCNC

## 2019-02-27 PROCEDURE — S0028 INJECTION, FAMOTIDINE, 20 MG: HCPCS | Mod: HCNC | Performed by: INTERNAL MEDICINE

## 2019-02-27 PROCEDURE — 25000003 PHARM REV CODE 250: Mod: HCNC | Performed by: PSYCHIATRY & NEUROLOGY

## 2019-02-27 PROCEDURE — 63600175 PHARM REV CODE 636 W HCPCS: Mod: HCNC | Performed by: INTERNAL MEDICINE

## 2019-02-27 PROCEDURE — 25000003 PHARM REV CODE 250: Mod: HCNC | Performed by: INTERNAL MEDICINE

## 2019-02-27 PROCEDURE — 96375 TX/PRO/DX INJ NEW DRUG ADDON: CPT | Mod: HCNC

## 2019-02-27 PROCEDURE — 96368 THER/DIAG CONCURRENT INF: CPT | Mod: HCNC

## 2019-02-27 PROCEDURE — 63600175 PHARM REV CODE 636 W HCPCS: Mod: HCNC | Performed by: PSYCHIATRY & NEUROLOGY

## 2019-02-27 PROCEDURE — 96367 TX/PROPH/DG ADDL SEQ IV INF: CPT | Mod: HCNC

## 2019-02-27 PROCEDURE — 96365 THER/PROPH/DIAG IV INF INIT: CPT | Mod: HCNC

## 2019-02-27 RX ORDER — ACETAMINOPHEN 500 MG
1000 TABLET ORAL
Status: COMPLETED | OUTPATIENT
Start: 2019-02-27 | End: 2019-02-27

## 2019-02-27 RX ORDER — FAMOTIDINE 10 MG/ML
20 INJECTION INTRAVENOUS
Status: DISCONTINUED | OUTPATIENT
Start: 2019-02-27 | End: 2019-02-27

## 2019-02-27 RX ORDER — FAMOTIDINE 20 MG/50ML
20 INJECTION, SOLUTION INTRAVENOUS
Status: CANCELLED
Start: 2019-02-27 | End: 2019-02-27

## 2019-02-27 RX ORDER — METHYLPREDNISOLONE SOD SUCC 125 MG
100 VIAL (EA) INJECTION
Status: COMPLETED | OUTPATIENT
Start: 2019-02-27 | End: 2019-02-27

## 2019-02-27 RX ORDER — ACETAMINOPHEN 500 MG
1000 TABLET ORAL
Status: CANCELLED | OUTPATIENT
Start: 2019-02-27

## 2019-02-27 RX ORDER — SODIUM CHLORIDE 0.9 % (FLUSH) 0.9 %
10 SYRINGE (ML) INJECTION
Status: CANCELLED | OUTPATIENT
Start: 2019-02-27

## 2019-02-27 RX ORDER — FAMOTIDINE 20 MG/50ML
20 INJECTION, SOLUTION INTRAVENOUS
Status: COMPLETED | OUTPATIENT
Start: 2019-02-27 | End: 2019-02-27

## 2019-02-27 RX ORDER — FAMOTIDINE 10 MG/ML
20 INJECTION INTRAVENOUS
Status: CANCELLED | OUTPATIENT
Start: 2019-02-27

## 2019-02-27 RX ORDER — HEPARIN 100 UNIT/ML
100 SYRINGE INTRAVENOUS
Status: CANCELLED | OUTPATIENT
Start: 2019-02-27

## 2019-02-27 RX ORDER — METHYLPREDNISOLONE SOD SUCC 125 MG
100 VIAL (EA) INJECTION
Status: CANCELLED
Start: 2019-02-27 | End: 2019-02-27

## 2019-02-27 RX ADMIN — METHYLPREDNISOLONE SODIUM SUCCINATE 100 MG: 125 INJECTION, POWDER, FOR SOLUTION INTRAMUSCULAR; INTRAVENOUS at 09:02

## 2019-02-27 RX ADMIN — DIPHENHYDRAMINE HYDROCHLORIDE 50 MG: 50 INJECTION INTRAMUSCULAR; INTRAVENOUS at 09:02

## 2019-02-27 RX ADMIN — ACETAMINOPHEN 1000 MG: 500 TABLET ORAL at 09:02

## 2019-02-27 RX ADMIN — FAMOTIDINE 20 MG: 20 INJECTION, SOLUTION INTRAVENOUS at 09:02

## 2019-02-27 NOTE — PLAN OF CARE
Problem: Fall Injury Risk  Goal: Absence of Fall and Fall-Related Injury    Intervention: Identify and Manage Contributors to Fall Injury Risk  Reviewed Ocrevus protocol with pt and reviewed side effects. Also discussed fall risk with pt.

## 2019-02-27 NOTE — PLAN OF CARE
Problem: Fall Injury Risk  Goal: Absence of Fall and Fall-Related Injury    Intervention: Promote Injury-Free Environment  Instructed pt to call for assistance when ambulating.

## 2019-02-27 NOTE — PLAN OF CARE
"Problem: Adult Inpatient Plan of Care  Goal: Plan of Care Review  Outcome: Ongoing (interventions implemented as appropriate)  Pt states, " I feel tired, weak and I also have numbness and tingling"      "

## 2019-02-27 NOTE — PLAN OF CARE
Problem: Adult Inpatient Plan of Care  Goal: Patient-Specific Goal (Individualization)  Outcome: Ongoing (interventions implemented as appropriate)  Pt likes dayana

## 2019-03-11 ENCOUNTER — PATIENT MESSAGE (OUTPATIENT)
Dept: NEUROLOGY | Facility: CLINIC | Age: 59
End: 2019-03-11

## 2019-03-18 ENCOUNTER — PATIENT MESSAGE (OUTPATIENT)
Dept: NEUROLOGY | Facility: CLINIC | Age: 59
End: 2019-03-18

## 2019-03-18 ENCOUNTER — OFFICE VISIT (OUTPATIENT)
Dept: INTERNAL MEDICINE | Facility: CLINIC | Age: 59
End: 2019-03-18
Payer: MEDICARE

## 2019-03-18 VITALS
BODY MASS INDEX: 27.73 KG/M2 | HEIGHT: 63 IN | TEMPERATURE: 96 F | OXYGEN SATURATION: 97 % | DIASTOLIC BLOOD PRESSURE: 88 MMHG | HEART RATE: 78 BPM | SYSTOLIC BLOOD PRESSURE: 108 MMHG | WEIGHT: 156.5 LBS

## 2019-03-18 DIAGNOSIS — J04.0 LARYNGITIS: Primary | ICD-10-CM

## 2019-03-18 PROCEDURE — 99999 PR PBB SHADOW E&M-EST. PATIENT-LVL IV: CPT | Mod: PBBFAC,HCNC,, | Performed by: PHYSICIAN ASSISTANT

## 2019-03-18 PROCEDURE — 3008F PR BODY MASS INDEX (BMI) DOCUMENTED: ICD-10-PCS | Mod: HCNC,CPTII,S$GLB, | Performed by: PHYSICIAN ASSISTANT

## 2019-03-18 PROCEDURE — 3008F BODY MASS INDEX DOCD: CPT | Mod: HCNC,CPTII,S$GLB, | Performed by: PHYSICIAN ASSISTANT

## 2019-03-18 PROCEDURE — 99213 OFFICE O/P EST LOW 20 MIN: CPT | Mod: HCNC,S$GLB,, | Performed by: PHYSICIAN ASSISTANT

## 2019-03-18 PROCEDURE — 99213 PR OFFICE/OUTPT VISIT, EST, LEVL III, 20-29 MIN: ICD-10-PCS | Mod: HCNC,S$GLB,, | Performed by: PHYSICIAN ASSISTANT

## 2019-03-18 PROCEDURE — 99999 PR PBB SHADOW E&M-EST. PATIENT-LVL IV: ICD-10-PCS | Mod: PBBFAC,HCNC,, | Performed by: PHYSICIAN ASSISTANT

## 2019-03-18 NOTE — PROGRESS NOTES
"  Subjective:      Patient ID: Cem Meyers is a 58 y.o. female.    Chief Complaint: URI and Sore Throat    URI    This is a new problem. The current episode started in the past 7 days. The problem has been gradually worsening. There has been no fever. Associated symptoms include congestion, coughing, rhinorrhea, sneezing and a sore throat. Pertinent negatives include no abdominal pain, chest pain, diarrhea, dysuria, ear pain, headaches, joint pain, joint swelling, nausea, neck pain, plugged ear sensation, rash, sinus pain, swollen glands, vomiting or wheezing. Associated symptoms comments: hoarseness. Treatments tried: promethazine DM, nyquil. The treatment provided no relief.       Review of Systems   Constitutional: Negative for activity change, appetite change, chills, fatigue, fever and unexpected weight change.   HENT: Positive for congestion, postnasal drip, rhinorrhea, sneezing and sore throat. Negative for dental problem, drooling, ear discharge, ear pain, facial swelling, hearing loss, mouth sores, nosebleeds, sinus pressure, sinus pain, tinnitus and trouble swallowing.    Eyes: Negative for pain, discharge, redness, itching and visual disturbance.   Respiratory: Positive for cough. Negative for chest tightness, shortness of breath and wheezing.    Cardiovascular: Negative for chest pain, palpitations and leg swelling.   Gastrointestinal: Negative for abdominal pain, constipation, diarrhea, nausea and vomiting.   Endocrine: Negative.    Genitourinary: Negative.  Negative for difficulty urinating and dysuria.   Musculoskeletal: Positive for myalgias. Negative for joint pain, neck pain and neck stiffness.   Skin: Negative for rash.   Allergic/Immunologic: Negative for environmental allergies, food allergies and immunocompromised state.   Neurological: Negative for dizziness, weakness and headaches.     Objective:   /88   Pulse 78   Temp 96.4 °F (35.8 °C) (Tympanic)   Ht 5' 3" (1.6 m)   Wt 71 " kg (156 lb 8.4 oz)   SpO2 97%   BMI 27.73 kg/m²     Physical Exam   Constitutional: She is oriented to person, place, and time. She appears well-developed and well-nourished. No distress.   HENT:   Head: Normocephalic and atraumatic.   Right Ear: Hearing, tympanic membrane, external ear and ear canal normal. No tenderness.   Left Ear: Hearing, tympanic membrane, external ear and ear canal normal. No tenderness.   Nose: Mucosal edema and rhinorrhea present. No sinus tenderness. Right sinus exhibits no maxillary sinus tenderness and no frontal sinus tenderness. Left sinus exhibits no maxillary sinus tenderness and no frontal sinus tenderness.   Mouth/Throat: Uvula is midline and mucous membranes are normal. No oral lesions. Normal dentition. No dental abscesses, uvula swelling or dental caries. No oropharyngeal exudate, posterior oropharyngeal edema, posterior oropharyngeal erythema or tonsillar abscesses. No tonsillar exudate.   Eyes: Conjunctivae and EOM are normal. Pupils are equal, round, and reactive to light. Right eye exhibits no discharge. Left eye exhibits no discharge.   Neck: Normal range of motion. Neck supple.   Cardiovascular: Normal rate, regular rhythm and normal heart sounds. Exam reveals no gallop and no friction rub.   No murmur heard.  Pulmonary/Chest: Effort normal and breath sounds normal. No respiratory distress. She has no wheezes. She has no rales.   Lymphadenopathy:     She has no cervical adenopathy.   Neurological: She is alert and oriented to person, place, and time.   Skin: Skin is warm. No rash noted. She is not diaphoretic. No erythema. No pallor.   Psychiatric: She has a normal mood and affect. Her behavior is normal. Judgment and thought content normal.   Nursing note and vitals reviewed.      Assessment:     1. Laryngitis      Plan:   Laryngitis    -mucinex DM  -hot tea, honey, gargles  -OTC symptomatic relief discussed.    Follow-up if symptoms worsen or fail to improve.

## 2019-03-18 NOTE — PATIENT INSTRUCTIONS
Over-the-counter supportive tx for colds and cough:   -start flonase nasal spray (fluticasone) 1 spray each nostril once/day. Continue use for 2-3 weeks.   -Try OTC saline nasal spray a few times a day or nedi-pot using warm filtered water    - refrain from smoking, drink plenty of fluids, hot tea with honey, rest, take medications as prescribed, and use a humidifier or steam in the bathroom.   -Shower in the morning and evening to wash away any allergens and help reduce the production of mucus.   - Zinc lozenge, Emergen-C, or Airborne,  to boost immune system.     -No more than 10 in 24 hours.  -Cepacol sore throat/ cough drops  -Take tylenol or Advil for fever, sore throat, and muscle aches.  -warm salt water gargles or Listerine gargles for sore throat frequently throughout the day.  - Try OTC Mucinex (guafenesin) for cough with thick mucous.   -DM is for dextromethorphan. This is a cough suppressant.   -Phenylephrine/pseudophedrine or anything labeled with a D after it is for congestion.   Avoid decongestants if diagnosed with hypertension or arrhythmias. To avoid  rebound congestion, refrain from taking decongestant for longer than 5 day.    -For prevention,extremely important to quit smoking, get annual influenza vaccines, reduce exposure to air pollution, and to frequently wash hands to avoid spread of infection.       Self-Care for Sore Throats    Sore throats happen for many reasons, such as colds, allergies, and infections caused by viruses or bacteria. In any case, your throat becomes red and sore. Your goal for self-care is to reduce your discomfort while giving your throat a chance to heal.  Moisten and soothe your throat  Tips include the following:  · Try a sip of water first thing after waking up.  · Keep your throat moist by drinking 6 or more glasses of clear liquids every day.  · Run a cool-air humidifier in your room overnight.  · Avoid cigarette smoke.   · Suck on throat lozenges, cough drops,  hard candy, ice chips, or frozen fruit-juice bars. Use the sugar-free versions if your diet or medical condition requires them.  Gargle to ease irritation  Gargling every hour or 2 can ease irritation. Try gargling with 1 of these solutions:  · 1/4 teaspoon of salt in 1/2 cup of warm water  · An over-the-counter anesthetic gargle  Use medicine for more relief  Over-the-counter medicine can reduce sore throat symptoms. Ask your pharmacist if you have questions about which medicine to use:  · Ease pain with anesthetic sprays. Aspirin or an aspirin substitute also helps. Remember, never give aspirin to anyone 18 or younger, or if you are already taking blood thinners.   · For sore throats caused by allergies, try antihistamines to block the allergic reaction.  · Remember: unless a sore throat is caused by a bacterial infection, antibiotics wont help you.  Prevent future sore throats  Prevention tips include the following:  · Stop smoking or reduce contact with secondhand smoke. Smoke irritates the tender throat lining.  · Limit contact with pets and with allergy-causing substances, such as pollen and mold.  · When youre around someone with a sore throat or cold, wash your hands often to keep viruses or bacteria from spreading.  · Dont strain your vocal cords.  Call your healthcare provider  Contact your healthcare provider if you have:  · A temperature over 101°F (38.3°C)  · White spots on the throat  · Great difficulty swallowing  · Trouble breathing  · A skin rash  · Recent exposure to someone else with strep bacteria  · Severe hoarseness and swollen glands in the neck or jaw   Date Last Reviewed: 8/1/2016 © 2000-2017 [employer]. [address] [place] [address], [place] [address]. All rights reserved. This information is not intended as a substitute for professional medical care. Always follow your healthcare professional's instructions.        Laryngitis    Laryngitis is a swelling of the tissues around  the vocal cords. Symptoms include a hoarse (scratchy) voice. The voice may be lost completely. It may be caused by a viral illness, such as a head or chest cold. It may also be due to overuse and strain of the voice. Smoking, drinking alcohol, acid reflux, allergies, or inhaling harsh chemicals may also lead to symptoms. This condition will usually resolve in 1-2 weeks.  Home care  · Rest your voice until it recovers. Talk as little as possible. If your symptoms are severe, rest at home for a day or so.  · Breathing cool steam from a humidifier/vaporizer or in a steamy shower may be helpful.  · Drink plenty of fluids to stay well hydrated.  · Do not smoke  Follow-up care  Follow up with your healthcare provider or this facility if you have not improved after one week.  When to seek medical advice  Contact your healthcare provider for any of the following:  · Severe pain with swallowing  · Trouble opening mouth  · Neck swelling, neck pain, or trouble moving neck  · Noisy breathing or trouble breathing  · Fever of 100.4°F (38.ºC) or higher, or as directed by your healthcare provider  · Drooling  · Symptoms do not resolve in 2 weeks  Date Last Reviewed: 4/26/2015  © 9556-5733 [employer]. [address] [place] [address], [place] [address]. All rights reserved. This information is not intended as a substitute for professional medical care. Always follow your healthcare professional's instructions.

## 2019-03-25 RX ORDER — SIMVASTATIN 40 MG/1
TABLET, FILM COATED ORAL
Qty: 90 TABLET | Refills: 2 | Status: SHIPPED | OUTPATIENT
Start: 2019-03-25 | End: 2020-01-13

## 2019-04-02 ENCOUNTER — HOSPITAL ENCOUNTER (OUTPATIENT)
Dept: RADIOLOGY | Facility: HOSPITAL | Age: 59
Discharge: HOME | End: 2019-04-02
Attending: PHYSICIAN ASSISTANT
Payer: MEDICARE

## 2019-04-02 ENCOUNTER — TELEPHONE (OUTPATIENT)
Dept: NEUROLOGY | Facility: CLINIC | Age: 59
End: 2019-04-02

## 2019-04-02 ENCOUNTER — OFFICE VISIT (OUTPATIENT)
Dept: INTERNAL MEDICINE | Facility: CLINIC | Age: 59
End: 2019-04-02
Payer: MEDICARE

## 2019-04-02 ENCOUNTER — PATIENT MESSAGE (OUTPATIENT)
Dept: NEUROLOGY | Facility: CLINIC | Age: 59
End: 2019-04-02

## 2019-04-02 VITALS
WEIGHT: 157.44 LBS | DIASTOLIC BLOOD PRESSURE: 72 MMHG | SYSTOLIC BLOOD PRESSURE: 102 MMHG | BODY MASS INDEX: 27.89 KG/M2 | HEIGHT: 63 IN | TEMPERATURE: 97 F | HEART RATE: 65 BPM | OXYGEN SATURATION: 97 %

## 2019-04-02 DIAGNOSIS — M53.3 COCCYGODYNIA: ICD-10-CM
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
## 2019-04-02 PROCEDURE — 72220 X-RAY EXAM SACRUM TAILBONE: CPT | Mod: 26,HCNC,, | Performed by: RADIOLOGY

## 2019-04-02 PROCEDURE — 3008F BODY MASS INDEX DOCD: CPT | Mod: HCNC,CPTII,S$GLB, | Performed by: PHYSICIAN ASSISTANT

## 2019-04-02 PROCEDURE — 72220 X-RAY EXAM SACRUM TAILBONE: CPT | Mod: TC,HCNC

## 2019-04-02 PROCEDURE — 3008F PR BODY MASS INDEX (BMI) DOCUMENTED: ICD-10-PCS | Mod: HCNC,CPTII,S$GLB, | Performed by: PHYSICIAN ASSISTANT

## 2019-04-02 PROCEDURE — 99999 PR PBB SHADOW E&M-EST. PATIENT-LVL V: CPT | Mod: PBBFAC,HCNC,, | Performed by: PHYSICIAN ASSISTANT

## 2019-04-02 PROCEDURE — 99214 OFFICE O/P EST MOD 30 MIN: CPT | Mod: HCNC,S$GLB,, | Performed by: PHYSICIAN ASSISTANT

## 2019-04-02 PROCEDURE — 72220 XR SACRUM AND COCCYX: ICD-10-PCS | Mod: 26,HCNC,, | Performed by: RADIOLOGY

## 2019-04-02 PROCEDURE — 99999 PR PBB SHADOW E&M-EST. PATIENT-LVL V: ICD-10-PCS | Mod: PBBFAC,HCNC,, | Performed by: PHYSICIAN ASSISTANT

## 2019-04-02 PROCEDURE — 99214 PR OFFICE/OUTPT VISIT, EST, LEVL IV, 30-39 MIN: ICD-10-PCS | Mod: HCNC,S$GLB,, | Performed by: PHYSICIAN ASSISTANT

## 2019-04-02 RX ORDER — IBUPROFEN 200 MG
2 CAPSULE ORAL DAILY
COMMUNITY

## 2019-04-02 NOTE — TELEPHONE ENCOUNTER
I received a call from Brain at [facility], which is her secondary, approving her Ocrevus from 2/18/19-2/18/2020. After that they will require the pt to infuse at an non hospital outpatient infusion center or at home. Reference # [id number]. Informed pre-service of the above information.

## 2019-04-02 NOTE — TELEPHONE ENCOUNTER
----- Message from Israel Youssef sent at 4/2/2019  9:10 AM CDT -----  Needs Advice    Reason for call: Brain is asking to speak w/ the nurse regarding Ocrevus        Communication Preference: Brain (RN) w/ LEANDRA @ [phone]    Additional Information:

## 2019-04-02 NOTE — PROGRESS NOTES
Subjective:      Patient ID: Cem Meyers is a 58 y.o. female.    Chief Complaint: Fall (x 1 week); Back Pain; and Rectal Pain    Fall   The accident occurred 5 to 7 days ago. The fall occurred while standing. She landed on concrete. The point of impact was the buttocks. The pain is present in the buttocks. The pain is at a severity of 2/10. The symptoms are aggravated by pressure on injury. Pertinent negatives include no abdominal pain, bowel incontinence, fever, headaches, hearing loss, hematuria, loss of consciousness, nausea, numbness, tingling, visual change or vomiting. She has tried NSAID and ice for the symptoms. The treatment provided mild relief.       Review of Systems   Constitutional: Negative for activity change, appetite change, chills, diaphoresis, fatigue, fever and unexpected weight change.   HENT: Negative.  Negative for congestion, hearing loss, postnasal drip, rhinorrhea, sore throat, trouble swallowing and voice change.    Eyes: Negative.  Negative for visual disturbance.   Respiratory: Negative.  Negative for cough, choking, chest tightness and shortness of breath.    Cardiovascular: Negative for chest pain, palpitations and leg swelling.   Gastrointestinal: Negative for abdominal distention, abdominal pain, blood in stool, bowel incontinence, constipation, diarrhea, nausea and vomiting.   Endocrine: Negative for cold intolerance, heat intolerance, polydipsia and polyuria.   Genitourinary: Negative.  Negative for difficulty urinating, frequency and hematuria.   Musculoskeletal: Positive for arthralgias. Negative for back pain, gait problem, joint swelling, myalgias, neck pain and neck stiffness.   Skin: Negative for color change, pallor, rash and wound.   Neurological: Negative for dizziness, tingling, tremors, loss of consciousness, weakness, light-headedness, numbness and headaches.   Hematological: Negative for adenopathy.   Psychiatric/Behavioral: Negative for behavioral problems,  "confusion, self-injury, sleep disturbance and suicidal ideas. The patient is not nervous/anxious.      Objective:   /72   Pulse 65   Temp 97.3 °F (36.3 °C) (Tympanic)   Ht 5' 3" (1.6 m)   Wt 71.4 kg (157 lb 6.5 oz)   SpO2 97%   BMI 27.88 kg/m²     Physical Exam   Constitutional: She appears well-developed and well-nourished. No distress.   HENT:   Head: Normocephalic and atraumatic.   Cardiovascular: Normal rate, regular rhythm and normal heart sounds. Exam reveals no gallop and no friction rub.   No murmur heard.  Pulmonary/Chest: Effort normal and breath sounds normal. No stridor. No respiratory distress. She has no wheezes.   Musculoskeletal:        Lumbar back: She exhibits decreased range of motion, tenderness, bony tenderness, swelling, edema and pain. She exhibits no deformity, no laceration, no spasm and normal pulse.        Back:    Skin: Skin is warm. Capillary refill takes less than 2 seconds. She is not diaphoretic.   Psychiatric: She has a normal mood and affect. Her behavior is normal. Judgment and thought content normal.   Nursing note and vitals reviewed.    X-Ray Sacrum And Coccyx  Narrative: EXAMINATION:  XR SACRUM AND COCCYX    CLINICAL HISTORY:  Unspecified fall, initial encounter    TECHNIQUE:  Two or three views of the sacrum and coccyx were performed.    COMPARISON:  None    FINDINGS:  No acute fractures or subluxations demonstrated.  Bilateral SI joints appear grossly within normal limits.  Impression: No acute findings.    Electronically signed by: Jesus Bonilla MD  Date:    04/02/2019  Time:    14:57    Assessment:     1. Fall, initial encounter    2. Coccygodynia      Plan:   Fall, initial encounter  -     X-Ray Sacrum And Coccyx; Future; Expected date: 04/02/2019    Coccygodynia    -alternate ice and heat  -NSAID (with food) or tylenol for pain  -Educational handout on over-the-counter medications and at-home conservative care, pertinent to the patients diagnosis today, was " handed to the patient and discussed in detail.    Follow up if symptoms worsen or fail to improve.

## 2019-04-02 NOTE — PATIENT INSTRUCTIONS
Treatment for Bone Bruise (Bone Contusion)  A bone bruise is an injury to a bone that is less severe than a bone fracture. Bone bruises are fairly common. They can happen to people of all ages. Any type of bone in your body can get a bone bruise. Other injuries often happen along with a bone bruise, such as damage to nearby ligaments.  Types of treatment  Treatment for a bone bruise may include:  · Resting the bone or joint  · Putting an ice pack on the area several times a day  · Raising the injury above the level of your heart to reduce swelling  · Taking medicine to reduce pain and swelling  · Wearing a brace or other device to limit movement, if needed  Your doctor may give you advice about your diet. This is because eating a diet that is rich in calcium, vitamin D, and protein can help you heal. Your doctor may ask you to not use certain over-the-counter medicines for pain. Some of these may delay normal bone healing. If you smoke, your doctor will advise you to stop smoking. Smoking can also delay bone healing.  Your health care provider will tell you how long you should avoid putting weight on your bone. Most bone bruises slowly heal over 2 to 4 months. A larger bone bruise may take longer to heal. You may not be able to return to sports activities for weeks or months. If your symptoms dont go away, your health care provider may give you an MRI.  Possible complications of a bone bruise  Most bone bruises heal without any problems. If your bone bruise is very large, your body may have trouble getting blood flow back to the area. This can cause avascular necrosis of the bone. This leads to death of that part of the bone.     When to call the health care provider  Call your health care provider if your symptoms dont start to get better in a few days. Call him or her right away if you have any severe symptoms, such as a high fever.      Date Last Reviewed: 7/21/2015  © 3338-2635 [employer] [address]  [place] [address]. All rights reserved. This information is not intended as a substitute for professional medical care. Always follow your healthcare professional's instructions.        Understanding Coccygodynia    Coccygodynia is pain at the lowest tip of the spine (the coccyx, or tailbone). This is sometimes called a bruised tailbone. Tailbone pain can be very uncomfortable. It can also interfere with daily activities, such as driving.     How to say it  [id number]   What causes coccygodynia?  Causes of tailbone pain include:  · Injury to the tailbone from a blow or fall  · A bone spur on the tailbone  · Poor posture while sitting  · Sitting for a long time in an uncomfortable position  · Vaginal childbirth  · Arthritis  In some cases, the cause of the pain cant be found.  Symptoms of coccygodynia  You may feel:  · A dull ache or sharp pain in the tailbone area, near the top of the buttocks  · Muscle spasms in the lower back or pelvic area  · A sense of pressure in the rectum  Pain may be triggered by things like walking or standing up from sitting. It may hurt more if you sit for long periods. Things that put pressure on the tailbone, such as riding a horse or having sex, may also trigger the pain.  Treatment for coccygodynia  Tailbone pain often goes away by itself within a few months. Treatment focuses on reducing pain and protecting the tailbone. Treatments can include:  · Over-the-counter or prescription pain medicine to help relieve pain and swelling  · Warm or cold to help relieve symptoms for a time, such as a heating pad, hot water bottle, or ice pack  · Keeping pressure off the tailbone to help the area heal by shifting weight forward onto your hipbones and thighs when sitting or sitting on a special cushion  · Medicine injected into the area to help relieve severe symptoms  · Physical therapy to help strengthen the structures around the tailbone  If no other treatments  work, you may need surgery to remove all or part of the coccyx.     When to call your healthcare provider  Call your healthcare provider right away if you have any of these:  · Pain that continues for more than 2 months or gets worse  · Pain that limits your usual activities  · Pain that doesnt get better by trying the self-care treatments described above  · Fever of 100.4°F (38°C) or higher, or as directed  · Redness or swelling  · A new sore in the cleft of the buttocks, especially one that contains or drains pus  · Other new symptoms   Date Last Reviewed: 3/10/2016  © 9684-6618 [employer]. [address] [place] [address], [place] [address]. All rights reserved. This information is not intended as a substitute for professional medical care. Always follow your healthcare professional's instructions.

## 2019-04-08 ENCOUNTER — INFUSION (OUTPATIENT)
Dept: INFUSION THERAPY | Facility: HOSPITAL | Age: 59
End: 2019-04-08
Attending: INTERNAL MEDICINE
Payer: MEDICARE

## 2019-04-08 VITALS
TEMPERATURE: 99 F | BODY MASS INDEX: 27.82 KG/M2 | HEIGHT: 63 IN | DIASTOLIC BLOOD PRESSURE: 88 MMHG | SYSTOLIC BLOOD PRESSURE: 139 MMHG | RESPIRATION RATE: 18 BRPM | OXYGEN SATURATION: 96 % | WEIGHT: 157 LBS | HEART RATE: 60 BPM

## 2019-04-08 DIAGNOSIS — G35 MS (MULTIPLE SCLEROSIS): Primary | ICD-10-CM

## 2019-04-08 PROCEDURE — 25000003 PHARM REV CODE 250: Mod: HCNC | Performed by: PSYCHIATRY & NEUROLOGY

## 2019-04-08 PROCEDURE — S0028 INJECTION, FAMOTIDINE, 20 MG: HCPCS | Mod: HCNC | Performed by: INTERNAL MEDICINE

## 2019-04-08 PROCEDURE — 63600175 PHARM REV CODE 636 W HCPCS: Mod: HCNC | Performed by: PSYCHIATRY & NEUROLOGY

## 2019-04-08 PROCEDURE — 63600175 PHARM REV CODE 636 W HCPCS: Mod: HCNC | Performed by: INTERNAL MEDICINE

## 2019-04-08 PROCEDURE — 96366 THER/PROPH/DIAG IV INF ADDON: CPT | Mod: HCNC

## 2019-04-08 PROCEDURE — 96367 TX/PROPH/DG ADDL SEQ IV INF: CPT | Mod: HCNC

## 2019-04-08 PROCEDURE — 96365 THER/PROPH/DIAG IV INF INIT: CPT | Mod: HCNC

## 2019-04-08 PROCEDURE — 96368 THER/DIAG CONCURRENT INF: CPT | Mod: HCNC

## 2019-04-08 PROCEDURE — 25000003 PHARM REV CODE 250: Mod: HCNC | Performed by: INTERNAL MEDICINE

## 2019-04-08 PROCEDURE — 96375 TX/PRO/DX INJ NEW DRUG ADDON: CPT | Mod: HCNC

## 2019-04-08 RX ORDER — ACETAMINOPHEN 500 MG
1000 TABLET ORAL
Status: COMPLETED | OUTPATIENT
Start: 2019-04-08 | End: 2019-04-08

## 2019-04-08 RX ORDER — METHYLPREDNISOLONE SOD SUCC 125 MG
100 VIAL (EA) INJECTION
Status: CANCELLED
Start: 2019-04-08

## 2019-04-08 RX ORDER — FAMOTIDINE 20 MG/50ML
20 INJECTION, SOLUTION INTRAVENOUS
Status: COMPLETED | OUTPATIENT
Start: 2019-04-08 | End: 2019-04-08

## 2019-04-08 RX ORDER — ACETAMINOPHEN 500 MG
1000 TABLET ORAL
Status: CANCELLED | OUTPATIENT
Start: 2019-04-08

## 2019-04-08 RX ORDER — HEPARIN 100 UNIT/ML
100 SYRINGE INTRAVENOUS
Status: CANCELLED | OUTPATIENT
Start: 2019-04-08

## 2019-04-08 RX ORDER — SODIUM CHLORIDE 0.9 % (FLUSH) 0.9 %
10 SYRINGE (ML) INJECTION
Status: CANCELLED | OUTPATIENT
Start: 2019-04-08

## 2019-04-08 RX ORDER — FAMOTIDINE 20 MG/50ML
20 INJECTION, SOLUTION INTRAVENOUS
Status: CANCELLED
Start: 2019-04-08

## 2019-04-08 RX ORDER — METHYLPREDNISOLONE SOD SUCC 125 MG
100 VIAL (EA) INJECTION
Status: COMPLETED | OUTPATIENT
Start: 2019-04-08 | End: 2019-04-08

## 2019-04-08 RX ADMIN — METHYLPREDNISOLONE SODIUM SUCCINATE 100 MG: 125 INJECTION, POWDER, FOR SOLUTION INTRAMUSCULAR; INTRAVENOUS at 09:04

## 2019-04-08 RX ADMIN — FAMOTIDINE 20 MG: 20 INJECTION, SOLUTION INTRAVENOUS at 09:04

## 2019-04-08 RX ADMIN — ACETAMINOPHEN 1000 MG: 500 TABLET ORAL at 09:04

## 2019-04-08 RX ADMIN — DIPHENHYDRAMINE HYDROCHLORIDE 50 MG: 50 INJECTION, SOLUTION INTRAMUSCULAR; INTRAVENOUS at 09:04

## 2019-04-08 NOTE — PLAN OF CARE
Problem: Fall Injury Risk  Goal: Absence of Fall and Fall-Related Injury    Intervention: Promote Injury-Free Environment  Instructed pt to call for assistance when ambulating

## 2019-04-08 NOTE — PLAN OF CARE
"Problem: Adult Inpatient Plan of Care  Goal: Plan of Care Review  Outcome: Ongoing (interventions implemented as appropriate)  Pt states, " I always feel tired and I am not sure if this medication is helping"      "

## 2019-04-08 NOTE — DISCHARGE INSTRUCTIONS
.  [facility]  [address] [facility]  [address]  [phone] phone     [phone] fax  Hours of Operation: Monday- Friday 8:00am- 5:00pm  After hours phone  [phone]  Hematology / Oncology Physicians on call      Dr. Nikolas Buckner    Please call with any concerns regarding your appointment today.  .FALL PREVENTION   Falls often occur due to slipping, tripping or losing your balance. Here are ways to reduce your risk of falling again.   Was there anything that caused your fall that can be fixed, removed or replaced?   Make your home safe by keeping walkways clear of objects you may trip over.   Use non-slip pads under rugs.   Do not walk in poorly lit areas.   Do not stand on chairs or wobbly ladders.   Use caution when reaching overhead or looking upward. This position can cause a loss of balance.   Be sure your shoes fit properly, have non-slip bottoms and are in good condition.   Be cautious when going up and down stairs, curbs, and when walking on uneven sidewalks.   If your balance is poor, consider using a cane or walker.   If your fall was related to alcohol use, stop or limit alcohol intake.   If your fall was related to use of sleeping medicines, talk to your doctor about this. You may need to reduce your dosage at bedtime if you awaken during the night to go to the bathroom.   To reduce the need for nighttime bathroom trips:   Avoid drinking fluids for several hours before going to bed   Empty your bladder before going to bed   Men can keep a urinal at the bedside   © 1328-3607 [employer], [address] [place] [address]. All rights reserved. This information is not intended as a substitute for professional medical care. Always follow your healthcare professional's instructions.    .WAYS TO HELP PREVENT INFECTION         WASH YOUR HANDS OFTEN DURING THE DAY, ESPECIALLY BEFORE YOU EAT, AFTER USING  THE BATHROOM, AND AFTER TOUCHING ANIMALS     STAY AWAY FROM PEOPLE WHO HAVE ILLNESSES YOU CAN CATCH; SUCH AS COLDS, FLU, CHICKEN POX     TRY TO AVOID CROWDS     STAY AWAY FROM CHILDREN WHO RECENTLY HAVE RECEIVED LIVE VIRUS VACCINES     MAINTAIN GOOD MOUTH CARE     DO NOT SQUEEZE OR SCRATCH PIMPLES     CLEAN CUTS & SCRAPES RIGHT AWAY AND DAILY UNTIL HEALED WITH WARM WATER, SOAP & AN ANTISEPTIC     AVOID CONTACT WITH LITTER BOXES, BIRD CAGES, & FISH TANKS     AVOID STANDING WATER, IE., BIRD BATHS, FLOWER POTS/VASES, OR HUMIDIFIERS     WEAR GLOVES WHEN GARDENING OR CLEANING UP AFTER OTHERS, ESPECIALLY BABIES & SMALL CHILDREN     DO NOT EAT RAW FISH, SEAFOOD, MEAT, OR EGGS  .HOME CARE AFTER CHEMOTHERAPY   Meals   Many patients feel sick and lose their appetites during treatment. Eat small meals several times a day. Choose bland foods with little taste or smell if you have problems with nausea. Be sure to cook all food thoroughly. This kills bacteria and helps you avoid intestinal infection. Soft foods are easier to swallow and digest.   Activity   Exercise keeps you strong and keeps your heart and lungs active. Talk to your doctor about an appropriate exercise program for you.   Skin Care   To prevent a skin infection, bathe or shower once a day. Use a moisturizing soap and wash with warm water. Avoid very hot or cold water. Chemotherapy can make your skin dry . Apply moisturizing lotion to help relieve dry skin. Some drugs used in high doses can cause slight burns to appear (like sunburn). Ask for a special cream to help relieve the burn and protect your skin.   Prevent Mouth Sores   During chemotherapy, many people get mouth sores. Do the following to help prevent mouth sores or to ease discomfort.   Brush your teeth with a soft-bristle toothbrush after every meal.  Don't use dental floss if your platelet count is below 50,000. Your doctor or nurse will tell you if this is the case.  Use an oral swab or  "special soft toothbrush if your gums bleed during regular brushing.  Use mouthwash as directed. If you can't tolerate commercial mouthwash, use salt and baking soda to clean your mouth. Mix 1 teaspoon of salt and 1 teaspoon of baking soda into a glass of water. Swish and spit.  Call your doctor or return to this facility if you develop any of the following:   Sore throat   White patches in the mouth or throat   Fever of 100.4ºF (38ºC) or higher, or as directed by your healthcare provider  © 3297-8222 [employer] [facility], [address] [place] [address], [place] [address]. All rights reserved. This information is not intended as a substitute for professional medical care. Always follow your healthcare professional's     .Support Groups/Classes    Support groups and classes are being offered at the   [facility] and [facility]!!    "Cooking with Cancer" (Nutrition Class):  Second Wednesday of each month   at noon at the [facility].  Metastatic Support Group:  Third Tuesday of each month   at noon at the [facility].  Next Steps Class/Group: Second and fourth Thursday of each month at noon at the [facility].  [facility] (Breast Cancer Support Group): First Tuesday of each month   at 5:30pm at the [facility] location.  Vanessa [facility]: [facility]: Second and third Tuesday of each month from 7:30am - 2pm.  [facility]: First and fourth Tuesday of each month from 7:30am - 2pm    If you are interested in attending or would like more information please ask our social workers or your nurse!    "

## 2019-04-17 ENCOUNTER — TELEPHONE (OUTPATIENT)
Dept: NEUROLOGY | Facility: CLINIC | Age: 59
End: 2019-04-17

## 2019-04-17 ENCOUNTER — PATIENT MESSAGE (OUTPATIENT)
Dept: NEUROLOGY | Facility: CLINIC | Age: 59
End: 2019-04-17

## 2019-04-17 DIAGNOSIS — R39.9 UTI SYMPTOMS: ICD-10-CM

## 2019-04-17 DIAGNOSIS — G35 MULTIPLE SCLEROSIS: Primary | ICD-10-CM

## 2019-04-17 NOTE — TELEPHONE ENCOUNTER
----- Message from Katherin Miller sent at 4/17/2019 11:14 AM CDT -----  Contact: self @ [phone]  Pt cancelled her appt with Rimma on yesterday, 4-16-19.  Pt is calling to have the appt rescheduled.  Pls call with a new appt.

## 2019-04-18 ENCOUNTER — LAB VISIT (OUTPATIENT)
Dept: LAB | Facility: HOSPITAL | Age: 59
End: 2019-04-18
Attending: PSYCHIATRY & NEUROLOGY
Payer: MEDICARE

## 2019-04-18 DIAGNOSIS — R39.9 UTI SYMPTOMS: ICD-10-CM

## 2019-04-18 DIAGNOSIS — G35 MULTIPLE SCLEROSIS: ICD-10-CM

## 2019-04-18 LAB
ALBUMIN SERPL BCP-MCNC: 3.6 G/DL (ref 3.5–5.2)
ALP SERPL-CCNC: 85 U/L (ref 55–135)
ALT SERPL W/O P-5'-P-CCNC: 16 U/L (ref 10–44)
ANION GAP SERPL CALC-SCNC: 8 MMOL/L (ref 8–16)
AST SERPL-CCNC: 29 U/L (ref 10–40)
BASOPHILS # BLD AUTO: 0.04 K/UL (ref 0–0.2)
BASOPHILS NFR BLD: 1 % (ref 0–1.9)
BILIRUB SERPL-MCNC: 0.3 MG/DL (ref 0.1–1)
BUN SERPL-MCNC: 14 MG/DL (ref 6–20)
CALCIUM SERPL-MCNC: 9.7 MG/DL (ref 8.7–10.5)
CHLORIDE SERPL-SCNC: 108 MMOL/L (ref 95–110)
CO2 SERPL-SCNC: 27 MMOL/L (ref 23–29)
CREAT SERPL-MCNC: 1 MG/DL (ref 0.5–1.4)
DIFFERENTIAL METHOD: ABNORMAL
EOSINOPHIL # BLD AUTO: 0 K/UL (ref 0–0.5)
EOSINOPHIL NFR BLD: 1 % (ref 0–8)
ERYTHROCYTE [DISTWIDTH] IN BLOOD BY AUTOMATED COUNT: 13.2 % (ref 11.5–14.5)
ERYTHROCYTE [SEDIMENTATION RATE] IN BLOOD BY WESTERGREN METHOD: 40 MM/HR (ref 0–36)
EST. GFR  (AFRICAN AMERICAN): >60 ML/MIN/1.73 M^2
EST. GFR  (NON AFRICAN AMERICAN): >60 ML/MIN/1.73 M^2
GLUCOSE SERPL-MCNC: 73 MG/DL (ref 70–110)
HCT VFR BLD AUTO: 40.8 % (ref 37–48.5)
HGB BLD-MCNC: 12.8 G/DL (ref 12–16)
IMM GRANULOCYTES # BLD AUTO: 0.01 K/UL (ref 0–0.04)
IMM GRANULOCYTES NFR BLD AUTO: 0.3 % (ref 0–0.5)
LYMPHOCYTES # BLD AUTO: 1.6 K/UL (ref 1–4.8)
LYMPHOCYTES NFR BLD: 41.6 % (ref 18–48)
MCH RBC QN AUTO: 31.5 PG (ref 27–31)
MCHC RBC AUTO-ENTMCNC: 31.4 G/DL (ref 32–36)
MCV RBC AUTO: 101 FL (ref 82–98)
MONOCYTES # BLD AUTO: 0.6 K/UL (ref 0.3–1)
MONOCYTES NFR BLD: 14.2 % (ref 4–15)
NEUTROPHILS # BLD AUTO: 1.6 K/UL (ref 1.8–7.7)
NEUTROPHILS NFR BLD: 41.9 % (ref 38–73)
NRBC BLD-RTO: 0 /100 WBC
PLATELET # BLD AUTO: 245 K/UL (ref 150–350)
PMV BLD AUTO: 11 FL (ref 9.2–12.9)
POTASSIUM SERPL-SCNC: 4.2 MMOL/L (ref 3.5–5.1)
PROT SERPL-MCNC: 7.3 G/DL (ref 6–8.4)
RBC # BLD AUTO: 4.06 M/UL (ref 4–5.4)
SODIUM SERPL-SCNC: 143 MMOL/L (ref 136–145)
WBC # BLD AUTO: 3.87 K/UL (ref 3.9–12.7)

## 2019-04-18 PROCEDURE — 85025 COMPLETE CBC W/AUTO DIFF WBC: CPT | Mod: HCNC

## 2019-04-18 PROCEDURE — 36415 COLL VENOUS BLD VENIPUNCTURE: CPT | Mod: HCNC

## 2019-04-18 PROCEDURE — 80053 COMPREHEN METABOLIC PANEL: CPT | Mod: HCNC

## 2019-04-18 PROCEDURE — 85652 RBC SED RATE AUTOMATED: CPT | Mod: HCNC

## 2019-04-24 ENCOUNTER — OFFICE VISIT (OUTPATIENT)
Dept: NEUROLOGY | Facility: CLINIC | Age: 59
End: 2019-04-24
Payer: MEDICARE

## 2019-04-24 ENCOUNTER — TELEPHONE (OUTPATIENT)
Dept: NEUROLOGY | Facility: CLINIC | Age: 59
End: 2019-04-24

## 2019-04-24 ENCOUNTER — PATIENT MESSAGE (OUTPATIENT)
Dept: NEUROLOGY | Facility: CLINIC | Age: 59
End: 2019-04-24

## 2019-04-24 VITALS
HEART RATE: 62 BPM | DIASTOLIC BLOOD PRESSURE: 73 MMHG | SYSTOLIC BLOOD PRESSURE: 115 MMHG | WEIGHT: 158.75 LBS | HEIGHT: 63 IN | BODY MASS INDEX: 28.13 KG/M2

## 2019-04-24 DIAGNOSIS — R26.9 GAIT DISTURBANCE: ICD-10-CM

## 2019-04-24 DIAGNOSIS — G35 MULTIPLE SCLEROSIS: ICD-10-CM

## 2019-04-24 DIAGNOSIS — R41.89 COGNITIVE CHANGE: ICD-10-CM

## 2019-04-24 DIAGNOSIS — Z79.899 OTHER LONG TERM (CURRENT) DRUG THERAPY: ICD-10-CM

## 2019-04-24 DIAGNOSIS — R53.83 FATIGUE, UNSPECIFIED TYPE: Primary | ICD-10-CM

## 2019-04-24 DIAGNOSIS — Z29.89 PROPHYLACTIC IMMUNOTHERAPY: ICD-10-CM

## 2019-04-24 DIAGNOSIS — G35 MULTIPLE SCLEROSIS: Primary | ICD-10-CM

## 2019-04-24 DIAGNOSIS — Z71.89 COUNSELING REGARDING GOALS OF CARE: ICD-10-CM

## 2019-04-24 DIAGNOSIS — Z79.899 HIGH RISK MEDICATION USE: ICD-10-CM

## 2019-04-24 PROCEDURE — 99215 OFFICE O/P EST HI 40 MIN: CPT | Mod: HCNC,S$GLB,, | Performed by: CLINICAL NURSE SPECIALIST

## 2019-04-24 PROCEDURE — 3008F BODY MASS INDEX DOCD: CPT | Mod: HCNC,CPTII,S$GLB, | Performed by: CLINICAL NURSE SPECIALIST

## 2019-04-24 PROCEDURE — 99215 PR OFFICE/OUTPT VISIT, EST, LEVL V, 40-54 MIN: ICD-10-PCS | Mod: HCNC,S$GLB,, | Performed by: CLINICAL NURSE SPECIALIST

## 2019-04-24 PROCEDURE — 3008F PR BODY MASS INDEX (BMI) DOCUMENTED: ICD-10-PCS | Mod: HCNC,CPTII,S$GLB, | Performed by: CLINICAL NURSE SPECIALIST

## 2019-04-24 PROCEDURE — 99999 PR PBB SHADOW E&M-EST. PATIENT-LVL IV: ICD-10-PCS | Mod: PBBFAC,HCNC,, | Performed by: CLINICAL NURSE SPECIALIST

## 2019-04-24 PROCEDURE — 99999 PR PBB SHADOW E&M-EST. PATIENT-LVL IV: CPT | Mod: PBBFAC,HCNC,, | Performed by: CLINICAL NURSE SPECIALIST

## 2019-04-24 RX ORDER — AMANTADINE HYDROCHLORIDE 100 MG/1
TABLET ORAL
Qty: 60 TABLET | Refills: 3 | Status: SHIPPED | OUTPATIENT
Start: 2019-04-24 | End: 2019-04-29 | Stop reason: SDUPTHER

## 2019-04-24 NOTE — Clinical Note
Cem Carballo asked about getting signed up for housekeeping assistance through [employer]. Do you know anything about this? Can you or Linette look into this for her? Thanks!Karoline

## 2019-04-24 NOTE — PROGRESS NOTES
"Subjective:       Patient ID: Cem Meyers is a 58 y.o. female who presents today for a fit-in clinic visit for MS.  The history has been provided by the patient. She was last seen by Dr. Smith in January 2019. She is 35 minutes late for her appt today.     MS HPI:  · DMT: Ocrevus  · Taking vitamin D3 as recommended? Yes -  Dose: 4000 units daily   · PT--she did not start; she said that no one ever called her  · Fatigue--Energy level is not where it used to be. She is tired all the time.   · Balance is not good. She is falling once every few weeks. Her walk getting up to clinic today from the parking garage was slower than normal. Walking in general is slow. She does not typically use any assistive devices at home. The cane makes her elbow hurt.   · She feels like she just can't "think myself through basic activities, such as unpacking boxes after her recent move.    · In general, she just doesn't feel well.   · She would like to get housekeeping assistance through [employer].   · She had viral bronchitis in between her 2 Ocrevus infusions.   · She did not tolerate Abilify (took one dose of it). She cannot recall now how it   · She has had more numbness and tingling in her arms, hands, and lower legs. She has a sense of heaviness in her arms and legs.     SOCIAL HISTORY  Social History     Tobacco Use    Smoking status: Never Smoker    Smokeless tobacco: Never Used   Substance Use Topics    Alcohol use: No     Alcohol/week: 0.0 oz    Drug use: No     Living arrangements - the patient lives alone.  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 25 foot timed walk: 7.8 seconds today without assist; was 6.0 seconds at last visit without assist   Timed 25 Foot Walk: 10/24/2016 3/23/2017   Did patient wear an AFO? No No   Was assistive device used? No No   Time for 25 Foot Walk (seconds) 5.5 5.5   Time for 25 Foot Walk (seconds) 5.5 5.3 "     Neurologic Exam     Mental Status   Oriented to person, place, and time.   Follows 3 step commands.   Speech: speech is normal   Level of consciousness: alert  Knowledge: good.   Normal comprehension.     Cranial Nerves     CN III, IV, VI   Extraocular motions are normal.   Nystagmus: none     CN VII   Facial expression full, symmetric.     CN VIII   Hearing: intact    CN IX, X   Palate: symmetric    CN XI   CN XI normal.     CN XII   Tongue deviation: none    Motor Exam   Muscle bulk: normal  Overall muscle tone: normal    Strength   Right neck flexion: 5/5  Left neck flexion: 5/5  Right neck extension: 5/5  Left neck extension: 5/5  Right deltoid: 5/5  Left deltoid: 5/5  Right biceps: 5/5  Left biceps: 4/5  Right triceps: 5/5  Left triceps: 5/5  Right wrist flexion: 5/5  Right wrist extension: 5/5  Right interossei: 5/5  Left interossei: 4/5  Right iliopsoas: 5/5  Left iliopsoas: 1/5  Right quadriceps: 5/5  Left quadriceps: 4/5  Right hamstrin/5  Left hamstrin/5  Right anterior tibial: 5/5  Left anterior tibial: 3/5  Right peroneal: 5/5  Left peroneal: 5/5L wrist extensors/triceps 5-/5     Sensory Exam   Light touch normal.   Right leg light touch: numbness foot.  Left leg light touch: numbness to feet.  Right arm vibration: decreased from fingers  Left arm vibration: decreased from fingers  Right leg vibration: decreased from ankle  Left leg vibration: decreased from ankle    Gait, Coordination, and Reflexes     Gait  Gait: circumduction and wide-based (slightly L hip circumduction)    Coordination   Romberg: positive  Finger to nose coordination: abnormal (L hand)  Heel to shin coordination: abnormal (impaired left heel to right shin)    Tremor   Resting tremor: absent  Action tremor: absent    Reflexes   Right brachioradialis: 2+  Left brachioradialis: 2+  Right biceps: 2+  Left biceps: 2+  Right triceps: 2+  Left triceps: 2+  Right patellar: 3+  Left patellar: 3+  Right achilles: 3+  Left achilles:  3+  Right plantar: equivocal  Left plantar: equivocal    Slow rapid sequential movements on left side.              Imaging:     No new imaging to review today.   Labs:     Lab Results   Component Value Date    [id number] 57 11/29/2017    [id number] 82 09/09/2016    [id number] 62 08/17/2015     Lab Results   Component Value Date    WBC 3.87 (L) 04/18/2019    RBC 4.06 04/18/2019    HGB 12.8 04/18/2019    HCT 40.8 04/18/2019     (H) 04/18/2019    MCH 31.5 (H) 04/18/2019    MCHC 31.4 (L) 04/18/2019    RDW 13.2 04/18/2019     04/18/2019    MPV 11.0 04/18/2019    GRAN 1.6 (L) 04/18/2019    GRAN 41.9 04/18/2019    LYMPH 1.6 04/18/2019    LYMPH 41.6 04/18/2019    MONO 0.6 04/18/2019    MONO 14.2 04/18/2019    EOS 0.0 04/18/2019    BASO 0.04 04/18/2019    EOSINOPHIL 1.0 04/18/2019    BASOPHIL 1.0 04/18/2019     Sodium   Date Value Ref Range Status   04/18/2019 143 136 - 145 mmol/L Final     Potassium   Date Value Ref Range Status   04/18/2019 4.2 3.5 - 5.1 mmol/L Final     Chloride   Date Value Ref Range Status   04/18/2019 108 95 - 110 mmol/L Final     CO2   Date Value Ref Range Status   04/18/2019 27 23 - 29 mmol/L Final     Glucose   Date Value Ref Range Status   04/18/2019 73 70 - 110 mg/dL Final     BUN, Bld   Date Value Ref Range Status   04/18/2019 14 6 - 20 mg/dL Final     Creatinine   Date Value Ref Range Status   04/18/2019 1.0 0.5 - 1.4 mg/dL Final     Calcium   Date Value Ref Range Status   04/18/2019 9.7 8.7 - 10.5 mg/dL Final     Total Protein   Date Value Ref Range Status   04/18/2019 7.3 6.0 - 8.4 g/dL Final     Albumin   Date Value Ref Range Status   04/18/2019 3.6 3.5 - 5.2 g/dL Final     Total Bilirubin   Date Value Ref Range Status   04/18/2019 0.3 0.1 - 1.0 mg/dL Final     Comment:     For infants and newborns, interpretation of results should be based  on gestational age, weight and in agreement with clinical  observations.  Premature Infant recommended reference ranges:  Up to 24  hours.............<8.0 mg/dL  Up to 48 hours............<12.0 mg/dL  3-5 days..................<15.0 mg/dL  6-29 days.................<15.0 mg/dL       Alkaline Phosphatase   Date Value Ref Range Status   04/18/2019 85 55 - 135 U/L Final     AST   Date Value Ref Range Status   04/18/2019 29 10 - 40 U/L Final     ALT   Date Value Ref Range Status   04/18/2019 16 10 - 44 U/L Final     Anion Gap   Date Value Ref Range Status   04/18/2019 8 8 - 16 mmol/L Final     eGFR if    Date Value Ref Range Status   04/18/2019 >60.0 >60 mL/min/1.73 m^2 Final     eGFR if non    Date Value Ref Range Status   04/18/2019 >60.0 >60 mL/min/1.73 m^2 Final     Comment:     Calculation used to obtain the estimated glomerular filtration  rate (eGFR) is the CKD-EPI equation.        Lab Results   Component Value Date    COLORU Yellow 04/18/2019    APPEARANCEUA Clear 04/18/2019    PHUR 7.0 04/18/2019    SPECGRAV <=1.005 04/18/2019    PROTEINUA Negative 04/18/2019    GLUCUA Negative 04/18/2019    KETONESU Negative 04/18/2019    BILIRUBINUA Negative 04/18/2019    OCCULTUA Negative 04/18/2019    NITRITE Negative 04/18/2019    UROBILINOGEN Negative 01/05/2019    LEUKOCYTESUR 1+ (A) 04/18/2019     Diagnosis/Assessment/Plan:    1. Multiple Sclerosis  · Assessment: Cem's walk time is slower, and her left side seems weaker today. Overall, she is having more cognitive issues and worsened fatigue. We may consider giving her some steroids.    · Imaging: may consider new spine MRIs; will discuss with Dr. Smith.   · Disease Modifying Therapies: Continue Ocrevus and Vitamin D for now. She will need CBC, CD20 in August and hepatitis B labs in July. Will check Vitamin D level with next labs.     2. MS Symptom Assessment / Management  · Fatigue: Will start amantadine 100mg twice daily as needed. Rx sent to pharmacy.   · Cognitive: Order for NP testing entered.   · Mood Disorder: Continue Paxil.  · Gait Disturbance: PT  reordered at [facility] today.   · Adaptive Needs: She is interested in getting housekeeping assistance being offered through [employer]. I will ask our , Kait Oseguera, for assistance in setting this up.     Over 50% of this 40 minute visit was spent in direct face to face counseling of the patient about MS, DMT considerations, and MS symptom management. The patient agrees with the plan of care. She will follow up with JASON Bass in 4 weeks.     There are no diagnoses linked to this encounter.

## 2019-04-25 ENCOUNTER — PATIENT MESSAGE (OUTPATIENT)
Dept: NEUROLOGY | Facility: CLINIC | Age: 59
End: 2019-04-25

## 2019-04-25 ENCOUNTER — TELEPHONE (OUTPATIENT)
Dept: PSYCHIATRY | Facility: CLINIC | Age: 59
End: 2019-04-25

## 2019-04-25 DIAGNOSIS — G35 MULTIPLE SCLEROSIS: ICD-10-CM

## 2019-04-25 DIAGNOSIS — R53.83 FATIGUE, UNSPECIFIED TYPE: ICD-10-CM

## 2019-04-25 NOTE — TELEPHONE ENCOUNTER
SW received referral from ALEXANDRE Cornejo to help pt investigate a housekeeping benefit through her [facility] Medicare Advantage plan.  SW did look over benefit booklet but could not locate said services.  Phoned pt and left VM asking that she call back.

## 2019-04-26 ENCOUNTER — HOSPITAL ENCOUNTER (OUTPATIENT)
Dept: RADIOLOGY | Facility: HOSPITAL | Age: 59
Discharge: HOME | End: 2019-04-26
Attending: CLINICAL NURSE SPECIALIST
Payer: MEDICARE

## 2019-04-26 DIAGNOSIS — G35 MULTIPLE SCLEROSIS: ICD-10-CM

## 2019-04-26 PROCEDURE — 72157 MRI THORACIC SPINE DEMYELINATING W W/O CONTRAST: ICD-10-PCS | Mod: 26,HCNC,, | Performed by: RADIOLOGY

## 2019-04-26 PROCEDURE — 72156 MRI NECK SPINE W/O & W/DYE: CPT | Mod: TC,HCNC

## 2019-04-26 PROCEDURE — 72157 MRI CHEST SPINE W/O & W/DYE: CPT | Mod: TC,HCNC

## 2019-04-26 PROCEDURE — A9585 GADOBUTROL INJECTION: HCPCS | Mod: HCNC | Performed by: CLINICAL NURSE SPECIALIST

## 2019-04-26 PROCEDURE — 70553 MRI BRAIN STEM W/O & W/DYE: CPT | Mod: TC,HCNC

## 2019-04-26 PROCEDURE — 70553 MRI BRAIN STEM W/O & W/DYE: CPT | Mod: 26,HCNC,, | Performed by: RADIOLOGY

## 2019-04-26 PROCEDURE — 70553 MRI BRAIN DEMYELINATING W/ WO CONTRAST: ICD-10-PCS | Mod: 26,HCNC,, | Performed by: RADIOLOGY

## 2019-04-26 PROCEDURE — 72157 MRI CHEST SPINE W/O & W/DYE: CPT | Mod: 26,HCNC,, | Performed by: RADIOLOGY

## 2019-04-26 PROCEDURE — 72156 MRI NECK SPINE W/O & W/DYE: CPT | Mod: 26,HCNC,, | Performed by: RADIOLOGY

## 2019-04-26 PROCEDURE — 72156 MRI CERVICAL SPINE DEMYELINATING W W/O CONTRAST: ICD-10-PCS | Mod: 26,HCNC,, | Performed by: RADIOLOGY

## 2019-04-26 PROCEDURE — 25500020 PHARM REV CODE 255: Mod: HCNC | Performed by: CLINICAL NURSE SPECIALIST

## 2019-04-26 RX ORDER — GADOBUTROL 604.72 MG/ML
8 INJECTION INTRAVENOUS
Status: COMPLETED | OUTPATIENT
Start: 2019-04-26 | End: 2019-04-26

## 2019-04-26 RX ADMIN — GADOBUTROL 8 ML: 604.72 INJECTION INTRAVENOUS at 05:04

## 2019-04-29 ENCOUNTER — TELEPHONE (OUTPATIENT)
Dept: PSYCHIATRY | Facility: CLINIC | Age: 59
End: 2019-04-29

## 2019-04-29 ENCOUNTER — TELEPHONE (OUTPATIENT)
Dept: NEUROLOGY | Facility: CLINIC | Age: 59
End: 2019-04-29

## 2019-04-29 RX ORDER — AMANTADINE HYDROCHLORIDE 100 MG/1
TABLET ORAL
Qty: 60 TABLET | Refills: 3 | Status: SHIPPED | OUTPATIENT
Start: 2019-04-29 | End: 2019-06-03 | Stop reason: SDUPTHER

## 2019-04-29 NOTE — TELEPHONE ENCOUNTER
Spoke with pt by phone about [employer] homemaker services.  SW explained that this is not a free benefit offered by [employer], but rather a service ([employer]) that helps connect their members to private pay services in the community.  Pt is also not eligible for homemaker services through the [place] on A[employer].

## 2019-05-02 ENCOUNTER — PATIENT MESSAGE (OUTPATIENT)
Dept: NEUROLOGY | Facility: CLINIC | Age: 59
End: 2019-05-02

## 2019-05-13 ENCOUNTER — PATIENT MESSAGE (OUTPATIENT)
Dept: INTERNAL MEDICINE | Facility: CLINIC | Age: 59
End: 2019-05-13

## 2019-05-13 ENCOUNTER — PATIENT MESSAGE (OUTPATIENT)
Dept: NEUROLOGY | Facility: CLINIC | Age: 59
End: 2019-05-13

## 2019-05-17 ENCOUNTER — PATIENT MESSAGE (OUTPATIENT)
Dept: INTERNAL MEDICINE | Facility: CLINIC | Age: 59
End: 2019-05-17

## 2019-05-17 ENCOUNTER — TELEPHONE (OUTPATIENT)
Dept: INTERNAL MEDICINE | Facility: CLINIC | Age: 59
End: 2019-05-17

## 2019-05-17 DIAGNOSIS — E03.8 OTHER SPECIFIED HYPOTHYROIDISM: Primary | Chronic | ICD-10-CM

## 2019-05-17 NOTE — TELEPHONE ENCOUNTER
Per Hilda, pharmacist at patient's preferred [facility], patient requesting the pended rx refill.    Dr. Stubbs: Please see pended request. Confirmed patient's preferred pharmacy, allergies, and current medications.

## 2019-05-17 NOTE — TELEPHONE ENCOUNTER
----- Message from Kym Mcgrath sent at 5/17/2019 11:25 AM CDT -----  Contact: Iker Mcqueen  Type:  Pharmacy Calling to Clarify an RX    Name of Caller: Chitra  Pharmacy Name: [facility]  Prescription Name: Levothyroxine  What do they need to clarify?: refill authorization  Best Call Back Number: [phone]  Additional Information: Pt is out of medication.    Thanks,   Kym Mcgrath

## 2019-05-20 ENCOUNTER — PATIENT MESSAGE (OUTPATIENT)
Dept: NEUROLOGY | Facility: CLINIC | Age: 59
End: 2019-05-20

## 2019-05-20 RX ORDER — LEVOTHYROXINE SODIUM 75 UG/1
75 TABLET ORAL EVERY MORNING
Qty: 90 TABLET | Refills: 0 | Status: SHIPPED | OUTPATIENT
Start: 2019-05-20 | End: 2019-08-16 | Stop reason: SDUPTHER

## 2019-05-21 ENCOUNTER — PATIENT MESSAGE (OUTPATIENT)
Dept: INTERNAL MEDICINE | Facility: CLINIC | Age: 59
End: 2019-05-21

## 2019-05-22 DIAGNOSIS — M81.0 AGE-RELATED OSTEOPOROSIS WITHOUT CURRENT PATHOLOGICAL FRACTURE: Primary | ICD-10-CM

## 2019-05-24 ENCOUNTER — OFFICE VISIT (OUTPATIENT)
Dept: INTERNAL MEDICINE | Facility: CLINIC | Age: 59
End: 2019-05-24
Payer: MEDICARE

## 2019-05-24 ENCOUNTER — LAB VISIT (OUTPATIENT)
Dept: LAB | Facility: HOSPITAL | Age: 59
End: 2019-05-24
Attending: FAMILY MEDICINE
Payer: MEDICARE

## 2019-05-24 VITALS
SYSTOLIC BLOOD PRESSURE: 110 MMHG | OXYGEN SATURATION: 96 % | BODY MASS INDEX: 27.88 KG/M2 | DIASTOLIC BLOOD PRESSURE: 78 MMHG | WEIGHT: 157.44 LBS | TEMPERATURE: 95 F | HEART RATE: 63 BPM

## 2019-05-24 DIAGNOSIS — Z00.00 ANNUAL PHYSICAL EXAM: ICD-10-CM

## 2019-05-24 DIAGNOSIS — Z00.00 ANNUAL PHYSICAL EXAM: Primary | ICD-10-CM

## 2019-05-24 DIAGNOSIS — I50.32 CHRONIC DIASTOLIC HEART FAILURE: ICD-10-CM

## 2019-05-24 DIAGNOSIS — F32.0 MAJOR DEPRESSIVE DISORDER, SINGLE EPISODE, MILD: ICD-10-CM

## 2019-05-24 LAB
CHOLEST SERPL-MCNC: 169 MG/DL (ref 120–199)
CHOLEST/HDLC SERPL: 3.4 {RATIO} (ref 2–5)
HDLC SERPL-MCNC: 50 MG/DL (ref 40–75)
HDLC SERPL: 29.6 % (ref 20–50)
LDLC SERPL CALC-MCNC: 104.8 MG/DL (ref 63–159)
NONHDLC SERPL-MCNC: 119 MG/DL
T4 FREE SERPL-MCNC: 0.9 NG/DL (ref 0.71–1.51)
TRIGL SERPL-MCNC: 71 MG/DL (ref 30–150)
TSH SERPL DL<=0.005 MIU/L-ACNC: 2.04 UIU/ML (ref 0.4–4)

## 2019-05-24 PROCEDURE — 36415 COLL VENOUS BLD VENIPUNCTURE: CPT | Mod: HCNC

## 2019-05-24 PROCEDURE — 99396 PR PREVENTIVE VISIT,EST,40-64: ICD-10-PCS | Mod: HCNC,S$GLB,, | Performed by: FAMILY MEDICINE

## 2019-05-24 PROCEDURE — 84443 ASSAY THYROID STIM HORMONE: CPT | Mod: HCNC

## 2019-05-24 PROCEDURE — 99396 PREV VISIT EST AGE 40-64: CPT | Mod: HCNC,S$GLB,, | Performed by: FAMILY MEDICINE

## 2019-05-24 PROCEDURE — 99999 PR PBB SHADOW E&M-EST. PATIENT-LVL III: CPT | Mod: PBBFAC,HCNC,, | Performed by: FAMILY MEDICINE

## 2019-05-24 PROCEDURE — 80061 LIPID PANEL: CPT | Mod: HCNC

## 2019-05-24 PROCEDURE — 84439 ASSAY OF FREE THYROXINE: CPT | Mod: HCNC

## 2019-05-24 PROCEDURE — 99999 PR PBB SHADOW E&M-EST. PATIENT-LVL III: ICD-10-PCS | Mod: PBBFAC,HCNC,, | Performed by: FAMILY MEDICINE

## 2019-05-24 NOTE — PROGRESS NOTES
Subjective:       Patient ID: Cem Meyers is a 58 y.o. female.    Chief Complaint: Follow-up    Annual exam:      Pt is a 58 year old who is here for annual. Pt has MS and is stable on thyroid medications as well as Paxil for depression    Review of Systems   Constitutional: Positive for activity change. Negative for unexpected weight change.   HENT: Negative for hearing loss, rhinorrhea and trouble swallowing.    Eyes: Negative for discharge and visual disturbance.   Respiratory: Negative for chest tightness and wheezing.    Cardiovascular: Negative for chest pain and palpitations.   Gastrointestinal: Negative for blood in stool, constipation, diarrhea and vomiting.   Endocrine: Negative for polydipsia and polyuria.   Genitourinary: Negative for difficulty urinating, dysuria, hematuria and menstrual problem.   Musculoskeletal: Negative for arthralgias, joint swelling and neck pain.   Neurological: Negative for weakness and headaches.   Psychiatric/Behavioral: Negative for confusion and dysphoric mood.       Objective:      Physical Exam   Constitutional: She is oriented to person, place, and time. She appears well-developed and well-nourished.   Cardiovascular: Normal rate and regular rhythm. Exam reveals no friction rub.   No murmur heard.  Pulmonary/Chest: Effort normal and breath sounds normal. No stridor. She has no wheezes.   Abdominal: Soft. Bowel sounds are normal. There is no tenderness. There is no guarding.   Neurological: She is alert and oriented to person, place, and time.   Skin: Skin is warm and dry.       Assessment:       1. Annual physical exam    2. Major depressive disorder, single episode, mild    3. Chronic diastolic heart failure        Plan:       Annual physical exam  Comments:  Will do Lipid and TSH in Oct.  Orders:  -     T4, free; Future; Expected date: 05/24/2019  -     TSH; Future; Expected date: 05/24/2019  -     Lipid panel; Future; Expected date: 05/24/2019    Major  depressive disorder, single episode, mild  Comments:  Stable on paxil    Chronic diastolic heart failure  Comments:  stable

## 2019-05-31 ENCOUNTER — TELEPHONE (OUTPATIENT)
Dept: PSYCHIATRY | Facility: CLINIC | Age: 59
End: 2019-05-31

## 2019-05-31 NOTE — TELEPHONE ENCOUNTER
SW intern spoke with pt by phone. Pt explained that her copay for PT would be ~$15/visit. Pt stated that she looked into receiving PT through [facility]. Pt is looking for financial assistance program ideas. This SW intern stated that she would be back in touch with pt later on.

## 2019-06-03 ENCOUNTER — PATIENT MESSAGE (OUTPATIENT)
Dept: NEUROLOGY | Facility: CLINIC | Age: 59
End: 2019-06-03

## 2019-06-03 DIAGNOSIS — R53.83 FATIGUE, UNSPECIFIED TYPE: ICD-10-CM

## 2019-06-03 DIAGNOSIS — G35 MULTIPLE SCLEROSIS: ICD-10-CM

## 2019-06-03 RX ORDER — AMANTADINE HYDROCHLORIDE 100 MG/1
100 TABLET ORAL DAILY
Qty: 30 TABLET | Refills: 2 | Status: SHIPPED | OUTPATIENT
Start: 2019-06-03 | End: 2020-07-06

## 2019-06-04 ENCOUNTER — OFFICE VISIT (OUTPATIENT)
Dept: INTERNAL MEDICINE | Facility: CLINIC | Age: 59
End: 2019-06-04
Payer: MEDICARE

## 2019-06-04 VITALS
DIASTOLIC BLOOD PRESSURE: 76 MMHG | SYSTOLIC BLOOD PRESSURE: 110 MMHG | HEART RATE: 66 BPM | WEIGHT: 158.06 LBS | TEMPERATURE: 98 F | BODY MASS INDEX: 28 KG/M2 | HEIGHT: 63 IN

## 2019-06-04 DIAGNOSIS — M62.838 MUSCLE SPASMS OF BOTH LOWER EXTREMITIES: ICD-10-CM

## 2019-06-04 DIAGNOSIS — E55.9 VITAMIN D INSUFFICIENCY: ICD-10-CM

## 2019-06-04 DIAGNOSIS — M81.0 AGE-RELATED OSTEOPOROSIS WITHOUT CURRENT PATHOLOGICAL FRACTURE: ICD-10-CM

## 2019-06-04 DIAGNOSIS — G47.33 OSA ON CPAP: Chronic | ICD-10-CM

## 2019-06-04 DIAGNOSIS — D84.9 IMMUNOSUPPRESSION: ICD-10-CM

## 2019-06-04 DIAGNOSIS — E78.49 OTHER HYPERLIPIDEMIA: Chronic | ICD-10-CM

## 2019-06-04 DIAGNOSIS — G62.9 POLYNEUROPATHY: ICD-10-CM

## 2019-06-04 DIAGNOSIS — R26.9 NEUROLOGIC GAIT DYSFUNCTION: ICD-10-CM

## 2019-06-04 DIAGNOSIS — I50.32 CHRONIC DIASTOLIC HEART FAILURE: ICD-10-CM

## 2019-06-04 DIAGNOSIS — G35 MS (MULTIPLE SCLEROSIS): Chronic | ICD-10-CM

## 2019-06-04 DIAGNOSIS — F51.04 PSYCHOPHYSIOLOGICAL INSOMNIA: ICD-10-CM

## 2019-06-04 DIAGNOSIS — E03.8 OTHER SPECIFIED HYPOTHYROIDISM: Chronic | ICD-10-CM

## 2019-06-04 DIAGNOSIS — F32.0 MAJOR DEPRESSIVE DISORDER, SINGLE EPISODE, MILD: ICD-10-CM

## 2019-06-04 DIAGNOSIS — Z86.010 HISTORY OF COLON POLYPS: ICD-10-CM

## 2019-06-04 DIAGNOSIS — Z00.00 ENCOUNTER FOR PREVENTIVE HEALTH EXAMINATION: Primary | ICD-10-CM

## 2019-06-04 PROCEDURE — G0439 PR MEDICARE ANNUAL WELLNESS SUBSEQUENT VISIT: ICD-10-PCS | Mod: HCNC,S$GLB,, | Performed by: PHYSICIAN ASSISTANT

## 2019-06-04 PROCEDURE — 99499 UNLISTED E&M SERVICE: CPT | Mod: HCNC,S$GLB,, | Performed by: PHYSICIAN ASSISTANT

## 2019-06-04 PROCEDURE — G0439 PPPS, SUBSEQ VISIT: HCPCS | Mod: HCNC,S$GLB,, | Performed by: PHYSICIAN ASSISTANT

## 2019-06-04 PROCEDURE — 99999 PR PBB SHADOW E&M-EST. PATIENT-LVL III: ICD-10-PCS | Mod: PBBFAC,HCNC,, | Performed by: PHYSICIAN ASSISTANT

## 2019-06-04 PROCEDURE — 99999 PR PBB SHADOW E&M-EST. PATIENT-LVL III: CPT | Mod: PBBFAC,HCNC,, | Performed by: PHYSICIAN ASSISTANT

## 2019-06-04 PROCEDURE — 99499 RISK ADDL DX/OHS AUDIT: ICD-10-PCS | Mod: HCNC,S$GLB,, | Performed by: PHYSICIAN ASSISTANT

## 2019-06-04 NOTE — PATIENT INSTRUCTIONS
Counseling and Referral of Other Preventative  (Italic type indicates deductible and co-insurance are waived)    Patient Name: Cem Meyers  Today's Date: 6/4/2019    Health Maintenance       Date Due Completion Date    DEXA SCAN 09/07/2018 9/7/2016    Override on 3/27/2013: Done (REPEAT 2 YRS)    Override on 1/31/2011: Done    Pneumococcal Vaccine (Highest Risk) (2 of 3 - PPSV23) 04/02/2019 2/5/2019    Influenza Vaccine 08/01/2019 10/3/2018    Override on 10/30/2017: [facility]    [facility]14[facility]    Mammogram 09/14/2019 9/14/2018    Override on 10/24/2012: (N/S)    Override on 10/18/2011: Done    Lipid Panel 05/24/2020 5/24/2019    Colonoscopy 09/01/2020 9/1/2017    Pap Smear with HPV Cotest 08/09/2021 8/9/2018    TETANUS VACCINE 09/07/2022 9/7/2012        No orders of the defined types were placed in this encounter.    The following information is provided to all patients.  This information is to help you find resources for any of the problems found today that may be affecting your health:                Living healthy guide: www.[facility].[place].gov      Understanding Diabetes: www.diabetes.org      Eating healthy: www.cdc.gov/healthyweight      [facility] home safety checklist: www.cdc.gov/steadi/patient.html      Agency on Aging: www.goea.[place].[facility]      Alcoholics anonymous (AA): www.aa.org      Physical Activity: www.geraldo.nih.gov/qm2qndn      Tobacco use: www.quitwithusla.org

## 2019-06-04 NOTE — PROGRESS NOTES
"Cem Meyers presented for a  Medicare AWV and comprehensive Health Risk Assessment today. The following components were reviewed and updated:    · Medical history  · Family History  · Social history  · Allergies and Current Medications  · Health Risk Assessment  · Health Maintenance  · Care Team     ** See Completed Assessments for Annual Wellness Visit within the encounter summary.**       The following assessments were completed:  · Living Situation  · CAGE  · Depression Screening  · Timed Get Up and Go  · Whisper Test  · Cognitive Function Screening  · Nutrition Screening  · ADL Screening  · PAQ Screening    Patient Care Team:  Adonis Stubbs MD as PCP - General (Family Medicine)  Too Chang MD as Consulting Physician (Rheumatology)  Madisyn Sullivan NP as Nurse Practitioner (Pulmonary Disease)  Rebeka Smith MD as Consulting Physician (Neurology)  Spencer Corey MD as Consulting Physician (Cardiology)  Winter Khalil MD as Consulting Physician (Dermatology)  Aure Martinez MD as Consulting Physician (Gynecology)  Jennifer Downey NP as Nurse Practitioner (Gynecology)  Rimma Rouse PA-C as Physician Assistant (Neurology)    Vitals:    06/04/19 1110   BP: 110/76   Pulse: 66   Temp: 98.3 °F (36.8 °C)   Weight: 71.7 kg (158 lb 1.1 oz)   Height: 5' 3" (1.6 m)     Body mass index is 28 kg/m².     Physical Exam   Constitutional: She is oriented to person, place, and time. She appears well-developed and well-nourished. No distress.   HENT:   Head: Normocephalic and atraumatic.   Eyes: EOM are normal. No scleral icterus.   Neck: Neck supple.   Cardiovascular: Normal rate and regular rhythm.   Pulmonary/Chest: Effort normal and breath sounds normal. No stridor. No respiratory distress. She has no decreased breath sounds. She has no wheezes. She has no rhonchi. She has no rales.   Musculoskeletal: Normal range of motion.   Neurological: She is alert and oriented to person, place, and time.   Skin: " Skin is warm and dry. No rash noted.   Psychiatric: She has a normal mood and affect. Her speech is normal and behavior is normal. Thought content normal.         Diagnoses and health risks identified today and associated recommendations/orders:    1. Encounter for preventive health examination    2. Chronic diastolic heart failure  Stable. 2D Echo 2/7/18. Continue current treatment plan as prescribed by your PCP and cardiologist and f/u with them for further management.    3. Major depressive disorder, single episode, mild  Stable. PHQ-2 score today = 0. Pt taking Paxil. Continue current treatment plan as prescribed by your PCP and f/u with your PCP for further management.    4. MS (multiple sclerosis)  Stable. Brain MRI 4/26/19. Pt taking amantadine and Rebif. Continue current treatment plan as prescribed by your PCP and neurologist and f/u with them for further management.    5. Muscle spasms of both lower extremities  Stable. Pt taking baclofen. Continue current treatment plan as prescribed by your PCP and neurologist and f/u with them for further management.    6. Neurologic gait dysfunction  Stable. See dx # 4 above. Pt reports she uses a cane PRN. Continue current treatment plan as prescribed by your PCP and neurologist and f/u with them for further management.    7. Immunosuppression  Stable. See dx # 4 above. Continue current treatment plan as prescribed by your PCP and neurologist and f/u with them for further management.    8. Other hyperlipidemia  Stable. Pt taking simvastatin. Continue current treatment plan as prescribed by your PCP and cardiologist and f/u with them for further management.  Component      Latest Ref Rng & Units 5/24/2019 12/4/2018   Cholesterol      120 - 199 mg/dL 169 173   Triglycerides      30 - 150 mg/dL 71 77   HDL      40 - 75 mg/dL 50 53   LDL Cholesterol External      63.0 - 159.0 mg/dL 104.8 104.6   Hdl/Cholesterol Ratio      20.0 - 50.0 % 29.6 30.6   Total Cholesterol/HDL  Ratio      2.0 - 5.0 3.4 3.3   Non-HDL Cholesterol      mg/dL 119 120     9. Polyneuropathy  Stable. Pt taking gabapentin. Continue current treatment plan as prescribed by your PCP and neurologist and f/u with them for further management.    10. Other specified hypothyroidism  Controlled. Pt taking Synthroid. Continue current treatment plan as prescribed by your PCP and f/u with your PCP for further management.  Component      Latest Ref Rng & Units 5/24/2019   TSH      0.400 - 4.000 uIU/mL 2.039     11. Vitamin D insufficiency  Controlled on most recent available labs. Pt taking vit D suppl. Continue current treatment plan as prescribed by your PCP and f/u with your PCP for further management.  Component      Latest Ref Rng & Units 11/29/2017   Vit D, 25-Hydroxy      30 - 96 ng/mL 57     12. Age-related osteoporosis without current pathological fracture  Stable. Pt with intolerance to bisphosphonate, now on Prolia, Ca suppl, and vit D suppl. Continue current treatment plan as prescribed by your PCP and rheumatologist and f/u with them for further management.    13. MAYKEL on CPAP, 14. Psychophysiological insomnia  Stable. Sleep study 4/16/18. Pt using CPAP. Pt reports she has no difficulty sleeping currently. Continue current treatment plan as prescribed by your PCP and pulmonologist and f/u with them for further management.    15. History of colon polyps  Stable. Colonoscopy 9/1/17 with recommended repeat in 3 years. Continue current treatment plan as prescribed by your PCP and f/u with your PCP for further management.    Please obtain any medical records from past / present outside medical providers and give to PCP for review and further medical recommendations.    Provided Cem with a 5-10 year written screening schedule and personal prevention plan. Recommendations were developed using the USPSTF age appropriate recommendations. Education, counseling, and referrals were provided as needed. After Visit Summary  printed and given to patient which includes a list of additional screenings\tests needed.    Follow up in about 1 year (around 6/4/2020) for HRA. F/u with PCP Dr. Stubbs and specialists as recommended for health management - pt to schedule appts as discussed. Will forward today's note to PCP as well as neuro Rimma Rouse PA-C as per pt request.    JASON Caruso     I offered to discuss end of life issues, including information on how to make advance directives that the patient could use to name someone who would make medical decisions on their behalf if they became too ill to make themselves.    ___Patient declined  _X_Patient is interested, I provided paper work and offered to discuss.

## 2019-06-05 ENCOUNTER — INFUSION (OUTPATIENT)
Dept: RHEUMATOLOGY | Facility: HOSPITAL | Age: 59
End: 2019-06-05
Attending: PSYCHIATRY & NEUROLOGY
Payer: MEDICARE

## 2019-06-05 ENCOUNTER — LAB VISIT (OUTPATIENT)
Dept: LAB | Facility: HOSPITAL | Age: 59
End: 2019-06-05
Attending: PSYCHIATRY & NEUROLOGY
Payer: MEDICARE

## 2019-06-05 ENCOUNTER — OFFICE VISIT (OUTPATIENT)
Dept: RHEUMATOLOGY | Facility: CLINIC | Age: 59
End: 2019-06-05
Payer: MEDICARE

## 2019-06-05 VITALS
DIASTOLIC BLOOD PRESSURE: 79 MMHG | HEIGHT: 63 IN | WEIGHT: 159.19 LBS | SYSTOLIC BLOOD PRESSURE: 117 MMHG | BODY MASS INDEX: 28.21 KG/M2 | HEART RATE: 63 BPM

## 2019-06-05 VITALS — BODY MASS INDEX: 28.2 KG/M2 | WEIGHT: 159.19 LBS

## 2019-06-05 DIAGNOSIS — M81.0 AGE-RELATED OSTEOPOROSIS WITHOUT CURRENT PATHOLOGICAL FRACTURE: Primary | ICD-10-CM

## 2019-06-05 DIAGNOSIS — M81.0 AGE-RELATED OSTEOPOROSIS WITHOUT CURRENT PATHOLOGICAL FRACTURE: ICD-10-CM

## 2019-06-05 DIAGNOSIS — M71.329 BURSAL CYST OF OLECRANON: ICD-10-CM

## 2019-06-05 LAB
ALBUMIN SERPL BCP-MCNC: 3.7 G/DL (ref 3.5–5.2)
ALP SERPL-CCNC: 73 U/L (ref 55–135)
ALT SERPL W/O P-5'-P-CCNC: 20 U/L (ref 10–44)
ANION GAP SERPL CALC-SCNC: 9 MMOL/L (ref 8–16)
AST SERPL-CCNC: 34 U/L (ref 10–40)
BILIRUB SERPL-MCNC: 0.4 MG/DL (ref 0.1–1)
BUN SERPL-MCNC: 11 MG/DL (ref 6–20)
CALCIUM SERPL-MCNC: 10.1 MG/DL (ref 8.7–10.5)
CHLORIDE SERPL-SCNC: 106 MMOL/L (ref 95–110)
CO2 SERPL-SCNC: 27 MMOL/L (ref 23–29)
CREAT SERPL-MCNC: 1.1 MG/DL (ref 0.5–1.4)
EST. GFR  (AFRICAN AMERICAN): >60 ML/MIN/1.73 M^2
EST. GFR  (NON AFRICAN AMERICAN): 56 ML/MIN/1.73 M^2
GLUCOSE SERPL-MCNC: 93 MG/DL (ref 70–110)
POTASSIUM SERPL-SCNC: 4 MMOL/L (ref 3.5–5.1)
PROT SERPL-MCNC: 7.5 G/DL (ref 6–8.4)
SODIUM SERPL-SCNC: 142 MMOL/L (ref 136–145)

## 2019-06-05 PROCEDURE — 20605 DRAIN/INJ JOINT/BURSA W/O US: CPT | Mod: HCNC,RT,S$GLB, | Performed by: INTERNAL MEDICINE

## 2019-06-05 PROCEDURE — 3008F BODY MASS INDEX DOCD: CPT | Mod: HCNC,CPTII,S$GLB, | Performed by: INTERNAL MEDICINE

## 2019-06-05 PROCEDURE — 36415 COLL VENOUS BLD VENIPUNCTURE: CPT | Mod: HCNC

## 2019-06-05 PROCEDURE — 99214 PR OFFICE/OUTPT VISIT, EST, LEVL IV, 30-39 MIN: ICD-10-PCS | Mod: 25,HCNC,S$GLB, | Performed by: INTERNAL MEDICINE

## 2019-06-05 PROCEDURE — 3008F PR BODY MASS INDEX (BMI) DOCUMENTED: ICD-10-PCS | Mod: HCNC,CPTII,S$GLB, | Performed by: INTERNAL MEDICINE

## 2019-06-05 PROCEDURE — 99999 PR PBB SHADOW E&M-EST. PATIENT-LVL III: ICD-10-PCS | Mod: PBBFAC,HCNC,, | Performed by: INTERNAL MEDICINE

## 2019-06-05 PROCEDURE — 96372 THER/PROPH/DIAG INJ SC/IM: CPT | Mod: HCNC

## 2019-06-05 PROCEDURE — 20605 INTERMEDIATE JOINT ASPIRATION/INJECTION: R OLECRANON BURSA: ICD-10-PCS | Mod: HCNC,RT,S$GLB, | Performed by: INTERNAL MEDICINE

## 2019-06-05 PROCEDURE — 63600175 PHARM REV CODE 636 W HCPCS: Mod: HCNC,JG | Performed by: INTERNAL MEDICINE

## 2019-06-05 PROCEDURE — 80053 COMPREHEN METABOLIC PANEL: CPT | Mod: HCNC

## 2019-06-05 PROCEDURE — 99214 OFFICE O/P EST MOD 30 MIN: CPT | Mod: 25,HCNC,S$GLB, | Performed by: INTERNAL MEDICINE

## 2019-06-05 PROCEDURE — 99999 PR PBB SHADOW E&M-EST. PATIENT-LVL III: CPT | Mod: PBBFAC,HCNC,, | Performed by: INTERNAL MEDICINE

## 2019-06-05 RX ORDER — TRIAMCINOLONE ACETONIDE 40 MG/ML
40 INJECTION, SUSPENSION INTRA-ARTICULAR; INTRAMUSCULAR
Status: DISCONTINUED | OUTPATIENT
Start: 2019-06-05 | End: 2019-06-05 | Stop reason: HOSPADM

## 2019-06-05 RX ORDER — METHYLPREDNISOLONE SOD SUCC 125 MG
100 VIAL (EA) INJECTION
Status: CANCELLED
Start: 2019-06-05

## 2019-06-05 RX ORDER — SODIUM CHLORIDE 0.9 % (FLUSH) 0.9 %
10 SYRINGE (ML) INJECTION
Status: CANCELLED | OUTPATIENT
Start: 2019-06-05

## 2019-06-05 RX ORDER — HEPARIN 100 UNIT/ML
100 SYRINGE INTRAVENOUS
Status: CANCELLED | OUTPATIENT
Start: 2019-06-05

## 2019-06-05 RX ORDER — ACETAMINOPHEN 500 MG
1000 TABLET ORAL
Status: CANCELLED | OUTPATIENT
Start: 2019-06-05

## 2019-06-05 RX ORDER — FAMOTIDINE 20 MG/50ML
20 INJECTION, SOLUTION INTRAVENOUS
Status: CANCELLED
Start: 2019-06-05

## 2019-06-05 RX ADMIN — TRIAMCINOLONE ACETONIDE 40 MG: 40 INJECTION, SUSPENSION INTRA-ARTICULAR; INTRAMUSCULAR at 02:06

## 2019-06-05 RX ADMIN — DENOSUMAB 60 MG: 60 INJECTION SUBCUTANEOUS at 11:06

## 2019-06-05 NOTE — PROGRESS NOTES
RHEUMATOLOGY CLINIC FOLLOW UP VISIT  Chief complaints:-  To follow up for osteoporosis.    HPI:-  Cem Fernandes a 58 y.o. pleasant female comes in with above chief complaints.  She has multiple medical problems as reviewed below including multiple sclerosis.  Due to multiple sclerosis and multiple other medical problems she had difficulty taking ORAL FOSAMAX once every week and sitting upright for at least 30 minutes. So she was given reclast. She developed severe arthralgias after reclast which subsided in a week. So she was switched to prolia. She received first injection in 10/2017 without any significant problems other than mild arthralgias for a couple of days.  She denies any falls or fractures since last visit .  No recurrent invasive dental procedures.  She complains of swelling under the right elbow-olecranon bursa without any associated pain.  The swelling interferes with many of her usual activities.  She denies any episodes of infection.  No history of gout.    Review of Systems   Constitutional: Negative for chills, fever, malaise/fatigue and weight loss.   HENT: Negative for ear discharge, ear pain, hearing loss, nosebleeds and sore throat.    Eyes: Negative for blurred vision, double vision, photophobia, discharge and redness.   Respiratory: Negative for cough, hemoptysis, sputum production and shortness of breath.    Cardiovascular: Negative for chest pain, palpitations and claudication.   Gastrointestinal: Negative for abdominal pain, constipation, diarrhea, melena, nausea and vomiting.   Genitourinary: Negative for dysuria, frequency, hematuria and urgency.   Musculoskeletal: Negative for back pain, falls, joint pain, myalgias and neck pain.   Skin: Negative for itching and rash.   Neurological: Positive for sensory change and focal weakness. Negative for dizziness, tingling, tremors, speech change, seizures, loss of consciousness, weakness and headaches.   Endo/Heme/Allergies: Negative for  "environmental allergies. Does not bruise/bleed easily.   Psychiatric/Behavioral: Negative for hallucinations and memory loss. The patient does not have insomnia.        Past Medical History:   Diagnosis Date    Broken toe 6/2015    left big toe    Chronic diastolic heart failure 5/8/2018    Closed left arm fracture     Colon polyp     Depression     Hyperlipidemia     Hypothyroid     Immunosuppression 1/28/2019    MS (multiple sclerosis)     Neurogenic bladder 12/6/2012    Osteoporosis     Polyneuropathy     Sleep apnea     Trouble in sleeping        Past Surgical History:   Procedure Laterality Date    COLONOSCOPY N/A 9/1/2017    [facility]    COLONOSCOPY N/A 6/20/2014    [facility]    FRACTURE SURGERY Left 2013    wrist- [facility]    KNEE SURGERY Right 1989    in [place]    TONSILLECTOMY  1966        Social History     Tobacco Use    Smoking status: Never Smoker    Smokeless tobacco: Never Used   Substance Use Topics    Alcohol use: No     Alcohol/week: 0.0 oz     Frequency: Never     Binge frequency: Never    Drug use: No       Family History   Problem Relation Age of Onset    Pancreatic cancer Mother     Stomach cancer Mother     Cancer Mother         pancreatic, stomach    Hypertension Father     Heart disease Father         MI    Lupus Maternal Aunt     Rheum arthritis Maternal Aunt     Rheum arthritis Sister     Melanoma Neg Hx        Allergies   Allergen Reactions    Latex, Natural Rubber Rash           Physical examination:-    Vitals:    06/05/19 1033   BP: 117/79   Pulse: 63   Weight: 72.2 kg (159 lb 2.8 oz)   Height: 5' 3" (1.6 m)   PainSc: 0-No pain       Physical Exam   Constitutional: She is oriented to person, place, and time and well-developed, well-nourished, and in no distress. No distress.   HENT:   Head: Normocephalic and atraumatic.   Nose: Nose normal.   Mouth/Throat: Oropharynx is clear and " moist. No oropharyngeal exudate.   Eyes: Pupils are equal, round, and reactive to light. Conjunctivae and EOM are normal. Right eye exhibits no discharge. Left eye exhibits no discharge. No scleral icterus.   Neck: Normal range of motion. Neck supple.   Cardiovascular: Normal rate and intact distal pulses.   Pulmonary/Chest: Effort normal. No respiratory distress. She exhibits no tenderness.   Abdominal: Soft. There is no tenderness.   Musculoskeletal:   No synovitis over small joints of hands or feet.  No effusion over the last joints. Non tender swollen right olecranon bursa.   Lymphadenopathy:     She has no cervical adenopathy.   Neurological: She is alert and oriented to person, place, and time.   Significant motor weakness over left side from multiple sclerosis.   Skin: Skin is warm. She is not diaphoretic. No erythema. No pallor.   Psychiatric: Mood and affect normal.   Nursing note and vitals reviewed.      Labs:-  Normal vitamin D.  Normal renal functions.      Assessment/Plans:-  1. Age-related osteoporosis without current pathological fracture    2. Bursal cyst of olecranon      Problem List Items Addressed This Visit        Orthopedic    Osteoporosis - Primary    Overview     Dexa 3/27/13 due next month patient declines ordering the test due to cost         Current Assessment & Plan     Significant osteoporosis with intolerance to bisphosphonate now on prolia. Tolerating well. Continue prolia.   Discussed in detail about all adverse effects of the medication including osteonecrosis of the jaw and atypical femoral fractures.  No plans for invasive dental procedures at this time or any other future.          Bursal cyst of olecranon    Current Assessment & Plan     Chronic, persistent, interfering with activities.  Drain and inject Kenalog.             Intermediate Joint Aspiration/Injection: R olecranon bursa  Date/Time: 6/5/2019 2:06 PM  Performed by: Too Chang MD  Authorized by: Too  MD Bernardo     Consent Done?:  Yes (Verbal)  Indications:  Pain and joint swelling  Site marked: The procedure site was marked    Timeout: Prior to procedure the correct patient, procedure, and site was verified      Location:  Elbow  Site:  R olecranon bursa  Prep: Patient was prepped and draped in usual sterile fashion    Ultrasonic Guidance for needle placement: No  Needle size:  22 G  Approach:  Posterolateral  Medications:  40 mg triamcinolone acetonide 40 mg/mL  Aspirate amount (ml):  4  Aspirate:  Bloody  Patient tolerance:  Patient tolerated the procedure well with no immediate complications         Follow up in about 6 months (around 12/5/2019).  Disclaimer: This note was prepared using voice recognition system and is likely to have sound alike errors .  Please call me with any questions

## 2019-06-05 NOTE — ASSESSMENT & PLAN NOTE
Significant osteoporosis with intolerance to bisphosphonate now on prolia. Tolerating well. Continue prolia.   Discussed in detail about all adverse effects of the medication including osteonecrosis of the jaw and atypical femoral fractures.  No plans for invasive dental procedures at this time or any other future.

## 2019-06-05 NOTE — NURSING
Prolia 60 mg q 6 months  Last dose given-10/5/19    Any invasive dental procedures in past 3 months or upcoming 3 months: denies    Last Rheumatology provider visit- Seen by Dr. DARBY on 6/5/19    Recent labs? 6/5/19;  CKD pt needing repeat labs in 10 days-No   Lab Results   Component Value Date    CALCIUM 10.1 06/05/2019     Lab Results   Component Value Date    CREATININE 1.1 06/05/2019     Lab Results   Component Value Date    ESTGFRAFRICA >60 06/05/2019     Lab Results   Component Value Date    EGFRNONAA 56 (A) 06/05/2019     Lab Results   Component Value Date    RQYZBZWN46SD 57 11/29/2017          Lot #- [id number]  Expiration Date- 06/21      Prolia 60 mg/ml administered SQ to Left lower abdominal quadrant. Tolerated without any complaints. No redness, swelling, or drainage noted to site. Instructed to remain in clinic 15 minutes after administration to monitor for any s/sx of reaction. Pt instructed on signs and symptoms of reaction to report. Verbalizes understanding.

## 2019-06-07 ENCOUNTER — PATIENT MESSAGE (OUTPATIENT)
Dept: NEUROLOGY | Facility: CLINIC | Age: 59
End: 2019-06-07

## 2019-06-11 DIAGNOSIS — F32.A DEPRESSION, UNSPECIFIED DEPRESSION TYPE: ICD-10-CM

## 2019-06-11 DIAGNOSIS — G35 MULTIPLE SCLEROSIS: ICD-10-CM

## 2019-06-11 RX ORDER — PAROXETINE HYDROCHLORIDE 40 MG/1
TABLET, FILM COATED ORAL
Qty: 30 TABLET | Refills: 5 | Status: SHIPPED | OUTPATIENT
Start: 2019-06-11 | End: 2019-12-17 | Stop reason: SDUPTHER

## 2019-06-18 ENCOUNTER — PATIENT MESSAGE (OUTPATIENT)
Dept: NEUROLOGY | Facility: CLINIC | Age: 59
End: 2019-06-18

## 2019-06-21 ENCOUNTER — PATIENT MESSAGE (OUTPATIENT)
Dept: NEUROLOGY | Facility: CLINIC | Age: 59
End: 2019-06-21

## 2019-06-21 DIAGNOSIS — G35 MULTIPLE SCLEROSIS: ICD-10-CM

## 2019-06-21 DIAGNOSIS — M79.2 NEUROPATHIC PAIN: ICD-10-CM

## 2019-06-21 RX ORDER — GABAPENTIN 100 MG/1
CAPSULE ORAL
Qty: 30 CAPSULE | Refills: 11 | Status: SHIPPED | OUTPATIENT
Start: 2019-06-21 | End: 2019-12-18 | Stop reason: SDUPTHER

## 2019-06-28 ENCOUNTER — OFFICE VISIT (OUTPATIENT)
Dept: NEUROLOGY | Facility: CLINIC | Age: 59
End: 2019-06-28
Payer: MEDICARE

## 2019-06-28 VITALS
HEART RATE: 73 BPM | DIASTOLIC BLOOD PRESSURE: 84 MMHG | SYSTOLIC BLOOD PRESSURE: 120 MMHG | HEIGHT: 63 IN | WEIGHT: 165.38 LBS | BODY MASS INDEX: 29.3 KG/M2

## 2019-06-28 DIAGNOSIS — Z71.89 COUNSELING REGARDING GOALS OF CARE: ICD-10-CM

## 2019-06-28 DIAGNOSIS — R25.2 SPASTICITY: ICD-10-CM

## 2019-06-28 DIAGNOSIS — M79.2 NEUROPATHIC PAIN: ICD-10-CM

## 2019-06-28 DIAGNOSIS — R53.83 FATIGUE, UNSPECIFIED TYPE: ICD-10-CM

## 2019-06-28 DIAGNOSIS — G35 MULTIPLE SCLEROSIS: Primary | ICD-10-CM

## 2019-06-28 DIAGNOSIS — F32.A DEPRESSION, UNSPECIFIED DEPRESSION TYPE: ICD-10-CM

## 2019-06-28 DIAGNOSIS — Z79.899 ENCOUNTER FOR LONG-TERM (CURRENT) USE OF HIGH-RISK MEDICATION: ICD-10-CM

## 2019-06-28 DIAGNOSIS — R26.9 NEUROLOGIC GAIT DYSFUNCTION: ICD-10-CM

## 2019-06-28 PROCEDURE — 99999 PR PBB SHADOW E&M-EST. PATIENT-LVL III: CPT | Mod: PBBFAC,HCNC,, | Performed by: PHYSICIAN ASSISTANT

## 2019-06-28 PROCEDURE — 99499 RISK ADDL DX/OHS AUDIT: ICD-10-PCS | Mod: HCNC,S$GLB,, | Performed by: PHYSICIAN ASSISTANT

## 2019-06-28 PROCEDURE — 99215 PR OFFICE/OUTPT VISIT, EST, LEVL V, 40-54 MIN: ICD-10-PCS | Mod: HCNC,S$GLB,, | Performed by: PHYSICIAN ASSISTANT

## 2019-06-28 PROCEDURE — 99999 PR PBB SHADOW E&M-EST. PATIENT-LVL III: ICD-10-PCS | Mod: PBBFAC,HCNC,, | Performed by: PHYSICIAN ASSISTANT

## 2019-06-28 PROCEDURE — 99499 UNLISTED E&M SERVICE: CPT | Mod: HCNC,S$GLB,, | Performed by: PHYSICIAN ASSISTANT

## 2019-06-28 PROCEDURE — 99215 OFFICE O/P EST HI 40 MIN: CPT | Mod: HCNC,S$GLB,, | Performed by: PHYSICIAN ASSISTANT

## 2019-06-28 PROCEDURE — 3008F BODY MASS INDEX DOCD: CPT | Mod: HCNC,CPTII,S$GLB, | Performed by: PHYSICIAN ASSISTANT

## 2019-06-28 PROCEDURE — 3008F PR BODY MASS INDEX (BMI) DOCUMENTED: ICD-10-PCS | Mod: HCNC,CPTII,S$GLB, | Performed by: PHYSICIAN ASSISTANT

## 2019-06-28 NOTE — Clinical Note
Discontinue Ocrevus. Want to plan to restart Rebif in August-she has follow up in 4 months with Dr. BETH

## 2019-06-28 NOTE — PROGRESS NOTES
"Subjective:       Patient ID: Cem Meyers is a 58 y.o. female who presents today for a routine clinic visit for MS.      MS HPI:  · DMT: Ocrevus(2/27 and 4/8/2019)  · Side effects from DMT? No  · Taking vitamin D3 as recommended? Yes    · She feels like she is worse on Ocrevus--and would like to get back on REbif  · SHe is having more vertigo and balance issues -  · She overall felt better on Rebif-"it was doing something for me that I didn't realize" -she has not been able start PT due to co-pay     SOCIAL HISTORY  Social History     Tobacco Use    Smoking status: Never Smoker    Smokeless tobacco: Never Used   Substance Use Topics    Alcohol use: No     Alcohol/week: 0.0 oz     Frequency: Never     Binge frequency: Never    Drug use: No     Living arrangements - the patient lives alone.  Employment     MS ROS:  ·   MS REVIEW OF SYMPTOMS 5/31/2019   Do you feel abnormally tired on most days? No-amantadine QD-100mg   Do you feel you generally sleep well? Yes   Do you have difficulty controlling your bladder?  No   Do you have difficulty controlling your bowels?  No   Do you have frequent muscle cramps, tightness or spasms in your limbs?  No-stable on Baclofen 10mg BID and up to 30mg HS   Do you have new visual symptoms?  No   Do you have worsening difficulty with your memory or thinking? No   Do you have worsening symptoms of anxiety or depression?  No-stable on Paxil   For patients who walk, Do you have more difficulty walking?  No   Have you fallen since your last visit?  Yes   For patients who use wheelchairs: Do you have any skin wounds or breakdown? Not Applicable   Do you have difficulty using your hands?  Yes   Do you have shooting or burning pain? No--stable on gabapentin   Do you have difficulty with sexual function?  Yes   If you are sexually active, are you using birth control? Y/N  N/A Not Applicable   Do you often choke when swallowing liquids or solid food?  No   Do you experience worsening " symptoms when overheated? Yes   Do you need any new equipment such as a wheelchair, walker or shower chair? No   Do you receive co-pay financial assistance for your principal MS medicine? Yes   Would you be interested in participating in an MS research trial in the future? Yes   Do you feel you have adequate family/friend support?  Yes   Do you have health insurance?   Yes   Are you currently employed? No   Do you receive SSDI/SSI?  Yes   Do you use marijuana or cannabis products? No   Have you been diagnosed with a urinary tract infection since your last visit here? No   Have you been diagnosed with a respiratory tract infection since your last visit here? No   Have you been to the emergency room since your last visit here? No   Have you been hospitalized since your last visit here?  No     FSS SCORE & INTERPRETATION 5/31/2019   FSS SCORE  37   FSS SCORE INTERPRETATION May be suffering from fatigue     MS JAZMIN-D SCORE & INTERPRETATION 5/31/2019   JAZMIN-D SCORE  0   JAZMIN-D INTERPRETATION  No indication of Depression     MS TOR-7 SCORE & INTERPRETATION 5/31/2019   TOR-7 SCORE  0   TOR-7 SCORE INTERPRETATION Normal     PEQ MS MOS PAIN EFFECTS SCORE & INTERPRETATION 5/31/2019   PES SCORE 12   PES SCORE INTERPRETATION Scores can range from 6-30.  Items are scaled so that higher scores indicate a greater impact of pain on a patients mood and behavior.     No flowsheet data found.  MS BLADDER CONTROL SCORE & INTERPRETATION 5/31/2019   BLCS SCORE 0   BLCS SCORE INTERPRETATION  Scores can range from 0-22, with higher scores indicating greater bladder control problems.     MS BOWEL CONTROL SCORE & INTERPRETATION 5/31/2019   BWCS SCORE 3   BWCS SCORE INTERPRETATION Scores can range from 0-26, with higher scores indicating greater bowel control problems.     PEQ MS IMPACT OF VISUAL IMPAIRMENT SCORE & INTERPRETATION 5/31/2019   HEIDI SCALE SCORE  0   HEIDI SCORE INTERPRETATION Scores can range from 0-15, with higher scores  indicating greater impact of visual problems on daily activites.     MS PDQ SCORE & INTERPRETATION 5/31/2019   PDQ RETROSPECTIVE MEMORY SUBSCALE 12   PDQ ATTENTION/CONCENTRATION SUBSCALE 6   PDQ PROSPECTIVE MEMORY SUBSCALE 10   PDQ PLANNING/ORGANIZATION SUBSCALE 7   PDQ TOTAL SCORE 35   PDQ SCORE INTERPRETATION Scores can range from 0-80, with higher scores indicating greater perceived cognitive impairment.     MSSS SCORE & INTERPRETATION 5/31/2019   MSSS TANGIBLE SUPPORT SUBSCALE 37.5   MSSS EMOTIONAL/INFORMATIONAL SUPPORT SUBSCALE 37.5   MSSS AFFECTIONATE SUPPORT SUBSCALE 41.67   MSSS POSITIVE SOCIAL INTERACTION SUBSCALE 41.67   MSSS TOTAL SCORE 39.59   MSSS SCORE INTERPRETATION Scores can range from 0-100, with higher scores indicating greater perceived support.             Objective:        1. 25 foot timed walk: 6 .8s today; was 7.8s last visit and 6.0s  previous  Timed 25 Foot Walk: 10/24/2016 3/23/2017   Did patient wear an AFO? No No   Was assistive device used? No No   Time for 25 Foot Walk (seconds) 5.5 5.5   Time for 25 Foot Walk (seconds) 5.5 5.3       Neurologic Exam     Mental Status   Oriented to person, place, and time.   Follows 3 step commands.   Speech: speech is normal   Level of consciousness: alert  Normal comprehension.     Cranial Nerves     CN II   Visual acuity: normal with correction (20/30 OD and 20/20OS corrected with Snellen hand held chart at 6 ft)    CN III, IV, VI   Pupils are equal, round, and reactive to light.  Extraocular motions are normal.     CN V   Facial sensation intact.     CN VII   Facial expression full, symmetric.     CN VIII   Hearing: intact (finger rub)    CN IX, X   Palate: symmetric    CN XI   CN XI normal.     CN XII   Tongue deviation: none    Motor Exam   Muscle bulk: normal  Overall muscle tone: normal    Strength   Right deltoid: 5/5  Left deltoid: 5/5  Right triceps: 5/5  Left triceps: 5/5  Right wrist extension: 5/5  Right interossei: 5/5  Left interossei:  4/5  Right iliopsoas: 5/5  Left iliopsoas: 2/5  Left quadriceps: 5/5  Right hamstrin/5  Left hamstrin/5  Right anterior tibial: 5/5  Left anterior tibial: 5/5  Right peroneal: 5/5  Left peroneal: 4/5L wrist extensors/triceps 5-/5     Sensory Exam   Light touch normal.   Right leg light touch: numbness foot.  Left leg light touch: numbness to feet.  Right arm vibration: decreased from fingers  Left arm vibration: decreased from wrist  Right leg vibration: decreased from toes  Left leg vibration: decreased from ankle    Gait, Coordination, and Reflexes     Gait  Gait: circumduction and wide-based (slightly L hip circumduction)    Coordination   Finger to nose coordination: abnormal (L hand)  Tandem walking coordination: abnormal    Tremor   Resting tremor: absent  Action tremor: absent    Reflexes   Right plantar: equivocal  Left plantar: equivocal  Right ankle clonus: absent  Left ankle clonus: absent  Right pendular knee jerk: absent  Left pendular knee jerk: absentSlowed on L RSM             Imaging:       Results for orders placed during the hospital encounter of 19   MRI Brain Demyelinating W W/O Contrast    Impression Findings in the cerebral white matter which are typical for multiple sclerosis.  No new or enhancing lesions to suggest active disease.    Electronically signed by resident: Hieu Marroquin  Date:    2019  Time:    21:06    Electronically signed by: Min Norton MD  Date:    2019  Time:    22:20     Results for orders placed during the hospital encounter of 19   MRI Cervical Spine Demyelinating W W/O Contrast    Impression Stable appearance of patchy T2/STIR intramedullary signal throughout the cervical cord and the visualized portions of the thoracic cord, keeping with prior episodes of demyelination.  No new lesions.      Electronically signed by: Min Norton MD  Date:    2019  Time:    15:45     Results for orders placed during the hospital encounter of  04/26/19   MRI Thoracic Spine Demyelinating W W/O Contrast    Impression Stable appearance of T2/FLAIR signal intramedullary signal throughout the thoracic cord, in keeping with multiple sclerosis.  No new or enhancing lesion.  No evidence of significant cord atrophy.      Electronically signed by: Min Norton MD  Date:    04/28/2019  Time:    15:30       Labs:     Lab Results   Component Value Date    CVACYLWC79RP 57 11/29/2017    KCPAJJRF39XS 82 09/09/2016    KQMJPSTM13QC 62 08/17/2015     No results found for: JCVINDEX, JCVANTIBODY  No results found for: EG6PSABK, ABSOLUTECD3, FI4HLMEJ, ABSOLUTECD8, VW6MKVQC, ABSOLUTECD4, LABCD48  Lab Results   Component Value Date    WBC 3.87 (L) 04/18/2019    RBC 4.06 04/18/2019    HGB 12.8 04/18/2019    HCT 40.8 04/18/2019     (H) 04/18/2019    MCH 31.5 (H) 04/18/2019    MCHC 31.4 (L) 04/18/2019    RDW 13.2 04/18/2019     04/18/2019    MPV 11.0 04/18/2019    GRAN 1.6 (L) 04/18/2019    GRAN 41.9 04/18/2019    LYMPH 1.6 04/18/2019    LYMPH 41.6 04/18/2019    MONO 0.6 04/18/2019    MONO 14.2 04/18/2019    EOS 0.0 04/18/2019    BASO 0.04 04/18/2019    EOSINOPHIL 1.0 04/18/2019    BASOPHIL 1.0 04/18/2019     Sodium   Date Value Ref Range Status   06/05/2019 142 136 - 145 mmol/L Final     Potassium   Date Value Ref Range Status   06/05/2019 4.0 3.5 - 5.1 mmol/L Final     Chloride   Date Value Ref Range Status   06/05/2019 106 95 - 110 mmol/L Final     CO2   Date Value Ref Range Status   06/05/2019 27 23 - 29 mmol/L Final     Glucose   Date Value Ref Range Status   06/05/2019 93 70 - 110 mg/dL Final     BUN, Bld   Date Value Ref Range Status   06/05/2019 11 6 - 20 mg/dL Final     Creatinine   Date Value Ref Range Status   06/05/2019 1.1 0.5 - 1.4 mg/dL Final     Calcium   Date Value Ref Range Status   06/05/2019 10.1 8.7 - 10.5 mg/dL Final     Total Protein   Date Value Ref Range Status   06/05/2019 7.5 6.0 - 8.4 g/dL Final     Albumin   Date Value Ref Range Status    06/05/2019 3.7 3.5 - 5.2 g/dL Final     Total Bilirubin   Date Value Ref Range Status   06/05/2019 0.4 0.1 - 1.0 mg/dL Final     Comment:     For infants and newborns, interpretation of results should be based  on gestational age, weight and in agreement with clinical  observations.  Premature Infant recommended reference ranges:  Up to 24 hours.............<8.0 mg/dL  Up to 48 hours............<12.0 mg/dL  3-5 days..................<15.0 mg/dL  6-29 days.................<15.0 mg/dL       Alkaline Phosphatase   Date Value Ref Range Status   06/05/2019 73 55 - 135 U/L Final     AST   Date Value Ref Range Status   06/05/2019 34 10 - 40 U/L Final     ALT   Date Value Ref Range Status   06/05/2019 20 10 - 44 U/L Final     Anion Gap   Date Value Ref Range Status   06/05/2019 9 8 - 16 mmol/L Final     eGFR if    Date Value Ref Range Status   06/05/2019 >60 >60 mL/min/1.73 m^2 Final     eGFR if non    Date Value Ref Range Status   06/05/2019 56 (A) >60 mL/min/1.73 m^2 Final     Comment:     Calculation used to obtain the estimated glomerular filtration  rate (eGFR) is the CKD-EPI equation.        Diagnosis/Assessment/Plan:    1. Multiple Sclerosis  · Assessment: Patient is improved today from previous visit; however, she subjectively feels worse on Ocrevus and would like to return to [facility].   · Imaging:recent MRI's in April were stable-discussed with patient  · Disease Modifying Therapies: Patient is requesting to return to [facility]-Would like to wait 4 months from last Ocrevus dose which would be August. Will plan to have patient return to clinic to restart in 4 months    2. MS Symptom Assessment / Management  · Gait Disturbance: would like to refer to vestibular therapy-she is working on applying for financial assistance for co-pays. If this is not successful-she is willing to attend up to 3 sessions to be evaluated and then instructed on appropriate HEP  · Fatigue: will plan to discontinue  amantadine(after discussing with Dr. Smith) as this can cause some dizziness-will switch to modafinil if patient is in agreement(message sent) after she holds amantadine for a couple of weeks and evaluates dizziness for improvement.       Our visit today lasted 40 minutes, and 100% of this time was spent face to face with the patient. Over 50% of this visit included discussion of the treatment plan/medication changes/symptom management/exam findings/imaging results/coordination of care. The patient agrees with the plan of care.     Follow up in about 4 months (around 10/28/2019) for follow up with Dr. Smith.  Patient agreed to POC today.    Attending, Dr. Smith, was available during today's encounter and participated in the medical decision making.     Rimma Rouse PA-C  [facility]      Problem List Items Addressed This Visit        Psychiatric    Depression       Other    Neurologic gait dysfunction    Overview     Followed by neurology and uses a cane           Other Visit Diagnoses     Multiple sclerosis    -  Primary    Counseling regarding goals of care        Encounter for long-term (current) use of high-risk medication        Neuropathic pain        Fatigue, unspecified type        Spasticity

## 2019-07-01 ENCOUNTER — PATIENT MESSAGE (OUTPATIENT)
Dept: PSYCHIATRY | Facility: CLINIC | Age: 59
End: 2019-07-01

## 2019-07-05 DIAGNOSIS — G35 MULTIPLE SCLEROSIS: ICD-10-CM

## 2019-07-05 DIAGNOSIS — R25.2 SPASTICITY: ICD-10-CM

## 2019-07-05 RX ORDER — BACLOFEN 10 MG/1
TABLET ORAL
Qty: 150 TABLET | Refills: 6 | Status: SHIPPED | OUTPATIENT
Start: 2019-07-05 | End: 2020-02-18

## 2019-07-17 ENCOUNTER — LAB VISIT (OUTPATIENT)
Dept: LAB | Facility: HOSPITAL | Age: 59
End: 2019-07-17
Attending: CLINICAL NURSE SPECIALIST
Payer: MEDICARE

## 2019-07-17 DIAGNOSIS — Z79.899 OTHER LONG TERM (CURRENT) DRUG THERAPY: ICD-10-CM

## 2019-07-17 DIAGNOSIS — G35 MULTIPLE SCLEROSIS: ICD-10-CM

## 2019-07-17 LAB — 25(OH)D3+25(OH)D2 SERPL-MCNC: 57 NG/ML (ref 30–96)

## 2019-07-17 PROCEDURE — 86704 HEP B CORE ANTIBODY TOTAL: CPT | Mod: HCNC

## 2019-07-17 PROCEDURE — 86706 HEP B SURFACE ANTIBODY: CPT | Mod: HCNC

## 2019-07-17 PROCEDURE — 87340 HEPATITIS B SURFACE AG IA: CPT | Mod: HCNC

## 2019-07-17 PROCEDURE — 82306 VITAMIN D 25 HYDROXY: CPT | Mod: HCNC

## 2019-07-17 PROCEDURE — 36415 COLL VENOUS BLD VENIPUNCTURE: CPT | Mod: HCNC

## 2019-07-18 LAB
HBV CORE AB SERPL QL IA: NEGATIVE
HBV SURFACE AB SER-ACNC: NEGATIVE M[IU]/ML
HBV SURFACE AG SERPL QL IA: NEGATIVE

## 2019-08-16 ENCOUNTER — TELEPHONE (OUTPATIENT)
Dept: NEUROLOGY | Facility: CLINIC | Age: 59
End: 2019-08-16

## 2019-08-16 DIAGNOSIS — E03.8 OTHER SPECIFIED HYPOTHYROIDISM: Chronic | ICD-10-CM

## 2019-08-19 ENCOUNTER — LAB VISIT (OUTPATIENT)
Dept: LAB | Facility: HOSPITAL | Age: 59
End: 2019-08-19
Payer: MEDICARE

## 2019-08-19 DIAGNOSIS — G35 MULTIPLE SCLEROSIS: ICD-10-CM

## 2019-08-19 LAB
BASOPHILS # BLD AUTO: 0.03 K/UL (ref 0–0.2)
BASOPHILS NFR BLD: 0.7 % (ref 0–1.9)
DIFFERENTIAL METHOD: ABNORMAL
EOSINOPHIL # BLD AUTO: 0 K/UL (ref 0–0.5)
EOSINOPHIL NFR BLD: 1 % (ref 0–8)
ERYTHROCYTE [DISTWIDTH] IN BLOOD BY AUTOMATED COUNT: 12.4 % (ref 11.5–14.5)
HCT VFR BLD AUTO: 41.2 % (ref 37–48.5)
HGB BLD-MCNC: 12.7 G/DL (ref 12–16)
IMM GRANULOCYTES # BLD AUTO: 0.01 K/UL (ref 0–0.04)
IMM GRANULOCYTES NFR BLD AUTO: 0.2 % (ref 0–0.5)
LYMPHOCYTES # BLD AUTO: 1.9 K/UL (ref 1–4.8)
LYMPHOCYTES NFR BLD: 45.1 % (ref 18–48)
MCH RBC QN AUTO: 31.8 PG (ref 27–31)
MCHC RBC AUTO-ENTMCNC: 30.8 G/DL (ref 32–36)
MCV RBC AUTO: 103 FL (ref 82–98)
MONOCYTES # BLD AUTO: 0.5 K/UL (ref 0.3–1)
MONOCYTES NFR BLD: 11.7 % (ref 4–15)
NEUTROPHILS # BLD AUTO: 1.7 K/UL (ref 1.8–7.7)
NEUTROPHILS NFR BLD: 41.3 % (ref 38–73)
NRBC BLD-RTO: 0 /100 WBC
PLATELET # BLD AUTO: 205 K/UL (ref 150–350)
PMV BLD AUTO: 10.7 FL (ref 9.2–12.9)
RBC # BLD AUTO: 4 M/UL (ref 4–5.4)
WBC # BLD AUTO: 4.19 K/UL (ref 3.9–12.7)

## 2019-08-19 PROCEDURE — 85025 COMPLETE CBC W/AUTO DIFF WBC: CPT | Mod: HCNC

## 2019-08-19 PROCEDURE — 86356 MONONUCLEAR CELL ANTIGEN: CPT | Mod: HCNC

## 2019-08-19 PROCEDURE — 36415 COLL VENOUS BLD VENIPUNCTURE: CPT | Mod: HCNC

## 2019-08-19 RX ORDER — LEVOTHYROXINE SODIUM 75 UG/1
TABLET ORAL
Qty: 90 TABLET | Refills: 1 | Status: SHIPPED | OUTPATIENT
Start: 2019-08-19 | End: 2020-02-17

## 2019-08-23 LAB — CD20 CELLS NFR SPEC: NORMAL %

## 2019-08-26 PROBLEM — Z00.00 ANNUAL PHYSICAL EXAM: Status: RESOLVED | Noted: 2017-03-24 | Resolved: 2019-08-26

## 2019-09-13 ENCOUNTER — OFFICE VISIT (OUTPATIENT)
Dept: INTERNAL MEDICINE | Facility: CLINIC | Age: 59
End: 2019-09-13
Payer: MEDICARE

## 2019-09-13 VITALS
OXYGEN SATURATION: 98 % | SYSTOLIC BLOOD PRESSURE: 126 MMHG | HEIGHT: 63 IN | HEART RATE: 72 BPM | BODY MASS INDEX: 28.95 KG/M2 | DIASTOLIC BLOOD PRESSURE: 64 MMHG | WEIGHT: 163.38 LBS | TEMPERATURE: 96 F

## 2019-09-13 DIAGNOSIS — R39.9 UTI SYMPTOMS: Primary | ICD-10-CM

## 2019-09-13 PROCEDURE — 99213 PR OFFICE/OUTPT VISIT, EST, LEVL III, 20-29 MIN: ICD-10-PCS | Mod: HCNC,S$GLB,, | Performed by: NURSE PRACTITIONER

## 2019-09-13 PROCEDURE — 99999 PR PBB SHADOW E&M-EST. PATIENT-LVL IV: CPT | Mod: PBBFAC,HCNC,, | Performed by: NURSE PRACTITIONER

## 2019-09-13 PROCEDURE — 3008F PR BODY MASS INDEX (BMI) DOCUMENTED: ICD-10-PCS | Mod: HCNC,CPTII,S$GLB, | Performed by: NURSE PRACTITIONER

## 2019-09-13 PROCEDURE — 3008F BODY MASS INDEX DOCD: CPT | Mod: HCNC,CPTII,S$GLB, | Performed by: NURSE PRACTITIONER

## 2019-09-13 PROCEDURE — 99213 OFFICE O/P EST LOW 20 MIN: CPT | Mod: HCNC,S$GLB,, | Performed by: NURSE PRACTITIONER

## 2019-09-13 PROCEDURE — 99999 PR PBB SHADOW E&M-EST. PATIENT-LVL IV: ICD-10-PCS | Mod: PBBFAC,HCNC,, | Performed by: NURSE PRACTITIONER

## 2019-09-13 RX ORDER — PHENAZOPYRIDINE HYDROCHLORIDE 200 MG/1
200 TABLET, FILM COATED ORAL 3 TIMES DAILY PRN
Qty: 9 TABLET | Refills: 0 | Status: SHIPPED | OUTPATIENT
Start: 2019-09-13 | End: 2019-09-16

## 2019-09-13 NOTE — PROGRESS NOTES
Subjective:       Patient ID: Cem Meyers is a 59 y.o. female.    Chief Complaint: Urinary Tract Infection (possible UTI)    Urinary Tract Infection    This is a new problem. The current episode started in the past 7 days. The problem occurs every urination. The problem has been gradually worsening. The quality of the pain is described as aching. There has been no fever. Associated symptoms include frequency and urgency. Pertinent negatives include no behavior changes, chills, discharge, flank pain, hematuria, hesitancy, nausea, possible pregnancy, sweats, vomiting, weight loss, bubble bath use, constipation, rash or withholding. She has tried increased fluids for the symptoms. The treatment provided mild relief.           Past Medical History:   Diagnosis Date    Broken toe 6/2015    left big toe    Chronic diastolic heart failure 5/8/2018    Closed left arm fracture     Colon polyp     Depression     Hyperlipidemia     Hypothyroid     Immunosuppression 1/28/2019    MS (multiple sclerosis)     Neurogenic bladder 12/6/2012    Osteoporosis     Polyneuropathy     Sleep apnea     Trouble in sleeping      Past Surgical History:   Procedure Laterality Date    COLONOSCOPY N/A 9/1/2017    [facility]    COLONOSCOPY N/A 6/20/2014    Per[facility] at [facility]    FRACTURE SURGERY Left 2013    wrist- [facility]    KNEE SURGERY Right 1989    in [place]    TONSILLECTOMY  1966     Social History     Socioeconomic History    Marital status:      Spouse name: Not on file    Number of children: Not on file    Years of education: Not on file    Highest education level: Not on file   Occupational History    Not on file   Social Needs    Financial resource strain: Somewhat hard    Food insecurity:     Worry: Never true     Inability: Never true    Transportation needs:     Medical: No     Non-medical: No   Tobacco Use    Smoking status: Never  Smoker    Smokeless tobacco: Never Used   Substance and Sexual Activity    Alcohol use: No     Alcohol/week: 0.0 oz     Frequency: Never     Binge frequency: Never    Drug use: No    Sexual activity: Not Currently   Lifestyle    Physical activity:     Days per week: 0 days     Minutes per session: 0 min    Stress: Only a little   Relationships    Social connections:     Talks on phone: Never     Gets together: Once a week     Attends [employer] service: Not on file     Active member of club or organization: No     Attends meetings of clubs or organizations: Never     Relationship status:    Other Topics Concern    Are you pregnant or think you may be? No    Breast-feeding No   Social History Narrative    Not on file     Review of patient's allergies indicates:   Allergen Reactions    Latex, natural rubber Rash     Current Outpatient Medications   Medication Sig    amantadine HCl 100 mg Tab Take 1 tablet (100 mg total) by mouth once daily.    baclofen (LIORESAL) 10 MG tablet TAKE 1 TABLET IN THE MORNING, 1 IN THE AFTERNOON, AND UP TO 3 TABLETS AT BEDTIME AS NEEDED    calcium citrate (CALCITRATE) 200 mg (950 mg) tablet Take 2 tablets by mouth once daily.    cholecalciferol, vitamin D3, (VITAMIN D3) 4,000 unit Cap Take 4,000 Units by mouth once daily.     fish oil-omega-3 fatty acids 300-1,000 mg capsule Take 2 g by mouth once daily.    gabapentin (NEURONTIN) 100 MG capsule Take 1 tablets PO HS    gabapentin (NEURONTIN) 800 MG tablet Take 1 tablet (800 mg total) by mouth 3 (three) times daily.    levothyroxine (SYNTHROID) 75 MCG tablet TAKE 1 TABLET BY MOUTH IN THE MORNING    multivitamin capsule Take 1 capsule by mouth once daily.    paroxetine (PAXIL) 40 MG tablet TAKE 1 TABLET BY MOUTH IN THE MORNING    simvastatin (ZOCOR) 40 MG tablet TAKE 1 TABLET BY MOUTH ONCE DAILY    sodium chloride 0.9% SolP 250 mL with ocrelizumab 30 mg/mL Soln Infuse Ocrevus 300mg in 250mL 0.9%NaCl every 2 weeks  x2 doses.     No current facility-administered medications for this visit.            Review of Systems   Constitutional: Negative for activity change, appetite change, chills, diaphoresis, fatigue, fever, unexpected weight change and weight loss.   HENT: Negative for congestion, ear pain, postnasal drip, rhinorrhea, sinus pressure, sinus pain, sneezing, sore throat, tinnitus, trouble swallowing and voice change.    Eyes: Negative for photophobia, pain and visual disturbance.   Respiratory: Negative for cough, chest tightness, shortness of breath and wheezing.    Cardiovascular: Negative for chest pain, palpitations and leg swelling.   Gastrointestinal: Negative for abdominal distention, abdominal pain, constipation, diarrhea, nausea and vomiting.   Genitourinary: Positive for frequency and urgency. Negative for decreased urine volume, difficulty urinating, dysuria, flank pain, hematuria and hesitancy.   Musculoskeletal: Negative for arthralgias, back pain, joint swelling, neck pain and neck stiffness.   Skin: Negative for rash.   Allergic/Immunologic: Negative for immunocompromised state.   Neurological: Negative for dizziness, tremors, seizures, syncope, facial asymmetry, speech difficulty, weakness, light-headedness, numbness and headaches.   Hematological: Negative for adenopathy. Does not bruise/bleed easily.   Psychiatric/Behavioral: Negative for confusion and sleep disturbance.       Objective:      Physical Exam   Constitutional: She is oriented to person, place, and time.   Cardiovascular: Normal rate, regular rhythm and normal heart sounds.   Pulmonary/Chest: Effort normal and breath sounds normal.   Abdominal: Soft. Bowel sounds are normal. There is tenderness in the suprapubic area.   Musculoskeletal: Normal range of motion.   Neurological: She is alert and oriented to person, place, and time.   Skin: Skin is warm and dry.   Psychiatric: She has a normal mood and affect.       Assessment:     Vitals:     09/13/19 1456   BP: 126/64   Pulse: 72   Temp: (!) 95.6 °F (35.3 °C)         1. UTI symptoms        Plan:   UTI symptoms  -     Urinalysis; Future; Expected date: 09/13/2019  -     Urine culture; Future; Expected date: 09/13/2019      As above  Push fluids  Recommendations to follow

## 2019-10-10 ENCOUNTER — TELEPHONE (OUTPATIENT)
Dept: OBSTETRICS AND GYNECOLOGY | Facility: CLINIC | Age: 59
End: 2019-10-10

## 2019-10-10 DIAGNOSIS — Z12.31 BREAST CANCER SCREENING BY MAMMOGRAM: Primary | ICD-10-CM

## 2019-10-10 NOTE — TELEPHONE ENCOUNTER
----- Message from [place] sent at 10/10/2019 10:59 AM CDT -----  Contact: self- [phone]  Would like to consult with the nurse, Patient would like to get an Order for a mammogram, Please call back at [phone], Thanks sj  .Type:  Mammogram    Caller is requesting to schedule their annual mammogram appointment.  Order is not listed in EPIC.  Please enter order and contact patient to schedule.  Name of Caller: Ms Meyers  Where would they like the mammogram performed? [facility]  Would the patient rather a call back or a response via [facility]? CallBack  Best Call Back Number:[phone]  Additional Information: Patient would like to get this Order as soon as possible

## 2019-10-11 ENCOUNTER — HOSPITAL ENCOUNTER (OUTPATIENT)
Dept: RADIOLOGY | Facility: HOSPITAL | Age: 59
Discharge: HOME | End: 2019-10-11
Attending: NURSE PRACTITIONER
Payer: MEDICARE

## 2019-10-11 VITALS — HEIGHT: 63 IN | BODY MASS INDEX: 28.95 KG/M2 | WEIGHT: 163.38 LBS

## 2019-10-11 DIAGNOSIS — Z12.31 BREAST CANCER SCREENING BY MAMMOGRAM: ICD-10-CM

## 2019-10-11 PROCEDURE — 77067 MAMMO DIGITAL SCREENING BILAT WITH TOMOSYNTHESIS_CAD: ICD-10-PCS | Mod: 26,HCNC,, | Performed by: RADIOLOGY

## 2019-10-11 PROCEDURE — 77063 MAMMO DIGITAL SCREENING BILAT WITH TOMOSYNTHESIS_CAD: ICD-10-PCS | Mod: 26,HCNC,, | Performed by: RADIOLOGY

## 2019-10-11 PROCEDURE — 77063 BREAST TOMOSYNTHESIS BI: CPT | Mod: 26,HCNC,, | Performed by: RADIOLOGY

## 2019-10-11 PROCEDURE — 77063 BREAST TOMOSYNTHESIS BI: CPT | Mod: TC,HCNC

## 2019-10-11 PROCEDURE — 77067 SCR MAMMO BI INCL CAD: CPT | Mod: 26,HCNC,, | Performed by: RADIOLOGY

## 2019-10-26 ENCOUNTER — PATIENT OUTREACH (OUTPATIENT)
Dept: ADMINISTRATIVE | Facility: OTHER | Age: 59
End: 2019-10-26

## 2019-10-29 ENCOUNTER — LAB VISIT (OUTPATIENT)
Dept: LAB | Facility: HOSPITAL | Age: 59
End: 2019-10-29
Attending: PSYCHIATRY & NEUROLOGY
Payer: MEDICARE

## 2019-10-29 ENCOUNTER — OFFICE VISIT (OUTPATIENT)
Dept: NEUROLOGY | Facility: CLINIC | Age: 59
End: 2019-10-29
Payer: MEDICARE

## 2019-10-29 VITALS
SYSTOLIC BLOOD PRESSURE: 115 MMHG | BODY MASS INDEX: 29.28 KG/M2 | WEIGHT: 165.25 LBS | DIASTOLIC BLOOD PRESSURE: 80 MMHG | HEART RATE: 65 BPM | HEIGHT: 63 IN

## 2019-10-29 DIAGNOSIS — Z71.89 COUNSELING REGARDING GOALS OF CARE: ICD-10-CM

## 2019-10-29 DIAGNOSIS — G35 MS (MULTIPLE SCLEROSIS): ICD-10-CM

## 2019-10-29 DIAGNOSIS — Z29.89 PROPHYLACTIC IMMUNOTHERAPY: ICD-10-CM

## 2019-10-29 DIAGNOSIS — E53.8 B12 DEFICIENCY: ICD-10-CM

## 2019-10-29 DIAGNOSIS — G35 MS (MULTIPLE SCLEROSIS): Primary | ICD-10-CM

## 2019-10-29 DIAGNOSIS — R26.9 GAIT DISTURBANCE: ICD-10-CM

## 2019-10-29 LAB
ALBUMIN SERPL BCP-MCNC: 3.9 G/DL (ref 3.5–5.2)
ALP SERPL-CCNC: 70 U/L (ref 55–135)
ALT SERPL W/O P-5'-P-CCNC: 19 U/L (ref 10–44)
AST SERPL-CCNC: 41 U/L (ref 10–40)
BASOPHILS # BLD AUTO: 0.04 K/UL (ref 0–0.2)
BASOPHILS NFR BLD: 0.8 % (ref 0–1.9)
BILIRUB DIRECT SERPL-MCNC: 0.1 MG/DL (ref 0.1–0.3)
BILIRUB SERPL-MCNC: 0.2 MG/DL (ref 0.1–1)
DIFFERENTIAL METHOD: ABNORMAL
EOSINOPHIL # BLD AUTO: 0 K/UL (ref 0–0.5)
EOSINOPHIL NFR BLD: 0.8 % (ref 0–8)
ERYTHROCYTE [DISTWIDTH] IN BLOOD BY AUTOMATED COUNT: 12.3 % (ref 11.5–14.5)
HCT VFR BLD AUTO: 42.8 % (ref 37–48.5)
HGB BLD-MCNC: 12.9 G/DL (ref 12–16)
IMM GRANULOCYTES # BLD AUTO: 0 K/UL (ref 0–0.04)
IMM GRANULOCYTES NFR BLD AUTO: 0 % (ref 0–0.5)
LYMPHOCYTES # BLD AUTO: 1.7 K/UL (ref 1–4.8)
LYMPHOCYTES NFR BLD: 33.7 % (ref 18–48)
MCH RBC QN AUTO: 31.2 PG (ref 27–31)
MCHC RBC AUTO-ENTMCNC: 30.1 G/DL (ref 32–36)
MCV RBC AUTO: 104 FL (ref 82–98)
MONOCYTES # BLD AUTO: 0.6 K/UL (ref 0.3–1)
MONOCYTES NFR BLD: 11 % (ref 4–15)
NEUTROPHILS # BLD AUTO: 2.8 K/UL (ref 1.8–7.7)
NEUTROPHILS NFR BLD: 53.7 % (ref 38–73)
NRBC BLD-RTO: 0 /100 WBC
PLATELET # BLD AUTO: 190 K/UL (ref 150–350)
PMV BLD AUTO: 10.2 FL (ref 9.2–12.9)
PROT SERPL-MCNC: 7.5 G/DL (ref 6–8.4)
RBC # BLD AUTO: 4.13 M/UL (ref 4–5.4)
VIT B12 SERPL-MCNC: 421 PG/ML (ref 210–950)
WBC # BLD AUTO: 5.11 K/UL (ref 3.9–12.7)

## 2019-10-29 PROCEDURE — 99499 RISK ADDL DX/OHS AUDIT: ICD-10-PCS | Mod: HCNC,S$GLB,, | Performed by: PSYCHIATRY & NEUROLOGY

## 2019-10-29 PROCEDURE — 99215 OFFICE O/P EST HI 40 MIN: CPT | Mod: HCNC,S$GLB,, | Performed by: PSYCHIATRY & NEUROLOGY

## 2019-10-29 PROCEDURE — 82607 VITAMIN B-12: CPT | Mod: HCNC

## 2019-10-29 PROCEDURE — 99999 PR PBB SHADOW E&M-EST. PATIENT-LVL III: ICD-10-PCS | Mod: PBBFAC,HCNC,, | Performed by: PSYCHIATRY & NEUROLOGY

## 2019-10-29 PROCEDURE — 86356 MONONUCLEAR CELL ANTIGEN: CPT | Mod: HCNC

## 2019-10-29 PROCEDURE — 99499 UNLISTED E&M SERVICE: CPT | Mod: HCNC,S$GLB,, | Performed by: PSYCHIATRY & NEUROLOGY

## 2019-10-29 PROCEDURE — 3008F PR BODY MASS INDEX (BMI) DOCUMENTED: ICD-10-PCS | Mod: HCNC,CPTII,S$GLB, | Performed by: PSYCHIATRY & NEUROLOGY

## 2019-10-29 PROCEDURE — 3008F BODY MASS INDEX DOCD: CPT | Mod: HCNC,CPTII,S$GLB, | Performed by: PSYCHIATRY & NEUROLOGY

## 2019-10-29 PROCEDURE — 80076 HEPATIC FUNCTION PANEL: CPT | Mod: HCNC

## 2019-10-29 PROCEDURE — 99215 PR OFFICE/OUTPT VISIT, EST, LEVL V, 40-54 MIN: ICD-10-PCS | Mod: HCNC,S$GLB,, | Performed by: PSYCHIATRY & NEUROLOGY

## 2019-10-29 PROCEDURE — 36415 COLL VENOUS BLD VENIPUNCTURE: CPT | Mod: HCNC

## 2019-10-29 PROCEDURE — 85025 COMPLETE CBC W/AUTO DIFF WBC: CPT | Mod: HCNC

## 2019-10-29 PROCEDURE — 99999 PR PBB SHADOW E&M-EST. PATIENT-LVL III: CPT | Mod: PBBFAC,HCNC,, | Performed by: PSYCHIATRY & NEUROLOGY

## 2019-10-29 NOTE — PROGRESS NOTES
"Subjective:        Patient ID: Cem Meyers is a 59 y.o. female who presents today for a routine clinic visit for MS.      MS HPI:  · DMT: Ocrevus--initial doses April 2019  · Side effects from DMT? No  · Taking vitamin D3 as recommended? Yes -  Dose:   · Feels walking is worse and she is stiffer;   · Wants to go back on Rebif b/c she "felt better, and I walked better" on Rebif.   · She not exercising like she "should" she states    Medications:  Current Outpatient Medications   Medication Sig    amantadine HCl 100 mg Tab Take 1 tablet (100 mg total) by mouth once daily.    baclofen (LIORESAL) 10 MG tablet TAKE 1 TABLET IN THE MORNING, 1 IN THE AFTERNOON, AND UP TO 3 TABLETS AT BEDTIME AS NEEDED    calcium citrate (CALCITRATE) 200 mg (950 mg) tablet Take 2 tablets by mouth once daily.    cholecalciferol, vitamin D3, (VITAMIN D3) 4,000 unit Cap Take 4,000 Units by mouth once daily.     fish oil-omega-3 fatty acids 300-1,000 mg capsule Take 2 g by mouth once daily.    gabapentin (NEURONTIN) 100 MG capsule Take 1 tablets PO HS    gabapentin (NEURONTIN) 800 MG tablet Take 1 tablet (800 mg total) by mouth 3 (three) times daily.    levothyroxine (SYNTHROID) 75 MCG tablet TAKE 1 TABLET BY MOUTH IN THE MORNING    multivitamin capsule Take 1 capsule by mouth once daily.    paroxetine (PAXIL) 40 MG tablet TAKE 1 TABLET BY MOUTH IN THE MORNING    simvastatin (ZOCOR) 40 MG tablet TAKE 1 TABLET BY MOUTH ONCE DAILY    sodium chloride 0.9% SolP 250 mL with ocrelizumab 30 mg/mL Soln Infuse Ocrevus 300mg in 250mL 0.9%NaCl every 2 weeks x2 doses. (Patient not taking: Reported on 10/29/2019)     No current facility-administered medications for this visit.        SOCIAL HISTORY  Social History     Tobacco Use    Smoking status: Never Smoker    Smokeless tobacco: Never Used   Substance Use Topics    Alcohol use: No     Alcohol/week: 0.0 standard drinks     Frequency: Never     Binge frequency: Never    Drug use: No "       Living arrangements - the patient lives alone.    MS ROS:  MS REVIEW OF SYMPTOMS 10/29/2019   Do you feel abnormally tired on most days? No   Do you feel you generally sleep well? Yes   Do you have difficulty controlling your bladder?  -   Do you have difficulty controlling your bowels?  Yes   Do you have frequent muscle cramps, tightness or spasms in your limbs?  Yes   Do you have new visual symptoms?  No   Do you have worsening difficulty with your memory or thinking? Yes   Do you have worsening symptoms of anxiety or depression?  Yes   For patients who walk, Do you have more difficulty walking?  Yes   Have you fallen since your last visit?  Yes   For patients who use wheelchairs: Do you have any skin wounds or breakdown? Not Applicable   Do you have difficulty using your hands?  Yes   Do you have shooting or burning pain? No   Do you have difficulty with sexual function?  -   If you are sexually active, are you using birth control? Y/N  N/A Not Applicable   Do you often choke when swallowing liquids or solid food?  No   Do you experience worsening symptoms when overheated? Yes   Do you need any new equipment such as a wheelchair, walker or shower chair? No   Do you receive co-pay financial assistance for your principal MS medicine? No   Would you be interested in participating in an MS research trial in the future? No   Do you feel you have adequate family/friend support?  Yes   Do you have health insurance?   Yes   Are you currently employed? No   Do you receive SSDI/SSI?  Yes   Do you use marijuana or cannabis products? No   Have you been diagnosed with a urinary tract infection since your last visit here? No   Have you been diagnosed with a respiratory tract infection since your last visit here? No   Have you been to the emergency room since your last visit here? No   Have you been hospitalized since your last visit here?  No           Objective:        1. 25 foot timed walk:  7.2s without assist; favors  LLE;   Was 6.8s last visit;     Neurologic Exam  MS: intact  CN: no ERNESTO, no dysarthria  Motor:4+/5 extensors LUE, left HF 3/5, left KF and DF 4/5;   Reflexes: left reflex preponderance  Sens: moderate decrease in vibration all 4s.   Gait: spastic, wide based, unsteady, favoring LLE;     Imaging:       Results for orders placed during the hospital encounter of 04/26/19   MRI Brain Demyelinating W W/O Contrast    Impression Findings in the cerebral white matter which are typical for multiple sclerosis.  No new or enhancing lesions to suggest active disease.    Electronically signed by resident: Hieu Marroquin  Date:    04/27/2019  Time:    21:06    Electronically signed by: Min Norton MD  Date:    04/27/2019  Time:    22:20     Results for orders placed during the hospital encounter of 04/26/19   MRI Cervical Spine Demyelinating W W/O Contrast    Impression Stable appearance of patchy T2/STIR intramedullary signal throughout the cervical cord and the visualized portions of the thoracic cord, keeping with prior episodes of demyelination.  No new lesions.      Electronically signed by: Min Norton MD  Date:    04/28/2019  Time:    15:45     Results for orders placed during the hospital encounter of 04/26/19   MRI Thoracic Spine Demyelinating W W/O Contrast    Impression Stable appearance of T2/FLAIR signal intramedullary signal throughout the thoracic cord, in keeping with multiple sclerosis.  No new or enhancing lesion.  No evidence of significant cord atrophy.      Electronically signed by: Min Norton MD  Date:    04/28/2019  Time:    15:30         Labs:     Lab Results   Component Value Date    JOIHDPVC18QY 57 07/17/2019    YAPRYGTU21JJ 57 11/29/2017    SJVWFSFQ36GX 82 09/09/2016     No results found for: JCVINDEX, JCVANTIBODY  No results found for: HI1NJDAR, ABSOLUTECD3, IJ1VPFYU, ABSOLUTECD8, CR6ZJXMX, ABSOLUTECD4, LABCD48  Lab Results   Component Value Date    WBC 5.11 10/29/2019    RBC 4.13 10/29/2019     HGB 12.9 10/29/2019    HCT 42.8 10/29/2019     (H) 10/29/2019    MCH 31.2 (H) 10/29/2019    MCHC 30.1 (L) 10/29/2019    RDW 12.3 10/29/2019     10/29/2019    MPV 10.2 10/29/2019    GRAN 2.8 10/29/2019    GRAN 53.7 10/29/2019    LYMPH 1.7 10/29/2019    LYMPH 33.7 10/29/2019    MONO 0.6 10/29/2019    MONO 11.0 10/29/2019    EOS 0.0 10/29/2019    BASO 0.04 10/29/2019    EOSINOPHIL 0.8 10/29/2019    BASOPHIL 0.8 10/29/2019     Sodium   Date Value Ref Range Status   06/05/2019 142 136 - 145 mmol/L Final     Potassium   Date Value Ref Range Status   06/05/2019 4.0 3.5 - 5.1 mmol/L Final     Chloride   Date Value Ref Range Status   06/05/2019 106 95 - 110 mmol/L Final     CO2   Date Value Ref Range Status   06/05/2019 27 23 - 29 mmol/L Final     Glucose   Date Value Ref Range Status   06/05/2019 93 70 - 110 mg/dL Final     BUN, Bld   Date Value Ref Range Status   06/05/2019 11 6 - 20 mg/dL Final     Creatinine   Date Value Ref Range Status   06/05/2019 1.1 0.5 - 1.4 mg/dL Final     Calcium   Date Value Ref Range Status   06/05/2019 10.1 8.7 - 10.5 mg/dL Final     Total Protein   Date Value Ref Range Status   10/29/2019 7.5 6.0 - 8.4 g/dL Final     Albumin   Date Value Ref Range Status   10/29/2019 3.9 3.5 - 5.2 g/dL Final     Total Bilirubin   Date Value Ref Range Status   10/29/2019 0.2 0.1 - 1.0 mg/dL Final     Comment:     For infants and newborns, interpretation of results should be based  on gestational age, weight and in agreement with clinical  observations.  Premature Infant recommended reference ranges:  Up to 24 hours.............<8.0 mg/dL  Up to 48 hours............<12.0 mg/dL  3-5 days..................<15.0 mg/dL  6-29 days.................<15.0 mg/dL       Alkaline Phosphatase   Date Value Ref Range Status   10/29/2019 70 55 - 135 U/L Final     AST   Date Value Ref Range Status   10/29/2019 41 (H) 10 - 40 U/L Final     ALT   Date Value Ref Range Status   10/29/2019 19 10 - 44 U/L Final      Anion Gap   Date Value Ref Range Status   06/05/2019 9 8 - 16 mmol/L Final     eGFR if    Date Value Ref Range Status   06/05/2019 >60 >60 mL/min/1.73 m^2 Final     eGFR if non    Date Value Ref Range Status   06/05/2019 56 (A) >60 mL/min/1.73 m^2 Final     Comment:     Calculation used to obtain the estimated glomerular filtration  rate (eGFR) is the CKD-EPI equation.           Lab Results   Component Value Date    HEPBSAG Negative 07/17/2019    HEPBSAB Negative 07/17/2019    HEPBCAB Negative 07/17/2019         Impression:       Labs reviewed: N/A  Imaging tests reviewed: N/A    Diagnosis of MS: Yes      MS Diagnosis based on:    Progression:  Number of relapses in the past year:  0  Clinical Progression:  Worsened  Type:  Progressive  MRI Progression:  N/A    Plan:   Switch Disease Modifying Therapy FROM:  Other            Ocrevus   TO:  Rebif   Monitoring labs ordered?:  Yes            REbif labs   Imaging labs ordered?:  No    Additional Impression:            Pt has subjective sense of worsening on Ocrevus and her exam shows slower timed walk; I do not think Rebif is best choice, but she feels strongly that she wants to go back to this medication for now;  After shared decision making, will restart Rebif; If she continues to progress however, will have low threshold to change again.      F/u 3mo with Rimma Rouse PA-C    worsening  Labs today  rebif however not sure best choice; what she wants'  Consider clad  Advised cane  Baclofen earlier  Defers PT due to cost;       Over 50% of this 40 minute visit was spent in direct face to face counseling of the patient about MS, DMT considerations, and MS symptom management.     Problem List Items Addressed This Visit        1 - High    MS (multiple sclerosis) - Primary (Chronic)    Relevant Medications    interferon beta-1a, albumin, (REBIF, WITH ALBUMIN,) 22 mcg/0.5 mL injection    Other Relevant Orders    Vitamin B12 (Completed)     Rituxan Sensitivity (Completed)    CBC auto differential (Completed)    Hepatic function panel (Completed)       2     Gait disturbance       Unprioritized    Prophylactic immunotherapy      Other Visit Diagnoses     B12 deficiency        Relevant Orders    Vitamin B12 (Completed)    Rituxan Sensitivity (Completed)    CBC auto differential (Completed)    Hepatic function panel (Completed)    Counseling regarding goals of care              Rebeka Smith MD

## 2019-10-30 ENCOUNTER — TELEPHONE (OUTPATIENT)
Dept: PHARMACY | Facility: CLINIC | Age: 59
End: 2019-10-30

## 2019-10-30 NOTE — TELEPHONE ENCOUNTER
Informed Patient  that [facility] received prescription for REBIF and prior authorization is required.  [facility] will be back in touch once insurance determination is received.

## 2019-11-01 LAB — CD20 CELLS NFR SPEC: NORMAL %

## 2019-11-05 ENCOUNTER — CLINICAL SUPPORT (OUTPATIENT)
Dept: CARDIOLOGY | Facility: CLINIC | Age: 59
End: 2019-11-05
Payer: MEDICARE

## 2019-11-05 ENCOUNTER — OFFICE VISIT (OUTPATIENT)
Dept: CARDIOLOGY | Facility: CLINIC | Age: 59
End: 2019-11-05
Payer: MEDICARE

## 2019-11-05 VITALS
DIASTOLIC BLOOD PRESSURE: 80 MMHG | WEIGHT: 166 LBS | BODY MASS INDEX: 29.41 KG/M2 | HEART RATE: 67 BPM | SYSTOLIC BLOOD PRESSURE: 102 MMHG

## 2019-11-05 DIAGNOSIS — I50.32 CHRONIC DIASTOLIC HEART FAILURE: Primary | ICD-10-CM

## 2019-11-05 DIAGNOSIS — E03.8 OTHER SPECIFIED HYPOTHYROIDISM: Chronic | ICD-10-CM

## 2019-11-05 DIAGNOSIS — E78.49 OTHER HYPERLIPIDEMIA: Chronic | ICD-10-CM

## 2019-11-05 DIAGNOSIS — G35 MS (MULTIPLE SCLEROSIS): Chronic | ICD-10-CM

## 2019-11-05 DIAGNOSIS — I50.32 CHRONIC DIASTOLIC HEART FAILURE: ICD-10-CM

## 2019-11-05 DIAGNOSIS — G47.33 OSA ON CPAP: Chronic | ICD-10-CM

## 2019-11-05 PROBLEM — Z29.89 PROPHYLACTIC IMMUNOTHERAPY: Status: ACTIVE | Noted: 2019-11-05

## 2019-11-05 PROCEDURE — 93005 ELECTROCARDIOGRAM TRACING: CPT | Mod: HCNC,S$GLB,, | Performed by: INTERNAL MEDICINE

## 2019-11-05 PROCEDURE — 3008F BODY MASS INDEX DOCD: CPT | Mod: HCNC,CPTII,S$GLB, | Performed by: INTERNAL MEDICINE

## 2019-11-05 PROCEDURE — 3008F PR BODY MASS INDEX (BMI) DOCUMENTED: ICD-10-PCS | Mod: HCNC,CPTII,S$GLB, | Performed by: INTERNAL MEDICINE

## 2019-11-05 PROCEDURE — 99214 PR OFFICE/OUTPT VISIT, EST, LEVL IV, 30-39 MIN: ICD-10-PCS | Mod: HCNC,S$GLB,, | Performed by: INTERNAL MEDICINE

## 2019-11-05 PROCEDURE — 99999 PR PBB SHADOW E&M-EST. PATIENT-LVL III: ICD-10-PCS | Mod: PBBFAC,HCNC,, | Performed by: INTERNAL MEDICINE

## 2019-11-05 PROCEDURE — 93010 ELECTROCARDIOGRAM REPORT: CPT | Mod: HCNC,S$GLB,, | Performed by: INTERNAL MEDICINE

## 2019-11-05 PROCEDURE — 99214 OFFICE O/P EST MOD 30 MIN: CPT | Mod: HCNC,S$GLB,, | Performed by: INTERNAL MEDICINE

## 2019-11-05 PROCEDURE — 93010 EKG 12-LEAD: ICD-10-PCS | Mod: HCNC,S$GLB,, | Performed by: INTERNAL MEDICINE

## 2019-11-05 PROCEDURE — 93005 EKG 12-LEAD: ICD-10-PCS | Mod: HCNC,S$GLB,, | Performed by: INTERNAL MEDICINE

## 2019-11-05 PROCEDURE — 99999 PR PBB SHADOW E&M-EST. PATIENT-LVL III: CPT | Mod: PBBFAC,HCNC,, | Performed by: INTERNAL MEDICINE

## 2019-11-05 NOTE — PROGRESS NOTES
Subjective:   Patient ID:  Cem Meyers is a 59 y.o. female who presents for cardiac consult of Hyperlipidemia (annual visit)      Hyperlipidemia   Associated symptoms include chest pain and myalgias. Pertinent negatives include no shortness of breath.   Chest Pain    Associated symptoms include back pain. Pertinent negatives include no dizziness, headaches, palpitations or shortness of breath.   Her past medical history is significant for hyperlipidemia.     The patient came in today for cardiac consult of Hyperlipidemia (annual visit)    Referring Provider: Adonis Stubbs MD   Reason for consult: chest pain    Cem Meyers is a 59 y.o. female pt with current medical conditions HFpEF, HLD, hypothyroidism, MS, MAYKEL, depression presents for follow up CV evaluation.      2/5/18  Pt complains of left sided sharp chest pain. Happened twice so far, intermittent, happens at rest. Pt has MS with heat sensitive, does not do much activity. Has been more sedentary. Occasionally has mild SOB, diagnosed with mild MAYKEL 2008/2009, used CPAP initially but has not used since then. Very min snoring these days.     5/8/18  After last visit had 2D ECHO which revealed normal BiV function with grade 1 DD, normal valves. Had sleep study which was positive for MAYKEL and has started CPAP. Wants a nose mask instead of face mask. Walks dogs, feels fatigue but not chest pain. NO further episodes of CP, thinks it may have been due to some exercise/MS symptoms.     11/1/18  Pt has been having more muscle spasms and increased Baclofen/pump. Occ chest pain - feels like pressure. Has been using CPAP, overall doing ok. No palpitations/LE swelling.     11/5/19  Has been doing well lately. She continues to work out, runs and tries to get HR up to 140s. No CP/SOB. Has been doing well with CPAP.   ECG - NSR, low voltage    Patient feels no leg swelling, no PND, no palpitation, no syncope, no CNS symptoms.    Patient is compliant with  medications.    2/5/18 - ECG - NSR, no ischemia    2D ECHO 2/7/18  CONCLUSIONS     1 - No wall motion abnormalities.     2 - Normal left ventricular systolic function (EF 60-65%).     3 - Impaired LV relaxation, normal LAP (grade 1 diastolic dysfunction).     4 - Normal right ventricular systolic function .     5 - The estimated PA systolic pressure is greater than 23 mmHg.         Past Medical History:   Diagnosis Date    Broken toe 6/2015    left big toe    Chronic diastolic heart failure 5/8/2018    Closed left arm fracture     Colon polyp     Depression     Hyperlipidemia     Hypothyroid     Immunosuppression 1/28/2019    MS (multiple sclerosis)     Neurogenic bladder 12/6/2012    Osteoporosis     Polyneuropathy     Sleep apnea     Trouble in sleeping        Past Surgical History:   Procedure Laterality Date    COLONOSCOPY N/A 9/1/2017    Procedure: COLONOSCOPY;  Surgeon: Andriy Villar MD;  [facility];  Service: Endoscopy;  Laterality: N/A;    FRACTURE SURGERY Left 2013    wrist- [facility]    KNEE SURGERY Right 1989    in [place]    TONSILLECTOMY  1966       Social History     Tobacco Use    Smoking status: Never Smoker    Smokeless tobacco: Never Used   Substance Use Topics    Alcohol use: No     Alcohol/week: 0.0 standard drinks     Frequency: Never     Binge frequency: Never    Drug use: No       Family History   Problem Relation Age of Onset    Pancreatic cancer Mother     Stomach cancer Mother     Cancer Mother         pancreatic, stomach    Hypertension Father     Heart disease Father         MI    Lupus Maternal Aunt     Rheum arthritis Maternal Aunt     Rheum arthritis Sister     Melanoma Neg Hx        Patient's Medications   New Prescriptions    No medications on file   Previous Medications    AMANTADINE  MG TAB    Take 1 tablet (100 mg total) by mouth once daily.    BACLOFEN (LIORESAL) 10 MG TABLET    TAKE 1 TABLET IN THE MORNING, 1 IN THE AFTERNOON, AND UP TO  3 TABLETS AT BEDTIME AS NEEDED    CALCIUM CITRATE (CALCITRATE) 200 MG (950 MG) TABLET    Take 2 tablets by mouth once daily.    CHOLECALCIFEROL, VITAMIN D3, (VITAMIN D3) 4,000 UNIT CAP    Take 4,000 Units by mouth once daily.     FISH OIL-OMEGA-3 FATTY ACIDS 300-1,000 MG CAPSULE    Take 2 g by mouth once daily.    GABAPENTIN (NEURONTIN) 100 MG CAPSULE    Take 1 tablets PO HS    GABAPENTIN (NEURONTIN) 800 MG TABLET    Take 1 tablet (800 mg total) by mouth 3 (three) times daily.    INTERFERON BETA-1A, ALBUMIN, (REBIF, WITH ALBUMIN,) 22 MCG/0.5 ML INJECTION    Inject 0.5 mLs (22 mcg total) into the skin 3 (three) times a week.    LEVOTHYROXINE (SYNTHROID) 75 MCG TABLET    TAKE 1 TABLET BY MOUTH IN THE MORNING    MULTIVITAMIN CAPSULE    Take 1 capsule by mouth once daily.    PAROXETINE (PAXIL) 40 MG TABLET    TAKE 1 TABLET BY MOUTH IN THE MORNING    SIMVASTATIN (ZOCOR) 40 MG TABLET    TAKE 1 TABLET BY MOUTH ONCE DAILY    SODIUM CHLORIDE 0.9% SOLP 250 ML WITH OCRELIZUMAB 30 MG/ML SOLN    Infuse Ocrevus 300mg in 250mL 0.9%NaCl every 2 weeks x2 doses.   Modified Medications    No medications on file   Discontinued Medications    No medications on file       Review of Systems   Constitutional: Negative.    HENT: Negative.    Eyes: Negative.    Respiratory: Negative for shortness of breath.    Cardiovascular: Positive for chest pain. Negative for palpitations.   Gastrointestinal: Negative.    Genitourinary: Negative.    Musculoskeletal: Positive for back pain, falls, joint pain and myalgias.   Skin: Negative.    Neurological: Negative for dizziness and headaches.   Endo/Heme/Allergies: Negative.    Psychiatric/Behavioral: Negative.    All 12 systems otherwise negative.      Wt Readings from Last 3 Encounters:   11/05/19 75.3 kg (166 lb 0.1 oz)   10/29/19 75 kg (165 lb 3.8 oz)   10/11/19 74.1 kg (163 lb 5.8 oz)     Temp Readings from Last 3 Encounters:   09/13/19 (!) 95.6 °F (35.3 °C) (Tympanic)   06/04/19 98.3 °F (36.8 °C)    05/24/19 (!) 95.1 °F (35.1 °C) (Tympanic)     BP Readings from Last 3 Encounters:   11/05/19 102/80   10/29/19 115/80   09/13/19 126/64     Pulse Readings from Last 3 Encounters:   11/05/19 67   10/29/19 65   09/13/19 72       /80 (BP Location: Right arm, Patient Position: Sitting, BP Method: Medium (Manual))   Pulse 67   Wt 75.3 kg (166 lb 0.1 oz)   BMI 29.41 kg/m²     Objective:   Physical Exam   Constitutional: She is oriented to person, place, and time. She appears well-developed and well-nourished. No distress.   HENT:   Head: Normocephalic and atraumatic.   Nose: Nose normal.   Mouth/Throat: Oropharynx is clear and moist.   Eyes: Conjunctivae and EOM are normal. No scleral icterus.   Neck: Normal range of motion. Neck supple. No JVD present. No thyromegaly present.   Cardiovascular: Normal rate, regular rhythm, S1 normal and S2 normal. Exam reveals no gallop, no S3, no S4 and no friction rub.   No murmur heard.  Pulmonary/Chest: Effort normal and breath sounds normal. No stridor. No respiratory distress. She has no wheezes. She has no rales. She exhibits no tenderness.   Abdominal: Soft. Bowel sounds are normal. She exhibits no distension and no mass. There is no tenderness. There is no rebound.   Genitourinary:   Genitourinary Comments: Deferred   Musculoskeletal: Normal range of motion. She exhibits no edema, tenderness or deformity.   Lymphadenopathy:     She has no cervical adenopathy.   Neurological: She is alert and oriented to person, place, and time. She exhibits normal muscle tone. Coordination normal.   Skin: Skin is warm and dry. No rash noted. She is not diaphoretic. No erythema. No pallor.   Psychiatric: She has a normal mood and affect. Her behavior is normal. Judgment and thought content normal.   Nursing note and vitals reviewed.      Lab Results   Component Value Date     06/05/2019    K 4.0 06/05/2019     06/05/2019    CO2 27 06/05/2019    BUN 11 06/05/2019     CREATININE 1.1 06/05/2019    GLU 93 06/05/2019    MG 2.3 10/16/2018    AST 41 (H) 10/29/2019    ALT 19 10/29/2019    ALBUMIN 3.9 10/29/2019    PROT 7.5 10/29/2019    BILITOT 0.2 10/29/2019    WBC 5.11 10/29/2019    HGB 12.9 10/29/2019    HCT 42.8 10/29/2019     (H) 10/29/2019     10/29/2019    TSH 2.039 05/24/2019    CHOL 169 05/24/2019    HDL 50 05/24/2019    LDLCALC 104.8 05/24/2019    TRIG 71 05/24/2019     Assessment:      1. Chronic diastolic heart failure    2. MAYKEL on CPAP    3. Other hyperlipidemia    4. Other specified hypothyroidism    5. MS (multiple sclerosis)        Plan:   1. Chest pain, atypical  - resolved   - 2D ECHO overall normal function without valve disease  - diastolic HF - euvolemic  - discussed stress test if more symptomatic/worse  - consider other causes of CP - MSK/esoph/pulm/anxiety    2. HLD  - cont statin    3. Hypothyrodism  - cont synthroid    4. MS  - cont meds per PCP/Neuro    5. MAYKEL  - cont CPAP    6. Diastolic HF  - pt euvolemic  - cont to monitor  - mild venous insuff    Thank you for allowing me to participate in this patient's care. Please do not hesitate to contact me with any questions or concerns. Consult note has been forwarded to the referral physician.

## 2019-11-05 NOTE — PATIENT INSTRUCTIONS
Understanding Chronic Venous Insufficiency  Problems with the veins in the legs may lead to chronic venous insufficiency (CVI). CVI means that there is a long-term problem with the veins not being able to pump blood back to your heart. When this happens, blood stays in the legs and causes swelling and aching.   Two problems that may lead to chronic venous insufficiency are:  · Damaged valves. Valves keep blood flowing from the legs through the blood vessels and back to the heart. When the valves are damaged, blood does not flow as well.   · Deep vein thrombosis (DVT). Blood clots may form in the deep veins of the legs. This may cause pain, redness, and swelling in the legs. It may also block the flow of blood back to the heart. Seek immediate medical care if you have these symptoms.  · A blood clot in the leg can also break off and travel to the lungs. This is called pulmonary embolism (PE). In the lungs, the clot can cut off the flow of blood. This may cause chest pain, trouble breathing, sweating, a fast heartbeat, coughing (may cough up blood), and fainting. It is a medical emergency and may cause death. Call 911 if you have these symptoms.  · Healthcare providers call the two conditions, DVT and PE, venous thromboembolism (VTE).  CVI cant be cured, but you can control leg swelling to reduce the likelihood of ulcers (sores).  Recognizing the symptoms  Be aware of the following:  · If you stand or sit with your feet down for long periods, your legs may ache or feel heavy.  · Swollen ankles are possibly the most common symptom of CVI.  · As swelling increases, the skin over your ankles may show red spots or a brownish tinge. The skin may feel leathery or scaly, and may start to itch.  · If swelling is not controlled, an ulcer (open wound) may form.  What you can do  Reduce your risk of developing ulcers by doing the following:  · Increase blood flow back to your heart by elevating your legs, exercising daily,  and wearing elastic stockings.  · Boost blood flow in your legs by losing excess weight.  · If you must stand or sit in one place for a period of time, keep your blood moving by wiggling your toes, shifting your body position, and rising up on the balls of your feet.    Date Last Reviewed: 5/1/2016  © 1496-9406 [employer]. [address] [place] [address], [place] [address]. All rights reserved. This information is not intended as a substitute for professional medical care. Always follow your healthcare professional's instructions.

## 2019-12-02 ENCOUNTER — OFFICE VISIT (OUTPATIENT)
Dept: OPHTHALMOLOGY | Facility: CLINIC | Age: 59
End: 2019-12-02
Payer: MEDICARE

## 2019-12-02 DIAGNOSIS — H52.7 REFRACTIVE ERROR: ICD-10-CM

## 2019-12-02 DIAGNOSIS — Z83.511 FAMILY HISTORY OF GLAUCOMA: ICD-10-CM

## 2019-12-02 DIAGNOSIS — H26.9 CORTICAL CATARACT OF BOTH EYES: Primary | ICD-10-CM

## 2019-12-02 DIAGNOSIS — H40.013 OPEN ANGLE WITH BORDERLINE FINDINGS, LOW RISK, BILATERAL: ICD-10-CM

## 2019-12-02 DIAGNOSIS — G35 MS (MULTIPLE SCLEROSIS): Chronic | ICD-10-CM

## 2019-12-02 PROCEDURE — 92004 PR EYE EXAM, NEW PATIENT,COMPREHESV: ICD-10-PCS | Mod: HCNC,S$GLB,, | Performed by: OPHTHALMOLOGY

## 2019-12-02 PROCEDURE — 92250 FUNDUS PHOTOGRAPHY W/I&R: CPT | Mod: HCNC,S$GLB,, | Performed by: OPHTHALMOLOGY

## 2019-12-02 PROCEDURE — 99999 PR PBB SHADOW E&M-EST. PATIENT-LVL II: CPT | Mod: PBBFAC,HCNC,, | Performed by: OPHTHALMOLOGY

## 2019-12-02 PROCEDURE — 92250 COLOR FUNDUS PHOTOGRAPHY - OU - BOTH EYES: ICD-10-PCS | Mod: HCNC,S$GLB,, | Performed by: OPHTHALMOLOGY

## 2019-12-02 PROCEDURE — 92004 COMPRE OPH EXAM NEW PT 1/>: CPT | Mod: HCNC,S$GLB,, | Performed by: OPHTHALMOLOGY

## 2019-12-02 PROCEDURE — 99999 PR PBB SHADOW E&M-EST. PATIENT-LVL II: ICD-10-PCS | Mod: PBBFAC,HCNC,, | Performed by: OPHTHALMOLOGY

## 2019-12-02 NOTE — PROGRESS NOTES
SUBJECTIVE:   Cem Meyers is a 59 y.o. female   Corrected distance visual acuity was 20/20 -1 in the right eye and 20/25 in the left eye.   No chief complaint on file.       HPI:  HPI     The patient is here for a full work up. The patient has a family HX of   glaucoma so just wanted to be checked for that. The patient denies any   ocular pain or problems at this time.    Referred by Self    1. Refractive Error  2. MS   HX Optic Neuritis  3. +FM HX COAG (PGM)    Last edited by Snow Springer on 12/2/2019  9:59 AM. (History)        Assessment /Plan :  1. Cortical cataract of both eyes very mild   2. MS (multiple sclerosis) mild GOCT changes with IOP of 12-13  Long discussion with pt that this could represent old optic neuritis or LTG and should be monitiored   3. Open angle with borderline findings, low risk, bilateral    4. Family history of glaucoma      Return to clinic in 3 months  or as needed.  With IOP Check and HVF 24-2

## 2019-12-04 ENCOUNTER — TELEPHONE (OUTPATIENT)
Dept: NEUROLOGY | Facility: CLINIC | Age: 59
End: 2019-12-04

## 2019-12-04 NOTE — TELEPHONE ENCOUNTER
----- Message from Nini Lim sent at 12/4/2019 12:12 PM CST -----  Contact: pt at [phone]  Sarah pt-Call is in ref to getting approval for Rebis medication.  Pt is having muscle spasms in the neck and needs to be on something soon. Please call with update.

## 2019-12-07 ENCOUNTER — TELEPHONE (OUTPATIENT)
Dept: NEUROLOGY | Facility: HOSPITAL | Age: 59
End: 2019-12-07

## 2019-12-07 DIAGNOSIS — M79.2 NEUROPATHIC PAIN: Primary | ICD-10-CM

## 2019-12-07 DIAGNOSIS — M79.2 NEUROPATHIC PAIN: ICD-10-CM

## 2019-12-07 DIAGNOSIS — G35 MULTIPLE SCLEROSIS: ICD-10-CM

## 2019-12-07 RX ORDER — GABAPENTIN 800 MG/1
800 TABLET ORAL 3 TIMES DAILY
Qty: 30 TABLET | Refills: 0 | Status: SHIPPED | OUTPATIENT
Start: 2019-12-07 | End: 2019-12-12 | Stop reason: SDUPTHER

## 2019-12-07 NOTE — TELEPHONE ENCOUNTER
Received call from pharmacy - pt is taking 800 mg tablet of gabapentin, although 100 mg tablets are documented as current.  On further review, pt had 800 mg tablets prescribed 11/2018 and states that these make the biggest difference but she has run out.    Will give one month supply and direct further refills to MS clinic.    Humphrey Bravo MD

## 2019-12-09 ENCOUNTER — INFUSION (OUTPATIENT)
Dept: RHEUMATOLOGY | Facility: HOSPITAL | Age: 59
End: 2019-12-09
Attending: INTERNAL MEDICINE
Payer: MEDICARE

## 2019-12-09 ENCOUNTER — LAB VISIT (OUTPATIENT)
Dept: LAB | Facility: HOSPITAL | Age: 59
End: 2019-12-09
Payer: MEDICARE

## 2019-12-09 ENCOUNTER — OFFICE VISIT (OUTPATIENT)
Dept: RHEUMATOLOGY | Facility: CLINIC | Age: 59
End: 2019-12-09
Payer: MEDICARE

## 2019-12-09 VITALS
WEIGHT: 169.56 LBS | DIASTOLIC BLOOD PRESSURE: 90 MMHG | HEART RATE: 68 BPM | BODY MASS INDEX: 30.03 KG/M2 | SYSTOLIC BLOOD PRESSURE: 124 MMHG | OXYGEN SATURATION: 99 %

## 2019-12-09 DIAGNOSIS — M81.0 AGE-RELATED OSTEOPOROSIS WITHOUT CURRENT PATHOLOGICAL FRACTURE: Primary | ICD-10-CM

## 2019-12-09 DIAGNOSIS — M81.0 AGE-RELATED OSTEOPOROSIS WITHOUT CURRENT PATHOLOGICAL FRACTURE: ICD-10-CM

## 2019-12-09 DIAGNOSIS — G35 MULTIPLE SCLEROSIS: Primary | ICD-10-CM

## 2019-12-09 LAB
ALBUMIN SERPL BCP-MCNC: 3.6 G/DL (ref 3.5–5.2)
ALP SERPL-CCNC: 67 U/L (ref 55–135)
ALT SERPL W/O P-5'-P-CCNC: 15 U/L (ref 10–44)
ANION GAP SERPL CALC-SCNC: 8 MMOL/L (ref 8–16)
AST SERPL-CCNC: 30 U/L (ref 10–40)
BILIRUB SERPL-MCNC: 0.3 MG/DL (ref 0.1–1)
BUN SERPL-MCNC: 11 MG/DL (ref 6–20)
CALCIUM SERPL-MCNC: 9.6 MG/DL (ref 8.7–10.5)
CHLORIDE SERPL-SCNC: 105 MMOL/L (ref 95–110)
CO2 SERPL-SCNC: 27 MMOL/L (ref 23–29)
CREAT SERPL-MCNC: 1 MG/DL (ref 0.5–1.4)
EST. GFR  (AFRICAN AMERICAN): >60 ML/MIN/1.73 M^2
EST. GFR  (NON AFRICAN AMERICAN): >60 ML/MIN/1.73 M^2
GLUCOSE SERPL-MCNC: 115 MG/DL (ref 70–110)
POTASSIUM SERPL-SCNC: 4.2 MMOL/L (ref 3.5–5.1)
PROT SERPL-MCNC: 7.4 G/DL (ref 6–8.4)
SODIUM SERPL-SCNC: 140 MMOL/L (ref 136–145)

## 2019-12-09 PROCEDURE — 36415 COLL VENOUS BLD VENIPUNCTURE: CPT | Mod: HCNC

## 2019-12-09 PROCEDURE — 99214 OFFICE O/P EST MOD 30 MIN: CPT | Mod: HCNC,S$GLB,, | Performed by: INTERNAL MEDICINE

## 2019-12-09 PROCEDURE — 99999 PR PBB SHADOW E&M-EST. PATIENT-LVL III: ICD-10-PCS | Mod: PBBFAC,HCNC,, | Performed by: INTERNAL MEDICINE

## 2019-12-09 PROCEDURE — 3008F PR BODY MASS INDEX (BMI) DOCUMENTED: ICD-10-PCS | Mod: HCNC,CPTII,S$GLB, | Performed by: INTERNAL MEDICINE

## 2019-12-09 PROCEDURE — 99999 PR PBB SHADOW E&M-EST. PATIENT-LVL III: CPT | Mod: PBBFAC,HCNC,, | Performed by: INTERNAL MEDICINE

## 2019-12-09 PROCEDURE — 63600175 PHARM REV CODE 636 W HCPCS: Mod: JG,HCNC | Performed by: INTERNAL MEDICINE

## 2019-12-09 PROCEDURE — 96372 THER/PROPH/DIAG INJ SC/IM: CPT | Mod: HCNC

## 2019-12-09 PROCEDURE — 80053 COMPREHEN METABOLIC PANEL: CPT | Mod: HCNC

## 2019-12-09 PROCEDURE — 3008F BODY MASS INDEX DOCD: CPT | Mod: HCNC,CPTII,S$GLB, | Performed by: INTERNAL MEDICINE

## 2019-12-09 PROCEDURE — 99214 PR OFFICE/OUTPT VISIT, EST, LEVL IV, 30-39 MIN: ICD-10-PCS | Mod: HCNC,S$GLB,, | Performed by: INTERNAL MEDICINE

## 2019-12-09 RX ORDER — SODIUM CHLORIDE 0.9 % (FLUSH) 0.9 %
10 SYRINGE (ML) INJECTION
Status: CANCELLED | OUTPATIENT
Start: 2019-12-09

## 2019-12-09 RX ORDER — HEPARIN 100 UNIT/ML
100 SYRINGE INTRAVENOUS
Status: CANCELLED | OUTPATIENT
Start: 2019-12-09

## 2019-12-09 RX ORDER — FAMOTIDINE 20 MG/50ML
20 INJECTION, SOLUTION INTRAVENOUS
Status: CANCELLED
Start: 2019-12-09

## 2019-12-09 RX ORDER — ACETAMINOPHEN 500 MG
1000 TABLET ORAL
Status: CANCELLED | OUTPATIENT
Start: 2019-12-09

## 2019-12-09 RX ORDER — METHYLPREDNISOLONE SOD SUCC 125 MG
100 VIAL (EA) INJECTION
Status: CANCELLED
Start: 2019-12-09

## 2019-12-09 RX ADMIN — DENOSUMAB 60 MG: 60 INJECTION SUBCUTANEOUS at 11:12

## 2019-12-09 NOTE — NURSING
Calcium and creatinine noted. Patient denies dental work in the last 3 months. Instructed to not have dental work for the next 3 months.Prolia given without difficulties.Bandaid applied. Patient instructed to stay in the clinic for 15 minutes. Patient verbalized understanding and will notify nurse with any complaints.

## 2019-12-09 NOTE — PROGRESS NOTES
RHEUMATOLOGY CLINIC FOLLOW UP VISIT  Chief complaints:-  To follow up for osteoporosis.    HPI:-  Cem Fernandes a 59 y.o. pleasant female comes in with above chief complaints.  She has multiple medical problems as reviewed below including multiple sclerosis.  Due to multiple sclerosis and multiple other medical problems she had difficulty taking ORAL FOSAMAX once every week and sitting upright for at least 30 minutes. So she was given reclast. She developed severe arthralgias after reclast which subsided in a week. So she was switched to prolia. She received first injection in 10/2017 without any significant problems other than mild arthralgias for a couple of days.  She denies any falls or fractures since last visit .  No recurrent invasive dental procedures.      Review of Systems   Constitutional: Negative for chills, fever, malaise/fatigue and weight loss.   HENT: Negative for ear discharge, ear pain, hearing loss, nosebleeds and sore throat.    Eyes: Negative for blurred vision, double vision, photophobia, discharge and redness.   Respiratory: Negative for cough, hemoptysis, sputum production and shortness of breath.    Cardiovascular: Negative for chest pain, palpitations and claudication.   Gastrointestinal: Negative for abdominal pain, constipation, diarrhea, melena, nausea and vomiting.   Genitourinary: Negative for dysuria, frequency, hematuria and urgency.   Musculoskeletal: Negative for back pain, falls, joint pain, myalgias and neck pain.   Skin: Negative for itching and rash.   Neurological: Positive for sensory change and focal weakness. Negative for dizziness, tingling, tremors, speech change, seizures, loss of consciousness, weakness and headaches.   Endo/Heme/Allergies: Negative for environmental allergies. Does not bruise/bleed easily.   Psychiatric/Behavioral: Negative for hallucinations and memory loss. The patient does not have insomnia.        Past Medical History:   Diagnosis Date     Broken toe 6/2015    left big toe    Chronic diastolic heart failure 5/8/2018    Closed left arm fracture     Colon polyp     Depression     Hyperlipidemia     Hypothyroid     Immunosuppression 1/28/2019    MS (multiple sclerosis)     Neurogenic bladder 12/6/2012    Osteoporosis     Polyneuropathy     Sleep apnea     Trouble in sleeping        Past Surgical History:   Procedure Laterality Date    COLONOSCOPY N/A 9/1/2017    Procedure: COLONOSCOPY;  Surgeon: Andriy Villar MD;  [facility];  Service: Endoscopy;  Laterality: N/A;    FRACTURE SURGERY Left 2013    wrist- [facility]    KNEE SURGERY Right 1989    in [place]    TONSILLECTOMY  1966        Social History     Tobacco Use    Smoking status: Never Smoker    Smokeless tobacco: Never Used   Substance Use Topics    Alcohol use: No     Alcohol/week: 0.0 standard drinks     Frequency: Never     Binge frequency: Never    Drug use: No       Family History   Problem Relation Age of Onset    Pancreatic cancer Mother     Stomach cancer Mother     Cancer Mother         pancreatic, stomach    Hypertension Father     Heart disease Father         MI    Lupus Maternal Aunt     Rheum arthritis Maternal Aunt     Rheum arthritis Sister     Melanoma Neg Hx        Allergies   Allergen Reactions    Latex, Natural Rubber Rash           Physical examination:-    Vitals:    12/09/19 1045   BP: (!) 124/90   Pulse: 68   SpO2: 99%   Weight: 76.9 kg (169 lb 8.5 oz)   PainSc: 0-No pain       Physical Exam   Constitutional: She is oriented to person, place, and time and well-developed, well-nourished, and in no distress. No distress.   HENT:   Head: Normocephalic and atraumatic.   Nose: Nose normal.   Mouth/Throat: Oropharynx is clear and moist. No oropharyngeal exudate.   Eyes: Pupils are equal, round, and reactive to light. Conjunctivae and EOM are normal. Right eye exhibits no discharge. Left eye exhibits no discharge. No scleral icterus.   Neck: Normal  range of motion. Neck supple.   Cardiovascular: Normal rate and intact distal pulses.   Pulmonary/Chest: Effort normal. No respiratory distress. She exhibits no tenderness.   Abdominal: Soft. There is no tenderness.   Musculoskeletal:   No synovitis over small joints of hands or feet.  No effusion over the last joints. Non tender swollen right olecranon bursa.   Lymphadenopathy:     She has no cervical adenopathy.   Neurological: She is alert and oriented to person, place, and time.   Significant motor weakness over left side from multiple sclerosis.   Skin: Skin is warm. She is not diaphoretic. No erythema. No pallor.   Psychiatric: Mood and affect normal.   Nursing note and vitals reviewed.      Labs:-  Normal vitamin D.  Normal renal functions.      Assessment/Plans:-  1. Age-related osteoporosis without current pathological fracture    - tolerating prolia without any complication - last dose was June 2019  - CMP checked today - okay to get another dose today  - Repeat DEXA scan scheduled to be done prior to next visit  - No plans for any invasive dental procedure in the near future     Follow up in about 6 months (around 6/9/2020).     Disclaimer: This note was prepared using voice recognition system and is likely to have sound alike errors .  Please call me with any questions

## 2019-12-09 NOTE — TELEPHONE ENCOUNTER
----- Message from Emily Kincaid sent at 11/25/2019  9:45 AM CST -----  Regarding: RE: Rebif Denial  Faxed.  ----- Message -----  From: Sydney Red PharmD  Sent: 11/22/2019   3:37 PM CST  To: Emily Kincaid  Subject: FW: Rebif Denial                                 In case Alex hasn't gotten to it yet.     ----- Message -----  From: Annie Juarez RN  Sent: 11/21/2019  12:17 PM CST  To: Alex Ortega PharmD, Sydney Red PharmD  Subject: FW: Rebif Denial                                 Hi Jennifer and Alex    Do you have a copy of the denial letter you can fax me to [phone]?    Thanks    Annie  ----- Message -----  From: Rebeka Smith MD  Sent: 11/16/2019  12:07 PM CST  To: Alex Ortega PharmD, Sydney Red PharmD, #  Subject: RE: Rebif Denial                                 Recommend that we appeal  ----- Message -----  From: Alex Ortega PharmD  Sent: 11/14/2019  11:28 AM CST  To: Rebeka Smith MD, #  Subject: Rebif Denial                                     Good morning,     The prior authorization for Rebif was denied by the patient's insurance since it is not on formulary. Formulary alternatives include: Betaseron, Copaxone, Gilenya, Glatiramer, and Tecfidera.     Please let us know if you would like [facility] to appeal the Rebif or send a prescription for a formulary alternative if appropriate.     Thanks in advance,     Alex Ortega PharmD  Clinical Pharmacist  [facility]  P: [phone]

## 2019-12-09 NOTE — TELEPHONE ENCOUNTER
Received denial from [employer] in regards to pt's Rebif. Rebif is not [employer]'s formulary list. [employer]'s formulary drugs are Betaseron, Copaxone, Gilenya and Tecfidera. Per, Dr. Sarah ernst to initiate Betaseron. Pt is open to moving forward with Betaseron as Rebif is denied. Educated on dosage, route, frequency and side effects.

## 2019-12-11 ENCOUNTER — TELEPHONE (OUTPATIENT)
Dept: PHARMACY | Facility: CLINIC | Age: 59
End: 2019-12-11

## 2019-12-11 RX ORDER — INTERFERON BETA-1B 0.3 MG
KIT SUBCUTANEOUS
Qty: 1 BOX | Refills: 1 | Status: SHIPPED | OUTPATIENT
Start: 2019-12-11 | End: 2020-03-11 | Stop reason: SDUPTHER

## 2019-12-11 NOTE — TELEPHONE ENCOUNTER
LVM for callback to inform patient that [facility] received prescription for Betaseron and prior authorization is required.  [facility] will be back in touch once insurance determination is received.

## 2019-12-12 RX ORDER — GABAPENTIN 800 MG/1
TABLET ORAL
Qty: 90 TABLET | Refills: 2 | Status: SHIPPED | OUTPATIENT
Start: 2019-12-12 | End: 2020-03-18

## 2019-12-17 DIAGNOSIS — F32.A DEPRESSION, UNSPECIFIED DEPRESSION TYPE: ICD-10-CM

## 2019-12-17 DIAGNOSIS — G35 MULTIPLE SCLEROSIS: ICD-10-CM

## 2019-12-17 DIAGNOSIS — M79.2 NEUROPATHIC PAIN: ICD-10-CM

## 2019-12-18 RX ORDER — PAROXETINE HYDROCHLORIDE 40 MG/1
TABLET, FILM COATED ORAL
Qty: 30 TABLET | Refills: 12 | Status: SHIPPED | OUTPATIENT
Start: 2019-12-18 | End: 2020-07-29 | Stop reason: SDUPTHER

## 2019-12-18 RX ORDER — GABAPENTIN 100 MG/1
CAPSULE ORAL
Qty: 60 CAPSULE | Refills: 5 | Status: SHIPPED | OUTPATIENT
Start: 2019-12-18 | End: 2020-03-17 | Stop reason: SDUPTHER

## 2019-12-18 NOTE — TELEPHONE ENCOUNTER
DOCUMENTATION ONLY:  Prior authorization for Betaseron approved from 12/18/19 to 12/31/20    Co-pay: $2032.77    Patient Assistance is required

## 2019-12-23 ENCOUNTER — OFFICE VISIT (OUTPATIENT)
Dept: PULMONOLOGY | Facility: CLINIC | Age: 59
End: 2019-12-23
Payer: MEDICARE

## 2019-12-23 VITALS
DIASTOLIC BLOOD PRESSURE: 68 MMHG | BODY MASS INDEX: 30.69 KG/M2 | HEART RATE: 70 BPM | HEIGHT: 63 IN | SYSTOLIC BLOOD PRESSURE: 110 MMHG | RESPIRATION RATE: 20 BRPM | OXYGEN SATURATION: 99 % | WEIGHT: 173.19 LBS

## 2019-12-23 DIAGNOSIS — G35 MS (MULTIPLE SCLEROSIS): Chronic | ICD-10-CM

## 2019-12-23 DIAGNOSIS — F32.A DEPRESSION, UNSPECIFIED DEPRESSION TYPE: ICD-10-CM

## 2019-12-23 DIAGNOSIS — G47.33 OSA ON CPAP: Primary | Chronic | ICD-10-CM

## 2019-12-23 DIAGNOSIS — F51.04 PSYCHOPHYSIOLOGICAL INSOMNIA: ICD-10-CM

## 2019-12-23 DIAGNOSIS — I50.32 CHRONIC DIASTOLIC HEART FAILURE: ICD-10-CM

## 2019-12-23 DIAGNOSIS — E66.9 OBESITY (BMI 30.0-34.9): ICD-10-CM

## 2019-12-23 DIAGNOSIS — M62.838 MUSCLE SPASMS OF BOTH LOWER EXTREMITIES: ICD-10-CM

## 2019-12-23 DIAGNOSIS — G62.9 POLYNEUROPATHY: ICD-10-CM

## 2019-12-23 PROBLEM — E66.811 OBESITY (BMI 30.0-34.9): Status: ACTIVE | Noted: 2019-12-23

## 2019-12-23 PROCEDURE — 99999 PR PBB SHADOW E&M-EST. PATIENT-LVL IV: ICD-10-PCS | Mod: PBBFAC,HCNC,, | Performed by: NURSE PRACTITIONER

## 2019-12-23 PROCEDURE — 3008F BODY MASS INDEX DOCD: CPT | Mod: HCNC,CPTII,S$GLB, | Performed by: NURSE PRACTITIONER

## 2019-12-23 PROCEDURE — 99214 PR OFFICE/OUTPT VISIT, EST, LEVL IV, 30-39 MIN: ICD-10-PCS | Mod: HCNC,S$GLB,, | Performed by: NURSE PRACTITIONER

## 2019-12-23 PROCEDURE — 99214 OFFICE O/P EST MOD 30 MIN: CPT | Mod: HCNC,S$GLB,, | Performed by: NURSE PRACTITIONER

## 2019-12-23 PROCEDURE — 3008F PR BODY MASS INDEX (BMI) DOCUMENTED: ICD-10-PCS | Mod: HCNC,CPTII,S$GLB, | Performed by: NURSE PRACTITIONER

## 2019-12-23 PROCEDURE — 99999 PR PBB SHADOW E&M-EST. PATIENT-LVL IV: CPT | Mod: PBBFAC,HCNC,, | Performed by: NURSE PRACTITIONER

## 2019-12-23 NOTE — ASSESSMENT & PLAN NOTE
Encouraged calorie reduction and 30 minutes of exercise daily. Discussed impact of obesity on general health.  Gained 10 lbs since September 2019, lost her dog and stopped walking

## 2019-12-23 NOTE — ASSESSMENT & PLAN NOTE
Dx: Osteoarthritis of lumbar spine, unspecified spinal osteoarthritis complication status (P43.644)  Acute left-sided low back pain, with sciatica presence unspecified (M54.5)    Authorized # of Visits:  2        Next MD visit: none scheduled  Fall Risk: s Improved with turning off electronics and resuming CPAP  No longer awakens during night, improved with CPAP

## 2019-12-23 NOTE — ASSESSMENT & PLAN NOTE
Benefits and compliant auto CPAP 4-6 cm   AHI 4.5  [place] 6   Nasal wisp mask   Yearly supply order   HME: [facility]   Continue auto CPAP 4-6 cm   Follow up December 2020 yearly supply order/compliance download

## 2019-12-23 NOTE — PROGRESS NOTES
Subjective:      Patient ID: Cem Meyers is a 59 y.o. female.    Chief Complaint: Sleep Apnea    HPI: Cem Meyers is here for follow up for MAYKEL and CPAP complaince assessment.  She is on Auto CPAP of 4-6 cmH2O pressure which is optimally controlling sleep apnea with apneic index (AHI) 4.5 events an hour.   She is compliant with CPAP use. Complaince download today reveals 90% of days with greater than 4 hours of device use.   Patient reports benefit from CPAP use and denies snoring and excessive daytime sleepiness.  Patient reports no complaints happy with 4-6 cm, no longer burping. Nasal wisp mask is used.   [place] 6    Previous Report Reviewed: lab reports and office notes     Past Medical History: The following portions of the patient's history were reviewed and updated as appropriate:   She  has a past surgical history that includes Knee surgery (Right, 1989); Fracture surgery (Left, 2013); Tonsillectomy (1966); and Colonoscopy (N/A, 9/1/2017).  Her family history includes Cancer in her mother; Heart disease in her father; Hypertension in her father; Lupus in her maternal aunt; Pancreatic cancer in her mother; Rheum arthritis in her maternal aunt and sister; Stomach cancer in her mother.  She  reports that she has never smoked. She has never used smokeless tobacco. She reports that she does not drink alcohol or use drugs.  She has a current medication list which includes the following prescription(s): amantadine hcl, baclofen, calcium citrate, cholecalciferol (vitamin d3), fish oil-omega-3 fatty acids, gabapentin, gabapentin, betaseron, levothyroxine, multivitamin, paroxetine, simvastatin, and sodium chloride 0.9% SolP 250 mL with ocrelizumab 30 mg/mL Soln.  She is allergic to latex, natural rubber..    The following portions of the patient's history were reviewed and updated as appropriate: allergies, current medications, past family history, past medical history, past social history, past  "surgical history and problem list.    Review of Systems   Constitutional: Negative for fever, chills, weight loss, weight gain, activity change, appetite change, fatigue and night sweats.   HENT: Negative for postnasal drip, rhinorrhea, sinus pressure, voice change and congestion.    Eyes: Negative for redness and itching.   Respiratory: Negative for snoring, cough, sputum production, chest tightness, shortness of breath, wheezing, orthopnea, asthma nighttime symptoms, dyspnea on extertion, use of rescue inhaler and somnolence.    Cardiovascular: Negative.  Negative for chest pain, palpitations and leg swelling.   Genitourinary: Negative for difficulty urinating and hematuria.   Endocrine: Negative for cold intolerance and heat intolerance.    Musculoskeletal: Negative for arthralgias, gait problem, joint swelling and myalgias.   Skin: Negative.    Gastrointestinal: Negative for nausea, vomiting, abdominal pain and acid reflux.   Neurological: Negative for dizziness, weakness, light-headedness and headaches.   Hematological: Negative for adenopathy. No excessive bruising.   All other systems reviewed and are negative.     Objective:   /68   Pulse 70   Resp 20   Ht 5' 3" (1.6 m)   Wt 78.5 kg (173 lb 2.7 oz)   SpO2 99%   BMI 30.68 kg/m²   Physical Exam   Constitutional: She is oriented to person, place, and time. She appears well-developed and well-nourished. She is active and cooperative.  Non-toxic appearance. She does not appear ill. No distress.   HENT:   Head: Normocephalic.   Right Ear: External ear normal.   Left Ear: External ear normal.   Nose: Nose normal.   Mouth/Throat: Oropharynx is clear and moist. No oropharyngeal exudate.   Eyes: Conjunctivae are normal.   Neck: Normal range of motion. Neck supple.   Cardiovascular: Normal rate, regular rhythm, normal heart sounds and intact distal pulses.   Pulmonary/Chest: Effort normal and breath sounds normal. No stridor.   Abdominal: Soft. "   Musculoskeletal: Normal range of motion.   Lymphadenopathy:     She has no cervical adenopathy.   Neurological: She is alert and oriented to person, place, and time.   Skin: Skin is warm and dry.   Psychiatric: She has a normal mood and affect. Her behavior is normal. Judgment and thought content normal.   Vitals reviewed.    Personal Diagnostic Review  CPAP download  APAP 4-6 cm  Compliance Summary  11/18/2019 - 12/17/2019 (30 days)  Days with Device Usage 28 days  Days without Device Usage 2 days  Percent Days with Device Usage 93.3%  Cumulative Usage 8 days 12 hrs. 11 secs.  Maximum Usage (1 Day) 12 hrs. 31 mins. 16 secs.  Average Usage (All Days) 6 hrs. 48 mins.  Average Usage (Days Used) 7 hrs. 17 mins. 8 secs.  Minimum Usage (1 Day) 2 hrs. 41 mins. 23 secs.  Percent of Days with Usage >= 4 Hours 90.0%  Percent of Days with Usage < 4 Hours 10.0%  Date Range  Total Blower Time 8 days 12 hrs. 24 secs.  Average AHI 4.5  Auto-CPAP Summary  Auto-CPAP Mean Pressure 5.0 cmH2O  Auto-CPAP Peak Average Pressure 6.0 cmH2O  Average Device Pressure <= 90% of Time 6.0 cmH2O  Average Time in Large Leak Per Day 0 secs.    Assessment:     1. MAYKEL on CPAP    2. Polyneuropathy    3. Psychophysiological insomnia    4. Muscle spasms of both lower extremities    5. MS (multiple sclerosis)    6. Chronic diastolic heart failure    7. Depression, unspecified depression type    8. Obesity (BMI 30.0-34.9)      Orders Placed This Encounter   Procedures    CPAP/BIPAP SUPPLIES     Benefits and compliant  90 day supply. 4 refills.  Updated yearly supply order  [facility]     Order Specific Question:   Type of mask:     Answer:   Nasal     Order Specific Question:   Headgear?     Answer:   Yes     Order Specific Question:   Tubing?     Answer:   Yes     Order Specific Question:   Humidifier chamber?     Answer:   Yes     Order Specific Question:   Chin strap?     Answer:   Yes     Order Specific Question:   Filters?     Answer:   Yes      Order Specific Question:   Cushions?     Answer:   Yes     Order Specific Question:   Length of need (1-99 months):     Answer:   99     Plan:     Problem List Items Addressed This Visit     Psychophysiological insomnia     Improved with turning off electronics and resuming CPAP  No longer awakens during night, improved with CPAP          Polyneuropathy     Treated with Neurontin           MAYKEL on CPAP - Primary (Chronic)     Benefits and compliant auto CPAP 4-6 cm   AHI 4.5  [place] 6   Nasal wisp mask   Yearly supply order   HME: [facility]   Continue auto CPAP 4-6 cm   Follow up December 2020 yearly supply order/compliance download            Relevant Orders    CPAP/BIPAP SUPPLIES    Obesity (BMI 30.0-34.9)     Encouraged calorie reduction and 30 minutes of exercise daily. Discussed impact of obesity on general health.  Gained 10 lbs since September 2019, lost her dog and stopped walking              Muscle spasms of both lower extremities     Followed by neurologist and primary care provider          MS (multiple sclerosis) (Chronic)     Stable on treatment, followed by Rebeka Smith MD and Rimma Rouse PA-C Neurology, [facility] [place]           Depression     Followed by neurology          Chronic diastolic heart failure     Followed by cardiology              Follow up for CPAP 1 year compliance download.

## 2019-12-30 ENCOUNTER — TELEPHONE (OUTPATIENT)
Dept: PHARMACY | Facility: CLINIC | Age: 59
End: 2019-12-30

## 2019-12-30 NOTE — TELEPHONE ENCOUNTER
Call to complete initial consult for Betaseron. No answer, left voicemail.     Jhon Greenberg, PharmD  Clinical Pharmacist  [facility]  P: [phone]

## 2020-01-03 ENCOUNTER — TELEPHONE (OUTPATIENT)
Dept: NEUROLOGY | Facility: CLINIC | Age: 60
End: 2020-01-03

## 2020-01-03 NOTE — TELEPHONE ENCOUNTER
----- Message from Mami Oneill MA sent at 1/3/2020 11:30 AM CST -----  Contact: [phone]  pt states that she just found out that she has to mix Betaseron and wanted to know if she has trouble mixing it, does she have to wait a long period of time before starting a different therapy. [phone]

## 2020-01-03 NOTE — TELEPHONE ENCOUNTER
Returned call, informed once Betaseron is received a Nurse educator will come out to her home to train her on the medication. Pt verbalized understanding.

## 2020-01-07 NOTE — TELEPHONE ENCOUNTER
FOR DOCUMENTATION ONLY:    Financial Assistance for Betaseron approved from 01/07/2020 to 01/06/2021    Source: [employer]     ID: [id number]  BIN: [id number]  PCN: [id number]  GRP: [id number]    Amount: $5600.00

## 2020-01-10 ENCOUNTER — TELEPHONE (OUTPATIENT)
Dept: NEUROLOGY | Facility: CLINIC | Age: 60
End: 2020-01-10

## 2020-01-10 NOTE — TELEPHONE ENCOUNTER
----- Message from Arabella Erickson sent at 1/10/2020  2:00 PM CST -----  Contact: Pt [phone]  Reason: Experiencing extreme delay with delivery of Betaseron from [facility]. Pt would like to know if it can be sent to [facility] instead.

## 2020-01-13 RX ORDER — SIMVASTATIN 40 MG/1
TABLET, FILM COATED ORAL
Qty: 90 TABLET | Refills: 1 | Status: SHIPPED | OUTPATIENT
Start: 2020-01-13 | End: 2020-07-12

## 2020-01-16 NOTE — TELEPHONE ENCOUNTER
Spoke with [employer] representative, who confirmed they will be reaching out to pt to set up injection training within the next 1-2 days as pt has received her medication.

## 2020-01-28 ENCOUNTER — PATIENT OUTREACH (OUTPATIENT)
Dept: ADMINISTRATIVE | Facility: OTHER | Age: 60
End: 2020-01-28

## 2020-01-29 ENCOUNTER — OFFICE VISIT (OUTPATIENT)
Dept: NEUROLOGY | Facility: CLINIC | Age: 60
End: 2020-01-29
Payer: MEDICARE

## 2020-01-29 VITALS
BODY MASS INDEX: 30.72 KG/M2 | HEART RATE: 78 BPM | SYSTOLIC BLOOD PRESSURE: 109 MMHG | WEIGHT: 173.38 LBS | HEIGHT: 63 IN | DIASTOLIC BLOOD PRESSURE: 70 MMHG

## 2020-01-29 DIAGNOSIS — F32.A DEPRESSION, UNSPECIFIED DEPRESSION TYPE: ICD-10-CM

## 2020-01-29 DIAGNOSIS — M79.2 NEUROPATHIC PAIN: ICD-10-CM

## 2020-01-29 DIAGNOSIS — Z71.89 COUNSELING REGARDING GOALS OF CARE: ICD-10-CM

## 2020-01-29 DIAGNOSIS — G35 MULTIPLE SCLEROSIS: Primary | ICD-10-CM

## 2020-01-29 DIAGNOSIS — G47.33 OSA ON CPAP: Chronic | ICD-10-CM

## 2020-01-29 DIAGNOSIS — E66.9 OBESITY (BMI 30.0-34.9): ICD-10-CM

## 2020-01-29 DIAGNOSIS — R26.9 NEUROLOGIC GAIT DYSFUNCTION: ICD-10-CM

## 2020-01-29 DIAGNOSIS — E55.9 VITAMIN D INSUFFICIENCY: ICD-10-CM

## 2020-01-29 DIAGNOSIS — Z79.899 ENCOUNTER FOR LONG-TERM (CURRENT) USE OF HIGH-RISK MEDICATION: ICD-10-CM

## 2020-01-29 DIAGNOSIS — R25.2 SPASTICITY: ICD-10-CM

## 2020-01-29 PROCEDURE — 3008F BODY MASS INDEX DOCD: CPT | Mod: HCNC,CPTII,S$GLB, | Performed by: PHYSICIAN ASSISTANT

## 2020-01-29 PROCEDURE — 99215 PR OFFICE/OUTPT VISIT, EST, LEVL V, 40-54 MIN: ICD-10-PCS | Mod: HCNC,S$GLB,, | Performed by: PHYSICIAN ASSISTANT

## 2020-01-29 PROCEDURE — 3008F PR BODY MASS INDEX (BMI) DOCUMENTED: ICD-10-PCS | Mod: HCNC,CPTII,S$GLB, | Performed by: PHYSICIAN ASSISTANT

## 2020-01-29 PROCEDURE — 99999 PR PBB SHADOW E&M-EST. PATIENT-LVL III: ICD-10-PCS | Mod: PBBFAC,HCNC,, | Performed by: PHYSICIAN ASSISTANT

## 2020-01-29 PROCEDURE — 99215 OFFICE O/P EST HI 40 MIN: CPT | Mod: HCNC,S$GLB,, | Performed by: PHYSICIAN ASSISTANT

## 2020-01-29 PROCEDURE — 99999 PR PBB SHADOW E&M-EST. PATIENT-LVL III: CPT | Mod: PBBFAC,HCNC,, | Performed by: PHYSICIAN ASSISTANT

## 2020-01-29 NOTE — PROGRESS NOTES
Subjective:        Patient ID: Cem Meyers is a 59 y.o. female who presents today for a routine clinic visit for MS.  Last visit 10/29/2019 [facility] with Dr. Smith.     MS HPI:  · DMT: interferon beta-1b SQ-initiated this month(1/16/2020)  · Side effects from DMT? Yes - tiredness, brain fog, headaches  · Taking vitamin D3 as recommended? Yes - 4,000IU/d   · Taking Ibuprofen for headaches. She feels it's better to inject mid day for side effects. She prefers Rebif, but this was denied by her insurance.   · Unsure from an MS perspective if Betaseron is helpful at this point.  · Going to gym twice weekly  · Her dog passed away since last visit she she has not been walking as much as prior.     Medications:  Current Outpatient Medications   Medication Sig    amantadine HCl 100 mg Tab Take 1 tablet (100 mg total) by mouth once daily.    baclofen (LIORESAL) 10 MG tablet TAKE 1 TABLET IN THE MORNING, 1 IN THE AFTERNOON, AND UP TO 3 TABLETS AT BEDTIME AS NEEDED    calcium citrate (CALCITRATE) 200 mg (950 mg) tablet Take 2 tablets by mouth once daily.    cholecalciferol, vitamin D3, (VITAMIN D3) 4,000 unit Cap Take 4,000 Units by mouth once daily.     fish oil-omega-3 fatty acids 300-1,000 mg capsule Take 2 g by mouth once daily.    gabapentin (NEURONTIN) 100 MG capsule TAKE 1 TO 2 CAPSULES BY MOUTH AT BEDTIME    gabapentin (NEURONTIN) 800 MG tablet TAKE 1 TABLET BY MOUTH THREE TIMES DAILY    interferon beta-1b (BETASERON) 0.3 mg Kit Titration - Inject subcutaneous every other day:   Weeks 1-2: 0.0625 mg (0.25 mL),   Weeks 3-4: 0.125 mg (0.5 mL),   Weeks 5-6: 0.1875 mg (0.75 mL),   Weeks 7+: 0.25 mg (1 mL)    levothyroxine (SYNTHROID) 75 MCG tablet TAKE 1 TABLET BY MOUTH IN THE MORNING    multivitamin capsule Take 1 capsule by mouth once daily.    paroxetine (PAXIL) 40 MG tablet TAKE 1 TABLET BY MOUTH ONCE DAILY IN THE MORNING    simvastatin (ZOCOR) 40 MG tablet TAKE 1 TABLET BY MOUTH ONCE DAILY     "sodium chloride 0.9% SolP 250 mL with ocrelizumab 30 mg/mL Soln Infuse Ocrevus 300mg in 250mL 0.9%NaCl every 2 weeks x2 doses. (Patient not taking: Reported on 10/29/2019)     No current facility-administered medications for this visit.        SOCIAL HISTORY  Social History     Tobacco Use    Smoking status: Never Smoker    Smokeless tobacco: Never Used   Substance Use Topics    Alcohol use: No     Alcohol/week: 0.0 standard drinks     Frequency: Never     Binge frequency: Never    Drug use: No       Living arrangements - the patient lives alone.    MS ROS:    MS REVIEW OF SYMPTOMS 1/26/2020   Do you feel abnormally tired on most days? Yes--related to Betaseron   Do you feel you generally sleep well? Yes--CPAP nightly   Do you have difficulty controlling your bladder?  No   Do you have difficulty controlling your bowels?  Yes--same no actual change   Do you have frequent muscle cramps, tightness or spasms in your limbs?  Yes--10mg/10mg/30mg Baclofen-mornings are an issue for her. She takes last Baclofen at 10-12 PM and wakes up about 10-11 AM   Do you have new visual symptoms?  No   Do you have worsening difficulty with your memory or thinking? Yes-related to Betaseron   Do you have worsening symptoms of anxiety or depression?  No   For patients who walk, Do you have more difficulty walking?  Yes   Have you fallen since your last visit?  Yes-but can't recall particular instance.    For patients who use wheelchairs: Do you have any skin wounds or breakdown? Not Applicable   Do you have difficulty using your hands?  Yes-does have some "a little"  trouble mixing Betaseron   Do you have shooting or burning pain? No   Do you have difficulty with sexual function?  No   If you are sexually active, are you using birth control? Y/N  N/A Not Applicable   Do you often choke when swallowing liquids or solid food?  No   Do you experience worsening symptoms when overheated? Yes   Do you need any new equipment such as a " wheelchair, walker or shower chair? No   Do you receive co-pay financial assistance for your principal MS medicine? Yes   Would you be interested in participating in an MS research trial in the future? No   For patients on Gilenya, Tecfidera, Aubagio, Rituxan, Ocrevus, Tysabri, Lemtrada or Methotrexate, are you aware that you should NOT receive live virus vaccines?  Not Applicable   Do you feel you have adequate family/friend support?  Yes   Do you have health insurance?   Yes   Are you currently employed? No   Do you receive SSDI/SSI?  Yes   Do you use marijuana or cannabis products? No   Have you been diagnosed with a urinary tract infection since your last visit here? No   Have you been diagnosed with a respiratory tract infection since your last visit here? No   Have you been to the emergency room since your last visit here? No   Have you been hospitalized since your last visit here?  No            Objective:        1. 25 foot timed walk:  Timed 25 Foot Walk: 10/29/2019 2020   Did patient wear an AFO? No No   Was assistive device used? No No   Time for 25 Foot Walk (seconds) 7.2 6.7   Time for 25 Foot Walk (seconds) - -       Neurologic Exam     Mental Status   Oriented to person, place, and time.   Follows 3 step commands.   Speech: speech is normal   Level of consciousness: alert  Normal comprehension.     Cranial Nerves     CN VII   Facial expression full, symmetric.     CN VIII   Hearing: intact (finger rub)    Motor Exam   Muscle bulk: normal  Right arm tone: normal  Left arm tone: normal  Right leg tone: normal  Left leg tone: spastic    Strength   Right deltoid: 5/5  Left deltoid: 5/5  Right triceps: 5/5  Left triceps: 5/5  Right wrist extension: 5/5  Right interossei: 5/5  Left interossei: 4/5  Right iliopsoas: 5/5  Left iliopsoas: 2/5  Left quadriceps: 5/5  Right hamstrin/5  Left hamstrin/5  Right anterior tibial: 5/5  Left anterior tibial: 4/5  Right peroneal: 5/5  Left peroneal: 4/5L  wrist extensors/triceps 5-/5     Sensory Exam   Right leg light touch: numbness foot.  Left leg light touch: numbness to feet.    Gait, Coordination, and Reflexes     Gait  Gait: circumduction and wide-based (slightly L hip circumduction)    Coordination   Finger-nose-finger test: L hand.  Tandem walking coordination: abnormal    Tremor   Resting tremor: absent  Action tremor: absent    Reflexes   Right ankle clonus: absent  Left ankle clonus: absent  Right pendular knee jerk: absent  Left pendular knee jerk: absentSlowed on L RSM         Imaging:       Results for orders placed during the hospital encounter of 04/26/19   MRI Brain Demyelinating W W/O Contrast    Impression Findings in the cerebral white matter which are typical for multiple sclerosis.  No new or enhancing lesions to suggest active disease.    Electronically signed by resident: Hieu Marroquin  Date:    04/27/2019  Time:    21:06    Electronically signed by: Min Norton MD  Date:    04/27/2019  Time:    22:20     Results for orders placed during the hospital encounter of 04/26/19   MRI Cervical Spine Demyelinating W W/O Contrast    Impression Stable appearance of patchy T2/STIR intramedullary signal throughout the cervical cord and the visualized portions of the thoracic cord, keeping with prior episodes of demyelination.  No new lesions.      Electronically signed by: Min Norton MD  Date:    04/28/2019  Time:    15:45     Results for orders placed during the hospital encounter of 04/26/19   MRI Thoracic Spine Demyelinating W W/O Contrast    Impression Stable appearance of T2/FLAIR signal intramedullary signal throughout the thoracic cord, in keeping with multiple sclerosis.  No new or enhancing lesion.  No evidence of significant cord atrophy.      Electronically signed by: Min Norton MD  Date:    04/28/2019  Time:    15:30         Labs:     Lab Results   Component Value Date    [id number] 57 07/17/2019    XOASEDTJ24IO 57 11/29/2017     GLUEPELR23XW 82 09/09/2016     No results found for: JCVINDEX, JCVANTIBODY  No results found for: LO7UKFAF, ABSOLUTECD3, CO1FSVBB, ABSOLUTECD8, ML8FKIIU, ABSOLUTECD4, LABCD48  Lab Results   Component Value Date    WBC 5.11 10/29/2019    RBC 4.13 10/29/2019    HGB 12.9 10/29/2019    HCT 42.8 10/29/2019     (H) 10/29/2019    MCH 31.2 (H) 10/29/2019    MCHC 30.1 (L) 10/29/2019    RDW 12.3 10/29/2019     10/29/2019    MPV 10.2 10/29/2019    GRAN 2.8 10/29/2019    GRAN 53.7 10/29/2019    LYMPH 1.7 10/29/2019    LYMPH 33.7 10/29/2019    MONO 0.6 10/29/2019    MONO 11.0 10/29/2019    EOS 0.0 10/29/2019    BASO 0.04 10/29/2019    EOSINOPHIL 0.8 10/29/2019    BASOPHIL 0.8 10/29/2019     Sodium   Date Value Ref Range Status   12/09/2019 140 136 - 145 mmol/L Final     Potassium   Date Value Ref Range Status   12/09/2019 4.2 3.5 - 5.1 mmol/L Final     Chloride   Date Value Ref Range Status   12/09/2019 105 95 - 110 mmol/L Final     CO2   Date Value Ref Range Status   12/09/2019 27 23 - 29 mmol/L Final     Glucose   Date Value Ref Range Status   12/09/2019 115 (H) 70 - 110 mg/dL Final     BUN, Bld   Date Value Ref Range Status   12/09/2019 11 6 - 20 mg/dL Final     Creatinine   Date Value Ref Range Status   12/09/2019 1.0 0.5 - 1.4 mg/dL Final     Calcium   Date Value Ref Range Status   12/09/2019 9.6 8.7 - 10.5 mg/dL Final     Total Protein   Date Value Ref Range Status   12/09/2019 7.4 6.0 - 8.4 g/dL Final     Albumin   Date Value Ref Range Status   12/09/2019 3.6 3.5 - 5.2 g/dL Final     Total Bilirubin   Date Value Ref Range Status   12/09/2019 0.3 0.1 - 1.0 mg/dL Final     Comment:     For infants and newborns, interpretation of results should be based  on gestational age, weight and in agreement with clinical  observations.  Premature Infant recommended reference ranges:  Up to 24 hours.............<8.0 mg/dL  Up to 48 hours............<12.0 mg/dL  3-5 days..................<15.0 mg/dL  6-29  days.................<15.0 mg/dL       Alkaline Phosphatase   Date Value Ref Range Status   12/09/2019 67 55 - 135 U/L Final     AST   Date Value Ref Range Status   12/09/2019 30 10 - 40 U/L Final     ALT   Date Value Ref Range Status   12/09/2019 15 10 - 44 U/L Final     Anion Gap   Date Value Ref Range Status   12/09/2019 8 8 - 16 mmol/L Final     eGFR if    Date Value Ref Range Status   12/09/2019 >60 >60 mL/min/1.73 m^2 Final     eGFR if non    Date Value Ref Range Status   12/09/2019 >60 >60 mL/min/1.73 m^2 Final     Comment:     Calculation used to obtain the estimated glomerular filtration  rate (eGFR) is the CKD-EPI equation.           Lab Results   Component Value Date    HEPBSAG Negative 07/17/2019    HEPBSAB Negative 07/17/2019    HEPBCAB Negative 07/17/2019         Impression:       Labs reviewed: Yes  Imaging tests reviewed: No    Diagnosis of MS: Yes      MS Diagnosis based on:    Progression:  Number of relapses in the past year:  0  Clinical Progression:  Clinically Stable  MRI Progression:  N/A    Plan:   Monitoring labs ordered?:  Yes   Imaging labs ordered?:  No    Additional Impression:      Over 50% of this 40 minute visit was spent in direct face to face counseling of the patient about MS, DMT considerations, and MS symptom management.     Problem List Items Addressed This Visit        Psychiatric    Depression       Endocrine    Vitamin D insufficiency    Obesity (BMI 30.0-34.9)       Other    MAYKEL on CPAP (Chronic)    Neurologic gait dysfunction      Other Visit Diagnoses     Multiple sclerosis    -  Primary    Relevant Orders    Hepatic function panel    CBC auto differential    Encounter for long-term (current) use of high-risk medication        Relevant Orders    Hepatic function panel    CBC auto differential    Counseling regarding goals of care        Neuropathic pain        Spasticity            Follow up in about 2 months (around 3/29/2020) for follow up  with me. via VIDEO VISIT  Patient agreed to POC today.    Attending, Dr. Smith, was available during today's encounter.     Rimma Rouse PA-C  [facility]

## 2020-02-13 ENCOUNTER — PES CALL (OUTPATIENT)
Dept: ADMINISTRATIVE | Facility: CLINIC | Age: 60
End: 2020-02-13

## 2020-02-17 DIAGNOSIS — E03.8 OTHER SPECIFIED HYPOTHYROIDISM: Chronic | ICD-10-CM

## 2020-02-17 DIAGNOSIS — R25.2 SPASTICITY: ICD-10-CM

## 2020-02-17 DIAGNOSIS — G35 MULTIPLE SCLEROSIS: ICD-10-CM

## 2020-02-17 RX ORDER — LEVOTHYROXINE SODIUM 75 UG/1
TABLET ORAL
Qty: 90 TABLET | Refills: 0 | Status: SHIPPED | OUTPATIENT
Start: 2020-02-17 | End: 2020-05-25

## 2020-02-18 RX ORDER — BACLOFEN 10 MG/1
TABLET ORAL
Qty: 150 TABLET | Refills: 4 | Status: SHIPPED | OUTPATIENT
Start: 2020-02-18 | End: 2020-03-17 | Stop reason: SDUPTHER

## 2020-03-03 ENCOUNTER — TELEPHONE (OUTPATIENT)
Dept: NEUROLOGY | Facility: CLINIC | Age: 60
End: 2020-03-03

## 2020-03-03 NOTE — TELEPHONE ENCOUNTER
----- Message from Jennifer Maurice MA sent at 3/3/2020 11:44 AM CST -----  Contact: Pharmacy/[phone]  Pharmacy verify refill request form was received      interferon beta-1b (BETASERON) 0.3 mg Kit

## 2020-03-05 ENCOUNTER — TELEPHONE (OUTPATIENT)
Dept: NEUROLOGY | Facility: CLINIC | Age: 60
End: 2020-03-05

## 2020-03-05 ENCOUNTER — TELEPHONE (OUTPATIENT)
Dept: PHARMACY | Facility: CLINIC | Age: 60
End: 2020-03-05

## 2020-03-05 NOTE — TELEPHONE ENCOUNTER
----- Message from Bia Bal sent at 3/5/2020 11:14 AM CST -----  Contact: [facility]   pharmacy is calling to check the status of the pts refill request that they have sent over since Feb 28,2020 so they can get the prescription filled medication called  betaseron injection 3/26/2020 mg. Can you please call pharmacy at [phone].    JOSHUA

## 2020-03-10 ENCOUNTER — TELEPHONE (OUTPATIENT)
Dept: NEUROLOGY | Facility: CLINIC | Age: 60
End: 2020-03-10

## 2020-03-10 ENCOUNTER — LAB VISIT (OUTPATIENT)
Dept: LAB | Facility: HOSPITAL | Age: 60
End: 2020-03-10
Payer: MEDICARE

## 2020-03-10 DIAGNOSIS — G35 MULTIPLE SCLEROSIS: Primary | ICD-10-CM

## 2020-03-10 DIAGNOSIS — G35 MULTIPLE SCLEROSIS: ICD-10-CM

## 2020-03-10 LAB
ALBUMIN SERPL BCP-MCNC: 3.4 G/DL (ref 3.5–5.2)
ALP SERPL-CCNC: 61 U/L (ref 55–135)
ALT SERPL W/O P-5'-P-CCNC: 17 U/L (ref 10–44)
AST SERPL-CCNC: 34 U/L (ref 10–40)
BASOPHILS # BLD AUTO: 0.03 K/UL (ref 0–0.2)
BASOPHILS NFR BLD: 0.6 % (ref 0–1.9)
BILIRUB DIRECT SERPL-MCNC: 0.1 MG/DL (ref 0.1–0.3)
BILIRUB SERPL-MCNC: 0.3 MG/DL (ref 0.1–1)
DIFFERENTIAL METHOD: ABNORMAL
EOSINOPHIL # BLD AUTO: 0 K/UL (ref 0–0.5)
EOSINOPHIL NFR BLD: 0.4 % (ref 0–8)
ERYTHROCYTE [DISTWIDTH] IN BLOOD BY AUTOMATED COUNT: 12.4 % (ref 11.5–14.5)
HCT VFR BLD AUTO: 37.9 % (ref 37–48.5)
HGB BLD-MCNC: 11.9 G/DL (ref 12–16)
IMM GRANULOCYTES # BLD AUTO: 0.01 K/UL (ref 0–0.04)
IMM GRANULOCYTES NFR BLD AUTO: 0.2 % (ref 0–0.5)
LYMPHOCYTES # BLD AUTO: 2.1 K/UL (ref 1–4.8)
LYMPHOCYTES NFR BLD: 44.2 % (ref 18–48)
MCH RBC QN AUTO: 31.6 PG (ref 27–31)
MCHC RBC AUTO-ENTMCNC: 31.4 G/DL (ref 32–36)
MCV RBC AUTO: 101 FL (ref 82–98)
MONOCYTES # BLD AUTO: 0.5 K/UL (ref 0.3–1)
MONOCYTES NFR BLD: 10.2 % (ref 4–15)
NEUTROPHILS # BLD AUTO: 2.1 K/UL (ref 1.8–7.7)
NEUTROPHILS NFR BLD: 44.4 % (ref 38–73)
NRBC BLD-RTO: 0 /100 WBC
PLATELET # BLD AUTO: 205 K/UL (ref 150–350)
PMV BLD AUTO: 10.9 FL (ref 9.2–12.9)
PROT SERPL-MCNC: 6.9 G/DL (ref 6–8.4)
RBC # BLD AUTO: 3.77 M/UL (ref 4–5.4)
WBC # BLD AUTO: 4.71 K/UL (ref 3.9–12.7)

## 2020-03-10 PROCEDURE — 80076 HEPATIC FUNCTION PANEL: CPT | Mod: HCNC

## 2020-03-10 PROCEDURE — 36415 COLL VENOUS BLD VENIPUNCTURE: CPT | Mod: HCNC

## 2020-03-10 PROCEDURE — 85025 COMPLETE CBC W/AUTO DIFF WBC: CPT | Mod: HCNC

## 2020-03-11 ENCOUNTER — TELEPHONE (OUTPATIENT)
Dept: NEUROLOGY | Facility: CLINIC | Age: 60
End: 2020-03-11

## 2020-03-15 ENCOUNTER — PATIENT MESSAGE (OUTPATIENT)
Dept: NEUROLOGY | Facility: CLINIC | Age: 60
End: 2020-03-15

## 2020-03-16 ENCOUNTER — TELEPHONE (OUTPATIENT)
Dept: NEUROLOGY | Facility: CLINIC | Age: 60
End: 2020-03-16

## 2020-03-16 NOTE — TELEPHONE ENCOUNTER
Spoke with patient and notified her that I scheduled her virtual visit for the same date and time.

## 2020-03-16 NOTE — TELEPHONE ENCOUNTER
----- Message from Maryann Moran sent at 3/16/2020 10:42 AM CDT -----  Contact: pt @ [phone]  Asking to speak with someone regarding her Video appt with Dr. Rouse, says she had canceled but was not aware that she could use her phone. Please call.

## 2020-03-17 DIAGNOSIS — M79.2 NEUROPATHIC PAIN: ICD-10-CM

## 2020-03-17 DIAGNOSIS — G35 MULTIPLE SCLEROSIS: ICD-10-CM

## 2020-03-17 DIAGNOSIS — R25.2 SPASTICITY: ICD-10-CM

## 2020-03-17 RX ORDER — GABAPENTIN 100 MG/1
CAPSULE ORAL
Qty: 180 CAPSULE | Refills: 0 | Status: SHIPPED | OUTPATIENT
Start: 2020-03-17 | End: 2020-07-06

## 2020-03-17 RX ORDER — BACLOFEN 10 MG/1
TABLET ORAL
Qty: 450 TABLET | Refills: 0 | Status: SHIPPED | OUTPATIENT
Start: 2020-03-17 | End: 2020-06-01

## 2020-03-17 NOTE — TELEPHONE ENCOUNTER
----- Message from Israel Youssef sent at 3/17/2020  1:15 PM CDT -----  Contact: pt @ [phone]  Pt is asking for 3 months supply of baclofen (LIORESAL) 10 MG tablet and gabapentin (NEURONTIN) 100 MG capsule, instead of one month supply      [address] [place] - [address] [facility]  [address] [facility]  [place] [address]  Phone: [phone] Fax: [phone]

## 2020-03-18 DIAGNOSIS — G35 MULTIPLE SCLEROSIS: ICD-10-CM

## 2020-03-18 DIAGNOSIS — M79.2 NEUROPATHIC PAIN: ICD-10-CM

## 2020-03-18 RX ORDER — GABAPENTIN 800 MG/1
800 TABLET ORAL 3 TIMES DAILY
Qty: 180 TABLET | Refills: 1 | Status: SHIPPED | OUTPATIENT
Start: 2020-03-18 | End: 2021-01-18 | Stop reason: SDUPTHER

## 2020-03-18 RX ORDER — GABAPENTIN 800 MG/1
TABLET ORAL
Qty: 90 TABLET | Refills: 5 | Status: SHIPPED | OUTPATIENT
Start: 2020-03-18 | End: 2020-03-18 | Stop reason: SDUPTHER

## 2020-03-18 NOTE — TELEPHONE ENCOUNTER
----- Message from Katherin Miller sent at 3/18/2020  2:28 PM CDT -----  Contact: self @ [phone]  Pt is req a refill for gabapentin (NEURONTIN) 800 MG tablet.  Pt would like a 90 day supply.     [address] [phone] (Phone)         [phone] (Fax)

## 2020-03-19 ENCOUNTER — OFFICE VISIT (OUTPATIENT)
Dept: OPHTHALMOLOGY | Facility: CLINIC | Age: 60
End: 2020-03-19
Payer: MEDICARE

## 2020-03-19 DIAGNOSIS — H26.9 CORTICAL CATARACT OF BOTH EYES: ICD-10-CM

## 2020-03-19 DIAGNOSIS — H40.013 OPEN ANGLE WITH BORDERLINE FINDINGS, LOW RISK, BILATERAL: Primary | ICD-10-CM

## 2020-03-19 PROCEDURE — 92012 INTRM OPH EXAM EST PATIENT: CPT | Mod: HCNC,S$GLB,, | Performed by: OPHTHALMOLOGY

## 2020-03-19 PROCEDURE — 99999 PR PBB SHADOW E&M-EST. PATIENT-LVL II: CPT | Mod: PBBFAC,HCNC,, | Performed by: OPHTHALMOLOGY

## 2020-03-19 PROCEDURE — 92012 PR EYE EXAM, EST PATIENT,INTERMED: ICD-10-PCS | Mod: HCNC,S$GLB,, | Performed by: OPHTHALMOLOGY

## 2020-03-19 PROCEDURE — 92083 HUMPHREY VISUAL FIELD - OU - BOTH EYES: ICD-10-PCS | Mod: HCNC,S$GLB,, | Performed by: OPHTHALMOLOGY

## 2020-03-19 PROCEDURE — 92083 EXTENDED VISUAL FIELD XM: CPT | Mod: HCNC,S$GLB,, | Performed by: OPHTHALMOLOGY

## 2020-03-19 PROCEDURE — 99999 PR PBB SHADOW E&M-EST. PATIENT-LVL II: ICD-10-PCS | Mod: PBBFAC,HCNC,, | Performed by: OPHTHALMOLOGY

## 2020-04-01 ENCOUNTER — OFFICE VISIT (OUTPATIENT)
Dept: NEUROLOGY | Facility: CLINIC | Age: 60
End: 2020-04-01
Payer: MEDICARE

## 2020-04-01 DIAGNOSIS — Z79.899 ENCOUNTER FOR LONG-TERM (CURRENT) USE OF HIGH-RISK MEDICATION: ICD-10-CM

## 2020-04-01 DIAGNOSIS — G35 MULTIPLE SCLEROSIS: Primary | ICD-10-CM

## 2020-04-01 PROCEDURE — 99213 OFFICE O/P EST LOW 20 MIN: CPT | Mod: HCNC,95,, | Performed by: PHYSICIAN ASSISTANT

## 2020-04-01 PROCEDURE — 99213 PR OFFICE/OUTPT VISIT, EST, LEVL III, 20-29 MIN: ICD-10-PCS | Mod: HCNC,95,, | Performed by: PHYSICIAN ASSISTANT

## 2020-04-01 NOTE — PROGRESS NOTES
Subjective:          Patient ID: Cem Meyers is a 59 y.o. female who presents today for a routine video visit for MS.      MS HPI:  · DMT: interferon beta-1b SQ-initiated mid January  · Side effects from DMT? Yes - missing some doses due to difficulty with FM coor and devices  · Taking vitamin D3 as recommended? Yes - 4,000IU Dose:   · She is not having a good response from Betaseron    Medications:  Current Outpatient Medications   Medication Sig    amantadine HCl 100 mg Tab Take 1 tablet (100 mg total) by mouth once daily.    baclofen (LIORESAL) 10 MG tablet TAKE ONE TABLET BY MOUTH IN THE MORNING, ONE TABLET IN THE AFTERNOON, AND UP TO THREE TABLETS AT BEDTIME AS NEEDED    calcium citrate (CALCITRATE) 200 mg (950 mg) tablet Take 2 tablets by mouth once daily.    cholecalciferol, vitamin D3, (VITAMIN D3) 4,000 unit Cap Take 4,000 Units by mouth once daily.     fish oil-omega-3 fatty acids 300-1,000 mg capsule Take 2 g by mouth once daily.    gabapentin (NEURONTIN) 100 MG capsule TAKE 1 TO 2 CAPSULES BY MOUTH AT BEDTIME    gabapentin (NEURONTIN) 800 MG tablet Take 1 tablet (800 mg total) by mouth 3 (three) times daily.    interferon beta-1b (BETASERON) 0.3 mg Kit Inject 0.25 mg into the skin every other day.    levothyroxine (SYNTHROID) 75 MCG tablet TAKE 1 TABLET BY MOUTH IN THE MORNING    multivitamin capsule Take 1 capsule by mouth once daily.    paroxetine (PAXIL) 40 MG tablet TAKE 1 TABLET BY MOUTH ONCE DAILY IN THE MORNING    simvastatin (ZOCOR) 40 MG tablet TAKE 1 TABLET BY MOUTH ONCE DAILY    sodium chloride 0.9% SolP 250 mL with ocrelizumab 30 mg/mL Soln Infuse Ocrevus 300mg in 250mL 0.9%NaCl every 2 weeks x2 doses. (Patient not taking: Reported on 1/29/2020)     No current facility-administered medications for this visit.        SOCIAL HISTORY  Social History     Tobacco Use    Smoking status: Never Smoker    Smokeless tobacco: Never Used   Substance Use Topics    Alcohol use: No      Alcohol/week: 0.0 standard drinks     Frequency: Never     Binge frequency: Never    Drug use: No       Living arrangements - the patient lives alone.    ROS:    REVIEW OF SYMPTOMS 4/1/2020   Do you feel abnormally tired on most days? No-has not needed amantadine, but Betaseron does cause fatigue   Do you feel you generally sleep well? Yes   Do you have difficulty controlling your bladder?  No   Do you have difficulty controlling your bowels?  Yes--uses Miralax every other day   Do you have frequent muscle cramps, tightness or spasms in your limbs?  Yes--baclofen    Do you have new visual symptoms?  No-now seeing ophtho(Nina)   Do you have worsening difficulty with your memory or thinking? Yes-some ST memory -no worse   Do you have worsening symptoms of anxiety or depression?  No stable on Paxil   For patients who walk, Do you have more difficulty walking?  Yes   Have you fallen since your last visit?  Yes-two falls   For patients who use wheelchairs: Do you have any skin wounds or breakdown? Not Applicable   Do you have difficulty using your hands?  Yes   Do you have shooting or burning pain? No   Do you have difficulty with sexual function?  No   If you are sexually active, are you using birth control? Y/N  N/A Not Applicable   Do you often choke when swallowing liquids or solid food?  No   Do you experience worsening symptoms when overheated? Yes   Do you need any new equipment such as a wheelchair, walker or shower chair? No   Do you receive co-pay financial assistance for your principal MS medicine? Yes   Would you be interested in participating in an MS research trial in the future? No   For patients on Gilenya, Tecfidera, Aubagio, Rituxan, Ocrevus, Tysabri, Lemtrada or Methotrexate, are you aware that you should NOT receive live virus vaccines?  Not Applicable   Do you feel you have adequate family/friend support?  Yes   Do you have health insurance?   Yes   Are you currently employed? No   Do you receive  SSDI/SSI?  Yes   Do you use marijuana or cannabis products? No   Have you been diagnosed with a urinary tract infection since your last visit here? No   Have you been diagnosed with a respiratory tract infection since your last visit here? No   Have you been to the emergency room since your last visit here? No   Have you been hospitalized since your last visit here?  No                Objective:        1. 25 foot timed walk:  Timed 25 Foot Walk: 10/29/2019 1/29/2020   Did patient wear an AFO? No No   Was assistive device used? No No   Time for 25 Foot Walk (seconds) 7.2 6.7   Time for 25 Foot Walk (seconds) - -       Neurologic Exam     Mental Status   Oriented to person, place, and time.   Speech: speech is normal   Level of consciousness: alert  Normal comprehension.     Remainder of exam deferred today    Imaging:       Results for orders placed during the hospital encounter of 04/26/19   MRI Brain Demyelinating W W/O Contrast    Impression Findings in the cerebral white matter which are typical for multiple sclerosis.  No new or enhancing lesions to suggest active disease.    Electronically signed by resident: Hieu Marroquin  Date:    04/27/2019  Time:    21:06    Electronically signed by: Min Norton MD  Date:    04/27/2019  Time:    22:20     Results for orders placed during the hospital encounter of 04/26/19   MRI Cervical Spine Demyelinating W W/O Contrast    Impression Stable appearance of patchy T2/STIR intramedullary signal throughout the cervical cord and the visualized portions of the thoracic cord, keeping with prior episodes of demyelination.  No new lesions.      Electronically signed by: Min Norton MD  Date:    04/28/2019  Time:    15:45     Results for orders placed during the hospital encounter of 04/26/19   MRI Thoracic Spine Demyelinating W W/O Contrast    Impression Stable appearance of T2/FLAIR signal intramedullary signal throughout the thoracic cord, in keeping with multiple sclerosis.   No new or enhancing lesion.  No evidence of significant cord atrophy.      Electronically signed by: Min Norton MD  Date:    04/28/2019  Time:    15:30         Labs:     Lab Results   Component Value Date    [id number] 57 07/17/2019    FOPOEBVH08UB 57 11/29/2017    ULPMIGJW28EV 82 09/09/2016     No results found for: JCVINDEX, JCVANTIBODY  No results found for: BI5PXJEY, ABSOLUTECD3, DU4FSJNN, ABSOLUTECD8, QD1KOGCK, ABSOLUTECD4, LABCD48  Lab Results   Component Value Date    WBC 4.71 03/10/2020    RBC 3.77 (L) 03/10/2020    HGB 11.9 (L) 03/10/2020    HCT 37.9 03/10/2020     (H) 03/10/2020    MCH 31.6 (H) 03/10/2020    MCHC 31.4 (L) 03/10/2020    RDW 12.4 03/10/2020     03/10/2020    MPV 10.9 03/10/2020    GRAN 2.1 03/10/2020    GRAN 44.4 03/10/2020    LYMPH 2.1 03/10/2020    LYMPH 44.2 03/10/2020    MONO 0.5 03/10/2020    MONO 10.2 03/10/2020    EOS 0.0 03/10/2020    BASO 0.03 03/10/2020    EOSINOPHIL 0.4 03/10/2020    BASOPHIL 0.6 03/10/2020     Sodium   Date Value Ref Range Status   12/09/2019 140 136 - 145 mmol/L Final     Potassium   Date Value Ref Range Status   12/09/2019 4.2 3.5 - 5.1 mmol/L Final     Chloride   Date Value Ref Range Status   12/09/2019 105 95 - 110 mmol/L Final     CO2   Date Value Ref Range Status   12/09/2019 27 23 - 29 mmol/L Final     Glucose   Date Value Ref Range Status   12/09/2019 115 (H) 70 - 110 mg/dL Final     BUN, Bld   Date Value Ref Range Status   12/09/2019 11 6 - 20 mg/dL Final     Creatinine   Date Value Ref Range Status   12/09/2019 1.0 0.5 - 1.4 mg/dL Final     Calcium   Date Value Ref Range Status   12/09/2019 9.6 8.7 - 10.5 mg/dL Final     Total Protein   Date Value Ref Range Status   03/10/2020 6.9 6.0 - 8.4 g/dL Final     Albumin   Date Value Ref Range Status   03/10/2020 3.4 (L) 3.5 - 5.2 g/dL Final     Total Bilirubin   Date Value Ref Range Status   03/10/2020 0.3 0.1 - 1.0 mg/dL Final     Comment:     For infants and newborns, interpretation of  results should be based  on gestational age, weight and in agreement with clinical  observations.  Premature Infant recommended reference ranges:  Up to 24 hours.............<8.0 mg/dL  Up to 48 hours............<12.0 mg/dL  3-5 days..................<15.0 mg/dL  6-29 days.................<15.0 mg/dL       Alkaline Phosphatase   Date Value Ref Range Status   03/10/2020 61 55 - 135 U/L Final     AST   Date Value Ref Range Status   03/10/2020 34 10 - 40 U/L Final     ALT   Date Value Ref Range Status   03/10/2020 17 10 - 44 U/L Final     Anion Gap   Date Value Ref Range Status   12/09/2019 8 8 - 16 mmol/L Final     eGFR if    Date Value Ref Range Status   12/09/2019 >60 >60 mL/min/1.73 m^2 Final     eGFR if non    Date Value Ref Range Status   12/09/2019 >60 >60 mL/min/1.73 m^2 Final     Comment:     Calculation used to obtain the estimated glomerular filtration  rate (eGFR) is the CKD-EPI equation.        Lab Results   Component Value Date    HEPBSAG Negative 07/17/2019    HEPBSAB Negative 07/17/2019    HEPBCAB Negative 07/17/2019           MS Impression and Plan:     NEURO MULTIPLE SCLEROSIS IMPRESSION:   MS Status:     MRI Progression comment:  Due for annual MRI now-will defer to August due to COVID crisis  Plan:     DMT:  Switch Disease Modifying therapy    Switch Disease Modifying Therapy FROM:  Interferon beta-1b SQ    TO:  Interferon beta-1a IM    Symptom Management:  No change in symptom management     Next Imaging Due: 8/1/2020     Next Labs Due: 7/1/2020     Patient is not able to tolerate frequent injections and is having coordination issues managing Betaseron injections. She did not previously have these issues with Rebif so we will now return to Rebif.     Our visit today lasted 20 minutes, and 100% of this time was spent face to face with the patient. Over 50% of this visit included discussion of the treatment plan/medication changes/symptom management/exam  findings/imaging results/coordination of care. The patient agrees with the plan of care.    Problem List Items Addressed This Visit     None      Visit Diagnoses     Multiple sclerosis    -  Primary    Relevant Orders    MRI Brain Demyelinating Without Contrast    CBC auto differential    Hepatic function panel    Encounter for long-term (current) use of high-risk medication        Relevant Orders    CBC auto differential    Hepatic function panel        Follow up in about 3 months (around 7/1/2020) for follow up with me.  Patient agreed to POC today.    Attending, Dr. Smith, was available during today's encounter.     Rimma Rouse PA-C  [facility]

## 2020-04-03 ENCOUNTER — TELEPHONE (OUTPATIENT)
Dept: PHARMACY | Facility: CLINIC | Age: 60
End: 2020-04-03

## 2020-04-03 ENCOUNTER — PATIENT MESSAGE (OUTPATIENT)
Dept: NEUROLOGY | Facility: CLINIC | Age: 60
End: 2020-04-03

## 2020-04-03 DIAGNOSIS — G35 MULTIPLE SCLEROSIS: Primary | ICD-10-CM

## 2020-04-08 ENCOUNTER — TELEPHONE (OUTPATIENT)
Dept: NEUROLOGY | Facility: CLINIC | Age: 60
End: 2020-04-08

## 2020-04-08 NOTE — TELEPHONE ENCOUNTER
----- Message from Peter Randolph sent at 4/8/2020  9:46 AM CDT -----  Contact: Robin tena [facility] @ [phone] ext [phone]  Caller calling to get the direction on the Ecociclus device, pls call or  fax to: [phone]

## 2020-04-08 NOTE — TELEPHONE ENCOUNTER
Received approval from [employer] for 30 day supply of Rebif, denied 90 day supply. Approval good through 12/31/2020

## 2020-04-09 ENCOUNTER — TELEPHONE (OUTPATIENT)
Dept: PHARMACY | Facility: CLINIC | Age: 60
End: 2020-04-09

## 2020-04-09 NOTE — TELEPHONE ENCOUNTER
Call for initial Rebif consult. No answer, LVM. $0 copay with mek[employer] assistance.     Jhon Greenberg, PharmD  Clinical Pharmacist  [facility]  P: [phone]

## 2020-04-09 NOTE — TELEPHONE ENCOUNTER
Initial Rebif consult completed on .Rebif 8.8/22mcg kit will be shipped on  to arrive at patient's home on  via [employer]. $0.00 copay.  Start date 4/15. Address confirmed. Confirmed 2 patient identifiers - name and . Therapy Appropriate.    Injection experience: Has used Rebif before in the past, but stopped and had to change for betaseron for a time. She much prefers Rebif, as she had no side effects with it. Provider has already arranged for shipment of Rebiject autoinjector that uses prefilled syringes, but prescriptions sent were for Rebif prefilled autoinjectors. Will assess if she likes using the autoinjectors and see if we can change prescriptions as appropriate. Patient had no questions or concerns regarding injections.    Counseled patient on administration directions:    - Titration: 8.8mcg three times weekly x 2 weeks; then  22mcg tiw x 2 weeks  · Patient  was advised to keep a calendar to stay compliant.    Maintenance dose: inject 44mcg subcutaneously three times weekly   Store in refrigerator, do not let freeze   Wash hands with soap and water before and after injection.   Monthly RX will come with gauze, bandaids, and alcohol swabs.   Patient may inject outer surface of the arms, abdomen (except 2-inch area around the navel), buttocks, and thighs. Patient should rotate injection sites.    Patient is to wipe down the injection site with the alcohol pad, wait to dry.    · Rebidose Pen: - Remove cap from pen. Place the pen flat against the skin then push down on the injector button and release - there will be an initial click; in 10 seconds you will hear a second click indicating injection is complete.   Patient will use sharps container; once full, per [place] law, may lock the sharps container and place in the trash. Pharmacy will replace the sharps at no additional charge.    Patient was counseled on possible side effects:   · Flu-like symptoms: headache, weakness, fever, shakes, aches,  pain sweating- may pre-medicate with ibuprofen/naproxen or APAP. May lessen after a few months of therapy.   · Upset stomach, dizziness, back pain, sleepiness and dry mouth  · ER precautions advised for signs and symptoms of allergic reaction  · Serious side effects: depression, liver problems, seizures, infection, thyroid problems  · Highlighted the importance of laboratory monitoring: CBC and liver function testing at 1-, 3-, and 6 months, then periodically thereafter.    Patient verbalized understanding. Patient did not have any further questions. Consultation included: indication; goals of treatment; administration; storage and handling; side effects; how to handle side effects; the importance of compliance; how to handle missed doses; the importance of keeping all follow up appointments.  Patient understands to report any medication changes to [facility] and provider. All questions answered and addressed to patients satisfaction. [facility] to contact patient in 3 weeks for refills.    Jhon Greenberg, PharmD  Clinical Pharmacist  [facility]  P: [phone]    [facility] sent 4/9 at 10:38AM

## 2020-05-05 ENCOUNTER — PATIENT MESSAGE (OUTPATIENT)
Dept: NEUROLOGY | Facility: CLINIC | Age: 60
End: 2020-05-05

## 2020-05-05 DIAGNOSIS — G35 MULTIPLE SCLEROSIS: Primary | ICD-10-CM

## 2020-05-07 ENCOUNTER — TELEPHONE (OUTPATIENT)
Dept: PHARMACY | Facility: CLINIC | Age: 60
End: 2020-05-07

## 2020-05-07 DIAGNOSIS — G35 MULTIPLE SCLEROSIS: ICD-10-CM

## 2020-05-07 NOTE — TELEPHONE ENCOUNTER
----- Message from Nanda Olson sent at 5/7/2020 11:57 AM CDT -----  Contact: patient  [phone] patient called said to please send over script to [facility] pharmacy waiting on the office thanks.

## 2020-05-08 ENCOUNTER — LAB VISIT (OUTPATIENT)
Dept: LAB | Facility: HOSPITAL | Age: 60
End: 2020-05-08
Attending: PHYSICIAN ASSISTANT
Payer: MEDICARE

## 2020-05-08 DIAGNOSIS — G35 MULTIPLE SCLEROSIS: ICD-10-CM

## 2020-05-08 DIAGNOSIS — Z79.899 ENCOUNTER FOR LONG-TERM (CURRENT) USE OF HIGH-RISK MEDICATION: ICD-10-CM

## 2020-05-08 LAB
ALBUMIN SERPL BCP-MCNC: 3.6 G/DL (ref 3.5–5.2)
ALP SERPL-CCNC: 63 U/L (ref 55–135)
ALT SERPL W/O P-5'-P-CCNC: 22 U/L (ref 10–44)
AST SERPL-CCNC: 37 U/L (ref 10–40)
BASOPHILS # BLD AUTO: 0.03 K/UL (ref 0–0.2)
BASOPHILS NFR BLD: 0.6 % (ref 0–1.9)
BILIRUB DIRECT SERPL-MCNC: 0.1 MG/DL (ref 0.1–0.3)
BILIRUB SERPL-MCNC: 0.3 MG/DL (ref 0.1–1)
DIFFERENTIAL METHOD: ABNORMAL
EOSINOPHIL # BLD AUTO: 0 K/UL (ref 0–0.5)
EOSINOPHIL NFR BLD: 0.6 % (ref 0–8)
ERYTHROCYTE [DISTWIDTH] IN BLOOD BY AUTOMATED COUNT: 12.6 % (ref 11.5–14.5)
HCT VFR BLD AUTO: 39 % (ref 37–48.5)
HGB BLD-MCNC: 12 G/DL (ref 12–16)
IMM GRANULOCYTES # BLD AUTO: 0.01 K/UL (ref 0–0.04)
IMM GRANULOCYTES NFR BLD AUTO: 0.2 % (ref 0–0.5)
LYMPHOCYTES # BLD AUTO: 1.8 K/UL (ref 1–4.8)
LYMPHOCYTES NFR BLD: 32.7 % (ref 18–48)
MCH RBC QN AUTO: 31.6 PG (ref 27–31)
MCHC RBC AUTO-ENTMCNC: 30.8 G/DL (ref 32–36)
MCV RBC AUTO: 103 FL (ref 82–98)
MONOCYTES # BLD AUTO: 0.5 K/UL (ref 0.3–1)
MONOCYTES NFR BLD: 8.9 % (ref 4–15)
NEUTROPHILS # BLD AUTO: 3.1 K/UL (ref 1.8–7.7)
NEUTROPHILS NFR BLD: 57 % (ref 38–73)
NRBC BLD-RTO: 0 /100 WBC
PLATELET # BLD AUTO: 201 K/UL (ref 150–350)
PMV BLD AUTO: 10.6 FL (ref 9.2–12.9)
PROT SERPL-MCNC: 7.1 G/DL (ref 6–8.4)
RBC # BLD AUTO: 3.8 M/UL (ref 4–5.4)
WBC # BLD AUTO: 5.41 K/UL (ref 3.9–12.7)

## 2020-05-08 PROCEDURE — 80076 HEPATIC FUNCTION PANEL: CPT | Mod: HCNC

## 2020-05-08 PROCEDURE — 85025 COMPLETE CBC W/AUTO DIFF WBC: CPT | Mod: HCNC

## 2020-05-08 PROCEDURE — 36415 COLL VENOUS BLD VENIPUNCTURE: CPT | Mod: HCNC

## 2020-05-08 NOTE — TELEPHONE ENCOUNTER
----- Message from Pk Garcia sent at 5/8/2020  2:42 PM CDT -----  Contact: taniya pharm tech   Pt needs a refill on medication interferon beta-1a, albumin, (REBIF REBIDOSE) 44 mcg/0.5 mL PnHattie        Contact info

## 2020-05-08 NOTE — TELEPHONE ENCOUNTER
Returned patients call. Explained that once we get her resulted labs and provider's review we can send the rx to [facility]. Patient verbalized understanding

## 2020-05-08 NOTE — TELEPHONE ENCOUNTER
----- Message from Bia Bal sent at 5/8/2020  2:33 PM CDT -----  Contact: pt   Pt is calling to follow up on a prescription that needs to be faxed over to [facility] phone number [phone] interferon beta-1a, albumin, (REBIF REBIDOSE) 44 mcg/0.5 mL PnIj. Pt said its urgent she needs her medication by Monday next week so she can start her shots. Can you please call pt at [phone]    JOSHUA

## 2020-05-13 ENCOUNTER — TELEPHONE (OUTPATIENT)
Dept: RHEUMATOLOGY | Facility: CLINIC | Age: 60
End: 2020-05-13

## 2020-05-13 ENCOUNTER — OFFICE VISIT (OUTPATIENT)
Dept: RHEUMATOLOGY | Facility: CLINIC | Age: 60
End: 2020-05-13
Payer: MEDICARE

## 2020-05-13 DIAGNOSIS — M65.841 STENOSING TENOSYNOVITIS OF FINGER OF RIGHT HAND: ICD-10-CM

## 2020-05-13 DIAGNOSIS — M81.0 AGE-RELATED OSTEOPOROSIS WITHOUT CURRENT PATHOLOGICAL FRACTURE: Primary | ICD-10-CM

## 2020-05-13 DIAGNOSIS — G35 MULTIPLE SCLEROSIS: Primary | ICD-10-CM

## 2020-05-13 PROCEDURE — 99214 PR OFFICE/OUTPT VISIT, EST, LEVL IV, 30-39 MIN: ICD-10-PCS | Mod: 95,HCNC,, | Performed by: INTERNAL MEDICINE

## 2020-05-13 PROCEDURE — 99499 UNLISTED E&M SERVICE: CPT | Mod: HCNC,95,, | Performed by: INTERNAL MEDICINE

## 2020-05-13 PROCEDURE — 99499 RISK ADDL DX/OHS AUDIT: ICD-10-PCS | Mod: HCNC,95,, | Performed by: INTERNAL MEDICINE

## 2020-05-13 PROCEDURE — 99214 OFFICE O/P EST MOD 30 MIN: CPT | Mod: 95,HCNC,, | Performed by: INTERNAL MEDICINE

## 2020-05-13 NOTE — TELEPHONE ENCOUNTER
Spoke with pt and scheduled labs for 12.16.20 at 9.45 am, Dr. DARBY at 10.15 am and prolia at 10.45 am. Pt verbalized understanding

## 2020-05-13 NOTE — PROGRESS NOTES
RHEUMATOLOGY FOLLOW UP - TELE VISIT     The patient location is: [place]  The chief complaint leading to consultation is:  FOLLOW-UP FOR OSTEOPOROSIS.  Visit type: Virtual visit with synchronous audio and video  Total time spent with patient: 10 MINUTES  Each patient to whom he or she provides medical services by telemedicine is:  (1) informed of the relationship between the physician and patient and the respective role of any other health care provider with respect to management of the patient; and (2) notified that he or she may decline to receive medical services by telemedicine and may withdraw from such care at any time.    HPI:-  Cem Fernandes a 59 y.o. pleasant female seen today through My chart video visit for follow up.  She reports doing well other than increased drowsiness from gabapentin which is used to treat her neuropathy secondary to multiple sclerosis.  She denies any fracture but had frequent falls due to drowsiness from gabapentin.  She also reports intermittent triggering of right 4th finger for the past month.  No injury.  No joint pain.  No swelling.  No plans for invasive dental procedure.    Review of Systems   Constitutional: Negative for chills and fever.   HENT: Negative for congestion and sore throat.    Eyes: Negative for blurred vision and redness.   Respiratory: Negative for cough and shortness of breath.    Cardiovascular: Negative for chest pain and leg swelling.   Gastrointestinal: Negative for abdominal pain.   Genitourinary: Negative for dysuria.   Musculoskeletal: Negative for back pain, falls, joint pain, myalgias and neck pain.        Intermittent triggering of right 4th finger   Skin: Negative for rash.   Neurological: Negative for headaches.   Endo/Heme/Allergies: Does not bruise/bleed easily.   Psychiatric/Behavioral: Negative for memory loss. The patient does not have insomnia.        Past Medical History:   Diagnosis Date    Broken toe 6/2015    left big toe    Chronic  diastolic heart failure 5/8/2018    Closed left arm fracture     Colon polyp     Depression     Hyperlipidemia     Hypothyroid     Immunosuppression 1/28/2019    MS (multiple sclerosis)     Neurogenic bladder 12/6/2012    Osteoporosis     Polyneuropathy     Sleep apnea     Trouble in sleeping        Past Surgical History:   Procedure Laterality Date    COLONOSCOPY N/A 9/1/2017    Procedure: COLONOSCOPY;  Surgeon: Andriy Villar MD;  Location: [facility];  Service: Endoscopy;  Laterality: N/A;    FRACTURE SURGERY Left 2013    wrist- [facility]    KNEE SURGERY Right 1989    in [place]    TONSILLECTOMY  1966        Social History     Tobacco Use    Smoking status: Never Smoker    Smokeless tobacco: Never Used   Substance Use Topics    Alcohol use: No     Alcohol/week: 0.0 standard drinks     Frequency: Never     Binge frequency: Never    Drug use: No       Family History   Problem Relation Age of Onset    Pancreatic cancer Mother     Stomach cancer Mother     Cancer Mother         pancreatic, stomach    Hypertension Father     Heart disease Father         MI    Lupus Maternal Aunt     Rheum arthritis Maternal Aunt     Rheum arthritis Sister     Melanoma Neg Hx        Review of patient's allergies indicates:   Allergen Reactions    Latex, natural rubber Rash       Physical exam:-    GEN: awake, alert, non-diaphoretic, no psychomotor agitation, no acute distress    HEENT :Head: atraumatic, normocephalic, no rashes noted, no lesions noted;    Eyes: NO redness, discharge, swelling, or lesions    Nose: NO redness, swelling, discharge, deformity, or impetigo/crusting    Skin: no lesions, wounds, erythema, or cyanosis noted on face or hands    Cardiopulmonary: no increased respiratory effort, speaking in clear sentences    MSK: normal ROM in the neck, Upper extremities, Lower extremities  Good ROM of hands, fist formation 100% and , no obvious synovitis.  Mild triggering of right fourth finger    Good  ambulation in front of the camera    Neuro: cranial nerves grossly normal, speech normal rate and rhythm, orientation arrived to appointment on time with no prompting, moved both upper extremities equally    Pysch:  appearance, behavior, and attitude- well groomed, pleasant, cooperative    Medication List with Changes/Refills   Current Medications    AMANTADINE  MG TAB    Take 1 tablet (100 mg total) by mouth once daily.    BACLOFEN (LIORESAL) 10 MG TABLET    TAKE ONE TABLET BY MOUTH IN THE MORNING, ONE TABLET IN THE AFTERNOON, AND UP TO THREE TABLETS AT BEDTIME AS NEEDED    CALCIUM CITRATE (CALCITRATE) 200 MG (950 MG) TABLET    Take 2 tablets by mouth once daily.    CHOLECALCIFEROL, VITAMIN D3, (VITAMIN D3) 4,000 UNIT CAP    Take 4,000 Units by mouth once daily.     FISH OIL-OMEGA-3 FATTY ACIDS 300-1,000 MG CAPSULE    Take 2 g by mouth once daily.    GABAPENTIN (NEURONTIN) 100 MG CAPSULE    TAKE 1 TO 2 CAPSULES BY MOUTH AT BEDTIME    GABAPENTIN (NEURONTIN) 800 MG TABLET    Take 1 tablet (800 mg total) by mouth 3 (three) times daily.    INTERFERON BETA-1A, ALBUMIN, (REBIF, WITH ALBUMIN,) 44 MCG/0.5 ML INJECTION    Inject 0.5 mLs (44 mcg total) into the skin 3 (three) times a week.    LEVOTHYROXINE (SYNTHROID) 75 MCG TABLET    TAKE 1 TABLET BY MOUTH IN THE MORNING    MULTIVITAMIN CAPSULE    Take 1 capsule by mouth once daily.    PAROXETINE (PAXIL) 40 MG TABLET    TAKE 1 TABLET BY MOUTH ONCE DAILY IN THE MORNING    SIMVASTATIN (ZOCOR) 40 MG TABLET    TAKE 1 TABLET BY MOUTH ONCE DAILY       Assessment/Plans:-  1. Age-related osteoporosis without current pathological fracture      Problem List Items Addressed This Visit        Orthopedic    Osteoporosis - Primary    Current Assessment & Plan     Significant osteoporosis with intolerance to bisphosphonate now on prolia. Tolerating well. Continue prolia.   Discussed in detail about all adverse effects of the medication including osteonecrosis of the jaw and  atypical femoral fractures.  No plans for invasive dental procedures at this time or any other future.          Stenosing tenosynovitis of finger of right hand    Current Assessment & Plan     Advised conservative therapy.               Follow up in about 7 months (around 12/13/2020).    Disclaimer: This note was prepared using voice recognition system and is likely to have sound alike errors and is not proof read.  Please call me with any questions.

## 2020-05-13 NOTE — TELEPHONE ENCOUNTER
----- Message from Jhon Greenberg, PharmD sent at 5/13/2020 10:14 AM CDT -----  Regarding: Rebif preferred prescription  Good morning,    During a call to confirm that Mrs. Meyers had received her Rebif from her preferred pharmacy, she states that she received the Rebif Rebidose. She reports that she prefers to get the Rebif pre-filled syringes, as she likes to use the Rebiject autoinjector. Would it be possible to send a prescription to [facility] for the prefilled syringe? Thanks very much.     Regards,    Jhon Greenberg, PharmD  Clinical Pharmacist  [facility]  P: [phone]

## 2020-05-13 NOTE — TELEPHONE ENCOUNTER
----- Message from Too Chang MD sent at 5/13/2020 12:55 PM CDT -----  Follow up in December with her prolia . Keep all appointments for labs, dex and prolia in  June.

## 2020-05-23 DIAGNOSIS — E03.8 OTHER SPECIFIED HYPOTHYROIDISM: Chronic | ICD-10-CM

## 2020-05-25 RX ORDER — LEVOTHYROXINE SODIUM 75 UG/1
TABLET ORAL
Qty: 90 TABLET | Refills: 0 | Status: SHIPPED | OUTPATIENT
Start: 2020-05-25 | End: 2020-08-23

## 2020-05-28 ENCOUNTER — PATIENT MESSAGE (OUTPATIENT)
Dept: INTERNAL MEDICINE | Facility: CLINIC | Age: 60
End: 2020-05-28

## 2020-05-31 DIAGNOSIS — E78.49 OTHER HYPERLIPIDEMIA: Primary | Chronic | ICD-10-CM

## 2020-06-04 ENCOUNTER — TELEPHONE (OUTPATIENT)
Dept: NEUROLOGY | Facility: CLINIC | Age: 60
End: 2020-06-04

## 2020-06-04 NOTE — TELEPHONE ENCOUNTER
----- Message from Chandni Johnson sent at 6/4/2020  1:29 PM CDT -----  Contact: [facility]  Reason: calling to speak with staff pertaining to script received for mutual pt     Communication: [phone]

## 2020-06-11 ENCOUNTER — INFUSION (OUTPATIENT)
Dept: INFUSION THERAPY | Facility: HOSPITAL | Age: 60
End: 2020-06-11
Attending: PSYCHIATRY & NEUROLOGY
Payer: MEDICARE

## 2020-06-11 DIAGNOSIS — M81.0 AGE-RELATED OSTEOPOROSIS WITHOUT CURRENT PATHOLOGICAL FRACTURE: Primary | ICD-10-CM

## 2020-06-11 PROCEDURE — 63600175 PHARM REV CODE 636 W HCPCS: Mod: JG,HCNC | Performed by: INTERNAL MEDICINE

## 2020-06-11 PROCEDURE — 96372 THER/PROPH/DIAG INJ SC/IM: CPT | Mod: HCNC

## 2020-06-11 RX ADMIN — DENOSUMAB 60 MG: 60 INJECTION SUBCUTANEOUS at 10:06

## 2020-06-11 NOTE — DISCHARGE INSTRUCTIONS
FALL PREVENTION   Falls often occur due to slipping, tripping or losing your balance. Here are ways to reduce your risk of falling again.   Was there anything that caused your fall that can be fixed, removed or replaced?   Make your home safe by keeping walkways clear of objects you may trip over.   Use non-slip pads under rugs.   Do not walk in poorly lit areas.   Do not stand on chairs or wobbly ladders.   Use caution when reaching overhead or looking upward. This position can cause a loss of balance.   Be sure your shoes fit properly, have non-slip bottoms and are in good condition.   Be cautious when going up and down stairs, curbs, and when walking on uneven sidewalks.   If your balance is poor, consider using a cane or walker.   If your fall was related to alcohol use, stop or limit alcohol intake.   If your fall was related to use of sleeping medicines, talk to your doctor about this. You may need to reduce your dosage at bedtime if you awaken during the night to go to the bathroom.   To reduce the need for nighttime bathroom trips:   Avoid drinking fluids for several hours before going to bed   Empty your bladder before going to bed   Men can keep a urinal at the bedside   © 0166-3410 [facility], [address] [place] [address], [place] [address]. All rights reserved. This information is not intended as a substitute for professional medical care. Always follow your healthcare professional's instructions.      Remember to take your calcium with vitamin D supplement as directed.   Do not have any dental work for 3 months following your injection today.

## 2020-06-11 NOTE — NURSING
Any invasive dental procedures in past 3 months or upcoming 3 months: denies    Last Rheumatology provider visit- Seen by Dr. DARBY on 5/13/20    Recent labs? Today CKD pt needing repeat labs in 10 days-no  Lab Results   Component Value Date    CALCIUM 9.5 06/11/2020     Lab Results   Component Value Date    CREATININE 0.9 06/11/2020     Lab Results   Component Value Date    ESTGFRAFRICA >60 06/11/2020     Lab Results   Component Value Date    EGFRNONAA >60 06/11/2020     Lab Results   Component Value Date    [id number] 57 07/17/2019                Prolia 60 mg/ml administered SQ to Left lower abdominal quadrant. Tolerated without any complaints. No redness, swelling, or drainage noted to site. Instructed to remain in clinic 15 minutes after administration to monitor for any s/sx of reaction. Pt instructed on signs and symptoms of reaction to report. Verbalizes understanding.

## 2020-06-29 ENCOUNTER — TELEPHONE (OUTPATIENT)
Dept: PSYCHIATRY | Facility: CLINIC | Age: 60
End: 2020-06-29

## 2020-07-06 ENCOUNTER — OFFICE VISIT (OUTPATIENT)
Dept: NEUROLOGY | Facility: CLINIC | Age: 60
End: 2020-07-06
Payer: MEDICARE

## 2020-07-06 VITALS — WEIGHT: 180 LBS | BODY MASS INDEX: 31.89 KG/M2

## 2020-07-06 DIAGNOSIS — R26.9 NEUROLOGIC GAIT DYSFUNCTION: ICD-10-CM

## 2020-07-06 DIAGNOSIS — M79.2 NEUROPATHIC PAIN: ICD-10-CM

## 2020-07-06 DIAGNOSIS — G35 MS (MULTIPLE SCLEROSIS): Primary | Chronic | ICD-10-CM

## 2020-07-06 DIAGNOSIS — Z71.89 COUNSELING REGARDING GOALS OF CARE: ICD-10-CM

## 2020-07-06 DIAGNOSIS — R25.2 SPASTICITY: ICD-10-CM

## 2020-07-06 PROBLEM — D84.9 IMMUNOSUPPRESSION: Status: RESOLVED | Noted: 2019-01-28 | Resolved: 2020-07-06

## 2020-07-06 PROCEDURE — 3008F BODY MASS INDEX DOCD: CPT | Mod: CPTII,95,, | Performed by: PHYSICIAN ASSISTANT

## 2020-07-06 PROCEDURE — 3008F PR BODY MASS INDEX (BMI) DOCUMENTED: ICD-10-PCS | Mod: CPTII,95,, | Performed by: PHYSICIAN ASSISTANT

## 2020-07-06 PROCEDURE — 99213 OFFICE O/P EST LOW 20 MIN: CPT | Mod: 95,,, | Performed by: PHYSICIAN ASSISTANT

## 2020-07-06 PROCEDURE — 99213 PR OFFICE/OUTPT VISIT, EST, LEVL III, 20-29 MIN: ICD-10-PCS | Mod: 95,,, | Performed by: PHYSICIAN ASSISTANT

## 2020-07-06 NOTE — PROGRESS NOTES
Subjective:          Patient ID: Cem Meyers is a 59 y.o. female who presents today for a routine video visit for MS. [facility] visit to [facility] in April 2020 with this provider.      MS HPI:  · DMT: interferon beta-1a SQ-returned to Rebif  · Side effects from DMT? No  · Taking vitamin D3 as recommended? Yes - 4,000IU/d     The patient location is: home  The chief complaint leading to consultation is: MS follow up     Patient has returned to the gym and is doing some walking.   She has transitioned back to Rebif using Rebiject and she does find this much easier to self inject. Tolerating well.   She is not taking Amantadine or Gabapentin(100mg)-she does not feel like this is needed.       Visit type: audiovisual    Face to Face time with patient: 18 minutes  30 minutes of total time spent on the encounter, which includes face to face time and non-face to face time preparing to see the patient (eg, review of tests), Obtaining and/or reviewing separately obtained history, Documenting clinical information in the electronic or other health record, Independently interpreting results (not separately reported) and communicating results to the patient/family/caregiver, or Care coordination (not separately reported).         Each patient to whom he or she provides medical services by telemedicine is:  (1) informed of the relationship between the physician and patient and the respective role of any other health care provider with respect to management of the patient; and (2) notified that he or she may decline to receive medical services by telemedicine and may withdraw from such care at any time.        Medications:  Current Outpatient Medications   Medication Sig    amantadine HCl 100 mg Tab Take 1 tablet (100 mg total) by mouth once daily. (Patient not taking: Reported on 4/1/2020)    baclofen (LIORESAL) 10 MG tablet TAKE TWO TABLETs BY MOUTH IN THE MORNING, TWO TABLET IN THE AFTERNOON, AND UP TO THREE TABLETS AT  BEDTIME AS NEEDED    calcium citrate (CALCITRATE) 200 mg (950 mg) tablet Take 2 tablets by mouth once daily.    cholecalciferol, vitamin D3, (VITAMIN D3) 4,000 unit Cap Take 4,000 Units by mouth once daily.     fish oil-omega-3 fatty acids 300-1,000 mg capsule Take 2 g by mouth once daily.    gabapentin (NEURONTIN) 100 MG capsule TAKE 1 TO 2 CAPSULES BY MOUTH AT BEDTIME    gabapentin (NEURONTIN) 800 MG tablet Take 1 tablet (800 mg total) by mouth 3 (three) times daily.    interferon beta-1a, albumin, (REBIF, WITH ALBUMIN,) 44 mcg/0.5 mL injection Inject 0.5 mLs (44 mcg total) into the skin 3 (three) times a week.    levothyroxine (SYNTHROID) 75 MCG tablet TAKE 1 TABLET BY MOUTH IN THE MORNING    multivitamin capsule Take 1 capsule by mouth once daily.    paroxetine (PAXIL) 40 MG tablet TAKE 1 TABLET BY MOUTH ONCE DAILY IN THE MORNING    simvastatin (ZOCOR) 40 MG tablet TAKE 1 TABLET BY MOUTH ONCE DAILY     No current facility-administered medications for this visit.        SOCIAL HISTORY  Social History     Tobacco Use    Smoking status: Never Smoker    Smokeless tobacco: Never Used   Substance Use Topics    Alcohol use: No     Alcohol/week: 0.0 standard drinks     Frequency: Never     Binge frequency: Never    Drug use: No       Living arrangements - the patient lives alone.    ROS:  As above             Objective:        1. 25 foot timed walk:  Timed 25 Foot Walk: 10/29/2019 1/29/2020   Did patient wear an AFO? No No   Was assistive device used? No No   Time for 25 Foot Walk (seconds) 7.2 6.7   Time for 25 Foot Walk (seconds) - -       Neurologic Exam     Mental Status   Oriented to person, place, and time.   Follows 3 step commands.   Speech: speech is normal   Level of consciousness: alert  Normal comprehension.     Cranial Nerves     CN VII   Facial expression full, symmetric.     CN VIII   Hearing: intact (to conversation)    CN XII   Tongue deviation: none    Motor Exam   Muscle bulk:  normal  Right arm tone: normal  Left arm tone: normal  Right leg tone: normal  Left leg tone: spasticMMT not performed due to nature of video visit; however, full AROM performed UE's. Weakness noted L LE observed during ambulation     Sensory Exam   Right leg light touch: numbness foot.  Left leg light touch: numbness to feet.    Gait, Coordination, and Reflexes     Gait  Gait: circumduction and wide-based (slightly L hip circumduction)    Coordination   Finger-nose-finger test: L hand.  Tandem walking coordination: abnormal    Tremor   Resting tremor: absentSlowed on L RSM         Imaging:     No results found for this or any previous visit.  No results found for this or any previous visit.  No results found for this or any previous visit.  Results for orders placed during the hospital encounter of 04/26/19   MRI Brain Demyelinating W W/O Contrast    Impression Findings in the cerebral white matter which are typical for multiple sclerosis.  No new or enhancing lesions to suggest active disease.    Electronically signed by resident: Hieu Marroquin  Date:    04/27/2019  Time:    21:06    Electronically signed by: Min Norton MD  Date:    04/27/2019  Time:    22:20     Results for orders placed during the hospital encounter of 04/26/19   MRI Cervical Spine Demyelinating W W/O Contrast    Impression Stable appearance of patchy T2/STIR intramedullary signal throughout the cervical cord and the visualized portions of the thoracic cord, keeping with prior episodes of demyelination.  No new lesions.      Electronically signed by: Min Norton MD  Date:    04/28/2019  Time:    15:45     Results for orders placed during the hospital encounter of 04/26/19   MRI Thoracic Spine Demyelinating W W/O Contrast    Impression Stable appearance of T2/FLAIR signal intramedullary signal throughout the thoracic cord, in keeping with multiple sclerosis.  No new or enhancing lesion.  No evidence of significant cord  atrophy.      Electronically signed by: Min Norton MD  Date:    04/28/2019  Time:    15:30         Labs:     Lab Results   Component Value Date    KKCTESFD39BX 57 07/17/2019    EXPBOEDO16MY 57 11/29/2017    MLQLCCSQ58SB 82 09/09/2016     No results found for: JCVINDEX, JCVANTIBODY  No results found for: XD1VCTJW, ABSOLUTECD3, NN4RSWRG, ABSOLUTECD8, GV6EWXUF, ABSOLUTECD4, LABCD48  Lab Results   Component Value Date    WBC 5.41 05/08/2020    RBC 3.80 (L) 05/08/2020    HGB 12.0 05/08/2020    HCT 39.0 05/08/2020     (H) 05/08/2020    MCH 31.6 (H) 05/08/2020    MCHC 30.8 (L) 05/08/2020    RDW 12.6 05/08/2020     05/08/2020    MPV 10.6 05/08/2020    GRAN 3.1 05/08/2020    GRAN 57.0 05/08/2020    LYMPH 1.8 05/08/2020    LYMPH 32.7 05/08/2020    MONO 0.5 05/08/2020    MONO 8.9 05/08/2020    EOS 0.0 05/08/2020    BASO 0.03 05/08/2020    EOSINOPHIL 0.6 05/08/2020    BASOPHIL 0.6 05/08/2020     Sodium   Date Value Ref Range Status   06/11/2020 135 (L) 136 - 145 mmol/L Final     Potassium   Date Value Ref Range Status   06/11/2020 4.2 3.5 - 5.1 mmol/L Final     Chloride   Date Value Ref Range Status   06/11/2020 101 95 - 110 mmol/L Final     CO2   Date Value Ref Range Status   06/11/2020 25 23 - 29 mmol/L Final     Glucose   Date Value Ref Range Status   06/11/2020 91 70 - 110 mg/dL Final     BUN, Bld   Date Value Ref Range Status   06/11/2020 14 6 - 20 mg/dL Final     Creatinine   Date Value Ref Range Status   06/11/2020 0.9 0.5 - 1.4 mg/dL Final     Calcium   Date Value Ref Range Status   06/11/2020 9.5 8.7 - 10.5 mg/dL Final     Total Protein   Date Value Ref Range Status   06/11/2020 7.0 6.0 - 8.4 g/dL Final     Albumin   Date Value Ref Range Status   06/11/2020 3.4 (L) 3.5 - 5.2 g/dL Final     Total Bilirubin   Date Value Ref Range Status   06/11/2020 0.4 0.1 - 1.0 mg/dL Final     Comment:     For infants and newborns, interpretation of results should be based  on gestational age, weight and in agreement  with clinical  observations.  Premature Infant recommended reference ranges:  Up to 24 hours.............<8.0 mg/dL  Up to 48 hours............<12.0 mg/dL  3-5 days..................<15.0 mg/dL  6-29 days.................<15.0 mg/dL       Alkaline Phosphatase   Date Value Ref Range Status   06/11/2020 62 55 - 135 U/L Final     AST   Date Value Ref Range Status   06/11/2020 32 10 - 40 U/L Final     ALT   Date Value Ref Range Status   06/11/2020 17 10 - 44 U/L Final     Anion Gap   Date Value Ref Range Status   06/11/2020 9 8 - 16 mmol/L Final     eGFR if    Date Value Ref Range Status   06/11/2020 >60 >60 mL/min/1.73 m^2 Final     eGFR if non    Date Value Ref Range Status   06/11/2020 >60 >60 mL/min/1.73 m^2 Final     Comment:     Calculation used to obtain the estimated glomerular filtration  rate (eGFR) is the CKD-EPI equation.        Lab Results   Component Value Date    HEPBSAG Negative 07/17/2019    HEPBSAB Negative 07/17/2019    HEPBCAB Negative 07/17/2019           MS Impression and Plan:     NEURO MULTIPLE SCLEROSIS IMPRESSION:   MS Status:     Number of relapses in the past year?:  0    Clinical Progression:  Clinically Stable    MRI Progression comment:  Will schedule previously ordered MRI for August 2020  Plan:     DMT:  No change in management    DMT comment:  She has transitioned back to Rebif well and is able to use injector without issue    : removed amantadine and gabapentin 100 mg from med list.     Next Imaging Due: 8/6/2020     Next Labs Due: 11/6/2020    Our video visit today lasted 18 minutes, and 100% of this time was spent face to face with the patient. Over 50% of this visit included discussion of the treatment plan/medication changes/symptom management/exam findings/imaging/coordination of care. The patient agrees with the plan of care.    Problem List Items Addressed This Visit        Neuro    MS (multiple sclerosis) - Primary (Chronic)       Other     Neurologic gait dysfunction      Other Visit Diagnoses     Counseling regarding goals of care        Spasticity        Neuropathic pain            Follow up in about 4 months (around 11/6/2020) for follow up with Dr. Smith.  Patient agreed to POC today.    Attending, Dr. Smith, was available during today's encounter.     Rimma Rouse PA-C  [facility]

## 2020-07-12 RX ORDER — SIMVASTATIN 40 MG/1
TABLET, FILM COATED ORAL
Qty: 90 TABLET | Refills: 0 | Status: SHIPPED | OUTPATIENT
Start: 2020-07-12 | End: 2020-10-10

## 2020-07-13 ENCOUNTER — HOSPITAL ENCOUNTER (EMERGENCY)
Facility: HOSPITAL | Age: 60
Discharge: HOME | End: 2020-07-13
Attending: EMERGENCY MEDICINE
Payer: MEDICARE

## 2020-07-13 VITALS
WEIGHT: 179.88 LBS | RESPIRATION RATE: 16 BRPM | HEART RATE: 91 BPM | TEMPERATURE: 97 F | DIASTOLIC BLOOD PRESSURE: 69 MMHG | OXYGEN SATURATION: 97 % | SYSTOLIC BLOOD PRESSURE: 114 MMHG | HEIGHT: 63 IN | BODY MASS INDEX: 31.87 KG/M2

## 2020-07-13 DIAGNOSIS — B02.9 HERPES ZOSTER WITHOUT COMPLICATION: Primary | ICD-10-CM

## 2020-07-13 PROCEDURE — 99284 EMERGENCY DEPT VISIT MOD MDM: CPT | Mod: HCNC

## 2020-07-13 RX ORDER — VALACYCLOVIR HYDROCHLORIDE 1 G/1
1000 TABLET, FILM COATED ORAL 3 TIMES DAILY
Qty: 21 TABLET | Refills: 0 | Status: SHIPPED | OUTPATIENT
Start: 2020-07-13 | End: 2020-11-10 | Stop reason: ALTCHOICE

## 2020-07-13 RX ORDER — HYDROCODONE BITARTRATE AND ACETAMINOPHEN 5; 325 MG/1; MG/1
1 TABLET ORAL EVERY 4 HOURS PRN
Qty: 11 TABLET | Refills: 0 | Status: SHIPPED | OUTPATIENT
Start: 2020-07-13 | End: 2020-07-15

## 2020-07-13 NOTE — ED PROVIDER NOTES
"SCRIBE #1 NOTE: ICarito, am scribing for, and in the presence of, Lukas Boston MD. I have scribed the entire note.       History     Chief Complaint   Patient presents with    Fall     While on treadmill 3 days ago.  Complaining of abdominal pain radiating to her back.     Review of patient's allergies indicates:   Allergen Reactions    Latex, natural rubber Rash         History of Present Illness     HPI    7/13/2020, 2:50 AM  History obtained from the patient      History of Present Illness: Cem Meyers is a 59 y.o. female patient with a PMHx of MS, HLD, hypothyroid, closed L arm fx, colon polyp, polyneuropathy, osteoporosis, neurogenic bladder, chronic diastolic heart failure, immunosuppression and sleep apnea who presents to the Emergency Department for evaluation s/p fall which onset suddenly 4 days PTA. Pt fell after pushing her "bad left leg" too hard. Pt has a chronically weak LLE from MS. Pt reports that she was fine after the fall. Pt reports that yesterday she noticed blisters and red spots on the R side of her abdomen and back. Symptoms are constant and moderate in severity. No mitigating or exacerbating factors reported. No associated sxs. Patient denies any n/v/d, fever, chills, CP, SOB and all other sxs at this time. No prior Tx. No further complaints or concerns at this time.       Arrival mode: Personal vehicle      PCP: Adonis Stubbs MD        Past Medical History:  Past Medical History:   Diagnosis Date    Broken toe 6/2015    left big toe    Chronic diastolic heart failure 5/8/2018    Closed left arm fracture     Colon polyp     Depression     Hyperlipidemia     Hypothyroid     Immunosuppression 1/28/2019    MS (multiple sclerosis)     Neurogenic bladder 12/6/2012    Osteoporosis     Polyneuropathy     Sleep apnea     Trouble in sleeping        Past Surgical History:  Past Surgical History:   Procedure Laterality Date    COLONOSCOPY N/A 9/1/2017    Procedure: " COLONOSCOPY;  Surgeon: Andriy Villar MD;  [facility];  Service: Endoscopy;  Laterality: N/A;    FRACTURE SURGERY Left 2013    wrist- [facility]    KNEE SURGERY Right 1989    in [place]    TONSILLECTOMY  1966         Family History:  Family History   Problem Relation Age of Onset    Pancreatic cancer Mother     Stomach cancer Mother     Cancer Mother         pancreatic, stomach    Hypertension Father     Heart disease Father         MI    Lupus Maternal Aunt     Rheum arthritis Maternal Aunt     Rheum arthritis Sister     Melanoma Neg Hx        Social History:  Social History     Tobacco Use    Smoking status: Never Smoker    Smokeless tobacco: Never Used   Substance and Sexual Activity    Alcohol use: No     Alcohol/week: 0.0 standard drinks     Frequency: Never     Binge frequency: Never    Drug use: No    Sexual activity: Not Currently        Review of Systems     Review of Systems   Constitutional: Negative for activity change, appetite change, chills, diaphoresis and fever.   HENT: Negative for congestion, drooling, ear pain, mouth sores, rhinorrhea, sinus pain, sore throat and trouble swallowing.    Eyes: Negative for pain and discharge.   Respiratory: Negative for cough, chest tightness, shortness of breath, wheezing and stridor.    Cardiovascular: Negative for chest pain, palpitations and leg swelling.   Gastrointestinal: Negative for abdominal distention, abdominal pain, blood in stool, constipation, diarrhea, nausea and vomiting.   Genitourinary: Negative for difficulty urinating, dysuria, flank pain, frequency, hematuria and urgency.   Musculoskeletal: Negative for arthralgias, back pain and myalgias.   Skin: Negative for pallor, rash and wound.   Neurological: Negative for dizziness, syncope, weakness, light-headedness and numbness.        (+) fall   All other systems reviewed and are negative.     Physical Exam     Initial Vitals [07/13/20 0240]   BP Pulse Resp Temp SpO2   114/69 91  "16 97.3 °F (36.3 °C) 97 %      MAP       --          Physical Exam  Nursing Notes and Vital Signs Reviewed.  Constitutional: Patient is in no acute distress. Well-developed and well-nourished.  Head: Atraumatic. Normocephalic.  Eyes: PERRL. EOM intact. Conjunctivae are not pale. No scleral icterus.  ENT: Mucous membranes are moist. Oropharynx is clear and symmetric.    Neck: Supple. Full ROM. No lymphadenopathy.  Cardiovascular: Regular rate. Regular rhythm. No murmurs, rubs, or gallops. Distal pulses are 2+ and symmetric.  Pulmonary/Chest: No respiratory distress. Clear to auscultation bilaterally. No wheezing or rales.  Abdominal: Soft and non-distended.  There is no tenderness.  No rebound, guarding, or rigidity. Good bowel sounds.  Genitourinary: No CVA tenderness  Musculoskeletal: Moves all extremities. No obvious deformities. No edema. No calf tenderness.  Skin: Warm and dry. At R T-12 dermatome there is an erythematous base with ulcerative lesions scattered throughout R dermatome, consistent with shingles. The lesions are mildly painful.  Neurological:  Alert, awake, and appropriate.  Normal speech.  No acute focal neurological deficits are appreciated.  Psychiatric: Normal affect. Good eye contact. Appropriate in content.     ED Course   Procedures  ED Vital Signs:  Vitals:    07/13/20 0240   BP: 114/69   Pulse: 91   Resp: 16   Temp: 97.3 °F (36.3 °C)   TempSrc: Temporal   SpO2: 97%   Weight: 81.6 kg (179 lb 14.3 oz)   Height: 5' 3" (1.6 m)       Abnormal Lab Results:  Labs Reviewed - No data to display     All Lab Results:  None.    Imaging Results:  Imaging Results    None                     The Emergency Provider reviewed the vital signs and test results, which are outlined above.     ED Discussion       2:58 AM: Reassessed pt at this time.  Pt states her condition has improved at this time. Discussed with pt all pertinent ED information and results. Discussed pt dx and plan of tx. Gave pt all f/u and " return to the ED instructions. All questions and concerns were addressed at this time. Pt expresses understanding of information and instructions, and is comfortable with plan to discharge. Pt is stable for discharge.    I discussed with patient and/or family/caretaker that evaluation in the ED does not suggest any emergent or life threatening medical conditions requiring immediate intervention beyond what was provided in the ED, and I believe patient is safe for discharge.  Regardless, an unremarkable evaluation in the ED does not preclude the development or presence of a serious of life threatening condition. As such, patient was instructed to return immediately for any worsening or change in current symptoms.    I discussed with patient and/or family/caretaker that evaluation in the ED does not suggest any emergent or life threatening medical conditions requiring immediate intervention beyond what was provided in the ED, and I believe patient is safe for discharge.  Regardless, an unremarkable evaluation in the ED does not preclude the development or presence of a serious of life threatening condition. As such, patient was instructed to return immediately for any worsening or change in current symptoms.                   ED Medication(s):  Medications - No data to display    New Prescriptions    HYDROCODONE-ACETAMINOPHEN (NORCO) 5-325 MG PER TABLET    Take 1 tablet by mouth every 4 (four) hours as needed for Pain.    VALACYCLOVIR (VALTREX) 1000 MG TABLET    Take 1 tablet (1,000 mg total) by mouth 3 (three) times daily. for 7 days       Follow-up Information     Adonis Stubbs MD. Schedule an appointment as soon as possible for a visit in 2 days.    Specialty: Family Medicine  Why: For re-evaluation and further treatment  Contact information:  [address]  [place] [address]  [phone]             [facility]. Go today.    Specialty: Emergency Medicine  Why: If symptoms worsen, For  re-evaluation and further treatment, As needed  Contact information:  [address] [facility] [address] [phone]                     Scribe Attestation:   Scribe #1: I performed the above scribed service and the documentation accurately describes the services I performed. I attest to the accuracy of the note.     Attending:   Physician Attestation Statement for Scribe #1: I, Lukas Boston MD, personally performed the services described in this documentation, as scribed by Carito Hunt, in my presence, and it is both accurate and complete.           Clinical Impression       ICD-10-CM ICD-9-CM   1. Herpes zoster without complication  B02.9 053.9       Disposition:   Disposition: Discharged  Condition: Stable         Lukas Boston MD  07/13/20 0638

## 2020-07-20 ENCOUNTER — OFFICE VISIT (OUTPATIENT)
Dept: INTERNAL MEDICINE | Facility: CLINIC | Age: 60
End: 2020-07-20
Payer: MEDICARE

## 2020-07-20 VITALS
DIASTOLIC BLOOD PRESSURE: 80 MMHG | HEART RATE: 108 BPM | SYSTOLIC BLOOD PRESSURE: 118 MMHG | OXYGEN SATURATION: 98 % | WEIGHT: 164.44 LBS | HEIGHT: 63 IN | BODY MASS INDEX: 29.14 KG/M2 | TEMPERATURE: 97 F

## 2020-07-20 DIAGNOSIS — B02.8 HERPES ZOSTER WITH OTHER COMPLICATION: Primary | ICD-10-CM

## 2020-07-20 PROCEDURE — 99999 PR PBB SHADOW E&M-EST. PATIENT-LVL V: CPT | Mod: PBBFAC,HCNC,, | Performed by: PHYSICIAN ASSISTANT

## 2020-07-20 PROCEDURE — 99999 PR PBB SHADOW E&M-EST. PATIENT-LVL V: ICD-10-PCS | Mod: PBBFAC,HCNC,, | Performed by: PHYSICIAN ASSISTANT

## 2020-07-20 PROCEDURE — 99213 OFFICE O/P EST LOW 20 MIN: CPT | Mod: HCNC,S$GLB,, | Performed by: PHYSICIAN ASSISTANT

## 2020-07-20 PROCEDURE — 3008F BODY MASS INDEX DOCD: CPT | Mod: HCNC,CPTII,S$GLB, | Performed by: PHYSICIAN ASSISTANT

## 2020-07-20 PROCEDURE — 99213 PR OFFICE/OUTPT VISIT, EST, LEVL III, 20-29 MIN: ICD-10-PCS | Mod: HCNC,S$GLB,, | Performed by: PHYSICIAN ASSISTANT

## 2020-07-20 PROCEDURE — 3008F PR BODY MASS INDEX (BMI) DOCUMENTED: ICD-10-PCS | Mod: HCNC,CPTII,S$GLB, | Performed by: PHYSICIAN ASSISTANT

## 2020-07-20 RX ORDER — MUPIROCIN 20 MG/G
OINTMENT TOPICAL 2 TIMES DAILY
Qty: 1 TUBE | Refills: 0 | Status: SHIPPED | OUTPATIENT
Start: 2020-07-20 | End: 2020-07-23 | Stop reason: SDUPTHER

## 2020-07-20 NOTE — PATIENT INSTRUCTIONS
-hibiclens over the counter soap    Shingles (Herpes Zoster)     Talk to your healthcare provider about the shingles vaccine.     Shingles is also called herpes zoster. It is a painful skin rash caused by the herpes zoster virus. This is the same virus that causes chickenpox. After a person has chickenpox, the virus remains inactive in the nerve cells. Years later, the virus can become active again and travel to the skin. Most people have shingles only once, but it is possible to have it more than once.  What are the risk factors for shingles?  Anyone who has ever had chickenpox can develop shingles. But your risk is greater if you:  · Are 50 years of age or older  · Have an illness that weakens your immune system, such as HIV/AIDS  · Have cancer, especially Hodgkin disease or lymphoma  · Take medicines that weaken your immune system  What are the symptoms of shingles?  · The first sign of shingles is usually pain, burning, tingling, or itching on one part of your face or body. You may also feel as if you have the flu, with fever and chills.  · A red rash with small blisters appears within a few days. The rash may appear as follows:   ¨ The blisters can occur anywhere, but theyre most common on the back, chest, or abdomen.  ¨ They usually appear on only one side of the body, spreading along the nerve pathway where the virus was inactive.   ¨ The rash can also form around an eye, along one side of the face or neck, or in the mouth.  ¨ In a few people, usually those with weakened immune systems, shingles appear on more than one part of the body at once.  · After a few days, the blisters become dry and form a crust. The crust falls off in days to weeks. The blisters generally do not leave scars.  How is shingles treated?  For most people, shingles heals on its own in a few weeks. But treatment is recommended to help relieve pain, speed healing, and reduce the risk of complications. Antiviral medicines are prescribed  within the first 72 hours of the appearance of the rash. To lessen symptoms:  · Apply ice packs (wrapped in a thin towel) or cool compresses, or soak in a cool bath.  · Use calamine lotion to calm itchy skin.  · Ask your healthcare provider about over-the-counter pain relievers. If your pain is severe, your healthcare provider may prescribe stronger pain medicines.  What are the complications of shingles?  Shingles often goes away with no lasting effects. But some people have serious problems long after the blisters have healed:  · Postherpetic neuralgia. This is the most common complication. It is severe nerve pain at the place where the rash used to be. It can last for months, or even years after you have had shingles. Medicines can be prescribed to help relieve the pain and improve quality of life.  · Bacterial infection. Shingles blisters may become infected with bacteria. Antibiotic medicine is used to treat the infection.  · Eye problems. A person with shingles on the face should see his or her healthcare provider right away. Shingles can cause serious problems with vision, and even blindness.  Very rarely shingles can also lead to pneumonia, hearing problems, brain inflammation, or even death.   When to seek medical care  Contact your healthcare provider if you experience any of the following:  · Symptoms that dont go away with treatment  · A rash or blisters near your eye  · Increased drainage, fever, or rash after treatment, or severe pain that doesnt go away   How can shingles be prevented?  You can only get shingles if you have had chicken pox in the past. Those who have never had chickenpox can get the virus from you. Although instead of developing shingles, the person may get chickenpox. Until your blisters form scabs, avoid contact with others, especially the following:  · Pregnant women who have never had chickenpox or the vaccine  · Infants who were born early (prematurely) or who had low weight at  birth  · People with weak immune system (for example, people receiving chemotherapy for cancer, people who have had organ transplants, or people with HIV infections)     The shingles vaccine  If youre 60 years of age or older, ask your healthcare provider if you should receive the shingles vaccine. The vaccine makes it less likely that you will develop shingles. If you do develop shingles, your symptoms will likely be milder than if you hadnt been vaccinated. Note: Although the vaccine is licensed for people 50 years of age or older, the CDC does not recommend the vaccine for those who are 50 to 59 years old.   Date Last Reviewed: 10/1/2016  © 3855-4641 [employer] [address] [place] [address], [place] [address]. All rights reserved. This information is not intended as a substitute for professional medical care. Always follow your healthcare professional's instructions.        Shingles  Shingles is a viral infection caused by the same virus as chicken pox. Anyone who has had chicken pox may get shingles later in life. The virus stays in the body, but remains dormant (asleep). Shingles often occurs in older persons or persons with lowered immunity. But it can affect anyone at any age.  Shingles starts as a tingling patch of skin on one side of the body. Small, painful blisters may then appear. The rash does not spread to the rest of the body.  Exposure to shingles cannot cause shingles. However, it can cause chicken pox in anyone who has not had chicken pox or has not been vaccinated. The contagious period ends when all blisters have crusted over (generally about 2 weeks after the illness begins).  After the blisters heal, the affected skin may be sensitive or painful for months (neuralgia). This often gradually goes away.  A shingles vaccine is available. This can help prevent shingles or make it less painful. It is generally recommended for adults over the age of 60 who have had chicken pox in the past,  but who have never had shingles. Adults over 60 who have had neither chicken pox nor shingles can prevent both diseases with the chicken pox vaccine. Ask your healthcare provider about these vaccines.  Home care  · Medicines may be prescribed to help relieve pain. Take these medicines as directed. Ask your healthcare provider or pharmacist before using over-the-counter medicines for helping treat pain and itching.  · In certain cases, antiviral medicines may be prescribed to reduce pain, shorten the illness, and prevent neuralgia. Take these medicines as directed.  · Compresses made from a solution of cool water mixed with cornstarch or baking soda may help relieve pain and itching.   · Gently wash skin daily with soap and water to help prevent infection.  Be certain to rinse off all of the soap, which can be irritating.  · Trim fingernails and try not to scratch. Scratching the sores may leave scars.  · Stay home from work or school until all blisters have formed a crust and you are no longer contagious.  Follow-up care  Follow up with your healthcare provider or as directed by our staff.  When to seek medical advice  · Fever of 100.4°F (38°C) or higher, or as directed by your healthcare provider  · Affected skin is on the face or neck and any of the following occur:  ¨ Headache  ¨ Eye pain  ¨ Changes in vision  ¨ Sores near the eye  ¨ Weakness of facial muscles  · Pain, redness, or swelling of a joint  · Signs of skin infection: colored drainage from the sores, warmth, increasing redness, or increasing pain  Date Last Reviewed: 9/25/2015  © 2489-9129 [employer]. [address] [place] [address], [place] [address]. All rights reserved. This information is not intended as a substitute for professional medical care. Always follow your healthcare professional's instructions.

## 2020-07-20 NOTE — PROGRESS NOTES
Subjective:      Patient ID: Cem Meyers is a 59 y.o. female.    Chief Complaint: Herpes Zoster (recovery)    Diagnosed with shingles 1 week ago in ER. Finished valtrex.   Rash  This is a new problem. The problem has been gradually improving since onset. Location: right abdomen  The rash is characterized by blistering (now it has crusted over). Pertinent negatives include no congestion, cough, diarrhea, fatigue, fever, rhinorrhea, shortness of breath, sore throat or vomiting. Treatments tried: valtrex. The treatment provided moderate relief. There is no history of allergies, asthma, eczema or varicella.     Patient Active Problem List   Diagnosis    Neurologic gait dysfunction    MS (multiple sclerosis)    Hypothyroid    Hyperlipidemia    Acquired pes planus of both feet    Vitamin D insufficiency    Osteoporosis    MAYKEL on CPAP    Major depressive disorder, single episode, mild    Polyneuropathy    History of colon polyps    Muscle spasms of both lower extremities    Psychophysiological insomnia    Chronic diastolic heart failure    Depression    Bursal cyst of olecranon    Prophylactic immunotherapy    Obesity (BMI 30.0-34.9)    Stenosing tenosynovitis of finger of right hand         Review of Systems   Constitutional: Negative for activity change, appetite change, chills, diaphoresis, fatigue, fever and unexpected weight change.   HENT: Negative.  Negative for congestion, hearing loss, postnasal drip, rhinorrhea, sore throat, trouble swallowing and voice change.    Eyes: Negative.  Negative for visual disturbance.   Respiratory: Negative.  Negative for cough, choking, chest tightness and shortness of breath.    Cardiovascular: Negative for chest pain, palpitations and leg swelling.   Gastrointestinal: Negative for abdominal distention, abdominal pain, blood in stool, constipation, diarrhea, nausea and vomiting.   Endocrine: Negative for cold intolerance, heat intolerance, polydipsia and  "polyuria.   Genitourinary: Negative.  Negative for difficulty urinating and frequency.   Musculoskeletal: Positive for arthralgias and myalgias. Negative for back pain, gait problem, joint swelling, neck pain and neck stiffness.   Skin: Positive for rash. Negative for color change, pallor and wound.   Neurological: Negative for dizziness, tremors, weakness, light-headedness, numbness and headaches.   Hematological: Negative for adenopathy.   Psychiatric/Behavioral: Negative for behavioral problems, confusion, self-injury, sleep disturbance and suicidal ideas. The patient is not nervous/anxious.      Objective:   /80 (BP Location: Left arm, Patient Position: Sitting, BP Method: Medium (Manual))   Pulse 108   Temp 96.8 °F (36 °C) (Tympanic)   Ht 5' 3" (1.6 m)   Wt 74.6 kg (164 lb 7.4 oz)   SpO2 98%   BMI 29.13 kg/m²     Physical Exam  Vitals signs reviewed.   Constitutional:       Appearance: She is well-developed.   HENT:      Head: Normocephalic and atraumatic.      Right Ear: External ear normal.      Left Ear: External ear normal.      Nose: Nose normal.   Eyes:      Conjunctiva/sclera: Conjunctivae normal.      Pupils: Pupils are equal, round, and reactive to light.   Neck:      Musculoskeletal: Normal range of motion and neck supple.   Cardiovascular:      Rate and Rhythm: Normal rate and regular rhythm.      Heart sounds: Normal heart sounds. No murmur. No friction rub. No gallop.    Pulmonary:      Effort: Pulmonary effort is normal. No respiratory distress.      Breath sounds: Normal breath sounds. No wheezing or rales.   Chest:      Chest wall: No tenderness.   Abdominal:      General: There is no distension.      Palpations: Abdomen is soft.      Tenderness: There is no abdominal tenderness.   Musculoskeletal: Normal range of motion.   Lymphadenopathy:      Cervical: No cervical adenopathy.   Skin:     General: Skin is warm and dry.      Findings: Rash present. Rash is crusting and vesicular. "          Neurological:      Mental Status: She is alert and oriented to person, place, and time.   Psychiatric:         Behavior: Behavior normal.         Thought Content: Thought content normal.         Judgment: Judgment normal.             Some areas with yellow honey crusts noted    Assessment:     1. Herpes zoster with other complication      Plan:   Herpes zoster with other complication  -     mupirocin (BACTROBAN) 2 % ointment; Apply topically 2 (two) times daily. for 10 days  Dispense: 1 Tube; Refill: 0    Educational handout on over-the-counter medications and at-home conservative care, pertinent to the patients diagnosis today, was handed to the patient and discussed in detail.  -gentle cleansing with hibiclens    Follow up if symptoms worsen or fail to improve.

## 2020-07-22 ENCOUNTER — LAB VISIT (OUTPATIENT)
Dept: PRIMARY CARE CLINIC | Facility: CLINIC | Age: 60
End: 2020-07-22
Payer: MEDICARE

## 2020-07-22 DIAGNOSIS — Z00.6 RESEARCH STUDY PATIENT: Primary | ICD-10-CM

## 2020-07-22 DIAGNOSIS — Z03.818 ENCOUNTER FOR OBSERVATION FOR SUSPECTED EXPOSURE TO OTHER BIOLOGICAL AGENTS RULED OUT: ICD-10-CM

## 2020-07-22 DIAGNOSIS — Z01.84 ENCOUNTER FOR ANTIBODY RESPONSE EXAMINATION: ICD-10-CM

## 2020-07-22 LAB — SARS-COV-2 IGG SERPLBLD QL IA.RAPID: NEGATIVE

## 2020-07-22 PROCEDURE — U0003 INFECTIOUS AGENT DETECTION BY NUCLEIC ACID (DNA OR RNA); SEVERE ACUTE RESPIRATORY SYNDROME CORONAVIRUS 2 (SARS-COV-2) (CORONAVIRUS DISEASE [COVID-19]), AMPLIFIED PROBE TECHNIQUE, MAKING USE OF HIGH THROUGHPUT TECHNOLOGIES AS DESCRIBED BY CMS-2020-01-R: HCPCS | Mod: HCNC

## 2020-07-22 PROCEDURE — 86769 SARS-COV-2 COVID-19 ANTIBODY: CPT | Mod: HCNC

## 2020-07-22 NOTE — RESEARCH
Date of Consent: 7/22/2020    Sponsor: [facility]    Study Title/IRB Number: Observational study of Sars-CoV2 Immunoglobulin G (IgG) seroprevalence among the [facility] population over time 2020.163  Principle Investigator: Sarah Guadarrama, PhD    Did the patient need translation services? No   name: N/A    Prior to the Informed Consent (IC) being signed, or any study protocol required data collection, testing, procedure, or intervention being performed, the following was done and/or discussed:   Patient was given a paper copy of the IC for review    Patient was given study FAQ   Purpose of the study and qualifications to participate    Study design and tests or procedures done at this visit   Confidentiality and HIPAA Authorization for Release of Medical Records for the research trial/ subject's rights/research related injury   Risk, Benefits, Alternative Treatments, Compensation and Costs   Participation in the research trial is voluntary and patient may withdraw at anytime   Contact information for study related questions    Patient verbalizes understanding of the above: Yes  Contact information for PI and IRB given to patient: Yes  Patient able to adequately summarize: the purpose of the study, the risks associated with the study, and all procedures, testing, and follow-ups associated with the study: Yes    The consent was discussed verbally with the patient and all questions were answered satisfactorily. Patient gave verbal consent for the Seroprevalence research study with an IRB approval date of 06/23/2020.      The Consent, Consent Witness and name of Clinical Research Coordinator consenting was captured and documented in [employer].    All Inclusion and Exclusion Criteria reviewed, subject meets all Inclusion criteria and does not meet any Exclusion Criteria at this time.     Patient Eligibility was confirmed.    Patient responded to survey questions.    The following biospecimen  collection procedures were collected:    -Nasopharyngeal Swab Collection  -Blood collection

## 2020-07-23 DIAGNOSIS — B02.8 HERPES ZOSTER WITH OTHER COMPLICATION: ICD-10-CM

## 2020-07-23 RX ORDER — MUPIROCIN 20 MG/G
OINTMENT TOPICAL 2 TIMES DAILY
Qty: 1 TUBE | Refills: 0 | Status: SHIPPED | OUTPATIENT
Start: 2020-07-23 | End: 2020-08-02

## 2020-07-24 LAB — SARS-COV-2 RNA RESP QL NAA+PROBE: NOT DETECTED

## 2020-07-29 ENCOUNTER — OFFICE VISIT (OUTPATIENT)
Dept: INTERNAL MEDICINE | Facility: CLINIC | Age: 60
End: 2020-07-29
Payer: MEDICARE

## 2020-07-29 VITALS
OXYGEN SATURATION: 98 % | SYSTOLIC BLOOD PRESSURE: 112 MMHG | TEMPERATURE: 97 F | HEART RATE: 110 BPM | HEIGHT: 63 IN | DIASTOLIC BLOOD PRESSURE: 82 MMHG | BODY MASS INDEX: 29.49 KG/M2 | WEIGHT: 166.44 LBS

## 2020-07-29 DIAGNOSIS — E03.8 OTHER SPECIFIED HYPOTHYROIDISM: Chronic | ICD-10-CM

## 2020-07-29 DIAGNOSIS — F32.0 MAJOR DEPRESSIVE DISORDER, SINGLE EPISODE, MILD: ICD-10-CM

## 2020-07-29 DIAGNOSIS — Z00.00 ANNUAL PHYSICAL EXAM: Primary | ICD-10-CM

## 2020-07-29 DIAGNOSIS — I50.32 CHRONIC DIASTOLIC HEART FAILURE: ICD-10-CM

## 2020-07-29 DIAGNOSIS — G35 MULTIPLE SCLEROSIS: ICD-10-CM

## 2020-07-29 DIAGNOSIS — Z12.11 ENCOUNTER FOR SCREENING COLONOSCOPY: ICD-10-CM

## 2020-07-29 DIAGNOSIS — F32.A DEPRESSION, UNSPECIFIED DEPRESSION TYPE: ICD-10-CM

## 2020-07-29 PROCEDURE — 3008F BODY MASS INDEX DOCD: CPT | Mod: HCNC,CPTII,S$GLB, | Performed by: FAMILY MEDICINE

## 2020-07-29 PROCEDURE — 3008F PR BODY MASS INDEX (BMI) DOCUMENTED: ICD-10-PCS | Mod: HCNC,CPTII,S$GLB, | Performed by: FAMILY MEDICINE

## 2020-07-29 PROCEDURE — 99396 PREV VISIT EST AGE 40-64: CPT | Mod: HCNC,S$GLB,, | Performed by: FAMILY MEDICINE

## 2020-07-29 PROCEDURE — 99396 PR PREVENTIVE VISIT,EST,40-64: ICD-10-PCS | Mod: HCNC,S$GLB,, | Performed by: FAMILY MEDICINE

## 2020-07-29 PROCEDURE — 99999 PR PBB SHADOW E&M-EST. PATIENT-LVL IV: ICD-10-PCS | Mod: PBBFAC,HCNC,, | Performed by: FAMILY MEDICINE

## 2020-07-29 PROCEDURE — 99999 PR PBB SHADOW E&M-EST. PATIENT-LVL IV: CPT | Mod: PBBFAC,HCNC,, | Performed by: FAMILY MEDICINE

## 2020-07-29 RX ORDER — PAROXETINE HYDROCHLORIDE 40 MG/1
40 TABLET, FILM COATED ORAL EVERY MORNING
Qty: 90 TABLET | Refills: 2 | Status: SHIPPED | OUTPATIENT
Start: 2020-07-29 | End: 2021-02-11 | Stop reason: SDUPTHER

## 2020-07-29 RX ORDER — BETAMETHASONE VALERATE 1.2 MG/G
CREAM TOPICAL 2 TIMES DAILY
Qty: 45 G | Refills: 1 | Status: SHIPPED | OUTPATIENT
Start: 2020-07-29 | End: 2020-11-10 | Stop reason: ALTCHOICE

## 2020-07-29 NOTE — PROGRESS NOTES
Subjective:       Patient ID: Cem Meyers is a 59 y.o. female.    Chief Complaint: Follow-up    Pt is a 59 year old who is just getting over shingles. It is improving. Pt other chronic conditions are well controlled. Pt does not colonoscopy    Rash  This is a new problem. The current episode started 1 to 4 weeks ago. The problem has been gradually improving since onset. The affected locations include the torso. The rash is characterized by burning and pain. She was exposed to nothing. Associated symptoms include fatigue. Pertinent negatives include no anorexia, congestion, cough, diarrhea, eye pain, facial edema, fever, joint pain, nail changes, rhinorrhea, shortness of breath, sore throat or vomiting. Past treatments include analgesics and antibiotic cream. The treatment provided moderate relief. Her past medical history is significant for varicella. There is no history of allergies, asthma or eczema.     Review of Systems   Constitutional: Positive for fatigue. Negative for fever.   HENT: Negative for nasal congestion, rhinorrhea and sore throat.    Eyes: Negative for pain.   Respiratory: Negative for cough and shortness of breath.    Gastrointestinal: Negative for anorexia, diarrhea and vomiting.   Musculoskeletal: Negative for joint pain.   Integumentary:  Positive for rash. Negative for nail changes.         Objective:      Physical Exam  Constitutional:       Appearance: Normal appearance.   Neck:      Musculoskeletal: Normal range of motion and neck supple.   Cardiovascular:      Rate and Rhythm: Normal rate and regular rhythm.      Pulses: Normal pulses.      Heart sounds: Normal heart sounds.   Pulmonary:      Effort: Pulmonary effort is normal.      Breath sounds: Normal breath sounds.   Skin:     General: Skin is warm and dry.          Neurological:      General: No focal deficit present.      Mental Status: She is alert and oriented to person, place, and time.         Assessment:       1. Annual  physical exam    2. Major depressive disorder, single episode, mild    3. Chronic diastolic heart failure    4. Encounter for screening colonoscopy    5. Other specified hypothyroidism        Plan:       Annual physical exam    Major depressive disorder, single episode, mild    Chronic diastolic heart failure    Encounter for screening colonoscopy  -     Case request GI: COLONOSCOPY    Other specified hypothyroidism  -     T4, free; Future; Expected date: 07/29/2020  -     TSH; Future; Expected date: 07/29/2020    Other orders  -     betamethasone valerate 0.1% (VALISONE) 0.1 % Crea; Apply topically 2 (two) times daily.  Dispense: 45 g; Refill: 1

## 2020-07-30 ENCOUNTER — LAB VISIT (OUTPATIENT)
Dept: LAB | Facility: HOSPITAL | Age: 60
End: 2020-07-30
Attending: FAMILY MEDICINE
Payer: MEDICARE

## 2020-07-30 DIAGNOSIS — E03.8 OTHER SPECIFIED HYPOTHYROIDISM: Chronic | ICD-10-CM

## 2020-07-30 LAB
T4 FREE SERPL-MCNC: 1.11 NG/DL (ref 0.71–1.51)
TSH SERPL DL<=0.005 MIU/L-ACNC: 1.17 UIU/ML (ref 0.4–4)

## 2020-07-30 PROCEDURE — 36415 COLL VENOUS BLD VENIPUNCTURE: CPT | Mod: HCNC

## 2020-07-30 PROCEDURE — 84439 ASSAY OF FREE THYROXINE: CPT | Mod: HCNC

## 2020-07-30 PROCEDURE — 84443 ASSAY THYROID STIM HORMONE: CPT | Mod: HCNC

## 2020-08-12 ENCOUNTER — HOSPITAL ENCOUNTER (OUTPATIENT)
Dept: RADIOLOGY | Facility: HOSPITAL | Age: 60
Discharge: HOME | End: 2020-08-12
Attending: PHYSICIAN ASSISTANT
Payer: MEDICARE

## 2020-08-12 DIAGNOSIS — G35 MULTIPLE SCLEROSIS: ICD-10-CM

## 2020-08-12 PROCEDURE — 70551 MRI BRAIN STEM W/O DYE: CPT | Mod: 26,HCNC,, | Performed by: RADIOLOGY

## 2020-08-12 PROCEDURE — 70551 MRI BRAIN STEM W/O DYE: CPT | Mod: TC,HCNC

## 2020-08-12 PROCEDURE — 70551 MRI BRAIN DEMYELINATING WITHOUT CONTRAST: ICD-10-PCS | Mod: 26,HCNC,, | Performed by: RADIOLOGY

## 2020-08-13 ENCOUNTER — PES CALL (OUTPATIENT)
Dept: ADMINISTRATIVE | Facility: CLINIC | Age: 60
End: 2020-08-13

## 2020-08-13 ENCOUNTER — TELEPHONE (OUTPATIENT)
Dept: OBSTETRICS AND GYNECOLOGY | Facility: CLINIC | Age: 60
End: 2020-08-13

## 2020-08-13 DIAGNOSIS — Z12.31 ENCOUNTER FOR SCREENING MAMMOGRAM FOR BREAST CANCER: Primary | ICD-10-CM

## 2020-08-18 ENCOUNTER — OFFICE VISIT (OUTPATIENT)
Dept: INTERNAL MEDICINE | Facility: CLINIC | Age: 60
End: 2020-08-18
Payer: MEDICARE

## 2020-08-18 VITALS
HEIGHT: 63 IN | DIASTOLIC BLOOD PRESSURE: 74 MMHG | HEART RATE: 103 BPM | WEIGHT: 164.25 LBS | OXYGEN SATURATION: 97 % | BODY MASS INDEX: 29.1 KG/M2 | TEMPERATURE: 98 F | SYSTOLIC BLOOD PRESSURE: 110 MMHG

## 2020-08-18 DIAGNOSIS — F32.0 MAJOR DEPRESSIVE DISORDER, SINGLE EPISODE, MILD: ICD-10-CM

## 2020-08-18 DIAGNOSIS — D84.9 IMMUNOSUPPRESSION: ICD-10-CM

## 2020-08-18 DIAGNOSIS — Z00.00 ENCOUNTER FOR PREVENTIVE HEALTH EXAMINATION: Primary | ICD-10-CM

## 2020-08-18 DIAGNOSIS — G35 MS (MULTIPLE SCLEROSIS): Chronic | ICD-10-CM

## 2020-08-18 DIAGNOSIS — M62.838 MUSCLE SPASMS OF BOTH LOWER EXTREMITIES: ICD-10-CM

## 2020-08-18 DIAGNOSIS — M21.42 ACQUIRED PES PLANUS OF BOTH FEET: ICD-10-CM

## 2020-08-18 DIAGNOSIS — R26.9 ABNORMALITY OF GAIT AND MOBILITY: ICD-10-CM

## 2020-08-18 DIAGNOSIS — F51.04 PSYCHOPHYSIOLOGICAL INSOMNIA: ICD-10-CM

## 2020-08-18 DIAGNOSIS — M21.41 ACQUIRED PES PLANUS OF BOTH FEET: ICD-10-CM

## 2020-08-18 DIAGNOSIS — G47.33 OSA ON CPAP: Chronic | ICD-10-CM

## 2020-08-18 DIAGNOSIS — E66.9 OBESITY (BMI 30.0-34.9): ICD-10-CM

## 2020-08-18 DIAGNOSIS — M81.0 AGE-RELATED OSTEOPOROSIS WITHOUT CURRENT PATHOLOGICAL FRACTURE: ICD-10-CM

## 2020-08-18 DIAGNOSIS — G62.9 POLYNEUROPATHY: ICD-10-CM

## 2020-08-18 DIAGNOSIS — M71.329 BURSAL CYST OF OLECRANON: ICD-10-CM

## 2020-08-18 DIAGNOSIS — M65.841 STENOSING TENOSYNOVITIS OF FINGER OF RIGHT HAND: ICD-10-CM

## 2020-08-18 DIAGNOSIS — Z29.89 PROPHYLACTIC IMMUNOTHERAPY: ICD-10-CM

## 2020-08-18 DIAGNOSIS — F33.0 MILD EPISODE OF RECURRENT MAJOR DEPRESSIVE DISORDER: ICD-10-CM

## 2020-08-18 DIAGNOSIS — E03.4 HYPOTHYROIDISM DUE TO ACQUIRED ATROPHY OF THYROID: Chronic | ICD-10-CM

## 2020-08-18 DIAGNOSIS — Z99.89 DEPENDENCE ON OTHER ENABLING MACHINES AND DEVICES: ICD-10-CM

## 2020-08-18 DIAGNOSIS — R26.9 NEUROLOGIC GAIT DYSFUNCTION: ICD-10-CM

## 2020-08-18 DIAGNOSIS — E78.2 MIXED HYPERLIPIDEMIA: Chronic | ICD-10-CM

## 2020-08-18 DIAGNOSIS — Z86.010 HISTORY OF COLON POLYPS: ICD-10-CM

## 2020-08-18 DIAGNOSIS — E55.9 VITAMIN D INSUFFICIENCY: ICD-10-CM

## 2020-08-18 DIAGNOSIS — I50.32 CHRONIC DIASTOLIC HEART FAILURE: ICD-10-CM

## 2020-08-18 PROCEDURE — G0439 PR MEDICARE ANNUAL WELLNESS SUBSEQUENT VISIT: ICD-10-PCS | Mod: HCNC,S$GLB,, | Performed by: PHYSICIAN ASSISTANT

## 2020-08-18 PROCEDURE — 99499 RISK ADDL DX/OHS AUDIT: ICD-10-PCS | Mod: HCNC,S$GLB,, | Performed by: PHYSICIAN ASSISTANT

## 2020-08-18 PROCEDURE — 99999 PR PBB SHADOW E&M-EST. PATIENT-LVL V: ICD-10-PCS | Mod: PBBFAC,HCNC,, | Performed by: PHYSICIAN ASSISTANT

## 2020-08-18 PROCEDURE — G0439 PPPS, SUBSEQ VISIT: HCPCS | Mod: HCNC,S$GLB,, | Performed by: PHYSICIAN ASSISTANT

## 2020-08-18 PROCEDURE — 99499 UNLISTED E&M SERVICE: CPT | Mod: HCNC,S$GLB,, | Performed by: PHYSICIAN ASSISTANT

## 2020-08-18 PROCEDURE — 99999 PR PBB SHADOW E&M-EST. PATIENT-LVL V: CPT | Mod: PBBFAC,HCNC,, | Performed by: PHYSICIAN ASSISTANT

## 2020-08-18 RX ORDER — GLUCOSAMINE/CHONDRO SU A 500-400 MG
1 TABLET ORAL DAILY
COMMUNITY
End: 2023-10-04

## 2020-08-18 NOTE — PATIENT INSTRUCTIONS
Counseling and Referral of Other Preventative  (Italic type indicates deductible and co-insurance are waived)    Patient Name: Cem Meyers  Today's Date: 8/18/2020    Health Maintenance       Date Due Completion Date    Shingles Vaccine (1 of 2) 07/30/2011 ---    Mammogram 10/11/2020 10/11/2019    Override on 10/24/2012: (N/S)    Override on 10/18/2011: Done    Influenza Vaccine (1) 09/01/2020 9/26/2019    Colorectal Cancer Screening 09/01/2020 9/1/2017    Lipid Panel 06/11/2021 6/11/2020    Cervical Cancer Screening 08/09/2021 8/9/2018    DEXA SCAN 06/11/2022 6/11/2020    Override on 3/27/2013: Done (REPEAT 2 YRS)    Override on 1/31/2011: Done    TETANUS VACCINE 09/07/2022 9/7/2012        No orders of the defined types were placed in this encounter.    The following information is provided to all patients.  This information is to help you find resources for any of the problems found today that may be affecting your health:                Living healthy guide: www.[facility].[place].gov      Understanding Diabetes: www.diabetes.org      Eating healthy: www.cdc.gov/healthyweight      CDC home safety checklist: www.cdc.gov/steadi/patient.html      Agency on Aging: www.goea.[place].[facility]      Alcoholics anonymous (AA): www.aa.org      Physical Activity: www.geraldo.nih.gov/ju2jmmq      Tobacco use: www.quitwithusla.org

## 2020-08-18 NOTE — PROGRESS NOTES
"  Cem Meyers presented for a  Medicare AWV and comprehensive Health Risk Assessment today. The following components were reviewed and updated:    · Medical history  · Family History  · Social history  · Allergies and Current Medications  · Health Risk Assessment  · Health Maintenance  · Care Team     ** See Completed Assessments for Annual Wellness Visit within the encounter summary.**         The following assessments were completed:  · Living Situation  · CAGE  · Depression Screening  · Timed Get Up and Go  · Whisper Test  · Cognitive Function Screening  · Nutrition Screening  · ADL Screening  · PAQ Screening        Vitals:    08/18/20 1419   BP: 110/74   BP Location: Left arm   Patient Position: Sitting   BP Method: Large (Manual)   Pulse: 103   Temp: 98.1 °F (36.7 °C)   TempSrc: Tympanic   SpO2: 97%   Weight: 74.5 kg (164 lb 3.9 oz)   Height: 5' 3" (1.6 m)     Body mass index is 29.09 kg/m².  Physical Exam  Vitals signs and nursing note reviewed.   Constitutional:       General: She is not in acute distress.     Appearance: She is well-developed. She is not diaphoretic.   HENT:      Head: Normocephalic and atraumatic.   Cardiovascular:      Rate and Rhythm: Normal rate and regular rhythm.      Heart sounds: Normal heart sounds. No murmur. No friction rub. No gallop.    Pulmonary:      Effort: Pulmonary effort is normal. No respiratory distress.      Breath sounds: Normal breath sounds. No wheezing or rales.   Musculoskeletal: Normal range of motion.   Skin:     General: Skin is warm.      Capillary Refill: Capillary refill takes less than 2 seconds.      Findings: No rash.   Neurological:      Mental Status: She is alert and oriented to person, place, and time.   Psychiatric:         Behavior: Behavior normal.         Thought Content: Thought content normal.         Judgment: Judgment normal.                   Diagnoses and health risks identified today and associated recommendations/orders:    1. Encounter for " preventive health examination  -completed today  -up to date on healthcare maintenance.   -colonoscopy due this year.     2. Dependence on other enabling machines and devices  -uses a can due to her MS    3. Abnormality of gait and mobility  -Stable and controlled. Uses a cane. Continue current treatment plan as previously prescribed with your neurologist.     4. MS (multiple sclerosis)  Stable on treatment, followed by Rebeka Smith MD and Rimma Rouse PA-C Neurology, [facility]  MRI 8/21/2020: Impression: Findings in the cerebral white matter which are typical for multiple sclerosis.  No new lesions identified.  No restricted diffusion.    5. Hypothyroidism due to acquired atrophy of thyroid  Lab Results   Component Value Date    TSH 1.170 07/30/2020     -Stable and controlled on levothyroxine. Continue current treatment plan as previously prescribed with your PCP.       6. Mixed hyperlipidemia  -Stable and controlled on zocor. Continue current treatment plan as previously prescribed with your PCP.   Lab Results   Component Value Date    CHOL 169 06/11/2020    CHOL 169 05/24/2019    CHOL 173 12/04/2018     Lab Results   Component Value Date    HDL 55 06/11/2020    HDL 50 05/24/2019    HDL 53 12/04/2018     Lab Results   Component Value Date    LDLCALC 100.2 06/11/2020    LDLCALC 104.8 05/24/2019    LDLCALC 104.6 12/04/2018     Lab Results   Component Value Date    TRIG 69 06/11/2020    TRIG 71 05/24/2019    TRIG 77 12/04/2018     Lab Results   Component Value Date    CHOLHDL 32.5 06/11/2020    CHOLHDL 29.6 05/24/2019    CHOLHDL 30.6 12/04/2018       7. MAYKEL on CPAP  Diagnosis in 1961[address] while in [place], told mild obstructive sleep apnea with treatment CPAP  8 cm.  Has not used CPAP since 2014, sporadic use from 9400-1518. Lost 30 lbs since last PSG.   PSG 3/17/2018 revealed AHI 7.4. Cpap titration 4/16/2018 revealed 7 cm optimal.  Patient was on 8 cm when on CPAP in 2014, she recall liking that air  flow and request resuming 8 cm.   4/20/2018 resume CPAP 8 cm   On auto CPAP 4-6 cm, nasal wisp mask.   HME: [facility]   -Stable and controlled on CPAP. Continue current treatment plan as previously prescribed with your pulmonologist.     8. Neurologic gait dysfunction  -Stable and controlled. Continue current treatment plan as previously prescribed with your neurologist.    9. Acquired pes planus of both feet  -Stable and controlled. Continue current treatment plan as previously prescribed with your PCP.     10. Vitamin D insufficiency  -Stable and controlled on vitamin D supplement. Continue current treatment plan as previously prescribed with your PCP.     11. Age-related osteoporosis without current pathological fracture  -DEXA 6/11/2020  -Stable and controlled on prolia, vitamin D, and calcium supplement. Continue current treatment plan as previously prescribed with your PCP.     12. Major depressive disorder, single episode, mild  -Stable and controlled on paxil. Continue current treatment plan as previously prescribed with your PCP.     13. Polyneuropathy  -Stable and controlled on gabapentin. Continue current treatment plan as previously prescribed with your PCP.     14. History of colon polyps  -repeat due next month  -colonscopy 9/1/2017    15. Muscle spasms of both lower extremities  -Stable and controlled on baclofen. Continue current treatment plan as previously prescribed with your PCP.     16. Psychophysiological insomnia  -Stable and controlled. Continue current treatment plan as previously prescribed with your PCP.     17. Chronic diastolic heart failure  2D echo with color flow doppler   Order: [id number]   Status:  Final result   Visible to patient:  Yes (Patient Portal) Next appt:  09/14/2018 at 09:45 AM in Radiology ([facility]) Dx:  Hyperlipidemia, unspecified hyperlipi...   Details        Narrative     Date of Procedure: 02/07/2018        TEST DESCRIPTION       Aorta: The aortic root is  normal in size, measuring 2.3 cm at sinotubular junction and 2.6 cm at Sinuses of Valsalva. The proximal ascending aorta is normal in size, measuring 2.5 cm across.     Left Atrium: The left atrial volume index is normal, measuring 17.35 cc/m2.     Left Ventricle: The left ventricle is normal in size, with an end-diastolic diameter of 4.5 cm, and an end-systolic diameter of 3.1 cm. LV wall thickness is normal, with the septum measuring 0.9 cm and the posterior wall measuring 0.8 cm across. Relative   wall thickness was normal at 0.36, and the LV mass index was 80.6 g/m2 consistent with normal left ventricular mass. There are no regional wall motion abnormalities. Left ventricular systolic function appears normal. Visually estimated ejection fraction   is 60-65%. The LV Doppler derived stroke volume equals 51.0 ccs.     Diastolic indices: E wave velocity 0.6 m/s, E/A ratio 0.9,  msec., E/e' ratio(avg) 8. There is diastolic dysfunction secondary to relaxation abnormality.     Right Atrium: The right atrium is normal in size, measuring 3.9 cm in length and 2.9 cm in width in the apical view.     Right Ventricle: The right ventricle is normal in size measuring 2.6 cm at the base in the apical right ventricle-focused view. Global right ventricular systolic function appears normal. The estimated PA systolic pressure is greater than 23 mmHg.     Aortic Valve:  Aortic valve is normal in structure with normal leaflet mobility.     Mitral Valve:  Mitral valve is normal in structure with normal leaflet mobility.     Tricuspid Valve:  Tricuspid valve is normal in structure with normal leaflet mobility.     Pulmonary Valve:  Pulmonary valve is normal in structure with normal leaflet mobility.     Intracavitary: There is no evidence of pericardial effusion, intracavity mass, thrombi, or vegetation.         CONCLUSIONS     1 - No wall motion abnormalities.     2 - Normal left ventricular systolic function (EF 60-65%).     3  - Impaired LV relaxation, normal LAP (grade 1 diastolic dysfunction).     4 - Normal right ventricular systolic function .     5 - The estimated PA systolic pressure is greater than 23 mmHg.             This document has been electronically    SIGNED BY: Aren Gold MD On: 02/07/2018 11:53       Ref Range & Units 6mo ago   EF 55 - 65 60    Diastolic Dysfunction  Yes Abnormal     Est. PA Systolic Pressure  23.43    Resulting Agency  [facility]         Specimen Collected: 02/07/18 10:00 Last Resulted: 02/07/18 11:59 Lab Flowsheet           -Stable and controlled. Continue current treatment plan as previously prescribed with your cardiologist.    18. Mild episode of recurrent major depressive disorder  -Stable and controlled on paxil. Continue current treatment plan as previously prescribed with your PCP.     19. Bursal cyst of olecranon  -Stable and controlled. No pain or flare ups. Continue current treatment plan as previously prescribed with your PCP.     20. Prophylactic immunotherapy  -Stable and controlled on interferon. Continue current treatment plan as previously prescribed with your neurologist.     21. Obesity (BMI 30.0-34.9)  -exercise limited due to fatigue from MS. But tries to do 15 minutes twice a day for 2-3 times a week.     22. Stenosing tenosynovitis of finger of right hand  -Stable and controlled. Continue current treatment plan as previously prescribed with your PCP.     23. Immunosuppression  -Stable and controlled on interferon for her MS. Continue current treatment plan as previously prescribed with your rheumatologist.     Provided Cem with a 5-10 year written screening schedule and personal prevention plan. Recommendations were developed using the USPSTF age appropriate recommendations. Education, counseling, and referrals were provided as needed. After Visit Summary printed and given to patient which includes a list of additional screenings\tests needed.    Follow up in about 1 year  (around 8/18/2021), or if symptoms worsen or fail to improve.    Maria De Jesus Green PA-C  I offered to discuss end of life issues, including information on how to make advance directives that the patient could use to name someone who would make medical decisions on their behalf if they became too ill to make themselves.    ___Patient declined  _X_Patient is interested, I provided paper work and offered to discuss.

## 2020-08-22 DIAGNOSIS — E03.8 OTHER SPECIFIED HYPOTHYROIDISM: Chronic | ICD-10-CM

## 2020-08-23 RX ORDER — LEVOTHYROXINE SODIUM 75 UG/1
TABLET ORAL
Qty: 90 TABLET | Refills: 0 | Status: SHIPPED | OUTPATIENT
Start: 2020-08-23 | End: 2020-11-27 | Stop reason: SDUPTHER

## 2020-09-23 ENCOUNTER — PATIENT OUTREACH (OUTPATIENT)
Dept: ADMINISTRATIVE | Facility: OTHER | Age: 60
End: 2020-09-23

## 2020-09-23 NOTE — PROGRESS NOTES
Health Maintenance Due   Topic Date Due    Shingles Vaccine (1 of 2) 07/30/2011    Influenza Vaccine (1) 08/01/2020    Colorectal Cancer Screening  09/01/2020     Updates were requested from care everywhere.  Chart was reviewed for overdue Proactive [facility] Encounters ([facility]) topics (CRS, Breast Cancer Screening, Eye exam)  Health Maintenance has been updated.  LINKS immunization registry triggered.  Immunizations were reconciled.

## 2020-10-01 ENCOUNTER — IMMUNIZATION (OUTPATIENT)
Dept: INTERNAL MEDICINE | Facility: CLINIC | Age: 60
End: 2020-10-01
Payer: MEDICARE

## 2020-10-01 ENCOUNTER — OFFICE VISIT (OUTPATIENT)
Dept: OPHTHALMOLOGY | Facility: CLINIC | Age: 60
End: 2020-10-01
Payer: MEDICARE

## 2020-10-01 DIAGNOSIS — H40.013 OPEN ANGLE WITH BORDERLINE FINDINGS, LOW RISK, BILATERAL: Primary | ICD-10-CM

## 2020-10-01 DIAGNOSIS — H26.9 CORTICAL CATARACT OF BOTH EYES: ICD-10-CM

## 2020-10-01 PROCEDURE — G0008 ADMIN INFLUENZA VIRUS VAC: HCPCS | Mod: HCNC,S$GLB,, | Performed by: FAMILY MEDICINE

## 2020-10-01 PROCEDURE — 92133 CPTRZD OPH DX IMG PST SGM ON: CPT | Mod: HCNC,S$GLB,, | Performed by: OPHTHALMOLOGY

## 2020-10-01 PROCEDURE — 92012 PR EYE EXAM, EST PATIENT,INTERMED: ICD-10-PCS | Mod: HCNC,S$GLB,, | Performed by: OPHTHALMOLOGY

## 2020-10-01 PROCEDURE — 99999 PR PBB SHADOW E&M-EST. PATIENT-LVL II: ICD-10-PCS | Mod: PBBFAC,HCNC,, | Performed by: OPHTHALMOLOGY

## 2020-10-01 PROCEDURE — 90686 FLU VACCINE (QUAD) GREATER THAN OR EQUAL TO 3YO PRESERVATIVE FREE IM: ICD-10-PCS | Mod: HCNC,S$GLB,, | Performed by: FAMILY MEDICINE

## 2020-10-01 PROCEDURE — 99999 PR PBB SHADOW E&M-EST. PATIENT-LVL III: CPT | Mod: PBBFAC,HCNC,,

## 2020-10-01 PROCEDURE — 99999 PR PBB SHADOW E&M-EST. PATIENT-LVL II: CPT | Mod: PBBFAC,HCNC,, | Performed by: OPHTHALMOLOGY

## 2020-10-01 PROCEDURE — 92133 POSTERIOR SEGMENT OCT OPTIC NERVE(OCULAR COHERENCE TOMOGRAPHY) - OU - BOTH EYES: ICD-10-PCS | Mod: HCNC,S$GLB,, | Performed by: OPHTHALMOLOGY

## 2020-10-01 PROCEDURE — 90686 IIV4 VACC NO PRSV 0.5 ML IM: CPT | Mod: HCNC,S$GLB,, | Performed by: FAMILY MEDICINE

## 2020-10-01 PROCEDURE — 99999 PR PBB SHADOW E&M-EST. PATIENT-LVL III: ICD-10-PCS | Mod: PBBFAC,HCNC,,

## 2020-10-01 PROCEDURE — 92012 INTRM OPH EXAM EST PATIENT: CPT | Mod: HCNC,S$GLB,, | Performed by: OPHTHALMOLOGY

## 2020-10-01 PROCEDURE — G0008 FLU VACCINE (QUAD) GREATER THAN OR EQUAL TO 3YO PRESERVATIVE FREE IM: ICD-10-PCS | Mod: HCNC,S$GLB,, | Performed by: FAMILY MEDICINE

## 2020-10-01 NOTE — PROGRESS NOTES
"SUBJECTIVE  Cem Meyers is 60 y.o. female  Corrected distance visual acuity was 20/20 in the right eye and 20/30 in the left eye.   Chief Complaint   Patient presents with    Glaucoma Suspect          HPI     Here for IOP check, GOCT review.  No HVF ordered.  No complaints    Pt has had less fatigue, no episodes of hashmarks in vision.  No   complaints    . MS x 1989 (sees [facility])  HX "Optic Neuritis" episodes - dark hashmarks in vision lasting several   hours when fatigued   2. COAG suspect OU(+FM HX COAG PGM)  GOCT changes OU - old optic neuritis vs LTG  3. Mild Cortical cataract    Last edited by Kaya Perez on 10/1/2020  3:26 PM. (History)         Assessment /Plan :  1. Open angle with borderline findings, low risk, bilateral No evidence of glaucoma at this time but based on risk factors recommend to continue monitoring.     2. Cortical cataract of both eyes monitor for now     Return to clinic in 6 months  or as needed.  With Dilation, HVF 24-2 and SDP          "

## 2020-10-02 ENCOUNTER — OFFICE VISIT (OUTPATIENT)
Dept: URGENT CARE | Facility: CLINIC | Age: 60
End: 2020-10-02
Payer: MEDICARE

## 2020-10-02 VITALS
DIASTOLIC BLOOD PRESSURE: 80 MMHG | BODY MASS INDEX: 29.06 KG/M2 | HEART RATE: 89 BPM | OXYGEN SATURATION: 98 % | RESPIRATION RATE: 18 BRPM | WEIGHT: 164 LBS | HEIGHT: 63 IN | SYSTOLIC BLOOD PRESSURE: 115 MMHG | TEMPERATURE: 98 F

## 2020-10-02 DIAGNOSIS — R39.15 URINARY URGENCY: Primary | ICD-10-CM

## 2020-10-02 LAB
BILIRUB UR QL STRIP: NEGATIVE
GLUCOSE UR QL STRIP: NEGATIVE
KETONES UR QL STRIP: NEGATIVE
LEUKOCYTE ESTERASE UR QL STRIP: NEGATIVE
PH, POC UA: 5.5
POC BLOOD, URINE: NEGATIVE
POC NITRATES, URINE: NEGATIVE
PROT UR QL STRIP: NEGATIVE
SP GR UR STRIP: 1.01 (ref 1–1.03)
UROBILINOGEN UR STRIP-ACNC: NORMAL (ref 0.1–1.1)

## 2020-10-02 PROCEDURE — 99214 PR OFFICE/OUTPT VISIT, EST, LEVL IV, 30-39 MIN: ICD-10-PCS | Mod: 25,S$GLB,, | Performed by: PHYSICIAN ASSISTANT

## 2020-10-02 PROCEDURE — 81003 POCT URINALYSIS, DIPSTICK, AUTOMATED, W/O SCOPE: ICD-10-PCS | Mod: QW,S$GLB,, | Performed by: PHYSICIAN ASSISTANT

## 2020-10-02 PROCEDURE — 81003 URINALYSIS AUTO W/O SCOPE: CPT | Mod: QW,S$GLB,, | Performed by: PHYSICIAN ASSISTANT

## 2020-10-02 PROCEDURE — 99214 OFFICE O/P EST MOD 30 MIN: CPT | Mod: 25,S$GLB,, | Performed by: PHYSICIAN ASSISTANT

## 2020-10-02 PROCEDURE — 87086 URINE CULTURE/COLONY COUNT: CPT | Mod: HCNC

## 2020-10-02 NOTE — PROGRESS NOTES
"Subjective:       Patient ID: Cem Meyers is a 60 y.o. female.    Vitals:  height is 5' 3" (1.6 m) and weight is 74.4 kg (164 lb). Her temporal temperature is 98 °F (36.7 °C). Her blood pressure is 115/80 and her pulse is 89. Her respiration is 18 and oxygen saturation is 98%.     Chief Complaint: Urinary Tract Infection    Pt present to clinic with symptoms for 1 day. Pt states she had an episode of urinary incontinence while sleeping. Pt also reports having urgency and full bladder sensation today and was not able to make it to the restroom in time, which caused her to have another accident. Pt does have hx of MS and states this has happened to her once before in the past. She also has episodes of bowel incontinence in the past due to her MS. She denies hematuria, dysuria, fever/chills, abdominal or pelvic pain, increased numbness/tingling/weakness.     Urinary Tract Infection   This is a new problem. The current episode started yesterday. Associated symptoms include urgency. Pertinent negatives include no chills, frequency, hematuria, nausea, vomiting or rash.       Constitution: Negative for chills and fever.   Neck: Negative for painful lymph nodes.   Gastrointestinal: Negative for abdominal pain, nausea and vomiting.   Genitourinary: Positive for urgency and bladder incontinence. Negative for dysuria, frequency, urine decreased, hematuria, history of kidney stones, painful menstruation, irregular menstruation, missed menses, heavy menstrual bleeding, ovarian cysts, genital trauma, vaginal pain, vaginal discharge, vaginal bleeding, vaginal odor, painful intercourse, genital sore, painful ejaculation and pelvic pain.   Musculoskeletal: Negative for back pain.   Skin: Negative for rash, lesion and erythema.   Hematologic/Lymphatic: Negative for swollen lymph nodes.       Objective:      Physical Exam   Constitutional: She is oriented to person, place, and time. She appears well-developed.   HENT:   Head: " Normocephalic and atraumatic. Head is without abrasion, without contusion and without laceration.   Ears:   Right Ear: External ear normal.   Left Ear: External ear normal.   Nose: Nose normal.   Mouth/Throat: Oropharynx is clear and moist and mucous membranes are normal.   Eyes: Pupils are equal, round, and reactive to light. Conjunctivae, EOM and lids are normal.   Neck: Trachea normal, full passive range of motion without pain and phonation normal. Neck supple.   Cardiovascular: Normal rate, regular rhythm and normal heart sounds.   Pulmonary/Chest: Effort normal and breath sounds normal. No stridor. No respiratory distress.   Musculoskeletal: Normal range of motion.   Neurological: She is alert and oriented to person, place, and time.   Skin: Skin is warm, dry, intact and no rash. Capillary refill takes less than 2 seconds. abrasion, burn, bruising, erythema and ecchymosisPsychiatric: Her speech is normal and behavior is normal. Judgment and thought content normal.   Nursing note and vitals reviewed.    Results for orders placed or performed in visit on 10/02/20   POCT Urinalysis, Dipstick, Automated, W/O Scope   Result Value Ref Range    POC Blood, Urine Negative Negative    POC Bilirubin, Urine Negative Negative    POC Urobilinogen, Urine normal 0.1 - 1.1    POC Ketones, Urine Negative Negative    POC Protein, Urine Negative Negative    POC Nitrates, Urine Negative Negative    POC Glucose, Urine Negative Negative    pH, UA 5.5     POC Specific Gravity, Urine 1.010 1.003 - 1.029    POC Leukocytes, Urine Negative Negative           Assessment:       1. Urinary urgency        Plan:       No evidence of infection at this time. Will send for culture and treat with antibiotics if appropriate. Pt instructed to monitor symptoms closely and f/u with PCP or go to ER if symptoms worsen. Pt voiced understanding.     Urinary urgency  -     POCT Urinalysis, Dipstick, Automated, W/O Scope  -     Urine culture

## 2020-10-04 ENCOUNTER — TELEPHONE (OUTPATIENT)
Dept: URGENT CARE | Facility: CLINIC | Age: 60
End: 2020-10-04

## 2020-10-04 LAB — BACTERIA UR CULT: NO GROWTH

## 2020-10-06 ENCOUNTER — TELEPHONE (OUTPATIENT)
Dept: URGENT CARE | Facility: CLINIC | Age: 60
End: 2020-10-06

## 2020-10-12 ENCOUNTER — HOSPITAL ENCOUNTER (OUTPATIENT)
Dept: RADIOLOGY | Facility: HOSPITAL | Age: 60
Discharge: HOME | End: 2020-10-12
Attending: NURSE PRACTITIONER
Payer: MEDICARE

## 2020-10-12 DIAGNOSIS — Z12.31 ENCOUNTER FOR SCREENING MAMMOGRAM FOR BREAST CANCER: ICD-10-CM

## 2020-10-12 PROCEDURE — 77063 BREAST TOMOSYNTHESIS BI: CPT | Mod: 26,HCNC,, | Performed by: RADIOLOGY

## 2020-10-12 PROCEDURE — 77063 MAMMO DIGITAL SCREENING BILAT WITH TOMO: ICD-10-PCS | Mod: 26,HCNC,, | Performed by: RADIOLOGY

## 2020-10-12 PROCEDURE — 77067 SCR MAMMO BI INCL CAD: CPT | Mod: TC,HCNC

## 2020-10-12 PROCEDURE — 77067 SCR MAMMO BI INCL CAD: CPT | Mod: 26,HCNC,, | Performed by: RADIOLOGY

## 2020-10-12 PROCEDURE — 77067 MAMMO DIGITAL SCREENING BILAT WITH TOMO: ICD-10-PCS | Mod: 26,HCNC,, | Performed by: RADIOLOGY

## 2020-10-14 ENCOUNTER — HOSPITAL ENCOUNTER (OUTPATIENT)
Dept: RADIOLOGY | Facility: HOSPITAL | Age: 60
Discharge: HOME | End: 2020-10-14
Attending: NURSE PRACTITIONER
Payer: MEDICARE

## 2020-10-14 ENCOUNTER — OFFICE VISIT (OUTPATIENT)
Dept: SURGERY | Facility: CLINIC | Age: 60
End: 2020-10-14
Payer: MEDICARE

## 2020-10-14 VITALS — BODY MASS INDEX: 29.02 KG/M2 | HEIGHT: 63 IN | TEMPERATURE: 97 F | WEIGHT: 163.81 LBS

## 2020-10-14 DIAGNOSIS — R92.8 ABNORMAL MAMMOGRAM: ICD-10-CM

## 2020-10-14 DIAGNOSIS — R92.8 ABNORMAL MAMMOGRAM OF LEFT BREAST: Primary | ICD-10-CM

## 2020-10-14 PROCEDURE — 77061 BREAST TOMOSYNTHESIS UNI: CPT | Mod: 26,LT,, | Performed by: RADIOLOGY

## 2020-10-14 PROCEDURE — 77065 MAMMO DIGITAL DIAGNOSTIC LEFT WITH TOMO: ICD-10-PCS | Mod: 26,LT,, | Performed by: RADIOLOGY

## 2020-10-14 PROCEDURE — 77061 MAMMO DIGITAL DIAGNOSTIC LEFT WITH TOMO: ICD-10-PCS | Mod: 26,LT,, | Performed by: RADIOLOGY

## 2020-10-14 PROCEDURE — 99204 PR OFFICE/OUTPT VISIT, NEW, LEVL IV, 45-59 MIN: ICD-10-PCS | Mod: S$GLB,,, | Performed by: SURGERY

## 2020-10-14 PROCEDURE — 77065 DX MAMMO INCL CAD UNI: CPT | Mod: TC,LT

## 2020-10-14 PROCEDURE — 3008F BODY MASS INDEX DOCD: CPT | Mod: CPTII,S$GLB,, | Performed by: SURGERY

## 2020-10-14 PROCEDURE — 77065 DX MAMMO INCL CAD UNI: CPT | Mod: 26,LT,, | Performed by: RADIOLOGY

## 2020-10-14 PROCEDURE — 99999 PR PBB SHADOW E&M-EST. PATIENT-LVL IV: CPT | Mod: PBBFAC,,, | Performed by: SURGERY

## 2020-10-14 PROCEDURE — 99999 PR PBB SHADOW E&M-EST. PATIENT-LVL IV: ICD-10-PCS | Mod: PBBFAC,,, | Performed by: SURGERY

## 2020-10-14 PROCEDURE — 99204 OFFICE O/P NEW MOD 45 MIN: CPT | Mod: S$GLB,,, | Performed by: SURGERY

## 2020-10-14 PROCEDURE — 3008F PR BODY MASS INDEX (BMI) DOCUMENTED: ICD-10-PCS | Mod: CPTII,S$GLB,, | Performed by: SURGERY

## 2020-10-14 NOTE — LETTER
October 19, 2020      Jennifer Downey, JOSIAH  [address]  [place] [address]           [facility]  [address]  [place] [address]  Phone: [phone]  Fax: [phone]          Patient: Cem Meyers   MR Number: [id number]   YOB: 1960   Date of Visit: 10/14/2020       Dear Jennifer Downey:    Thank you for referring Cem Meyers to me for evaluation. Attached you will find relevant portions of my assessment and plan of care.    If you have questions, please do not hesitate to call me. I look forward to following Cem Meyers along with you.    Sincerely,    Robin Sepulveda MD    Enclosure  CC:  No Recipients    If you would like to receive this communication electronically, please contact [email or web address] or [phone] to request more information on [facility] [employer] [facility] access.    For providers and/or their staff who would like to refer a patient to [facility], please contact us through our one-stop-shop provider referral line, [facility] , at [phone].    If you feel you have received this communication in error or would no longer like to receive these types of communications, please e-mail [email or web address]

## 2020-10-20 NOTE — PROGRESS NOTES
History & Physical    SUBJECTIVE:     History of Present Illness:  Patient is a 60 y.o. female referred for abnormal mammogram of the left breast. She denies any breast masses, nipple discharge, breast pain or any changes. No family history of breast cancer or ovarian cancer. Menarche age 11, G0 Menopause at 45, no HRT    Chief Complaint   Patient presents with    Abnormal Mammogram     left breast       Review of patient's allergies indicates:   Allergen Reactions    Latex, natural rubber Rash       Current Outpatient Medications   Medication Sig Dispense Refill    baclofen (LIORESAL) 10 MG tablet TAKE TWO TABLETs BY MOUTH IN THE MORNING, TWO TABLET IN THE AFTERNOON, AND UP TO THREE TABLETS AT BEDTIME AS NEEDED 630 tablet 1    calcium citrate (CALCITRATE) 200 mg (950 mg) tablet Take 2 tablets by mouth once daily.      cholecalciferol, vitamin D3, (VITAMIN D3) 4,000 unit Cap Take 4,000 Units by mouth once daily.       denosumab (PROLIA SUBQ) Inject into the skin.      fish oil-omega-3 fatty acids 300-1,000 mg capsule Take 2 g by mouth once daily.      gabapentin (NEURONTIN) 800 MG tablet Take 1 tablet (800 mg total) by mouth 3 (three) times daily. 180 tablet 1    glucosamine-chondroitin 500-400 mg tablet Take 1 tablet by mouth 3 (three) times daily.      interferon beta-1a, albumin, (REBIF, WITH ALBUMIN,) 44 mcg/0.5 mL injection Inject 0.5 mLs (44 mcg total) into the skin 3 (three) times a week. 18 mL 1    levothyroxine (SYNTHROID) 75 MCG tablet TAKE 1 TABLET BY MOUTH IN THE MORNING 90 tablet 0    multivitamin capsule Take 1 capsule by mouth once daily.      paroxetine (PAXIL) 40 MG tablet Take 1 tablet (40 mg total) by mouth every morning. 90 tablet 2    simvastatin (ZOCOR) 40 MG tablet Take 1 tablet by mouth once daily 90 tablet 1    betamethasone valerate 0.1% (VALISONE) 0.1 % Crea Apply topically 2 (two) times daily. 45 g 1    valACYclovir (VALTREX) 1000 MG tablet Take 1 tablet (1,000 mg total)  by mouth 3 (three) times daily. for 7 days (Patient not taking: Reported on 7/29/2020) 21 tablet 0     No current facility-administered medications for this visit.        Past Medical History:   Diagnosis Date    Back pain     Broken toe 6/2015    left big toe    Chronic diastolic heart failure 5/8/2018    Closed left arm fracture     Colon polyp     Depression     Hyperlipidemia     Hypothyroid     Immunosuppression 1/28/2019    MS (multiple sclerosis)     Neurogenic bladder 12/6/2012    Osteoporosis     Polyneuropathy     Sleep apnea     Trouble in sleeping      Past Surgical History:   Procedure Laterality Date    COLONOSCOPY N/A 9/1/2017    Procedure: COLONOSCOPY;  Surgeon: Andriy Villar MD;  Location: [facility];  Service: Endoscopy;  Laterality: N/A;    FRACTURE SURGERY Left 2013    wrist- [facility]    [place] Right 1989    in [place]    TONSILLECTOMY  1966     Family History   Problem Relation Age of Onset    Pancreatic cancer Mother     Stomach cancer Mother     Cancer Mother         pancreatic, stomach    Hypertension Father     Heart disease Father         MI    Lupus Maternal Aunt     Rheum arthritis Maternal Aunt     Rheum arthritis Sister     Melanoma Neg Hx      Social History     Tobacco Use    Smoking status: Never Smoker    Smokeless tobacco: Never Used   Substance Use Topics    Alcohol use: No     Alcohol/week: 0.0 standard drinks     Frequency: Never     Binge frequency: Never    Drug use: No        Review of Systems:  Review of Systems   Constitutional: Negative for activity change, appetite change, chills, diaphoresis, fatigue, fever and unexpected weight change.   HENT: Negative for congestion, dental problem, hearing loss, rhinorrhea, sore throat and trouble swallowing.    Eyes: Negative for discharge and visual disturbance.   Respiratory: Negative for cough, chest tightness, shortness of breath and wheezing.    Cardiovascular: Negative for chest pain,  "palpitations and leg swelling.   Gastrointestinal: Negative for abdominal distention, abdominal pain, blood in stool, constipation, diarrhea, nausea and vomiting.   Endocrine: Positive for polyuria. Negative for cold intolerance, heat intolerance, polydipsia and polyphagia.   Genitourinary: Negative for difficulty urinating, dysuria, frequency, hematuria and urgency.   Musculoskeletal: Positive for neck pain. Negative for arthralgias, gait problem, joint swelling and myalgias.   Skin: Negative for color change, pallor, rash and wound.   Neurological: Positive for weakness. Negative for dizziness, syncope, light-headedness, numbness and headaches.   Hematological: Negative for adenopathy. Does not bruise/bleed easily.   Psychiatric/Behavioral: Negative for confusion, decreased concentration, dysphoric mood and sleep disturbance. The patient is not nervous/anxious.        OBJECTIVE:     Vital Signs (Most Recent)  Temp: 97.4 °F (36.3 °C) (10/14/20 0935)  5' 3" (1.6 m)  74.3 kg (163 lb 12.8 oz)     Physical Exam:  Physical Exam  Vitals signs reviewed.   Constitutional:       General: She is not in acute distress.     Appearance: She is well-developed. She is not diaphoretic.   HENT:      Head: Normocephalic and atraumatic.      Right Ear: External ear normal.      Left Ear: External ear normal.   Eyes:      General: No scleral icterus.     Conjunctiva/sclera: Conjunctivae normal.      Pupils: Pupils are equal, round, and reactive to light.   Neck:      Musculoskeletal: Normal range of motion and neck supple.      Thyroid: No thyromegaly.      Trachea: No tracheal deviation.   Cardiovascular:      Rate and Rhythm: Normal rate and regular rhythm.      Heart sounds: Normal heart sounds. No murmur. No friction rub. No gallop.    Pulmonary:      Effort: Pulmonary effort is normal. No respiratory distress.      Breath sounds: Normal breath sounds. No wheezing or rales.   Chest:      Chest wall: No tenderness.      Breasts:    "      Right: No inverted nipple, mass, nipple discharge, skin change or tenderness.         Left: No inverted nipple, mass, nipple discharge, skin change or tenderness.   Abdominal:      General: Bowel sounds are normal. There is no distension.      Palpations: Abdomen is soft.      Tenderness: There is no abdominal tenderness.      Hernia: No hernia is present.   Musculoskeletal: Normal range of motion.         General: No tenderness or deformity.   Lymphadenopathy:      Cervical: No cervical adenopathy.   Skin:     General: Skin is warm and dry.      Coloration: Skin is not pale.      Findings: No erythema or rash.   Neurological:      Mental Status: She is alert and oriented to person, place, and time.   Psychiatric:         Behavior: Behavior normal.         Thought Content: Thought content normal.         Judgment: Judgment normal.         Diagnostic Results:  Result:   Mammo Digital Diagnostic Left with Tj     History:  Patient is 60 y.o. and is seen for a diagnostic mammogram.     Films Compared:  Compared to: 10/12/2020 Mammo Digital Screening Bilat w/ Tj, 10/11/2019 Mammo Digital Screening Bilat w/ Tj, 09/14/2018 Mammo Digital Screening Bilat with Tomosynthesis_CAD, 09/11/2017 Mammo Digital Screening Bilat with Tomosynthesis_CAD, 08/11/2016 Mammo Digital Screening Bilat with Tomosynthesis_CAD, 01/15/2015 Mammo Digital Screening Bilateral With CAD, and 10/29/2013 Mammo Digital Screening Bilat with CAD     Findings:  This procedure was performed using tomosynthesis. Computer-aided detection was utilized in the interpretation of this examination.  The left breast is heterogeneously dense, which may obscure small masses.     There are fine linear or fine-linear branching calcifications seen in the lower inner quadrant of the left breast.      Impression:  Left  Calcifications: Left breast calcifications at the lower inner position. Assessment: 4 - Suspicious finding. Stereotactic Biopsy is recommended.       BI-RADS Category:   Overall: 4 - Suspicious     Recommendation:  Stereotactic Biopsy is recommended.     Your estimated lifetime risk of breast cancer (to age 85) based on Tyrer-Cuzick risk assessment model is Tyrer-Cuzick: 10.99 %. According to the American Cancer Society, patients with a lifetime breast cancer risk of 20% or higher might benefit from supplemental screening tests.                            ASSESSMENT/PLAN:     59 y/o female with abnormal mammogram of the left breast    PLAN:Plan     Left breast stereotactic biopsy  Call patient with results

## 2020-10-28 ENCOUNTER — HOSPITAL ENCOUNTER (OUTPATIENT)
Dept: RADIOLOGY | Facility: HOSPITAL | Age: 60
Discharge: HOME | End: 2020-10-28
Attending: SURGERY
Payer: MEDICARE

## 2020-10-28 DIAGNOSIS — R92.8 ABNORMAL MAMMOGRAM OF LEFT BREAST: ICD-10-CM

## 2020-10-28 PROCEDURE — 19081 BX BREAST 1ST LESION STRTCTC: CPT | Mod: HCNC,LT

## 2020-10-28 PROCEDURE — 88305 TISSUE EXAM BY PATHOLOGIST: CPT | Mod: HCNC | Performed by: PATHOLOGY

## 2020-10-28 PROCEDURE — 19081 BX BREAST 1ST LESION STRTCTC: CPT | Mod: HCNC,LT,, | Performed by: RADIOLOGY

## 2020-10-28 PROCEDURE — 88305 TISSUE EXAM BY PATHOLOGIST: ICD-10-PCS | Mod: 26,HCNC,, | Performed by: PATHOLOGY

## 2020-10-28 PROCEDURE — 19081 MAMMO BREAST STEREOTACTIC BREAST BIOPSY LEFT: ICD-10-PCS | Mod: HCNC,LT,, | Performed by: RADIOLOGY

## 2020-10-28 PROCEDURE — 88305 TISSUE EXAM BY PATHOLOGIST: CPT | Mod: 26,HCNC,, | Performed by: PATHOLOGY

## 2020-10-30 LAB
FINAL PATHOLOGIC DIAGNOSIS: NORMAL
GROSS: NORMAL

## 2020-11-02 ENCOUNTER — PATIENT MESSAGE (OUTPATIENT)
Dept: PSYCHIATRY | Facility: CLINIC | Age: 60
End: 2020-11-02

## 2020-11-03 DIAGNOSIS — I50.32 CHRONIC DIASTOLIC HEART FAILURE: Primary | ICD-10-CM

## 2020-11-03 DIAGNOSIS — E78.49 OTHER HYPERLIPIDEMIA: ICD-10-CM

## 2020-11-06 ENCOUNTER — PATIENT MESSAGE (OUTPATIENT)
Dept: CARDIOLOGY | Facility: CLINIC | Age: 60
End: 2020-11-06

## 2020-11-08 ENCOUNTER — PATIENT OUTREACH (OUTPATIENT)
Dept: ADMINISTRATIVE | Facility: OTHER | Age: 60
End: 2020-11-08

## 2020-11-08 NOTE — PROGRESS NOTES
Care Everywhere: updated  Immunization: updated  Health Maintenance: updated  Media Review: review for outside colon cancer report   Legacy Review:   Order placed:   Upcoming appts:  Colonoscopy case request 7/29/2020

## 2020-11-10 ENCOUNTER — OFFICE VISIT (OUTPATIENT)
Dept: NEUROLOGY | Facility: CLINIC | Age: 60
End: 2020-11-10
Payer: MEDICARE

## 2020-11-10 VITALS — WEIGHT: 165 LBS | BODY MASS INDEX: 29.23 KG/M2

## 2020-11-10 DIAGNOSIS — G35 MS (MULTIPLE SCLEROSIS): Primary | ICD-10-CM

## 2020-11-10 DIAGNOSIS — G47.33 OSA ON CPAP: Chronic | ICD-10-CM

## 2020-11-10 DIAGNOSIS — E55.9 VITAMIN D INSUFFICIENCY: ICD-10-CM

## 2020-11-10 DIAGNOSIS — R25.2 SPASTICITY: ICD-10-CM

## 2020-11-10 DIAGNOSIS — R26.9 NEUROLOGIC GAIT DYSFUNCTION: ICD-10-CM

## 2020-11-10 DIAGNOSIS — M79.2 NEUROPATHIC PAIN: ICD-10-CM

## 2020-11-10 DIAGNOSIS — Z79.899 ENCOUNTER FOR LONG-TERM (CURRENT) USE OF HIGH-RISK MEDICATION: ICD-10-CM

## 2020-11-10 DIAGNOSIS — F32.0 MAJOR DEPRESSIVE DISORDER, SINGLE EPISODE, MILD: ICD-10-CM

## 2020-11-10 DIAGNOSIS — E03.8 OTHER SPECIFIED HYPOTHYROIDISM: Chronic | ICD-10-CM

## 2020-11-10 PROCEDURE — 99215 OFFICE O/P EST HI 40 MIN: CPT | Mod: HCNC,95,, | Performed by: PHYSICIAN ASSISTANT

## 2020-11-10 PROCEDURE — 3008F BODY MASS INDEX DOCD: CPT | Mod: HCNC,CPTII,95, | Performed by: PHYSICIAN ASSISTANT

## 2020-11-10 PROCEDURE — 99499 UNLISTED E&M SERVICE: CPT | Mod: 95,,, | Performed by: PHYSICIAN ASSISTANT

## 2020-11-10 PROCEDURE — 99215 PR OFFICE/OUTPT VISIT, EST, LEVL V, 40-54 MIN: ICD-10-PCS | Mod: HCNC,95,, | Performed by: PHYSICIAN ASSISTANT

## 2020-11-10 PROCEDURE — 3008F PR BODY MASS INDEX (BMI) DOCUMENTED: ICD-10-PCS | Mod: HCNC,CPTII,95, | Performed by: PHYSICIAN ASSISTANT

## 2020-11-10 PROCEDURE — 99499 RISK ADDL DX/OHS AUDIT: ICD-10-PCS | Mod: 95,,, | Performed by: PHYSICIAN ASSISTANT

## 2020-11-10 NOTE — Clinical Note
Labs at [facility] in the afternoon not on a Thursday in a month  4 month f/u with Dr. Smith

## 2020-11-10 NOTE — PROGRESS NOTES
Subjective:        The patient location is: home([place])   The chief complaint leading to consultation is: MS follow up    Visit type: audiovisual    Face to Face time with patient: 25  35 minutes of total time spent on the encounter, which includes face to face time and non-face to face time preparing to see the patient (eg, review of tests), Obtaining and/or reviewing separately obtained history, Documenting clinical information in the electronic or other health record, Independently interpreting results (not separately reported) and communicating results to the patient/family/caregiver, or Care coordination (not separately reported).         Each patient to whom he or she provides medical services by telemedicine is:  (1) informed of the relationship between the physician and patient and the respective role of any other health care provider with respect to management of the patient; and (2) notified that he or she may decline to receive medical services by telemedicine and may withdraw from such care at any time.    Notes:     Patient ID: Cem Meyers is a 60 y.o. female who presents today for a routine clinic visit for MS.  Last visit with [facility] 7/2020 with this provider.     MS HPI:  · DMT: interferon beta-1b SQ  · Side effects from DMT? No  · Taking vitamin D3 as recommended? Yes - 4,000IU/d   · Stable-but not feeling that good -tingling in arms/legs--800mg TID  · Has added metamucil every other day and this has been beneficial     Medications: Med list/allergies reviewed by provider and updated  Current Outpatient Medications   Medication Sig    baclofen (LIORESAL) 10 MG tablet TAKE TWO TABLETs BY MOUTH IN THE MORNING, TWO TABLET IN THE AFTERNOON, AND UP TO THREE TABLETS AT BEDTIME AS NEEDED    betamethasone valerate 0.1% (VALISONE) 0.1 % Crea Apply topically 2 (two) times daily.    calcium citrate (CALCITRATE) 200 mg (950 mg) tablet Take 2 tablets by mouth once daily.    cholecalciferol,  vitamin D3, (VITAMIN D3) 4,000 unit Cap Take 4,000 Units by mouth once daily.     denosumab (PROLIA SUBQ) Inject into the skin.    fish oil-omega-3 fatty acids 300-1,000 mg capsule Take 2 g by mouth once daily.    gabapentin (NEURONTIN) 800 MG tablet Take 1 tablet (800 mg total) by mouth 3 (three) times daily.    glucosamine-chondroitin 500-400 mg tablet Take 1 tablet by mouth 3 (three) times daily.    interferon beta-1a, albumin, (REBIF, WITH ALBUMIN,) 44 mcg/0.5 mL injection Inject 0.5 mLs (44 mcg total) into the skin 3 (three) times a week.    levothyroxine (SYNTHROID) 75 MCG tablet TAKE 1 TABLET BY MOUTH IN THE MORNING    multivitamin capsule Take 1 capsule by mouth once daily.    paroxetine (PAXIL) 40 MG tablet Take 1 tablet (40 mg total) by mouth every morning.    simvastatin (ZOCOR) 40 MG tablet Take 1 tablet by mouth once daily    valACYclovir (VALTREX) 1000 MG tablet Take 1 tablet (1,000 mg total) by mouth 3 (three) times daily. for 7 days (Patient not taking: Reported on 7/29/2020)     No current facility-administered medications for this visit.        SOCIAL HISTORY  Social History     Tobacco Use    Smoking status: Never Smoker    Smokeless tobacco: Never Used   Substance Use Topics    Alcohol use: No     Alcohol/week: 0.0 standard drinks     Frequency: Never     Binge frequency: Never    Drug use: No       Living arrangements - the patient lives with their family.    ROS:    REVIEW OF SYMPTOMS 11/2/2020   Do you feel abnormally tired on most days? Yes, end of day fatigue--will occasionally take afternoon naps-4 days a week   Do you feel you generally sleep well? Yes--sleeping better with CPAP   Do you have difficulty controlling your bladder?  Yes--urgency, intermittent incontinence rarely   Do you have difficulty controlling your bowels?  Yes-but metamucil every other day has been helpful   Do you have frequent muscle cramps, tightness or spasms in your limbs?  Yes-baclofen   Do you have  new visual symptoms?  No   Do you have worsening difficulty with your memory or thinking? Yes   Do you have worsening symptoms of anxiety or depression?  No   For patients who walk, Do you have more difficulty walking?  Yes   Have you fallen since your last visit?  Yes--fell in the house -tripped over run   For patients who use wheelchairs: Do you have any skin wounds or breakdown? Not Applicable   Do you have difficulty using your hands?  Yes   Do you have shooting or burning pain? No   Do you have difficulty with sexual function?  No   If you are sexually active, are you using birth control? Y/N  N/A Not Applicable   Do you often choke when swallowing liquids or solid food?  No   Do you experience worsening symptoms when overheated? Yes   Do you need any new equipment such as a wheelchair, walker or shower chair? Yes   Do you receive co-pay financial assistance for your principal MS medicine? Yes   Would you be interested in participating in an MS research trial in the future? No   For patients on Gilenya, Tecfidera, Aubagio, Rituxan, Ocrevus, Tysabri, Lemtrada or Methotrexate, are you aware that you should NOT receive live virus vaccines?  Not Applicable   Do you feel you have adequate family/friend support?  Yes   Do you have health insurance?   Yes   Are you currently employed? No   Do you receive SSDI/SSI?  Yes   Do you use marijuana or cannabis products? No   Have you been diagnosed with a urinary tract infection since your last visit here? No   Have you been diagnosed with a respiratory tract infection since your last visit here? No   Have you been to the emergency room since your last visit here? Yes   Have you been hospitalized since your last visit here?  No                Objective:        1. 25 foot timed walk:  Timed 25 Foot Walk: 10/29/2019 1/29/2020   Did patient wear an AFO? No No   Was assistive device used? No No   Time for 25 Foot Walk (seconds) 7.2 6.7   Time for 25 Foot Walk (seconds) - -      Neurologic Exam     Mental Status   Oriented to person, place, and time.   Follows 3 step commands.   Speech: speech is normal   Level of consciousness: alert  Normal comprehension.     Cranial Nerves     CN II   Visual acuity: (glasses)    CN VII   Facial expression full, symmetric.     CN VIII   Hearing: intact (to conversation via video)    CN XII   Tongue deviation: none    Motor Exam   Muscle bulk: normal  Right arm tone: normal  Left arm tone: normal  Right leg tone: normal  Left leg tone: spasticMMT not performed due to nature of video visit; however, full AROM performed UE's. Weakness noted L LE observed during ambulation     Sensory Exam   Right leg light touch: numbness foot.  Left leg light touch: numbness to feet.    Gait, Coordination, and Reflexes     Gait  Gait: circumduction and wide-based (slightly L hip circumduction)    Coordination   Finger-nose-finger test: L hand.  Tandem walking coordination: abnormal    Tremor   Resting tremor: absentSlowed on L RSM  Difficulty with toe walking, marching in place         Imaging:     Results for orders placed during the hospital encounter of 08/12/20   MRI Brain Demyelinating Without Contrast    Impression Findings in the cerebral white matter which are typical for multiple sclerosis.  No new lesions identified.  No restricted diffusion..      Electronically signed by: Kyle Keith DO  Date:    08/12/2020  Time:    11:42     No results found for this or any previous visit.  No results found for this or any previous visit.  Results for orders placed during the hospital encounter of 04/26/19   MRI Brain Demyelinating W W/O Contrast    Impression Findings in the cerebral white matter which are typical for multiple sclerosis.  No new or enhancing lesions to suggest active disease.    Electronically signed by resident: Hieu Marroquin  Date:    04/27/2019  Time:    21:06    Electronically signed by: Min Norton MD  Date:    04/27/2019  Time:    22:20      Results for orders placed during the hospital encounter of 04/26/19   MRI Cervical Spine Demyelinating W W/O Contrast    Impression Stable appearance of patchy T2/STIR intramedullary signal throughout the cervical cord and the visualized portions of the thoracic cord, keeping with prior episodes of demyelination.  No new lesions.      Electronically signed by: Min Norton MD  Date:    04/28/2019  Time:    15:45     Results for orders placed during the hospital encounter of 04/26/19   MRI Thoracic Spine Demyelinating W W/O Contrast    Impression Stable appearance of T2/FLAIR signal intramedullary signal throughout the thoracic cord, in keeping with multiple sclerosis.  No new or enhancing lesion.  No evidence of significant cord atrophy.      Electronically signed by: Min Norton MD  Date:    04/28/2019  Time:    15:30         Labs:     Lab Results   Component Value Date    MHJBSLHI53GM 57 07/17/2019    TXEHNCAO15WU 57 11/29/2017    [id number] 82 09/09/2016     No results found for: JCVINDEX, JCVANTIBODY  No results found for: JP8PKJYX, ABSOLUTECD3, HH7JYNUR, ABSOLUTECD8, ET0TISWK, ABSOLUTECD4, LABCD48  Lab Results   Component Value Date    WBC 5.41 05/08/2020    RBC 3.80 (L) 05/08/2020    HGB 12.0 05/08/2020    HCT 39.0 05/08/2020     (H) 05/08/2020    MCH 31.6 (H) 05/08/2020    MCHC 30.8 (L) 05/08/2020    RDW 12.6 05/08/2020     05/08/2020    MPV 10.6 05/08/2020    GRAN 3.1 05/08/2020    GRAN 57.0 05/08/2020    LYMPH 1.8 05/08/2020    LYMPH 32.7 05/08/2020    MONO 0.5 05/08/2020    MONO 8.9 05/08/2020    EOS 0.0 05/08/2020    BASO 0.03 05/08/2020    EOSINOPHIL 0.6 05/08/2020    BASOPHIL 0.6 05/08/2020     Sodium   Date Value Ref Range Status   06/11/2020 135 (L) 136 - 145 mmol/L Final     Potassium   Date Value Ref Range Status   06/11/2020 4.2 3.5 - 5.1 mmol/L Final     Chloride   Date Value Ref Range Status   06/11/2020 101 95 - 110 mmol/L Final     CO2   Date Value Ref Range Status    06/11/2020 25 23 - 29 mmol/L Final     Glucose   Date Value Ref Range Status   06/11/2020 91 70 - 110 mg/dL Final     BUN   Date Value Ref Range Status   06/11/2020 14 6 - 20 mg/dL Final     Creatinine   Date Value Ref Range Status   06/11/2020 0.9 0.5 - 1.4 mg/dL Final     Calcium   Date Value Ref Range Status   06/11/2020 9.5 8.7 - 10.5 mg/dL Final     Total Protein   Date Value Ref Range Status   06/11/2020 7.0 6.0 - 8.4 g/dL Final     Albumin   Date Value Ref Range Status   06/11/2020 3.4 (L) 3.5 - 5.2 g/dL Final     Total Bilirubin   Date Value Ref Range Status   06/11/2020 0.4 0.1 - 1.0 mg/dL Final     Comment:     For infants and newborns, interpretation of results should be based  on gestational age, weight and in agreement with clinical  observations.  Premature Infant recommended reference ranges:  Up to 24 hours.............<8.0 mg/dL  Up to 48 hours............<12.0 mg/dL  3-5 days..................<15.0 mg/dL  6-29 days.................<15.0 mg/dL       Alkaline Phosphatase   Date Value Ref Range Status   06/11/2020 62 55 - 135 U/L Final     AST   Date Value Ref Range Status   06/11/2020 32 10 - 40 U/L Final     ALT   Date Value Ref Range Status   06/11/2020 17 10 - 44 U/L Final     Anion Gap   Date Value Ref Range Status   06/11/2020 9 8 - 16 mmol/L Final     eGFR if    Date Value Ref Range Status   06/11/2020 >60 >60 mL/min/1.73 m^2 Final     eGFR if non    Date Value Ref Range Status   06/11/2020 >60 >60 mL/min/1.73 m^2 Final     Comment:     Calculation used to obtain the estimated glomerular filtration  rate (eGFR) is the CKD-EPI equation.        Lab Results   Component Value Date    HEPBSAG Negative 07/17/2019    HEPBSAB Negative 07/17/2019    HEPBCAB Negative 07/17/2019           MS Impression and Plan:     NEURO MULTIPLE SCLEROSIS IMPRESSION:   MS Status:     Number of relapses in the past year?:  0    Clinical Progression:  Clinically Stable    MRI Progression:   Stable    MRI Progression comment:  MRI stable in August 2020  Plan:     DMT:  No change in management    DMT comment:  Continue Rebif-safety labs ordered, along with Vit D3 screening.    Symptom Management:  Implement change in symptom management    Implement Change in Symptom Management:  Pain (will increase gabapentin 800mg QID)     Next Imaging Due: 8/10/2021     Next Labs Due: 12/10/2020      Problem List Items Addressed This Visit        Neuro    MS (multiple sclerosis) - Primary (Chronic)    Relevant Orders    CBC Auto Differential    Hepatic Function Panel    Vitamin D    Neuropathic pain     Increase gabapentin 800mg to QID from TID            Psychiatric    Major depressive disorder, single episode, mild       Endocrine    Hypothyroid (Chronic)    Vitamin D insufficiency     Vit D 3 screening lab ordered-continue current dose         Relevant Orders    Vitamin D       Other    MAYKEL on CPAP (Chronic)    Neurologic gait dysfunction    Spasticity      Other Visit Diagnoses     Encounter for long-term (current) use of high-risk medication        Relevant Orders    CBC Auto Differential    Hepatic Function Panel          Follow up in about 4 months (around 3/10/2021) for follow up with Dr. Smith.  Patient agreed to POC today.    Attending, Dr. Smith, was available during today's encounter.     Rimma Rouse PA-C  [facility]

## 2020-11-18 ENCOUNTER — PATIENT MESSAGE (OUTPATIENT)
Dept: SURGERY | Facility: HOSPITAL | Age: 60
End: 2020-11-18

## 2020-11-18 DIAGNOSIS — R92.8 ABNORMAL MAMMOGRAM OF LEFT BREAST: Primary | ICD-10-CM

## 2020-11-19 ENCOUNTER — TELEPHONE (OUTPATIENT)
Dept: SURGERY | Facility: CLINIC | Age: 60
End: 2020-11-19

## 2020-11-19 ENCOUNTER — HOSPITAL ENCOUNTER (OUTPATIENT)
Dept: CARDIOLOGY | Facility: HOSPITAL | Age: 60
Discharge: HOME | End: 2020-11-19
Attending: INTERNAL MEDICINE
Payer: MEDICARE

## 2020-11-19 ENCOUNTER — OFFICE VISIT (OUTPATIENT)
Dept: CARDIOLOGY | Facility: CLINIC | Age: 60
End: 2020-11-19
Payer: MEDICARE

## 2020-11-19 VITALS
WEIGHT: 165 LBS | SYSTOLIC BLOOD PRESSURE: 112 MMHG | HEART RATE: 103 BPM | BODY MASS INDEX: 29.23 KG/M2 | RESPIRATION RATE: 16 BRPM | OXYGEN SATURATION: 94 % | HEIGHT: 63 IN | DIASTOLIC BLOOD PRESSURE: 80 MMHG

## 2020-11-19 DIAGNOSIS — G47.33 OSA ON CPAP: Chronic | ICD-10-CM

## 2020-11-19 DIAGNOSIS — E03.8 OTHER SPECIFIED HYPOTHYROIDISM: Chronic | ICD-10-CM

## 2020-11-19 DIAGNOSIS — I50.32 CHRONIC DIASTOLIC HEART FAILURE: Primary | ICD-10-CM

## 2020-11-19 DIAGNOSIS — I50.32 CHRONIC DIASTOLIC HEART FAILURE: ICD-10-CM

## 2020-11-19 DIAGNOSIS — G35 MS (MULTIPLE SCLEROSIS): Chronic | ICD-10-CM

## 2020-11-19 DIAGNOSIS — E78.49 OTHER HYPERLIPIDEMIA: ICD-10-CM

## 2020-11-19 DIAGNOSIS — E78.2 MIXED HYPERLIPIDEMIA: Chronic | ICD-10-CM

## 2020-11-19 PROCEDURE — 99214 OFFICE O/P EST MOD 30 MIN: CPT | Mod: HCNC,25,S$GLB, | Performed by: INTERNAL MEDICINE

## 2020-11-19 PROCEDURE — 3008F BODY MASS INDEX DOCD: CPT | Mod: HCNC,CPTII,S$GLB, | Performed by: INTERNAL MEDICINE

## 2020-11-19 PROCEDURE — 93010 ELECTROCARDIOGRAM REPORT: CPT | Mod: HCNC,,, | Performed by: INTERNAL MEDICINE

## 2020-11-19 PROCEDURE — 99999 PR PBB SHADOW E&M-EST. PATIENT-LVL IV: ICD-10-PCS | Mod: PBBFAC,HCNC,, | Performed by: INTERNAL MEDICINE

## 2020-11-19 PROCEDURE — 99999 PR PBB SHADOW E&M-EST. PATIENT-LVL IV: CPT | Mod: PBBFAC,HCNC,, | Performed by: INTERNAL MEDICINE

## 2020-11-19 PROCEDURE — 99214 PR OFFICE/OUTPT VISIT, EST, LEVL IV, 30-39 MIN: ICD-10-PCS | Mod: HCNC,25,S$GLB, | Performed by: INTERNAL MEDICINE

## 2020-11-19 PROCEDURE — 93005 ELECTROCARDIOGRAM TRACING: CPT | Mod: HCNC

## 2020-11-19 PROCEDURE — 3008F PR BODY MASS INDEX (BMI) DOCUMENTED: ICD-10-PCS | Mod: HCNC,CPTII,S$GLB, | Performed by: INTERNAL MEDICINE

## 2020-11-19 PROCEDURE — 93010 EKG 12-LEAD: ICD-10-PCS | Mod: HCNC,,, | Performed by: INTERNAL MEDICINE

## 2020-11-19 NOTE — TELEPHONE ENCOUNTER
----- Message from Robin Sepulveda MD sent at 11/18/2020  9:24 PM CST -----  Regarding: mammogram/breast exam  Patient needs appt for mammogram left breast in 6 months and follow up in clinic for breast exam at that time. thanks

## 2020-11-19 NOTE — TELEPHONE ENCOUNTER
Pathology results discussed with patient    Final Pathologic Diagnosis LEFT BREAST, LOWER INNER CALCIFICATIONS, BIOPSY:   Benign breast tissue with fibroadenomatoid nodules and cystic apocrine   metaplasia.   Microcalcifications are identified in association with apocrine metaplasia   and stromal fibrosis.   Negative for atypia or malignancy.   Comment:  Dr. Ligia Alvarado also reviewed this case and agrees with the   diagnosis.      [employer]     Mammogram and breast exam in 6 months

## 2020-11-19 NOTE — PROGRESS NOTES
Subjective:   Patient ID:  Cem Meyers is a 60 y.o. female who presents for cardiac consult of Follow-up      Hyperlipidemia  Associated symptoms include chest pain and myalgias. Pertinent negatives include no shortness of breath.   Chest Pain   Associated symptoms include back pain. Pertinent negatives include no dizziness, headaches, palpitations or shortness of breath.   Her past medical history is significant for hyperlipidemia.   Follow-up  Associated symptoms include chest pain and myalgias. Pertinent negatives include no headaches.     The patient came in today for cardiac consult of Follow-up    Referring Provider: Adonis Stubbs MD   Reason for consult: chest pain    Cem Meyers is a 60 y.o. female pt with current medical conditions HFpEF, HLD, hypothyroidism, MS, MAYKEL, depression presents for follow up CV evaluation.      2/5/18  Pt complains of left sided sharp chest pain. Happened twice so far, intermittent, happens at rest. Pt has MS with heat sensitive, does not do much activity. Has been more sedentary. Occasionally has mild SOB, diagnosed with mild MAYKEL 2008/2009, used CPAP initially but has not used since then. Very min snoring these days.     5/8/18  After last visit had 2D ECHO which revealed normal BiV function with grade 1 DD, normal valves. Had sleep study which was positive for MAYKEL and has started CPAP. Wants a nose mask instead of face mask. Walks dogs, feels fatigue but not chest pain. NO further episodes of CP, thinks it may have been due to some exercise/MS symptoms.     11/1/18  Pt has been having more muscle spasms and increased Baclofen/pump. Occ chest pain - feels like pressure. Has been using CPAP, overall doing ok. No palpitations/LE swelling.     11/5/19  Has been doing well lately. She continues to work out, runs and tries to get HR up to 140s. No CP/SOB. Has been doing well with CPAP.   ECG - NSR, low voltage    11/19/20  She has been doing well since last year,  working out once a week. No CP/SOB. Using CPAP. No MS flareups. She has to take gabapentin 4 x/day for MS.   ECG - NSR     Patient feels no leg swelling, no PND, no palpitation, no syncope, no CNS symptoms.    Patient is compliant with medications.    2/5/18 - ECG - NSR, no ischemia    2D ECHO 2/7/18  CONCLUSIONS     1 - No wall motion abnormalities.     2 - Normal left ventricular systolic function (EF 60-65%).     3 - Impaired LV relaxation, normal LAP (grade 1 diastolic dysfunction).     4 - Normal right ventricular systolic function .     5 - The estimated PA systolic pressure is greater than 23 mmHg.         Past Medical History:   Diagnosis Date    Back pain     Broken toe 6/2015    left big toe    Chronic diastolic heart failure 5/8/2018    Closed left arm fracture     Colon polyp     Depression     Hyperlipidemia     Hypothyroid     Immunosuppression 1/28/2019    MS (multiple sclerosis)     Neurogenic bladder 12/6/2012    Osteoporosis     Polyneuropathy     Sleep apnea     Trouble in sleeping        Past Surgical History:   Procedure Laterality Date    COLONOSCOPY N/A 9/1/2017    Procedure: COLONOSCOPY;  Surgeon: Andriy Villar MD;  Location[facility];  Service: Endoscopy;  Laterality: N/A;    FRACTURE SURGERY Left 2013    wrist- [facility]    KNEE SURGERY Right 1989    in [place]    TONSILLECTOMY  1966       Social History     Tobacco Use    Smoking status: Never Smoker    Smokeless tobacco: Never Used   Substance Use Topics    Alcohol use: No     Alcohol/week: 0.0 standard drinks     Frequency: Never     Binge frequency: Never    Drug use: No       Family History   Problem Relation Age of Onset    Pancreatic cancer Mother     Stomach cancer Mother     Cancer Mother         pancreatic, stomach    Hypertension Father     Heart disease Father         MI    Lupus Maternal Aunt     Rheum arthritis Maternal Aunt     Rheum arthritis Sister     Melanoma Neg Hx        Patient's Medications    New Prescriptions    No medications on file   Previous Medications    BACLOFEN (LIORESAL) 10 MG TABLET    TAKE TWO TABLETs BY MOUTH IN THE MORNING, TWO TABLET IN THE AFTERNOON, AND UP TO THREE TABLETS AT BEDTIME AS NEEDED    CALCIUM CITRATE (CALCITRATE) 200 MG (950 MG) TABLET    Take 2 tablets by mouth once daily.    CHOLECALCIFEROL, VITAMIN D3, (VITAMIN D3) 4,000 UNIT CAP    Take 4,000 Units by mouth once daily.     DENOSUMAB (PROLIA SUBQ)    Inject into the skin.    FISH OIL-OMEGA-3 FATTY ACIDS 300-1,000 MG CAPSULE    Take 2 g by mouth once daily.    GABAPENTIN (NEURONTIN) 800 MG TABLET    Take 1 tablet (800 mg total) by mouth 3 (three) times daily.    GLUCOSAMINE-CHONDROITIN 500-400 MG TABLET    Take 1 tablet by mouth once daily.     LEVOTHYROXINE (SYNTHROID) 75 MCG TABLET    TAKE 1 TABLET BY MOUTH IN THE MORNING    MULTIVITAMIN CAPSULE    Take 1 capsule by mouth once daily.    PAROXETINE (PAXIL) 40 MG TABLET    Take 1 tablet (40 mg total) by mouth every morning.    REBIF, WITH ALBUMIN, 44 MCG/0.5 ML INJECTION    INJECT 0.5ML (44MCG TOTAL) SUBCUTANEOUSLY 3 TIMES A WEEK    SIMVASTATIN (ZOCOR) 40 MG TABLET    Take 1 tablet by mouth once daily   Modified Medications    No medications on file   Discontinued Medications    No medications on file       Review of Systems   Constitutional: Negative.    HENT: Negative.    Eyes: Negative.    Respiratory: Negative for shortness of breath.    Cardiovascular: Positive for chest pain. Negative for palpitations.   Gastrointestinal: Negative.    Genitourinary: Negative.    Musculoskeletal: Positive for back pain, falls, joint pain and myalgias.   Skin: Negative.    Neurological: Negative for dizziness and headaches.   Endo/Heme/Allergies: Negative.    Psychiatric/Behavioral: Negative.    All 12 systems otherwise negative.      Wt Readings from Last 3 Encounters:   11/19/20 74.8 kg (165 lb)   11/10/20 74.8 kg (165 lb)   10/14/20 74.3 kg (163 lb 12.8 oz)     Temp Readings from  "Last 3 Encounters:   10/14/20 97.4 °F (36.3 °C) (Oral)   10/02/20 98 °F (36.7 °C) (Temporal)   08/18/20 98.1 °F (36.7 °C) (Tympanic)     BP Readings from Last 3 Encounters:   11/19/20 112/80   10/02/20 115/80   08/18/20 110/74     Pulse Readings from Last 3 Encounters:   11/19/20 103   10/02/20 89   08/18/20 103       /80 (BP Location: Left arm, Patient Position: Sitting, BP Method: Large (Manual))   Pulse 103   Resp 16   Ht 5' 3" (1.6 m)   Wt 74.8 kg (165 lb)   SpO2 (!) 94%   BMI 29.23 kg/m²     Objective:   Physical Exam   Constitutional: She is oriented to person, place, and time. She appears well-developed and well-nourished. No distress.   HENT:   Head: Normocephalic and atraumatic.   Nose: Nose normal.   Mouth/Throat: Oropharynx is clear and moist.   Eyes: Conjunctivae and EOM are normal. No scleral icterus.   Neck: Normal range of motion. Neck supple. No JVD present. No thyromegaly present.   Cardiovascular: Normal rate, regular rhythm, S1 normal and S2 normal. Exam reveals no gallop, no S3, no S4 and no friction rub.   No murmur heard.  Pulmonary/Chest: Effort normal and breath sounds normal. No stridor. No respiratory distress. She has no wheezes. She has no rales. She exhibits no tenderness.   Abdominal: Soft. Bowel sounds are normal. She exhibits no distension and no mass. There is no abdominal tenderness. There is no rebound.   Genitourinary:    Genitourinary Comments: Deferred     Musculoskeletal: Normal range of motion.         General: No tenderness, deformity or edema.   Lymphadenopathy:     She has no cervical adenopathy.   Neurological: She is alert and oriented to person, place, and time. She exhibits normal muscle tone. Coordination normal.   Skin: Skin is warm and dry. No rash noted. She is not diaphoretic. No erythema. No pallor.   Psychiatric: She has a normal mood and affect. Her behavior is normal. Judgment and thought content normal.   Nursing note and vitals reviewed.      Lab " Results   Component Value Date     (L) 06/11/2020    K 4.2 06/11/2020     06/11/2020    CO2 25 06/11/2020    BUN 14 06/11/2020    CREATININE 0.9 06/11/2020    GLU 91 06/11/2020    MG 2.3 10/16/2018    AST 32 06/11/2020    ALT 17 06/11/2020    ALBUMIN 3.4 (L) 06/11/2020    PROT 7.0 06/11/2020    BILITOT 0.4 06/11/2020    WBC 5.41 05/08/2020    HGB 12.0 05/08/2020    HCT 39.0 05/08/2020     (H) 05/08/2020     05/08/2020    TSH 1.170 07/30/2020    CHOL 169 06/11/2020    HDL 55 06/11/2020    LDLCALC 100.2 06/11/2020    TRIG 69 06/11/2020     Assessment:      1. Chronic diastolic heart failure    2. Mixed hyperlipidemia    3. MS (multiple sclerosis)    4. Other specified hypothyroidism    5. MAYKEL on CPAP        Plan:   1. Chest pain, atypical  - resolved   - 2D ECHO overall normal function without valve disease  - diastolic HF - euvolemic  - discussed stress test if more symptomatic/worse  - consider other causes of CP - MSK/esoph/pulm/anxiety    2. HLD  - cont statin    3. Hypothyrodism, TSH 1.170  - cont synthroid, needs to take in early AM empty stomach     4. MS  - cont meds per PCP/Neuro    5. MAYKEL  - cont CPAP    6. Diastolic HF  - pt euvolemic  - cont to monitor  - mild venous insuff    Thank you for allowing me to participate in this patient's care. Please do not hesitate to contact me with any questions or concerns. Consult note has been forwarded to the referral physician.

## 2020-11-27 DIAGNOSIS — E03.8 OTHER SPECIFIED HYPOTHYROIDISM: Chronic | ICD-10-CM

## 2020-11-27 RX ORDER — LEVOTHYROXINE SODIUM 75 UG/1
75 TABLET ORAL
Qty: 90 TABLET | Refills: 0 | Status: SHIPPED | OUTPATIENT
Start: 2020-11-27 | End: 2021-02-11 | Stop reason: SDUPTHER

## 2020-11-27 NOTE — TELEPHONE ENCOUNTER
----- Message from Kirstin Ramirez sent at 11/27/2020  1:39 PM CST -----  Please call pt @ [phone] regarding medication synthroid , pt have questions for nurse.

## 2020-12-01 ENCOUNTER — OFFICE VISIT (OUTPATIENT)
Dept: INTERNAL MEDICINE | Facility: CLINIC | Age: 60
End: 2020-12-01
Payer: MEDICARE

## 2020-12-01 VITALS
SYSTOLIC BLOOD PRESSURE: 110 MMHG | BODY MASS INDEX: 29.41 KG/M2 | OXYGEN SATURATION: 96 % | DIASTOLIC BLOOD PRESSURE: 80 MMHG | WEIGHT: 166 LBS | HEART RATE: 92 BPM | TEMPERATURE: 98 F | HEIGHT: 63 IN

## 2020-12-01 DIAGNOSIS — G35 MS (MULTIPLE SCLEROSIS): ICD-10-CM

## 2020-12-01 DIAGNOSIS — Z12.11 SCREENING FOR COLON CANCER: ICD-10-CM

## 2020-12-01 DIAGNOSIS — M81.0 AGE-RELATED OSTEOPOROSIS WITHOUT CURRENT PATHOLOGICAL FRACTURE: ICD-10-CM

## 2020-12-01 DIAGNOSIS — Z86.19 HISTORY OF SHINGLES: ICD-10-CM

## 2020-12-01 DIAGNOSIS — E03.9 ACQUIRED HYPOTHYROIDISM: Primary | Chronic | ICD-10-CM

## 2020-12-01 DIAGNOSIS — G62.9 POLYNEUROPATHY: ICD-10-CM

## 2020-12-01 DIAGNOSIS — R26.9 NEUROLOGIC GAIT DYSFUNCTION: ICD-10-CM

## 2020-12-01 DIAGNOSIS — E78.49 OTHER HYPERLIPIDEMIA: Chronic | ICD-10-CM

## 2020-12-01 DIAGNOSIS — F32.0 MAJOR DEPRESSIVE DISORDER, SINGLE EPISODE, MILD: ICD-10-CM

## 2020-12-01 DIAGNOSIS — Z86.010 HISTORY OF COLON POLYPS: ICD-10-CM

## 2020-12-01 PROBLEM — F32.A DEPRESSION: Status: RESOLVED | Noted: 2019-01-28 | Resolved: 2020-12-01

## 2020-12-01 PROBLEM — E66.811 OBESITY (BMI 30.0-34.9): Status: RESOLVED | Noted: 2019-12-23 | Resolved: 2020-12-01

## 2020-12-01 PROBLEM — E66.9 OBESITY (BMI 30.0-34.9): Status: RESOLVED | Noted: 2019-12-23 | Resolved: 2020-12-01

## 2020-12-01 PROCEDURE — 3008F PR BODY MASS INDEX (BMI) DOCUMENTED: ICD-10-PCS | Mod: HCNC,CPTII,S$GLB, | Performed by: FAMILY MEDICINE

## 2020-12-01 PROCEDURE — 99999 PR PBB SHADOW E&M-EST. PATIENT-LVL IV: ICD-10-PCS | Mod: PBBFAC,HCNC,, | Performed by: FAMILY MEDICINE

## 2020-12-01 PROCEDURE — 1126F AMNT PAIN NOTED NONE PRSNT: CPT | Mod: HCNC,S$GLB,, | Performed by: FAMILY MEDICINE

## 2020-12-01 PROCEDURE — 99999 PR PBB SHADOW E&M-EST. PATIENT-LVL IV: CPT | Mod: PBBFAC,HCNC,, | Performed by: FAMILY MEDICINE

## 2020-12-01 PROCEDURE — 99214 OFFICE O/P EST MOD 30 MIN: CPT | Mod: HCNC,S$GLB,, | Performed by: FAMILY MEDICINE

## 2020-12-01 PROCEDURE — 99214 PR OFFICE/OUTPT VISIT, EST, LEVL IV, 30-39 MIN: ICD-10-PCS | Mod: HCNC,S$GLB,, | Performed by: FAMILY MEDICINE

## 2020-12-01 PROCEDURE — 1126F PR PAIN SEVERITY QUANTIFIED, NO PAIN PRESENT: ICD-10-PCS | Mod: HCNC,S$GLB,, | Performed by: FAMILY MEDICINE

## 2020-12-01 PROCEDURE — 3008F BODY MASS INDEX DOCD: CPT | Mod: HCNC,CPTII,S$GLB, | Performed by: FAMILY MEDICINE

## 2020-12-01 NOTE — PROGRESS NOTES
Subjective:   Patient ID: Cem Meyers is a 60 y.o. female.  Chief Complaint:  Establish Care    Patient presents to [facility] care.    Previous PCP Dr. Stubbs.    Also followed by neurology for multiple sclerosis and polyneuropathy.  Had annual wellness visit August 2020     Medical history for colon  - Acquired hypothyroidism.  Last TSH 1.170 and normal.  Reports compliance with Synthroid 75 mcg daily.  No symptoms hypo or hyperglycemia.    - Hyperlipidemia.  Last lipid panel with .  Close to goal on simvastatin 40 mg daily.  Reports compliance.  Denies side effects.  No chest pain or claudication.    - Major depressive disorder.  On Paxil 40 mg daily.  Reports compliance.  Denies side effects.  Symptoms well controlled.  Happy with medication.  Wants to continue.    - History of shingles.  Resulted with significant scarring, but otherwise healed.  Questions when she can obtain the shingles vaccine.    - History of colon polyps.  Due for repeat colonoscopy.  - Multiple sclerosis and neuropathy followed by neurology.    - Osteoporosis followed by rheumatology.    No new complaints or concerns today.    Review of Systems   Constitutional: Positive for activity change. Negative for appetite change, chills, diaphoresis, fatigue, fever and unexpected weight change.   HENT: Positive for rhinorrhea. Negative for hearing loss and trouble swallowing.    Eyes: Negative for discharge and visual disturbance.   Respiratory: Negative for cough, chest tightness, shortness of breath and wheezing.    Cardiovascular: Negative for chest pain, palpitations and leg swelling.   Gastrointestinal: Negative for abdominal distention, abdominal pain, blood in stool, constipation, diarrhea, nausea and vomiting.   Endocrine: Positive for polydipsia and polyuria. Negative for cold intolerance and heat intolerance.   Genitourinary: Negative for difficulty urinating, dysuria, hematuria, menstrual problem and pelvic pain.  "  Musculoskeletal: Positive for gait problem and neck pain. Negative for arthralgias, back pain, joint swelling and myalgias.   Skin: Negative for rash.   Neurological: Negative for dizziness, tremors, weakness, light-headedness, numbness and headaches.   Psychiatric/Behavioral: Negative for agitation, behavioral problems, confusion, decreased concentration, dysphoric mood, hallucinations, self-injury, sleep disturbance and suicidal ideas. The patient is not nervous/anxious and is not hyperactive.      Objective:   /80 (BP Location: Left arm, Patient Position: Sitting, BP Method: Large (Manual))   Pulse 92   Temp 98.1 °F (36.7 °C) (Tympanic)   Ht 5' 3" (1.6 m)   Wt 75.3 kg (166 lb 0.1 oz)   SpO2 96%   BMI 29.41 kg/m²     Physical Exam  Vitals signs and nursing note reviewed.   Constitutional:       General: She is not in acute distress.     Appearance: Normal appearance. She is well-developed and normal weight. She is not ill-appearing or toxic-appearing.   Eyes:      General: No scleral icterus.     Conjunctiva/sclera: Conjunctivae normal.      Right eye: Right conjunctiva is not injected.      Left eye: Left conjunctiva is not injected.   Neck:      Thyroid: No thyroid mass, thyromegaly or thyroid tenderness.      Vascular: No carotid bruit or JVD.   Cardiovascular:      Rate and Rhythm: Normal rate and regular rhythm.      Pulses:           Radial pulses are 2+ on the right side and 2+ on the left side.      Heart sounds: Normal heart sounds. No murmur. No friction rub. No gallop.    Pulmonary:      Effort: Pulmonary effort is normal. No tachypnea, accessory muscle usage or respiratory distress.      Breath sounds: Normal breath sounds. No wheezing, rhonchi or rales.   Abdominal:      General: There is no distension.      Palpations: Abdomen is soft. There is no hepatomegaly.      Tenderness: There is no abdominal tenderness. There is no guarding or rebound.   Musculoskeletal:      Right lower leg: No " edema.      Left lower leg: No edema.   Skin:     General: Skin is warm and dry.      Capillary Refill: Capillary refill takes less than 2 seconds.      Findings: No abrasion, bruising, ecchymosis, rash or wound.   Neurological:      Mental Status: She is alert.      Coordination: Coordination abnormal.      Gait: Gait abnormal.   Psychiatric:         Attention and Perception: Attention and perception normal.         Mood and Affect: Mood and affect normal. Mood is not anxious or depressed.         Speech: Speech normal.         Behavior: Behavior normal. Behavior is cooperative.         Thought Content: Thought content normal.         Cognition and Memory: Cognition and memory normal.         Judgment: Judgment normal.       Assessment:       ICD-10-CM ICD-9-CM   1. Acquired hypothyroidism  E03.9 244.9   2. Other hyperlipidemia  E78.49 272.4   3. Major depressive disorder, single episode, mild  F32.0 296.21   4. History of shingles  Z86.19 V12.09   5. Screening for colon cancer  Z12.11 V76.51   6. History of colon polyps  Z86.010 V12.72   7. MS (multiple sclerosis)  G35 340   8. Neurologic gait dysfunction  R26.9 781.2   9. Polyneuropathy  G62.9 356.9   10. Age-related osteoporosis without current pathological fracture  M81.0 733.01     Plan:   Acquired hypothyroidism  Controlled.  Asymptomatic.  TSH at goal.    Continue Synthroid 75 mcg daily.      Other hyperlipidemia  Controlled.  Asymptomatic.  LDL at goal.    Continue simvastatin 40 mg daily.      Major depressive disorder, single episode, mild  Stable, no side effects, mood and symptoms well controlled on present medication .  Continue Paxil 40 mg daily.    History of shingles  Outbreak occurred July 2020.    Recommend start shingles vaccine series in July 2021.    If any recurrent outbreak before then, call within 24-48 hours for re treatment with Valtrex.      Screening for colon cancer  History of colon polyps  -     Case Request Endoscopy:  COLONOSCOPY    MS (multiple sclerosis)  Neurologic gait dysfunction  Polyneuropathy  Reported clinically stable.    Continue all current medications.    Follow-up neurology as scheduled.      Age-related osteoporosis without current pathological fracture  Vitamin-D and calcium supplement is adequate.    Continue Prolia per rheumatology.      Return to clinic 6 months sooner as needed.

## 2020-12-02 ENCOUNTER — TELEPHONE (OUTPATIENT)
Dept: ENDOSCOPY | Facility: HOSPITAL | Age: 60
End: 2020-12-02

## 2020-12-02 DIAGNOSIS — Z12.11 COLON CANCER SCREENING: ICD-10-CM

## 2020-12-02 RX ORDER — SODIUM, POTASSIUM,MAG SULFATES 17.5-3.13G
1 SOLUTION, RECONSTITUTED, ORAL ORAL DAILY
Qty: 1 KIT | Refills: 0 | Status: SHIPPED | OUTPATIENT
Start: 2020-12-02 | End: 2020-12-04

## 2020-12-02 NOTE — TELEPHONE ENCOUNTER
Location Screening:    If answers yes to any of the following, schedule at [place] ONLY. If No, OK for either location.    1. Is there a diagnosis of heart failure, severe heart valve disease (aortic stenosis) or mechanical valve? yes  a. Is the Left Ventricle Ejection Fraction <30% ? no       2. Does the pt have pulmonary hypertension? no   a. Is pulmonary arterial pressure gradient >50mmHg? not applicable   b. Is there evidence of right ventricular dysfunction? not applicable    3. Does the pt have achalasia? no    4. Any history of negative reaction to anesthesia? no   a. Myasthenia gravis? no   b. Malignant hyperthermia? no   c. Other? no    5. Is procedure for esophageal banding? no      COVID Screening    1. Have you had a fever in the last 7 days or have you used fever reducing medicines for a fever in the last 7 days?  no    2. Are you experiencing shortness of breath, cough, muscle aches, loss of taste or loss of smell?  no    If answered yes to questions 1 and 2, the patient must seek medical attention with their PCP.  Do not schedule their procedure.     3. Are you residing with anyone who has tested positive for Covid?  no    If answered yes to question 3, recommend 14 day self-quarantine from the date of relative's positive test and place special needs note in the depot.  Wait to schedule.     ENDO screening    1. Have you been admitted for cardiac, kidney or pulmonary causes to the hospital in the past 3 months? no   If yes, schedule an appointment with PCP before scheduling endoscopic procedure.     2. Have you had a stent placed in the last 12 months? no   If yes, for a screening visit, cancel and message the ordering provider.  The patient will need a new order when the time is appropriate.     3. Have you had a stroke or heart attack in the past 6 months? no   If yes, cancel and refer patient to ordering provider for clearance, also message ordering provider to inform.     4. Have you had any chest  "pain in the past 3 months? no   If yes, Have you been evaluated by your PCP and/or cardiologist and it was determined to not be heart related? no   If No, Pt needs to be seen by PCP or Cardiologist .  Pt can be scheduled once clearance obtained by either of those providers.     5. Do you take prescription weight loss medications?  no   If yes, must stop for 2 weeks prior to procedure.     6. Have you been diagnosed with diverticulitis within the past 3 months? no   If yes, must have been seen by GI within the last 3 months, if not schedule with GI [facility].    If pt has been seen by GI, schedule procedure 8-12 weeks post antibiotic treatment.     7. Are you on Dialysis? no  If yes, schedule procedure for the day AFTER dialysis.  Appt time should be 9am or later, patient arrival time is 2 hours prior.  Nulytely or miralax prep for all patients with kidney disease.     8. Are you diabetic?  no   If yes, schedule morning appt. Advise pt to hold all diabetic meds day of procedure.     9. If pt is older than 80 years of age and HAS NOT been seen by GI or PCP within the last 6 months, needs appt with GI [facility].   If pt has been seen by the GI provider or PCP within the past 6  months AND meets criteria, schedule procedure AND send message to the endoscopist.     10. Is patient on a "high risk" medication (blood thinner/antiplatelet agent)?  no   If yes, has cardiac clearance been obtained within the last 60 days? N/A   If no, a new clearance needs to be obtained.     Final Questions:    1.I have reviewed the last colonoscopy for recommendations regarding next procedure bowel prep.  yes  2. I have reviewed medications and allergies.  Yes  3. I have verified the pharmacy information and appropriate prep sent if needed. yes  4. Prep instructions have been mailed or sent to portal per patient request. yes        All schedulers will have ability to reach out to the ordering GI provider to clarify any issues.     "

## 2020-12-14 ENCOUNTER — PATIENT OUTREACH (OUTPATIENT)
Dept: ADMINISTRATIVE | Facility: OTHER | Age: 60
End: 2020-12-14

## 2020-12-15 ENCOUNTER — TELEPHONE (OUTPATIENT)
Dept: RHEUMATOLOGY | Facility: CLINIC | Age: 60
End: 2020-12-15

## 2020-12-16 ENCOUNTER — OFFICE VISIT (OUTPATIENT)
Dept: RHEUMATOLOGY | Facility: CLINIC | Age: 60
End: 2020-12-16
Payer: MEDICARE

## 2020-12-16 ENCOUNTER — INFUSION (OUTPATIENT)
Dept: INFUSION THERAPY | Facility: HOSPITAL | Age: 60
End: 2020-12-16
Attending: FAMILY MEDICINE
Payer: MEDICARE

## 2020-12-16 DIAGNOSIS — M81.0 AGE-RELATED OSTEOPOROSIS WITHOUT CURRENT PATHOLOGICAL FRACTURE: Primary | ICD-10-CM

## 2020-12-16 PROCEDURE — 99214 PR OFFICE/OUTPT VISIT, EST, LEVL IV, 30-39 MIN: ICD-10-PCS | Mod: 95,HCNC,, | Performed by: INTERNAL MEDICINE

## 2020-12-16 PROCEDURE — 99214 OFFICE O/P EST MOD 30 MIN: CPT | Mod: 95,HCNC,, | Performed by: INTERNAL MEDICINE

## 2020-12-16 PROCEDURE — 96372 THER/PROPH/DIAG INJ SC/IM: CPT | Mod: HCNC

## 2020-12-16 PROCEDURE — 63600175 PHARM REV CODE 636 W HCPCS: Mod: JG,HCNC | Performed by: INTERNAL MEDICINE

## 2020-12-16 RX ADMIN — DENOSUMAB 60 MG: 60 INJECTION SUBCUTANEOUS at 10:12

## 2020-12-16 NOTE — PROGRESS NOTES
RHEUMATOLOGY FOLLOW UP - TELE VISIT     The patient location is: [place]  The chief complaint leading to consultation is:  FOLLOW-UP FOR OSTEOPOROSIS.  Visit type: Virtual visit with synchronous audio and video  Total time spent with patient: 10 MINUTES  Each patient to whom he or she provides medical services by telemedicine is:  (1) informed of the relationship between the physician and patient and the respective role of any other health care provider with respect to management of the patient; and (2) notified that he or she may decline to receive medical services by telemedicine and may withdraw from such care at any time.    HPI:-  Cem Fernandes a 60 y.o. pleasant female seen today through My chart video visit for follow up.  She reports doing well today .  She denies any fracture but had frequent falls --longstanding history of MS on high-dose gabapentin.  She also reports intermittent triggering of right 4th finger for the past month.  No injury.  No joint pain.  No swelling.  No plans for invasive dental procedure.    Review of Systems   Constitutional: Negative for chills and fever.   HENT: Negative for congestion and sore throat.    Eyes: Negative for blurred vision and redness.   Respiratory: Negative for cough and shortness of breath.    Cardiovascular: Negative for chest pain and leg swelling.   Gastrointestinal: Negative for abdominal pain.   Genitourinary: Negative for dysuria.   Musculoskeletal: Negative for back pain, falls, joint pain, myalgias and neck pain.        Intermittent triggering of right 4th finger   Skin: Negative for rash.   Neurological: Negative for headaches.   Endo/Heme/Allergies: Does not bruise/bleed easily.   Psychiatric/Behavioral: Negative for memory loss. The patient does not have insomnia.        Past Medical History:   Diagnosis Date    Back pain     Broken toe 6/2015    left big toe    Chronic diastolic heart failure 5/8/2018    Closed left arm fracture     Colon  polyp     Depression     Hyperlipidemia     Hypothyroid     Immunosuppression 1/28/2019    MS (multiple sclerosis)     Neurogenic bladder 12/6/2012    Osteoporosis     Polyneuropathy     Sleep apnea     Trouble in sleeping        Past Surgical History:   Procedure Laterality Date    COLONOSCOPY N/A 9/1/2017    Procedure: COLONOSCOPY;  Surgeon: Andriy Villar MD;  Location[facility];  Service: Endoscopy;  Laterality: N/A;    FRACTURE SURGERY Left 2013    wrist- [facility]    KNEE SURGERY Right 1989    in [place]    TONSILLECTOMY  1966        Social History     Tobacco Use    Smoking status: Never Smoker    Smokeless tobacco: Never Used   Substance Use Topics    Alcohol use: No     Alcohol/week: 0.0 standard drinks     Frequency: Never     Binge frequency: Never    Drug use: No       Family History   Problem Relation Age of Onset    Pancreatic cancer Mother     Stomach cancer Mother     Cancer Mother         pancreatic, stomach    Hypertension Father     Heart disease Father         MI    Lupus Maternal Aunt     Rheum arthritis Maternal Aunt     Rheum arthritis Sister     Melanoma Neg Hx        Review of patient's allergies indicates:   Allergen Reactions    Latex, natural rubber Rash       Physical exam:-    GEN: awake, alert, non-diaphoretic, no psychomotor agitation, no acute distress    HEENT :Head: atraumatic, normocephalic, no rashes noted, no lesions noted;    Eyes: NO redness, discharge, swelling, or lesions    Nose: NO redness, swelling, discharge, deformity, or impetigo/crusting    Skin: no lesions, wounds, erythema, or cyanosis noted on face or hands    Cardiopulmonary: no increased respiratory effort, speaking in clear sentences    MSK: normal ROM in the neck, Upper extremities, Lower extremities  Good ROM of hands, fist formation 100% and , no obvious synovitis.  Mild triggering of right fourth finger    Good ambulation in front of the camera    Neuro: cranial nerves grossly normal,  speech normal rate and rhythm, orientation arrived to appointment on time with no prompting, moved both upper extremities equally    Pysch:  appearance, behavior, and attitude- well groomed, pleasant, cooperative    Medication List with Changes/Refills   Current Medications    BACLOFEN (LIORESAL) 10 MG TABLET    TAKE 2 TABLETS BY MOUTH ONCE DAILY IN THE MORNING, 2 TABLETS IN THE AFTERNOON, AND UP TO 3 TABLETS AT BEDTIME AS NEEDED    CALCIUM CITRATE (CALCITRATE) 200 MG (950 MG) TABLET    Take 2 tablets by mouth once daily.    CHOLECALCIFEROL, VITAMIN D3, (VITAMIN D3) 4,000 UNIT CAP    Take 4,000 Units by mouth once daily.     DENOSUMAB (PROLIA SUBQ)    Inject into the skin.    FISH OIL-OMEGA-3 FATTY ACIDS 300-1,000 MG CAPSULE    Take 2 g by mouth once daily.    GABAPENTIN (NEURONTIN) 800 MG TABLET    Take 1 tablet (800 mg total) by mouth 3 (three) times daily.    GLUCOSAMINE-CHONDROITIN 500-400 MG TABLET    Take 1 tablet by mouth once daily.     LEVOTHYROXINE (SYNTHROID) 75 MCG TABLET    Take 1 tablet (75 mcg total) by mouth before breakfast.    MULTIVITAMIN CAPSULE    Take 1 capsule by mouth once daily.    PAROXETINE (PAXIL) 40 MG TABLET    Take 1 tablet (40 mg total) by mouth every morning.    REBIF, WITH ALBUMIN, 44 MCG/0.5 ML INJECTION    INJECT 0.5ML (44MCG TOTAL) SUBCUTANEOUSLY 3 TIMES A WEEK    SIMVASTATIN (ZOCOR) 40 MG TABLET    Take 1 tablet by mouth once daily       Assessment/Plans:-  1. Age-related osteoporosis without current pathological fracture     Stable osteoporosis on Prolia.  Continue Prolia every 6 months.  Advised all dental precautions CMP stable from today.  Problem List Items Addressed This Visit     Osteoporosis - Primary (Chronic)          Follow up in about 6 months (around 6/16/2021) for Prolia.    Disclaimer: This note was prepared using voice recognition system and is likely to have sound alike errors and is not proof read.  Please call me with any questions.

## 2020-12-16 NOTE — NURSING
Any invasive dental procedures in past 3 months or upcoming 3 months: Denies    Last Rheumatology provider visit- Seen by Dr. DARBY on 12/16    Recent labs? yes CKD pt needing repeat labs in 10 days- denies  Lab Results   Component Value Date    CALCIUM 9.7 12/16/2020     Lab Results   Component Value Date    CREATININE 1.1 12/16/2020     Lab Results   Component Value Date    ESTGFRAFRICA >60 12/16/2020     Lab Results   Component Value Date    EGFRNONAA 55 (A) 12/16/2020     Lab Results   Component Value Date    VIGCKNMK22RQ 57 07/17/2019                Prolia 60 mg/ml administered SQ to Right lower abdominal quadrant. Tolerated without any complaints. No redness, swelling, or drainage noted to site. Instructed to remain in clinic 15 minutes after administration to monitor for any s/sx of reaction. Pt instructed on signs and symptoms of reaction to report. Verbalizes understanding.

## 2020-12-16 NOTE — Clinical Note
Okay to get Prolia today before lab results become available.  Please schedule follow-up in 6 months with CMP and Prolia appointment.  Thanks.

## 2020-12-23 ENCOUNTER — OFFICE VISIT (OUTPATIENT)
Dept: PULMONOLOGY | Facility: CLINIC | Age: 60
End: 2020-12-23
Payer: MEDICARE

## 2020-12-23 VITALS
WEIGHT: 163 LBS | BODY MASS INDEX: 28.88 KG/M2 | DIASTOLIC BLOOD PRESSURE: 84 MMHG | HEIGHT: 63 IN | RESPIRATION RATE: 16 BRPM | HEART RATE: 74 BPM | SYSTOLIC BLOOD PRESSURE: 120 MMHG | OXYGEN SATURATION: 96 %

## 2020-12-23 DIAGNOSIS — R25.2 SPASTICITY: ICD-10-CM

## 2020-12-23 DIAGNOSIS — G47.33 OSA ON CPAP: Primary | Chronic | ICD-10-CM

## 2020-12-23 DIAGNOSIS — F32.0 MAJOR DEPRESSIVE DISORDER, SINGLE EPISODE, MILD: Chronic | ICD-10-CM

## 2020-12-23 DIAGNOSIS — G35 MS (MULTIPLE SCLEROSIS): Chronic | ICD-10-CM

## 2020-12-23 DIAGNOSIS — I50.32 CHRONIC DIASTOLIC HEART FAILURE: ICD-10-CM

## 2020-12-23 PROCEDURE — 3008F BODY MASS INDEX DOCD: CPT | Mod: HCNC,CPTII,S$GLB, | Performed by: NURSE PRACTITIONER

## 2020-12-23 PROCEDURE — 3008F PR BODY MASS INDEX (BMI) DOCUMENTED: ICD-10-PCS | Mod: HCNC,CPTII,S$GLB, | Performed by: NURSE PRACTITIONER

## 2020-12-23 PROCEDURE — 99999 PR PBB SHADOW E&M-EST. PATIENT-LVL IV: ICD-10-PCS | Mod: PBBFAC,HCNC,, | Performed by: NURSE PRACTITIONER

## 2020-12-23 PROCEDURE — 99214 OFFICE O/P EST MOD 30 MIN: CPT | Mod: HCNC,S$GLB,, | Performed by: NURSE PRACTITIONER

## 2020-12-23 PROCEDURE — 99999 PR PBB SHADOW E&M-EST. PATIENT-LVL IV: CPT | Mod: PBBFAC,HCNC,, | Performed by: NURSE PRACTITIONER

## 2020-12-23 PROCEDURE — 99214 PR OFFICE/OUTPT VISIT, EST, LEVL IV, 30-39 MIN: ICD-10-PCS | Mod: HCNC,S$GLB,, | Performed by: NURSE PRACTITIONER

## 2020-12-23 NOTE — ASSESSMENT & PLAN NOTE
Benefits and compliant with Auto CPAP 4-6 cm  AHI 6.9   Nasal wisp mask, thinking about changing to dream wear nasal mask, will call [facility]  Updated supply order  [facility]: [place]   Follow up December 2021 for yearly CPAP compliance download and supply order.

## 2020-12-23 NOTE — PROGRESS NOTES
Subjective:      Patient ID: Cem Meyers is a 60 y.o. female.    Chief Complaint: Sleep Apnea    HPI: Cem Meyers is here for follow up for MAYKEL with yearly CPAP complaince assessment.  She is on Auto CPAP of 4-6 cmH2O pressure which is optimally controlling sleep apnea with apneic index (AHI) 6.9 events an hour.   She is compliant with CPAP use. Complaince download today reveals 100% of days with greater than 4 hours of device use.   Patient reports benefit from CPAP use.  Patient reports no complaints.  Nasal wisp mask is used.   [place] [address]     Previous Report Reviewed: lab reports and office notes     Past Medical History: The following portions of the patient's history were reviewed and updated as appropriate:   She  has a past surgical history that includes Knee surgery (Right, 1989); Fracture surgery (Left, 2013); Tonsillectomy (1966); and Colonoscopy (N/A, 9/1/2017).  Her family history includes Cancer in her mother; Heart disease in her father; Hypertension in her father; Lupus in her maternal aunt; Pancreatic cancer in her mother; Rheum arthritis in her maternal aunt and sister; Stomach cancer in her mother.  She  reports that she has never smoked. She has never used smokeless tobacco. She reports that she does not drink alcohol or use drugs.  She has a current medication list which includes the following prescription(s): baclofen, calcium citrate, cholecalciferol (vitamin d3), denosumab, fish oil-omega-3 fatty acids, gabapentin, glucosamine-chondroitin, levothyroxine, multivitamin, paroxetine, rebif (with albumin), and simvastatin.  She is allergic to latex, natural rubber.    The following portions of the patient's history were reviewed and updated as appropriate: allergies, current medications, past family history, past medical history, past social history, past surgical history and problem list.    Review of Systems   Constitutional: Negative for fever, chills, weight loss, weight gain,  "activity change, appetite change, fatigue and night sweats.   HENT: Negative for postnasal drip, rhinorrhea, sinus pressure, voice change and congestion.    Eyes: Negative for redness and itching.   Respiratory: Negative for snoring, cough, sputum production, chest tightness, shortness of breath, wheezing, orthopnea, asthma nighttime symptoms, dyspnea on extertion, use of rescue inhaler and somnolence.    Cardiovascular: Negative.  Negative for chest pain, palpitations and leg swelling.   Genitourinary: Negative for difficulty urinating and hematuria.   Endocrine: Negative for cold intolerance and heat intolerance.    Musculoskeletal: Negative for arthralgias, gait problem, joint swelling and myalgias.   Skin: Negative.    Gastrointestinal: Negative for nausea, vomiting, abdominal pain and acid reflux.   Neurological: Negative for dizziness, weakness, light-headedness and headaches.   Hematological: Negative for adenopathy. No excessive bruising.   All other systems reviewed and are negative.     Objective:   /84   Pulse 74   Resp 16   Ht 5' 3" (1.6 m)   Wt 73.9 kg (163 lb 0.5 oz)   SpO2 96%   BMI 28.88 kg/m²   Physical Exam  Vitals signs reviewed.   Constitutional:       General: She is not in acute distress.     Appearance: She is well-developed. She is not ill-appearing or toxic-appearing.   HENT:      Head: Normocephalic.      Right Ear: External ear normal.      Left Ear: External ear normal.      Nose: Nose normal.      Mouth/Throat:      Pharynx: No oropharyngeal exudate.   Eyes:      Conjunctiva/sclera: Conjunctivae normal.   Neck:      Musculoskeletal: Normal range of motion and neck supple.   Cardiovascular:      Rate and Rhythm: Normal rate and regular rhythm.      Heart sounds: Normal heart sounds.   Pulmonary:      Effort: Pulmonary effort is normal.      Breath sounds: Normal breath sounds. No stridor.   Abdominal:      Palpations: Abdomen is soft.   Musculoskeletal: Normal range of motion. "   Lymphadenopathy:      Cervical: No cervical adenopathy.   Skin:     General: Skin is warm and dry.   Neurological:      Mental Status: She is alert and oriented to person, place, and time.   Psychiatric:         Behavior: Behavior normal. Behavior is cooperative.         Thought Content: Thought content normal.         Judgment: Judgment normal.       Personal Diagnostic Review  CPAP download  APAP 4-6 cm    Assessment:     1. MAYKEL on CPAP    2. Chronic diastolic heart failure    3. Spasticity    4. Major depressive disorder, single episode, mild    5. MS (multiple sclerosis)      Orders Placed This Encounter   Procedures    CPAP/BIPAP SUPPLIES     Benefits and compliant  90 day supply. 4 refills  [facility]: [facility]     Order Specific Question:   Length of need (1-99 months):     Answer:   99     Order Specific Question:   Choose ONE mask type and its corresponding cushions and/or pillows:     Answer:    Nasal Cushion Mask, 1 per 90 days:  Nasal Cushions, (6 per 90 days)     Order Specific Question:   Choose EITHER Heated or Non-Heated Tubjing     Answer:    Non-Heated Tubing, 1 per 90 days     Order Specific Question:   Number of Days Needed:     Answer:   99     Order Specific Question:   All other supplies as needed as listed below:     Answer:    Headgear, 1 per 180 days     Order Specific Question:   All other supplies as needed as listed below:     Answer:    Chin Strap, 1 per 180 days     Order Specific Question:   All other supplies as needed as listed below:     Answer:    Disposable Filter, 6 per 90 days     Order Specific Question:   All other supplies as needed as listed below:     Answer:    Non-Disposable Filter, 1 per 180 days     Order Specific Question:   All other supplies as needed as listed below:     Answer:    Humidifier Chamber, 1 per 180 days     Plan:     Problem List Items Addressed This Visit     Spasticity     MS diagnosis, Managed by Neurology          MAYKEL on CPAP - Primary (Chronic)     Benefits and compliant with Auto CPAP 4-6 cm  AHI 6.9   Nasal wisp mask, thinking about changing to dream wear nasal mask, will call [facility]  Updated supply order  [facility]: [facility]   Follow up December 2021 for yearly CPAP compliance download and supply order.             Relevant Orders    CPAP/BIPAP SUPPLIES    MS (multiple sclerosis) (Chronic)     Stable on treatment, followed by Rebeka Smith MD and Rimma Rouse PA-C Neurology, [facility] [place]           Major depressive disorder, single episode, mild (Chronic)     Controlled, on paxil   And having a cat as a pet         Chronic diastolic heart failure     managed by cardiology             Follow up in about 1 year (around 12/23/2021) for CPAP 1 year compliance download.

## 2020-12-31 ENCOUNTER — TELEPHONE (OUTPATIENT)
Dept: ENDOSCOPY | Facility: HOSPITAL | Age: 60
End: 2020-12-31

## 2020-12-31 NOTE — TELEPHONE ENCOUNTER
Endoscopy pre op call complete. Instructions reviewed with patient. Patient expressed understanding.

## 2021-01-03 ENCOUNTER — LAB VISIT (OUTPATIENT)
Dept: OTOLARYNGOLOGY | Facility: CLINIC | Age: 61
End: 2021-01-03
Payer: MEDICARE

## 2021-01-03 DIAGNOSIS — Z12.11 COLON CANCER SCREENING: ICD-10-CM

## 2021-01-03 LAB — SARS-COV-2 RNA RESP QL NAA+PROBE: NOT DETECTED

## 2021-01-03 PROCEDURE — U0003 INFECTIOUS AGENT DETECTION BY NUCLEIC ACID (DNA OR RNA); SEVERE ACUTE RESPIRATORY SYNDROME CORONAVIRUS 2 (SARS-COV-2) (CORONAVIRUS DISEASE [COVID-19]), AMPLIFIED PROBE TECHNIQUE, MAKING USE OF HIGH THROUGHPUT TECHNOLOGIES AS DESCRIBED BY CMS-2020-01-R: HCPCS | Mod: HCNC

## 2021-01-06 ENCOUNTER — ANESTHESIA (OUTPATIENT)
Dept: ENDOSCOPY | Facility: HOSPITAL | Age: 61
End: 2021-01-06
Payer: MEDICARE

## 2021-01-06 ENCOUNTER — ANESTHESIA EVENT (OUTPATIENT)
Dept: ENDOSCOPY | Facility: HOSPITAL | Age: 61
End: 2021-01-06
Payer: MEDICARE

## 2021-01-06 ENCOUNTER — HOSPITAL ENCOUNTER (OUTPATIENT)
Facility: HOSPITAL | Age: 61
Discharge: HOME | End: 2021-01-06
Attending: INTERNAL MEDICINE | Admitting: INTERNAL MEDICINE
Payer: MEDICARE

## 2021-01-06 VITALS
RESPIRATION RATE: 18 BRPM | SYSTOLIC BLOOD PRESSURE: 128 MMHG | DIASTOLIC BLOOD PRESSURE: 87 MMHG | WEIGHT: 165.81 LBS | TEMPERATURE: 97 F | BODY MASS INDEX: 29.38 KG/M2 | HEIGHT: 63 IN | OXYGEN SATURATION: 100 % | HEART RATE: 60 BPM

## 2021-01-06 DIAGNOSIS — Z86.010 HISTORY OF COLON POLYPS: Primary | ICD-10-CM

## 2021-01-06 PROCEDURE — 37000008 HC ANESTHESIA 1ST 15 MINUTES: Mod: HCNC | Performed by: INTERNAL MEDICINE

## 2021-01-06 PROCEDURE — G0105 COLORECTAL SCRN; HI RISK IND: HCPCS | Mod: HCNC,,, | Performed by: INTERNAL MEDICINE

## 2021-01-06 PROCEDURE — G0105 COLORECTAL SCRN; HI RISK IND: ICD-10-PCS | Mod: HCNC,,, | Performed by: INTERNAL MEDICINE

## 2021-01-06 PROCEDURE — G0105 COLORECTAL SCRN; HI RISK IND: HCPCS | Mod: HCNC | Performed by: INTERNAL MEDICINE

## 2021-01-06 PROCEDURE — 63600175 PHARM REV CODE 636 W HCPCS: Mod: HCNC | Performed by: NURSE ANESTHETIST, CERTIFIED REGISTERED

## 2021-01-06 PROCEDURE — 25000003 PHARM REV CODE 250: Mod: HCNC | Performed by: NURSE ANESTHETIST, CERTIFIED REGISTERED

## 2021-01-06 PROCEDURE — 37000009 HC ANESTHESIA EA ADD 15 MINS: Mod: HCNC | Performed by: INTERNAL MEDICINE

## 2021-01-06 RX ORDER — SODIUM CHLORIDE, SODIUM LACTATE, POTASSIUM CHLORIDE, CALCIUM CHLORIDE 600; 310; 30; 20 MG/100ML; MG/100ML; MG/100ML; MG/100ML
INJECTION, SOLUTION INTRAVENOUS CONTINUOUS PRN
Status: DISCONTINUED | OUTPATIENT
Start: 2021-01-06 | End: 2021-01-06

## 2021-01-06 RX ORDER — LIDOCAINE HYDROCHLORIDE 10 MG/ML
INJECTION, SOLUTION EPIDURAL; INFILTRATION; INTRACAUDAL; PERINEURAL
Status: DISCONTINUED | OUTPATIENT
Start: 2021-01-06 | End: 2021-01-06

## 2021-01-06 RX ORDER — PROPOFOL 10 MG/ML
VIAL (ML) INTRAVENOUS
Status: DISCONTINUED | OUTPATIENT
Start: 2021-01-06 | End: 2021-01-06

## 2021-01-06 RX ORDER — SODIUM CHLORIDE, SODIUM LACTATE, POTASSIUM CHLORIDE, CALCIUM CHLORIDE 600; 310; 30; 20 MG/100ML; MG/100ML; MG/100ML; MG/100ML
INJECTION, SOLUTION INTRAVENOUS CONTINUOUS
Status: DISCONTINUED | OUTPATIENT
Start: 2021-01-06 | End: 2021-01-06 | Stop reason: HOSPADM

## 2021-01-06 RX ADMIN — PROPOFOL 20 MG: 10 INJECTION, EMULSION INTRAVENOUS at 08:01

## 2021-01-06 RX ADMIN — LIDOCAINE HYDROCHLORIDE 50 MG: 10 INJECTION, SOLUTION EPIDURAL; INFILTRATION; INTRACAUDAL; PERINEURAL at 08:01

## 2021-01-06 RX ADMIN — PROPOFOL 80 MG: 10 INJECTION, EMULSION INTRAVENOUS at 08:01

## 2021-01-06 RX ADMIN — SODIUM CHLORIDE, SODIUM LACTATE, POTASSIUM CHLORIDE, AND CALCIUM CHLORIDE: 600; 310; 30; 20 INJECTION, SOLUTION INTRAVENOUS at 08:01

## 2021-01-06 RX ADMIN — PROPOFOL 30 MG: 10 INJECTION, EMULSION INTRAVENOUS at 08:01

## 2021-01-11 ENCOUNTER — DOCUMENTATION ONLY (OUTPATIENT)
Dept: NEUROLOGY | Facility: CLINIC | Age: 61
End: 2021-01-11

## 2021-02-05 ENCOUNTER — PATIENT MESSAGE (OUTPATIENT)
Dept: NEUROLOGY | Facility: CLINIC | Age: 61
End: 2021-02-05

## 2021-02-11 DIAGNOSIS — R25.2 SPASTICITY: ICD-10-CM

## 2021-02-11 DIAGNOSIS — M79.2 NEUROPATHIC PAIN: ICD-10-CM

## 2021-02-11 DIAGNOSIS — G35 MULTIPLE SCLEROSIS: ICD-10-CM

## 2021-02-11 DIAGNOSIS — F32.A DEPRESSION, UNSPECIFIED DEPRESSION TYPE: ICD-10-CM

## 2021-02-11 DIAGNOSIS — E03.8 OTHER SPECIFIED HYPOTHYROIDISM: Chronic | ICD-10-CM

## 2021-02-11 RX ORDER — SIMVASTATIN 40 MG/1
40 TABLET, FILM COATED ORAL DAILY
Qty: 90 TABLET | Refills: 3 | Status: SHIPPED | OUTPATIENT
Start: 2021-02-11 | End: 2022-01-13

## 2021-02-11 RX ORDER — PAROXETINE HYDROCHLORIDE 40 MG/1
40 TABLET, FILM COATED ORAL EVERY MORNING
Qty: 90 TABLET | Refills: 3 | Status: SHIPPED | OUTPATIENT
Start: 2021-02-11 | End: 2022-01-19

## 2021-02-11 RX ORDER — LEVOTHYROXINE SODIUM 75 UG/1
75 TABLET ORAL
Qty: 90 TABLET | Refills: 3 | Status: SHIPPED | OUTPATIENT
Start: 2021-02-11 | End: 2021-03-01 | Stop reason: SDUPTHER

## 2021-02-11 RX ORDER — GABAPENTIN 800 MG/1
800 TABLET ORAL 3 TIMES DAILY
Qty: 90 TABLET | Refills: 0 | Status: SHIPPED | OUTPATIENT
Start: 2021-02-11 | End: 2021-02-25 | Stop reason: SDUPTHER

## 2021-02-11 RX ORDER — BACLOFEN 10 MG/1
TABLET ORAL
Qty: 630 TABLET | Refills: 0 | Status: SHIPPED | OUTPATIENT
Start: 2021-02-11 | End: 2021-05-15

## 2021-02-25 ENCOUNTER — PATIENT MESSAGE (OUTPATIENT)
Dept: NEUROLOGY | Facility: CLINIC | Age: 61
End: 2021-02-25

## 2021-02-25 DIAGNOSIS — G35 MULTIPLE SCLEROSIS: ICD-10-CM

## 2021-02-25 DIAGNOSIS — M79.2 NEUROPATHIC PAIN: ICD-10-CM

## 2021-02-25 RX ORDER — GABAPENTIN 800 MG/1
800 TABLET ORAL 3 TIMES DAILY
Qty: 21 TABLET | Refills: 0 | Status: SHIPPED | OUTPATIENT
Start: 2021-02-25 | End: 2021-03-29

## 2021-03-01 ENCOUNTER — PATIENT MESSAGE (OUTPATIENT)
Dept: INTERNAL MEDICINE | Facility: CLINIC | Age: 61
End: 2021-03-01

## 2021-03-01 ENCOUNTER — PATIENT MESSAGE (OUTPATIENT)
Dept: NEUROLOGY | Facility: CLINIC | Age: 61
End: 2021-03-01

## 2021-03-01 DIAGNOSIS — E03.8 OTHER SPECIFIED HYPOTHYROIDISM: Chronic | ICD-10-CM

## 2021-03-01 RX ORDER — LEVOTHYROXINE SODIUM 75 UG/1
75 TABLET ORAL
Qty: 30 TABLET | Refills: 0 | Status: SHIPPED | OUTPATIENT
Start: 2021-03-01 | End: 2021-07-26 | Stop reason: SDUPTHER

## 2021-03-02 ENCOUNTER — DOCUMENTATION ONLY (OUTPATIENT)
Dept: NEUROLOGY | Facility: CLINIC | Age: 61
End: 2021-03-02

## 2021-03-05 ENCOUNTER — TELEPHONE (OUTPATIENT)
Dept: NEUROLOGY | Facility: CLINIC | Age: 61
End: 2021-03-05

## 2021-03-10 ENCOUNTER — SPECIALTY PHARMACY (OUTPATIENT)
Dept: PHARMACY | Facility: CLINIC | Age: 61
End: 2021-03-10

## 2021-03-10 ENCOUNTER — OFFICE VISIT (OUTPATIENT)
Dept: NEUROLOGY | Facility: CLINIC | Age: 61
End: 2021-03-10
Payer: MEDICARE

## 2021-03-10 ENCOUNTER — LAB VISIT (OUTPATIENT)
Dept: LAB | Facility: HOSPITAL | Age: 61
End: 2021-03-10
Attending: PSYCHIATRY & NEUROLOGY
Payer: MEDICARE

## 2021-03-10 VITALS
BODY MASS INDEX: 29.92 KG/M2 | TEMPERATURE: 98 F | WEIGHT: 168.88 LBS | HEIGHT: 63 IN | DIASTOLIC BLOOD PRESSURE: 92 MMHG | HEART RATE: 91 BPM | SYSTOLIC BLOOD PRESSURE: 137 MMHG

## 2021-03-10 DIAGNOSIS — G35 MS (MULTIPLE SCLEROSIS): Primary | ICD-10-CM

## 2021-03-10 DIAGNOSIS — G35 MS (MULTIPLE SCLEROSIS): ICD-10-CM

## 2021-03-10 DIAGNOSIS — G35 MULTIPLE SCLEROSIS: ICD-10-CM

## 2021-03-10 PROCEDURE — 99215 PR OFFICE/OUTPT VISIT, EST, LEVL V, 40-54 MIN: ICD-10-PCS | Mod: S$GLB,,, | Performed by: PSYCHIATRY & NEUROLOGY

## 2021-03-10 PROCEDURE — 36415 COLL VENOUS BLD VENIPUNCTURE: CPT | Performed by: PSYCHIATRY & NEUROLOGY

## 2021-03-10 PROCEDURE — 99999 PR PBB SHADOW E&M-EST. PATIENT-LVL III: ICD-10-PCS | Mod: PBBFAC,,, | Performed by: PSYCHIATRY & NEUROLOGY

## 2021-03-10 PROCEDURE — 99499 UNLISTED E&M SERVICE: CPT | Mod: S$GLB,,, | Performed by: PSYCHIATRY & NEUROLOGY

## 2021-03-10 PROCEDURE — 3008F BODY MASS INDEX DOCD: CPT | Mod: CPTII,S$GLB,, | Performed by: PSYCHIATRY & NEUROLOGY

## 2021-03-10 PROCEDURE — 1126F PR PAIN SEVERITY QUANTIFIED, NO PAIN PRESENT: ICD-10-PCS | Mod: S$GLB,,, | Performed by: PSYCHIATRY & NEUROLOGY

## 2021-03-10 PROCEDURE — 99215 OFFICE O/P EST HI 40 MIN: CPT | Mod: S$GLB,,, | Performed by: PSYCHIATRY & NEUROLOGY

## 2021-03-10 PROCEDURE — 99999 PR PBB SHADOW E&M-EST. PATIENT-LVL III: CPT | Mod: PBBFAC,,, | Performed by: PSYCHIATRY & NEUROLOGY

## 2021-03-10 PROCEDURE — 1126F AMNT PAIN NOTED NONE PRSNT: CPT | Mod: S$GLB,,, | Performed by: PSYCHIATRY & NEUROLOGY

## 2021-03-10 PROCEDURE — 86480 TB TEST CELL IMMUN MEASURE: CPT | Performed by: PSYCHIATRY & NEUROLOGY

## 2021-03-10 PROCEDURE — 3008F PR BODY MASS INDEX (BMI) DOCUMENTED: ICD-10-PCS | Mod: CPTII,S$GLB,, | Performed by: PSYCHIATRY & NEUROLOGY

## 2021-03-10 PROCEDURE — 99499 RISK ADDL DX/OHS AUDIT: ICD-10-PCS | Mod: S$GLB,,, | Performed by: PSYCHIATRY & NEUROLOGY

## 2021-03-10 RX ORDER — TERIFLUNOMIDE 14 MG/1
TABLET, FILM COATED ORAL
Qty: 30 TABLET | Refills: 4 | OUTPATIENT
Start: 2021-03-10 | End: 2021-09-09 | Stop reason: SDUPTHER

## 2021-03-11 ENCOUNTER — PATIENT MESSAGE (OUTPATIENT)
Dept: RESEARCH | Facility: HOSPITAL | Age: 61
End: 2021-03-11

## 2021-03-11 LAB
GAMMA INTERFERON BACKGROUND BLD IA-ACNC: 0.03 IU/ML
M TB IFN-G CD4+ BCKGRND COR BLD-ACNC: 9.6 IU/ML
MITOGEN IGNF BCKGRD COR BLD-ACNC: 0.01 IU/ML
TB GOLD PLUS: POSITIVE
TB2 - NIL: 0 IU/ML

## 2021-03-15 ENCOUNTER — OFFICE VISIT (OUTPATIENT)
Dept: URGENT CARE | Facility: CLINIC | Age: 61
End: 2021-03-15
Payer: MEDICARE

## 2021-03-15 VITALS
TEMPERATURE: 98 F | RESPIRATION RATE: 20 BRPM | OXYGEN SATURATION: 96 % | SYSTOLIC BLOOD PRESSURE: 126 MMHG | HEIGHT: 63 IN | HEART RATE: 88 BPM | BODY MASS INDEX: 29.77 KG/M2 | WEIGHT: 168 LBS | DIASTOLIC BLOOD PRESSURE: 79 MMHG

## 2021-03-15 DIAGNOSIS — N39.0 URINARY TRACT INFECTION WITHOUT HEMATURIA, SITE UNSPECIFIED: Primary | ICD-10-CM

## 2021-03-15 DIAGNOSIS — R39.15 URINARY URGENCY: ICD-10-CM

## 2021-03-15 LAB
BILIRUB UR QL STRIP: NEGATIVE
GLUCOSE UR QL STRIP: NEGATIVE
KETONES UR QL STRIP: NEGATIVE
LEUKOCYTE ESTERASE UR QL STRIP: POSITIVE
PH, POC UA: 5
POC BLOOD, URINE: NEGATIVE
POC NITRATES, URINE: NEGATIVE
PROT UR QL STRIP: POSITIVE
SP GR UR STRIP: 1.02 (ref 1–1.03)
UROBILINOGEN UR STRIP-ACNC: NORMAL (ref 0.1–1.1)

## 2021-03-15 PROCEDURE — 87088 URINE BACTERIA CULTURE: CPT | Performed by: PHYSICIAN ASSISTANT

## 2021-03-15 PROCEDURE — 99000 SPECIMEN HANDLING OFFICE-LAB: CPT | Mod: S$GLB,,, | Performed by: FAMILY MEDICINE

## 2021-03-15 PROCEDURE — 3008F PR BODY MASS INDEX (BMI) DOCUMENTED: ICD-10-PCS | Mod: CPTII,S$GLB,, | Performed by: PHYSICIAN ASSISTANT

## 2021-03-15 PROCEDURE — 99213 OFFICE O/P EST LOW 20 MIN: CPT | Mod: 25,S$GLB,, | Performed by: PHYSICIAN ASSISTANT

## 2021-03-15 PROCEDURE — 81003 POCT URINALYSIS, DIPSTICK, AUTOMATED, W/O SCOPE: ICD-10-PCS | Mod: QW,S$GLB,, | Performed by: PHYSICIAN ASSISTANT

## 2021-03-15 PROCEDURE — 1125F PR PAIN SEVERITY QUANTIFIED, PAIN PRESENT: ICD-10-PCS | Mod: S$GLB,,, | Performed by: PHYSICIAN ASSISTANT

## 2021-03-15 PROCEDURE — 87077 CULTURE AEROBIC IDENTIFY: CPT | Performed by: PHYSICIAN ASSISTANT

## 2021-03-15 PROCEDURE — 81003 URINALYSIS AUTO W/O SCOPE: CPT | Mod: QW,S$GLB,, | Performed by: PHYSICIAN ASSISTANT

## 2021-03-15 PROCEDURE — 99000 PR SPECIMEN HANDLING,DR OFF->LAB: ICD-10-PCS | Mod: S$GLB,,, | Performed by: FAMILY MEDICINE

## 2021-03-15 PROCEDURE — 87186 SC STD MICRODIL/AGAR DIL: CPT | Performed by: PHYSICIAN ASSISTANT

## 2021-03-15 PROCEDURE — 99213 PR OFFICE/OUTPT VISIT, EST, LEVL III, 20-29 MIN: ICD-10-PCS | Mod: 25,S$GLB,, | Performed by: PHYSICIAN ASSISTANT

## 2021-03-15 PROCEDURE — 87086 URINE CULTURE/COLONY COUNT: CPT | Performed by: PHYSICIAN ASSISTANT

## 2021-03-15 PROCEDURE — 3008F BODY MASS INDEX DOCD: CPT | Mod: CPTII,S$GLB,, | Performed by: PHYSICIAN ASSISTANT

## 2021-03-15 PROCEDURE — 1125F AMNT PAIN NOTED PAIN PRSNT: CPT | Mod: S$GLB,,, | Performed by: PHYSICIAN ASSISTANT

## 2021-03-15 RX ORDER — NITROFURANTOIN 25; 75 MG/1; MG/1
100 CAPSULE ORAL 2 TIMES DAILY
Qty: 14 CAPSULE | Refills: 0 | Status: SHIPPED | OUTPATIENT
Start: 2021-03-15 | End: 2021-03-22

## 2021-03-18 ENCOUNTER — TELEPHONE (OUTPATIENT)
Dept: URGENT CARE | Facility: CLINIC | Age: 61
End: 2021-03-18

## 2021-03-19 LAB — BACTERIA UR CULT: ABNORMAL

## 2021-03-20 ENCOUNTER — TELEPHONE (OUTPATIENT)
Dept: URGENT CARE | Facility: CLINIC | Age: 61
End: 2021-03-20

## 2021-03-26 ENCOUNTER — TELEPHONE (OUTPATIENT)
Dept: NEUROLOGY | Facility: CLINIC | Age: 61
End: 2021-03-26

## 2021-03-26 ENCOUNTER — TELEPHONE (OUTPATIENT)
Dept: RESEARCH | Facility: HOSPITAL | Age: 61
End: 2021-03-26

## 2021-03-26 NOTE — TELEPHONE ENCOUNTER
----- Message from Lalita Thrasher sent at 3/26/2021 12:49 PM CDT -----  Regarding: [employer] Start Form  Good afternoon Dr. Smith and staff,     Action Required:      I have faxed over  assistance forms to your office at fax number [phone]. The forms need to be completed by the physician for the patient's [employer] prescription assistance. Please complete the prescription sections of the forms.      Once completed, you can fax them to [facility]. Any questions or concerns regarding the program or completion of the forms, please reach out to Lalita WHEELER at [facility].      Thank you,    Lalita Thrasher  [facility] [phone] (U)

## 2021-03-29 ENCOUNTER — TELEPHONE (OUTPATIENT)
Dept: RESEARCH | Facility: HOSPITAL | Age: 61
End: 2021-03-29

## 2021-04-05 ENCOUNTER — OFFICE VISIT (OUTPATIENT)
Dept: OPHTHALMOLOGY | Facility: CLINIC | Age: 61
End: 2021-04-05
Payer: MEDICARE

## 2021-04-05 DIAGNOSIS — H26.9 CORTICAL CATARACT OF BOTH EYES: ICD-10-CM

## 2021-04-05 DIAGNOSIS — Z83.511 FAMILY HISTORY OF GLAUCOMA: ICD-10-CM

## 2021-04-05 DIAGNOSIS — H40.013 OPEN ANGLE WITH BORDERLINE FINDINGS, LOW RISK, BILATERAL: Primary | ICD-10-CM

## 2021-04-05 DIAGNOSIS — G35 MS (MULTIPLE SCLEROSIS): ICD-10-CM

## 2021-04-05 PROCEDURE — 92083 EXTENDED VISUAL FIELD XM: CPT | Mod: S$GLB,,, | Performed by: OPHTHALMOLOGY

## 2021-04-05 PROCEDURE — 92083 HUMPHREY VISUAL FIELD - OU - BOTH EYES: ICD-10-PCS | Mod: S$GLB,,, | Performed by: OPHTHALMOLOGY

## 2021-04-05 PROCEDURE — 99212 PR OFFICE/OUTPT VISIT, EST, LEVL II, 10-19 MIN: ICD-10-PCS | Mod: S$GLB,,, | Performed by: OPHTHALMOLOGY

## 2021-04-05 PROCEDURE — 99999 PR PBB SHADOW E&M-EST. PATIENT-LVL III: CPT | Mod: PBBFAC,,, | Performed by: OPHTHALMOLOGY

## 2021-04-05 PROCEDURE — 99212 OFFICE O/P EST SF 10 MIN: CPT | Mod: S$GLB,,, | Performed by: OPHTHALMOLOGY

## 2021-04-05 PROCEDURE — 99999 PR PBB SHADOW E&M-EST. PATIENT-LVL III: ICD-10-PCS | Mod: PBBFAC,,, | Performed by: OPHTHALMOLOGY

## 2021-04-05 RX ORDER — INTERFERON BETA-1A 44 UG/.5ML
INJECTION, SOLUTION SUBCUTANEOUS
COMMUNITY
Start: 2021-03-26 | End: 2021-06-01

## 2021-04-14 ENCOUNTER — NURSE TRIAGE (OUTPATIENT)
Dept: ADMINISTRATIVE | Facility: CLINIC | Age: 61
End: 2021-04-14

## 2021-04-21 ENCOUNTER — LAB VISIT (OUTPATIENT)
Dept: LAB | Facility: HOSPITAL | Age: 61
End: 2021-04-21
Attending: PSYCHIATRY & NEUROLOGY
Payer: MEDICARE

## 2021-04-21 DIAGNOSIS — G35 MS (MULTIPLE SCLEROSIS): ICD-10-CM

## 2021-04-21 LAB
ANISOCYTOSIS BLD QL SMEAR: SLIGHT
BASOPHILS # BLD AUTO: 0.04 K/UL (ref 0–0.2)
BASOPHILS NFR BLD: 0.8 % (ref 0–1.9)
DIFFERENTIAL METHOD: ABNORMAL
EOSINOPHIL # BLD AUTO: 0.1 K/UL (ref 0–0.5)
EOSINOPHIL NFR BLD: 1.3 % (ref 0–8)
ERYTHROCYTE [DISTWIDTH] IN BLOOD BY AUTOMATED COUNT: 12.4 % (ref 11.5–14.5)
GIANT PLATELETS BLD QL SMEAR: PRESENT
HCT VFR BLD AUTO: 37 % (ref 37–48.5)
HGB BLD-MCNC: 11.6 G/DL (ref 12–16)
HYPOCHROMIA BLD QL SMEAR: ABNORMAL
IMM GRANULOCYTES # BLD AUTO: 0.01 K/UL (ref 0–0.04)
IMM GRANULOCYTES NFR BLD AUTO: 0.2 % (ref 0–0.5)
LYMPHOCYTES # BLD AUTO: 2.7 K/UL (ref 1–4.8)
LYMPHOCYTES NFR BLD: 50 % (ref 18–48)
MCH RBC QN AUTO: 31.3 PG (ref 27–31)
MCHC RBC AUTO-ENTMCNC: 31.4 G/DL (ref 32–36)
MCV RBC AUTO: 100 FL (ref 82–98)
MONOCYTES # BLD AUTO: 0.4 K/UL (ref 0.3–1)
MONOCYTES NFR BLD: 8.1 % (ref 4–15)
NEUTROPHILS # BLD AUTO: 2.1 K/UL (ref 1.8–7.7)
NEUTROPHILS NFR BLD: 39.6 % (ref 38–73)
NRBC BLD-RTO: 0 /100 WBC
OVALOCYTES BLD QL SMEAR: ABNORMAL
PLATELET # BLD AUTO: 271 K/UL (ref 150–450)
PLATELET BLD QL SMEAR: ABNORMAL
PMV BLD AUTO: 10.7 FL (ref 9.2–12.9)
POIKILOCYTOSIS BLD QL SMEAR: SLIGHT
POLYCHROMASIA BLD QL SMEAR: ABNORMAL
RBC # BLD AUTO: 3.71 M/UL (ref 4–5.4)
SCHISTOCYTES BLD QL SMEAR: ABNORMAL
WBC # BLD AUTO: 5.3 K/UL (ref 3.9–12.7)
WBC TOXIC VACUOLES BLD QL SMEAR: PRESENT

## 2021-04-21 PROCEDURE — 85025 COMPLETE CBC W/AUTO DIFF WBC: CPT | Performed by: PSYCHIATRY & NEUROLOGY

## 2021-04-21 PROCEDURE — 36415 COLL VENOUS BLD VENIPUNCTURE: CPT | Performed by: PSYCHIATRY & NEUROLOGY

## 2021-04-21 PROCEDURE — 80076 HEPATIC FUNCTION PANEL: CPT | Performed by: PSYCHIATRY & NEUROLOGY

## 2021-04-22 ENCOUNTER — OFFICE VISIT (OUTPATIENT)
Dept: URGENT CARE | Facility: CLINIC | Age: 61
End: 2021-04-22
Payer: MEDICARE

## 2021-04-22 VITALS
TEMPERATURE: 98 F | HEIGHT: 63 IN | HEART RATE: 70 BPM | OXYGEN SATURATION: 97 % | DIASTOLIC BLOOD PRESSURE: 85 MMHG | WEIGHT: 168 LBS | BODY MASS INDEX: 29.77 KG/M2 | SYSTOLIC BLOOD PRESSURE: 120 MMHG
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
## 2021-04-22 DIAGNOSIS — S00.462A INSECT BITE OF LEFT EAR WITH LOCAL REACTION, INITIAL ENCOUNTER: Primary | ICD-10-CM

## 2021-04-22 LAB
ALBUMIN SERPL BCP-MCNC: 3.4 G/DL (ref 3.5–5.2)
ALP SERPL-CCNC: 57 U/L (ref 55–135)
ALT SERPL W/O P-5'-P-CCNC: 18 U/L (ref 10–44)
AST SERPL-CCNC: 31 U/L (ref 10–40)
BILIRUB DIRECT SERPL-MCNC: 0.2 MG/DL (ref 0.1–0.3)
BILIRUB SERPL-MCNC: 0.3 MG/DL (ref 0.1–1)
PROT SERPL-MCNC: 7.3 G/DL (ref 6–8.4)

## 2021-04-22 PROCEDURE — 99213 OFFICE O/P EST LOW 20 MIN: CPT | Mod: S$GLB,,, | Performed by: PHYSICIAN ASSISTANT

## 2021-04-22 PROCEDURE — 99213 PR OFFICE/OUTPT VISIT, EST, LEVL III, 20-29 MIN: ICD-10-PCS | Mod: S$GLB,,, | Performed by: PHYSICIAN ASSISTANT

## 2021-04-22 PROCEDURE — 3008F PR BODY MASS INDEX (BMI) DOCUMENTED: ICD-10-PCS | Mod: CPTII,S$GLB,, | Performed by: PHYSICIAN ASSISTANT

## 2021-04-22 PROCEDURE — 3008F BODY MASS INDEX DOCD: CPT | Mod: CPTII,S$GLB,, | Performed by: PHYSICIAN ASSISTANT

## 2021-04-22 PROCEDURE — 1126F PR PAIN SEVERITY QUANTIFIED, NO PAIN PRESENT: ICD-10-PCS | Mod: S$GLB,,, | Performed by: PHYSICIAN ASSISTANT

## 2021-04-22 PROCEDURE — 1126F AMNT PAIN NOTED NONE PRSNT: CPT | Mod: S$GLB,,, | Performed by: PHYSICIAN ASSISTANT

## 2021-04-22 RX ORDER — HYDROCORTISONE 25 MG/G
OINTMENT TOPICAL 2 TIMES DAILY
Qty: 20 G | Refills: 0 | Status: SHIPPED | OUTPATIENT
Start: 2021-04-22 | End: 2021-06-01

## 2021-04-22 RX ORDER — NEOMYCIN SULFATE, POLYMYXIN B SULFATE, HYDROCORTISONE 3.5; 10000; 1 MG/ML; [USP'U]/ML; MG/ML
3 SOLUTION/ DROPS AURICULAR (OTIC) 3 TIMES DAILY
Qty: 10 ML | Refills: 0 | Status: SHIPPED | OUTPATIENT
Start: 2021-04-22 | End: 2021-04-29

## 2021-04-25 ENCOUNTER — TELEPHONE (OUTPATIENT)
Dept: URGENT CARE | Facility: CLINIC | Age: 61
End: 2021-04-25

## 2021-04-26 ENCOUNTER — PATIENT MESSAGE (OUTPATIENT)
Dept: NEUROLOGY | Facility: CLINIC | Age: 61
End: 2021-04-26

## 2021-04-26 DIAGNOSIS — R89.9 ABNORMAL LABORATORY TEST: Primary | ICD-10-CM

## 2021-04-28 ENCOUNTER — PATIENT MESSAGE (OUTPATIENT)
Dept: RESEARCH | Facility: HOSPITAL | Age: 61
End: 2021-04-28

## 2021-04-29 ENCOUNTER — PATIENT MESSAGE (OUTPATIENT)
Dept: NEUROLOGY | Facility: CLINIC | Age: 61
End: 2021-04-29

## 2021-05-03 ENCOUNTER — PES CALL (OUTPATIENT)
Dept: ADMINISTRATIVE | Facility: CLINIC | Age: 61
End: 2021-05-03

## 2021-05-04 ENCOUNTER — PES CALL (OUTPATIENT)
Dept: ADMINISTRATIVE | Facility: CLINIC | Age: 61
End: 2021-05-04

## 2021-05-04 ENCOUNTER — PATIENT MESSAGE (OUTPATIENT)
Dept: NEUROLOGY | Facility: CLINIC | Age: 61
End: 2021-05-04

## 2021-05-12 ENCOUNTER — OFFICE VISIT (OUTPATIENT)
Dept: FAMILY MEDICINE | Facility: CLINIC | Age: 61
End: 2021-05-12
Payer: MEDICARE

## 2021-05-12 VITALS
DIASTOLIC BLOOD PRESSURE: 70 MMHG | OXYGEN SATURATION: 97 % | WEIGHT: 163 LBS | BODY MASS INDEX: 28.88 KG/M2 | HEIGHT: 63 IN | SYSTOLIC BLOOD PRESSURE: 104 MMHG | TEMPERATURE: 98 F | HEART RATE: 98 BPM | RESPIRATION RATE: 18 BRPM

## 2021-05-12 DIAGNOSIS — R26.9 NEUROLOGIC GAIT DYSFUNCTION: ICD-10-CM

## 2021-05-12 DIAGNOSIS — Z00.00 ENCOUNTER FOR PREVENTIVE HEALTH EXAMINATION: Primary | ICD-10-CM

## 2021-05-12 DIAGNOSIS — E78.49 OTHER HYPERLIPIDEMIA: Chronic | ICD-10-CM

## 2021-05-12 DIAGNOSIS — I50.32 CHRONIC DIASTOLIC HEART FAILURE: ICD-10-CM

## 2021-05-12 DIAGNOSIS — M62.838 MUSCLE SPASMS OF BOTH LOWER EXTREMITIES: ICD-10-CM

## 2021-05-12 DIAGNOSIS — M81.0 AGE-RELATED OSTEOPOROSIS WITHOUT CURRENT PATHOLOGICAL FRACTURE: Chronic | ICD-10-CM

## 2021-05-12 DIAGNOSIS — G62.9 POLYNEUROPATHY: ICD-10-CM

## 2021-05-12 DIAGNOSIS — F32.0 MAJOR DEPRESSIVE DISORDER, SINGLE EPISODE, MILD: Chronic | ICD-10-CM

## 2021-05-12 DIAGNOSIS — D84.9 IMMUNOSUPPRESSION: ICD-10-CM

## 2021-05-12 DIAGNOSIS — G35 MS (MULTIPLE SCLEROSIS): Chronic | ICD-10-CM

## 2021-05-12 DIAGNOSIS — G47.33 OSA ON CPAP: Chronic | ICD-10-CM

## 2021-05-12 DIAGNOSIS — E03.9 ACQUIRED HYPOTHYROIDISM: Chronic | ICD-10-CM

## 2021-05-12 PROCEDURE — 3008F PR BODY MASS INDEX (BMI) DOCUMENTED: ICD-10-PCS | Mod: CPTII,S$GLB,, | Performed by: NURSE PRACTITIONER

## 2021-05-12 PROCEDURE — 99499 RISK ADDL DX/OHS AUDIT: ICD-10-PCS | Mod: S$GLB,,, | Performed by: NURSE PRACTITIONER

## 2021-05-12 PROCEDURE — 99999 PR PBB SHADOW E&M-EST. PATIENT-LVL V: ICD-10-PCS | Mod: PBBFAC,,, | Performed by: NURSE PRACTITIONER

## 2021-05-12 PROCEDURE — 1126F PR PAIN SEVERITY QUANTIFIED, NO PAIN PRESENT: ICD-10-PCS | Mod: S$GLB,,, | Performed by: NURSE PRACTITIONER

## 2021-05-12 PROCEDURE — 1126F AMNT PAIN NOTED NONE PRSNT: CPT | Mod: S$GLB,,, | Performed by: NURSE PRACTITIONER

## 2021-05-12 PROCEDURE — G0439 PPPS, SUBSEQ VISIT: HCPCS | Mod: S$GLB,,, | Performed by: NURSE PRACTITIONER

## 2021-05-12 PROCEDURE — 3008F BODY MASS INDEX DOCD: CPT | Mod: CPTII,S$GLB,, | Performed by: NURSE PRACTITIONER

## 2021-05-12 PROCEDURE — G0439 PR MEDICARE ANNUAL WELLNESS SUBSEQUENT VISIT: ICD-10-PCS | Mod: S$GLB,,, | Performed by: NURSE PRACTITIONER

## 2021-05-12 PROCEDURE — 99999 PR PBB SHADOW E&M-EST. PATIENT-LVL V: CPT | Mod: PBBFAC,,, | Performed by: NURSE PRACTITIONER

## 2021-05-12 PROCEDURE — 99499 UNLISTED E&M SERVICE: CPT | Mod: S$GLB,,, | Performed by: NURSE PRACTITIONER

## 2021-05-21 ENCOUNTER — HOSPITAL ENCOUNTER (OUTPATIENT)
Dept: RADIOLOGY | Facility: HOSPITAL | Age: 61
Discharge: HOME | End: 2021-05-21
Attending: SURGERY
Payer: MEDICARE

## 2021-05-21 DIAGNOSIS — R92.8 ABNORMAL MAMMOGRAM OF LEFT BREAST: ICD-10-CM

## 2021-05-21 PROCEDURE — 77061 MAMMO DIGITAL DIAGNOSTIC LEFT WITH TOMO: ICD-10-PCS | Mod: 26,LT,, | Performed by: RADIOLOGY

## 2021-05-21 PROCEDURE — 77061 BREAST TOMOSYNTHESIS UNI: CPT | Mod: TC,LT

## 2021-05-21 PROCEDURE — 77061 BREAST TOMOSYNTHESIS UNI: CPT | Mod: 26,LT,, | Performed by: RADIOLOGY

## 2021-05-21 PROCEDURE — 77065 DX MAMMO INCL CAD UNI: CPT | Mod: 26,LT,, | Performed by: RADIOLOGY

## 2021-05-21 PROCEDURE — 77065 MAMMO DIGITAL DIAGNOSTIC LEFT WITH TOMO: ICD-10-PCS | Mod: 26,LT,, | Performed by: RADIOLOGY

## 2021-05-24 ENCOUNTER — DOCUMENTATION ONLY (OUTPATIENT)
Dept: NEUROLOGY | Facility: CLINIC | Age: 61
End: 2021-05-24

## 2021-05-25 ENCOUNTER — LAB VISIT (OUTPATIENT)
Dept: LAB | Facility: HOSPITAL | Age: 61
End: 2021-05-25
Attending: PSYCHIATRY & NEUROLOGY
Payer: MEDICARE

## 2021-05-25 ENCOUNTER — PATIENT OUTREACH (OUTPATIENT)
Dept: ADMINISTRATIVE | Facility: OTHER | Age: 61
End: 2021-05-25

## 2021-05-25 DIAGNOSIS — G35 MS (MULTIPLE SCLEROSIS): ICD-10-CM

## 2021-05-25 LAB
ALBUMIN SERPL BCP-MCNC: 3.7 G/DL (ref 3.5–5.2)
ALP SERPL-CCNC: 57 U/L (ref 55–135)
ALT SERPL W/O P-5'-P-CCNC: 22 U/L (ref 10–44)
AST SERPL-CCNC: 34 U/L (ref 10–40)
BASOPHILS # BLD AUTO: 0.03 K/UL (ref 0–0.2)
BASOPHILS NFR BLD: 0.6 % (ref 0–1.9)
BILIRUB DIRECT SERPL-MCNC: 0.2 MG/DL (ref 0.1–0.3)
BILIRUB SERPL-MCNC: 0.4 MG/DL (ref 0.1–1)
DIFFERENTIAL METHOD: ABNORMAL
EOSINOPHIL # BLD AUTO: 0 K/UL (ref 0–0.5)
EOSINOPHIL NFR BLD: 0.4 % (ref 0–8)
ERYTHROCYTE [DISTWIDTH] IN BLOOD BY AUTOMATED COUNT: 12.9 % (ref 11.5–14.5)
HCT VFR BLD AUTO: 39.3 % (ref 37–48.5)
HGB BLD-MCNC: 12.4 G/DL (ref 12–16)
IMM GRANULOCYTES # BLD AUTO: 0 K/UL (ref 0–0.04)
IMM GRANULOCYTES NFR BLD AUTO: 0 % (ref 0–0.5)
LYMPHOCYTES # BLD AUTO: 2.3 K/UL (ref 1–4.8)
LYMPHOCYTES NFR BLD: 46 % (ref 18–48)
MCH RBC QN AUTO: 31.4 PG (ref 27–31)
MCHC RBC AUTO-ENTMCNC: 31.6 G/DL (ref 32–36)
MCV RBC AUTO: 100 FL (ref 82–98)
MONOCYTES # BLD AUTO: 0.5 K/UL (ref 0.3–1)
MONOCYTES NFR BLD: 9.7 % (ref 4–15)
NEUTROPHILS # BLD AUTO: 2.2 K/UL (ref 1.8–7.7)
NEUTROPHILS NFR BLD: 43.3 % (ref 38–73)
NRBC BLD-RTO: 0 /100 WBC
PLATELET # BLD AUTO: 228 K/UL (ref 150–450)
PMV BLD AUTO: 10.2 FL (ref 9.2–12.9)
PROT SERPL-MCNC: 7.4 G/DL (ref 6–8.4)
RBC # BLD AUTO: 3.95 M/UL (ref 4–5.4)
WBC # BLD AUTO: 5.07 K/UL (ref 3.9–12.7)

## 2021-05-25 PROCEDURE — 85025 COMPLETE CBC W/AUTO DIFF WBC: CPT | Performed by: PHYSICIAN ASSISTANT

## 2021-05-25 PROCEDURE — 80076 HEPATIC FUNCTION PANEL: CPT | Performed by: PHYSICIAN ASSISTANT

## 2021-05-25 PROCEDURE — 36415 COLL VENOUS BLD VENIPUNCTURE: CPT | Performed by: PHYSICIAN ASSISTANT

## 2021-05-26 ENCOUNTER — OFFICE VISIT (OUTPATIENT)
Dept: SURGERY | Facility: CLINIC | Age: 61
End: 2021-05-26
Payer: MEDICARE

## 2021-05-26 VITALS — BODY MASS INDEX: 28.9 KG/M2 | WEIGHT: 163.13 LBS

## 2021-05-26 DIAGNOSIS — R92.8 ABNORMAL MAMMOGRAM OF LEFT BREAST: Primary | ICD-10-CM

## 2021-05-26 PROCEDURE — 99214 PR OFFICE/OUTPT VISIT, EST, LEVL IV, 30-39 MIN: ICD-10-PCS | Mod: S$GLB,,, | Performed by: SURGERY

## 2021-05-26 PROCEDURE — 3008F PR BODY MASS INDEX (BMI) DOCUMENTED: ICD-10-PCS | Mod: CPTII,S$GLB,, | Performed by: SURGERY

## 2021-05-26 PROCEDURE — 99999 PR PBB SHADOW E&M-EST. PATIENT-LVL II: CPT | Mod: PBBFAC,,, | Performed by: SURGERY

## 2021-05-26 PROCEDURE — 99214 OFFICE O/P EST MOD 30 MIN: CPT | Mod: S$GLB,,, | Performed by: SURGERY

## 2021-05-26 PROCEDURE — 3008F BODY MASS INDEX DOCD: CPT | Mod: CPTII,S$GLB,, | Performed by: SURGERY

## 2021-05-26 PROCEDURE — 99999 PR PBB SHADOW E&M-EST. PATIENT-LVL II: ICD-10-PCS | Mod: PBBFAC,,, | Performed by: SURGERY

## 2021-05-31 ENCOUNTER — PATIENT MESSAGE (OUTPATIENT)
Dept: PSYCHIATRY | Facility: CLINIC | Age: 61
End: 2021-05-31

## 2021-06-02 ENCOUNTER — OFFICE VISIT (OUTPATIENT)
Dept: NEUROLOGY | Facility: CLINIC | Age: 61
End: 2021-06-02
Payer: MEDICARE

## 2021-06-02 VITALS
HEART RATE: 60 BPM | WEIGHT: 162.94 LBS | BODY MASS INDEX: 28.87 KG/M2 | RESPIRATION RATE: 20 BRPM | HEIGHT: 63 IN | SYSTOLIC BLOOD PRESSURE: 104 MMHG | DIASTOLIC BLOOD PRESSURE: 70 MMHG

## 2021-06-02 DIAGNOSIS — M79.2 NEUROPATHIC PAIN: ICD-10-CM

## 2021-06-02 DIAGNOSIS — R26.9 NEUROLOGIC GAIT DYSFUNCTION: ICD-10-CM

## 2021-06-02 DIAGNOSIS — R25.2 SPASTICITY: ICD-10-CM

## 2021-06-02 DIAGNOSIS — E55.9 VITAMIN D INSUFFICIENCY: ICD-10-CM

## 2021-06-02 DIAGNOSIS — R89.9 ABNORMAL LABORATORY TEST RESULT: ICD-10-CM

## 2021-06-02 DIAGNOSIS — Z71.89 COUNSELING REGARDING GOALS OF CARE: ICD-10-CM

## 2021-06-02 DIAGNOSIS — G35 MULTIPLE SCLEROSIS: Primary | ICD-10-CM

## 2021-06-02 PROCEDURE — 1126F PR PAIN SEVERITY QUANTIFIED, NO PAIN PRESENT: ICD-10-PCS | Mod: S$GLB,,, | Performed by: PHYSICIAN ASSISTANT

## 2021-06-02 PROCEDURE — 3008F PR BODY MASS INDEX (BMI) DOCUMENTED: ICD-10-PCS | Mod: CPTII,S$GLB,, | Performed by: PHYSICIAN ASSISTANT

## 2021-06-02 PROCEDURE — 1126F AMNT PAIN NOTED NONE PRSNT: CPT | Mod: S$GLB,,, | Performed by: PHYSICIAN ASSISTANT

## 2021-06-02 PROCEDURE — 99999 PR PBB SHADOW E&M-EST. PATIENT-LVL V: ICD-10-PCS | Mod: PBBFAC,,, | Performed by: PHYSICIAN ASSISTANT

## 2021-06-02 PROCEDURE — 99215 OFFICE O/P EST HI 40 MIN: CPT | Mod: S$GLB,,, | Performed by: PHYSICIAN ASSISTANT

## 2021-06-02 PROCEDURE — 99215 PR OFFICE/OUTPT VISIT, EST, LEVL V, 40-54 MIN: ICD-10-PCS | Mod: S$GLB,,, | Performed by: PHYSICIAN ASSISTANT

## 2021-06-02 PROCEDURE — 99999 PR PBB SHADOW E&M-EST. PATIENT-LVL V: CPT | Mod: PBBFAC,,, | Performed by: PHYSICIAN ASSISTANT

## 2021-06-02 PROCEDURE — 3008F BODY MASS INDEX DOCD: CPT | Mod: CPTII,S$GLB,, | Performed by: PHYSICIAN ASSISTANT

## 2021-06-03 ENCOUNTER — LAB VISIT (OUTPATIENT)
Dept: LAB | Facility: HOSPITAL | Age: 61
End: 2021-06-03
Attending: INTERNAL MEDICINE
Payer: MEDICARE

## 2021-06-03 DIAGNOSIS — R89.9 ABNORMAL LABORATORY TEST RESULT: ICD-10-CM

## 2021-06-03 DIAGNOSIS — G35 MULTIPLE SCLEROSIS: ICD-10-CM

## 2021-06-03 PROCEDURE — 86480 TB TEST CELL IMMUN MEASURE: CPT | Performed by: PHYSICIAN ASSISTANT

## 2021-06-04 ENCOUNTER — PATIENT MESSAGE (OUTPATIENT)
Dept: NEUROLOGY | Facility: CLINIC | Age: 61
End: 2021-06-04

## 2021-06-04 DIAGNOSIS — G35 MULTIPLE SCLEROSIS: Primary | ICD-10-CM

## 2021-06-04 DIAGNOSIS — Z78.9 IMPAIRED MOBILITY AND ACTIVITIES OF DAILY LIVING: ICD-10-CM

## 2021-06-04 DIAGNOSIS — Z74.09 IMPAIRED MOBILITY AND ACTIVITIES OF DAILY LIVING: ICD-10-CM

## 2021-06-07 LAB
GAMMA INTERFERON BACKGROUND BLD IA-ACNC: 0.04 IU/ML
M TB IFN-G CD4+ BCKGRND COR BLD-ACNC: -0.01 IU/ML
MITOGEN IGNF BCKGRD COR BLD-ACNC: >10 IU/ML
TB GOLD PLUS: NEGATIVE
TB2 - NIL: 0 IU/ML

## 2021-06-08 ENCOUNTER — TELEPHONE (OUTPATIENT)
Dept: NEUROLOGY | Facility: CLINIC | Age: 61
End: 2021-06-08

## 2021-06-10 ENCOUNTER — PATIENT MESSAGE (OUTPATIENT)
Dept: NEUROLOGY | Facility: CLINIC | Age: 61
End: 2021-06-10

## 2021-06-10 DIAGNOSIS — G35 MULTIPLE SCLEROSIS: ICD-10-CM

## 2021-06-10 DIAGNOSIS — M79.2 NEUROPATHIC PAIN: ICD-10-CM

## 2021-06-11 ENCOUNTER — PATIENT MESSAGE (OUTPATIENT)
Dept: NEUROLOGY | Facility: CLINIC | Age: 61
End: 2021-06-11

## 2021-06-11 ENCOUNTER — PATIENT MESSAGE (OUTPATIENT)
Dept: INTERNAL MEDICINE | Facility: CLINIC | Age: 61
End: 2021-06-11

## 2021-06-11 RX ORDER — GABAPENTIN 800 MG/1
800 TABLET ORAL 3 TIMES DAILY
Qty: 42 TABLET | Refills: 0 | Status: SHIPPED | OUTPATIENT
Start: 2021-06-11 | End: 2021-06-22 | Stop reason: SDUPTHER

## 2021-06-15 ENCOUNTER — TELEPHONE (OUTPATIENT)
Dept: RHEUMATOLOGY | Facility: CLINIC | Age: 61
End: 2021-06-15

## 2021-06-16 ENCOUNTER — OFFICE VISIT (OUTPATIENT)
Dept: RHEUMATOLOGY | Facility: CLINIC | Age: 61
End: 2021-06-16
Payer: MEDICARE

## 2021-06-16 ENCOUNTER — INFUSION (OUTPATIENT)
Dept: INFUSION THERAPY | Facility: HOSPITAL | Age: 61
End: 2021-06-16
Attending: SURGERY
Payer: MEDICARE

## 2021-06-16 VITALS
OXYGEN SATURATION: 95 % | RESPIRATION RATE: 16 BRPM | TEMPERATURE: 98 F | DIASTOLIC BLOOD PRESSURE: 65 MMHG | HEART RATE: 60 BPM | SYSTOLIC BLOOD PRESSURE: 105 MMHG

## 2021-06-16 VITALS
SYSTOLIC BLOOD PRESSURE: 113 MMHG | HEART RATE: 72 BPM | BODY MASS INDEX: 28.64 KG/M2 | HEIGHT: 63 IN | DIASTOLIC BLOOD PRESSURE: 79 MMHG | WEIGHT: 161.63 LBS

## 2021-06-16 DIAGNOSIS — M81.0 AGE-RELATED OSTEOPOROSIS WITHOUT CURRENT PATHOLOGICAL FRACTURE: Primary | ICD-10-CM

## 2021-06-16 PROCEDURE — 99214 OFFICE O/P EST MOD 30 MIN: CPT | Mod: S$GLB,,, | Performed by: INTERNAL MEDICINE

## 2021-06-16 PROCEDURE — 96372 THER/PROPH/DIAG INJ SC/IM: CPT

## 2021-06-16 PROCEDURE — 1126F AMNT PAIN NOTED NONE PRSNT: CPT | Mod: S$GLB,,, | Performed by: INTERNAL MEDICINE

## 2021-06-16 PROCEDURE — 63600175 PHARM REV CODE 636 W HCPCS: Mod: JG | Performed by: INTERNAL MEDICINE

## 2021-06-16 PROCEDURE — 3008F BODY MASS INDEX DOCD: CPT | Mod: CPTII,S$GLB,, | Performed by: INTERNAL MEDICINE

## 2021-06-16 PROCEDURE — 3008F PR BODY MASS INDEX (BMI) DOCUMENTED: ICD-10-PCS | Mod: CPTII,S$GLB,, | Performed by: INTERNAL MEDICINE

## 2021-06-16 PROCEDURE — 99999 PR PBB SHADOW E&M-EST. PATIENT-LVL IV: CPT | Mod: PBBFAC,,, | Performed by: INTERNAL MEDICINE

## 2021-06-16 PROCEDURE — 1126F PR PAIN SEVERITY QUANTIFIED, NO PAIN PRESENT: ICD-10-PCS | Mod: S$GLB,,, | Performed by: INTERNAL MEDICINE

## 2021-06-16 PROCEDURE — 99999 PR PBB SHADOW E&M-EST. PATIENT-LVL IV: ICD-10-PCS | Mod: PBBFAC,,, | Performed by: INTERNAL MEDICINE

## 2021-06-16 PROCEDURE — 99214 PR OFFICE/OUTPT VISIT, EST, LEVL IV, 30-39 MIN: ICD-10-PCS | Mod: S$GLB,,, | Performed by: INTERNAL MEDICINE

## 2021-06-16 RX ORDER — SODIUM CHLORIDE, SODIUM LACTATE, POTASSIUM CHLORIDE, CALCIUM CHLORIDE 600; 310; 30; 20 MG/100ML; MG/100ML; MG/100ML; MG/100ML
INJECTION, SOLUTION INTRAVENOUS
COMMUNITY
Start: 2021-01-06 | End: 2022-04-11

## 2021-06-16 RX ADMIN — DENOSUMAB 60 MG: 60 INJECTION SUBCUTANEOUS at 12:06

## 2021-06-21 ENCOUNTER — LAB VISIT (OUTPATIENT)
Dept: LAB | Facility: HOSPITAL | Age: 61
End: 2021-06-21
Attending: PSYCHIATRY & NEUROLOGY
Payer: MEDICARE

## 2021-06-21 ENCOUNTER — CLINICAL SUPPORT (OUTPATIENT)
Dept: REHABILITATION | Facility: HOSPITAL | Age: 61
End: 2021-06-21
Payer: MEDICARE

## 2021-06-21 ENCOUNTER — PATIENT MESSAGE (OUTPATIENT)
Dept: INTERNAL MEDICINE | Facility: CLINIC | Age: 61
End: 2021-06-21

## 2021-06-21 DIAGNOSIS — R26.9 NEUROLOGIC GAIT DYSFUNCTION: ICD-10-CM

## 2021-06-21 DIAGNOSIS — Z74.09 DECREASED STRENGTH, ENDURANCE, AND MOBILITY: ICD-10-CM

## 2021-06-21 DIAGNOSIS — G35 MULTIPLE SCLEROSIS: ICD-10-CM

## 2021-06-21 DIAGNOSIS — G35 MS (MULTIPLE SCLEROSIS): ICD-10-CM

## 2021-06-21 DIAGNOSIS — R68.89 DECREASED STRENGTH, ENDURANCE, AND MOBILITY: ICD-10-CM

## 2021-06-21 DIAGNOSIS — R53.1 DECREASED STRENGTH, ENDURANCE, AND MOBILITY: ICD-10-CM

## 2021-06-21 LAB
ALBUMIN SERPL BCP-MCNC: 3.9 G/DL (ref 3.5–5.2)
ALP SERPL-CCNC: 62 U/L (ref 55–135)
ALT SERPL W/O P-5'-P-CCNC: 21 U/L (ref 10–44)
AST SERPL-CCNC: 36 U/L (ref 10–40)
BASOPHILS # BLD AUTO: 0.03 K/UL (ref 0–0.2)
BASOPHILS NFR BLD: 0.5 % (ref 0–1.9)
BILIRUB DIRECT SERPL-MCNC: 0.2 MG/DL (ref 0.1–0.3)
BILIRUB SERPL-MCNC: 0.4 MG/DL (ref 0.1–1)
DIFFERENTIAL METHOD: ABNORMAL
EOSINOPHIL # BLD AUTO: 0 K/UL (ref 0–0.5)
EOSINOPHIL NFR BLD: 0.7 % (ref 0–8)
ERYTHROCYTE [DISTWIDTH] IN BLOOD BY AUTOMATED COUNT: 12.8 % (ref 11.5–14.5)
HCT VFR BLD AUTO: 38.6 % (ref 37–48.5)
HGB BLD-MCNC: 12.3 G/DL (ref 12–16)
IMM GRANULOCYTES # BLD AUTO: 0.01 K/UL (ref 0–0.04)
IMM GRANULOCYTES NFR BLD AUTO: 0.2 % (ref 0–0.5)
LYMPHOCYTES # BLD AUTO: 2.6 K/UL (ref 1–4.8)
LYMPHOCYTES NFR BLD: 44.5 % (ref 18–48)
MCH RBC QN AUTO: 31.6 PG (ref 27–31)
MCHC RBC AUTO-ENTMCNC: 31.9 G/DL (ref 32–36)
MCV RBC AUTO: 99 FL (ref 82–98)
MONOCYTES # BLD AUTO: 0.5 K/UL (ref 0.3–1)
MONOCYTES NFR BLD: 8.4 % (ref 4–15)
NEUTROPHILS # BLD AUTO: 2.6 K/UL (ref 1.8–7.7)
NEUTROPHILS NFR BLD: 45.7 % (ref 38–73)
NRBC BLD-RTO: 0 /100 WBC
PLATELET # BLD AUTO: 215 K/UL (ref 150–450)
PMV BLD AUTO: 11.4 FL (ref 9.2–12.9)
PROT SERPL-MCNC: 7.5 G/DL (ref 6–8.4)
RBC # BLD AUTO: 3.89 M/UL (ref 4–5.4)
WBC # BLD AUTO: 5.73 K/UL (ref 3.9–12.7)

## 2021-06-21 PROCEDURE — 97163 PT EVAL HIGH COMPLEX 45 MIN: CPT

## 2021-06-21 PROCEDURE — 80076 HEPATIC FUNCTION PANEL: CPT | Performed by: PSYCHIATRY & NEUROLOGY

## 2021-06-21 PROCEDURE — 36415 COLL VENOUS BLD VENIPUNCTURE: CPT | Performed by: PSYCHIATRY & NEUROLOGY

## 2021-06-21 PROCEDURE — 85025 COMPLETE CBC W/AUTO DIFF WBC: CPT | Performed by: PSYCHIATRY & NEUROLOGY

## 2021-06-22 ENCOUNTER — PATIENT MESSAGE (OUTPATIENT)
Dept: NEUROLOGY | Facility: CLINIC | Age: 61
End: 2021-06-22

## 2021-06-22 DIAGNOSIS — M79.2 NEUROPATHIC PAIN: ICD-10-CM

## 2021-06-22 DIAGNOSIS — G35 MULTIPLE SCLEROSIS: ICD-10-CM

## 2021-06-22 PROBLEM — R68.89 DECREASED STRENGTH, ENDURANCE, AND MOBILITY: Status: ACTIVE | Noted: 2021-06-22

## 2021-06-22 PROBLEM — R53.1 DECREASED STRENGTH, ENDURANCE, AND MOBILITY: Status: ACTIVE | Noted: 2021-06-22

## 2021-06-22 PROBLEM — Z74.09 DECREASED STRENGTH, ENDURANCE, AND MOBILITY: Status: ACTIVE | Noted: 2021-06-22

## 2021-06-24 DIAGNOSIS — E78.49 OTHER HYPERLIPIDEMIA: ICD-10-CM

## 2021-06-24 DIAGNOSIS — E03.9 ACQUIRED HYPOTHYROIDISM: Primary | ICD-10-CM

## 2021-06-24 RX ORDER — GABAPENTIN 800 MG/1
TABLET ORAL
Qty: 270 TABLET | Refills: 1 | Status: SHIPPED | OUTPATIENT
Start: 2021-06-24 | End: 2022-01-31

## 2021-06-29 ENCOUNTER — HOSPITAL ENCOUNTER (OUTPATIENT)
Dept: RADIOLOGY | Facility: HOSPITAL | Age: 61
Discharge: HOME | End: 2021-06-29
Attending: PHYSICIAN ASSISTANT
Payer: MEDICARE

## 2021-06-29 ENCOUNTER — CLINICAL SUPPORT (OUTPATIENT)
Dept: REHABILITATION | Facility: HOSPITAL | Age: 61
End: 2021-06-29
Payer: MEDICARE

## 2021-06-29 DIAGNOSIS — G35 MULTIPLE SCLEROSIS: ICD-10-CM

## 2021-06-29 DIAGNOSIS — R68.89 DECREASED STRENGTH, ENDURANCE, AND MOBILITY: ICD-10-CM

## 2021-06-29 DIAGNOSIS — Z74.09 DECREASED STRENGTH, ENDURANCE, AND MOBILITY: ICD-10-CM

## 2021-06-29 DIAGNOSIS — R53.1 DECREASED STRENGTH, ENDURANCE, AND MOBILITY: ICD-10-CM

## 2021-06-29 PROCEDURE — A9585 GADOBUTROL INJECTION: HCPCS | Performed by: PHYSICIAN ASSISTANT

## 2021-06-29 PROCEDURE — 70553 MRI BRAIN STEM W/O & W/DYE: CPT | Mod: TC

## 2021-06-29 PROCEDURE — 72156 MRI NECK SPINE W/O & W/DYE: CPT | Mod: TC

## 2021-06-29 PROCEDURE — 25500020 PHARM REV CODE 255: Performed by: PHYSICIAN ASSISTANT

## 2021-06-29 PROCEDURE — 70553 MRI BRAIN STEM W/O & W/DYE: CPT | Mod: 26,,, | Performed by: RADIOLOGY

## 2021-06-29 PROCEDURE — 72156 MRI NECK SPINE W/O & W/DYE: CPT | Mod: 26,,, | Performed by: RADIOLOGY

## 2021-06-29 PROCEDURE — 97113 AQUATIC THERAPY/EXERCISES: CPT | Performed by: PHYSICAL THERAPIST

## 2021-06-29 PROCEDURE — 72157 MRI CHEST SPINE W/O & W/DYE: CPT | Mod: 26,,, | Performed by: RADIOLOGY

## 2021-06-29 PROCEDURE — 72157 MRI THORACIC SPINE DEMYELINATING W W/O CONTRAST: ICD-10-PCS | Mod: 26,,, | Performed by: RADIOLOGY

## 2021-06-29 PROCEDURE — 70553 MRI BRAIN DEMYELINATING W/ WO CONTRAST: ICD-10-PCS | Mod: 26,,, | Performed by: RADIOLOGY

## 2021-06-29 PROCEDURE — 72157 MRI CHEST SPINE W/O & W/DYE: CPT | Mod: TC

## 2021-06-29 PROCEDURE — 72156 MRI CERVICAL SPINE DEMYELINATING W W/O CONTRAST: ICD-10-PCS | Mod: 26,,, | Performed by: RADIOLOGY

## 2021-06-29 RX ORDER — GADOBUTROL 604.72 MG/ML
7 INJECTION INTRAVENOUS
Status: COMPLETED | OUTPATIENT
Start: 2021-06-29 | End: 2021-06-29

## 2021-06-29 RX ADMIN — GADOBUTROL 7 ML: 604.72 INJECTION INTRAVENOUS at 10:06

## 2021-07-02 ENCOUNTER — CLINICAL SUPPORT (OUTPATIENT)
Dept: REHABILITATION | Facility: HOSPITAL | Age: 61
End: 2021-07-02
Payer: MEDICARE

## 2021-07-02 DIAGNOSIS — Z74.09 DECREASED STRENGTH, ENDURANCE, AND MOBILITY: ICD-10-CM

## 2021-07-02 DIAGNOSIS — R68.89 DECREASED STRENGTH, ENDURANCE, AND MOBILITY: ICD-10-CM

## 2021-07-02 DIAGNOSIS — R53.1 DECREASED STRENGTH, ENDURANCE, AND MOBILITY: ICD-10-CM

## 2021-07-02 PROCEDURE — 97113 AQUATIC THERAPY/EXERCISES: CPT | Mod: CQ

## 2021-07-06 ENCOUNTER — CLINICAL SUPPORT (OUTPATIENT)
Dept: REHABILITATION | Facility: HOSPITAL | Age: 61
End: 2021-07-06
Payer: MEDICARE

## 2021-07-06 DIAGNOSIS — R53.1 DECREASED STRENGTH, ENDURANCE, AND MOBILITY: ICD-10-CM

## 2021-07-06 DIAGNOSIS — Z74.09 DECREASED STRENGTH, ENDURANCE, AND MOBILITY: ICD-10-CM

## 2021-07-06 DIAGNOSIS — R68.89 DECREASED STRENGTH, ENDURANCE, AND MOBILITY: ICD-10-CM

## 2021-07-06 PROCEDURE — 97113 AQUATIC THERAPY/EXERCISES: CPT | Performed by: PHYSICAL THERAPIST

## 2021-07-08 ENCOUNTER — CLINICAL SUPPORT (OUTPATIENT)
Dept: REHABILITATION | Facility: HOSPITAL | Age: 61
End: 2021-07-08
Payer: MEDICARE

## 2021-07-08 DIAGNOSIS — R53.1 DECREASED STRENGTH, ENDURANCE, AND MOBILITY: ICD-10-CM

## 2021-07-08 DIAGNOSIS — Z74.09 DECREASED STRENGTH, ENDURANCE, AND MOBILITY: ICD-10-CM

## 2021-07-08 DIAGNOSIS — R68.89 DECREASED STRENGTH, ENDURANCE, AND MOBILITY: ICD-10-CM

## 2021-07-08 PROCEDURE — 97113 AQUATIC THERAPY/EXERCISES: CPT | Performed by: PHYSICAL THERAPIST

## 2021-07-19 ENCOUNTER — CLINICAL SUPPORT (OUTPATIENT)
Dept: REHABILITATION | Facility: HOSPITAL | Age: 61
End: 2021-07-19
Payer: MEDICARE

## 2021-07-19 DIAGNOSIS — R53.1 DECREASED STRENGTH, ENDURANCE, AND MOBILITY: ICD-10-CM

## 2021-07-19 DIAGNOSIS — R68.89 DECREASED STRENGTH, ENDURANCE, AND MOBILITY: ICD-10-CM

## 2021-07-19 DIAGNOSIS — Z74.09 DECREASED STRENGTH, ENDURANCE, AND MOBILITY: ICD-10-CM

## 2021-07-19 PROCEDURE — 97113 AQUATIC THERAPY/EXERCISES: CPT

## 2021-07-22 ENCOUNTER — LAB VISIT (OUTPATIENT)
Dept: LAB | Facility: HOSPITAL | Age: 61
End: 2021-07-22
Payer: MEDICARE

## 2021-07-22 ENCOUNTER — CLINICAL SUPPORT (OUTPATIENT)
Dept: REHABILITATION | Facility: HOSPITAL | Age: 61
End: 2021-07-22
Payer: MEDICARE

## 2021-07-22 ENCOUNTER — DOCUMENTATION ONLY (OUTPATIENT)
Dept: REHABILITATION | Facility: HOSPITAL | Age: 61
End: 2021-07-22

## 2021-07-22 DIAGNOSIS — E03.9 ACQUIRED HYPOTHYROIDISM: ICD-10-CM

## 2021-07-22 DIAGNOSIS — R53.1 DECREASED STRENGTH, ENDURANCE, AND MOBILITY: ICD-10-CM

## 2021-07-22 DIAGNOSIS — R68.89 DECREASED STRENGTH, ENDURANCE, AND MOBILITY: ICD-10-CM

## 2021-07-22 DIAGNOSIS — Z74.09 DECREASED STRENGTH, ENDURANCE, AND MOBILITY: ICD-10-CM

## 2021-07-22 DIAGNOSIS — E78.49 OTHER HYPERLIPIDEMIA: ICD-10-CM

## 2021-07-22 PROCEDURE — 84443 ASSAY THYROID STIM HORMONE: CPT | Performed by: FAMILY MEDICINE

## 2021-07-22 PROCEDURE — 80061 LIPID PANEL: CPT | Performed by: FAMILY MEDICINE

## 2021-07-22 PROCEDURE — 97113 AQUATIC THERAPY/EXERCISES: CPT | Mod: CQ

## 2021-07-22 PROCEDURE — 36415 COLL VENOUS BLD VENIPUNCTURE: CPT | Performed by: FAMILY MEDICINE

## 2021-07-23 LAB
CHOLEST SERPL-MCNC: 157 MG/DL (ref 120–199)
CHOLEST/HDLC SERPL: 3.3 {RATIO} (ref 2–5)
HDLC SERPL-MCNC: 48 MG/DL (ref 40–75)
HDLC SERPL: 30.6 % (ref 20–50)
LDLC SERPL CALC-MCNC: 97.6 MG/DL (ref 63–159)
NONHDLC SERPL-MCNC: 109 MG/DL
TRIGL SERPL-MCNC: 57 MG/DL (ref 30–150)
TSH SERPL DL<=0.005 MIU/L-ACNC: 1.35 UIU/ML (ref 0.4–4)

## 2021-07-26 ENCOUNTER — CLINICAL SUPPORT (OUTPATIENT)
Dept: REHABILITATION | Facility: HOSPITAL | Age: 61
End: 2021-07-26
Payer: MEDICARE

## 2021-07-26 ENCOUNTER — TELEPHONE (OUTPATIENT)
Dept: RHEUMATOLOGY | Facility: CLINIC | Age: 61
End: 2021-07-26

## 2021-07-26 DIAGNOSIS — R53.1 DECREASED STRENGTH, ENDURANCE, AND MOBILITY: ICD-10-CM

## 2021-07-26 DIAGNOSIS — Z74.09 DECREASED STRENGTH, ENDURANCE, AND MOBILITY: ICD-10-CM

## 2021-07-26 DIAGNOSIS — E03.8 OTHER SPECIFIED HYPOTHYROIDISM: Chronic | ICD-10-CM

## 2021-07-26 DIAGNOSIS — R68.89 DECREASED STRENGTH, ENDURANCE, AND MOBILITY: ICD-10-CM

## 2021-07-26 PROCEDURE — 97113 AQUATIC THERAPY/EXERCISES: CPT

## 2021-07-26 RX ORDER — LEVOTHYROXINE SODIUM 75 UG/1
75 TABLET ORAL
Qty: 90 TABLET | Refills: 3 | Status: SHIPPED | OUTPATIENT
Start: 2021-07-26 | End: 2021-12-06

## 2021-08-02 ENCOUNTER — CLINICAL SUPPORT (OUTPATIENT)
Dept: REHABILITATION | Facility: HOSPITAL | Age: 61
End: 2021-08-02
Payer: MEDICARE

## 2021-08-02 ENCOUNTER — OFFICE VISIT (OUTPATIENT)
Dept: INTERNAL MEDICINE | Facility: CLINIC | Age: 61
End: 2021-08-02
Payer: MEDICARE

## 2021-08-02 VITALS
DIASTOLIC BLOOD PRESSURE: 80 MMHG | HEART RATE: 84 BPM | OXYGEN SATURATION: 97 % | HEIGHT: 63 IN | WEIGHT: 156.06 LBS | BODY MASS INDEX: 27.65 KG/M2 | TEMPERATURE: 97 F | SYSTOLIC BLOOD PRESSURE: 100 MMHG

## 2021-08-02 DIAGNOSIS — Z86.010 HISTORY OF COLON POLYPS: ICD-10-CM

## 2021-08-02 DIAGNOSIS — E03.9 ACQUIRED HYPOTHYROIDISM: ICD-10-CM

## 2021-08-02 DIAGNOSIS — R68.89 DECREASED STRENGTH, ENDURANCE, AND MOBILITY: ICD-10-CM

## 2021-08-02 DIAGNOSIS — R53.1 DECREASED STRENGTH, ENDURANCE, AND MOBILITY: ICD-10-CM

## 2021-08-02 DIAGNOSIS — G35 MS (MULTIPLE SCLEROSIS): ICD-10-CM

## 2021-08-02 DIAGNOSIS — E78.49 OTHER HYPERLIPIDEMIA: ICD-10-CM

## 2021-08-02 DIAGNOSIS — F32.0 MAJOR DEPRESSIVE DISORDER, SINGLE EPISODE, MILD: ICD-10-CM

## 2021-08-02 DIAGNOSIS — Z74.09 DECREASED STRENGTH, ENDURANCE, AND MOBILITY: ICD-10-CM

## 2021-08-02 DIAGNOSIS — R73.9 ELEVATED SERUM GLUCOSE: ICD-10-CM

## 2021-08-02 DIAGNOSIS — M81.0 AGE-RELATED OSTEOPOROSIS WITHOUT CURRENT PATHOLOGICAL FRACTURE: ICD-10-CM

## 2021-08-02 DIAGNOSIS — Z00.00 ANNUAL PHYSICAL EXAM: Primary | ICD-10-CM

## 2021-08-02 PROCEDURE — 99499 UNLISTED E&M SERVICE: CPT | Mod: S$PBB,,, | Performed by: FAMILY MEDICINE

## 2021-08-02 PROCEDURE — 1159F MED LIST DOCD IN RCRD: CPT | Mod: CPTII,S$GLB,, | Performed by: FAMILY MEDICINE

## 2021-08-02 PROCEDURE — 1159F PR MEDICATION LIST DOCUMENTED IN MEDICAL RECORD: ICD-10-PCS | Mod: CPTII,S$GLB,, | Performed by: FAMILY MEDICINE

## 2021-08-02 PROCEDURE — 3079F PR MOST RECENT DIASTOLIC BLOOD PRESSURE 80-89 MM HG: ICD-10-PCS | Mod: CPTII,S$GLB,, | Performed by: FAMILY MEDICINE

## 2021-08-02 PROCEDURE — 3008F PR BODY MASS INDEX (BMI) DOCUMENTED: ICD-10-PCS | Mod: CPTII,S$GLB,, | Performed by: FAMILY MEDICINE

## 2021-08-02 PROCEDURE — 3074F PR MOST RECENT SYSTOLIC BLOOD PRESSURE < 130 MM HG: ICD-10-PCS | Mod: CPTII,S$GLB,, | Performed by: FAMILY MEDICINE

## 2021-08-02 PROCEDURE — 3079F DIAST BP 80-89 MM HG: CPT | Mod: CPTII,S$GLB,, | Performed by: FAMILY MEDICINE

## 2021-08-02 PROCEDURE — 1126F PR PAIN SEVERITY QUANTIFIED, NO PAIN PRESENT: ICD-10-PCS | Mod: CPTII,S$GLB,, | Performed by: FAMILY MEDICINE

## 2021-08-02 PROCEDURE — 97113 AQUATIC THERAPY/EXERCISES: CPT | Mod: CQ

## 2021-08-02 PROCEDURE — 99999 PR PBB SHADOW E&M-EST. PATIENT-LVL V: CPT | Mod: PBBFAC,,, | Performed by: FAMILY MEDICINE

## 2021-08-02 PROCEDURE — 3008F BODY MASS INDEX DOCD: CPT | Mod: CPTII,S$GLB,, | Performed by: FAMILY MEDICINE

## 2021-08-02 PROCEDURE — 3074F SYST BP LT 130 MM HG: CPT | Mod: CPTII,S$GLB,, | Performed by: FAMILY MEDICINE

## 2021-08-02 PROCEDURE — 99396 PR PREVENTIVE VISIT,EST,40-64: ICD-10-PCS | Mod: S$GLB,,, | Performed by: FAMILY MEDICINE

## 2021-08-02 PROCEDURE — 99396 PREV VISIT EST AGE 40-64: CPT | Mod: S$GLB,,, | Performed by: FAMILY MEDICINE

## 2021-08-02 PROCEDURE — 1126F AMNT PAIN NOTED NONE PRSNT: CPT | Mod: CPTII,S$GLB,, | Performed by: FAMILY MEDICINE

## 2021-08-02 PROCEDURE — 99999 PR PBB SHADOW E&M-EST. PATIENT-LVL V: ICD-10-PCS | Mod: PBBFAC,,, | Performed by: FAMILY MEDICINE

## 2021-08-02 PROCEDURE — 1160F PR REVIEW ALL MEDS BY PRESCRIBER/CLIN PHARMACIST DOCUMENTED: ICD-10-PCS | Mod: CPTII,S$GLB,, | Performed by: FAMILY MEDICINE

## 2021-08-02 PROCEDURE — 1160F RVW MEDS BY RX/DR IN RCRD: CPT | Mod: CPTII,S$GLB,, | Performed by: FAMILY MEDICINE

## 2021-08-02 PROCEDURE — 99499 RISK ADDL DX/OHS AUDIT: ICD-10-PCS | Mod: S$PBB,,, | Performed by: FAMILY MEDICINE

## 2021-08-06 ENCOUNTER — TELEPHONE (OUTPATIENT)
Dept: REHABILITATION | Facility: HOSPITAL | Age: 61
End: 2021-08-06

## 2021-08-09 ENCOUNTER — CLINICAL SUPPORT (OUTPATIENT)
Dept: REHABILITATION | Facility: HOSPITAL | Age: 61
End: 2021-08-09
Payer: MEDICARE

## 2021-08-09 DIAGNOSIS — R68.89 DECREASED STRENGTH, ENDURANCE, AND MOBILITY: ICD-10-CM

## 2021-08-09 DIAGNOSIS — R53.1 DECREASED STRENGTH, ENDURANCE, AND MOBILITY: ICD-10-CM

## 2021-08-09 DIAGNOSIS — Z74.09 DECREASED STRENGTH, ENDURANCE, AND MOBILITY: ICD-10-CM

## 2021-08-09 PROCEDURE — 97113 AQUATIC THERAPY/EXERCISES: CPT

## 2021-08-11 ENCOUNTER — CLINICAL SUPPORT (OUTPATIENT)
Dept: REHABILITATION | Facility: HOSPITAL | Age: 61
End: 2021-08-11
Payer: MEDICARE

## 2021-08-11 DIAGNOSIS — Z74.09 DECREASED STRENGTH, ENDURANCE, AND MOBILITY: ICD-10-CM

## 2021-08-11 DIAGNOSIS — R68.89 DECREASED STRENGTH, ENDURANCE, AND MOBILITY: ICD-10-CM

## 2021-08-11 DIAGNOSIS — R53.1 DECREASED STRENGTH, ENDURANCE, AND MOBILITY: ICD-10-CM

## 2021-08-11 PROCEDURE — 97113 AQUATIC THERAPY/EXERCISES: CPT

## 2021-08-16 ENCOUNTER — TELEPHONE (OUTPATIENT)
Dept: REHABILITATION | Facility: HOSPITAL | Age: 61
End: 2021-08-16

## 2021-08-18 ENCOUNTER — TELEPHONE (OUTPATIENT)
Dept: NEUROLOGY | Facility: CLINIC | Age: 61
End: 2021-08-18

## 2021-08-19 NOTE — TELEPHONE ENCOUNTER
Spoke with pt who states [facility] told her she was approved for funding. Spoke to [employer] and requested they confirm this information so we can move forward with PAP if needed. [employer] will fax over PAP form for me to provide to pt as she has not received it in the mail.

## 2021-08-20 ENCOUNTER — CLINICAL SUPPORT (OUTPATIENT)
Dept: REHABILITATION | Facility: HOSPITAL | Age: 61
End: 2021-08-20
Payer: MEDICARE

## 2021-08-20 ENCOUNTER — TELEPHONE (OUTPATIENT)
Dept: REHABILITATION | Facility: HOSPITAL | Age: 61
End: 2021-08-20

## 2021-08-20 ENCOUNTER — LAB VISIT (OUTPATIENT)
Dept: LAB | Facility: HOSPITAL | Age: 61
End: 2021-08-20
Attending: PSYCHIATRY & NEUROLOGY
Payer: MEDICARE

## 2021-08-20 DIAGNOSIS — R68.89 DECREASED STRENGTH, ENDURANCE, AND MOBILITY: ICD-10-CM

## 2021-08-20 DIAGNOSIS — R53.1 DECREASED STRENGTH, ENDURANCE, AND MOBILITY: ICD-10-CM

## 2021-08-20 DIAGNOSIS — G35 MS (MULTIPLE SCLEROSIS): ICD-10-CM

## 2021-08-20 DIAGNOSIS — Z74.09 DECREASED STRENGTH, ENDURANCE, AND MOBILITY: ICD-10-CM

## 2021-08-20 LAB
ALBUMIN SERPL BCP-MCNC: 3.7 G/DL (ref 3.5–5.2)
ALP SERPL-CCNC: 60 U/L (ref 55–135)
ALT SERPL W/O P-5'-P-CCNC: 21 U/L (ref 10–44)
AST SERPL-CCNC: 32 U/L (ref 10–40)
BASOPHILS # BLD AUTO: 0.03 K/UL (ref 0–0.2)
BASOPHILS NFR BLD: 0.7 % (ref 0–1.9)
BILIRUB DIRECT SERPL-MCNC: 0.2 MG/DL (ref 0.1–0.3)
BILIRUB SERPL-MCNC: 0.4 MG/DL (ref 0.1–1)
DIFFERENTIAL METHOD: ABNORMAL
EOSINOPHIL # BLD AUTO: 0 K/UL (ref 0–0.5)
EOSINOPHIL NFR BLD: 0.4 % (ref 0–8)
ERYTHROCYTE [DISTWIDTH] IN BLOOD BY AUTOMATED COUNT: 13.2 % (ref 11.5–14.5)
HCT VFR BLD AUTO: 41.1 % (ref 37–48.5)
HGB BLD-MCNC: 12.8 G/DL (ref 12–16)
IMM GRANULOCYTES # BLD AUTO: 0.01 K/UL (ref 0–0.04)
IMM GRANULOCYTES NFR BLD AUTO: 0.2 % (ref 0–0.5)
LYMPHOCYTES # BLD AUTO: 2 K/UL (ref 1–4.8)
LYMPHOCYTES NFR BLD: 43.9 % (ref 18–48)
MCH RBC QN AUTO: 31.3 PG (ref 27–31)
MCHC RBC AUTO-ENTMCNC: 31.1 G/DL (ref 32–36)
MCV RBC AUTO: 101 FL (ref 82–98)
MONOCYTES # BLD AUTO: 0.3 K/UL (ref 0.3–1)
MONOCYTES NFR BLD: 7.5 % (ref 4–15)
NEUTROPHILS # BLD AUTO: 2.1 K/UL (ref 1.8–7.7)
NEUTROPHILS NFR BLD: 47.3 % (ref 38–73)
NRBC BLD-RTO: 0 /100 WBC
PLATELET # BLD AUTO: 203 K/UL (ref 150–450)
PMV BLD AUTO: 11 FL (ref 9.2–12.9)
PROT SERPL-MCNC: 7.3 G/DL (ref 6–8.4)
RBC # BLD AUTO: 4.09 M/UL (ref 4–5.4)
WBC # BLD AUTO: 4.51 K/UL (ref 3.9–12.7)

## 2021-08-20 PROCEDURE — 85025 COMPLETE CBC W/AUTO DIFF WBC: CPT | Performed by: PSYCHIATRY & NEUROLOGY

## 2021-08-20 PROCEDURE — 36415 COLL VENOUS BLD VENIPUNCTURE: CPT | Performed by: PSYCHIATRY & NEUROLOGY

## 2021-08-20 PROCEDURE — 97110 THERAPEUTIC EXERCISES: CPT

## 2021-08-20 PROCEDURE — 80076 HEPATIC FUNCTION PANEL: CPT | Performed by: PSYCHIATRY & NEUROLOGY

## 2021-08-23 ENCOUNTER — CLINICAL SUPPORT (OUTPATIENT)
Dept: REHABILITATION | Facility: HOSPITAL | Age: 61
End: 2021-08-23
Payer: MEDICARE

## 2021-08-23 ENCOUNTER — DOCUMENTATION ONLY (OUTPATIENT)
Dept: REHABILITATION | Facility: HOSPITAL | Age: 61
End: 2021-08-23

## 2021-08-23 DIAGNOSIS — R53.1 DECREASED STRENGTH, ENDURANCE, AND MOBILITY: ICD-10-CM

## 2021-08-23 DIAGNOSIS — R68.89 DECREASED STRENGTH, ENDURANCE, AND MOBILITY: ICD-10-CM

## 2021-08-23 DIAGNOSIS — Z74.09 DECREASED STRENGTH, ENDURANCE, AND MOBILITY: ICD-10-CM

## 2021-08-23 PROCEDURE — 97110 THERAPEUTIC EXERCISES: CPT | Mod: CQ

## 2021-08-27 ENCOUNTER — PATIENT MESSAGE (OUTPATIENT)
Dept: NEPHROLOGY | Facility: CLINIC | Age: 61
End: 2021-08-27

## 2021-08-27 ENCOUNTER — CLINICAL SUPPORT (OUTPATIENT)
Dept: REHABILITATION | Facility: HOSPITAL | Age: 61
End: 2021-08-27
Payer: MEDICARE

## 2021-08-27 DIAGNOSIS — R68.89 DECREASED STRENGTH, ENDURANCE, AND MOBILITY: ICD-10-CM

## 2021-08-27 DIAGNOSIS — R53.1 DECREASED STRENGTH, ENDURANCE, AND MOBILITY: ICD-10-CM

## 2021-08-27 DIAGNOSIS — Z74.09 DECREASED STRENGTH, ENDURANCE, AND MOBILITY: ICD-10-CM

## 2021-08-27 PROCEDURE — 97110 THERAPEUTIC EXERCISES: CPT

## 2021-09-01 ENCOUNTER — PATIENT MESSAGE (OUTPATIENT)
Dept: PSYCHIATRY | Facility: CLINIC | Age: 61
End: 2021-09-01

## 2021-09-02 ENCOUNTER — PATIENT MESSAGE (OUTPATIENT)
Dept: PSYCHIATRY | Facility: CLINIC | Age: 61
End: 2021-09-02

## 2021-09-04 ENCOUNTER — PATIENT MESSAGE (OUTPATIENT)
Dept: NEUROLOGY | Facility: CLINIC | Age: 61
End: 2021-09-04

## 2021-09-07 ENCOUNTER — PATIENT MESSAGE (OUTPATIENT)
Dept: NEUROLOGY | Facility: CLINIC | Age: 61
End: 2021-09-07

## 2021-09-07 DIAGNOSIS — G35 MULTIPLE SCLEROSIS: Primary | ICD-10-CM

## 2021-09-09 RX ORDER — TERIFLUNOMIDE 14 MG/1
14 TABLET, FILM COATED ORAL DAILY
Qty: 90 TABLET | Refills: 0 | Status: SHIPPED | OUTPATIENT
Start: 2021-09-09 | End: 2021-11-05 | Stop reason: SDUPTHER

## 2021-09-10 ENCOUNTER — CLINICAL SUPPORT (OUTPATIENT)
Dept: REHABILITATION | Facility: HOSPITAL | Age: 61
End: 2021-09-10
Payer: MEDICARE

## 2021-09-10 DIAGNOSIS — R53.1 DECREASED STRENGTH, ENDURANCE, AND MOBILITY: ICD-10-CM

## 2021-09-10 DIAGNOSIS — Z74.09 DECREASED STRENGTH, ENDURANCE, AND MOBILITY: ICD-10-CM

## 2021-09-10 DIAGNOSIS — R68.89 DECREASED STRENGTH, ENDURANCE, AND MOBILITY: ICD-10-CM

## 2021-09-10 PROCEDURE — 97113 AQUATIC THERAPY/EXERCISES: CPT

## 2021-09-13 ENCOUNTER — TELEPHONE (OUTPATIENT)
Dept: REHABILITATION | Facility: HOSPITAL | Age: 61
End: 2021-09-13

## 2021-09-23 ENCOUNTER — PATIENT MESSAGE (OUTPATIENT)
Dept: NEUROLOGY | Facility: CLINIC | Age: 61
End: 2021-09-23

## 2021-09-23 DIAGNOSIS — G35 MULTIPLE SCLEROSIS: Primary | ICD-10-CM

## 2021-09-27 ENCOUNTER — TELEPHONE (OUTPATIENT)
Dept: RESEARCH | Facility: HOSPITAL | Age: 61
End: 2021-09-27

## 2021-09-29 ENCOUNTER — DOCUMENTATION ONLY (OUTPATIENT)
Dept: NEUROLOGY | Facility: CLINIC | Age: 61
End: 2021-09-29

## 2021-10-01 ENCOUNTER — PATIENT MESSAGE (OUTPATIENT)
Dept: RESEARCH | Facility: HOSPITAL | Age: 61
End: 2021-10-01

## 2021-10-06 ENCOUNTER — IMMUNIZATION (OUTPATIENT)
Dept: INTERNAL MEDICINE | Facility: CLINIC | Age: 61
End: 2021-10-06
Payer: MEDICARE

## 2021-10-06 PROCEDURE — 90686 IIV4 VACC NO PRSV 0.5 ML IM: CPT | Mod: HCNC,S$GLB,, | Performed by: PEDIATRICS

## 2021-10-06 PROCEDURE — G0008 ADMIN INFLUENZA VIRUS VAC: HCPCS | Mod: HCNC,S$GLB,, | Performed by: PEDIATRICS

## 2021-10-06 PROCEDURE — G0008 FLU VACCINE (QUAD) GREATER THAN OR EQUAL TO 3YO PRESERVATIVE FREE IM: ICD-10-PCS | Mod: HCNC,S$GLB,, | Performed by: PEDIATRICS

## 2021-10-06 PROCEDURE — 90686 FLU VACCINE (QUAD) GREATER THAN OR EQUAL TO 3YO PRESERVATIVE FREE IM: ICD-10-PCS | Mod: HCNC,S$GLB,, | Performed by: PEDIATRICS

## 2021-10-07 ENCOUNTER — PATIENT OUTREACH (OUTPATIENT)
Dept: ADMINISTRATIVE | Facility: OTHER | Age: 61
End: 2021-10-07

## 2021-10-08 ENCOUNTER — OFFICE VISIT (OUTPATIENT)
Dept: OPHTHALMOLOGY | Facility: CLINIC | Age: 61
End: 2021-10-08
Payer: MEDICARE

## 2021-10-08 DIAGNOSIS — H26.9 CORTICAL CATARACT OF BOTH EYES: ICD-10-CM

## 2021-10-08 DIAGNOSIS — G35 MS (MULTIPLE SCLEROSIS): ICD-10-CM

## 2021-10-08 DIAGNOSIS — H40.013 OPEN ANGLE WITH BORDERLINE FINDINGS, LOW RISK, BILATERAL: Primary | ICD-10-CM

## 2021-10-08 PROCEDURE — 99499 RISK ADDL DX/OHS AUDIT: ICD-10-PCS | Mod: HCNC,S$GLB,, | Performed by: OPHTHALMOLOGY

## 2021-10-08 PROCEDURE — 92014 COMPRE OPH EXAM EST PT 1/>: CPT | Mod: HCNC,S$GLB,, | Performed by: OPHTHALMOLOGY

## 2021-10-08 PROCEDURE — 99999 PR PBB SHADOW E&M-EST. PATIENT-LVL III: ICD-10-PCS | Mod: PBBFAC,HCNC,, | Performed by: OPHTHALMOLOGY

## 2021-10-08 PROCEDURE — 92133 POSTERIOR SEGMENT OCT OPTIC NERVE(OCULAR COHERENCE TOMOGRAPHY) - OU - BOTH EYES: ICD-10-PCS | Mod: HCNC,S$GLB,, | Performed by: OPHTHALMOLOGY

## 2021-10-08 PROCEDURE — 1159F PR MEDICATION LIST DOCUMENTED IN MEDICAL RECORD: ICD-10-PCS | Mod: HCNC,CPTII,S$GLB, | Performed by: OPHTHALMOLOGY

## 2021-10-08 PROCEDURE — 92133 CPTRZD OPH DX IMG PST SGM ON: CPT | Mod: HCNC,S$GLB,, | Performed by: OPHTHALMOLOGY

## 2021-10-08 PROCEDURE — 99999 PR PBB SHADOW E&M-EST. PATIENT-LVL III: CPT | Mod: PBBFAC,HCNC,, | Performed by: OPHTHALMOLOGY

## 2021-10-08 PROCEDURE — 1159F MED LIST DOCD IN RCRD: CPT | Mod: HCNC,CPTII,S$GLB, | Performed by: OPHTHALMOLOGY

## 2021-10-08 PROCEDURE — 92014 PR EYE EXAM, EST PATIENT,COMPREHESV: ICD-10-PCS | Mod: HCNC,S$GLB,, | Performed by: OPHTHALMOLOGY

## 2021-10-08 PROCEDURE — 99499 UNLISTED E&M SERVICE: CPT | Mod: HCNC,S$GLB,, | Performed by: OPHTHALMOLOGY

## 2021-10-27 ENCOUNTER — LAB VISIT (OUTPATIENT)
Dept: LAB | Facility: HOSPITAL | Age: 61
End: 2021-10-27
Attending: PSYCHIATRY & NEUROLOGY
Payer: MEDICARE

## 2021-10-27 DIAGNOSIS — G35 MS (MULTIPLE SCLEROSIS): ICD-10-CM

## 2021-10-27 LAB
ALBUMIN SERPL BCP-MCNC: 3.7 G/DL (ref 3.5–5.2)
ALP SERPL-CCNC: 60 U/L (ref 55–135)
ALT SERPL W/O P-5'-P-CCNC: 14 U/L (ref 10–44)
AST SERPL-CCNC: 29 U/L (ref 10–40)
BASOPHILS # BLD AUTO: 0.05 K/UL (ref 0–0.2)
BASOPHILS NFR BLD: 0.9 % (ref 0–1.9)
BILIRUB DIRECT SERPL-MCNC: 0.1 MG/DL (ref 0.1–0.3)
BILIRUB SERPL-MCNC: 0.3 MG/DL (ref 0.1–1)
DIFFERENTIAL METHOD: ABNORMAL
EOSINOPHIL # BLD AUTO: 0.1 K/UL (ref 0–0.5)
EOSINOPHIL NFR BLD: 0.9 % (ref 0–8)
ERYTHROCYTE [DISTWIDTH] IN BLOOD BY AUTOMATED COUNT: 13.1 % (ref 11.5–14.5)
HCT VFR BLD AUTO: 40.5 % (ref 37–48.5)
HGB BLD-MCNC: 12.7 G/DL (ref 12–16)
IMM GRANULOCYTES # BLD AUTO: 0.01 K/UL (ref 0–0.04)
IMM GRANULOCYTES NFR BLD AUTO: 0.2 % (ref 0–0.5)
LYMPHOCYTES # BLD AUTO: 3.1 K/UL (ref 1–4.8)
LYMPHOCYTES NFR BLD: 52.6 % (ref 18–48)
MCH RBC QN AUTO: 31.5 PG (ref 27–31)
MCHC RBC AUTO-ENTMCNC: 31.4 G/DL (ref 32–36)
MCV RBC AUTO: 101 FL (ref 82–98)
MONOCYTES # BLD AUTO: 0.7 K/UL (ref 0.3–1)
MONOCYTES NFR BLD: 11.8 % (ref 4–15)
NEUTROPHILS # BLD AUTO: 2 K/UL (ref 1.8–7.7)
NEUTROPHILS NFR BLD: 33.6 % (ref 38–73)
NRBC BLD-RTO: 0 /100 WBC
PLATELET # BLD AUTO: 203 K/UL (ref 150–450)
PMV BLD AUTO: 10.5 FL (ref 9.2–12.9)
PROT SERPL-MCNC: 7.5 G/DL (ref 6–8.4)
RBC # BLD AUTO: 4.03 M/UL (ref 4–5.4)
WBC # BLD AUTO: 5.84 K/UL (ref 3.9–12.7)

## 2021-10-27 PROCEDURE — 36415 COLL VENOUS BLD VENIPUNCTURE: CPT | Mod: HCNC | Performed by: PSYCHIATRY & NEUROLOGY

## 2021-10-27 PROCEDURE — 85025 COMPLETE CBC W/AUTO DIFF WBC: CPT | Mod: HCNC | Performed by: PSYCHIATRY & NEUROLOGY

## 2021-10-27 PROCEDURE — 80076 HEPATIC FUNCTION PANEL: CPT | Mod: HCNC | Performed by: PSYCHIATRY & NEUROLOGY

## 2021-11-09 ENCOUNTER — SPECIALTY PHARMACY (OUTPATIENT)
Dept: PHARMACY | Facility: CLINIC | Age: 61
End: 2021-11-09
Payer: MEDICARE

## 2021-11-17 ENCOUNTER — PATIENT OUTREACH (OUTPATIENT)
Dept: ADMINISTRATIVE | Facility: OTHER | Age: 61
End: 2021-11-17
Payer: MEDICARE

## 2021-11-21 ENCOUNTER — PATIENT MESSAGE (OUTPATIENT)
Dept: NEUROLOGY | Facility: CLINIC | Age: 61
End: 2021-11-21
Payer: MEDICARE

## 2021-11-24 ENCOUNTER — LAB VISIT (OUTPATIENT)
Dept: LAB | Facility: HOSPITAL | Age: 61
End: 2021-11-24
Attending: FAMILY MEDICINE
Payer: MEDICARE

## 2021-11-24 DIAGNOSIS — G35 MULTIPLE SCLEROSIS: ICD-10-CM

## 2021-11-24 LAB
ALBUMIN SERPL BCP-MCNC: 3.4 G/DL (ref 3.5–5.2)
ALP SERPL-CCNC: 67 U/L (ref 55–135)
ALT SERPL W/O P-5'-P-CCNC: 17 U/L (ref 10–44)
AST SERPL-CCNC: 29 U/L (ref 10–40)
BASOPHILS # BLD AUTO: 0.06 K/UL (ref 0–0.2)
BASOPHILS NFR BLD: 1.2 % (ref 0–1.9)
BILIRUB DIRECT SERPL-MCNC: 0.1 MG/DL (ref 0.1–0.3)
BILIRUB SERPL-MCNC: 0.4 MG/DL (ref 0.1–1)
DIFFERENTIAL METHOD: ABNORMAL
EOSINOPHIL # BLD AUTO: 0.1 K/UL (ref 0–0.5)
EOSINOPHIL NFR BLD: 1.8 % (ref 0–8)
ERYTHROCYTE [DISTWIDTH] IN BLOOD BY AUTOMATED COUNT: 12.7 % (ref 11.5–14.5)
HCT VFR BLD AUTO: 40.1 % (ref 37–48.5)
HGB BLD-MCNC: 12.1 G/DL (ref 12–16)
IMM GRANULOCYTES # BLD AUTO: 0.01 K/UL (ref 0–0.04)
IMM GRANULOCYTES NFR BLD AUTO: 0.2 % (ref 0–0.5)
LYMPHOCYTES # BLD AUTO: 2.4 K/UL (ref 1–4.8)
LYMPHOCYTES NFR BLD: 48.9 % (ref 18–48)
MCH RBC QN AUTO: 31.2 PG (ref 27–31)
MCHC RBC AUTO-ENTMCNC: 30.2 G/DL (ref 32–36)
MCV RBC AUTO: 103 FL (ref 82–98)
MONOCYTES # BLD AUTO: 0.6 K/UL (ref 0.3–1)
MONOCYTES NFR BLD: 12.2 % (ref 4–15)
NEUTROPHILS # BLD AUTO: 1.8 K/UL (ref 1.8–7.7)
NEUTROPHILS NFR BLD: 35.7 % (ref 38–73)
NRBC BLD-RTO: 0 /100 WBC
PLATELET # BLD AUTO: 197 K/UL (ref 150–450)
PMV BLD AUTO: 10.9 FL (ref 9.2–12.9)
PROT SERPL-MCNC: 6.6 G/DL (ref 6–8.4)
RBC # BLD AUTO: 3.88 M/UL (ref 4–5.4)
WBC # BLD AUTO: 4.93 K/UL (ref 3.9–12.7)

## 2021-11-24 PROCEDURE — 85025 COMPLETE CBC W/AUTO DIFF WBC: CPT | Mod: HCNC | Performed by: PSYCHIATRY & NEUROLOGY

## 2021-11-24 PROCEDURE — 80076 HEPATIC FUNCTION PANEL: CPT | Mod: HCNC | Performed by: PSYCHIATRY & NEUROLOGY

## 2021-11-24 PROCEDURE — 36415 COLL VENOUS BLD VENIPUNCTURE: CPT | Mod: HCNC | Performed by: PSYCHIATRY & NEUROLOGY

## 2021-11-29 ENCOUNTER — SPECIALTY PHARMACY (OUTPATIENT)
Dept: PHARMACY | Facility: CLINIC | Age: 61
End: 2021-11-29
Payer: MEDICARE

## 2021-11-29 ENCOUNTER — PATIENT MESSAGE (OUTPATIENT)
Dept: PHARMACY | Facility: CLINIC | Age: 61
End: 2021-11-29
Payer: MEDICARE

## 2021-12-05 DIAGNOSIS — E03.8 OTHER SPECIFIED HYPOTHYROIDISM: Chronic | ICD-10-CM

## 2021-12-06 RX ORDER — LEVOTHYROXINE SODIUM 75 UG/1
75 TABLET ORAL
Qty: 90 TABLET | Refills: 3 | Status: SHIPPED | OUTPATIENT
Start: 2021-12-06 | End: 2022-10-03

## 2021-12-20 ENCOUNTER — TELEPHONE (OUTPATIENT)
Dept: NEUROLOGY | Facility: CLINIC | Age: 61
End: 2021-12-20
Payer: MEDICARE

## 2021-12-20 DIAGNOSIS — R71.8 ELEVATED MCV: ICD-10-CM

## 2021-12-20 DIAGNOSIS — M79.2 NEUROPATHIC PAIN: ICD-10-CM

## 2021-12-20 DIAGNOSIS — E53.8 B12 DEFICIENCY: ICD-10-CM

## 2021-12-20 DIAGNOSIS — G35 MULTIPLE SCLEROSIS: Primary | ICD-10-CM

## 2021-12-20 DIAGNOSIS — R89.9 ABNORMAL LABORATORY TEST RESULT: ICD-10-CM

## 2021-12-21 ENCOUNTER — PATIENT MESSAGE (OUTPATIENT)
Dept: NEUROLOGY | Facility: CLINIC | Age: 61
End: 2021-12-21
Payer: MEDICARE

## 2021-12-22 ENCOUNTER — TELEPHONE (OUTPATIENT)
Dept: RHEUMATOLOGY | Facility: CLINIC | Age: 61
End: 2021-12-22
Payer: MEDICARE

## 2021-12-22 ENCOUNTER — LAB VISIT (OUTPATIENT)
Dept: LAB | Facility: HOSPITAL | Age: 61
End: 2021-12-22
Attending: PSYCHIATRY & NEUROLOGY
Payer: MEDICARE

## 2021-12-22 DIAGNOSIS — E53.8 B12 DEFICIENCY: ICD-10-CM

## 2021-12-22 PROCEDURE — 82607 VITAMIN B-12: CPT | Performed by: PSYCHIATRY & NEUROLOGY

## 2021-12-22 PROCEDURE — 36415 COLL VENOUS BLD VENIPUNCTURE: CPT | Performed by: PSYCHIATRY & NEUROLOGY

## 2021-12-23 ENCOUNTER — OFFICE VISIT (OUTPATIENT)
Dept: RHEUMATOLOGY | Facility: CLINIC | Age: 61
End: 2021-12-23
Payer: MEDICARE

## 2021-12-23 ENCOUNTER — PATIENT OUTREACH (OUTPATIENT)
Dept: ADMINISTRATIVE | Facility: OTHER | Age: 61
End: 2021-12-23
Payer: MEDICARE

## 2021-12-23 ENCOUNTER — INFUSION (OUTPATIENT)
Dept: INFUSION THERAPY | Facility: HOSPITAL | Age: 61
End: 2021-12-23
Attending: PSYCHIATRY & NEUROLOGY
Payer: MEDICARE

## 2021-12-23 VITALS
WEIGHT: 170.19 LBS | RESPIRATION RATE: 16 BRPM | HEIGHT: 63 IN | TEMPERATURE: 97 F | DIASTOLIC BLOOD PRESSURE: 81 MMHG | BODY MASS INDEX: 30.16 KG/M2 | DIASTOLIC BLOOD PRESSURE: 76 MMHG | HEART RATE: 61 BPM | HEART RATE: 66 BPM | SYSTOLIC BLOOD PRESSURE: 120 MMHG | SYSTOLIC BLOOD PRESSURE: 121 MMHG | OXYGEN SATURATION: 98 %

## 2021-12-23 DIAGNOSIS — M81.0 AGE-RELATED OSTEOPOROSIS WITHOUT CURRENT PATHOLOGICAL FRACTURE: Primary | ICD-10-CM

## 2021-12-23 LAB — VIT B12 SERPL-MCNC: 471 PG/ML (ref 210–950)

## 2021-12-23 PROCEDURE — 99214 PR OFFICE/OUTPT VISIT, EST, LEVL IV, 30-39 MIN: ICD-10-PCS | Mod: S$GLB,,, | Performed by: INTERNAL MEDICINE

## 2021-12-23 PROCEDURE — 3079F DIAST BP 80-89 MM HG: CPT | Mod: CPTII,S$GLB,, | Performed by: INTERNAL MEDICINE

## 2021-12-23 PROCEDURE — 3079F PR MOST RECENT DIASTOLIC BLOOD PRESSURE 80-89 MM HG: ICD-10-PCS | Mod: CPTII,S$GLB,, | Performed by: INTERNAL MEDICINE

## 2021-12-23 PROCEDURE — 1159F MED LIST DOCD IN RCRD: CPT | Mod: CPTII,S$GLB,, | Performed by: INTERNAL MEDICINE

## 2021-12-23 PROCEDURE — 63600175 PHARM REV CODE 636 W HCPCS: Mod: JG | Performed by: INTERNAL MEDICINE

## 2021-12-23 PROCEDURE — 3008F PR BODY MASS INDEX (BMI) DOCUMENTED: ICD-10-PCS | Mod: CPTII,S$GLB,, | Performed by: INTERNAL MEDICINE

## 2021-12-23 PROCEDURE — 3074F SYST BP LT 130 MM HG: CPT | Mod: CPTII,S$GLB,, | Performed by: INTERNAL MEDICINE

## 2021-12-23 PROCEDURE — 1160F RVW MEDS BY RX/DR IN RCRD: CPT | Mod: CPTII,S$GLB,, | Performed by: INTERNAL MEDICINE

## 2021-12-23 PROCEDURE — 99214 OFFICE O/P EST MOD 30 MIN: CPT | Mod: S$GLB,,, | Performed by: INTERNAL MEDICINE

## 2021-12-23 PROCEDURE — 1159F PR MEDICATION LIST DOCUMENTED IN MEDICAL RECORD: ICD-10-PCS | Mod: CPTII,S$GLB,, | Performed by: INTERNAL MEDICINE

## 2021-12-23 PROCEDURE — 99999 PR PBB SHADOW E&M-EST. PATIENT-LVL IV: CPT | Mod: PBBFAC,,, | Performed by: INTERNAL MEDICINE

## 2021-12-23 PROCEDURE — 3008F BODY MASS INDEX DOCD: CPT | Mod: CPTII,S$GLB,, | Performed by: INTERNAL MEDICINE

## 2021-12-23 PROCEDURE — 3074F PR MOST RECENT SYSTOLIC BLOOD PRESSURE < 130 MM HG: ICD-10-PCS | Mod: CPTII,S$GLB,, | Performed by: INTERNAL MEDICINE

## 2021-12-23 PROCEDURE — 96372 THER/PROPH/DIAG INJ SC/IM: CPT

## 2021-12-23 PROCEDURE — 99999 PR PBB SHADOW E&M-EST. PATIENT-LVL IV: ICD-10-PCS | Mod: PBBFAC,,, | Performed by: INTERNAL MEDICINE

## 2021-12-23 PROCEDURE — 1160F PR REVIEW ALL MEDS BY PRESCRIBER/CLIN PHARMACIST DOCUMENTED: ICD-10-PCS | Mod: CPTII,S$GLB,, | Performed by: INTERNAL MEDICINE

## 2021-12-23 RX ADMIN — DENOSUMAB 60 MG: 60 INJECTION SUBCUTANEOUS at 10:12

## 2021-12-30 ENCOUNTER — TELEPHONE (OUTPATIENT)
Dept: PULMONOLOGY | Facility: CLINIC | Age: 61
End: 2021-12-30
Payer: MEDICARE

## 2022-01-10 NOTE — TELEPHONE ENCOUNTER
No new care gaps identified.  Powered by [employer]. Reference number: [id number].   1/10/2022 2:23:33 AM CST

## 2022-01-12 DIAGNOSIS — G35 MULTIPLE SCLEROSIS: ICD-10-CM

## 2022-01-12 DIAGNOSIS — F32.A DEPRESSION, UNSPECIFIED DEPRESSION TYPE: ICD-10-CM

## 2022-01-12 NOTE — TELEPHONE ENCOUNTER
No new care gaps identified.  Powered by [employer] [place]. Reference number: [id number].   1/12/2022 2:30:02 AM CST

## 2022-01-13 RX ORDER — SIMVASTATIN 40 MG/1
TABLET, FILM COATED ORAL
Qty: 90 TABLET | Refills: 1 | Status: SHIPPED | OUTPATIENT
Start: 2022-01-13 | End: 2022-06-08

## 2022-01-13 NOTE — TELEPHONE ENCOUNTER
Refill Authorization Note   Cem Meyers  is requesting a refill authorization.  Brief Assessment and Rationale for Refill:  Approve     Medication Therapy Plan:       Medication Reconciliation Completed: No   Comments:   --->Care Gap information included below if applicable.       Requested Prescriptions   Pending Prescriptions Disp Refills    simvastatin (ZOCOR) 40 MG tablet [Pharmacy Med Name: SIMVASTATIN 40 MG Tablet] 90 tablet 1     Sig: TAKE 1 TABLET EVERY DAY       Cardiovascular:  Antilipid - Statins Passed - 1/10/2022  2:22 AM        Passed - Patient is at least 18 years old        Passed - Valid encounter within last 15 months     Recent Visits  Date Type Provider Dept   08/02/21 Office Visit Keagan Calles MD [facility] Internal Medicine   12/01/20 Office Visit Keagan Calles MD [facility] Internal Medicine   07/29/20 Office Visit Adonis Stubbs MD [facility] Internal Medicine   Showing recent visits within past 720 days and meeting all other requirements  Future Appointments  No visits were found meeting these conditions.  Showing future appointments within next 150 days and meeting all other requirements      Future Appointments              In 1 week LABORATORY, [facility] - Lab 1st Fl, [facility]    In 2 weeks Spencer Corey MD [facility] [place] - Cardiology 3rd Fl, [facility]    In 3 weeks Madisyn Sullivan NP [place] - Pulmonary Services, [facility]    In 1 month Keagan Calles MD [facility] Internal Med 2nd Fl, [facility]    In 1 month LABORATORY, [facility] - Lab 1st Fl, [facility]    In 2 months LABORATORY, [facility] - Lab 1st Fl, [place]    In 2 months ANDREW, VISUAL-ONE [facility] [place] - Ophthalmology 3rd Fl, [facility]    In 2 months John Lora MD [facility] [place] - Ophthalmology 3rd Fl, [facility]    In 5 months LABORATORY, [facility] - Lab 1st Fl, [place]    In 5 months [facility]1 [facility] Bone Density 1st Fl, [facility]    In 5 months Too Chang MD [facility]  Rheumatology, [place]    In 5 months LABORATORY, [facility] [place] - Lab 1st Fl, [place]    In 5 months INJECTION 1, [facility] [place] - Infusion 4th Fl, [place]    In 8 months LABORATORY, [facility] [place] - Lab 1st Fl, [place]                Passed - ALT is 131 or below and within 360 days     ALT   Date Value Ref Range Status   12/23/2021 17 10 - 44 U/L Final   11/24/2021 17 10 - 44 U/L Final   10/27/2021 14 10 - 44 U/L Final              Passed - AST is 119 or below and within 360 days     AST   Date Value Ref Range Status   12/23/2021 27 10 - 40 U/L Final   11/24/2021 29 10 - 40 U/L Final   10/27/2021 29 10 - 40 U/L Final              Passed - Total Cholesterol within 360 days     Lab Results   Component Value Date    CHOL 157 07/22/2021    CHOL 169 06/11/2020    CHOL 169 05/24/2019              Passed - LDL within 360 days     LDL Cholesterol   Date Value Ref Range Status   07/22/2021 97.6 63.0 - 159.0 mg/dL Final     Comment:     The National Cholesterol Education Program (NCEP) has set the  following guidelines (reference values) for LDL Cholesterol:  Optimal.......................<130 mg/dL  Borderline High...............130-159 mg/dL  High..........................160-189 mg/dL  Very High.....................>190 mg/dL              Passed - HDL within 360 days     HDL   Date Value Ref Range Status   07/22/2021 48 40 - 75 mg/dL Final     Comment:     The National Cholesterol Education Program (NCEP) has set the  following guidelines (reference values) for HDL Cholesterol:  Low...............<40 mg/dL  Optimal...........>60 mg/dL              Passed - Triglycerides within 360 days     Lab Results   Component Value Date    TRIG 57 07/22/2021    TRIG 69 06/11/2020    TRIG 71 05/24/2019                  Appointments  past 12m or future 3m with PCP    Date Provider   Last Visit   8/2/2021 Keagan Calles MD   Next Visit   1/12/2022 Keagan Calles MD   ED visits in past 90 days: 0     Note  composed:1:19 PM 01/13/2022

## 2022-01-19 RX ORDER — PAROXETINE HYDROCHLORIDE 40 MG/1
TABLET, FILM COATED ORAL
Qty: 90 TABLET | Refills: 2 | Status: SHIPPED | OUTPATIENT
Start: 2022-01-19 | End: 2022-08-29

## 2022-01-19 NOTE — TELEPHONE ENCOUNTER
Refill Authorization Note   Cem Meyers  is requesting a refill authorization.  Brief Assessment and Rationale for Refill:  Approve     Medication Therapy Plan:       Medication Reconciliation Completed: No   Comments:   --->Care Gap information included below if applicable.       Requested Prescriptions   Pending Prescriptions Disp Refills    paroxetine (PAXIL) 40 MG tablet [Pharmacy Med Name: PAROXETINE HCL 40 MG Tablet] 90 tablet 2     Sig: TAKE 1 TABLET EVERY MORNING.       Psychiatry:  Antidepressants - SSRI Passed - 1/12/2022  2:29 AM        Passed - Patient is at least 18 years old        Passed - Valid encounter within last 15 months     Recent Visits  Date Type Provider Dept   08/02/21 Office Visit Keagan Calles MD [facility] Internal Medicine   12/01/20 Office Visit Keagan Calles MD [facility] Internal Medicine   07/29/20 Office Visit Adonis Stubbs MD [facility] Internal Medicine   Showing recent visits within past 720 days and meeting all other requirements  Future Appointments  No visits were found meeting these conditions.  Showing future appointments within next 150 days and meeting all other requirements      Future Appointments              In 1 week LABORATORY, [facility] [place] - Lab 1st Fl, [facility]    In 1 week Spencer Corey MD [facility] [place] - Cardiology 3rd Fl, [facility]    In 3 weeks Madisyn Sullivan NP [place] - Pulmonary Services, [facility]    In 3 weeks Keagan Calles MD [facility] Internal Med 2nd Fl, [facility]    In 1 month LABORATORY, [facility] [place] - Lab 1st Fl, [facility]    In 2 months LABORATORY, [facility] [place] - Lab 1st Fl, [facility]    In 2 months ANDREW, VISUAL-ONE [facility] [place] - Ophthalmology 3rd Fl, [facility]    In 2 months John Lora MD [facility] [place] - Ophthalmology 3rd Fl, [facility]    In 4 months LABORATORY, [facility] [place] - Lab 1st Fl, [facility]    In 4 months [facility] BMD1 [facility] Bone Density 1st Fl, [facility]    In 5 months Too Chang MD  [place] - Rheumatology, [place]    In 5 months LABORATORY, [facility] [place] - Lab 1st Fl, [place]    In 5 months INJECTION 1, [facility] [place] - Infusion 4th Fl, [place]    In 8 months LABORATORY, [facility] [place] - Lab 1st Fl, [place]                    Appointments  past 12m or future 3m with PCP    Date Provider   Last Visit   8/2/2021 Keagan Calles MD   Next Visit   2/15/2022 Keagan Calles MD   ED visits in past 90 days: 0     Note composed:8:17 AM 01/19/2022

## 2022-01-20 ENCOUNTER — SPECIALTY PHARMACY (OUTPATIENT)
Dept: PHARMACY | Facility: CLINIC | Age: 62
End: 2022-01-20
Payer: MEDICARE

## 2022-01-20 NOTE — TELEPHONE ENCOUNTER
Specialty Pharmacy - Refill Coordination    Specialty Medication Orders Linked to Encounter    Flowsheet Row Most Recent Value   Medication #1 teriflunomide (AUBAGIO) 14 mg Tab (Order#[id number], Rx#[id number])        Refill Questions - Documented Responses    Flowsheet Row Most Recent Value   Patient Availability and HIPAA Verification    Does patient want to proceed with activity? Yes   HIPAA/medical authority confirmed? Yes   Relationship to patient of person spoken to? Self   Refill Screening Questions    Changes to allergies? No   Changes to medications? No   New conditions since last clinic visit? No   Unplanned office visit, urgent care, ED, or hospital admission in the last 4 weeks? No   How does patient/caregiver feel medication is working? Good   Financial problems or insurance changes? No   How many doses of your specialty medications were missed in the last 4 weeks? 1   Why were doses missed? Simply forgot  [patient was counseled on utilizing a reminder such as an alarm]   Would patient like to speak to a pharmacist? No   When does the patient need to receive the medication? 01/26/22   Refill Delivery Questions    How will the patient receive the medication? Delivery Tracie   When does the patient need to receive the medication? 01/26/22   Shipping Address Home   Address in [facility] confirmed and updated if neccessary? Yes   Expected Copay ($) 0   Is the patient able to afford the medication copay? Yes   Payment Method zero copay   Days supply of Refill 30   Supplies needed? No supplies needed   Refill activity completed? Yes   Refill activity plan Refill scheduled   Shipment/Pickup Date: 01/25/22          Current Outpatient Medications   Medication Sig    baclofen (LIORESAL) 10 MG tablet TAKE 2 TABLETS ONCE DAILY IN THE MORNING, 2 TABLETS IN THE AFTERNOON, AND UP TO 3 TABLETS AT BEDTIME AS NEEDED    calcium citrate (CALCITRATE) 200 mg (950 mg) tablet Take 2 tablets by mouth once daily.     cholecalciferol, vitamin D3, 100 mcg (4,000 unit) Cap Take 4,000 Units by mouth once daily.     denosumab (PROLIA SUBQ) Inject into the skin.    fish oil-omega-3 fatty acids 300-1,000 mg capsule Take 2 g by mouth once daily.    gabapentin (NEURONTIN) 800 MG tablet Take 1 tablet (800 mg total) by mouth 3 (three) times daily.    glucosamine-chondroitin 500-400 mg tablet Take 1 tablet by mouth once daily.     levothyroxine (SYNTHROID) 75 MCG tablet Take 1 tablet (75 mcg total) by mouth before breakfast.    MAGNESIUM GLYCINATE ORAL     multivitamin capsule Take 1 capsule by mouth once daily.    paroxetine (PAXIL) 40 MG tablet TAKE 1 TABLET EVERY MORNING.    Ringer's solution,lactated (LACTATED RINGERS) infusion     simvastatin (ZOCOR) 40 MG tablet TAKE 1 TABLET EVERY DAY    teriflunomide (AUBAGIO) 14 mg Tab Take 1 tablet (14 mg) by mouth once daily.   Last reviewed on 12/23/2021 10:50 AM by Charlene Louis LPN    Review of patient's allergies indicates:   Allergen Reactions    Latex, natural rubber Rash    Last reviewed on  12/23/2021 10:50 AM by Charlene Louis      Tasks added this encounter   2/18/2022 - Refill Call (Auto Added)   Tasks due within next 3 months   3/16/2022 - Clinical - Follow Up Assesement (90 day)     Brenton Chaves, Vira [facility] - Specialty Pharmacy  [facility] [address]  [facility] [address]  Phone: [phone]  Fax: [phone]

## 2022-01-24 ENCOUNTER — PATIENT MESSAGE (OUTPATIENT)
Dept: INTERNAL MEDICINE | Facility: CLINIC | Age: 62
End: 2022-01-24
Payer: MEDICARE

## 2022-01-24 DIAGNOSIS — I50.32 CHRONIC DIASTOLIC HEART FAILURE: Primary | ICD-10-CM

## 2022-01-26 ENCOUNTER — LAB VISIT (OUTPATIENT)
Dept: LAB | Facility: HOSPITAL | Age: 62
End: 2022-01-26
Attending: PSYCHIATRY & NEUROLOGY
Payer: MEDICARE

## 2022-01-26 DIAGNOSIS — G35 MULTIPLE SCLEROSIS: ICD-10-CM

## 2022-01-26 LAB
ALBUMIN SERPL BCP-MCNC: 3.8 G/DL (ref 3.5–5.2)
ALP SERPL-CCNC: 70 U/L (ref 55–135)
ALT SERPL W/O P-5'-P-CCNC: 19 U/L (ref 10–44)
AST SERPL-CCNC: 33 U/L (ref 10–40)
BASOPHILS # BLD AUTO: 0.03 K/UL (ref 0–0.2)
BASOPHILS NFR BLD: 0.6 % (ref 0–1.9)
BILIRUB DIRECT SERPL-MCNC: 0.2 MG/DL (ref 0.1–0.3)
BILIRUB SERPL-MCNC: 0.6 MG/DL (ref 0.1–1)
DIFFERENTIAL METHOD: ABNORMAL
EOSINOPHIL # BLD AUTO: 0.1 K/UL (ref 0–0.5)
EOSINOPHIL NFR BLD: 1.2 % (ref 0–8)
ERYTHROCYTE [DISTWIDTH] IN BLOOD BY AUTOMATED COUNT: 12.8 % (ref 11.5–14.5)
HCT VFR BLD AUTO: 41.6 % (ref 37–48.5)
HGB BLD-MCNC: 12.9 G/DL (ref 12–16)
IMM GRANULOCYTES # BLD AUTO: 0 K/UL (ref 0–0.04)
IMM GRANULOCYTES NFR BLD AUTO: 0 % (ref 0–0.5)
LYMPHOCYTES # BLD AUTO: 2.1 K/UL (ref 1–4.8)
LYMPHOCYTES NFR BLD: 42.2 % (ref 18–48)
MCH RBC QN AUTO: 31 PG (ref 27–31)
MCHC RBC AUTO-ENTMCNC: 31 G/DL (ref 32–36)
MCV RBC AUTO: 100 FL (ref 82–98)
MONOCYTES # BLD AUTO: 0.5 K/UL (ref 0.3–1)
MONOCYTES NFR BLD: 9.3 % (ref 4–15)
NEUTROPHILS # BLD AUTO: 2.3 K/UL (ref 1.8–7.7)
NEUTROPHILS NFR BLD: 46.7 % (ref 38–73)
NRBC BLD-RTO: 0 /100 WBC
PLATELET # BLD AUTO: 192 K/UL (ref 150–450)
PMV BLD AUTO: 11.1 FL (ref 9.2–12.9)
PROT SERPL-MCNC: 7.4 G/DL (ref 6–8.4)
RBC # BLD AUTO: 4.16 M/UL (ref 4–5.4)
WBC # BLD AUTO: 4.93 K/UL (ref 3.9–12.7)

## 2022-01-26 PROCEDURE — 80076 HEPATIC FUNCTION PANEL: CPT | Mod: HCNC | Performed by: PSYCHIATRY & NEUROLOGY

## 2022-01-26 PROCEDURE — 36415 COLL VENOUS BLD VENIPUNCTURE: CPT | Mod: HCNC | Performed by: PSYCHIATRY & NEUROLOGY

## 2022-01-26 PROCEDURE — 85025 COMPLETE CBC W/AUTO DIFF WBC: CPT | Mod: HCNC | Performed by: PSYCHIATRY & NEUROLOGY

## 2022-01-27 ENCOUNTER — PATIENT OUTREACH (OUTPATIENT)
Dept: ADMINISTRATIVE | Facility: OTHER | Age: 62
End: 2022-01-27
Payer: MEDICARE

## 2022-01-28 ENCOUNTER — OFFICE VISIT (OUTPATIENT)
Dept: CARDIOLOGY | Facility: CLINIC | Age: 62
End: 2022-01-28
Payer: MEDICARE

## 2022-01-28 ENCOUNTER — HOSPITAL ENCOUNTER (OUTPATIENT)
Dept: CARDIOLOGY | Facility: HOSPITAL | Age: 62
Discharge: HOME | End: 2022-01-28
Attending: INTERNAL MEDICINE
Payer: MEDICARE

## 2022-01-28 VITALS
RESPIRATION RATE: 16 BRPM | HEART RATE: 75 BPM | OXYGEN SATURATION: 99 % | SYSTOLIC BLOOD PRESSURE: 116 MMHG | BODY MASS INDEX: 27.64 KG/M2 | DIASTOLIC BLOOD PRESSURE: 82 MMHG | WEIGHT: 156 LBS | HEIGHT: 63 IN

## 2022-01-28 DIAGNOSIS — G47.33 OSA ON CPAP: ICD-10-CM

## 2022-01-28 DIAGNOSIS — I50.32 CHRONIC DIASTOLIC HEART FAILURE: Primary | ICD-10-CM

## 2022-01-28 DIAGNOSIS — G35 MS (MULTIPLE SCLEROSIS): ICD-10-CM

## 2022-01-28 DIAGNOSIS — I50.32 CHRONIC DIASTOLIC HEART FAILURE: ICD-10-CM

## 2022-01-28 DIAGNOSIS — E78.2 MIXED HYPERLIPIDEMIA: ICD-10-CM

## 2022-01-28 DIAGNOSIS — E03.8 OTHER SPECIFIED HYPOTHYROIDISM: ICD-10-CM

## 2022-01-28 DIAGNOSIS — E78.49 OTHER HYPERLIPIDEMIA: ICD-10-CM

## 2022-01-28 PROCEDURE — 1160F PR REVIEW ALL MEDS BY PRESCRIBER/CLIN PHARMACIST DOCUMENTED: ICD-10-PCS | Mod: HCNC,CPTII,S$GLB, | Performed by: INTERNAL MEDICINE

## 2022-01-28 PROCEDURE — 3079F PR MOST RECENT DIASTOLIC BLOOD PRESSURE 80-89 MM HG: ICD-10-PCS | Mod: HCNC,CPTII,S$GLB, | Performed by: INTERNAL MEDICINE

## 2022-01-28 PROCEDURE — 93005 ELECTROCARDIOGRAM TRACING: CPT | Mod: HCNC

## 2022-01-28 PROCEDURE — 1159F PR MEDICATION LIST DOCUMENTED IN MEDICAL RECORD: ICD-10-PCS | Mod: HCNC,CPTII,S$GLB, | Performed by: INTERNAL MEDICINE

## 2022-01-28 PROCEDURE — 99499 RISK ADDL DX/OHS AUDIT: ICD-10-PCS | Mod: S$GLB,,, | Performed by: INTERNAL MEDICINE

## 2022-01-28 PROCEDURE — 99214 PR OFFICE/OUTPT VISIT, EST, LEVL IV, 30-39 MIN: ICD-10-PCS | Mod: HCNC,S$GLB,, | Performed by: INTERNAL MEDICINE

## 2022-01-28 PROCEDURE — 99999 PR PBB SHADOW E&M-EST. PATIENT-LVL IV: ICD-10-PCS | Mod: PBBFAC,HCNC,, | Performed by: INTERNAL MEDICINE

## 2022-01-28 PROCEDURE — 99499 UNLISTED E&M SERVICE: CPT | Mod: S$GLB,,, | Performed by: INTERNAL MEDICINE

## 2022-01-28 PROCEDURE — 3079F DIAST BP 80-89 MM HG: CPT | Mod: HCNC,CPTII,S$GLB, | Performed by: INTERNAL MEDICINE

## 2022-01-28 PROCEDURE — 93010 EKG 12-LEAD: ICD-10-PCS | Mod: HCNC,,, | Performed by: INTERNAL MEDICINE

## 2022-01-28 PROCEDURE — 99999 PR PBB SHADOW E&M-EST. PATIENT-LVL IV: CPT | Mod: PBBFAC,HCNC,, | Performed by: INTERNAL MEDICINE

## 2022-01-28 PROCEDURE — 3074F SYST BP LT 130 MM HG: CPT | Mod: HCNC,CPTII,S$GLB, | Performed by: INTERNAL MEDICINE

## 2022-01-28 PROCEDURE — 99214 OFFICE O/P EST MOD 30 MIN: CPT | Mod: HCNC,S$GLB,, | Performed by: INTERNAL MEDICINE

## 2022-01-28 PROCEDURE — 93010 ELECTROCARDIOGRAM REPORT: CPT | Mod: HCNC,,, | Performed by: INTERNAL MEDICINE

## 2022-01-28 PROCEDURE — 1159F MED LIST DOCD IN RCRD: CPT | Mod: HCNC,CPTII,S$GLB, | Performed by: INTERNAL MEDICINE

## 2022-01-28 PROCEDURE — 1160F RVW MEDS BY RX/DR IN RCRD: CPT | Mod: HCNC,CPTII,S$GLB, | Performed by: INTERNAL MEDICINE

## 2022-01-28 PROCEDURE — 3008F BODY MASS INDEX DOCD: CPT | Mod: HCNC,CPTII,S$GLB, | Performed by: INTERNAL MEDICINE

## 2022-01-28 PROCEDURE — 3008F PR BODY MASS INDEX (BMI) DOCUMENTED: ICD-10-PCS | Mod: HCNC,CPTII,S$GLB, | Performed by: INTERNAL MEDICINE

## 2022-01-28 PROCEDURE — 3074F PR MOST RECENT SYSTOLIC BLOOD PRESSURE < 130 MM HG: ICD-10-PCS | Mod: HCNC,CPTII,S$GLB, | Performed by: INTERNAL MEDICINE

## 2022-01-28 NOTE — PROGRESS NOTES
Subjective:   Patient ID:  Cem Meyers is a 61 y.o. female who presents for cardiac consult of No chief complaint on file.      Follow-up  Associated symptoms include chest pain and myalgias. Pertinent negatives include no headaches.   Hyperlipidemia  Associated symptoms include chest pain and myalgias. Pertinent negatives include no shortness of breath.   Chest Pain   Associated symptoms include back pain. Pertinent negatives include no dizziness, headaches, palpitations or shortness of breath.   Her past medical history is significant for hyperlipidemia.     The patient came in today for cardiac consult of No chief complaint on file.    Referring Provider: Adonis Stubbs MD   Reason for consult: chest pain    Cem Meyers is a 61 y.o. female pt with current medical conditions HFpEF, HLD, hypothyroidism, MS, MAYKEL, depression presents for follow up CV evaluation.      2/5/18  Pt complains of left sided sharp chest pain. Happened twice so far, intermittent, happens at rest. Pt has MS with heat sensitive, does not do much activity. Has been more sedentary. Occasionally has mild SOB, diagnosed with mild MAYKEL 2008/2009, used CPAP initially but has not used since then. Very min snoring these days.     5/8/18  After last visit had 2D ECHO which revealed normal BiV function with grade 1 DD, normal valves. Had sleep study which was positive for MAYKEL and has started CPAP. Wants a nose mask instead of face mask. Walks dogs, feels fatigue but not chest pain. NO further episodes of CP, thinks it may have been due to some exercise/MS symptoms.     11/1/18  Pt has been having more muscle spasms and increased Baclofen/pump. Occ chest pain - feels like pressure. Has been using CPAP, overall doing ok. No palpitations/LE swelling.     11/5/19  Has been doing well lately. She continues to work out, runs and tries to get HR up to 140s. No CP/SOB. Has been doing well with CPAP.   ECG - NSR, low voltage    11/19/20  She has been doing  well since last year, working out once a week. No CP/SOB. Using CPAP. No MS flareups. She has to take gabapentin 4 x/day for MS.   ECG - NSR     1/28/22  Follow up since 11/2020. Pt has been overall doing well. Has gotten COVID vaccines. Still takes gabapentin 800 TID. She has started Aubagio.   ECG - NSR    Patient feels no leg swelling, no PND, no palpitation, no syncope, no CNS symptoms.    Patient is compliant with medications.    2/5/18 - ECG - NSR, no ischemia    2D ECHO 2/7/18  CONCLUSIONS     1 - No wall motion abnormalities.     2 - Normal left ventricular systolic function (EF 60-65%).     3 - Impaired LV relaxation, normal LAP (grade 1 diastolic dysfunction).     4 - Normal right ventricular systolic function .     5 - The estimated PA systolic pressure is greater than 23 mmHg.         Past Medical History:   Diagnosis Date    Back pain     Broken toe 6/2015    left big toe    Chronic diastolic heart failure 5/8/2018    Closed left arm fracture     Colon polyp     Depression     Hyperlipidemia     Hypothyroid     Immunosuppression 1/28/2019    MS (multiple sclerosis)     Neurogenic bladder 12/6/2012    Osteoporosis     Polyneuropathy     Sleep apnea     Trouble in sleeping        Past Surgical History:   Procedure Laterality Date    COLONOSCOPY N/A 9/1/2017    Procedure: COLONOSCOPY;  Surgeon: Andriy Villar MD;  Location: [facility];  Service: Endoscopy;  Laterality: N/A;    COLONOSCOPY N/A 1/6/2021    Procedure: COLONOSCOPY;  Surgeon: Althea Casanova MD;  Location: [facility];  Service: Endoscopy;  Laterality: N/A;    FRACTURE SURGERY Left 2013    wrist- [facility]    KNEE SURGERY Right 1989    in [place]    TONSILLECTOMY  1966       Social History     Tobacco Use    Smoking status: Never Smoker    Smokeless tobacco: Never Used   Substance Use Topics    Alcohol use: No     Alcohol/week: 0.0 standard drinks    Drug use: No       Family History   Problem Relation Age of Onset     Pancreatic cancer Mother     Stomach cancer Mother     Cancer Mother         pancreatic, stomach    Hypertension Father     Heart disease Father         MI    Lupus Maternal Aunt     Rheum arthritis Maternal Aunt     Rheum arthritis Sister     Melanoma Neg Hx        Patient's Medications   New Prescriptions    No medications on file   Previous Medications    BACLOFEN (LIORESAL) 10 MG TABLET    TAKE 2 TABLETS ONCE DAILY IN THE MORNING, 2 TABLETS IN THE AFTERNOON, AND UP TO 3 TABLETS AT BEDTIME AS NEEDED    CALCIUM CITRATE (CALCITRATE) 200 MG (950 MG) TABLET    Take 2 tablets by mouth once daily.    CHOLECALCIFEROL, VITAMIN D3, 100 MCG (4,000 UNIT) CAP    Take 4,000 Units by mouth once daily.     DENOSUMAB (PROLIA SUBQ)    Inject into the skin.    FISH OIL-OMEGA-3 FATTY ACIDS 300-1,000 MG CAPSULE    Take 2 g by mouth once daily.    GABAPENTIN (NEURONTIN) 800 MG TABLET    Take 1 tablet (800 mg total) by mouth 3 (three) times daily.    GLUCOSAMINE-CHONDROITIN 500-400 MG TABLET    Take 1 tablet by mouth once daily.     LEVOTHYROXINE (SYNTHROID) 75 MCG TABLET    Take 1 tablet (75 mcg total) by mouth before breakfast.    MAGNESIUM GLYCINATE ORAL        MULTIVITAMIN CAPSULE    Take 1 capsule by mouth once daily.    PAROXETINE (PAXIL) 40 MG TABLET    TAKE 1 TABLET EVERY MORNING.    RINGER'S SOLUTION,LACTATED (LACTATED RINGERS) INFUSION        SIMVASTATIN (ZOCOR) 40 MG TABLET    TAKE 1 TABLET EVERY DAY    TERIFLUNOMIDE (AUBAGIO) 14 MG TAB    Take 1 tablet (14 mg) by mouth once daily.   Modified Medications    No medications on file   Discontinued Medications    No medications on file       Review of Systems   Constitutional: Negative.    HENT: Negative.    Eyes: Negative.    Respiratory: Negative for shortness of breath.    Cardiovascular: Positive for chest pain. Negative for palpitations.   Gastrointestinal: Negative.    Genitourinary: Negative.    Musculoskeletal: Positive for back pain, falls, joint pain and  myalgias.   Skin: Negative.    Neurological: Negative for dizziness and headaches.   Endo/Heme/Allergies: Negative.    Psychiatric/Behavioral: Negative.    All 12 systems otherwise negative.      Wt Readings from Last 3 Encounters:   12/23/21 77.2 kg (170 lb 3.1 oz)   08/02/21 70.8 kg (156 lb 1.4 oz)   06/16/21 73.3 kg (161 lb 9.6 oz)     Temp Readings from Last 3 Encounters:   12/23/21 97.3 °F (36.3 °C)   08/02/21 97.3 °F (36.3 °C) (Tympanic)   06/16/21 98.2 °F (36.8 °C)     BP Readings from Last 3 Encounters:   12/23/21 121/76   12/23/21 120/81   08/02/21 100/80     Pulse Readings from Last 3 Encounters:   12/23/21 61   12/23/21 66   08/02/21 84       There were no vitals taken for this visit.    Objective:   Physical Exam  Vitals and nursing note reviewed.   Constitutional:       General: She is not in acute distress.     Appearance: She is well-developed. She is not diaphoretic.   HENT:      Head: Normocephalic and atraumatic.      Nose: Nose normal.   Eyes:      General: No scleral icterus.     Conjunctiva/sclera: Conjunctivae normal.   Neck:      Thyroid: No thyromegaly.      Vascular: No JVD.   Cardiovascular:      Rate and Rhythm: Normal rate and regular rhythm.      Heart sounds: S1 normal and S2 normal. No murmur heard.  No friction rub. No gallop. No S3 or S4 sounds.    Pulmonary:      Effort: Pulmonary effort is normal. No respiratory distress.      Breath sounds: Normal breath sounds. No stridor. No wheezing or rales.   Chest:      Chest wall: No tenderness.   Abdominal:      General: Bowel sounds are normal. There is no distension.      Palpations: Abdomen is soft. There is no mass.      Tenderness: There is no abdominal tenderness. There is no rebound.   Genitourinary:     Comments: Deferred  Musculoskeletal:         General: No tenderness or deformity. Normal range of motion.      Cervical back: Normal range of motion and neck supple.   Lymphadenopathy:      Cervical: No cervical adenopathy.    Skin:     General: Skin is warm and dry.      Coloration: Skin is not pale.      Findings: No erythema or rash.   Neurological:      Mental Status: She is alert and oriented to person, place, and time.      Motor: No abnormal muscle tone.      Coordination: Coordination normal.   Psychiatric:         Behavior: Behavior normal.         Thought Content: Thought content normal.         Judgment: Judgment normal.         Lab Results   Component Value Date     12/23/2021    K 4.3 12/23/2021     12/23/2021    CO2 25 12/23/2021    BUN 16 12/23/2021    CREATININE 1.0 12/23/2021    GLU 83 12/23/2021    MG 2.3 10/16/2018    AST 33 01/26/2022    ALT 19 01/26/2022    ALBUMIN 3.8 01/26/2022    PROT 7.4 01/26/2022    BILITOT 0.6 01/26/2022    WBC 4.93 01/26/2022    HGB 12.9 01/26/2022    HCT 41.6 01/26/2022     (H) 01/26/2022     01/26/2022    TSH 1.350 07/22/2021    CHOL 157 07/22/2021    HDL 48 07/22/2021    LDLCALC 97.6 07/22/2021    TRIG 57 07/22/2021     Assessment:      1. Chronic diastolic heart failure    2. Other hyperlipidemia    3. Mixed hyperlipidemia    4. MS (multiple sclerosis)    5. Other specified hypothyroidism    6. MAYKEL on CPAP        Plan:   1. Chest pain, atypical  - resolved   - 2D ECHO overall normal function without valve disease  - diastolic HF - euvolemic  - discussed stress test if more symptomatic/worse  - consider other causes of CP - MSK/esoph/pulm/anxiety    2. HLD  - cont statin    3. Hypothyrodism, TSH 1.35  - cont synthroid, needs to take in early AM empty stomach     4. MS  - cont meds per PCP/Neuro    5. MAYKEL  - cont CPAP    6. Diastolic HF with edema   - pt euvolemic  - cont to monitor  - mild venous insuff    Thank you for allowing me to participate in this patient's care. Please do not hesitate to contact me with any questions or concerns. Consult note has been forwarded to the referral physician.

## 2022-01-31 DIAGNOSIS — Z12.11 COLON CANCER SCREENING: Primary | ICD-10-CM

## 2022-02-07 ENCOUNTER — LAB VISIT (OUTPATIENT)
Dept: LAB | Facility: HOSPITAL | Age: 62
End: 2022-02-07
Payer: MEDICARE

## 2022-02-07 ENCOUNTER — OFFICE VISIT (OUTPATIENT)
Dept: INTERNAL MEDICINE | Facility: CLINIC | Age: 62
End: 2022-02-07
Payer: MEDICARE

## 2022-02-07 VITALS
SYSTOLIC BLOOD PRESSURE: 104 MMHG | OXYGEN SATURATION: 97 % | HEART RATE: 88 BPM | WEIGHT: 168.19 LBS | HEIGHT: 63 IN | TEMPERATURE: 97 F | BODY MASS INDEX: 29.8 KG/M2 | DIASTOLIC BLOOD PRESSURE: 72 MMHG

## 2022-02-07 DIAGNOSIS — D84.9 IMMUNOSUPPRESSION: ICD-10-CM

## 2022-02-07 DIAGNOSIS — Z12.11 SCREENING FOR COLON CANCER: ICD-10-CM

## 2022-02-07 DIAGNOSIS — R39.89 ABNORMAL URINE COLOR: ICD-10-CM

## 2022-02-07 DIAGNOSIS — Z87.440 HISTORY OF UTI: ICD-10-CM

## 2022-02-07 DIAGNOSIS — G35 MS (MULTIPLE SCLEROSIS): ICD-10-CM

## 2022-02-07 DIAGNOSIS — R39.89 ABNORMAL URINE COLOR: Primary | ICD-10-CM

## 2022-02-07 DIAGNOSIS — R35.0 URINARY FREQUENCY: ICD-10-CM

## 2022-02-07 LAB
BILIRUB UR QL STRIP: NEGATIVE
CLARITY UR: CLEAR
COLOR UR: YELLOW
GLUCOSE UR QL STRIP: NEGATIVE
HGB UR QL STRIP: NEGATIVE
KETONES UR QL STRIP: NEGATIVE
LEUKOCYTE ESTERASE UR QL STRIP: NEGATIVE
NITRITE UR QL STRIP: NEGATIVE
PH UR STRIP: 7 [PH] (ref 5–8)
PROT UR QL STRIP: NEGATIVE
SP GR UR STRIP: 1.02 (ref 1–1.03)
URN SPEC COLLECT METH UR: NORMAL

## 2022-02-07 PROCEDURE — 99214 OFFICE O/P EST MOD 30 MIN: CPT | Mod: HCNC,S$GLB,, | Performed by: FAMILY MEDICINE

## 2022-02-07 PROCEDURE — 1160F PR REVIEW ALL MEDS BY PRESCRIBER/CLIN PHARMACIST DOCUMENTED: ICD-10-PCS | Mod: HCNC,CPTII,S$GLB, | Performed by: FAMILY MEDICINE

## 2022-02-07 PROCEDURE — 3078F DIAST BP <80 MM HG: CPT | Mod: HCNC,CPTII,S$GLB, | Performed by: FAMILY MEDICINE

## 2022-02-07 PROCEDURE — 99999 PR PBB SHADOW E&M-EST. PATIENT-LVL IV: CPT | Mod: PBBFAC,HCNC,, | Performed by: FAMILY MEDICINE

## 2022-02-07 PROCEDURE — 3074F SYST BP LT 130 MM HG: CPT | Mod: HCNC,CPTII,S$GLB, | Performed by: FAMILY MEDICINE

## 2022-02-07 PROCEDURE — 3008F PR BODY MASS INDEX (BMI) DOCUMENTED: ICD-10-PCS | Mod: HCNC,CPTII,S$GLB, | Performed by: FAMILY MEDICINE

## 2022-02-07 PROCEDURE — 81003 URINALYSIS AUTO W/O SCOPE: CPT | Mod: HCNC | Performed by: FAMILY MEDICINE

## 2022-02-07 PROCEDURE — 3074F PR MOST RECENT SYSTOLIC BLOOD PRESSURE < 130 MM HG: ICD-10-PCS | Mod: HCNC,CPTII,S$GLB, | Performed by: FAMILY MEDICINE

## 2022-02-07 PROCEDURE — 99499 UNLISTED E&M SERVICE: CPT | Mod: S$GLB,,, | Performed by: FAMILY MEDICINE

## 2022-02-07 PROCEDURE — 3008F BODY MASS INDEX DOCD: CPT | Mod: HCNC,CPTII,S$GLB, | Performed by: FAMILY MEDICINE

## 2022-02-07 PROCEDURE — 87086 URINE CULTURE/COLONY COUNT: CPT | Mod: HCNC | Performed by: FAMILY MEDICINE

## 2022-02-07 PROCEDURE — 99214 PR OFFICE/OUTPT VISIT, EST, LEVL IV, 30-39 MIN: ICD-10-PCS | Mod: HCNC,S$GLB,, | Performed by: FAMILY MEDICINE

## 2022-02-07 PROCEDURE — 1159F PR MEDICATION LIST DOCUMENTED IN MEDICAL RECORD: ICD-10-PCS | Mod: HCNC,CPTII,S$GLB, | Performed by: FAMILY MEDICINE

## 2022-02-07 PROCEDURE — 3078F PR MOST RECENT DIASTOLIC BLOOD PRESSURE < 80 MM HG: ICD-10-PCS | Mod: HCNC,CPTII,S$GLB, | Performed by: FAMILY MEDICINE

## 2022-02-07 PROCEDURE — 99499 RISK ADDL DX/OHS AUDIT: ICD-10-PCS | Mod: S$GLB,,, | Performed by: FAMILY MEDICINE

## 2022-02-07 PROCEDURE — 99999 PR PBB SHADOW E&M-EST. PATIENT-LVL IV: ICD-10-PCS | Mod: PBBFAC,HCNC,, | Performed by: FAMILY MEDICINE

## 2022-02-07 PROCEDURE — 1160F RVW MEDS BY RX/DR IN RCRD: CPT | Mod: HCNC,CPTII,S$GLB, | Performed by: FAMILY MEDICINE

## 2022-02-07 PROCEDURE — 1159F MED LIST DOCD IN RCRD: CPT | Mod: HCNC,CPTII,S$GLB, | Performed by: FAMILY MEDICINE

## 2022-02-07 RX ORDER — NITROFURANTOIN 25; 75 MG/1; MG/1
100 CAPSULE ORAL 2 TIMES DAILY
Qty: 6 CAPSULE | Refills: 0 | Status: SHIPPED | OUTPATIENT
Start: 2022-02-07 | End: 2022-02-10

## 2022-02-07 RX ORDER — DICYCLOMINE HYDROCHLORIDE 20 MG/1
TABLET ORAL
COMMUNITY
Start: 2022-01-25 | End: 2023-06-08

## 2022-02-07 NOTE — PROGRESS NOTES
Subjective:   Patient ID: Cem Meyers is a 61 y.o. female.  Chief Complaint:  Possible UTI    Presents for evaluation of possible UTI  Last visit August 2021 for annual physical exam    Recently had MS meds changed and unfortunately not as effective as previous treatment  Episode of significant constipation last week which has resolved  However, complains of multiple urinary symptoms and possible underlying infection  Last infection March 2021 with culture positive E coli sensitive to all antibiotics except tetracycline.  Treated 3 days with Macrobid with improvement    Urinary Tract Infection   This is a recurrent problem. The current episode started in the past 7 days. The problem occurs every urination. The problem has been unchanged. The pain is at a severity of 0/10. The patient is experiencing no pain. There has been no fever. The fever has been present for less than 1 day. There is a history of pyelonephritis. Associated symptoms include frequency, urgency and constipation. Pertinent negatives include no behavior changes, chills, discharge, flank pain, hematuria, hesitancy, nausea, sweats, vomiting, weight loss, bubble bath use, rash or withholding. She has tried nothing for the symptoms. Her past medical history is significant for recurrent UTIs and urinary stasis.     Review of Systems   Constitutional: Negative for chills, diaphoresis, fatigue, fever and weight loss.   HENT: Negative for congestion, dental problem, ear discharge, ear pain, hoarse voice, postnasal drip, rhinorrhea, sinus pressure, sneezing, sore throat, tinnitus, trouble swallowing and voice change.    Eyes: Negative for pain, discharge, redness and itching.   Respiratory: Negative for cough, chest tightness, shortness of breath and wheezing.    Cardiovascular: Negative for chest pain, palpitations and leg swelling.   Gastrointestinal: Positive for constipation. Negative for abdominal distention, abdominal pain, diarrhea, nausea and  "vomiting.   Genitourinary: Positive for dysuria, frequency and urgency. Negative for decreased urine volume, difficulty urinating, enuresis, flank pain, hematuria and hesitancy.   Musculoskeletal: Positive for gait problem. Negative for arthralgias, myalgias, neck pain and neck stiffness.   Skin: Negative for rash.   Neurological: Positive for weakness. Negative for headaches.   Hematological: Negative for adenopathy.     Objective:   /72 (BP Location: Left arm, Patient Position: Sitting, BP Method: Large (Manual))   Pulse 88   Temp 97.2 °F (36.2 °C) (Temporal)   Ht 5' 3" (1.6 m)   Wt 76.3 kg (168 lb 3.4 oz)   SpO2 97%   BMI 29.80 kg/m²     Physical Exam  Vitals and nursing note reviewed.   Constitutional:       General: She is not in acute distress.     Appearance: Normal appearance. She is well-developed and normal weight. She is not ill-appearing or toxic-appearing.   Eyes:      General: No scleral icterus.     Conjunctiva/sclera: Conjunctivae normal.      Right eye: Right conjunctiva is not injected.      Left eye: Left conjunctiva is not injected.   Neck:      Thyroid: No thyroid mass, thyromegaly or thyroid tenderness.      Vascular: No carotid bruit or JVD.   Cardiovascular:      Rate and Rhythm: Normal rate and regular rhythm.      Pulses:           Radial pulses are 2+ on the right side and 2+ on the left side.      Heart sounds: Normal heart sounds. No murmur heard.  No friction rub. No gallop.    Pulmonary:      Effort: Pulmonary effort is normal. No tachypnea, accessory muscle usage or respiratory distress.      Breath sounds: Normal breath sounds. No wheezing, rhonchi or rales.   Abdominal:      General: There is no distension.      Palpations: Abdomen is soft. There is no hepatomegaly.      Tenderness: There is no abdominal tenderness. There is no right CVA tenderness, left CVA tenderness, guarding or rebound.   Musculoskeletal:      Right lower leg: No edema.      Left lower leg: No edema. "   Skin:     General: Skin is warm and dry.      Capillary Refill: Capillary refill takes less than 2 seconds.      Findings: No abrasion, bruising, ecchymosis, rash or wound.   Neurological:      Mental Status: She is alert.      Coordination: Coordination abnormal.      Gait: Gait abnormal.   Psychiatric:         Attention and Perception: Attention and perception normal.         Mood and Affect: Mood and affect normal. Mood is not anxious or depressed.         Speech: Speech normal.         Behavior: Behavior normal. Behavior is cooperative.         Thought Content: Thought content normal.         Cognition and Memory: Cognition and memory normal.         Judgment: Judgment normal.       Assessment:       ICD-10-CM ICD-9-CM   1. Abnormal urine color  R39.89 791.9   2. Urinary frequency  R35.0 788.41   3. History of UTI  Z87.440 V13.02   4. MS (multiple sclerosis)  G35 340   5. Immunosuppression  D84.9 279.9   6. Screening for colon cancer  Z12.11 V76.51     Plan:   Abnormal urine color  Urinary frequency  History of UTI  Immunosuppression  -     Urinalysis; Future; Expected date: 02/07/2022  -     Urine culture; Future; Expected date: 02/07/2022  -     nitrofurantoin, macrocrystal-monohydrate, (MACROBID) 100 MG capsule; Take 1 capsule (100 mg total) by mouth 2 (two) times daily. for 3 days  Dispense: 6 capsule; Refill: 0  Based on history and symptoms, empiric treatment with Macrobid twice a day for 3 days  Check urinalysis and culture  If culture positive, will need additional 7 days of appropriate antibiotics based on sensitivities  If culture negative, no additional antibiotics needed    Multiple sclerosis  Unfortunately medication does not fear is effective in past with exacerbation of her symptoms  Needs to follow-up with any/all specialists scheduled    Screening for colon cancer  -     Case Request Endoscopy: COLONOSCOPY    Return to clinic 6 months for annual physical exam or sooner as needed

## 2022-02-09 ENCOUNTER — OFFICE VISIT (OUTPATIENT)
Dept: PULMONOLOGY | Facility: CLINIC | Age: 62
End: 2022-02-09
Payer: MEDICARE

## 2022-02-09 VITALS
BODY MASS INDEX: 26.99 KG/M2 | DIASTOLIC BLOOD PRESSURE: 92 MMHG | RESPIRATION RATE: 16 BRPM | HEIGHT: 63 IN | OXYGEN SATURATION: 95 % | WEIGHT: 152.31 LBS | HEART RATE: 71 BPM | SYSTOLIC BLOOD PRESSURE: 128 MMHG

## 2022-02-09 DIAGNOSIS — I50.32 CHRONIC DIASTOLIC HEART FAILURE: ICD-10-CM

## 2022-02-09 DIAGNOSIS — M62.838 MUSCLE SPASMS OF BOTH LOWER EXTREMITIES: ICD-10-CM

## 2022-02-09 DIAGNOSIS — F51.04 PSYCHOPHYSIOLOGICAL INSOMNIA: ICD-10-CM

## 2022-02-09 DIAGNOSIS — G47.33 OSA ON CPAP: Primary | ICD-10-CM

## 2022-02-09 DIAGNOSIS — G35 MS (MULTIPLE SCLEROSIS): Chronic | ICD-10-CM

## 2022-02-09 LAB — BACTERIA UR CULT: NO GROWTH

## 2022-02-09 PROCEDURE — 1159F PR MEDICATION LIST DOCUMENTED IN MEDICAL RECORD: ICD-10-PCS | Mod: HCNC,CPTII,S$GLB, | Performed by: NURSE PRACTITIONER

## 2022-02-09 PROCEDURE — 99999 PR PBB SHADOW E&M-EST. PATIENT-LVL IV: CPT | Mod: PBBFAC,HCNC,, | Performed by: NURSE PRACTITIONER

## 2022-02-09 PROCEDURE — 99214 PR OFFICE/OUTPT VISIT, EST, LEVL IV, 30-39 MIN: ICD-10-PCS | Mod: HCNC,S$GLB,, | Performed by: NURSE PRACTITIONER

## 2022-02-09 PROCEDURE — 3008F PR BODY MASS INDEX (BMI) DOCUMENTED: ICD-10-PCS | Mod: HCNC,CPTII,S$GLB, | Performed by: NURSE PRACTITIONER

## 2022-02-09 PROCEDURE — 3074F PR MOST RECENT SYSTOLIC BLOOD PRESSURE < 130 MM HG: ICD-10-PCS | Mod: HCNC,CPTII,S$GLB, | Performed by: NURSE PRACTITIONER

## 2022-02-09 PROCEDURE — 3008F BODY MASS INDEX DOCD: CPT | Mod: HCNC,CPTII,S$GLB, | Performed by: NURSE PRACTITIONER

## 2022-02-09 PROCEDURE — 99999 PR PBB SHADOW E&M-EST. PATIENT-LVL IV: ICD-10-PCS | Mod: PBBFAC,HCNC,, | Performed by: NURSE PRACTITIONER

## 2022-02-09 PROCEDURE — 3080F DIAST BP >= 90 MM HG: CPT | Mod: HCNC,CPTII,S$GLB, | Performed by: NURSE PRACTITIONER

## 2022-02-09 PROCEDURE — 99214 OFFICE O/P EST MOD 30 MIN: CPT | Mod: HCNC,S$GLB,, | Performed by: NURSE PRACTITIONER

## 2022-02-09 PROCEDURE — 1159F MED LIST DOCD IN RCRD: CPT | Mod: HCNC,CPTII,S$GLB, | Performed by: NURSE PRACTITIONER

## 2022-02-09 PROCEDURE — 3074F SYST BP LT 130 MM HG: CPT | Mod: HCNC,CPTII,S$GLB, | Performed by: NURSE PRACTITIONER

## 2022-02-09 PROCEDURE — 1160F PR REVIEW ALL MEDS BY PRESCRIBER/CLIN PHARMACIST DOCUMENTED: ICD-10-PCS | Mod: HCNC,CPTII,S$GLB, | Performed by: NURSE PRACTITIONER

## 2022-02-09 PROCEDURE — 1160F RVW MEDS BY RX/DR IN RCRD: CPT | Mod: HCNC,CPTII,S$GLB, | Performed by: NURSE PRACTITIONER

## 2022-02-09 PROCEDURE — 3080F PR MOST RECENT DIASTOLIC BLOOD PRESSURE >= 90 MM HG: ICD-10-PCS | Mod: HCNC,CPTII,S$GLB, | Performed by: NURSE PRACTITIONER

## 2022-02-09 NOTE — ASSESSMENT & PLAN NOTE
Benefits and compliant with Auto CPAP 4-6 cm  AHI 9.6  Patient willing for higher pressures, remote change to Auto CPAP 4-8 cm  Dream wear mask   Updated supply order  HME: [facility]   Follow up yearly CPAP compliance download and supply order.

## 2022-02-09 NOTE — ASSESSMENT & PLAN NOTE
Improved with turning off electronics and resuming CPAP  No longer awakens during night, improved with CPAP

## 2022-02-09 NOTE — PROGRESS NOTES
Subjective:      Patient ID: Cem Meyers is a 61 y.o. female.    Chief Complaint: Apnea    HPI: Cem Meyers is here for follow up for MAYKEL with yearly CPAP complaince assessment.  She is on Auto CPAP of 4-6 cmH2O pressure  She is compliant with CPAP use. Complaince download today reveals 80% of days with greater than 4 hours of device use.   Patient reports benefit from CPAP use.  Patient reports no complaints.  Dream wear nasal mask is used, changed from prior nasal wisp mask.  Discussed AHI 9.3. Patient willing for higher pressures, orders for remote change to Auto CPAP 4-8 cm    Assisted registering CPAP machine with [employer] confirmation code is: [id number].    Diagnosis in [id number] while in [place], told mild obstructive sleep apnea with treatment CPAP  8 cm.  Has not used CPAP since 2014, sporadic use from [id number]. Lost 30 lbs since last PSG.   PSG 3/17/2018 revealed AHI 7.4. Cpap titration 4/16/2018 revealed 7 cm optimal.  Patient was on 8 cm when on CPAP in 2014.     Compliance Summary  Auto CPAP 4-6 cm  1/8/2022 - 2/6/2022 (30 days)  Days with Device Usage 27 days  Days without Device Usage 3 days  Percent Days with Device Usage 90.0%  Cumulative Usage 6 days 11 hrs. 8 mins. 7 secs.  Maximum Usage (1 Day) 9 hrs. 47 mins. 5 secs.  Average Usage (All Days) 5 hrs. 10 mins. 16 secs.  Average Usage (Days Used) 5 hrs. 44 mins. 44 secs.   Minimum Usage (1 Day) 12 mins. 7 secs.  Percent of Days with Usage >= 4 Hours 80.0%  Percent of Days with Usage < 4 Hours 20.0%  Date Range  Total Blower Time 6 days 12 hrs. 47 mins. 16 secs.  Average AHI 9.3  Auto-CPAP Summary  Auto-CPAP Mean Pressure 5.2 cmH2O  Auto-CPAP Peak Average Pressure 6.0 cmH2O  Device Pressure <= 90% of Time 6.0 cmH2O  Average Time in Large Leak Per Day 9 secs.    Porum Sleepiness Scale   EPWORTH SLEEPINESS SCALE 2/9/2022 12/23/2020 12/23/2019 12/21/2018 6/20/2018 4/20/2018 2/23/2018   Sitting and reading 1 0 1 0 0 1 1   Watching TV 1 1  1 0 0 1 0   Sitting, inactive in a public place (e.g. a theatre or a meeting) 1 0 0 0 0 1 1   As a passenger in a car for an hour without a break 1 1 1 0 0 2 1   Lying down to rest in the afternoon when circumstances permit 2 2 2 3 1 2 3   Sitting and talking to someone 0 0 0 0 0 0 0   Sitting quietly after a lunch without alcohol 1 1 1 1 1 2 1   In a car, while stopped for a few minutes in traffic 0 0 0 0 0 0 0   Total score 7 5 6 4 2 9 7     Previous Report Reviewed: lab reports and office notes     Past Medical History: The following portions of the patient's history were reviewed and updated as appropriate:   She  has a past surgical history that includes Knee surgery (Right, 1989); Fracture surgery (Left, 2013); Tonsillectomy (1966); Colonoscopy (N/A, 9/1/2017); and Colonoscopy (N/A, 1/6/2021).  Her family history includes Cancer in her mother; Heart disease in her father; Hypertension in her father; Lupus in her maternal aunt; Pancreatic cancer in her mother; Rheum arthritis in her maternal aunt and sister; Stomach cancer in her mother.  She  reports that she has never smoked. She has never used smokeless tobacco. She reports that she does not drink alcohol and does not use drugs.  She has a current medication list which includes the following prescription(s): baclofen, calcium citrate, cholecalciferol (vitamin d3), denosumab, dicyclomine, fish oil-omega-3 fatty acids, gabapentin, glucosamine-chondroitin, levothyroxine, magnesium glycinate, multivitamin, nitrofurantoin (macrocrystal-monohydrate), paroxetine, lactated ringers, simvastatin, and aubagio.  She is allergic to latex, natural rubber.    The following portions of the patient's history were reviewed and updated as appropriate: allergies, current medications, past family history, past medical history, past social history, past surgical history and problem list.    Review of Systems   Constitutional: Negative for fever, chills, weight loss, weight gain,  "activity change, appetite change, fatigue and night sweats.   HENT: Negative for postnasal drip, rhinorrhea, sinus pressure, voice change and congestion.    Eyes: Negative for redness and itching.   Respiratory: Negative for snoring, cough, sputum production, chest tightness, shortness of breath, wheezing, orthopnea, asthma nighttime symptoms, dyspnea on extertion, use of rescue inhaler and somnolence.    Cardiovascular: Negative.  Negative for chest pain, palpitations and leg swelling.   Genitourinary: Negative for difficulty urinating and hematuria.   Endocrine: Negative for cold intolerance and heat intolerance.    Musculoskeletal: Negative for arthralgias, gait problem, joint swelling and myalgias.   Skin: Negative.    Gastrointestinal: Negative for nausea, vomiting, abdominal pain and acid reflux.   Neurological: Negative for dizziness, weakness, light-headedness and headaches.   Hematological: Negative for adenopathy. No excessive bruising.   All other systems reviewed and are negative.     Objective:   BP (!) 128/92   Pulse 71   Resp 16   Ht 5' 3" (1.6 m)   Wt 69.1 kg (152 lb 5.4 oz)   SpO2 95%   BMI 26.99 kg/m²   Physical Exam  Vitals reviewed.   Constitutional:       General: She is not in acute distress.     Appearance: She is well-developed. She is not ill-appearing or toxic-appearing.   HENT:      Head: Normocephalic.      Right Ear: External ear normal.      Left Ear: External ear normal.      Nose: Nose normal.      Mouth/Throat:      Pharynx: No oropharyngeal exudate.   Eyes:      Conjunctiva/sclera: Conjunctivae normal.   Cardiovascular:      Rate and Rhythm: Normal rate and regular rhythm.      Heart sounds: Normal heart sounds.   Pulmonary:      Effort: Pulmonary effort is normal.      Breath sounds: Normal breath sounds. No stridor.   Abdominal:      Palpations: Abdomen is soft.   Musculoskeletal:         General: Normal range of motion.      Cervical back: Normal range of motion and neck " supple.   Lymphadenopathy:      Cervical: No cervical adenopathy.   Skin:     General: Skin is warm and dry.   Neurological:      Mental Status: She is alert and oriented to person, place, and time.   Psychiatric:         Behavior: Behavior normal. Behavior is cooperative.         Thought Content: Thought content normal.         Judgment: Judgment normal.       Personal Diagnostic Review  CPAP download    Assessment:     1. MAYKEL on CPAP    2. Chronic diastolic heart failure    3. Psychophysiological insomnia    4. MS (multiple sclerosis)    5. Muscle spasms of both lower extremities      Orders Placed This Encounter   Procedures    CPAP/BIPAP SUPPLIES     Benefits and compliant  90 day supply. 4 refills  HME: [facility]     Order Specific Question:   Length of need (1-99 months):     Answer:   99     Order Specific Question:   Choose ONE mask type and its corresponding cushions and/or pillows:     Answer:    Nasal Cushion Mask, 1 per 90 days:  Nasal Cushions, (6 per 90 days)     Order Specific Question:   Choose EITHER Heated or Non-Heated Tubjing     Answer:    Non-Heated Tubing, 1 per 90 days     Order Specific Question:   Number of Days Needed:     Answer:   99     Order Specific Question:   All other supplies as needed as listed below:     Answer:    Headgear, 1 per 180 days     Order Specific Question:   All other supplies as needed as listed below:     Answer:    Chin Strap, 1 per 180 days     Order Specific Question:   All other supplies as needed as listed below:     Answer:    Disposable Filter, 6 per 90 days     Order Specific Question:   All other supplies as needed as listed below:     Answer:    Humidifier Chamber, 1 per 180 days     Order Specific Question:   All other supplies as needed as listed below:     Answer:    Non-Disposable Filter, 1 per 180 days    HME - OTHER     Please change remotely to Auto CPAP 4-8 cm   HME:[facility]     Order Specific Question:    "Type of Equipment:     Answer:   CPAP     Order Specific Question:   Height:     Answer:   5' 3" (1.6 m)     Order Specific Question:   Weight:     Answer:   69.1 kg (152 lb 5.4 oz)     Order Specific Question:   Does patient have medical equipment at home?     Answer:   CPAP     Plan:     Problem List Items Addressed This Visit     Psychophysiological insomnia     Improved with turning off electronics and resuming CPAP  No longer awakens during night, improved with CPAP          MAYKEL on CPAP - Primary (Chronic)     Benefits and compliant with Auto CPAP 4-6 cm  AHI 9.6  Patient willing for higher pressures, remote change to Auto CPAP 4-8 cm  Dream wear mask   Updated supply order  HME: [facility]   Follow up yearly CPAP compliance download and supply order.         Relevant Orders    CPAP/BIPAP SUPPLIES    HME - OTHER    Muscle spasms of both lower extremities     Improved with magnesium          MS (multiple sclerosis) (Chronic)     Stable, followed by Rebeka Smith MD and Rimma Rouse PA-C Neurology, [facility] [place]            Chronic diastolic heart failure     managed by cardiology             Follow up in about 1 year (around 2/9/2023) for CPAP 1 year compliance download.    "

## 2022-02-10 DIAGNOSIS — M79.2 NEUROPATHIC PAIN: ICD-10-CM

## 2022-02-10 DIAGNOSIS — G35 MULTIPLE SCLEROSIS: ICD-10-CM

## 2022-02-14 RX ORDER — GABAPENTIN 800 MG/1
TABLET ORAL
Qty: 270 TABLET | Refills: 1 | Status: SHIPPED | OUTPATIENT
Start: 2022-02-14 | End: 2022-08-11

## 2022-02-23 ENCOUNTER — LAB VISIT (OUTPATIENT)
Dept: LAB | Facility: HOSPITAL | Age: 62
End: 2022-02-23
Attending: FAMILY MEDICINE
Payer: MEDICARE

## 2022-02-23 ENCOUNTER — SPECIALTY PHARMACY (OUTPATIENT)
Dept: PHARMACY | Facility: CLINIC | Age: 62
End: 2022-02-23
Payer: MEDICARE

## 2022-02-23 DIAGNOSIS — G35 MULTIPLE SCLEROSIS: ICD-10-CM

## 2022-02-23 LAB
ALBUMIN SERPL BCP-MCNC: 3.7 G/DL (ref 3.5–5.2)
ALP SERPL-CCNC: 73 U/L (ref 55–135)
ALT SERPL W/O P-5'-P-CCNC: 15 U/L (ref 10–44)
AST SERPL-CCNC: 31 U/L (ref 10–40)
BASOPHILS # BLD AUTO: 0.04 K/UL (ref 0–0.2)
BASOPHILS NFR BLD: 1 % (ref 0–1.9)
BILIRUB DIRECT SERPL-MCNC: 0.2 MG/DL (ref 0.1–0.3)
BILIRUB SERPL-MCNC: 0.5 MG/DL (ref 0.1–1)
DIFFERENTIAL METHOD: ABNORMAL
EOSINOPHIL # BLD AUTO: 0.1 K/UL (ref 0–0.5)
EOSINOPHIL NFR BLD: 1.8 % (ref 0–8)
ERYTHROCYTE [DISTWIDTH] IN BLOOD BY AUTOMATED COUNT: 12.8 % (ref 11.5–14.5)
HCT VFR BLD AUTO: 40.5 % (ref 37–48.5)
HGB BLD-MCNC: 12.7 G/DL (ref 12–16)
IMM GRANULOCYTES # BLD AUTO: 0.01 K/UL (ref 0–0.04)
IMM GRANULOCYTES NFR BLD AUTO: 0.3 % (ref 0–0.5)
LYMPHOCYTES # BLD AUTO: 1.8 K/UL (ref 1–4.8)
LYMPHOCYTES NFR BLD: 46 % (ref 18–48)
MCH RBC QN AUTO: 31.2 PG (ref 27–31)
MCHC RBC AUTO-ENTMCNC: 31.4 G/DL (ref 32–36)
MCV RBC AUTO: 100 FL (ref 82–98)
MONOCYTES # BLD AUTO: 0.5 K/UL (ref 0.3–1)
MONOCYTES NFR BLD: 11.8 % (ref 4–15)
NEUTROPHILS # BLD AUTO: 1.6 K/UL (ref 1.8–7.7)
NEUTROPHILS NFR BLD: 39.1 % (ref 38–73)
NRBC BLD-RTO: 0 /100 WBC
PLATELET # BLD AUTO: 189 K/UL (ref 150–450)
PMV BLD AUTO: 10.7 FL (ref 9.2–12.9)
PROT SERPL-MCNC: 6.9 G/DL (ref 6–8.4)
RBC # BLD AUTO: 4.07 M/UL (ref 4–5.4)
WBC # BLD AUTO: 4 K/UL (ref 3.9–12.7)

## 2022-02-23 PROCEDURE — 36415 COLL VENOUS BLD VENIPUNCTURE: CPT | Mod: HCNC | Performed by: PSYCHIATRY & NEUROLOGY

## 2022-02-23 PROCEDURE — 85025 COMPLETE CBC W/AUTO DIFF WBC: CPT | Mod: HCNC | Performed by: PSYCHIATRY & NEUROLOGY

## 2022-02-23 PROCEDURE — 80076 HEPATIC FUNCTION PANEL: CPT | Mod: HCNC | Performed by: PSYCHIATRY & NEUROLOGY

## 2022-02-28 ENCOUNTER — PATIENT MESSAGE (OUTPATIENT)
Dept: PSYCHIATRY | Facility: CLINIC | Age: 62
End: 2022-02-28
Payer: MEDICARE

## 2022-03-07 NOTE — TELEPHONE ENCOUNTER
Specialty Pharmacy - Refill Coordination    Specialty Medication Orders Linked to Encounter    Flowsheet Row Most Recent Value   Medication #1 teriflunomide (AUBAGIO) 14 mg Tab (Order#[id number], Rx#[id number])          Refill Questions - Documented Responses    Flowsheet Row Most Recent Value   Patient Availability and HIPAA Verification    Does patient want to proceed with activity? Yes   HIPAA/medical authority confirmed? Yes   Relationship to patient of person spoken to? Self   Refill Screening Questions    Changes to allergies? No   Changes to medications? No   New conditions since last clinic visit? No   Unplanned office visit, urgent care, ED, or hospital admission in the last 4 weeks? No   How does patient/caregiver feel medication is working? Very good   Financial problems or insurance changes? No   How many doses of your specialty medications were missed in the last 4 weeks? 0   Would patient like to speak to a pharmacist? No   When does the patient need to receive the medication? 03/11/22   Refill Delivery Questions    How will the patient receive the medication? Delivery Tracie   When does the patient need to receive the medication? 03/11/22   Shipping Address Home   Address in [facility] confirmed and updated if neccessary? Yes   Expected Copay ($) 0   Is the patient able to afford the medication copay? Yes   Payment Method zero copay   Days supply of Refill 30   Supplies needed? No supplies needed   Refill activity completed? Yes   Refill activity plan Refill scheduled   Shipment/Pickup Date: 03/08/22          Current Outpatient Medications   Medication Sig    baclofen (LIORESAL) 10 MG tablet TAKE 2 TABLETS ONCE DAILY IN THE MORNING, 2 TABLETS IN THE AFTERNOON, AND UP TO 3 TABLETS AT BEDTIME AS NEEDED    calcium citrate (CALCITRATE) 200 mg (950 mg) tablet Take 2 tablets by mouth once daily.    cholecalciferol, vitamin D3, 100 mcg (4,000 unit) Cap Take 4,000 Units by mouth once daily.      denosumab (PROLIA SUBQ) Inject into the skin.    dicyclomine (BENTYL) 20 mg tablet     fish oil-omega-3 fatty acids 300-1,000 mg capsule Take 2 g by mouth once daily.    gabapentin (NEURONTIN) 800 MG tablet TAKE ONE TABLET BY MOUTH THREE TIMES DAILY    glucosamine-chondroitin 500-400 mg tablet Take 1 tablet by mouth once daily.     levothyroxine (SYNTHROID) 75 MCG tablet Take 1 tablet (75 mcg total) by mouth before breakfast.    MAGNESIUM GLYCINATE ORAL     multivitamin capsule Take 1 capsule by mouth once daily.    paroxetine (PAXIL) 40 MG tablet TAKE 1 TABLET EVERY MORNING.    Ringer's solution,lactated (LACTATED RINGERS) infusion     simvastatin (ZOCOR) 40 MG tablet TAKE 1 TABLET EVERY DAY    teriflunomide (AUBAGIO) 14 mg Tab Take 1 tablet (14 mg) by mouth once daily.   Last reviewed on 2/9/2022 12:53 PM by Madisyn Sullivan NP    Review of patient's allergies indicates:   Allergen Reactions    Latex, natural rubber Rash    Last reviewed on  2/9/2022 12:53 PM by Madisyn Sullivan      Tasks added this encounter   4/3/2022 - Refill Call (Auto Added)   Tasks due within next 3 months   No tasks due.     Samia White, PharmD  [facility]  [address]  [facility] [address]  Phone: [phone]  Fax: [phone]

## 2022-03-14 ENCOUNTER — PATIENT MESSAGE (OUTPATIENT)
Dept: ENDOSCOPY | Facility: HOSPITAL | Age: 62
End: 2022-03-14
Payer: MEDICARE

## 2022-03-14 RX ORDER — POLYETHYLENE GLYCOL 3350, SODIUM SULFATE ANHYDROUS, SODIUM BICARBONATE, SODIUM CHLORIDE, POTASSIUM CHLORIDE 236; 22.74; 6.74; 5.86; 2.97 G/4L; G/4L; G/4L; G/4L; G/4L
4 POWDER, FOR SOLUTION ORAL ONCE
Qty: 4000 ML | Refills: 0 | Status: SHIPPED | OUTPATIENT
Start: 2022-03-14 | End: 2022-03-14

## 2022-03-15 ENCOUNTER — TELEPHONE (OUTPATIENT)
Dept: PREADMISSION TESTING | Facility: HOSPITAL | Age: 62
End: 2022-03-15
Payer: MEDICARE

## 2022-03-15 ENCOUNTER — PATIENT OUTREACH (OUTPATIENT)
Dept: ADMINISTRATIVE | Facility: HOSPITAL | Age: 62
End: 2022-03-15
Payer: MEDICARE

## 2022-03-15 NOTE — TELEPHONE ENCOUNTER
PAT call completed.  Patient educated on procedure instructions.  Medical history discussed and patient informed of arrival time of 11:00 AM on Friday, March 18, 2022 at the [facility], and was made aware of the limited-visitor policy, and that  is to remain during the entire visit.  All questions and concerns addressed.  Endoscopy instructions reviewed. Instructed no solid food, only clear liquids, the day before the procedure, then at 6 PM, the day before the procedure, drink the first half of the bowel prep, then at 2 AM, the morning of procedure, drink the second half of the prep, then do not eat or drink anything until after the procedure.  Pre-procedure covid testing not needed, patient is covid vaccinated.  Patient verbalized understanding of all instructions.

## 2022-03-15 NOTE — PROGRESS NOTES
BR PAP REPORT: Chart was reviewed for overdue pap smear. Called and spoke with pt offered pap smear on 04/11/2022 at 2:15pm at [facility].

## 2022-03-23 ENCOUNTER — LAB VISIT (OUTPATIENT)
Dept: LAB | Facility: HOSPITAL | Age: 62
End: 2022-03-23
Attending: FAMILY MEDICINE
Payer: MEDICARE

## 2022-03-23 DIAGNOSIS — G35 MULTIPLE SCLEROSIS: ICD-10-CM

## 2022-03-23 LAB
ALBUMIN SERPL BCP-MCNC: 3.7 G/DL (ref 3.5–5.2)
ALP SERPL-CCNC: 65 U/L (ref 55–135)
ALT SERPL W/O P-5'-P-CCNC: 20 U/L (ref 10–44)
AST SERPL-CCNC: 31 U/L (ref 10–40)
BASOPHILS # BLD AUTO: 0.06 K/UL (ref 0–0.2)
BASOPHILS NFR BLD: 1.2 % (ref 0–1.9)
BILIRUB DIRECT SERPL-MCNC: 0.2 MG/DL (ref 0.1–0.3)
BILIRUB SERPL-MCNC: 0.4 MG/DL (ref 0.1–1)
DIFFERENTIAL METHOD: ABNORMAL
EOSINOPHIL # BLD AUTO: 0.1 K/UL (ref 0–0.5)
EOSINOPHIL NFR BLD: 1 % (ref 0–8)
ERYTHROCYTE [DISTWIDTH] IN BLOOD BY AUTOMATED COUNT: 13.1 % (ref 11.5–14.5)
HCT VFR BLD AUTO: 39 % (ref 37–48.5)
HGB BLD-MCNC: 11.9 G/DL (ref 12–16)
IMM GRANULOCYTES # BLD AUTO: 0.01 K/UL (ref 0–0.04)
IMM GRANULOCYTES NFR BLD AUTO: 0.2 % (ref 0–0.5)
LYMPHOCYTES # BLD AUTO: 2.4 K/UL (ref 1–4.8)
LYMPHOCYTES NFR BLD: 48.7 % (ref 18–48)
MCH RBC QN AUTO: 30.9 PG (ref 27–31)
MCHC RBC AUTO-ENTMCNC: 30.5 G/DL (ref 32–36)
MCV RBC AUTO: 101 FL (ref 82–98)
MONOCYTES # BLD AUTO: 0.6 K/UL (ref 0.3–1)
MONOCYTES NFR BLD: 12.5 % (ref 4–15)
NEUTROPHILS # BLD AUTO: 1.8 K/UL (ref 1.8–7.7)
NEUTROPHILS NFR BLD: 36.4 % (ref 38–73)
NRBC BLD-RTO: 0 /100 WBC
PLATELET # BLD AUTO: 197 K/UL (ref 150–450)
PMV BLD AUTO: 11 FL (ref 9.2–12.9)
PROT SERPL-MCNC: 7.2 G/DL (ref 6–8.4)
RBC # BLD AUTO: 3.85 M/UL (ref 4–5.4)
WBC # BLD AUTO: 4.97 K/UL (ref 3.9–12.7)

## 2022-03-23 PROCEDURE — 36415 COLL VENOUS BLD VENIPUNCTURE: CPT | Mod: HCNC | Performed by: PSYCHIATRY & NEUROLOGY

## 2022-03-23 PROCEDURE — 85025 COMPLETE CBC W/AUTO DIFF WBC: CPT | Mod: HCNC | Performed by: PSYCHIATRY & NEUROLOGY

## 2022-03-23 PROCEDURE — 80076 HEPATIC FUNCTION PANEL: CPT | Mod: HCNC | Performed by: PSYCHIATRY & NEUROLOGY

## 2022-04-04 DIAGNOSIS — G35 MULTIPLE SCLEROSIS: ICD-10-CM

## 2022-04-05 RX ORDER — TERIFLUNOMIDE 14 MG/1
14 TABLET, FILM COATED ORAL DAILY
Qty: 90 TABLET | Refills: 0 | Status: SHIPPED | OUTPATIENT
Start: 2022-04-05 | End: 2022-07-14 | Stop reason: SDUPTHER

## 2022-04-07 ENCOUNTER — SPECIALTY PHARMACY (OUTPATIENT)
Dept: PHARMACY | Facility: CLINIC | Age: 62
End: 2022-04-07
Payer: MEDICARE

## 2022-04-07 ENCOUNTER — PATIENT OUTREACH (OUTPATIENT)
Dept: ADMINISTRATIVE | Facility: OTHER | Age: 62
End: 2022-04-07
Payer: MEDICARE

## 2022-04-07 DIAGNOSIS — Z12.31 ENCOUNTER FOR SCREENING MAMMOGRAM FOR BREAST CANCER: Primary | ICD-10-CM

## 2022-04-07 NOTE — TELEPHONE ENCOUNTER
Specialty Pharmacy - Refill Coordination    Specialty Medication Orders Linked to Encounter    Flowsheet Row Most Recent Value   Medication #1 teriflunomide (AUBAGIO) 14 mg Tab (Order#[id number], Rx#[id number])          Refill Questions - Documented Responses    Flowsheet Row Most Recent Value   Patient Availability and HIPAA Verification    Does patient want to proceed with activity? Yes   HIPAA/medical authority confirmed? Yes   Relationship to patient of person spoken to? Self   Refill Screening Questions    Changes to allergies? No   Changes to medications? No   New conditions since last clinic visit? No   Unplanned office visit, urgent care, ED, or hospital admission in the last 4 weeks? No   How does patient/caregiver feel medication is working? Too soon to tell   Financial problems or insurance changes? No   How many doses of your specialty medications were missed in the last 4 weeks? 1   Why were doses missed? Simply forgot   Would patient like to speak to a pharmacist? No   When does the patient need to receive the medication? 04/12/22   Refill Delivery Questions    How will the patient receive the medication? Delivery Tracie   When does the patient need to receive the medication? 04/12/22   Shipping Address Home   Address in [facility] confirmed and updated if neccessary? Yes   Expected Copay ($) 0   Is the patient able to afford the medication copay? Yes   Payment Method zero copay   Days supply of Refill 30   Supplies needed? No supplies needed   Refill activity completed? Yes   Refill activity plan Refill scheduled   Shipment/Pickup Date: 04/08/22          Current Outpatient Medications   Medication Sig    baclofen (LIORESAL) 10 MG tablet TAKE 2 TABLETS ONE TIME DAILY IN THE MORNING, 2 TABLETS IN THE AFTERNOON, AND UP TO 3 TABLETS AT BEDTIME AS NEEDED    calcium citrate (CALCITRATE) 200 mg (950 mg) tablet Take 2 tablets by mouth once daily.    cholecalciferol, vitamin D3, 100 mcg (4,000 unit)  Cap Take 4,000 Units by mouth once daily.     denosumab (PROLIA SUBQ) Inject into the skin.    dicyclomine (BENTYL) 20 mg tablet     fish oil-omega-3 fatty acids 300-1,000 mg capsule Take 2 g by mouth once daily.    gabapentin (NEURONTIN) 800 MG tablet TAKE ONE TABLET BY MOUTH THREE TIMES DAILY    glucosamine-chondroitin 500-400 mg tablet Take 1 tablet by mouth once daily.     levothyroxine (SYNTHROID) 75 MCG tablet Take 1 tablet (75 mcg total) by mouth before breakfast.    MAGNESIUM GLYCINATE ORAL     multivitamin capsule Take 1 capsule by mouth once daily.    paroxetine (PAXIL) 40 MG tablet TAKE 1 TABLET EVERY MORNING.    Ringer's solution,lactated (LACTATED RINGERS) infusion     simvastatin (ZOCOR) 40 MG tablet TAKE 1 TABLET EVERY DAY    teriflunomide (AUBAGIO) 14 mg Tab Take 1 tablet (14 mg) by mouth once daily.   Last reviewed on 3/15/2022 10:49 AM by Nina Loza RN    Review of patient's allergies indicates:   Allergen Reactions    Latex, natural rubber Rash    Last reviewed on  3/15/2022 10:55 AM by Nina Loza      Tasks added this encounter   5/5/2022 - Refill Call (Auto Added)   Tasks due within next 3 months   No tasks due.     Lindsay Alonso, PharmD  [facility]  [address] [place] [id number]  Phone: [phone]  Fax: [phone]

## 2022-04-08 NOTE — PROGRESS NOTES
Health Maintenance Due   Topic Date Due    Shingles Vaccine (1 of 2) Never done    Pneumococcal Vaccines (Age 0-64) (2 of 4 - PPSV23) 04/02/2019    Cervical Cancer Screening  08/09/2021    COVID-19 Vaccine (3 - Booster for Moderna series) 09/13/2021    Colorectal Cancer Screening  01/06/2022    Mammogram  05/21/2022     Updates were requested from care everywhere.  Chart was reviewed for overdue Proactive [facility] Encounters (DAVID) topics (CRS, Breast Cancer Screening, Eye exam)  Health Maintenance has been updated.  LINKS immunization registry triggered.  Immunizations were reconciled.

## 2022-04-11 ENCOUNTER — OFFICE VISIT (OUTPATIENT)
Dept: OBSTETRICS AND GYNECOLOGY | Facility: CLINIC | Age: 62
End: 2022-04-11
Payer: MEDICARE

## 2022-04-11 ENCOUNTER — APPOINTMENT (OUTPATIENT)
Dept: OPHTHALMOLOGY | Facility: CLINIC | Age: 62
End: 2022-04-11
Payer: MEDICARE

## 2022-04-11 ENCOUNTER — OFFICE VISIT (OUTPATIENT)
Dept: OPHTHALMOLOGY | Facility: CLINIC | Age: 62
End: 2022-04-11
Payer: MEDICARE

## 2022-04-11 VITALS
BODY MASS INDEX: 30.62 KG/M2 | SYSTOLIC BLOOD PRESSURE: 110 MMHG | DIASTOLIC BLOOD PRESSURE: 78 MMHG | WEIGHT: 172.81 LBS

## 2022-04-11 DIAGNOSIS — H40.013 OPEN ANGLE WITH BORDERLINE FINDINGS, LOW RISK, BILATERAL: Primary | ICD-10-CM

## 2022-04-11 DIAGNOSIS — Z01.419 WELL WOMAN EXAM WITH ROUTINE GYNECOLOGICAL EXAM: Primary | ICD-10-CM

## 2022-04-11 DIAGNOSIS — G35 MS (MULTIPLE SCLEROSIS): ICD-10-CM

## 2022-04-11 DIAGNOSIS — Z12.4 ENCOUNTER FOR SCREENING FOR CERVICAL CANCER: ICD-10-CM

## 2022-04-11 DIAGNOSIS — H26.9 CORTICAL CATARACT OF BOTH EYES: ICD-10-CM

## 2022-04-11 PROCEDURE — 1159F MED LIST DOCD IN RCRD: CPT | Mod: CPTII,S$GLB,, | Performed by: NURSE PRACTITIONER

## 2022-04-11 PROCEDURE — 1160F RVW MEDS BY RX/DR IN RCRD: CPT | Mod: CPTII,S$GLB,, | Performed by: OPHTHALMOLOGY

## 2022-04-11 PROCEDURE — 1160F PR REVIEW ALL MEDS BY PRESCRIBER/CLIN PHARMACIST DOCUMENTED: ICD-10-PCS | Mod: CPTII,S$GLB,, | Performed by: NURSE PRACTITIONER

## 2022-04-11 PROCEDURE — 99999 PR PBB SHADOW E&M-EST. PATIENT-LVL II: CPT | Mod: PBBFAC,,, | Performed by: OPHTHALMOLOGY

## 2022-04-11 PROCEDURE — 92083 EXTENDED VISUAL FIELD XM: CPT | Mod: S$GLB,,, | Performed by: OPHTHALMOLOGY

## 2022-04-11 PROCEDURE — 3078F PR MOST RECENT DIASTOLIC BLOOD PRESSURE < 80 MM HG: ICD-10-PCS | Mod: CPTII,S$GLB,, | Performed by: NURSE PRACTITIONER

## 2022-04-11 PROCEDURE — 1159F MED LIST DOCD IN RCRD: CPT | Mod: CPTII,S$GLB,, | Performed by: OPHTHALMOLOGY

## 2022-04-11 PROCEDURE — 1159F PR MEDICATION LIST DOCUMENTED IN MEDICAL RECORD: ICD-10-PCS | Mod: CPTII,S$GLB,, | Performed by: NURSE PRACTITIONER

## 2022-04-11 PROCEDURE — 92133 CPTRZD OPH DX IMG PST SGM ON: CPT | Mod: S$GLB,,, | Performed by: OPHTHALMOLOGY

## 2022-04-11 PROCEDURE — G0101 CA SCREEN;PELVIC/BREAST EXAM: HCPCS | Mod: S$GLB,,, | Performed by: NURSE PRACTITIONER

## 2022-04-11 PROCEDURE — 3008F BODY MASS INDEX DOCD: CPT | Mod: CPTII,S$GLB,, | Performed by: NURSE PRACTITIONER

## 2022-04-11 PROCEDURE — 99999 PR PBB SHADOW E&M-EST. PATIENT-LVL II: ICD-10-PCS | Mod: PBBFAC,,, | Performed by: OPHTHALMOLOGY

## 2022-04-11 PROCEDURE — 92014 PR EYE EXAM, EST PATIENT,COMPREHESV: ICD-10-PCS | Mod: S$GLB,,, | Performed by: OPHTHALMOLOGY

## 2022-04-11 PROCEDURE — G0101 PR CA SCREEN;PELVIC/BREAST EXAM: ICD-10-PCS | Mod: S$GLB,,, | Performed by: NURSE PRACTITIONER

## 2022-04-11 PROCEDURE — 92083 HUMPHREY VISUAL FIELD - OU - BOTH EYES: ICD-10-PCS | Mod: S$GLB,,, | Performed by: OPHTHALMOLOGY

## 2022-04-11 PROCEDURE — 87624 HPV HI-RISK TYP POOLED RSLT: CPT | Performed by: NURSE PRACTITIONER

## 2022-04-11 PROCEDURE — 3008F PR BODY MASS INDEX (BMI) DOCUMENTED: ICD-10-PCS | Mod: CPTII,S$GLB,, | Performed by: NURSE PRACTITIONER

## 2022-04-11 PROCEDURE — 99999 PR PBB SHADOW E&M-EST. PATIENT-LVL III: CPT | Mod: PBBFAC,,, | Performed by: NURSE PRACTITIONER

## 2022-04-11 PROCEDURE — 88175 CYTOPATH C/V AUTO FLUID REDO: CPT | Performed by: NURSE PRACTITIONER

## 2022-04-11 PROCEDURE — 99999 PR PBB SHADOW E&M-EST. PATIENT-LVL III: ICD-10-PCS | Mod: PBBFAC,,, | Performed by: NURSE PRACTITIONER

## 2022-04-11 PROCEDURE — 1159F PR MEDICATION LIST DOCUMENTED IN MEDICAL RECORD: ICD-10-PCS | Mod: CPTII,S$GLB,, | Performed by: OPHTHALMOLOGY

## 2022-04-11 PROCEDURE — 3074F PR MOST RECENT SYSTOLIC BLOOD PRESSURE < 130 MM HG: ICD-10-PCS | Mod: CPTII,S$GLB,, | Performed by: NURSE PRACTITIONER

## 2022-04-11 PROCEDURE — 1160F RVW MEDS BY RX/DR IN RCRD: CPT | Mod: CPTII,S$GLB,, | Performed by: NURSE PRACTITIONER

## 2022-04-11 PROCEDURE — 92133 POSTERIOR SEGMENT OCT OPTIC NERVE(OCULAR COHERENCE TOMOGRAPHY) - OU - BOTH EYES: ICD-10-PCS | Mod: S$GLB,,, | Performed by: OPHTHALMOLOGY

## 2022-04-11 PROCEDURE — 3078F DIAST BP <80 MM HG: CPT | Mod: CPTII,S$GLB,, | Performed by: NURSE PRACTITIONER

## 2022-04-11 PROCEDURE — 92014 COMPRE OPH EXAM EST PT 1/>: CPT | Mod: S$GLB,,, | Performed by: OPHTHALMOLOGY

## 2022-04-11 PROCEDURE — 3074F SYST BP LT 130 MM HG: CPT | Mod: CPTII,S$GLB,, | Performed by: NURSE PRACTITIONER

## 2022-04-11 PROCEDURE — 1160F PR REVIEW ALL MEDS BY PRESCRIBER/CLIN PHARMACIST DOCUMENTED: ICD-10-PCS | Mod: CPTII,S$GLB,, | Performed by: OPHTHALMOLOGY

## 2022-04-11 NOTE — PROGRESS NOTES
"SUBJECTIVE  Cem Meyers is 61 y.o. female  Corrected distance visual acuity was 20/20 in the right eye and 20/20 in the left eye.   Chief Complaint   Patient presents with    Glaucoma Suspect     4m GOCT HVF dilation           HPI     Glaucoma Suspect      Additional comments: 4m GOCT HVF dilation               Comments     States that her vision is stable denies any changes. States that she is   very sleepy and did doze off during her VF test.     1. MS x 1989 (sees Nat GEORGE)  HX "Optic Neuritis" episodes - dark hashmarks in vision lasting several   hours when fatigued   2. COAG suspect OU(+FM HX COAG PGM)  GOCT changes OU - old optic neuritis more likely than LTG   3. Mild Cortical cataract            Last edited by Fela Godfrey on 4/11/2022  1:43 PM. (History)         Assessment /Plan :  1. Open angle with borderline findings, low risk, bilateral No evidence of glaucoma at this time but based on risk factors recommend to continue monitoring.    Return to clinic in 6 months  or as needed.  With IOP Check and GOCT       2. Cortical cataract of both eyes - monitor for now   3. MS (multiple sclerosis)              "

## 2022-04-11 NOTE — PROGRESS NOTES
Subjective:       Patient ID: Cem Meyers is a 61 y.o. female.    Chief Complaint:  Well Woman    No LMP recorded. Patient is postmenopausal.  History of Present Illness  Annual Exam-Postmenopausal  Patient presents for annual exam. The patient has no complaints today. The patient is not sexually active. GYN screening history: last pap: approximate date 18 and was normal and last mammogram: approximate date 22 and was normal.  History of abnormal mammogram in the past although follow-up was benign. The patient is not taking hormone replacement therapy. Patient denies post-menopausal vaginal bleeding. The patient wears seatbelts: yes. The patient participates in regular exercise: yes. Has the patient ever been transfused or tattooed?: no. The patient reports that there is not domestic violence in her life.    OB History    Para Term  AB Living   0 0 0 0 0 0   SAB IAB Ectopic Multiple Live Births   0 0 0 0         Review of Systems  Review of Systems   Constitutional: Negative for appetite change, fatigue, fever and unexpected weight change.   Eyes: Negative for visual disturbance.   Cardiovascular: Negative for chest pain.   Gastrointestinal: Negative for abdominal pain, bloating, constipation, diarrhea, nausea and vomiting.   Genitourinary: Negative for bladder incontinence, dysmenorrhea, dyspareunia, dysuria, flank pain, frequency, genital sores, menorrhagia, menstrual problem, pelvic pain, urgency, vaginal bleeding, vaginal discharge, vaginal pain, postcoital bleeding, vaginal dryness and vaginal odor.   Integumentary:  Negative for rash, acne, mole/lesion, breast mass, nipple discharge, breast skin changes and breast tenderness.   Neurological: Negative for syncope and headaches.   Hematological: Negative for adenopathy. Does not bruise/bleed easily.   All other systems reviewed and are negative.  Breast: Positive for breast self exam.Negative for asymmetry, lump, mass, nipple discharge,  skin changes and tenderness           Objective:      Physical Exam:   Constitutional: She is oriented to person, place, and time. She appears well-developed and well-nourished.    HENT:   Head: Normocephalic and atraumatic.    Eyes: Pupils are equal, round, and reactive to light. Conjunctivae and EOM are normal.     Cardiovascular: Normal rate and regular rhythm.     Pulmonary/Chest: Effort normal. Right breast exhibits no inverted nipple, no mass, no nipple discharge, no skin change, no tenderness, no bleeding and no swelling. Left breast exhibits no inverted nipple, no mass, no nipple discharge, no skin change, no tenderness, no bleeding and no swelling. Breasts are symmetrical.        Abdominal: Soft. Hernia confirmed negative in the right inguinal area and confirmed negative in the left inguinal area.     Genitourinary:    Inguinal canal, vagina, uterus, right adnexa, left adnexa and rectum normal.      Pelvic exam was performed with patient supine.   The external female genitalia was normal.   Genitalia hair distrobution normal .   Labial bartholins normal.There is no rash, tenderness, lesion or injury on the right labia. There is no rash, tenderness, lesion or injury on the left labia. Cervix is normal. No erythema,  no vaginal discharge, bleeding, rectocele, cystocele or unspecified prolapse of vaginal walls in the vagina. Cervix exhibits no motion tenderness and no friability. Uterus is not tender.           Musculoskeletal: Normal range of motion and moves all extremeties.      Lymphadenopathy: No inguinal adenopathy noted on the right or left side.    Neurological: She is alert and oriented to person, place, and time.    Skin: Skin is warm and dry. No rash noted. No erythema.    Psychiatric: She has a normal mood and affect. Her behavior is normal. Judgment and thought content normal.            Assessment:     1. Well woman exam with routine gynecological exam    2. Encounter for screening for cervical  cancer              Plan:   Cem was seen today for well woman.    Diagnoses and all orders for this visit:    Well woman exam with routine gynecological exam  -     Liquid-Based Pap Smear, Screening  -     HPV High Risk Genotypes, PCR    Encounter for screening for cervical cancer  -     Liquid-Based Pap Smear, Screening  -     HPV High Risk Genotypes, PCR      Follow up with me in 1 year for annual well woman exam.

## 2022-04-12 ENCOUNTER — TELEPHONE (OUTPATIENT)
Dept: ADMINISTRATIVE | Facility: HOSPITAL | Age: 62
End: 2022-04-12
Payer: MEDICARE

## 2022-04-12 DIAGNOSIS — Z12.11 COLON CANCER SCREENING: Primary | ICD-10-CM

## 2022-04-15 LAB
HPV HR 12 DNA SPEC QL NAA+PROBE: NEGATIVE
HPV16 AG SPEC QL: NEGATIVE
HPV18 DNA SPEC QL NAA+PROBE: NEGATIVE

## 2022-04-18 ENCOUNTER — PATIENT MESSAGE (OUTPATIENT)
Dept: ADMINISTRATIVE | Facility: OTHER | Age: 62
End: 2022-04-18
Payer: MEDICARE

## 2022-04-18 LAB
FINAL PATHOLOGIC DIAGNOSIS: NORMAL
Lab: NORMAL

## 2022-04-19 ENCOUNTER — HOSPITAL ENCOUNTER (OUTPATIENT)
Dept: PREADMISSION TESTING | Facility: HOSPITAL | Age: 62
Discharge: HOME | End: 2022-04-19
Attending: FAMILY MEDICINE
Payer: MEDICARE

## 2022-04-19 DIAGNOSIS — Z12.11 COLON CANCER SCREENING: Primary | ICD-10-CM

## 2022-04-25 ENCOUNTER — ANESTHESIA EVENT (OUTPATIENT)
Dept: ENDOSCOPY | Facility: HOSPITAL | Age: 62
End: 2022-04-25
Payer: MEDICARE

## 2022-04-25 ENCOUNTER — HOSPITAL ENCOUNTER (OUTPATIENT)
Facility: HOSPITAL | Age: 62
Discharge: HOME | End: 2022-04-25
Attending: INTERNAL MEDICINE | Admitting: INTERNAL MEDICINE
Payer: MEDICARE

## 2022-04-25 ENCOUNTER — ANESTHESIA (OUTPATIENT)
Dept: ENDOSCOPY | Facility: HOSPITAL | Age: 62
End: 2022-04-25
Payer: MEDICARE

## 2022-04-25 DIAGNOSIS — K63.5 POLYP OF COLON: ICD-10-CM

## 2022-04-25 PROCEDURE — 63600175 PHARM REV CODE 636 W HCPCS: Performed by: FAMILY MEDICINE

## 2022-04-25 PROCEDURE — 25000003 PHARM REV CODE 250: Performed by: FAMILY MEDICINE

## 2022-04-25 PROCEDURE — 37000008 HC ANESTHESIA 1ST 15 MINUTES: Performed by: INTERNAL MEDICINE

## 2022-04-25 PROCEDURE — 37000009 HC ANESTHESIA EA ADD 15 MINS: Performed by: INTERNAL MEDICINE

## 2022-04-25 PROCEDURE — G0105 COLORECTAL SCRN; HI RISK IND: HCPCS | Mod: ,,, | Performed by: INTERNAL MEDICINE

## 2022-04-25 PROCEDURE — G0105 COLORECTAL SCRN; HI RISK IND: HCPCS | Performed by: INTERNAL MEDICINE

## 2022-04-25 PROCEDURE — G0105 COLORECTAL SCRN; HI RISK IND: ICD-10-PCS | Mod: ,,, | Performed by: INTERNAL MEDICINE

## 2022-04-25 RX ORDER — LIDOCAINE HYDROCHLORIDE 20 MG/ML
INJECTION, SOLUTION EPIDURAL; INFILTRATION; INTRACAUDAL; PERINEURAL
Status: DISCONTINUED | OUTPATIENT
Start: 2022-04-25 | End: 2022-04-25

## 2022-04-25 RX ORDER — PROPOFOL 10 MG/ML
VIAL (ML) INTRAVENOUS
Status: DISCONTINUED | OUTPATIENT
Start: 2022-04-25 | End: 2022-04-25

## 2022-04-25 RX ORDER — SODIUM CHLORIDE, SODIUM LACTATE, POTASSIUM CHLORIDE, CALCIUM CHLORIDE 600; 310; 30; 20 MG/100ML; MG/100ML; MG/100ML; MG/100ML
INJECTION, SOLUTION INTRAVENOUS CONTINUOUS
Status: DISCONTINUED | OUTPATIENT
Start: 2022-04-25 | End: 2022-04-25 | Stop reason: HOSPADM

## 2022-04-25 RX ADMIN — LIDOCAINE HYDROCHLORIDE 50 MG: 20 INJECTION, SOLUTION EPIDURAL; INFILTRATION; INTRACAUDAL; PERINEURAL at 02:04

## 2022-04-25 RX ADMIN — PROPOFOL 50 MG: 10 INJECTION, EMULSION INTRAVENOUS at 02:04

## 2022-04-25 RX ADMIN — PROPOFOL 100 MG: 10 INJECTION, EMULSION INTRAVENOUS at 02:04

## 2022-04-25 RX ADMIN — SODIUM CHLORIDE, SODIUM LACTATE, POTASSIUM CHLORIDE, AND CALCIUM CHLORIDE: .6; .31; .03; .02 INJECTION, SOLUTION INTRAVENOUS at 02:04

## 2022-04-25 NOTE — PLAN OF CARE
S/P colonoscopy. Received report from RN, patient octavio, vss. MD Aaron at bedside to discuss procedure and surveillance recommendations. Patient met  criteria for discharge. Discharge instructions given and patient verbalized understanding. IV removed, catheter intact. No distress noted at time of discharge. Patient placed in wheelchair and discharged to home with mother.

## 2022-04-25 NOTE — TRANSFER OF CARE
"Anesthesia Transfer of Care Note    Patient: Cem Meyers    Procedure(s) Performed: Procedure(s) (LRB):  COLONOSCOPY (N/A)    Patient location: GI    Anesthesia Type: MAC    Transport from OR: Transported from OR on room air with adequate spontaneous ventilation    Post pain: adequate analgesia    Post assessment: no apparent anesthetic complications    Post vital signs: stable    Level of consciousness: awake and responds to stimulation    Nausea/Vomiting: no nausea/vomiting    Complications: none    Transfer of care protocol was followed      Last vitals:   Visit Vitals  BP (!) 141/79 (BP Location: Left arm, Patient Position: Lying)   Pulse 78   Temp 36.4 °C (97.5 °F) (Temporal)   Resp (!) 22   Ht 5' 3" (1.6 m)   Wt 77.1 kg (170 lb)   SpO2 98%   Breastfeeding No   BMI 30.11 kg/m²     "

## 2022-04-25 NOTE — H&P
PRE PROCEDURE H&P    Patient Name: Cem Meyers  MRN: [id number]  : 1960  Date of Procedure:  2022  Referring Physician: Cristiano Aaron MD  Primary Physician: Keagan Calles MD  Procedure Physician: Cristiano Aaron MD       Planned Procedure: Colonoscopy  Diagnosis: previous adenomatous polyp  Chief Complaint: Same as above    HPI: Patient is an 61 y.o. female is here for the above.     Last colonoscopy:   Family history: None   Anticoagulation: None    Past Medical History:   Past Medical History:   Diagnosis Date    Back pain     Broken toe 2015    left big toe    Chronic diastolic heart failure 2018    Closed left arm fracture     Colon polyp     Depression     Hyperlipidemia     Hypothyroid     Immunosuppression 2019    MS (multiple sclerosis)     Neurogenic bladder 2012    Osteoporosis     Polyneuropathy     Sleep apnea     Trouble in sleeping         Past Surgical History:  Past Surgical History:   Procedure Laterality Date    COLONOSCOPY N/A 2017    Procedure: COLONOSCOPY;  Surgeon: Andriy Villar MD;  Location: [facility] ENDO;  Service: Endoscopy;  Laterality: N/A;    COLONOSCOPY N/A 2021    Procedure: COLONOSCOPY;  Surgeon: Althea Casanova MD;  Location: [facility] ENDO;  Service: Endoscopy;  Laterality: N/A;    FRACTURE SURGERY Left     wrist- SSC    KNEE SURGERY Right     in [place]    TONSILLECTOMY  1966        Home Medications:  Prior to Admission medications    Medication Sig Start Date End Date Taking? Authorizing Provider   baclofen (LIORESAL) 10 MG tablet TAKE 2 TABLETS ONE TIME DAILY IN THE MORNING, 2 TABLETS IN THE AFTERNOON, AND UP TO 3 TABLETS AT BEDTIME AS NEEDED 3/10/22  Yes Rebeka Smith MD   calcium citrate (CALCITRATE) 200 mg (950 mg) tablet Take 2 tablets by mouth once daily.   Yes Historical Provider   cholecalciferol, vitamin D3, 100 mcg (4,000 unit) Cap Take 4,000 Units by mouth once daily.    Yes Historical  Provider   fish oil-omega-3 fatty acids 300-1,000 mg capsule Take 2 g by mouth once daily.   Yes Historical Provider   gabapentin (NEURONTIN) 800 MG tablet TAKE ONE TABLET BY MOUTH THREE TIMES DAILY 2/14/22  Yes Rebeka Smith MD   glucosamine-chondroitin 500-400 mg tablet Take 1 tablet by mouth once daily.    Yes Historical Provider   levothyroxine (SYNTHROID) 75 MCG tablet Take 1 tablet (75 mcg total) by mouth before breakfast. 12/6/21 12/6/22 Yes Keagan Calles MD   MAGNESIUM GLYCINATE ORAL  6/4/21  Yes Historical Provider   multivitamin capsule Take 1 capsule by mouth once daily.   Yes Historical Provider   paroxetine (PAXIL) 40 MG tablet TAKE 1 TABLET EVERY MORNING. 1/19/22  Yes Keagan Calles MD   simvastatin (ZOCOR) 40 MG tablet TAKE 1 TABLET EVERY DAY 1/13/22  Yes Keagan Calles MD   teriflunomide (AUBAGIO) 14 mg Tab Take 1 tablet (14 mg) by mouth once daily. 4/5/22  Yes Rebeka Smith MD   denosumab (PROLIA SUBQ) Inject into the skin.    Historical Provider   dicyclomine (BENTYL) 20 mg tablet  1/25/22   Historical Provider        Allergies:  Review of patient's allergies indicates:   Allergen Reactions    Latex, natural rubber Rash        Social History:   Social History     Socioeconomic History    Marital status:    Tobacco Use    Smoking status: Never Smoker    Smokeless tobacco: Never Used   Substance and Sexual Activity    Alcohol use: Yes     Alcohol/week: 1.0 standard drink     Types: 1 Glasses of wine per week    Drug use: No    Sexual activity: Not Currently   Other Topics Concern    Are you pregnant or think you may be? No    Breast-feeding No     Social Determinants of Health     Financial Resource Strain: Medium Risk    Difficulty of Paying Living Expenses: Somewhat hard   Food Insecurity: No Food Insecurity    Worried About Running Out of Food in the Last Year: Never true    Ran Out of Food in the Last Year: Never true   Transportation Needs: No  "Transportation Needs    Lack of Transportation (Medical): No    Lack of Transportation (Non-Medical): No   Physical Activity: Insufficiently Active    Days of Exercise per Week: 2 days    Minutes of Exercise per Session: 30 min   Stress: Stress Concern Present    Feeling of Stress : To some extent   Social Connections: Unknown    Frequency of Communication with Friends and Family: More than three times a week    Frequency of Social Gatherings with Friends and Family: More than three times a week    Active Member of Clubs or Organizations: No    Attends Club or Organization Meetings: Never    Marital Status:    Housing Stability: Low Risk     Unable to Pay for Housing in the Last Year: No    Number of Places Lived in the Last Year: 2    Unstable Housing in the Last Year: No       Family History:  Family History   Problem Relation Age of Onset    Pancreatic cancer Mother     Stomach cancer Mother     Cancer Mother         pancreatic, stomach    Hypertension Father     Heart disease Father         MI    Lupus Maternal Aunt     Rheum arthritis Maternal Aunt     Rheum arthritis Sister     Melanoma Neg Hx        ROS: No acute cardiac events, no acute respiratory complaints.     Physical Exam (all patients):    BP (!) 141/79 (BP Location: Left arm, Patient Position: Lying)   Pulse 78   Temp 97.5 °F (36.4 °C) (Temporal)   Resp (!) 22   Ht 5' 3" (1.6 m)   Wt 77.1 kg (170 lb)   SpO2 98%   Breastfeeding No   BMI 30.11 kg/m²   Lungs: Clear to auscultation bilaterally, respirations unlabored  Heart: Regular rate and rhythm, S1 and S2 normal, no obvious murmurs  Abdomen:         Soft, non-tender, bowel sounds normal, no masses, no organomegaly    Lab Results   Component Value Date    WBC 4.97 03/23/2022     (H) 03/23/2022    RDW 13.1 03/23/2022     03/23/2022    GLU 83 12/23/2021    BUN 16 12/23/2021     12/23/2021    K 4.3 12/23/2021     12/23/2021        SEDATION " PLAN: per anesthesia      History reviewed, vital signs satisfactory, cardiopulmonary status satisfactory, sedation options, risks and plans have been discussed with the patient  All their questions were answered and the patient agrees to the sedation procedures as planned and the patient is deemed an appropriate candidate for the sedation as planned.    Procedure explained to patient, informed consent obtained and placed in chart.    Cristiano Aaron  4/25/2022  2:01 PM

## 2022-04-25 NOTE — ANESTHESIA POSTPROCEDURE EVALUATION
Anesthesia Post Evaluation    Patient: Cem Meyers    Procedure(s) Performed: Procedure(s) (LRB):  COLONOSCOPY (N/A)    Final Anesthesia Type: MAC      Patient location during evaluation: GI PACU  Patient participation: Yes- Able to Participate  Level of consciousness: awake and alert  Post-procedure vital signs: reviewed and stable  Pain management: adequate  Airway patency: patent    PONV status at discharge: No PONV  Anesthetic complications: no      Cardiovascular status: stable  Respiratory status: unassisted and spontaneous ventilation  Hydration status: euvolemic  Follow-up not needed.          Vitals Value Taken Time   BP  04/25/22 1434   Temp  04/25/22 1434   Pulse  04/25/22 1434   Resp  04/25/22 1434   SpO2  04/25/22 1434         No case tracking events are documented in the log.      Pain/Chandler Score: No data recorded

## 2022-04-25 NOTE — ANESTHESIA PREPROCEDURE EVALUATION
04/25/2022  Cem Meyers is a 61 y.o., female.      Pre-op Assessment    I have reviewed the Patient Summary Reports.     I have reviewed the Nursing Notes. I have reviewed the NPO Status.   I have reviewed the Medications.     Review of Systems  Anesthesia Hx:  No problems with previous Anesthesia    Cardiovascular:   Exercise tolerance: good    Pulmonary:   Sleep Apnea    Neurological:   Neuromuscular Disease,    Endocrine:   Hypothyroidism    Psych:   Psychiatric History          Physical Exam  General: Well nourished, Alert, Oriented and Cooperative    Airway:  Mallampati: II   Mouth Opening: Normal  TM Distance: Normal  Tongue: Normal  Neck ROM: Normal ROM    Dental:  Intact    Chest/Lungs:  Clear to auscultation, Normal Respiratory Rate    Heart:  Rate: Normal    Abdomen:  Normal        Anesthesia Plan  Type of Anesthesia, risks & benefits discussed:    Anesthesia Type: MAC  Intra-op Monitoring Plan: Standard ASA Monitors  Induction:  IV  Informed Consent: Informed consent signed with the Patient and all parties understand the risks and agree with anesthesia plan.  All questions answered.   ASA Score: 3  Day of Surgery Review of History & Physical: I have interviewed and examined the patient. I have reviewed the patient's H&P dated:     Ready For Surgery From Anesthesia Perspective.     .

## 2022-04-25 NOTE — PROVATION PATIENT INSTRUCTIONS
Discharge Summary/Instructions after an Endoscopic Procedure  Patient Name: Cem Meyers  Patient MRN: [id number]  Patient YOB: 1960 Monday, April 25, 2022 Cristiano Aaron MD  Dear patient,  As a result of recent federal legislation (The Federal Cures Act), you may   receive lab or pathology results from your procedure in your [facility]   account before your physician is able to contact you. Your physician or   their representative will relay the results to you with their   recommendations at their soonest availability.  Thank you,  RESTRICTIONS:  During your procedure today, you received medications for sedation.  These   medications may affect your judgment, balance and coordination.  Therefore,   for 24 hours, you have the following restrictions:   - DO NOT drive a car, operate machinery, make legal/financial decisions,   sign important papers or drink alcohol.    ACTIVITY:  Today: no heavy lifting, straining or running due to procedural   sedation/anesthesia.  The following day: return to full activity including work.  DIET:  Eat and drink normally unless instructed otherwise.     TREATMENT FOR COMMON SIDE EFFECTS:  - Mild abdominal pain, nausea, belching, bloating or excessive gas:  rest,   eat lightly and use a heating pad.  - Sore Throat: treat with throat lozenges and/or gargle with warm salt   water.  - Because air was used during the procedure, expelling large amounts of air   from your rectum or belching is normal.  - If a bowel prep was taken, you may not have a bowel movement for 1-3 days.    This is normal.  SYMPTOMS TO WATCH FOR AND REPORT TO YOUR PHYSICIAN:  1. Abdominal pain or bloating, other than gas cramps.  2. Chest pain.  3. Back pain.  4. Signs of infection such as: chills or fever occurring within 24 hours   after the procedure.  5. Rectal bleeding, which would show as bright red, maroon, or black stools.   (A tablespoon of blood from the rectum is not serious, especially if    hemorrhoids are present.)  6. Vomiting.  7. Weakness or dizziness.  GO DIRECTLY TO THE NEAREST EMERGENCY ROOM IF YOU HAVE ANY OF THE FOLLOWING:      Difficulty breathing              Chills and/or fever over 101 F   Persistent vomiting and/or vomiting blood   Severe abdominal pain   Severe chest pain   Black, tarry stools   Bleeding- more than one tablespoon   Any other symptom or condition that you feel may need urgent attention  Your doctor recommends these additional instructions:  If any biopsies were taken, your doctors clinic will contact you in 1 to 2   weeks with any results.  - Discharge patient to home.   - Resume previous diet.   - Continue present medications.   - Repeat colonoscopy in 5 years for surveillance.   - Return to referring physician.   - Patient has a contact number available for emergencies.  The signs and   symptoms of potential delayed complications were discussed with the   patient.  Return to normal activities tomorrow.  Written discharge   instructions were provided to the patient.  For questions, problems or results please call your physician Cristiano aAron MD at Work:  [phone]  If you have any questions about the above instructions, call the GI   department at [phone] or call the endoscopy unit at [phone]   from 7am until 3 pm.  [facility], EMERGENCY ROOM PHONE NUMBER:   [phone]  IF A COMPLICATION OR EMERGENCY SITUATION ARISES AND YOU ARE UNABLE TO REACH   YOUR PHYSICIAN - GO DIRECTLY TO THE EMERGENCY ROOM.  I have read or have had read to me these discharge instructions for my   procedure and have received a written copy.  I understand these   instructions and will follow-up with my physician if I have any questions.     __________________________________       _____________________________________  Nurse Signature                                          Patient/Designated   Responsible Party Signature  Cristiano Aaron  MD  4/25/2022 2:29:11 PM  This report has been verified and signed electronically.  Dear patient,  As a result of recent federal legislation (The Federal Cures Act), you may   receive lab or pathology results from your procedure in your [facility]   account before your physician is able to contact you. Your physician or   their representative will relay the results to you with their   recommendations at their soonest availability.  Thank you,  PROVATION

## 2022-04-26 VITALS
HEART RATE: 81 BPM | HEIGHT: 63 IN | BODY MASS INDEX: 30.12 KG/M2 | OXYGEN SATURATION: 97 % | TEMPERATURE: 98 F | RESPIRATION RATE: 12 BRPM | DIASTOLIC BLOOD PRESSURE: 76 MMHG | SYSTOLIC BLOOD PRESSURE: 140 MMHG | WEIGHT: 170 LBS

## 2022-04-29 ENCOUNTER — TELEPHONE (OUTPATIENT)
Dept: ADMINISTRATIVE | Facility: HOSPITAL | Age: 62
End: 2022-04-29
Payer: MEDICARE

## 2022-05-02 ENCOUNTER — HOSPITAL ENCOUNTER (OUTPATIENT)
Dept: RADIOLOGY | Facility: HOSPITAL | Age: 62
Discharge: HOME | End: 2022-05-02
Attending: FAMILY MEDICINE
Payer: MEDICARE

## 2022-05-02 DIAGNOSIS — Z12.31 ENCOUNTER FOR SCREENING MAMMOGRAM FOR BREAST CANCER: ICD-10-CM

## 2022-05-02 PROCEDURE — 77063 MAMMO DIGITAL SCREENING BILAT WITH TOMO: ICD-10-PCS | Mod: 26,,, | Performed by: RADIOLOGY

## 2022-05-02 PROCEDURE — 77067 SCR MAMMO BI INCL CAD: CPT | Mod: 26,,, | Performed by: RADIOLOGY

## 2022-05-02 PROCEDURE — 77067 MAMMO DIGITAL SCREENING BILAT WITH TOMO: ICD-10-PCS | Mod: 26,,, | Performed by: RADIOLOGY

## 2022-05-02 PROCEDURE — 77067 SCR MAMMO BI INCL CAD: CPT | Mod: TC

## 2022-05-02 PROCEDURE — 77063 BREAST TOMOSYNTHESIS BI: CPT | Mod: 26,,, | Performed by: RADIOLOGY

## 2022-05-02 PROCEDURE — 77063 BREAST TOMOSYNTHESIS BI: CPT | Mod: TC

## 2022-05-05 ENCOUNTER — PATIENT MESSAGE (OUTPATIENT)
Dept: PHARMACY | Facility: CLINIC | Age: 62
End: 2022-05-05
Payer: MEDICARE

## 2022-05-05 ENCOUNTER — SPECIALTY PHARMACY (OUTPATIENT)
Dept: PHARMACY | Facility: CLINIC | Age: 62
End: 2022-05-05
Payer: MEDICARE

## 2022-05-05 NOTE — TELEPHONE ENCOUNTER
Specialty Pharmacy - Refill Coordination    Specialty Medication Orders Linked to Encounter    Flowsheet Row Most Recent Value   Medication #1 teriflunomide (AUBAGIO) 14 mg Tab (Order#[id number], Rx#[id number])          Refill Questions - Documented Responses    Flowsheet Row Most Recent Value   Patient Availability and HIPAA Verification    Does patient want to proceed with activity? Yes   HIPAA/medical authority confirmed? Yes   Relationship to patient of person spoken to? Self   Refill Screening Questions    Changes to allergies? No   Changes to medications? No   New conditions since last clinic visit? No   Unplanned office visit, urgent care, ED, or hospital admission in the last 4 weeks? No   How does patient/caregiver feel medication is working? Good   Financial problems or insurance changes? No   How many doses of your specialty medications were missed in the last 4 weeks? 1   Would patient like to speak to a pharmacist? No   When does the patient need to receive the medication? 05/15/22   Refill Delivery Questions    How will the patient receive the medication? Delivery Tracie   When does the patient need to receive the medication? 05/15/22   Shipping Address Home   Address in [facility] confirmed and updated if neccessary? Yes   Expected Copay ($) 0   Is the patient able to afford the medication copay? Yes   Payment Method zero copay   Days supply of Refill 30   Supplies needed? No supplies needed   Refill activity completed? Yes   Refill activity plan Refill scheduled   Shipment/Pickup Date: 05/11/22          Current Outpatient Medications   Medication Sig    baclofen (LIORESAL) 10 MG tablet TAKE 2 TABLETS ONE TIME DAILY IN THE MORNING, 2 TABLETS IN THE AFTERNOON, AND UP TO 3 TABLETS AT BEDTIME AS NEEDED    calcium citrate (CALCITRATE) 200 mg (950 mg) tablet Take 2 tablets by mouth once daily.    cholecalciferol, vitamin D3, 100 mcg (4,000 unit) Cap Take 4,000 Units by mouth once daily.      denosumab (PROLIA SUBQ) Inject into the skin.    dicyclomine (BENTYL) 20 mg tablet     fish oil-omega-3 fatty acids 300-1,000 mg capsule Take 2 g by mouth once daily.    gabapentin (NEURONTIN) 800 MG tablet TAKE ONE TABLET BY MOUTH THREE TIMES DAILY    glucosamine-chondroitin 500-400 mg tablet Take 1 tablet by mouth once daily.     levothyroxine (SYNTHROID) 75 MCG tablet Take 1 tablet (75 mcg total) by mouth before breakfast.    MAGNESIUM GLYCINATE ORAL     multivitamin capsule Take 1 capsule by mouth once daily.    paroxetine (PAXIL) 40 MG tablet TAKE 1 TABLET EVERY MORNING.    simvastatin (ZOCOR) 40 MG tablet TAKE 1 TABLET EVERY DAY    teriflunomide (AUBAGIO) 14 mg Tab Take 1 tablet (14 mg) by mouth once daily.   Last reviewed on 4/25/2022  1:11 PM by Mary Vides RN    Review of patient's allergies indicates:   Allergen Reactions    Latex, natural rubber Rash    Last reviewed on  4/25/2022 2:37 PM by Arin Alicea      Tasks added this encounter   6/7/2022 - Refill Call (Auto Added)   Tasks due within next 3 months   No tasks due.     [facility]  [address] [facility] [address]  Phone: [phone]  Fax: [phone]

## 2022-05-25 ENCOUNTER — PATIENT MESSAGE (OUTPATIENT)
Dept: PULMONOLOGY | Facility: CLINIC | Age: 62
End: 2022-05-25
Payer: MEDICARE

## 2022-05-25 DIAGNOSIS — G47.33 OSA ON CPAP: Primary | ICD-10-CM

## 2022-05-25 NOTE — TELEPHONE ENCOUNTER
"Orders Placed This Encounter   Procedures    HME - OTHER     Please change remotely to Auto CPAP 4-6 cm. Thank you     Order Specific Question:   Type of Equipment:     Answer:   cpap     Order Specific Question:   Height:     Answer:   5'3"     Order Specific Question:   Weight:     Answer:   170 lbs     Order Specific Question:   Does patient have medical equipment at home?     Answer:   CPAP     1. MAYKEL on CPAP  HME - OTHER       "

## 2022-06-08 ENCOUNTER — SPECIALTY PHARMACY (OUTPATIENT)
Dept: PHARMACY | Facility: CLINIC | Age: 62
End: 2022-06-08
Payer: MEDICARE

## 2022-06-08 RX ORDER — SIMVASTATIN 40 MG/1
40 TABLET, FILM COATED ORAL DAILY
Qty: 90 TABLET | Refills: 3 | Status: SHIPPED | OUTPATIENT
Start: 2022-06-08 | End: 2023-04-13

## 2022-06-08 NOTE — TELEPHONE ENCOUNTER
Refill Routing Note   Medication(s) are not appropriate for processing by [facility] for the following reason(s):      - Patient has been seen in the ED/Hospital since the last PCP visit    [facility] action(s):  Route Medication-related problems identified: Requires labs     Medication Therapy Plan: CDMR.LABS(LIPIDS, TSH)  Medication reconciliation completed: No     Appointments  past 12m or future 3m with PCP    Date Provider   Last Visit   2/7/2022 Keagan Calles MD   Next Visit   8/8/2022 Keagan Calles MD   ED visits in past 90 days: 0        Note composed:5:27 AM 06/08/2022

## 2022-06-08 NOTE — TELEPHONE ENCOUNTER
Care Due:                  Date            Visit Type   Department     Provider  --------------------------------------------------------------------------------                                MYCHART                              FOLLOWUP/OF  [facility] INTERNAL  Last Visit: 02-      [facility]ICE VISIT   MEDICINE       Keagan Calles                              EP -                              PRIMARY      [facility] INTER[facility]  Next Visit: 08-      CARE ([facility])   MEDICINE       Keagan Calles                                                            Last  Test          Frequency    Reason                     Performed    Due Date  --------------------------------------------------------------------------------    Lipid Panel.  12 months..  simvastatin..............  07- 07-    TSH.........  12 months..  levothyroxine............  07- 07-    [facility] Embedded Care Gaps. Reference number: [id number]. 6/08/2022   2:32:13 AM CDT

## 2022-06-08 NOTE — TELEPHONE ENCOUNTER
Specialty Pharmacy - Refill Coordination    Specialty Medication Orders Linked to Encounter    Flowsheet Row Most Recent Value   Medication #1 teriflunomide (AUBAGIO) 14 mg Tab (Order#[id number], Rx#[id number])          Refill Questions - Documented Responses    Flowsheet Row Most Recent Value   Patient Availability and HIPAA Verification    Does patient want to proceed with activity? Yes   HIPAA/medical authority confirmed? Yes   Relationship to patient of person spoken to? Self   Refill Screening Questions    Changes to allergies? No   Changes to medications? No   New conditions since last clinic visit? No   Unplanned office visit, urgent care, ED, or hospital admission in the last 4 weeks? No   How does patient/caregiver feel medication is working? Excellent   Financial problems or insurance changes? No   How many doses of your specialty medications were missed in the last 4 weeks? 0   Would patient like to speak to a pharmacist? No   When does the patient need to receive the medication? 06/22/22   Refill Delivery Questions    How will the patient receive the medication? Delivery Tracie   When does the patient need to receive the medication? 06/22/22   Shipping Address Home   Address in [facility] confirmed and updated if neccessary? Yes   Expected Copay ($) 0   Is the patient able to afford the medication copay? Yes   Payment Method zero copay   Days supply of Refill 30   Supplies needed? No supplies needed   Refill activity completed? Yes   Refill activity plan Refill scheduled   Shipment/Pickup Date: 06/15/22          Current Outpatient Medications   Medication Sig    baclofen (LIORESAL) 10 MG tablet TAKE 2 TABLETS ONE TIME DAILY IN THE MORNING, 2 TABLETS IN THE AFTERNOON, AND UP TO 3 TABLETS AT BEDTIME AS NEEDED    calcium citrate (CALCITRATE) 200 mg (950 mg) tablet Take 2 tablets by mouth once daily.    cholecalciferol, vitamin D3, 100 mcg (4,000 unit) Cap Take 4,000 Units by mouth once daily.      denosumab (PROLIA SUBQ) Inject into the skin.    dicyclomine (BENTYL) 20 mg tablet     fish oil-omega-3 fatty acids 300-1,000 mg capsule Take 2 g by mouth once daily.    gabapentin (NEURONTIN) 800 MG tablet TAKE ONE TABLET BY MOUTH THREE TIMES DAILY    glucosamine-chondroitin 500-400 mg tablet Take 1 tablet by mouth once daily.     levothyroxine (SYNTHROID) 75 MCG tablet Take 1 tablet (75 mcg total) by mouth before breakfast.    MAGNESIUM GLYCINATE ORAL     multivitamin capsule Take 1 capsule by mouth once daily.    paroxetine (PAXIL) 40 MG tablet TAKE 1 TABLET EVERY MORNING.    simvastatin (ZOCOR) 40 MG tablet Take 1 tablet (40 mg total) by mouth once daily.    teriflunomide (AUBAGIO) 14 mg Tab Take 1 tablet (14 mg) by mouth once daily.   Last reviewed on 5/25/2022  5:32 PM by Madisyn Sullivan, NP    Review of patient's allergies indicates:   Allergen Reactions    Latex, natural rubber Rash    Last reviewed on  5/25/2022 5:32 PM by Madisyn Sullivan      Tasks added this encounter   No tasks added.   Tasks due within next 3 months   6/7/2022 - Refill Call (Auto Added)     Maurilio OwusuD  [facility]  [address] [place] [id number]  Phone: [phone]  Fax: [phone]

## 2022-06-17 ENCOUNTER — LAB VISIT (OUTPATIENT)
Dept: LAB | Facility: HOSPITAL | Age: 62
End: 2022-06-17
Attending: FAMILY MEDICINE
Payer: MEDICARE

## 2022-06-17 DIAGNOSIS — M81.0 AGE-RELATED OSTEOPOROSIS WITHOUT CURRENT PATHOLOGICAL FRACTURE: ICD-10-CM

## 2022-06-17 LAB
ALBUMIN SERPL BCP-MCNC: 3.8 G/DL (ref 3.5–5.2)
ALP SERPL-CCNC: 65 U/L (ref 55–135)
ALT SERPL W/O P-5'-P-CCNC: 21 U/L (ref 10–44)
ANION GAP SERPL CALC-SCNC: 10 MMOL/L (ref 8–16)
AST SERPL-CCNC: 37 U/L (ref 10–40)
BILIRUB SERPL-MCNC: 0.4 MG/DL (ref 0.1–1)
BUN SERPL-MCNC: 13 MG/DL (ref 6–20)
CALCIUM SERPL-MCNC: 9.4 MG/DL (ref 8.7–10.5)
CHLORIDE SERPL-SCNC: 108 MMOL/L (ref 95–110)
CO2 SERPL-SCNC: 24 MMOL/L (ref 23–29)
CREAT SERPL-MCNC: 1 MG/DL (ref 0.5–1.4)
EST. GFR  (AFRICAN AMERICAN): >60 ML/MIN/1.73 M^2
EST. GFR  (NON AFRICAN AMERICAN): >60 ML/MIN/1.73 M^2
GLUCOSE SERPL-MCNC: 76 MG/DL (ref 70–110)
POTASSIUM SERPL-SCNC: 4.1 MMOL/L (ref 3.5–5.1)
PROT SERPL-MCNC: 7.5 G/DL (ref 6–8.4)
SODIUM SERPL-SCNC: 142 MMOL/L (ref 136–145)

## 2022-06-17 PROCEDURE — 36415 COLL VENOUS BLD VENIPUNCTURE: CPT | Performed by: INTERNAL MEDICINE

## 2022-06-17 PROCEDURE — 80053 COMPREHEN METABOLIC PANEL: CPT | Performed by: INTERNAL MEDICINE

## 2022-06-20 ENCOUNTER — OFFICE VISIT (OUTPATIENT)
Dept: RHEUMATOLOGY | Facility: CLINIC | Age: 62
End: 2022-06-20
Payer: MEDICARE

## 2022-06-20 VITALS
HEART RATE: 68 BPM | BODY MASS INDEX: 30.79 KG/M2 | SYSTOLIC BLOOD PRESSURE: 129 MMHG | DIASTOLIC BLOOD PRESSURE: 82 MMHG | WEIGHT: 173.75 LBS | HEIGHT: 63 IN

## 2022-06-20 DIAGNOSIS — M81.0 AGE-RELATED OSTEOPOROSIS WITHOUT CURRENT PATHOLOGICAL FRACTURE: Primary | ICD-10-CM

## 2022-06-20 PROCEDURE — 3074F SYST BP LT 130 MM HG: CPT | Mod: CPTII,S$GLB,, | Performed by: INTERNAL MEDICINE

## 2022-06-20 PROCEDURE — 1159F MED LIST DOCD IN RCRD: CPT | Mod: CPTII,S$GLB,, | Performed by: INTERNAL MEDICINE

## 2022-06-20 PROCEDURE — 3008F BODY MASS INDEX DOCD: CPT | Mod: CPTII,S$GLB,, | Performed by: INTERNAL MEDICINE

## 2022-06-20 PROCEDURE — 99999 PR PBB SHADOW E&M-EST. PATIENT-LVL IV: CPT | Mod: PBBFAC,,, | Performed by: INTERNAL MEDICINE

## 2022-06-20 PROCEDURE — 3079F DIAST BP 80-89 MM HG: CPT | Mod: CPTII,S$GLB,, | Performed by: INTERNAL MEDICINE

## 2022-06-20 PROCEDURE — 3074F PR MOST RECENT SYSTOLIC BLOOD PRESSURE < 130 MM HG: ICD-10-PCS | Mod: CPTII,S$GLB,, | Performed by: INTERNAL MEDICINE

## 2022-06-20 PROCEDURE — 1160F PR REVIEW ALL MEDS BY PRESCRIBER/CLIN PHARMACIST DOCUMENTED: ICD-10-PCS | Mod: CPTII,S$GLB,, | Performed by: INTERNAL MEDICINE

## 2022-06-20 PROCEDURE — 1159F PR MEDICATION LIST DOCUMENTED IN MEDICAL RECORD: ICD-10-PCS | Mod: CPTII,S$GLB,, | Performed by: INTERNAL MEDICINE

## 2022-06-20 PROCEDURE — 3008F PR BODY MASS INDEX (BMI) DOCUMENTED: ICD-10-PCS | Mod: CPTII,S$GLB,, | Performed by: INTERNAL MEDICINE

## 2022-06-20 PROCEDURE — 99214 PR OFFICE/OUTPT VISIT, EST, LEVL IV, 30-39 MIN: ICD-10-PCS | Mod: S$GLB,,, | Performed by: INTERNAL MEDICINE

## 2022-06-20 PROCEDURE — 1160F RVW MEDS BY RX/DR IN RCRD: CPT | Mod: CPTII,S$GLB,, | Performed by: INTERNAL MEDICINE

## 2022-06-20 PROCEDURE — 99999 PR PBB SHADOW E&M-EST. PATIENT-LVL IV: ICD-10-PCS | Mod: PBBFAC,,, | Performed by: INTERNAL MEDICINE

## 2022-06-20 PROCEDURE — 99214 OFFICE O/P EST MOD 30 MIN: CPT | Mod: S$GLB,,, | Performed by: INTERNAL MEDICINE

## 2022-06-20 PROCEDURE — 3079F PR MOST RECENT DIASTOLIC BLOOD PRESSURE 80-89 MM HG: ICD-10-PCS | Mod: CPTII,S$GLB,, | Performed by: INTERNAL MEDICINE

## 2022-06-20 NOTE — PROGRESS NOTES
RHEUMATOLOGY CLINIC FOLLOW UP VISIT  Chief complaints, HPI, ROS, EXAM, Assessment & Plans:-  Cem Fernandes a 61 y.o. pleasant female comes in for follow-up visit.  She is on Prolia for osteoporosis.  She denies any fractures since last visit.  Multiple falls due to her foot drop from MS. No joint pain.  Rheumatological review of system negative otherwise.  Physical examination shows no synovitis or effusion..    1. Age-related osteoporosis without current pathological fracture      Problem List Items Addressed This Visit     Osteoporosis - Primary (Chronic)           Continue Prolia every 6 months.  Advised all precautions.  CMP before Prolia   Low risk for hypocalcemia   DEXA with next visit.   # Follow up in about 6 months (around 12/20/2022).    Medication List with Changes/Refills   Current Medications    BACLOFEN (LIORESAL) 10 MG TABLET    TAKE 2 TABLETS ONE TIME DAILY IN THE MORNING, 2 TABLETS IN THE AFTERNOON, AND UP TO 3 TABLETS AT BEDTIME AS NEEDED    CALCIUM CITRATE (CALCITRATE) 200 MG (950 MG) TABLET    Take 2 tablets by mouth once daily.    CHOLECALCIFEROL, VITAMIN D3, 100 MCG (4,000 UNIT) CAP    Take 4,000 Units by mouth once daily.     DENOSUMAB (PROLIA SUBQ)    Inject into the skin.    DICYCLOMINE (BENTYL) 20 MG TABLET        FISH OIL-OMEGA-3 FATTY ACIDS 300-1,000 MG CAPSULE    Take 2 g by mouth once daily.    GABAPENTIN (NEURONTIN) 800 MG TABLET    TAKE ONE TABLET BY MOUTH THREE TIMES DAILY    GLUCOSAMINE-CHONDROITIN 500-400 MG TABLET    Take 1 tablet by mouth once daily.     LEVOTHYROXINE (SYNTHROID) 75 MCG TABLET    Take 1 tablet (75 mcg total) by mouth before breakfast.    MAGNESIUM GLYCINATE ORAL        MULTIVITAMIN CAPSULE    Take 1 capsule by mouth once daily.    PAROXETINE (PAXIL) 40 MG TABLET    TAKE 1 TABLET EVERY MORNING.    SIMVASTATIN (ZOCOR) 40 MG TABLET    Take 1 tablet (40 mg total) by mouth once daily.    TERIFLUNOMIDE  (AUBAGIO) 14 MG TAB    Take 1 tablet (14 mg) by mouth once daily.       Past Medical History:   Diagnosis Date    Back pain     Broken toe 6/2015    left big toe    Chronic diastolic heart failure 5/8/2018    Closed left arm fracture     Colon polyp     Depression     Hyperlipidemia     Hypothyroid     Immunosuppression 1/28/2019    MS (multiple sclerosis)     Neurogenic bladder 12/6/2012    Osteoporosis     Polyneuropathy     Sleep apnea     Trouble in sleeping        Past Surgical History:   Procedure Laterality Date    BREAST BIOPSY Left 10/28/2020    COLONOSCOPY N/A 09/01/2017    Procedure: COLONOSCOPY;  Surgeon: Andriy Villar MD;  Location: [facility] ENDO;  Service: Endoscopy;  Laterality: N/A;    COLONOSCOPY N/A 01/06/2021    Procedure: COLONOSCOPY;  Surgeon: Althea Casanova MD;  Location: [facility] ENDO;  Service: Endoscopy;  Laterality: N/A;    COLONOSCOPY N/A 04/25/2022    Procedure: COLONOSCOPY;  Surgeon: Cristiano Aaron MD;  Location: [facility] ENDO;  Service: Endoscopy;  Laterality: N/A;    FRACTURE SURGERY Left 2013    wrist- [facility]    KNEE SURGERY Right 1989    in [place]    TONSILLECTOMY  1966        Social History     Tobacco Use    Smoking status: Never Smoker    Smokeless tobacco: Never Used   Substance Use Topics    Alcohol use: Yes     Alcohol/week: 1.0 standard drink     Types: 1 Glasses of wine per week    Drug use: No       Family History   Problem Relation Age of Onset    Pancreatic cancer Mother     Stomach cancer Mother     Cancer Mother         pancreatic, stomach    Hypertension Father     Heart disease Father         MI    Lupus Maternal Aunt     Rheum arthritis Maternal Aunt     Rheum arthritis Sister     Melanoma Neg Hx        Review of patient's allergies indicates:   Allergen Reactions    Latex, natural rubber Rash       Disclaimer: This note was prepared using voice recognition system and is likely to have sound alike errors and is not proof read.  Please  call me with any questions.

## 2022-06-22 ENCOUNTER — LAB VISIT (OUTPATIENT)
Dept: LAB | Facility: HOSPITAL | Age: 62
End: 2022-06-22
Attending: PSYCHIATRY & NEUROLOGY
Payer: MEDICARE

## 2022-06-22 DIAGNOSIS — G35 MULTIPLE SCLEROSIS: ICD-10-CM

## 2022-06-22 LAB
ALBUMIN SERPL BCP-MCNC: 3.8 G/DL (ref 3.5–5.2)
ALP SERPL-CCNC: 66 U/L (ref 55–135)
ALT SERPL W/O P-5'-P-CCNC: 14 U/L (ref 10–44)
AST SERPL-CCNC: 28 U/L (ref 10–40)
BASOPHILS # BLD AUTO: 0.05 K/UL (ref 0–0.2)
BASOPHILS NFR BLD: 1.1 % (ref 0–1.9)
BILIRUB DIRECT SERPL-MCNC: 0.2 MG/DL (ref 0.1–0.3)
BILIRUB SERPL-MCNC: 0.5 MG/DL (ref 0.1–1)
DIFFERENTIAL METHOD: ABNORMAL
EOSINOPHIL # BLD AUTO: 0.1 K/UL (ref 0–0.5)
EOSINOPHIL NFR BLD: 1.1 % (ref 0–8)
ERYTHROCYTE [DISTWIDTH] IN BLOOD BY AUTOMATED COUNT: 12.6 % (ref 11.5–14.5)
HCT VFR BLD AUTO: 41 % (ref 37–48.5)
HGB BLD-MCNC: 12.6 G/DL (ref 12–16)
IMM GRANULOCYTES # BLD AUTO: 0.01 K/UL (ref 0–0.04)
IMM GRANULOCYTES NFR BLD AUTO: 0.2 % (ref 0–0.5)
LYMPHOCYTES # BLD AUTO: 1.7 K/UL (ref 1–4.8)
LYMPHOCYTES NFR BLD: 39.5 % (ref 18–48)
MCH RBC QN AUTO: 30.8 PG (ref 27–31)
MCHC RBC AUTO-ENTMCNC: 30.7 G/DL (ref 32–36)
MCV RBC AUTO: 100 FL (ref 82–98)
MONOCYTES # BLD AUTO: 0.5 K/UL (ref 0.3–1)
MONOCYTES NFR BLD: 10.3 % (ref 4–15)
NEUTROPHILS # BLD AUTO: 2.1 K/UL (ref 1.8–7.7)
NEUTROPHILS NFR BLD: 47.8 % (ref 38–73)
NRBC BLD-RTO: 0 /100 WBC
PLATELET # BLD AUTO: 204 K/UL (ref 150–450)
PMV BLD AUTO: 11 FL (ref 9.2–12.9)
PROT SERPL-MCNC: 6.9 G/DL (ref 6–8.4)
RBC # BLD AUTO: 4.09 M/UL (ref 4–5.4)
WBC # BLD AUTO: 4.38 K/UL (ref 3.9–12.7)

## 2022-06-22 PROCEDURE — 80076 HEPATIC FUNCTION PANEL: CPT | Performed by: PSYCHIATRY & NEUROLOGY

## 2022-06-22 PROCEDURE — 85025 COMPLETE CBC W/AUTO DIFF WBC: CPT | Performed by: PSYCHIATRY & NEUROLOGY

## 2022-06-22 PROCEDURE — 36415 COLL VENOUS BLD VENIPUNCTURE: CPT | Performed by: PSYCHIATRY & NEUROLOGY

## 2022-06-24 ENCOUNTER — INFUSION (OUTPATIENT)
Dept: INFUSION THERAPY | Facility: HOSPITAL | Age: 62
End: 2022-06-24
Attending: INTERNAL MEDICINE
Payer: MEDICARE

## 2022-06-24 ENCOUNTER — PATIENT MESSAGE (OUTPATIENT)
Dept: PSYCHIATRY | Facility: CLINIC | Age: 62
End: 2022-06-24
Payer: MEDICARE

## 2022-06-24 VITALS
HEART RATE: 64 BPM | TEMPERATURE: 98 F | RESPIRATION RATE: 18 BRPM | DIASTOLIC BLOOD PRESSURE: 84 MMHG | SYSTOLIC BLOOD PRESSURE: 125 MMHG

## 2022-06-24 DIAGNOSIS — M81.0 AGE-RELATED OSTEOPOROSIS WITHOUT CURRENT PATHOLOGICAL FRACTURE: Primary | ICD-10-CM

## 2022-06-24 PROCEDURE — 96372 THER/PROPH/DIAG INJ SC/IM: CPT

## 2022-06-24 PROCEDURE — 63600175 PHARM REV CODE 636 W HCPCS: Mod: JG | Performed by: INTERNAL MEDICINE

## 2022-06-24 RX ADMIN — DENOSUMAB 60 MG: 60 INJECTION SUBCUTANEOUS at 11:06

## 2022-06-24 NOTE — NURSING
Last Prolia:  12/23/2021    Labs reviewed: Ca WNL    Patient denied any recent or upcoming dental work that would prevent pt from receiving Prolia today.    Patient stated she takes calcium and Vit D supplements at home:   Yes      Prolia injection given without difficulty to right arm.   Band-Aid applied to site.  Patient instructed to stay in the clinic for 15 minutes.  Patient verbalized understanding and will notify nurse with any complaints.  NAD noted upon discharge.

## 2022-06-24 NOTE — DISCHARGE INSTRUCTIONS
FALL PREVENTION   Falls often occur due to slipping, tripping or losing your balance. Here are ways to reduce your risk of falling again.   Was there anything that caused your fall that can be fixed, removed or replaced?   Make your home safe by keeping walkways clear of objects you may trip over.   Use non-slip pads under rugs.   Do not walk in poorly lit areas.   Do not stand on chairs or wobbly ladders.   Use caution when reaching overhead or looking upward. This position can cause a loss of balance.   Be sure your shoes fit properly, have non-slip bottoms and are in good condition.   Be cautious when going up and down stairs, curbs, and when walking on uneven sidewalks.   If your balance is poor, consider using a cane or walker.   If your fall was related to alcohol use, stop or limit alcohol intake.   If your fall was related to use of sleeping medicines, talk to your doctor about this. You may need to reduce your dosage at bedtime if you awaken during the night to go to the bathroom.   To reduce the need for nighttime bathroom trips:   Avoid drinking fluids for several hours before going to bed   Empty your bladder before going to bed   Men can keep a urinal at the bedside   © 7264-8365 [facility], [address] [place] [address], [place] [address]. All rights reserved. This information is not intended as a substitute for professional medical care. Always follow your healthcare professional's instructions.

## 2022-07-09 ENCOUNTER — PES CALL (OUTPATIENT)
Dept: ADMINISTRATIVE | Facility: CLINIC | Age: 62
End: 2022-07-09
Payer: MEDICARE

## 2022-07-14 DIAGNOSIS — G35 MULTIPLE SCLEROSIS: ICD-10-CM

## 2022-07-14 RX ORDER — TERIFLUNOMIDE 14 MG/1
14 TABLET, FILM COATED ORAL DAILY
Qty: 90 TABLET | Refills: 0 | Status: CANCELLED | OUTPATIENT
Start: 2022-07-14

## 2022-07-15 ENCOUNTER — SPECIALTY PHARMACY (OUTPATIENT)
Dept: PHARMACY | Facility: CLINIC | Age: 62
End: 2022-07-15
Payer: MEDICARE

## 2022-07-15 ENCOUNTER — PATIENT MESSAGE (OUTPATIENT)
Dept: PHARMACY | Facility: CLINIC | Age: 62
End: 2022-07-15
Payer: MEDICARE

## 2022-07-15 NOTE — TELEPHONE ENCOUNTER
Incoming call from patient for aubagio refill. Script out of refills, MD faxed. Patient has enough on hand until 7/25. Will follow up when refills received.

## 2022-07-18 DIAGNOSIS — G35 MULTIPLE SCLEROSIS: ICD-10-CM

## 2022-07-21 RX ORDER — TERIFLUNOMIDE 14 MG/1
14 TABLET, FILM COATED ORAL DAILY
Qty: 30 TABLET | Refills: 1 | Status: SHIPPED | OUTPATIENT
Start: 2022-07-21 | End: 2022-09-28 | Stop reason: SDUPTHER

## 2022-07-21 NOTE — TELEPHONE ENCOUNTER
Incoming call from patient on status of Aubagio refill. Waiting on approval. Will reach out to [facility].

## 2022-07-29 NOTE — TELEPHONE ENCOUNTER
Specialty Pharmacy - Refill Coordination    Specialty Medication Orders Linked to Encounter    Flowsheet Row Most Recent Value   Medication #1 teriflunomide (AUBAGIO) 14 mg Tab (Order#[id number], Rx#[id number])        Refill Questions - Documented Responses    Flowsheet Row Most Recent Value   Patient Availability and HIPAA Verification    Does patient want to proceed with activity? Unable to Reach   HIPAA/medical authority confirmed? Yes   Relationship to patient of person spoken to? Self        We have had multiple attempts to the patient and have been unsuccessful to reach the patient. We will stop reaching out to the patient but in the event that the patient needs the med and contacts us, we will communicate and begin dispensing for the patient. At your next visit with the patient, please review the importance of being in contact with our specialty pharmacy as a part of our care team.    Interventions added this encounter   Closed: [facility] Provider Intervention - Drug therapy adherence: teriflunomide (AUBAGIO) 14 mg Tab     Ronnie El, PharmD  [facility]  [address] [place] [id number]  Phone: [phone]  Fax: [phone]

## 2022-08-03 ENCOUNTER — SPECIALTY PHARMACY (OUTPATIENT)
Dept: PHARMACY | Facility: CLINIC | Age: 62
End: 2022-08-03
Payer: MEDICARE

## 2022-08-03 NOTE — TELEPHONE ENCOUNTER
Specialty Pharmacy - Refill Coordination    Specialty Medication Orders Linked to Encounter    Flowsheet Row Most Recent Value   Medication #1 teriflunomide (AUBAGIO) 14 mg Tab (Order#[id number], Rx#[id number])          Refill Questions - Documented Responses    Flowsheet Row Most Recent Value   Patient Availability and HIPAA Verification    Does patient want to proceed with activity? Yes   HIPAA/medical authority confirmed? Yes   Relationship to patient of person spoken to? Self   Refill Screening Questions    Changes to allergies? No   Changes to medications? No   New conditions since last clinic visit? No   Unplanned office visit, urgent care, ED, or hospital admission in the last 4 weeks? No   How does patient/caregiver feel medication is working? Excellent   Financial problems or insurance changes? No   How many doses of your specialty medications were missed in the last 4 weeks? 4   Why were doses missed? Busy with other things   Would patient like to speak to a pharmacist? No   When does the patient need to receive the medication? 08/04/22   Refill Delivery Questions    How will the patient receive the medication? Delivery Tracie   When does the patient need to receive the medication? 08/04/22   Shipping Address Home   Address in [facility] confirmed and updated if neccessary? Yes   Expected Copay ($) 0   Is the patient able to afford the medication copay? Yes   Payment Method zero copay   Days supply of Refill 30   Supplies needed? No supplies needed   Refill activity completed? Yes   Refill activity plan Refill scheduled   Shipment/Pickup Date: 08/03/22          Current Outpatient Medications   Medication Sig    baclofen (LIORESAL) 10 MG tablet TAKE 2 TABLETS ONE TIME DAILY IN THE MORNING, 2 TABLETS IN THE AFTERNOON, AND UP TO 3 TABLETS AT BEDTIME AS NEEDED    calcium citrate (CALCITRATE) 200 mg (950 mg) tablet Take 2 tablets by mouth once daily.    cholecalciferol, vitamin D3, 100 mcg (4,000 unit)  Cap Take 4,000 Units by mouth once daily.     denosumab (PROLIA SUBQ) Inject into the skin.    dicyclomine (BENTYL) 20 mg tablet     fish oil-omega-3 fatty acids 300-1,000 mg capsule Take 2 g by mouth once daily.    gabapentin (NEURONTIN) 800 MG tablet TAKE ONE TABLET BY MOUTH THREE TIMES DAILY    glucosamine-chondroitin 500-400 mg tablet Take 1 tablet by mouth once daily.     levothyroxine (SYNTHROID) 75 MCG tablet Take 1 tablet (75 mcg total) by mouth before breakfast.    MAGNESIUM GLYCINATE ORAL     multivitamin capsule Take 1 capsule by mouth once daily.    paroxetine (PAXIL) 40 MG tablet TAKE 1 TABLET EVERY MORNING.    simvastatin (ZOCOR) 40 MG tablet Take 1 tablet (40 mg total) by mouth once daily.    teriflunomide (AUBAGIO) 14 mg Tab Take 1 tablet (14 mg) by mouth once daily.   Last reviewed on 6/24/2022 11:36 AM by Ainsley Jarrett RN    Review of patient's allergies indicates:   Allergen Reactions    Latex, natural rubber Rash    Last reviewed on  6/24/2022 11:36 AM by Ainsley Jarrett      Tasks added this encounter   8/27/2022 - Refill Call (Auto Added)   Tasks due within next 3 months   8/29/2022 - Clinical - Follow Up Assesement (Annual)     Jhon Greenberg, PharmD  [facility]  [address] [place], [place] [address]  [facility] [address]  Phone: [phone]  Fax: [phone]

## 2022-08-08 ENCOUNTER — OFFICE VISIT (OUTPATIENT)
Dept: INTERNAL MEDICINE | Facility: CLINIC | Age: 62
End: 2022-08-08
Payer: MEDICARE

## 2022-08-08 ENCOUNTER — LAB VISIT (OUTPATIENT)
Dept: LAB | Facility: HOSPITAL | Age: 62
End: 2022-08-08
Attending: FAMILY MEDICINE
Payer: MEDICARE

## 2022-08-08 VITALS
OXYGEN SATURATION: 96 % | BODY MASS INDEX: 30.9 KG/M2 | HEART RATE: 69 BPM | SYSTOLIC BLOOD PRESSURE: 98 MMHG | RESPIRATION RATE: 16 BRPM | TEMPERATURE: 98 F | DIASTOLIC BLOOD PRESSURE: 78 MMHG | WEIGHT: 174.38 LBS | HEIGHT: 63 IN

## 2022-08-08 DIAGNOSIS — Z00.00 ANNUAL PHYSICAL EXAM: Primary | ICD-10-CM

## 2022-08-08 DIAGNOSIS — Z86.010 HISTORY OF COLON POLYPS: ICD-10-CM

## 2022-08-08 DIAGNOSIS — E78.49 OTHER HYPERLIPIDEMIA: ICD-10-CM

## 2022-08-08 DIAGNOSIS — Z86.39 PERSONAL HISTORY OF OTHER ENDOCRINE, NUTRITIONAL AND METABOLIC DISEASE: ICD-10-CM

## 2022-08-08 DIAGNOSIS — E03.9 ACQUIRED HYPOTHYROIDISM: ICD-10-CM

## 2022-08-08 DIAGNOSIS — F32.0 MAJOR DEPRESSIVE DISORDER, SINGLE EPISODE, MILD: ICD-10-CM

## 2022-08-08 DIAGNOSIS — Z00.00 ANNUAL PHYSICAL EXAM: ICD-10-CM

## 2022-08-08 DIAGNOSIS — M81.0 AGE-RELATED OSTEOPOROSIS WITHOUT CURRENT PATHOLOGICAL FRACTURE: ICD-10-CM

## 2022-08-08 DIAGNOSIS — G35 MS (MULTIPLE SCLEROSIS): ICD-10-CM

## 2022-08-08 DIAGNOSIS — D84.9 IMMUNOSUPPRESSION: ICD-10-CM

## 2022-08-08 LAB
CHOLEST SERPL-MCNC: 164 MG/DL (ref 120–199)
CHOLEST/HDLC SERPL: 3.1 {RATIO} (ref 2–5)
ESTIMATED AVG GLUCOSE: 103 MG/DL (ref 68–131)
HBA1C MFR BLD: 5.2 % (ref 4–5.6)
HDLC SERPL-MCNC: 53 MG/DL (ref 40–75)
HDLC SERPL: 32.3 % (ref 20–50)
LDLC SERPL CALC-MCNC: 96.2 MG/DL (ref 63–159)
NONHDLC SERPL-MCNC: 111 MG/DL
TRIGL SERPL-MCNC: 74 MG/DL (ref 30–150)
TSH SERPL DL<=0.005 MIU/L-ACNC: 1.34 UIU/ML (ref 0.4–4)

## 2022-08-08 PROCEDURE — 1160F PR REVIEW ALL MEDS BY PRESCRIBER/CLIN PHARMACIST DOCUMENTED: ICD-10-PCS | Mod: CPTII,S$GLB,, | Performed by: FAMILY MEDICINE

## 2022-08-08 PROCEDURE — 3078F PR MOST RECENT DIASTOLIC BLOOD PRESSURE < 80 MM HG: ICD-10-PCS | Mod: CPTII,S$GLB,, | Performed by: FAMILY MEDICINE

## 2022-08-08 PROCEDURE — 83036 HEMOGLOBIN GLYCOSYLATED A1C: CPT | Performed by: FAMILY MEDICINE

## 2022-08-08 PROCEDURE — 1159F MED LIST DOCD IN RCRD: CPT | Mod: CPTII,S$GLB,, | Performed by: FAMILY MEDICINE

## 2022-08-08 PROCEDURE — 99999 PR PBB SHADOW E&M-EST. PATIENT-LVL IV: ICD-10-PCS | Mod: PBBFAC,,, | Performed by: FAMILY MEDICINE

## 2022-08-08 PROCEDURE — 1160F RVW MEDS BY RX/DR IN RCRD: CPT | Mod: CPTII,S$GLB,, | Performed by: FAMILY MEDICINE

## 2022-08-08 PROCEDURE — 3008F PR BODY MASS INDEX (BMI) DOCUMENTED: ICD-10-PCS | Mod: CPTII,S$GLB,, | Performed by: FAMILY MEDICINE

## 2022-08-08 PROCEDURE — 80061 LIPID PANEL: CPT | Performed by: FAMILY MEDICINE

## 2022-08-08 PROCEDURE — 1159F PR MEDICATION LIST DOCUMENTED IN MEDICAL RECORD: ICD-10-PCS | Mod: CPTII,S$GLB,, | Performed by: FAMILY MEDICINE

## 2022-08-08 PROCEDURE — 36415 COLL VENOUS BLD VENIPUNCTURE: CPT | Performed by: FAMILY MEDICINE

## 2022-08-08 PROCEDURE — 3074F PR MOST RECENT SYSTOLIC BLOOD PRESSURE < 130 MM HG: ICD-10-PCS | Mod: CPTII,S$GLB,, | Performed by: FAMILY MEDICINE

## 2022-08-08 PROCEDURE — 99396 PREV VISIT EST AGE 40-64: CPT | Mod: 25,S$GLB,, | Performed by: FAMILY MEDICINE

## 2022-08-08 PROCEDURE — 3078F DIAST BP <80 MM HG: CPT | Mod: CPTII,S$GLB,, | Performed by: FAMILY MEDICINE

## 2022-08-08 PROCEDURE — 99396 PR PREVENTIVE VISIT,EST,40-64: ICD-10-PCS | Mod: 25,S$GLB,, | Performed by: FAMILY MEDICINE

## 2022-08-08 PROCEDURE — 84443 ASSAY THYROID STIM HORMONE: CPT | Performed by: FAMILY MEDICINE

## 2022-08-08 PROCEDURE — 99999 PR PBB SHADOW E&M-EST. PATIENT-LVL IV: CPT | Mod: PBBFAC,,, | Performed by: FAMILY MEDICINE

## 2022-08-08 PROCEDURE — 3074F SYST BP LT 130 MM HG: CPT | Mod: CPTII,S$GLB,, | Performed by: FAMILY MEDICINE

## 2022-08-08 PROCEDURE — 3008F BODY MASS INDEX DOCD: CPT | Mod: CPTII,S$GLB,, | Performed by: FAMILY MEDICINE

## 2022-08-08 NOTE — PROGRESS NOTES
Subjective:   Patient ID: Cem Meyers is a 62 y.o. female.  Chief Complaint:  Follow-up    Patient presents annual physical exam    Last annual physical exam August 2021  Last office visit February 2022 for follow-up on abnormal urine with resultant normal urinalysis and urine culture post treatment with antibiotics  Also followed by Neurology and Rheumatology     Medical history:  - Acquired hypothyroidism.  Last TSH normal.  Reports compliance with Synthroid 75 mcg daily.  No symptoms hypo or hyperthyroidism.  Due for repeat TSH.    - Hyperlipidemia.  Last lipid panel with LDL close to goal on simvastatin 40 mg daily.  Reports compliance.  Denies side effects.  No chest pain or claudication.  Due for repeat lipid panel.    - Major depressive disorder.  On Paxil 40 mg daily.  Reports compliance.  Denies side effects.  Symptoms well controlled.  Happy with medication.  Wants to continue.    - History of shingles.  Resulted with significant scarring, but otherwise healed.  Last visit recommended she obtain the shingles vaccine  - History of colon polyps.  Colonoscopy completed April 2022 and normal.  Recommend repeat colonoscopy in 5 years.  - Multiple sclerosis and Neuropathy followed by Neurology.    - Osteoporosis followed by Rheumatology.     Health maintenance needs include:  - Shingles vaccine series  - Prevnar 20     No new complaints or concerns today.      Current Outpatient Medications:     baclofen (LIORESAL) 10 MG tablet, TAKE 2 TABLETS ONE TIME DAILY IN THE MORNING, 2 TABLETS IN THE AFTERNOON, AND UP TO 3 TABLETS AT BEDTIME AS NEEDED, Disp: 630 tablet, Rfl: 0    calcium citrate (CALCITRATE) 200 mg (950 mg) tablet, Take 2 tablets by mouth once daily., Disp: , Rfl:     cholecalciferol, vitamin D3, 100 mcg (4,000 unit) Cap, Take 4,000 Units by mouth once daily. , Disp: , Rfl:     denosumab (PROLIA SUBQ), Inject into the skin., Disp: , Rfl:     fish oil-omega-3 fatty acids 300-1,000 mg capsule, Take 2  g by mouth once daily., Disp: , Rfl:     gabapentin (NEURONTIN) 800 MG tablet, TAKE ONE TABLET BY MOUTH THREE TIMES DAILY, Disp: 270 tablet, Rfl: 1    levothyroxine (SYNTHROID) 75 MCG tablet, Take 1 tablet (75 mcg total) by mouth before breakfast., Disp: 90 tablet, Rfl: 3    paroxetine (PAXIL) 40 MG tablet, TAKE 1 TABLET EVERY MORNING., Disp: 90 tablet, Rfl: 2    simvastatin (ZOCOR) 40 MG tablet, Take 1 tablet (40 mg total) by mouth once daily., Disp: 90 tablet, Rfl: 3    teriflunomide (AUBAGIO) 14 mg Tab, Take 1 tablet (14 mg) by mouth once daily., Disp: 30 tablet, Rfl: 1    Review of Systems   Constitutional: Negative for activity change, chills, diaphoresis, fatigue, fever and unexpected weight change.   HENT: Negative for congestion, dental problem, ear pain, hearing loss, postnasal drip, rhinorrhea, sinus pressure, sore throat and trouble swallowing.    Eyes: Negative for discharge and visual disturbance.   Respiratory: Negative for cough, chest tightness, shortness of breath and wheezing.    Cardiovascular: Negative for chest pain, palpitations and leg swelling.   Gastrointestinal: Negative for abdominal distention, abdominal pain, blood in stool, constipation, diarrhea, nausea and vomiting.   Endocrine: Negative for cold intolerance, heat intolerance, polydipsia, polyphagia and polyuria.   Genitourinary: Negative for difficulty urinating, dysuria, flank pain, hematuria, menstrual problem and pelvic pain.   Musculoskeletal: Positive for neck pain. Negative for arthralgias, joint swelling and myalgias.   Skin: Negative for rash.   Neurological: Positive for weakness. Negative for dizziness, tremors, syncope, light-headedness and headaches.   Hematological: Negative for adenopathy.   Psychiatric/Behavioral: Negative for confusion, decreased concentration, dysphoric mood and sleep disturbance. The patient is not nervous/anxious.      Objective:   BP 98/78   Pulse 69   Temp 98.3 °F (36.8 °C)   Resp 16    "Ht 5' 3" (1.6 m)   Wt 79.1 kg (174 lb 6.1 oz)   SpO2 96%   BMI 30.89 kg/m²     Physical Exam  Vitals and nursing note reviewed.   Constitutional:       General: She is not in acute distress.     Appearance: Normal appearance. She is well-developed. She is obese. She is not ill-appearing or toxic-appearing.   Eyes:      General: No scleral icterus.     Conjunctiva/sclera: Conjunctivae normal.      Right eye: Right conjunctiva is not injected.      Left eye: Left conjunctiva is not injected.   Neck:      Thyroid: No thyroid mass, thyromegaly or thyroid tenderness.      Vascular: No carotid bruit or JVD.   Cardiovascular:      Rate and Rhythm: Normal rate and regular rhythm.      Pulses:           Radial pulses are 2+ on the right side and 2+ on the left side.      Heart sounds: Normal heart sounds. No murmur heard.    No friction rub. No gallop.   Pulmonary:      Effort: Pulmonary effort is normal. No tachypnea, accessory muscle usage or respiratory distress.      Breath sounds: Normal breath sounds. No wheezing, rhonchi or rales.   Abdominal:      General: There is no distension.      Palpations: Abdomen is soft. There is no hepatomegaly.      Tenderness: There is no abdominal tenderness. There is no guarding or rebound.   Musculoskeletal:      Right lower leg: No edema.      Left lower leg: No edema.   Skin:     General: Skin is warm and dry.      Capillary Refill: Capillary refill takes less than 2 seconds.      Findings: No abrasion, bruising, ecchymosis, rash or wound.   Neurological:      Mental Status: She is alert.      Coordination: Coordination abnormal.      Gait: Gait abnormal.   Psychiatric:         Attention and Perception: Attention and perception normal.         Mood and Affect: Mood and affect normal. Mood is not anxious or depressed.         Speech: Speech normal.         Behavior: Behavior normal. Behavior is cooperative.         Thought Content: Thought content normal.         Cognition and " Memory: Cognition and memory normal.         Judgment: Judgment normal.       Assessment:       ICD-10-CM ICD-9-CM   1. Annual physical exam  Z00.00 V70.0   2. Personal history of other endocrine, nutritional and metabolic disease  Z86.39 V12.29   3. Acquired hypothyroidism  E03.9 244.9   4. Other hyperlipidemia  E78.49 272.4   5. Major depressive disorder, single episode, mild  F32.0 296.21   6. MS (multiple sclerosis)  G35 340   7. Immunosuppression  D84.9 279.9   8. Age-related osteoporosis without current pathological fracture  M81.0 733.01   9. History of colon polyps  Z86.010 V12.72     Plan:   Annual physical exam  Personal history of other endocrine, nutritional and metabolic disease  -     Lipid Panel; Future; Expected date: 08/08/2022  -     TSH; Future; Expected date: 08/08/2022  -     Hemoglobin A1C; Future; Expected date: 08/08/2022  Blood pressure normal.  BMI 31. Exam remarkable for gait muscle abnormalities due to MS  Check labs.  Treat as indicated.  Colon cancer screening up-to-date  Breast cancer screening up-to-date  Tetanus booster up-to-date  COVID vaccine booster is up-to-date  Recommend shingles vaccine through pharmacy  Prevnar 20 vaccine when available  Flu vaccine advised next season    Acquired hypothyroidism  -     TSH; Future; Expected date: 08/08/2022  Stable.  Asymptomatic.  Previous TSH at goal.  Adjust Synthroid 75 mcg daily if needed    Other hyperlipidemia  -     Lipid Panel; Future; Expected date: 08/08/2022  Stable.  Asymptomatic.  Last LDL close to goal.  Adjust simvastatin 40 mg daily if needed    Major depressive disorder, single episode, mild  Stable, no side effects, mood and symptoms well controlled on present medication .  Continue Paxil 40 mg daily    MS (multiple sclerosis)  Immunosuppression  Continue per Neurology     Age-related osteoporosis without current pathological fracture  Continue per Rheumatology    History of colon polyps  Most recent colonoscopy April 2022  and normal  Repeat colonoscopy in 2027    Return to clinic 1 year for annual physical exam or sooner as needed

## 2022-08-12 ENCOUNTER — TELEPHONE (OUTPATIENT)
Dept: INTERNAL MEDICINE | Facility: CLINIC | Age: 62
End: 2022-08-12
Payer: MEDICARE

## 2022-08-12 NOTE — TELEPHONE ENCOUNTER
----- Message from Carrie Peace sent at 8/12/2022 10:38 AM CDT -----  Contact: self/[phone]  Patient is calling in regards to TSH levels labs.  Please give her a call back [phone]

## 2022-08-12 NOTE — TELEPHONE ENCOUNTER
----- Message from Carrie Peace sent at 8/12/2022  7:11 AM CDT -----  Contact: self/[phone]  Type:  Patient Returning Call    Who Called:Patient  Who Left Message for Patient:Nurse  Does the patient know what this is regarding?: no  Would the patient rather a call back or a response via [facility]? Call back  Best Call Back Number:[phone]  Additional Information:

## 2022-08-12 NOTE — TELEPHONE ENCOUNTER
Spoke with patient; pt states she has already reviewed TSH lab notes in [facility]; no concerns were voiced /LD

## 2022-08-21 ENCOUNTER — HOSPITAL ENCOUNTER (EMERGENCY)
Facility: HOSPITAL | Age: 62
Discharge: HOME | End: 2022-08-21
Attending: EMERGENCY MEDICINE
Payer: MEDICARE

## 2022-08-21 VITALS
TEMPERATURE: 98 F | DIASTOLIC BLOOD PRESSURE: 81 MMHG | HEART RATE: 77 BPM | OXYGEN SATURATION: 96 % | HEIGHT: 63 IN | BODY MASS INDEX: 30.13 KG/M2 | WEIGHT: 170.06 LBS | RESPIRATION RATE: 18 BRPM | SYSTOLIC BLOOD PRESSURE: 146 MMHG

## 2022-08-21 DIAGNOSIS — R10.31 RLQ ABDOMINAL PAIN: ICD-10-CM

## 2022-08-21 DIAGNOSIS — K59.09 OTHER CONSTIPATION: Primary | ICD-10-CM

## 2022-08-21 LAB
ALBUMIN SERPL BCP-MCNC: 4 G/DL (ref 3.5–5.2)
ALP SERPL-CCNC: 72 U/L (ref 55–135)
ALT SERPL W/O P-5'-P-CCNC: 24 U/L (ref 10–44)
ANION GAP SERPL CALC-SCNC: 11 MMOL/L (ref 8–16)
AST SERPL-CCNC: 41 U/L (ref 10–40)
BASOPHILS # BLD AUTO: 0.04 K/UL (ref 0–0.2)
BASOPHILS NFR BLD: 0.7 % (ref 0–1.9)
BILIRUB DIRECT SERPL-MCNC: 0.1 MG/DL (ref 0.1–0.3)
BILIRUB SERPL-MCNC: 0.4 MG/DL (ref 0.1–1)
BILIRUB UR QL STRIP: NEGATIVE
BUN SERPL-MCNC: 19 MG/DL (ref 8–23)
CALCIUM SERPL-MCNC: 9.8 MG/DL (ref 8.7–10.5)
CHLORIDE SERPL-SCNC: 106 MMOL/L (ref 95–110)
CLARITY UR: CLEAR
CO2 SERPL-SCNC: 24 MMOL/L (ref 23–29)
COLOR UR: YELLOW
CREAT SERPL-MCNC: 1.3 MG/DL (ref 0.5–1.4)
DIFFERENTIAL METHOD: ABNORMAL
EOSINOPHIL # BLD AUTO: 0 K/UL (ref 0–0.5)
EOSINOPHIL NFR BLD: 0.7 % (ref 0–8)
ERYTHROCYTE [DISTWIDTH] IN BLOOD BY AUTOMATED COUNT: 13 % (ref 11.5–14.5)
EST. GFR  (NO RACE VARIABLE): 47 ML/MIN/1.73 M^2
GLUCOSE SERPL-MCNC: 91 MG/DL (ref 70–110)
GLUCOSE UR QL STRIP: NEGATIVE
HCT VFR BLD AUTO: 42.3 % (ref 37–48.5)
HGB BLD-MCNC: 13.3 G/DL (ref 12–16)
HGB UR QL STRIP: NEGATIVE
IMM GRANULOCYTES # BLD AUTO: 0.02 K/UL (ref 0–0.04)
IMM GRANULOCYTES NFR BLD AUTO: 0.4 % (ref 0–0.5)
INR PPP: 1 (ref 0.8–1.2)
KETONES UR QL STRIP: NEGATIVE
LEUKOCYTE ESTERASE UR QL STRIP: NEGATIVE
LYMPHOCYTES # BLD AUTO: 2.5 K/UL (ref 1–4.8)
LYMPHOCYTES NFR BLD: 45.2 % (ref 18–48)
MCH RBC QN AUTO: 30.4 PG (ref 27–31)
MCHC RBC AUTO-ENTMCNC: 31.4 G/DL (ref 32–36)
MCV RBC AUTO: 97 FL (ref 82–98)
MONOCYTES # BLD AUTO: 0.6 K/UL (ref 0.3–1)
MONOCYTES NFR BLD: 11.4 % (ref 4–15)
NEUTROPHILS # BLD AUTO: 2.3 K/UL (ref 1.8–7.7)
NEUTROPHILS NFR BLD: 41.6 % (ref 38–73)
NITRITE UR QL STRIP: NEGATIVE
NRBC BLD-RTO: 0 /100 WBC
PH UR STRIP: 6 [PH] (ref 5–8)
PLATELET # BLD AUTO: 206 K/UL (ref 150–450)
PMV BLD AUTO: 9.7 FL (ref 9.2–12.9)
POTASSIUM SERPL-SCNC: 5.2 MMOL/L (ref 3.5–5.1)
PROT SERPL-MCNC: 7.7 G/DL (ref 6–8.4)
PROT UR QL STRIP: ABNORMAL
PROTHROMBIN TIME: 10.3 SEC (ref 9–12.5)
RBC # BLD AUTO: 4.37 M/UL (ref 4–5.4)
SODIUM SERPL-SCNC: 141 MMOL/L (ref 136–145)
SP GR UR STRIP: 1.02 (ref 1–1.03)
URN SPEC COLLECT METH UR: ABNORMAL
UROBILINOGEN UR STRIP-ACNC: NEGATIVE EU/DL
WBC # BLD AUTO: 5.51 K/UL (ref 3.9–12.7)

## 2022-08-21 PROCEDURE — 80048 BASIC METABOLIC PNL TOTAL CA: CPT | Performed by: EMERGENCY MEDICINE

## 2022-08-21 PROCEDURE — 25000003 PHARM REV CODE 250: Performed by: EMERGENCY MEDICINE

## 2022-08-21 PROCEDURE — 99285 EMERGENCY DEPT VISIT HI MDM: CPT | Mod: 25

## 2022-08-21 PROCEDURE — 25500020 PHARM REV CODE 255: Performed by: EMERGENCY MEDICINE

## 2022-08-21 PROCEDURE — 85025 COMPLETE CBC W/AUTO DIFF WBC: CPT | Performed by: EMERGENCY MEDICINE

## 2022-08-21 PROCEDURE — 85610 PROTHROMBIN TIME: CPT | Performed by: EMERGENCY MEDICINE

## 2022-08-21 PROCEDURE — 81003 URINALYSIS AUTO W/O SCOPE: CPT | Performed by: EMERGENCY MEDICINE

## 2022-08-21 PROCEDURE — 80076 HEPATIC FUNCTION PANEL: CPT | Performed by: EMERGENCY MEDICINE

## 2022-08-21 RX ORDER — LACTULOSE 10 G/15ML
10 SOLUTION ORAL 3 TIMES DAILY PRN
Qty: 15 ML | Refills: 0 | Status: SHIPPED | OUTPATIENT
Start: 2022-08-21 | End: 2022-08-28

## 2022-08-21 RX ORDER — LACTULOSE 10 G/15ML
20 SOLUTION ORAL
Status: COMPLETED | OUTPATIENT
Start: 2022-08-21 | End: 2022-08-21

## 2022-08-21 RX ORDER — PSYLLIUM HUSK 0.4 G
1 CAPSULE ORAL 3 TIMES DAILY PRN
Qty: 30 CAPSULE | Refills: 0 | Status: SHIPPED | OUTPATIENT
Start: 2022-08-21 | End: 2022-08-31

## 2022-08-21 RX ADMIN — LACTULOSE 20 G: 20 SOLUTION ORAL at 04:08

## 2022-08-21 RX ADMIN — IOHEXOL 100 ML: 350 INJECTION, SOLUTION INTRAVENOUS at 03:08

## 2022-08-21 NOTE — ED PROVIDER NOTES
"SCRIBE #1 NOTE: I, Jolie Yenifer, am scribing for, and in the presence of, Seth Marshall MD. I have scribed the entire note.       History     Chief Complaint   Patient presents with    Abdominal Pain     Abdominal heaviness, off and on pain, normal BM, takes miralax     Review of patient's allergies indicates:   Allergen Reactions    Latex, natural rubber Rash         History of Present Illness     HPI    8/21/2022, 2:00 PM  History obtained from the patient      History of Present Illness: Cem Meyers is a 62 y.o. female patient who presents to the Emergency Department for evaluation of abdominal pain. Pt reports she has had abdominal pain and cramping "on and off" for a while. Symptoms are constant and moderate in severity. No mitigating or exacerbating factors reported. No associated sxs reported. Patient denies any dysuria, blood in stool, N/V, hematuria, fever, and all other sxs at this time. No prior Tx reported. No further complaints or concerns at this time.       Arrival mode: Personal vehicle     PCP: Keagan Calles MD        Past Medical History:  Past Medical History:   Diagnosis Date    Back pain     Broken toe 6/2015    left big toe    Chronic diastolic heart failure 5/8/2018    Closed left arm fracture     Colon polyp     Depression     Hyperlipidemia     Hypothyroid     Immunosuppression 1/28/2019    MS (multiple sclerosis)     Neurogenic bladder 12/6/2012    Osteoporosis     Polyneuropathy     Sleep apnea     Trouble in sleeping        Past Surgical History:  Past Surgical History:   Procedure Laterality Date    BREAST BIOPSY Left 10/28/2020    COLONOSCOPY N/A 09/01/2017    Procedure: COLONOSCOPY;  Surgeon: Andriy Villar MD;  Location: [facility];  Service: Endoscopy;  Laterality: N/A;    COLONOSCOPY N/A 01/06/2021    Procedure: COLONOSCOPY;  Surgeon: Althea Casanova MD;  Location: [facility];  Service: Endoscopy;  Laterality: N/A;    COLONOSCOPY N/A 04/25/2022 "    Procedure: COLONOSCOPY;  Surgeon: Cristiano Aaron MD;  Location: [facility];  Service: Endoscopy;  Laterality: N/A;    FRACTURE SURGERY Left 2013    wrist- [facility]    [place] Right [place]    in [place]    TONSILLECTOMY  1966         Family History:  Family History   Problem Relation Age of Onset    Pancreatic cancer Mother     Stomach cancer Mother     Cancer Mother         pancreatic, stomach    Hypertension Father     Heart disease Father         MI    Lupus Maternal Aunt     Rheum arthritis Maternal Aunt     Rheum arthritis Sister     Melanoma Neg Hx        Social History:  Social History     Tobacco Use    Smoking status: Never Smoker    Smokeless tobacco: Never Used   Substance and Sexual Activity    Alcohol use: Yes     Alcohol/week: 1.0 standard drink     Types: 1 Glasses of wine per week    Drug use: No    Sexual activity: Not Currently        Review of Systems     Review of Systems   Constitutional: Negative for fever.   HENT: Negative for sore throat.    Respiratory: Negative for shortness of breath.    Cardiovascular: Negative for chest pain.   Gastrointestinal: Positive for abdominal pain. Negative for blood in stool, nausea and vomiting.   Genitourinary: Negative for dysuria and hematuria.   Musculoskeletal: Negative for back pain.   Skin: Negative for rash.   Neurological: Negative for weakness.   Hematological: Does not bruise/bleed easily.   All other systems reviewed and are negative.       Physical Exam     Initial Vitals [08/21/22 1307]   BP Pulse Resp Temp SpO2   124/77 102 18 98.1 °F (36.7 °C) 97 %      MAP       --          Physical Exam  Nursing Notes and Vital Signs Reviewed.  Constitutional: Patient is in no acute distress. Well-developed and well-nourished.  Head: Atraumatic. Normocephalic.  Eyes: PERRL. EOM intact. Conjunctivae are not pale. No scleral icterus.  ENT: Mucous membranes are moist. Oropharynx is clear and symmetric.    Neck: Supple. Full ROM.  Cardiovascular:  "Regular rate. Regular rhythm. No murmurs, rubs, or gallops. Distal pulses are 2+ and symmetric.  Pulmonary/Chest: No respiratory distress. Clear to auscultation bilaterally. No wheezing or rales.  Abdominal: Soft and non-distended.  RLQ tenderness.  No rebound, guarding, or rigidity.   Musculoskeletal: Moves all extremities. No obvious deformities. No edema.   Skin: Warm and dry.  Neurological:  Alert, awake, and appropriate.  Normal speech.  No acute focal neurological deficits are appreciated.  Psychiatric: Normal affect. Good eye contact. Appropriate in content.     ED Course   Procedures  ED Vital Signs:  Vitals:    08/21/22 1307 08/21/22 1445 08/21/22 1500 08/21/22 1700   BP: 124/77 133/86 138/88 (!) 146/81   Pulse: 102 72 74 77   Resp: 18 18 18 18   Temp: 98.1 °F (36.7 °C)      TempSrc: Oral      SpO2: 97% 98% 98% 96%   Weight: 77.2 kg (170 lb 1.4 oz)      Height: 5' 3" (1.6 m)          Abnormal Lab Results:  Labs Reviewed   CBC W/ AUTO DIFFERENTIAL - Abnormal; Notable for the following components:       Result Value    MCHC 31.4 (*)     All other components within normal limits   BASIC METABOLIC PANEL - Abnormal; Notable for the following components:    Potassium 5.2 (*)     eGFR 47 (*)     All other components within normal limits   HEPATIC FUNCTION PANEL - Abnormal; Notable for the following components:    AST 41 (*)     All other components within normal limits   URINALYSIS, REFLEX TO URINE CULTURE - Abnormal; Notable for the following components:    Protein, UA Trace (*)     All other components within normal limits    Narrative:     Specimen Source->Urine   PROTIME-INR        All Lab Results:  Results for orders placed or performed during the hospital encounter of 08/21/22   CBC auto differential   Result Value Ref Range    WBC 5.51 3.90 - 12.70 K/uL    RBC 4.37 4.00 - 5.40 M/uL    Hemoglobin 13.3 12.0 - 16.0 g/dL    Hematocrit 42.3 37.0 - 48.5 %    MCV 97 82 - 98 fL    MCH 30.4 27.0 - 31.0 pg    MCHC " 31.4 (L) 32.0 - 36.0 g/dL    RDW 13.0 11.5 - 14.5 %    Platelets 206 150 - 450 K/uL    MPV 9.7 9.2 - 12.9 fL    Immature Granulocytes 0.4 0.0 - 0.5 %    Gran # (ANC) 2.3 1.8 - 7.7 K/uL    Immature Grans (Abs) 0.02 0.00 - 0.04 K/uL    Lymph # 2.5 1.0 - 4.8 K/uL    Mono # 0.6 0.3 - 1.0 K/uL    Eos # 0.0 0.0 - 0.5 K/uL    Baso # 0.04 0.00 - 0.20 K/uL    nRBC 0 0 /100 WBC    Gran % 41.6 38.0 - 73.0 %    Lymph % 45.2 18.0 - 48.0 %    Mono % 11.4 4.0 - 15.0 %    Eosinophil % 0.7 0.0 - 8.0 %    Basophil % 0.7 0.0 - 1.9 %    Differential Method Automated    Protime-INR   Result Value Ref Range    Prothrombin Time 10.3 9.0 - 12.5 sec    INR 1.0 0.8 - 1.2   Basic metabolic panel   Result Value Ref Range    Sodium 141 136 - 145 mmol/L    Potassium 5.2 (H) 3.5 - 5.1 mmol/L    Chloride 106 95 - 110 mmol/L    CO2 24 23 - 29 mmol/L    Glucose 91 70 - 110 mg/dL    BUN 19 8 - 23 mg/dL    Creatinine 1.3 0.5 - 1.4 mg/dL    Calcium 9.8 8.7 - 10.5 mg/dL    Anion Gap 11 8 - 16 mmol/L    eGFR 47 (A) >60 mL/min/1.73 m^2   Hepatic function panel   Result Value Ref Range    Total Protein 7.7 6.0 - 8.4 g/dL    Albumin 4.0 3.5 - 5.2 g/dL    Total Bilirubin 0.4 0.1 - 1.0 mg/dL    Bilirubin, Direct 0.1 0.1 - 0.3 mg/dL    AST 41 (H) 10 - 40 U/L    ALT 24 10 - 44 U/L    Alkaline Phosphatase 72 55 - 135 U/L   Urinalysis, Reflex to Urine Culture Urine, Clean Catch    Specimen: Urine   Result Value Ref Range    Specimen UA Urine, Clean Catch     Color, UA Yellow Yellow, Straw, Karoline    Appearance, UA Clear Clear    pH, UA 6.0 5.0 - 8.0    Specific Gravity, UA 1.025 1.005 - 1.030    Protein, UA Trace (A) Negative    Glucose, UA Negative Negative    Ketones, UA Negative Negative    Bilirubin (UA) Negative Negative    Occult Blood UA Negative Negative    Nitrite, UA Negative Negative    Urobilinogen, UA Negative <2.0 EU/dL    Leukocytes, UA Negative Negative     *Note: Due to a large number of results and/or encounters for the requested time period,  some results have not been displayed. A complete set of results can be found in Results Review.         Imaging Results:  Imaging Results          CT Abdomen Pelvis With Contrast (Final result)  Result time 08/21/22 15:42:56    Final result by Ye Sam MD (08/21/22 15:42:56)                 Impression:      No acute intra-abdominal abnormality.  Large volume stool consistent with constipation.  Small fibroid uterus.      Electronically signed by: Ye Sam MD  Date:    08/21/2022  Time:    15:42             Narrative:    EXAMINATION:  CT ABDOMEN PELVIS WITH CONTRAST    CLINICAL HISTORY:  Abdominal pain.    TECHNIQUE:  Standard contrast enhanced CT performed with  ml Omnipaque 350.  with reformats.  All CT scans at this facility are performed  using dose modulation techniques as appropriate to performed exam including the following:  automated exposure control; adjustment of mA and/or kV according to the patients size (this includes techniques or standardized protocols for targeted exams where dose is matched to indication/reason for exam: i.e. extremities or head);  iterative reconstruction technique.    COMPARISON:  None    FINDINGS:  Lung bases are clear.    The liver, spleen and gallbladder appear normal.    Kidneys and adrenal glands are normal.    The pancreas appears normal.    The aorta and IVC appear normal.    Small bowel loops are nondilated.  The appendix is not reliably identified    There is a large volume of stool throughout the large bowel consistent with a degree of constipation    There are calcifications in the anterior abdominal subcutaneous fat.  Similar calcifications seen in the right gluteal fat.    In the pelvis no free fluid is seen.  There appears to be a small lobulated uterus consistent with fibroids.    No free air.  Mild lumbar degenerative disc disease.                                   The Emergency Provider reviewed the vital signs and test results, which  are outlined above.     ED Discussion     6:24 PM: Reassessed pt at this time.  Discussed with pt all pertinent ED information and results. Discussed pt dx and plan of tx. Gave pt all f/u and return to the ED instructions. All questions and concerns were addressed at this time. Pt expresses understanding of information and instructions, and is comfortable with plan to discharge. Pt is stable for discharge.    I discussed with patient and/or family/caretaker that evaluation in the ED does not suggest any emergent or life threatening medical conditions requiring immediate intervention beyond what was provided in the ED, and I believe patient is safe for discharge.  Regardless, an unremarkable evaluation in the ED does not preclude the development or presence of a serious of life threatening condition. As such, patient was instructed to return immediately for any worsening or change in current symptoms.         Medical Decision Making:   Clinical Tests:   Lab Tests: Ordered and Reviewed  Radiological Study: Ordered and Reviewed           ED Medication(s):  Medications   iohexoL (OMNIPAQUE 350) injection 100 mL (100 mLs Intravenous Given 8/21/22 1534)   lactulose 20 gram/30 mL solution Soln 20 g (20 g Oral Given 8/21/22 5207)       Discharge Medication List as of 8/21/2022  6:22 PM      START taking these medications    Details   lactulose (CHRONULAC) 20 gram/30 mL Soln Take 15 mLs (10 g total) by mouth 3 (three) times daily as needed (costipation)., Starting Sun 8/21/2022, Until Sun 8/28/2022 at 2359, Print      psyllium husk (METAMUCIL) 0.4 gram Cap Take 1 capsule by mouth 3 (three) times daily as needed (constipation)., Starting Sun 8/21/2022, Until Wed 8/31/2022 at 2359, Print              Follow-up Information     Keagan Calles MD. Schedule an appointment as soon as possible for a visit in 5 days.    Specialty: Family Medicine  Contact information:  [address]  [place] [address] [phone]              [facility] - Emergency Dept..    Specialty: Emergency Medicine  Why: As needed, If symptoms worsen  Contact information:  [address] [facility] [address] [phone]                           Scribe Attestation:   Scribe #1: I performed the above scribed service and the documentation accurately describes the services I performed. I attest to the accuracy of the note.     Attending:   Physician Attestation Statement for Scribe #1: I, Seth Marshall MD, personally performed the services described in this documentation, as scribed by Jolie Street, in my presence, and it is both accurate and complete.           Clinical Impression       ICD-10-CM ICD-9-CM   1. Other constipation  K59.09 564.09   2. RLQ abdominal pain  R10.31 789.03       Disposition:   Disposition: Discharged  Condition: Stable         Seth Marshall MD  08/21/22 2015

## 2022-08-23 ENCOUNTER — SPECIALTY PHARMACY (OUTPATIENT)
Dept: PHARMACY | Facility: CLINIC | Age: 62
End: 2022-08-23
Payer: MEDICARE

## 2022-08-23 NOTE — TELEPHONE ENCOUNTER
Specialty Pharmacy - Clinical Reassessment    Specialty Medication Orders Linked to Encounter    Flowsheet Row Most Recent Value   Medication #1 teriflunomide (AUBAGIO) 14 mg Tab (Order#[id number], Rx#[id number])        Patient Diagnosis   G35 - MS (multiple sclerosis)    Specialty clinical pharmacist review completed for an annual review of reassessment. Reviewed the following areas: current med list, reports of adverse effects, adherence and progress towards therapeutic goals.    Recommendations: Patient has not been seen by clinic since 6/02/2021. Opening intervention to have patient schedule a f/u appt with Dr. Smith.     Tasks added this encounter   No tasks added.   Tasks due within next 3 months   8/27/2022 - Refill Call (Auto Added)     Ronnie El, PharmD  [facility]  [address] [place] [id number]  Phone: [phone]  Fax: [phone]

## 2022-08-26 ENCOUNTER — SPECIALTY PHARMACY (OUTPATIENT)
Dept: PHARMACY | Facility: CLINIC | Age: 62
End: 2022-08-26
Payer: MEDICARE

## 2022-08-26 NOTE — TELEPHONE ENCOUNTER
Specialty Pharmacy - Refill Coordination    Specialty Medication Orders Linked to Encounter    Flowsheet Row Most Recent Value   Medication #1 teriflunomide (AUBAGIO) 14 mg Tab (Order#[id number], Rx#[id number])          Refill Questions - Documented Responses    Flowsheet Row Most Recent Value   Patient Availability and HIPAA Verification    Does patient want to proceed with activity? Yes   HIPAA/medical authority confirmed? Yes   Relationship to patient of person spoken to? Self   Refill Screening Questions    Changes to allergies? No   Changes to medications? Yes  [lactulose - no DDI]   New conditions since last clinic visit? No   Unplanned office visit, urgent care, ED, or hospital admission in the last 4 weeks? Yes  [ER - Bowel issues]   How does patient/caregiver feel medication is working? Good   Financial problems or insurance changes? No   How many doses of your specialty medications were missed in the last 4 weeks? 0   Would patient like to speak to a pharmacist? No   When does the patient need to receive the medication? 09/05/22   Refill Delivery Questions    How will the patient receive the medication? Delivery Tracie   When does the patient need to receive the medication? 09/05/22   Shipping Address Home   Address in [facility] confirmed and updated if neccessary? Yes   Expected Copay ($) 0   Is the patient able to afford the medication copay? Yes   Payment Method zero copay   Days supply of Refill 30   Supplies needed? No supplies needed   Refill activity completed? No   Refill activity plan Refill scheduled   Shipment/Pickup Date: 08/30/22          Current Outpatient Medications   Medication Sig    baclofen (LIORESAL) 10 MG tablet TAKE 2 TABLETS ONE TIME DAILY IN THE MORNING, 2 TABLETS IN THE AFTERNOON, AND UP TO 3 TABLETS AT BEDTIME AS NEEDED    calcium citrate (CALCITRATE) 200 mg (950 mg) tablet Take 2 tablets by mouth once daily.    cholecalciferol, vitamin D3, 100 mcg (4,000 unit) Cap Take  4,000 Units by mouth once daily.     denosumab (PROLIA SUBQ) Inject into the skin.    dicyclomine (BENTYL) 20 mg tablet     fish oil-omega-3 fatty acids 300-1,000 mg capsule Take 2 g by mouth once daily.    gabapentin (NEURONTIN) 800 MG tablet TAKE 1 TABLET THREE TIMES DAILY    glucosamine-chondroitin 500-400 mg tablet Take 1 tablet by mouth once daily.     lactulose (CHRONULAC) 20 gram/30 mL Soln Take 15 mLs (10 g total) by mouth 3 (three) times daily as needed (costipation).    levothyroxine (SYNTHROID) 75 MCG tablet Take 1 tablet (75 mcg total) by mouth before breakfast.    MAGNESIUM GLYCINATE ORAL     multivitamin capsule Take 1 capsule by mouth once daily.    paroxetine (PAXIL) 40 MG tablet TAKE 1 TABLET EVERY MORNING.    psyllium husk (METAMUCIL) 0.4 gram Cap Take 1 capsule by mouth 3 (three) times daily as needed (constipation).    simvastatin (ZOCOR) 40 MG tablet Take 1 tablet (40 mg total) by mouth once daily.    teriflunomide (AUBAGIO) 14 mg Tab Take 1 tablet (14 mg) by mouth once daily.   Last reviewed on 8/21/2022  2:40 PM by Dianna Peraza RN    Review of patient's allergies indicates:   Allergen Reactions    Latex, natural rubber Rash    Last reviewed on  8/21/2022 3:31 PM by Jen Marquez      Tasks added this encounter   9/28/2022 - Refill Call (Auto Added)   Tasks due within next 3 months   No tasks due.     Idania Woo, PharmD  [facility]  [address] [place] [id number]  Phone: [phone]  Fax: [phone]

## 2022-09-07 ENCOUNTER — TELEPHONE (OUTPATIENT)
Dept: NEUROLOGY | Facility: CLINIC | Age: 62
End: 2022-09-07

## 2022-09-07 DIAGNOSIS — G35 MULTIPLE SCLEROSIS: Primary | ICD-10-CM

## 2022-09-07 NOTE — TELEPHONE ENCOUNTER
----- Message from Carlos Yu sent at 9/7/2022  1:38 PM CDT -----  Regarding: call bk from the nurse about TML      The Pt states that she is worried about having TML from the Rx she is taking.    The Pt states that she is having symptoms- such as not thinking clearly and other things    Ph# [phone]

## 2022-09-07 NOTE — TELEPHONE ENCOUNTER
Spoke to pt who reports new memory issues and word finding issues. For example she forgot her address when the phone staffed asked for it. She also reports difficulties with hand coordination and trouble walking. She states these symptoms have been present for 2 weeks. She states these symptoms are new. Denies any recent infections. Reviewed with Karoline. Lab orders placed to r/o infection and MRI ordered. Pt aware I will reach back out to [facility] in the morning to schedule. In the meantime I made her aware that if symptoms worsen to go to the ER for evaluation. Pt julita

## 2022-09-08 ENCOUNTER — HOSPITAL ENCOUNTER (OUTPATIENT)
Dept: RADIOLOGY | Facility: HOSPITAL | Age: 62
Discharge: HOME | End: 2022-09-08
Attending: CLINICAL NURSE SPECIALIST
Payer: MEDICARE

## 2022-09-08 DIAGNOSIS — G35 MULTIPLE SCLEROSIS: ICD-10-CM

## 2022-09-08 PROCEDURE — 25500020 PHARM REV CODE 255: Performed by: CLINICAL NURSE SPECIALIST

## 2022-09-08 PROCEDURE — A9585 GADOBUTROL INJECTION: HCPCS | Performed by: CLINICAL NURSE SPECIALIST

## 2022-09-08 PROCEDURE — 70553 MRI BRAIN STEM W/O & W/DYE: CPT | Mod: TC

## 2022-09-08 RX ORDER — GADOBUTROL 604.72 MG/ML
10 INJECTION INTRAVENOUS
Status: COMPLETED | OUTPATIENT
Start: 2022-09-08 | End: 2022-09-08

## 2022-09-08 RX ADMIN — GADOBUTROL 6 ML: 604.72 INJECTION INTRAVENOUS at 02:09

## 2022-09-09 ENCOUNTER — PATIENT MESSAGE (OUTPATIENT)
Dept: NEUROLOGY | Facility: CLINIC | Age: 62
End: 2022-09-09
Payer: MEDICARE

## 2022-09-14 ENCOUNTER — OFFICE VISIT (OUTPATIENT)
Dept: INTERNAL MEDICINE | Facility: CLINIC | Age: 62
End: 2022-09-14
Payer: MEDICARE

## 2022-09-14 VITALS
DIASTOLIC BLOOD PRESSURE: 86 MMHG | SYSTOLIC BLOOD PRESSURE: 128 MMHG | HEIGHT: 63 IN | OXYGEN SATURATION: 96 % | TEMPERATURE: 97 F | HEART RATE: 71 BPM | BODY MASS INDEX: 30.94 KG/M2 | WEIGHT: 174.63 LBS

## 2022-09-14 DIAGNOSIS — K59.00 CONSTIPATION, UNSPECIFIED CONSTIPATION TYPE: ICD-10-CM

## 2022-09-14 DIAGNOSIS — R10.31 RIGHT LOWER QUADRANT ABDOMINAL PAIN: Primary | ICD-10-CM

## 2022-09-14 DIAGNOSIS — K92.1 BLACK STOOL: ICD-10-CM

## 2022-09-14 PROCEDURE — 3044F HG A1C LEVEL LT 7.0%: CPT | Mod: CPTII,S$GLB,, | Performed by: PHYSICIAN ASSISTANT

## 2022-09-14 PROCEDURE — 1159F PR MEDICATION LIST DOCUMENTED IN MEDICAL RECORD: ICD-10-PCS | Mod: CPTII,S$GLB,, | Performed by: PHYSICIAN ASSISTANT

## 2022-09-14 PROCEDURE — 1159F MED LIST DOCD IN RCRD: CPT | Mod: CPTII,S$GLB,, | Performed by: PHYSICIAN ASSISTANT

## 2022-09-14 PROCEDURE — 3079F DIAST BP 80-89 MM HG: CPT | Mod: CPTII,S$GLB,, | Performed by: PHYSICIAN ASSISTANT

## 2022-09-14 PROCEDURE — 3074F PR MOST RECENT SYSTOLIC BLOOD PRESSURE < 130 MM HG: ICD-10-PCS | Mod: CPTII,S$GLB,, | Performed by: PHYSICIAN ASSISTANT

## 2022-09-14 PROCEDURE — 3008F PR BODY MASS INDEX (BMI) DOCUMENTED: ICD-10-PCS | Mod: CPTII,S$GLB,, | Performed by: PHYSICIAN ASSISTANT

## 2022-09-14 PROCEDURE — 99999 PR PBB SHADOW E&M-EST. PATIENT-LVL V: CPT | Mod: PBBFAC,,, | Performed by: PHYSICIAN ASSISTANT

## 2022-09-14 PROCEDURE — 1160F PR REVIEW ALL MEDS BY PRESCRIBER/CLIN PHARMACIST DOCUMENTED: ICD-10-PCS | Mod: CPTII,S$GLB,, | Performed by: PHYSICIAN ASSISTANT

## 2022-09-14 PROCEDURE — G0008 ADMIN INFLUENZA VIRUS VAC: HCPCS | Mod: S$GLB,,, | Performed by: PHYSICIAN ASSISTANT

## 2022-09-14 PROCEDURE — 90686 FLU VACCINE (QUAD) GREATER THAN OR EQUAL TO 3YO PRESERVATIVE FREE IM: ICD-10-PCS | Mod: S$GLB,,, | Performed by: PHYSICIAN ASSISTANT

## 2022-09-14 PROCEDURE — 90686 IIV4 VACC NO PRSV 0.5 ML IM: CPT | Mod: S$GLB,,, | Performed by: PHYSICIAN ASSISTANT

## 2022-09-14 PROCEDURE — 3044F PR MOST RECENT HEMOGLOBIN A1C LEVEL <7.0%: ICD-10-PCS | Mod: CPTII,S$GLB,, | Performed by: PHYSICIAN ASSISTANT

## 2022-09-14 PROCEDURE — 3079F PR MOST RECENT DIASTOLIC BLOOD PRESSURE 80-89 MM HG: ICD-10-PCS | Mod: CPTII,S$GLB,, | Performed by: PHYSICIAN ASSISTANT

## 2022-09-14 PROCEDURE — 3008F BODY MASS INDEX DOCD: CPT | Mod: CPTII,S$GLB,, | Performed by: PHYSICIAN ASSISTANT

## 2022-09-14 PROCEDURE — 99214 OFFICE O/P EST MOD 30 MIN: CPT | Mod: S$GLB,,, | Performed by: PHYSICIAN ASSISTANT

## 2022-09-14 PROCEDURE — 99999 PR PBB SHADOW E&M-EST. PATIENT-LVL V: ICD-10-PCS | Mod: PBBFAC,,, | Performed by: PHYSICIAN ASSISTANT

## 2022-09-14 PROCEDURE — G0008 FLU VACCINE (QUAD) GREATER THAN OR EQUAL TO 3YO PRESERVATIVE FREE IM: ICD-10-PCS | Mod: S$GLB,,, | Performed by: PHYSICIAN ASSISTANT

## 2022-09-14 PROCEDURE — 1160F RVW MEDS BY RX/DR IN RCRD: CPT | Mod: CPTII,S$GLB,, | Performed by: PHYSICIAN ASSISTANT

## 2022-09-14 PROCEDURE — 3074F SYST BP LT 130 MM HG: CPT | Mod: CPTII,S$GLB,, | Performed by: PHYSICIAN ASSISTANT

## 2022-09-14 PROCEDURE — 99214 PR OFFICE/OUTPT VISIT, EST, LEVL IV, 30-39 MIN: ICD-10-PCS | Mod: S$GLB,,, | Performed by: PHYSICIAN ASSISTANT

## 2022-09-14 NOTE — PROGRESS NOTES
Subjective:      Patient ID: Cem Meyers is a 62 y.o. female.    Chief Complaint: Abdominal Pain    Abdominal Pain  This is a chronic problem. The current episode started more than 1 month ago. The onset quality is sudden. The problem occurs constantly. The problem has been gradually worsening. The pain is located in the RLQ. The pain is at a severity of 2/10. The pain is mild. The quality of the pain is aching. Associated symptoms include constipation and frequency. Pertinent negatives include no anorexia, arthralgias, belching, diarrhea, dysuria, fever, flatus, headaches, hematochezia, hematuria, melena, myalgias, nausea, vomiting or weight loss. The pain is aggravated by bowel movement. The pain is relieved by Nothing. She has tried nothing (bentyl, metamucil, lactulose) for the symptoms. There is no history of abdominal surgery, colon cancer, Crohn's disease, gallstones, GERD, irritable bowel syndrome, pancreatitis, PUD or ulcerative colitis. Patient's medical history includes UTI. Patient's medical history does not include kidney stones.   PT states that she noticed some black in her stool recently.   'CT scan and Xrays show large volume of stool in her colon.     Colonoscopy completed in April was normal.   Currently on abx for UTI. Cipro    Patient Active Problem List   Diagnosis    Neurologic gait dysfunction    MS (multiple sclerosis)    Acquired hypothyroidism    Other hyperlipidemia    Acquired pes planus of both feet    Osteoporosis    MAYKEL on CPAP    Major depressive disorder, single episode, mild    Polyneuropathy    History of colon polyps    Muscle spasms of both lower extremities    Psychophysiological insomnia    Chronic diastolic heart failure    Immunosuppression    Bursal cyst of olecranon    Prophylactic immunotherapy    Stenosing tenosynovitis of finger of right hand    Spasticity    Neuropathic pain    Decreased strength, endurance, and mobility       Current Outpatient Medications:      baclofen (LIORESAL) 10 MG tablet, TAKE 2 TABLETS ONE TIME DAILY IN THE MORNING, 2 TABLETS IN THE AFTERNOON, AND UP TO 3 TABLETS AT BEDTIME AS NEEDED, Disp: 630 tablet, Rfl: 0    calcium citrate (CALCITRATE) 200 mg (950 mg) tablet, Take 2 tablets by mouth once daily., Disp: , Rfl:     cholecalciferol, vitamin D3, 100 mcg (4,000 unit) Cap, Take 4,000 Units by mouth once daily. , Disp: , Rfl:     denosumab (PROLIA SUBQ), Inject into the skin., Disp: , Rfl:     dicyclomine (BENTYL) 20 mg tablet, , Disp: , Rfl:     fish oil-omega-3 fatty acids 300-1,000 mg capsule, Take 2 g by mouth once daily., Disp: , Rfl:     gabapentin (NEURONTIN) 800 MG tablet, TAKE 1 TABLET THREE TIMES DAILY, Disp: 270 tablet, Rfl: 1    glucosamine-chondroitin 500-400 mg tablet, Take 1 tablet by mouth once daily. , Disp: , Rfl:     levothyroxine (SYNTHROID) 75 MCG tablet, Take 1 tablet (75 mcg total) by mouth before breakfast., Disp: 90 tablet, Rfl: 3    MAGNESIUM GLYCINATE ORAL, , Disp: , Rfl:     multivitamin capsule, Take 1 capsule by mouth once daily., Disp: , Rfl:     paroxetine (PAXIL) 40 MG tablet, Take 1 tablet (40 mg total) by mouth every morning., Disp: 90 tablet, Rfl: 3    simvastatin (ZOCOR) 40 MG tablet, Take 1 tablet (40 mg total) by mouth once daily., Disp: 90 tablet, Rfl: 3    teriflunomide (AUBAGIO) 14 mg Tab, Take 1 tablet (14 mg) by mouth once daily., Disp: 30 tablet, Rfl: 1    Review of Systems   Constitutional:  Negative for activity change, appetite change, chills, diaphoresis, fatigue, fever, unexpected weight change and weight loss.   HENT: Negative.  Negative for congestion, hearing loss, postnasal drip, rhinorrhea, sore throat, trouble swallowing and voice change.    Eyes: Negative.  Negative for visual disturbance.   Respiratory: Negative.  Negative for cough, choking, chest tightness and shortness of breath.    Cardiovascular:  Negative for chest pain, palpitations and leg swelling.   Gastrointestinal:  Positive for  "abdominal distention, abdominal pain and constipation. Negative for anal bleeding, anorexia, blood in stool, diarrhea, flatus, hematochezia, melena, nausea, rectal pain and vomiting.   Endocrine: Negative for cold intolerance, heat intolerance, polydipsia and polyuria.   Genitourinary:  Positive for frequency. Negative for difficulty urinating, dysuria and hematuria.   Musculoskeletal:  Negative for arthralgias, back pain, gait problem, joint swelling and myalgias.   Skin:  Negative for color change, pallor, rash and wound.   Neurological:  Negative for dizziness, tremors, weakness, light-headedness, numbness and headaches.   Hematological:  Negative for adenopathy.   Psychiatric/Behavioral:  Negative for behavioral problems, confusion, self-injury, sleep disturbance and suicidal ideas. The patient is not nervous/anxious.    Objective:   /86 (BP Location: Left arm, Patient Position: Sitting, BP Method: Medium (Manual))   Pulse 71   Temp 97.2 °F (36.2 °C) (Tympanic)   Ht 5' 3" (1.6 m)   Wt 79.2 kg (174 lb 9.7 oz)   SpO2 96%   BMI 30.93 kg/m²     Physical Exam  Vitals reviewed.   Constitutional:       General: She is not in acute distress.     Appearance: Normal appearance. She is well-developed. She is not ill-appearing, toxic-appearing or diaphoretic.   HENT:      Head: Normocephalic and atraumatic.      Right Ear: External ear normal.      Left Ear: External ear normal.      Nose: Nose normal.   Eyes:      Conjunctiva/sclera: Conjunctivae normal.      Pupils: Pupils are equal, round, and reactive to light.   Cardiovascular:      Rate and Rhythm: Normal rate and regular rhythm.      Heart sounds: Normal heart sounds. No murmur heard.    No friction rub. No gallop.   Pulmonary:      Effort: Pulmonary effort is normal. No respiratory distress.      Breath sounds: Normal breath sounds. No wheezing or rales.   Chest:      Chest wall: No tenderness.   Abdominal:      General: There is no distension.      " Palpations: Abdomen is soft. There is no mass.      Tenderness: There is no abdominal tenderness. There is no guarding or rebound.      Hernia: No hernia is present.   Musculoskeletal:         General: Normal range of motion.      Cervical back: Normal range of motion and neck supple.   Lymphadenopathy:      Cervical: No cervical adenopathy.   Skin:     General: Skin is warm and dry.      Capillary Refill: Capillary refill takes less than 2 seconds.      Findings: No rash.   Neurological:      Mental Status: She is alert and oriented to person, place, and time.      Motor: No weakness.      Coordination: Coordination normal.      Gait: Gait normal.   Psychiatric:         Mood and Affect: Mood normal.         Behavior: Behavior normal.         Thought Content: Thought content normal.         Judgment: Judgment normal.     Lab Results   Component Value Date    CREATININE 1.0 09/08/2022    BUN 12 09/08/2022     09/08/2022    K 4.4 09/08/2022     09/08/2022    CO2 25 09/08/2022       Assessment:     1. Right lower quadrant abdominal pain    2. Constipation, unspecified constipation type    3. Black stool      Plan:   Right lower quadrant abdominal pain  -     Ambulatory referral/consult to Gastroenterology    Constipation, unspecified constipation type  -     Ambulatory referral/consult to Gastroenterology    Black stool  -     Occult blood x 1, stool; Future; Expected date: 09/14/2022    Other orders  -     Influenza - Quadrivalent (PF)  -finish abx for UTI.   -miralax daily for 7 days  - ounces of water    Follow up if symptoms worsen or fail to improve.

## 2022-09-19 ENCOUNTER — LAB VISIT (OUTPATIENT)
Dept: LAB | Facility: HOSPITAL | Age: 62
End: 2022-09-19
Payer: MEDICARE

## 2022-09-19 DIAGNOSIS — K92.1 BLACK STOOL: ICD-10-CM

## 2022-09-19 PROCEDURE — 82272 OCCULT BLD FECES 1-3 TESTS: CPT | Performed by: PHYSICIAN ASSISTANT

## 2022-09-20 ENCOUNTER — PATIENT MESSAGE (OUTPATIENT)
Dept: NEUROLOGY | Facility: CLINIC | Age: 62
End: 2022-09-20
Payer: MEDICARE

## 2022-09-20 LAB — OB PNL STL: NEGATIVE

## 2022-09-28 ENCOUNTER — LAB VISIT (OUTPATIENT)
Dept: LAB | Facility: HOSPITAL | Age: 62
End: 2022-09-28
Attending: PSYCHIATRY & NEUROLOGY
Payer: MEDICARE

## 2022-09-28 DIAGNOSIS — G35 MULTIPLE SCLEROSIS: ICD-10-CM

## 2022-09-28 LAB
ALBUMIN SERPL BCP-MCNC: 3.7 G/DL (ref 3.5–5.2)
ALP SERPL-CCNC: 69 U/L (ref 55–135)
ALT SERPL W/O P-5'-P-CCNC: 16 U/L (ref 10–44)
AST SERPL-CCNC: 27 U/L (ref 10–40)
BASOPHILS # BLD AUTO: 0.04 K/UL (ref 0–0.2)
BASOPHILS NFR BLD: 0.9 % (ref 0–1.9)
BILIRUB DIRECT SERPL-MCNC: 0.2 MG/DL (ref 0.1–0.3)
BILIRUB SERPL-MCNC: 0.4 MG/DL (ref 0.1–1)
DIFFERENTIAL METHOD: ABNORMAL
EOSINOPHIL # BLD AUTO: 0 K/UL (ref 0–0.5)
EOSINOPHIL NFR BLD: 0.9 % (ref 0–8)
ERYTHROCYTE [DISTWIDTH] IN BLOOD BY AUTOMATED COUNT: 13.2 % (ref 11.5–14.5)
HCT VFR BLD AUTO: 40.2 % (ref 37–48.5)
HGB BLD-MCNC: 12.2 G/DL (ref 12–16)
IMM GRANULOCYTES # BLD AUTO: 0.01 K/UL (ref 0–0.04)
IMM GRANULOCYTES NFR BLD AUTO: 0.2 % (ref 0–0.5)
LYMPHOCYTES # BLD AUTO: 2.1 K/UL (ref 1–4.8)
LYMPHOCYTES NFR BLD: 46.4 % (ref 18–48)
MCH RBC QN AUTO: 30.9 PG (ref 27–31)
MCHC RBC AUTO-ENTMCNC: 30.3 G/DL (ref 32–36)
MCV RBC AUTO: 102 FL (ref 82–98)
MONOCYTES # BLD AUTO: 0.5 K/UL (ref 0.3–1)
MONOCYTES NFR BLD: 11.2 % (ref 4–15)
NEUTROPHILS # BLD AUTO: 1.8 K/UL (ref 1.8–7.7)
NEUTROPHILS NFR BLD: 40.4 % (ref 38–73)
NRBC BLD-RTO: 0 /100 WBC
PLATELET # BLD AUTO: 208 K/UL (ref 150–450)
PMV BLD AUTO: 11 FL (ref 9.2–12.9)
PROT SERPL-MCNC: 6.4 G/DL (ref 6–8.4)
RBC # BLD AUTO: 3.95 M/UL (ref 4–5.4)
WBC # BLD AUTO: 4.55 K/UL (ref 3.9–12.7)

## 2022-09-28 PROCEDURE — 36415 COLL VENOUS BLD VENIPUNCTURE: CPT | Performed by: PSYCHIATRY & NEUROLOGY

## 2022-09-28 PROCEDURE — 80076 HEPATIC FUNCTION PANEL: CPT | Performed by: PSYCHIATRY & NEUROLOGY

## 2022-09-28 PROCEDURE — 85025 COMPLETE CBC W/AUTO DIFF WBC: CPT | Performed by: PSYCHIATRY & NEUROLOGY

## 2022-09-28 RX ORDER — TERIFLUNOMIDE 14 MG/1
14 TABLET, FILM COATED ORAL DAILY
Qty: 30 TABLET | Refills: 1 | Status: CANCELLED | OUTPATIENT
Start: 2022-09-28

## 2022-10-03 ENCOUNTER — SPECIALTY PHARMACY (OUTPATIENT)
Dept: PHARMACY | Facility: CLINIC | Age: 62
End: 2022-10-03

## 2022-10-03 DIAGNOSIS — G35 MULTIPLE SCLEROSIS: ICD-10-CM

## 2022-10-03 RX ORDER — TERIFLUNOMIDE 14 MG/1
14 TABLET, FILM COATED ORAL DAILY
Qty: 30 TABLET | Refills: 2 | OUTPATIENT
Start: 2022-10-03 | End: 2022-11-11 | Stop reason: SDUPTHER

## 2022-10-03 NOTE — TELEPHONE ENCOUNTER
Pt was previously wait-listed with [facility] ([id number]). [place] currently closed. Outgoing call to pt to initiate PAP - LVM

## 2022-10-04 NOTE — TELEPHONE ENCOUNTER
Pt was previously wait-listed with [facility] ([id number]). [place] currently closed. Outgoing call to pt to initiate PAP - LVM

## 2022-10-05 NOTE — TELEPHONE ENCOUNTER
Incoming call from pt returning [facility] call. Informed pt that [facility] grants are currently closed but [facility] has placed pt on the waitlist. Informed pt of PAP program with [facility], pt would like to apply, requested start form be sent via email.     Once pt return start form, same start form would need to be fax to [facility] to sign on same form prior to submitting to [facility]      Blank start form emailed to: [email or web address]

## 2022-10-13 NOTE — TELEPHONE ENCOUNTER
Provider portion received    Faxed to [facility] for review     Patient aware [facility] will call to screen for program in 1 to 2 business days    Will continue to follow

## 2022-10-17 NOTE — TELEPHONE ENCOUNTER
Per Zacarias at [facility], start form was received, however patient still needs to be screened. Advised patient no longer has mekhi and needs PAP. He will have nurse reach out to the patient to discuss PAP

## 2022-10-19 NOTE — TELEPHONE ENCOUNTER
[facility] Consult Note    # Demographics  Consult Type: Acute Stroke Level 1 (0-4.5 hrs)    Patient Location: Emergency Room    First Name: Bib    Last Name: Nii    Gender: Male    Facility: [facility]    Time of Initial Page (Central Time): 10/19/2022, 02:43    Time of Return Call (Central Time): 10/19/2022, 02:43      # HPI  History: 47M with prior stroke and MI, presenting after last well 23:00, then chest pain, EMS noted right weakness and speech trouble. Nitro given en route reportedly. Denies headache. Right arm shaking also on arrival.    Last Known Normal:  S    Possible Thrombolytic candidate:  not on warfarin or NOACs  no intracranial hemorrhage history  no recent major surgery  no known active major internal bleeding  no known blood disorders      # Scores  Time of exam and NIHSS (Central Time): 10/19/2022, 02:45    Level of Consciousness 1a: [0] = Alert; keenly responsive    LOC Questions 1b: [0] = Answers both questions correctly    LOC Commands 1c: [0] = Performs both tasks correctly    Best Gaze 2: [0] = Normal    Visual 3: [1] = Partial hemianopia    Facial Palsy 4: [2] = Partial paralysis    Motor Arm Left 5a: [0] = No drift    Motor Arm Right 5b: [3] = No effort against gravity    Motor Leg Left 6a: [0] = No drift    Motor Leg Right 6b: [1] = Drift    Limb Ataxia 7: [0] = Absent    Sensory 8: [2] = Severe to total sensory loss    Best Language 9: [0] = No aphasia    Dysarthria 10: [1] = Mild-to-moderate dysarthria    Extinction and Inattention 11: [0] = No abnormality    NIHSS Total: 10      # PMH-FH-SH  Past Medical History:  stroke      # Data  Time Head CT personally read by me (Central Time): 10/19/2022, 02:59    Head CT:  no bleed  preliminarily reviewed by me, please refer to radiology read for official reading      # Assessment  Impression:  Ischemic Stroke (Acute)  r/o aortic dissection given chest pain and short of breath also. If no dissection, this patient  I have spoken to Calixto and he stated that this matter has been resolved as of 5/25/2017   has no identifiable contraindication to IV tPA that I can identify.      # Plan  Thrombolytic/Intervention: IV thrombolytic and possible IA candidate    Thrombolytic Dosing: IV alteplase 0.9 mg/kg, max dose 90 mg; 10% of dose given over 1 minute IVP, remaining 90% given as infusion over 1 hour    Time IV Thrombolytic Recommended (Central Time): 10/19/2022, 02:59    Imaging: (urgency: STAT):  CT Angiogram Head and CT Angiogram Neck AND call back with results if abnormal  CT Perfusion AND callback with results if abnormal  include aortic arch    Thrombolytic Administration Recommendations:  I reviewed the risks/benefits/alternatives of IV thrombolytic therapy with the patient. They understand there is potential of life threatening hemorrhage from IV thrombolysis. I stated that I believe benefits outweighs risk. They wish to proceed with IV thrombolytic therapy.  I have collected independent history specific to time last normal or last known well. We have collaborated with the ED provider and at this time, we have the most current timeline with the information that is available.  BP goal< 180/105 for 24hrs post Thrombolytic administration  Use Labetolol 10-20mg IV prn or Nicardipine gtt to maintain BP parameters  No antiplatelets or anticoagulants for next 24 hrs unless indicated for emergent IA procedure or other life threatening situation  ICU admission  Call back if there is any decline in neurological condition    Other:  If patient has any neurological deterioration please call me back immediately  telemetry monitoring  I have discussed my recommendations with the referring provider    Disposition: admit      # Demographics  First Name: Bib    Last Name: Nii    Facility: [facility]

## 2022-10-19 NOTE — TELEPHONE ENCOUNTER
Per sherwin at [facility], the patient was screened for PAP and PAP application was emailed to the patient to fill out    Will continue to f/u

## 2022-10-24 NOTE — TELEPHONE ENCOUNTER
Per TANJA at [facility], PAP application was emailed to the patient, however has not been returned.     Spoke to the patient and she states he is working on the application. She will complete application and return the program    Will continue to f/u

## 2022-11-03 NOTE — TELEPHONE ENCOUNTER
Per Pb at [facility], patient has not returned application for PAP for [place]    Spoke to the patient and she states she is faxing the PAP application to [facility] on 11/3    Will continue to f/u

## 2022-11-07 ENCOUNTER — PATIENT MESSAGE (OUTPATIENT)
Dept: NEUROLOGY | Facility: CLINIC | Age: 62
End: 2022-11-07

## 2022-11-07 DIAGNOSIS — G35 MULTIPLE SCLEROSIS: ICD-10-CM

## 2022-11-09 ENCOUNTER — PES CALL (OUTPATIENT)
Dept: ADMINISTRATIVE | Facility: CLINIC | Age: 62
End: 2022-11-09

## 2022-11-10 ENCOUNTER — TELEPHONE (OUTPATIENT)
Dept: PSYCHIATRY | Facility: CLINIC | Age: 62
End: 2022-11-10

## 2022-11-11 ENCOUNTER — PATIENT MESSAGE (OUTPATIENT)
Dept: PSYCHIATRY | Facility: CLINIC | Age: 62
End: 2022-11-11

## 2022-11-11 RX ORDER — TERIFLUNOMIDE 14 MG/1
14 TABLET, FILM COATED ORAL DAILY
Qty: 90 TABLET | Refills: 0 | OUTPATIENT
Start: 2022-11-11 | End: 2023-01-11

## 2022-11-14 NOTE — TELEPHONE ENCOUNTER
Per [facility], section 7 needs to be completed.     Completed section 7- Aubagio 14 mg once daily #90 tabs with zero refills    Re-faxed to [facility]

## 2022-11-15 NOTE — TELEPHONE ENCOUNTER
----- Message from Israel Youssef sent at 4/24/2019 12:45 PM CDT -----  Needs Advice    Reason for call: Pt states she may be 15-20 mins late for appt starting at 1:30 PM today. Pt is asking if she can move appt down by 30 mins to 2 PM.        Communication Preference: [phone]    Additional Information:   Gabapentin Counseling: I discussed with the patient the risks of gabapentin including but not limited to dizziness, somnolence, fatigue and ataxia.

## 2022-11-16 NOTE — TELEPHONE ENCOUNTER
Per Heidy at [facility], updated start form was received    She is forwarding to form to  for review.

## 2022-11-18 NOTE — TELEPHONE ENCOUNTER
Per Yola at [facility], patient's application is missing proof of income    Spoke to patient and advised her to call [facility] to have income information updated     Will continue to f/u

## 2022-11-23 NOTE — TELEPHONE ENCOUNTER
Per Sammi, still waiting proof of income. Nurse at [facility] is going to reach out to the patient today regarding need of proof of income     Will continue to f/u

## 2022-11-30 NOTE — TELEPHONE ENCOUNTER
Per Alisson at [facility],they are still missing the patients proof of income. [facility] has reached out to the patient twice with no answer.     Called and spoke with the patient and she states [facility] has tried to contact her but has been fighting the flu. She states is not going to call them back as they will call her back.     Will f/u in one week on status of PAP

## 2022-12-01 ENCOUNTER — PATIENT MESSAGE (OUTPATIENT)
Dept: PSYCHIATRY | Facility: CLINIC | Age: 62
End: 2022-12-01

## 2022-12-06 NOTE — TELEPHONE ENCOUNTER
Per [facility] approval until 12/31/2022  Medication was sent for delivery for the patient by [employer] - 90 day supply on 12/6    PAF mekhi obtained for 2023  BIN:[id number]  PCN:[id number]  Group:[id number]  Card ID: [id number]    [facility] to call for refill in three months

## 2022-12-07 ENCOUNTER — PATIENT MESSAGE (OUTPATIENT)
Dept: RHEUMATOLOGY | Facility: CLINIC | Age: 62
End: 2022-12-07

## 2022-12-07 ENCOUNTER — TELEPHONE (OUTPATIENT)
Dept: NEUROLOGY | Facility: CLINIC | Age: 62
End: 2022-12-07

## 2022-12-07 NOTE — TELEPHONE ENCOUNTER
----- Message from Kim Soto sent at 12/7/2022  9:56 AM CST -----  Regarding: Pt Adv  Contact: [phone]  Pt calling stating she need a referral for a Neurologist in [place] due to her insurance change and transportation. Please call and adv @ [phone]

## 2022-12-28 ENCOUNTER — PATIENT MESSAGE (OUTPATIENT)
Dept: RHEUMATOLOGY | Facility: CLINIC | Age: 62
End: 2022-12-28

## 2022-12-28 ENCOUNTER — PATIENT MESSAGE (OUTPATIENT)
Dept: OPHTHALMOLOGY | Facility: CLINIC | Age: 62
End: 2022-12-28

## 2022-12-28 ENCOUNTER — TELEPHONE (OUTPATIENT)
Dept: RHEUMATOLOGY | Facility: CLINIC | Age: 62
End: 2022-12-28

## 2022-12-28 NOTE — TELEPHONE ENCOUNTER
Called patient to rescheduled 1.10.23 appt with Dr. DARBY due to Dr. DARBY having a meeting at that time, no answer, left message.

## 2023-01-04 ENCOUNTER — TELEPHONE (OUTPATIENT)
Dept: PSYCHIATRY | Facility: CLINIC | Age: 63
End: 2023-01-04
Payer: MEDICARE

## 2023-01-04 NOTE — TELEPHONE ENCOUNTER
Spoke with pt. Cem is looking for referral in [place] for a new neurologist. Insurance is not really the issue. Instead, she says its difficult to find transportation and she would prefer NOT to do virtual visits. Will f/u with provider. Cem did say she feels pretty stable.

## 2023-01-09 ENCOUNTER — PATIENT MESSAGE (OUTPATIENT)
Dept: PSYCHIATRY | Facility: CLINIC | Age: 63
End: 2023-01-09
Payer: MEDICARE

## 2023-01-10 NOTE — PROGRESS NOTES
RHEUMATOLOGY OUTPATIENT CLINIC NOTE      Subjective:      Chief Complaint: osteoporosis follow up    Cem Fernandes a 61 y.o. pleasant female comes in for follow-up visit.  She is on Prolia for osteoporosis.  She denies any fractures since last visit.  Multiple falls due to her foot drop from MS. No joint pain.  Rheumatological review of system negative otherwise.  Physical examination shows no synovitis or effusion.    Current Tx: Prolia - due today  Previous Tx:  Fosamax- pt has difficulty sitting upright for 30 min after taking medication; had 1 dose reclast- severe myalgia/arthralgias  Supplements/Diet: take ca and vit D supplements; limited dairy in diet  Gait:  unsteady- uses cane. Has MS  Dental:  no upcoming dental surgeries  History Fragility Fracture: left wrist fracture in 2013      Current Outpatient Medications   Medication Instructions    baclofen (LIORESAL) 10 MG tablet TAKE 2 TABLETS ONE TIME DAILY IN THE MORNING, 2 TABLETS IN THE AFTERNOON, AND UP TO 3 TABLETS AT BEDTIME AS NEEDED    calcium citrate (CALCITRATE) 200 mg (950 mg) tablet 2 tablets, Oral, Daily    cholecalciferol (vitamin D3) 4,000 Units, Oral, Daily    denosumab (PROLIA SUBQ) Subcutaneous    dicyclomine (BENTYL) 20 mg tablet No dose, route, or frequency recorded.    fish oil-omega-3 fatty acids 300-1,000 mg capsule 2 g, Oral, Daily    gabapentin (NEURONTIN) 800 MG tablet TAKE 1 TABLET THREE TIMES DAILY    glucosamine-chondroitin 500-400 mg tablet 1 tablet, Oral, Daily    levothyroxine (SYNTHROID) 75 MCG tablet TAKE 1 TABLET BEFORE BREAKFAST    MAGNESIUM GLYCINATE ORAL No dose, route, or frequency recorded.    multivitamin capsule 1 capsule, Oral, Daily    paroxetine (PAXIL) 40 mg, Oral, Every morning    simvastatin (ZOCOR) 40 mg, Oral, Daily    teriflunomide (AUBAGIO) 14 mg Tab Take 1 tablet (14 mg) by mouth once daily.       Past Medical History:   Diagnosis Date    Back pain     Broken toe 6/2015    left big toe    Chronic  "diastolic heart failure 5/8/2018    Closed left arm fracture     Colon polyp     Depression     Hyperlipidemia     Hypothyroid     Immunosuppression 1/28/2019    MS (multiple sclerosis)     Neurogenic bladder 12/6/2012    Osteoporosis     Polyneuropathy     Sleep apnea     Trouble in sleeping        Family History   Problem Relation Age of Onset    Pancreatic cancer Mother     Stomach cancer Mother     Cancer Mother         pancreatic, stomach    Hypertension Father     Heart disease Father         MI    Lupus Maternal Aunt     Rheum arthritis Maternal Aunt     Rheum arthritis Sister     Melanoma Neg Hx        Social History     Tobacco Use    Smoking status: Never    Smokeless tobacco: Never   Substance Use Topics    Alcohol use: Yes     Alcohol/week: 1.0 standard drink     Types: 1 Glasses of wine per week       Review of patient's allergies indicates:   Allergen Reactions    Latex, natural rubber Rash       Objective:     /81   Pulse 71   Ht 5' 3" (1.6 m)   Wt 80.2 kg (176 lb 12.9 oz)   BMI 31.32 kg/m²     Lab Results   Component Value Date    [id number] 73 12/16/2020    [id number] 37 02/16/2011      Lab Results   Component Value Date    CREATININE 1.0 01/11/2023     Estimated Creatinine Clearance: 59.2 mL/min (based on SCr of 1 mg/dL).      DEXA   Results for orders placed in visit on 06/17/22    DXA Bone Density Spine And Hip    Narrative  EXAMINATION:  DEXA BONE DENSITY SPINE HIP    CLINICAL HISTORY:  OP; Age-related osteoporosis without current pathological fracture    TECHNIQUE:  DXA scanning was performed over the left hip and lumbar spine.  Review of the images confirms satisfactory positioning and technique.    COMPARISON:  06/11/2020    FINDINGS:  The L1 to L4 vertebral bone mineral density is equal to 1.13 g/cm squared with a T score of -0.5.  There has been a 3.3% statistically significant change relative to the prior study.    The left femoral neck bone mineral density is equal to 0.801 " g/cm squared with a T score of -1.7.  There has been  no significant change relative to the prior study.    There is a 6.6% risk of a major osteoporotic fracture and a 0.7% risk of hip fracture in the next 10 years (FRAX).    Impression  Osteopenia    Electronically signed by: Jed Vazquez MD  Date:    06/17/2022  Time:    10:39       Assessment:     1. Age-related osteoporosis without current pathological fracture    2. Medication monitoring encounter    3. History of healed fragility fracture            Plan:       Osteoporosis  No contra indication to Prolia today  Patient instructed to notify the office if he/she has any new falls or new fractures  Discussed risks associated w Prolia including but not limited to ONJ, AFF, hypocalcemia, severe musculoskeletal pain, anaphylaxis, infection risk  DEXA due June 2024  Return to clinic: 6mos - labs/DEXA(if due) 1 week prior (BMP, vit D)      The patient understands, chooses and consents to this plan and accepts all   the risks which include but are not limited to the risks mentioned above.     30 minutes of total time spent on the encounter, which includes face to face time and non-face to face time preparing to see the patient (eg, review of tests), Obtaining and/or reviewing separately obtained history, Documenting clinical information in the electronic or other health record, Independently interpreting results (not separately reported) and communicating results to the patient/family/caregiver, or Care coordination (not separately reported).    Disclaimer: This note was prepared using a voice recognition system and is likely to have sound alike errors within the text.        Emily Verma PA-C  [facility] RHEUMATOLOGY 4TH FL [facility], [place]

## 2023-01-11 ENCOUNTER — OFFICE VISIT (OUTPATIENT)
Dept: RHEUMATOLOGY | Facility: CLINIC | Age: 63
End: 2023-01-11
Payer: MEDICARE

## 2023-01-11 ENCOUNTER — TELEPHONE (OUTPATIENT)
Dept: RHEUMATOLOGY | Facility: CLINIC | Age: 63
End: 2023-01-11

## 2023-01-11 ENCOUNTER — LAB VISIT (OUTPATIENT)
Dept: LAB | Facility: HOSPITAL | Age: 63
End: 2023-01-11
Attending: PSYCHIATRY & NEUROLOGY
Payer: MEDICARE

## 2023-01-11 VITALS
SYSTOLIC BLOOD PRESSURE: 114 MMHG | DIASTOLIC BLOOD PRESSURE: 81 MMHG | WEIGHT: 176.81 LBS | BODY MASS INDEX: 31.33 KG/M2 | HEIGHT: 63 IN | HEART RATE: 71 BPM

## 2023-01-11 DIAGNOSIS — Z51.81 MEDICATION MONITORING ENCOUNTER: ICD-10-CM

## 2023-01-11 DIAGNOSIS — Z87.310 HISTORY OF HEALED FRAGILITY FRACTURE: ICD-10-CM

## 2023-01-11 DIAGNOSIS — M81.0 AGE-RELATED OSTEOPOROSIS WITHOUT CURRENT PATHOLOGICAL FRACTURE: ICD-10-CM

## 2023-01-11 DIAGNOSIS — M81.0 AGE-RELATED OSTEOPOROSIS WITHOUT CURRENT PATHOLOGICAL FRACTURE: Primary | ICD-10-CM

## 2023-01-11 LAB
ALBUMIN SERPL BCP-MCNC: 3.8 G/DL (ref 3.5–5.2)
ALP SERPL-CCNC: 66 U/L (ref 55–135)
ALT SERPL W/O P-5'-P-CCNC: 14 U/L (ref 10–44)
ANION GAP SERPL CALC-SCNC: 10 MMOL/L (ref 8–16)
AST SERPL-CCNC: 24 U/L (ref 10–40)
BILIRUB SERPL-MCNC: 0.2 MG/DL (ref 0.1–1)
BUN SERPL-MCNC: 12 MG/DL (ref 8–23)
CALCIUM SERPL-MCNC: 9.6 MG/DL (ref 8.7–10.5)
CHLORIDE SERPL-SCNC: 104 MMOL/L (ref 95–110)
CO2 SERPL-SCNC: 24 MMOL/L (ref 23–29)
CREAT SERPL-MCNC: 1 MG/DL (ref 0.5–1.4)
EST. GFR  (NO RACE VARIABLE): >60 ML/MIN/1.73 M^2
GLUCOSE SERPL-MCNC: 102 MG/DL (ref 70–110)
POTASSIUM SERPL-SCNC: 4.3 MMOL/L (ref 3.5–5.1)
PROT SERPL-MCNC: 7.1 G/DL (ref 6–8.4)
SODIUM SERPL-SCNC: 138 MMOL/L (ref 136–145)

## 2023-01-11 PROCEDURE — 36415 COLL VENOUS BLD VENIPUNCTURE: CPT | Performed by: INTERNAL MEDICINE

## 2023-01-11 PROCEDURE — 3079F PR MOST RECENT DIASTOLIC BLOOD PRESSURE 80-89 MM HG: ICD-10-PCS | Mod: CPTII,S$GLB,, | Performed by: PHYSICIAN ASSISTANT

## 2023-01-11 PROCEDURE — 3074F SYST BP LT 130 MM HG: CPT | Mod: CPTII,S$GLB,, | Performed by: PHYSICIAN ASSISTANT

## 2023-01-11 PROCEDURE — 99214 OFFICE O/P EST MOD 30 MIN: CPT | Mod: S$GLB,,, | Performed by: PHYSICIAN ASSISTANT

## 2023-01-11 PROCEDURE — 1159F PR MEDICATION LIST DOCUMENTED IN MEDICAL RECORD: ICD-10-PCS | Mod: CPTII,S$GLB,, | Performed by: PHYSICIAN ASSISTANT

## 2023-01-11 PROCEDURE — 3008F PR BODY MASS INDEX (BMI) DOCUMENTED: ICD-10-PCS | Mod: CPTII,S$GLB,, | Performed by: PHYSICIAN ASSISTANT

## 2023-01-11 PROCEDURE — 3008F BODY MASS INDEX DOCD: CPT | Mod: CPTII,S$GLB,, | Performed by: PHYSICIAN ASSISTANT

## 2023-01-11 PROCEDURE — 99999 PR PBB SHADOW E&M-EST. PATIENT-LVL III: CPT | Mod: PBBFAC,,, | Performed by: PHYSICIAN ASSISTANT

## 2023-01-11 PROCEDURE — 1159F MED LIST DOCD IN RCRD: CPT | Mod: CPTII,S$GLB,, | Performed by: PHYSICIAN ASSISTANT

## 2023-01-11 PROCEDURE — 99999 PR PBB SHADOW E&M-EST. PATIENT-LVL III: ICD-10-PCS | Mod: PBBFAC,,, | Performed by: PHYSICIAN ASSISTANT

## 2023-01-11 PROCEDURE — 3079F DIAST BP 80-89 MM HG: CPT | Mod: CPTII,S$GLB,, | Performed by: PHYSICIAN ASSISTANT

## 2023-01-11 PROCEDURE — 3074F PR MOST RECENT SYSTOLIC BLOOD PRESSURE < 130 MM HG: ICD-10-PCS | Mod: CPTII,S$GLB,, | Performed by: PHYSICIAN ASSISTANT

## 2023-01-11 PROCEDURE — 80053 COMPREHEN METABOLIC PANEL: CPT | Performed by: INTERNAL MEDICINE

## 2023-01-11 PROCEDURE — 99214 PR OFFICE/OUTPT VISIT, EST, LEVL IV, 30-39 MIN: ICD-10-PCS | Mod: S$GLB,,, | Performed by: PHYSICIAN ASSISTANT

## 2023-01-17 ENCOUNTER — TELEPHONE (OUTPATIENT)
Dept: NEUROLOGY | Facility: CLINIC | Age: 63
End: 2023-01-17
Payer: MEDICARE

## 2023-01-17 NOTE — TELEPHONE ENCOUNTER
----- Message from Carrie Peace sent at 1/17/2023  2:26 PM CST -----  Contact: self/[phone]  Type:  Sooner Apoointment Request    Caller is requesting a sooner appointment.  Caller declined first available appointment listed below.  Caller will not accept being placed on the waitlist and is requesting a message be sent to doctor.  Name of Caller:Cem Meyers    When is the first available appointment?2024  Symptoms:Multiple Sclerosis   Would the patient rather a call back or a response via [facility]? Call back  Best Call Back Number:[phone] (home)   Additional Information:

## 2023-01-17 NOTE — TELEPHONE ENCOUNTER
Spoke with pt in regards to scheduling the next available. When attempted to look for next appt nothing was becoming available. I advise pt of this and she stated she was going to attempt else where. I apologize to patient.

## 2023-01-18 ENCOUNTER — INFUSION (OUTPATIENT)
Dept: INFUSION THERAPY | Facility: HOSPITAL | Age: 63
End: 2023-01-18
Attending: PHYSICIAN ASSISTANT
Payer: MEDICARE

## 2023-01-18 ENCOUNTER — TELEPHONE (OUTPATIENT)
Dept: NEUROLOGY | Facility: CLINIC | Age: 63
End: 2023-01-18
Payer: MEDICARE

## 2023-01-18 VITALS
DIASTOLIC BLOOD PRESSURE: 78 MMHG | HEART RATE: 68 BPM | SYSTOLIC BLOOD PRESSURE: 114 MMHG | OXYGEN SATURATION: 98 % | TEMPERATURE: 98 F | RESPIRATION RATE: 18 BRPM

## 2023-01-18 DIAGNOSIS — M81.0 AGE-RELATED OSTEOPOROSIS WITHOUT CURRENT PATHOLOGICAL FRACTURE: Primary | ICD-10-CM

## 2023-01-18 PROCEDURE — 63600175 PHARM REV CODE 636 W HCPCS: Mod: JG | Performed by: PHYSICIAN ASSISTANT

## 2023-01-18 PROCEDURE — 96372 THER/PROPH/DIAG INJ SC/IM: CPT

## 2023-01-18 RX ADMIN — DENOSUMAB 60 MG: 60 INJECTION SUBCUTANEOUS at 11:01

## 2023-01-18 NOTE — TELEPHONE ENCOUNTER
Spoke with pt in regards to scheduling appt. I advise pt that Dr. Rojas does see patient with MS but he is a general neurologist. I advise pt that currently Dr. Rojas next available is not showing up. Meaning we don't currently have any slots available. Pt did verbalized understanding.

## 2023-01-18 NOTE — NURSING
Printed information regarding Prolia given to pt. Instructed on s/s to report. Verbalized understanding.  Administered Prolia 60 mg/ml SQ in right upper outer arm.  Instructed to wait in clinic x 15 min. For monitoring.

## 2023-01-18 NOTE — TELEPHONE ENCOUNTER
----- Message from Russell Augustine sent at 1/18/2023  8:22 AM CST -----  Contact: Patient  Cem COHEN Mira would like a call back at  [phone]. pt would like to know if the provider specialize in MS .

## 2023-01-26 DIAGNOSIS — I50.32 CHRONIC DIASTOLIC HEART FAILURE: Primary | ICD-10-CM

## 2023-01-27 ENCOUNTER — PATIENT MESSAGE (OUTPATIENT)
Dept: NEUROLOGY | Facility: CLINIC | Age: 63
End: 2023-01-27
Payer: MEDICARE

## 2023-02-13 ENCOUNTER — TELEPHONE (OUTPATIENT)
Dept: ADMINISTRATIVE | Facility: HOSPITAL | Age: 63
End: 2023-02-13
Payer: MEDICARE

## 2023-02-13 ENCOUNTER — OFFICE VISIT (OUTPATIENT)
Dept: OPHTHALMOLOGY | Facility: CLINIC | Age: 63
End: 2023-02-13
Payer: MEDICARE

## 2023-02-13 DIAGNOSIS — H26.9 CORTICAL CATARACT OF BOTH EYES: ICD-10-CM

## 2023-02-13 DIAGNOSIS — H40.013 OPEN ANGLE WITH BORDERLINE FINDINGS, LOW RISK, BILATERAL: Primary | ICD-10-CM

## 2023-02-13 PROCEDURE — 92133 CPTRZD OPH DX IMG PST SGM ON: CPT | Mod: S$GLB,,, | Performed by: OPHTHALMOLOGY

## 2023-02-13 PROCEDURE — 1159F MED LIST DOCD IN RCRD: CPT | Mod: CPTII,S$GLB,, | Performed by: OPHTHALMOLOGY

## 2023-02-13 PROCEDURE — 92133 POSTERIOR SEGMENT OCT OPTIC NERVE(OCULAR COHERENCE TOMOGRAPHY) - OU - BOTH EYES: ICD-10-PCS | Mod: S$GLB,,, | Performed by: OPHTHALMOLOGY

## 2023-02-13 PROCEDURE — 99214 PR OFFICE/OUTPT VISIT, EST, LEVL IV, 30-39 MIN: ICD-10-PCS | Mod: S$GLB,,, | Performed by: OPHTHALMOLOGY

## 2023-02-13 PROCEDURE — 99214 OFFICE O/P EST MOD 30 MIN: CPT | Mod: S$GLB,,, | Performed by: OPHTHALMOLOGY

## 2023-02-13 PROCEDURE — 1160F PR REVIEW ALL MEDS BY PRESCRIBER/CLIN PHARMACIST DOCUMENTED: ICD-10-PCS | Mod: CPTII,S$GLB,, | Performed by: OPHTHALMOLOGY

## 2023-02-13 PROCEDURE — 1160F RVW MEDS BY RX/DR IN RCRD: CPT | Mod: CPTII,S$GLB,, | Performed by: OPHTHALMOLOGY

## 2023-02-13 PROCEDURE — 99999 PR PBB SHADOW E&M-EST. PATIENT-LVL III: ICD-10-PCS | Mod: PBBFAC,,, | Performed by: OPHTHALMOLOGY

## 2023-02-13 PROCEDURE — 99999 PR PBB SHADOW E&M-EST. PATIENT-LVL III: CPT | Mod: PBBFAC,,, | Performed by: OPHTHALMOLOGY

## 2023-02-13 PROCEDURE — 1159F PR MEDICATION LIST DOCUMENTED IN MEDICAL RECORD: ICD-10-PCS | Mod: CPTII,S$GLB,, | Performed by: OPHTHALMOLOGY

## 2023-02-13 NOTE — PROGRESS NOTES
"SUBJECTIVE  Cem Meyers is 62 y.o. female  Visual acuity was not recorded.   Chief Complaint   Patient presents with    Glaucoma Suspect     Pt reports for 6m SUSP COAG w/GOCT. Denies any pain or irritation. Va stable.           HPI     Glaucoma Suspect     Additional comments: Pt reports for 6m SUSP COAG w/GOCT. Denies any pain   or irritation. Va stable.            Comments    1. MS x 1989 (sees Nat [facility]GE)  HX "Optic Neuritis" episodes - dark hashmarks in vision lasting several   hours when fatigued   2. COAG suspect OU(+FM HX COAG PGM)  GOCT changes OU - old optic neuritis more likely than LTG   3. Mild Cortical cataract            Last edited by Raul Wetzel on 2/13/2023  8:46 AM.         Assessment /Plan :  1. Open angle with borderline findings, low risk, bilateral No evidence of glaucoma at this time but based on risk factors recommend to continue monitoring.    Return to clinic in 6 months  or as needed.  With Dilation and HVF 24-2     2. Cortical cataract of both eyes - monitor for now               "

## 2023-02-20 ENCOUNTER — PATIENT MESSAGE (OUTPATIENT)
Dept: PSYCHIATRY | Facility: CLINIC | Age: 63
End: 2023-02-20
Payer: MEDICARE

## 2023-02-24 ENCOUNTER — OFFICE VISIT (OUTPATIENT)
Dept: INTERNAL MEDICINE | Facility: CLINIC | Age: 63
End: 2023-02-24
Payer: MEDICARE

## 2023-02-24 ENCOUNTER — OFFICE VISIT (OUTPATIENT)
Dept: PULMONOLOGY | Facility: CLINIC | Age: 63
End: 2023-02-24
Payer: MEDICARE

## 2023-02-24 ENCOUNTER — HOSPITAL ENCOUNTER (OUTPATIENT)
Dept: RADIOLOGY | Facility: HOSPITAL | Age: 63
Discharge: HOME | End: 2023-02-24
Attending: PHYSICIAN ASSISTANT
Payer: MEDICARE

## 2023-02-24 VITALS
WEIGHT: 170.88 LBS | HEIGHT: 63 IN | SYSTOLIC BLOOD PRESSURE: 126 MMHG | RESPIRATION RATE: 16 BRPM | HEART RATE: 98 BPM | TEMPERATURE: 97 F | BODY MASS INDEX: 30.28 KG/M2 | OXYGEN SATURATION: 96 % | DIASTOLIC BLOOD PRESSURE: 78 MMHG

## 2023-02-24 VITALS
DIASTOLIC BLOOD PRESSURE: 82 MMHG | HEIGHT: 63 IN | HEART RATE: 82 BPM | BODY MASS INDEX: 30.39 KG/M2 | RESPIRATION RATE: 18 BRPM | SYSTOLIC BLOOD PRESSURE: 118 MMHG | OXYGEN SATURATION: 98 % | WEIGHT: 171.5 LBS

## 2023-02-24 DIAGNOSIS — R26.9 NEUROLOGIC GAIT DYSFUNCTION: ICD-10-CM

## 2023-02-24 DIAGNOSIS — R10.31 RIGHT LOWER QUADRANT ABDOMINAL PAIN: ICD-10-CM

## 2023-02-24 DIAGNOSIS — E66.9 CLASS 1 OBESITY WITH SERIOUS COMORBIDITY AND BODY MASS INDEX (BMI) OF 30.0 TO 30.9 IN ADULT, UNSPECIFIED OBESITY TYPE: ICD-10-CM

## 2023-02-24 DIAGNOSIS — G35 MS (MULTIPLE SCLEROSIS): Chronic | ICD-10-CM

## 2023-02-24 DIAGNOSIS — K59.09 CHRONIC CONSTIPATION: Primary | ICD-10-CM

## 2023-02-24 DIAGNOSIS — K59.09 CHRONIC CONSTIPATION: ICD-10-CM

## 2023-02-24 DIAGNOSIS — R25.2 SPASTICITY: ICD-10-CM

## 2023-02-24 DIAGNOSIS — I50.32 CHRONIC DIASTOLIC HEART FAILURE: ICD-10-CM

## 2023-02-24 DIAGNOSIS — G47.33 OSA ON CPAP: Primary | Chronic | ICD-10-CM

## 2023-02-24 DIAGNOSIS — F51.04 PSYCHOPHYSIOLOGICAL INSOMNIA: ICD-10-CM

## 2023-02-24 PROCEDURE — 3074F SYST BP LT 130 MM HG: CPT | Mod: CPTII,S$GLB,, | Performed by: NURSE PRACTITIONER

## 2023-02-24 PROCEDURE — 99214 PR OFFICE/OUTPT VISIT, EST, LEVL IV, 30-39 MIN: ICD-10-PCS | Mod: S$GLB,,, | Performed by: NURSE PRACTITIONER

## 2023-02-24 PROCEDURE — 1159F MED LIST DOCD IN RCRD: CPT | Mod: CPTII,S$GLB,, | Performed by: PHYSICIAN ASSISTANT

## 2023-02-24 PROCEDURE — 1160F PR REVIEW ALL MEDS BY PRESCRIBER/CLIN PHARMACIST DOCUMENTED: ICD-10-PCS | Mod: CPTII,S$GLB,, | Performed by: NURSE PRACTITIONER

## 2023-02-24 PROCEDURE — 99214 PR OFFICE/OUTPT VISIT, EST, LEVL IV, 30-39 MIN: ICD-10-PCS | Mod: S$GLB,,, | Performed by: PHYSICIAN ASSISTANT

## 2023-02-24 PROCEDURE — 3074F PR MOST RECENT SYSTOLIC BLOOD PRESSURE < 130 MM HG: ICD-10-PCS | Mod: CPTII,S$GLB,, | Performed by: PHYSICIAN ASSISTANT

## 2023-02-24 PROCEDURE — 99999 PR PBB SHADOW E&M-EST. PATIENT-LVL V: CPT | Mod: PBBFAC,,, | Performed by: PHYSICIAN ASSISTANT

## 2023-02-24 PROCEDURE — 99999 PR PBB SHADOW E&M-EST. PATIENT-LVL IV: ICD-10-PCS | Mod: PBBFAC,,, | Performed by: NURSE PRACTITIONER

## 2023-02-24 PROCEDURE — 1160F RVW MEDS BY RX/DR IN RCRD: CPT | Mod: CPTII,S$GLB,, | Performed by: NURSE PRACTITIONER

## 2023-02-24 PROCEDURE — 99214 OFFICE O/P EST MOD 30 MIN: CPT | Mod: S$GLB,,, | Performed by: NURSE PRACTITIONER

## 2023-02-24 PROCEDURE — 3008F PR BODY MASS INDEX (BMI) DOCUMENTED: ICD-10-PCS | Mod: CPTII,S$GLB,, | Performed by: PHYSICIAN ASSISTANT

## 2023-02-24 PROCEDURE — 1159F MED LIST DOCD IN RCRD: CPT | Mod: CPTII,S$GLB,, | Performed by: NURSE PRACTITIONER

## 2023-02-24 PROCEDURE — 1159F PR MEDICATION LIST DOCUMENTED IN MEDICAL RECORD: ICD-10-PCS | Mod: CPTII,S$GLB,, | Performed by: NURSE PRACTITIONER

## 2023-02-24 PROCEDURE — 3079F PR MOST RECENT DIASTOLIC BLOOD PRESSURE 80-89 MM HG: ICD-10-PCS | Mod: CPTII,S$GLB,, | Performed by: NURSE PRACTITIONER

## 2023-02-24 PROCEDURE — 99999 PR PBB SHADOW E&M-EST. PATIENT-LVL V: ICD-10-PCS | Mod: PBBFAC,,, | Performed by: PHYSICIAN ASSISTANT

## 2023-02-24 PROCEDURE — 3008F BODY MASS INDEX DOCD: CPT | Mod: CPTII,S$GLB,, | Performed by: PHYSICIAN ASSISTANT

## 2023-02-24 PROCEDURE — 74019 XR ABDOMEN FLAT AND ERECT: ICD-10-PCS | Mod: 26,,, | Performed by: RADIOLOGY

## 2023-02-24 PROCEDURE — 74019 RADEX ABDOMEN 2 VIEWS: CPT | Mod: TC

## 2023-02-24 PROCEDURE — 74019 RADEX ABDOMEN 2 VIEWS: CPT | Mod: 26,,, | Performed by: RADIOLOGY

## 2023-02-24 PROCEDURE — 3074F SYST BP LT 130 MM HG: CPT | Mod: CPTII,S$GLB,, | Performed by: PHYSICIAN ASSISTANT

## 2023-02-24 PROCEDURE — 99999 PR PBB SHADOW E&M-EST. PATIENT-LVL IV: CPT | Mod: PBBFAC,,, | Performed by: NURSE PRACTITIONER

## 2023-02-24 PROCEDURE — 1159F PR MEDICATION LIST DOCUMENTED IN MEDICAL RECORD: ICD-10-PCS | Mod: CPTII,S$GLB,, | Performed by: PHYSICIAN ASSISTANT

## 2023-02-24 PROCEDURE — 3074F PR MOST RECENT SYSTOLIC BLOOD PRESSURE < 130 MM HG: ICD-10-PCS | Mod: CPTII,S$GLB,, | Performed by: NURSE PRACTITIONER

## 2023-02-24 PROCEDURE — 3008F BODY MASS INDEX DOCD: CPT | Mod: CPTII,S$GLB,, | Performed by: NURSE PRACTITIONER

## 2023-02-24 PROCEDURE — 3078F DIAST BP <80 MM HG: CPT | Mod: CPTII,S$GLB,, | Performed by: PHYSICIAN ASSISTANT

## 2023-02-24 PROCEDURE — 3079F DIAST BP 80-89 MM HG: CPT | Mod: CPTII,S$GLB,, | Performed by: NURSE PRACTITIONER

## 2023-02-24 PROCEDURE — 3008F PR BODY MASS INDEX (BMI) DOCUMENTED: ICD-10-PCS | Mod: CPTII,S$GLB,, | Performed by: NURSE PRACTITIONER

## 2023-02-24 PROCEDURE — 3078F PR MOST RECENT DIASTOLIC BLOOD PRESSURE < 80 MM HG: ICD-10-PCS | Mod: CPTII,S$GLB,, | Performed by: PHYSICIAN ASSISTANT

## 2023-02-24 PROCEDURE — 99214 OFFICE O/P EST MOD 30 MIN: CPT | Mod: S$GLB,,, | Performed by: PHYSICIAN ASSISTANT

## 2023-02-24 NOTE — ASSESSMENT & PLAN NOTE
Encouraged calorie reduction and 30 minutes of exercise daily. Discussed impact of obesity on general health.  Walks about 3/4 mile daily outside of [facility]  Wt Readings from Last 9 Encounters:   02/24/23 77.8 kg (171 lb 8.3 oz)   02/24/23 77.5 kg (170 lb 13.7 oz)   01/11/23 80.2 kg (176 lb 12.9 oz)   09/14/22 79.2 kg (174 lb 9.7 oz)   08/21/22 77.2 kg (170 lb 1.4 oz)   08/08/22 79.1 kg (174 lb 6.1 oz)   06/20/22 78.8 kg (173 lb 11.6 oz)   04/25/22 77.1 kg (170 lb)   04/11/22 78.4 kg (172 lb 13.5 oz)   Body mass index is 30.38 kg/m².

## 2023-02-24 NOTE — PROGRESS NOTES
Subjective:      Patient ID: Cem Meyers is a 62 y.o. female.    Chief Complaint: Abdominal Pain    Abdominal Pain  This is a chronic problem. The current episode started more than 1 month ago. The onset quality is sudden. The problem occurs rarely. The most recent episode lasted 0.13 hours. The problem has been gradually worsening. The pain is located in the RLQ. The pain is at a severity of 7/10. The pain is severe. The quality of the pain is cramping, sharp and tearing. Associated symptoms include constipation, flatus, frequency, melena and myalgias. Pertinent negatives include no anorexia, arthralgias, belching, diarrhea, dysuria, fever, headaches, hematochezia, hematuria, nausea, vomiting or weight loss. The pain is aggravated by certain positions. The pain is relieved by Nothing. Treatments tried: metamucil, miralax. There is no history of abdominal surgery, colon cancer, Crohn's disease, gallstones, GERD, irritable bowel syndrome, pancreatitis, PUD or ulcerative colitis. Patient's medical history includes UTI. Patient's medical history does not include kidney stones.     Patient Active Problem List   Diagnosis    Neurologic gait dysfunction    MS (multiple sclerosis)    Acquired hypothyroidism    Other hyperlipidemia    Acquired pes planus of both feet    Osteoporosis    MAYKEL on CPAP    Major depressive disorder, single episode, mild    Polyneuropathy    History of colon polyps    Muscle spasms of both lower extremities    Psychophysiological insomnia    Chronic diastolic heart failure    Immunosuppression    Bursal cyst of olecranon    Prophylactic immunotherapy    Stenosing tenosynovitis of finger of right hand    Spasticity    Neuropathic pain    Decreased strength, endurance, and mobility         Current Outpatient Medications:     baclofen (LIORESAL) 10 MG tablet, TAKE 2 TABLETS ONE TIME DAILY IN THE MORNING, 2 TABLETS IN THE AFTERNOON, AND UP TO 3 TABLETS AT BEDTIME AS NEEDED, Disp: 630 tablet, Rfl:  0    calcium citrate (CALCITRATE) 200 mg (950 mg) tablet, Take 2 tablets by mouth once daily., Disp: , Rfl:     cholecalciferol, vitamin D3, 100 mcg (4,000 unit) Cap, Take 4,000 Units by mouth once daily. , Disp: , Rfl:     denosumab (PROLIA SUBQ), Inject into the skin., Disp: , Rfl:     dicyclomine (BENTYL) 20 mg tablet, , Disp: , Rfl:     fish oil-omega-3 fatty acids 300-1,000 mg capsule, Take 2 g by mouth once daily., Disp: , Rfl:     gabapentin (NEURONTIN) 800 MG tablet, TAKE 1 TABLET THREE TIMES DAILY, Disp: 270 tablet, Rfl: 1    glucosamine-chondroitin 500-400 mg tablet, Take 1 tablet by mouth once daily. , Disp: , Rfl:     levothyroxine (SYNTHROID) 75 MCG tablet, TAKE 1 TABLET BEFORE BREAKFAST, Disp: 90 tablet, Rfl: 3    MAGNESIUM GLYCINATE ORAL, , Disp: , Rfl:     multivitamin capsule, Take 1 capsule by mouth once daily., Disp: , Rfl:     paroxetine (PAXIL) 40 MG tablet, Take 1 tablet (40 mg total) by mouth every morning., Disp: 90 tablet, Rfl: 3    simvastatin (ZOCOR) 40 MG tablet, Take 1 tablet (40 mg total) by mouth once daily., Disp: 90 tablet, Rfl: 3    linaCLOtide (LINZESS) 145 mcg Cap capsule, Take 1 capsule (145 mcg total) by mouth before breakfast., Disp: 30 capsule, Rfl: 1    Review of Systems   Constitutional:  Negative for activity change, appetite change, chills, diaphoresis, fatigue, fever, unexpected weight change and weight loss.   HENT: Negative.  Negative for congestion, hearing loss, postnasal drip, rhinorrhea, sore throat, trouble swallowing and voice change.    Eyes: Negative.  Negative for visual disturbance.   Respiratory: Negative.  Negative for cough, choking, chest tightness and shortness of breath.    Cardiovascular:  Negative for chest pain, palpitations and leg swelling.   Gastrointestinal:  Positive for abdominal distention, abdominal pain, constipation, flatus and melena. Negative for anal bleeding, anorexia, blood in stool, diarrhea, hematochezia, nausea, rectal pain and  "vomiting.   Endocrine: Negative for cold intolerance, heat intolerance, polydipsia and polyuria.   Genitourinary:  Positive for frequency. Negative for difficulty urinating, dysuria and hematuria.   Musculoskeletal:  Positive for myalgias. Negative for arthralgias, back pain, gait problem and joint swelling.   Skin:  Negative for color change, pallor, rash and wound.   Neurological:  Negative for dizziness, tremors, weakness, light-headedness, numbness and headaches.   Hematological:  Negative for adenopathy.   Psychiatric/Behavioral:  Negative for behavioral problems, confusion, self-injury, sleep disturbance and suicidal ideas. The patient is not nervous/anxious.    Objective:   /78 (BP Location: Left arm, Patient Position: Sitting)   Pulse 98   Temp 97.2 °F (36.2 °C) (Tympanic)   Resp 16   Ht 5' 3" (1.6 m)   Wt 77.5 kg (170 lb 13.7 oz)   SpO2 96%   BMI 30.27 kg/m²     Physical Exam  Vitals reviewed.   Constitutional:       General: She is not in acute distress.     Appearance: Normal appearance. She is well-developed. She is not ill-appearing, toxic-appearing or diaphoretic.   HENT:      Head: Normocephalic and atraumatic.      Right Ear: External ear normal.      Left Ear: External ear normal.      Nose: Nose normal.   Eyes:      Conjunctiva/sclera: Conjunctivae normal.      Pupils: Pupils are equal, round, and reactive to light.   Cardiovascular:      Rate and Rhythm: Normal rate and regular rhythm.      Heart sounds: Normal heart sounds. No murmur heard.    No friction rub. No gallop.   Pulmonary:      Effort: Pulmonary effort is normal. No respiratory distress.      Breath sounds: Normal breath sounds. No wheezing or rales.   Chest:      Chest wall: No tenderness.   Abdominal:      General: Abdomen is flat. Bowel sounds are increased. There is no distension.      Palpations: Abdomen is soft.      Tenderness: There is abdominal tenderness in the periumbilical area. There is no guarding or rebound. "   Musculoskeletal:         General: Normal range of motion.      Cervical back: Normal range of motion and neck supple.   Lymphadenopathy:      Cervical: No cervical adenopathy.   Skin:     General: Skin is warm and dry.      Capillary Refill: Capillary refill takes less than 2 seconds.      Findings: No rash.   Neurological:      Mental Status: She is alert and oriented to person, place, and time.      Motor: No weakness.      Coordination: Coordination normal.      Gait: Gait normal.   Psychiatric:         Mood and Affect: Mood normal.         Behavior: Behavior normal.         Thought Content: Thought content normal.         Judgment: Judgment normal.       Assessment:     1. Chronic constipation    2. Right lower quadrant abdominal pain    3. MS (multiple sclerosis)      Plan:   Chronic constipation  -     X-Ray Abdomen Flat And Erect; Future; Expected date: 02/24/2023  -     linaCLOtide (LINZESS) 145 mcg Cap capsule; Take 1 capsule (145 mcg total) by mouth before breakfast.  Dispense: 30 capsule; Refill: 1    Right lower quadrant abdominal pain  -     X-Ray Abdomen Flat And Erect; Future; Expected date: 02/24/2023    MS (multiple sclerosis)    -scheduled to see PCP in 2 weeks. Keep that apt to discuss how well linzess is working  - ounces of water daily    Follow up if symptoms worsen or fail to improve.

## 2023-02-24 NOTE — ASSESSMENT & PLAN NOTE
Benefits and compliant with Auto CPAP 4-8 cm  AHI 8.7  Patient not willing for higher pressures  Continue Auto CPAP 4-8 cm   Dream wear mask   Updated supply order  HME: [facility]   Follow up yearly CPAP compliance download and supply order.

## 2023-02-24 NOTE — PROGRESS NOTES
Subjective:      Patient ID: Cem Meyers is a 62 y.o. female.    Chief Complaint: Sleep Apnea    HPI: Cem Meyers is here for follow up for MAYKEL with yearly CPAP complaince assessment.  She is on Auto CPAP of 4-8 cmH2O pressure  She is compliant with CPAP use. Complaince download today reveals 86.7% of days with greater than 4 hours of device use.   Patient reports benefit from CPAP use.  Patient reports no complaints.  Dream wear nasal mask is used.  Discussed AHI 8.7. Patient not willing for higher pressures she finds 4-8 comfortable and obtaining good sleep and benefit from CPAP use.     Obtained Syn[employer] dream station 2 related to recall.    Diagnosis in [id number] while in [place], told mild obstructive sleep apnea with treatment CPAP  8 cm.  Has not used CPAP since 2014, sporadic use from [id number]. Lost 30 lbs since last PSG.   PSG 3/17/2018 revealed AHI 7.4. Cpap titration 4/16/2018 revealed 7 cm optimal.  Patient was on 8 cm when on CPAP in 2014.     Compliance Summary  1/25/2023 - 2/23/2023 (30 days)  Days with Device Usage 30 days  Days without Device Usage 0 days  Percent Days with Device Usage 100.0%  Cumulative Usage 7 days 12 hrs. 14 mins. 46 secs.  Maximum Usage (1 Day) 8 hrs. 48 mins. 33 secs.  Average Usage (All Days) 6 hrs. 29 secs.  Average Usage (Days Used) 6 hrs. 29 secs.  Minimum Usage (1 Day) 1 hrs. 8 mins. 45 secs.  Percent of Days with Usage >= 4 Hours 86.7%  Percent of Days with Usage < 4 Hours 13.3%  Date Range  Total Blower Time 7 days 12 hrs. 55 mins. 48 secs.  Average AHI 8.7  Auto-CPAP Summary  Auto-CPAP Mean Pressure 6.1 cmH2O  Auto-CPAP Peak Average Pressure 8.0 cmH2O  Device Pressure <= 90% of Time 8.0 cmH2O  Average Time in Large Leak Per Day 0 secs.    [place] Sleepiness Scale   EPWORTH SLEEPINESS SCALE 2/24/2023 2/9/2022 12/23/2020 12/23/2019 12/21/2018 6/20/2018 4/20/2018   Sitting and reading 0 1 0 1 0 0 1   Watching TV 0 1 1 1 0 0 1   Sitting, inactive in a public place  (e.g. a theatre or a meeting) 0 1 0 0 0 0 1   As a passenger in a car for an hour without a break 1 1 1 1 0 0 2   Lying down to rest in the afternoon when circumstances permit 2 2 2 2 3 1 2   Sitting and talking to someone 0 0 0 0 0 0 0   Sitting quietly after a lunch without alcohol 1 1 1 1 1 1 2   In a car, while stopped for a few minutes in traffic 0 0 0 0 0 0 0   Total score 4 7 5 6 4 2 9     Previous Report Reviewed: lab reports and office notes     Past Medical History: The following portions of the patient's history were reviewed and updated as appropriate:   She  has a past surgical history that includes Knee surgery (Right, 1989); Fracture surgery (Left, 2013); Tonsillectomy (1966); Colonoscopy (N/A, 09/01/2017); Colonoscopy (N/A, 01/06/2021); Colonoscopy (N/A, 04/25/2022); and Breast biopsy (Left, 10/28/2020).  Her family history includes Cancer in her mother; Heart disease in her father; Hypertension in her father; Lupus in her maternal aunt; Pancreatic cancer in her mother; Rheum arthritis in her maternal aunt and sister; Stomach cancer in her mother.  She  reports that she has never smoked. She has never used smokeless tobacco. She reports current alcohol use of about 1.0 standard drink per week. She reports that she does not use drugs.  She has a current medication list which includes the following prescription(s): baclofen, calcium citrate, cholecalciferol (vitamin d3), denosumab, fish oil-omega-3 fatty acids, gabapentin, glucosamine-chondroitin, levothyroxine, linaclotide, magnesium glycinate, multivitamin, paroxetine, simvastatin, and dicyclomine.  She is allergic to latex, natural rubber.    The following portions of the patient's history were reviewed and updated as appropriate: allergies, current medications, past family history, past medical history, past social history, past surgical history and problem list.    Review of Systems   Constitutional:  Negative for fever, chills, weight loss,  "weight gain, activity change, appetite change, fatigue and night sweats.   HENT:  Negative for postnasal drip, rhinorrhea, sinus pressure, voice change and congestion.    Eyes:  Negative for redness and itching.   Respiratory:  Negative for snoring, cough, sputum production, chest tightness, shortness of breath, wheezing, orthopnea, asthma nighttime symptoms, dyspnea on extertion, use of rescue inhaler and somnolence.    Cardiovascular: Negative.  Negative for chest pain, palpitations and leg swelling.   Genitourinary:  Negative for difficulty urinating and hematuria.   Endocrine:  Negative for cold intolerance and heat intolerance.    Musculoskeletal:  Negative for arthralgias, gait problem, joint swelling and myalgias.   Skin: Negative.    Gastrointestinal:  Negative for nausea, vomiting, abdominal pain and acid reflux.   Neurological:  Negative for dizziness, weakness, light-headedness and headaches.   Hematological:  Negative for adenopathy. No excessive bruising.   All other systems reviewed and are negative.   Objective:   /82   Pulse 82   Resp 18   Ht 5' 3" (1.6 m)   Wt 77.8 kg (171 lb 8.3 oz)   SpO2 98%   BMI 30.38 kg/m²   Physical Exam  Vitals reviewed.   Constitutional:       General: She is not in acute distress.     Appearance: She is well-developed. She is not ill-appearing or toxic-appearing.   HENT:      Head: Normocephalic.      Right Ear: External ear normal.      Left Ear: External ear normal.      Nose: Nose normal.      Mouth/Throat:      Pharynx: No oropharyngeal exudate.   Eyes:      Conjunctiva/sclera: Conjunctivae normal.   Cardiovascular:      Rate and Rhythm: Normal rate and regular rhythm.      Heart sounds: Normal heart sounds.   Pulmonary:      Effort: Pulmonary effort is normal.      Breath sounds: Normal breath sounds. No stridor.   Abdominal:      Palpations: Abdomen is soft.   Musculoskeletal:         General: Normal range of motion.      Cervical back: Normal range of " motion and neck supple.   Lymphadenopathy:      Cervical: No cervical adenopathy.   Skin:     General: Skin is warm and dry.   Neurological:      Mental Status: She is alert and oriented to person, place, and time.   Psychiatric:         Behavior: Behavior normal. Behavior is cooperative.         Thought Content: Thought content normal.         Judgment: Judgment normal.     Personal Diagnostic Review  CPAP download    Assessment:     1. MAYKEL on CPAP    2. Psychophysiological insomnia    3. Spasticity    4. MS (multiple sclerosis)    5. Chronic diastolic heart failure    6. Neurologic gait dysfunction    7. Class 1 obesity with serious comorbidity and body mass index (BMI) of 30.0 to 30.9 in adult, unspecified obesity type        Orders Placed This Encounter   Procedures    CPAP/BIPAP SUPPLIES     Benefits and compliant  90 day supply. 4 refills  HME: [facility]    Patient is using Dream wear nasal mask. No longer using nasal wisp.     Order Specific Question:   Length of need (1-99 months):     Answer:   99     Order Specific Question:   Choose ONE mask type and its corresponding cushions and/or pillows:     Answer:    Nasal Mask, 1 per 90 days:  Nasal Cushions, (6 per 90 days):  Nasal Pillows, (6 per 90 days)     Order Specific Question:   Choose EITHER Heated or Non-Heated Tubjing     Answer:    Non-Heated Tubing, 1 per 90 days     Order Specific Question:   Number of Days Needed:     Answer:   99     Order Specific Question:   All other supplies as needed as listed below:     Answer:    Headgear, 1 per 180 days     Order Specific Question:   All other supplies as needed as listed below:     Answer:    Chin Strap, 1 per 180 days     Order Specific Question:   All other supplies as needed as listed below:     Answer:    Disposable Filter, 6 per 90 days     Order Specific Question:   All other supplies as needed as listed below:     Answer:    Humidifier Chamber, 1 per 180 days      Order Specific Question:   All other supplies as needed as listed below:     Answer:    Non-Disposable Filter, 1 per 180 days     Plan:     Problem List Items Addressed This Visit       MAYKEL on CPAP - Primary (Chronic)     Benefits and compliant with Auto CPAP 4-8 cm  AHI 8.7  Patient not willing for higher pressures  Continue Auto CPAP 4-8 cm   Dream wear mask   Updated supply order  HME: [facility]   Follow up yearly CPAP compliance download and supply order.         Relevant Orders    CPAP/BIPAP SUPPLIES    MS (multiple sclerosis) (Chronic)     Stable, followed by Rebeka Smith MD and Rimma Rouse PA-C Neurology, [facility] [place] [facility]            Spasticity     Improved with magnesium, reported as doing better         Psychophysiological insomnia     Improved with turning off electronics and resuming CPAP  No longer awakens during night, improved with CPAP          Neurologic gait dysfunction     Managed by  Neurology, walks with cane          Class 1 obesity with serious comorbidity and body mass index (BMI) of 30.0 to 30.9 in adult     Encouraged calorie reduction and 30 minutes of exercise daily. Discussed impact of obesity on general health.  Walks about 3/4 mile daily outside of [facility]  Wt Readings from Last 9 Encounters:   02/24/23 77.8 kg (171 lb 8.3 oz)   02/24/23 77.5 kg (170 lb 13.7 oz)   01/11/23 80.2 kg (176 lb 12.9 oz)   09/14/22 79.2 kg (174 lb 9.7 oz)   08/21/22 77.2 kg (170 lb 1.4 oz)   08/08/22 79.1 kg (174 lb 6.1 oz)   06/20/22 78.8 kg (173 lb 11.6 oz)   04/25/22 77.1 kg (170 lb)   04/11/22 78.4 kg (172 lb 13.5 oz)   Body mass index is 30.38 kg/m².           Chronic diastolic heart failure     Stable, managed by cardiology            Follow up in about 1 year (around 2/24/2024) for CPAP 1 year compliance download.

## 2023-02-25 ENCOUNTER — PATIENT MESSAGE (OUTPATIENT)
Dept: INTERNAL MEDICINE | Facility: CLINIC | Age: 63
End: 2023-02-25
Payer: MEDICARE

## 2023-02-26 ENCOUNTER — PATIENT MESSAGE (OUTPATIENT)
Dept: INTERNAL MEDICINE | Facility: CLINIC | Age: 63
End: 2023-02-26
Payer: MEDICARE

## 2023-02-27 ENCOUNTER — SPECIALTY PHARMACY (OUTPATIENT)
Dept: PHARMACY | Facility: CLINIC | Age: 63
End: 2023-02-27
Payer: MEDICARE

## 2023-02-27 NOTE — TELEPHONE ENCOUNTER
Outgoing call to pt regarding Aubagio - last notes recorded stated pt received 90 days supply from [facility] and to contact pt in 3 months for refill.     Outgoing call to pt to confirm if pt is still receiving free drug from [facility] or will need [facility] to fill (need new rx as previous rx were all dc'd) - MEDINA

## 2023-03-03 NOTE — TELEPHONE ENCOUNTER
Incoming call from pt stating she self-discontinued her Aubagio sometime last year as she felt it depressed her immune system too much. Pt received her covid booster and both times she became sick. Pt reported have received 3-months of Aubagio shipment from [facility], however, pt never took them because she did not want to continue therapy. Pt would like to discuss with [facility] to possibly switch therapy, however, pt does not have a car and is unable to come down to [place]. Pt requested if [facility] can reach out to [facility] and possibly have a virtual visit if possible.     Staff message sent to [facility]  Closing out enrollment for Aubagio at [facility] as rx was discontinued, cannot open ivent

## 2023-03-08 ENCOUNTER — OFFICE VISIT (OUTPATIENT)
Dept: INTERNAL MEDICINE | Facility: CLINIC | Age: 63
End: 2023-03-08
Payer: MEDICARE

## 2023-03-08 VITALS
HEIGHT: 63 IN | BODY MASS INDEX: 30.59 KG/M2 | WEIGHT: 172.63 LBS | TEMPERATURE: 96 F | HEART RATE: 83 BPM | DIASTOLIC BLOOD PRESSURE: 74 MMHG | OXYGEN SATURATION: 95 % | SYSTOLIC BLOOD PRESSURE: 120 MMHG

## 2023-03-08 DIAGNOSIS — D84.9 IMMUNOSUPPRESSION: ICD-10-CM

## 2023-03-08 DIAGNOSIS — Z23 NEED FOR PNEUMOCOCCAL VACCINATION: ICD-10-CM

## 2023-03-08 DIAGNOSIS — K59.09 CHRONIC CONSTIPATION: Primary | ICD-10-CM

## 2023-03-08 DIAGNOSIS — Z12.31 ENCOUNTER FOR SCREENING MAMMOGRAM FOR MALIGNANT NEOPLASM OF BREAST: ICD-10-CM

## 2023-03-08 PROCEDURE — 3078F PR MOST RECENT DIASTOLIC BLOOD PRESSURE < 80 MM HG: ICD-10-PCS | Mod: CPTII,S$GLB,, | Performed by: FAMILY MEDICINE

## 2023-03-08 PROCEDURE — 3074F SYST BP LT 130 MM HG: CPT | Mod: CPTII,S$GLB,, | Performed by: FAMILY MEDICINE

## 2023-03-08 PROCEDURE — 1159F PR MEDICATION LIST DOCUMENTED IN MEDICAL RECORD: ICD-10-PCS | Mod: CPTII,S$GLB,, | Performed by: FAMILY MEDICINE

## 2023-03-08 PROCEDURE — 3008F PR BODY MASS INDEX (BMI) DOCUMENTED: ICD-10-PCS | Mod: CPTII,S$GLB,, | Performed by: FAMILY MEDICINE

## 2023-03-08 PROCEDURE — 1160F PR REVIEW ALL MEDS BY PRESCRIBER/CLIN PHARMACIST DOCUMENTED: ICD-10-PCS | Mod: CPTII,S$GLB,, | Performed by: FAMILY MEDICINE

## 2023-03-08 PROCEDURE — G0009 PNEUMOCOCCAL CONJUGATE VACCINE 20-VALENT: ICD-10-PCS | Mod: S$GLB,,, | Performed by: FAMILY MEDICINE

## 2023-03-08 PROCEDURE — 99214 OFFICE O/P EST MOD 30 MIN: CPT | Mod: 25,S$GLB,, | Performed by: FAMILY MEDICINE

## 2023-03-08 PROCEDURE — 1160F RVW MEDS BY RX/DR IN RCRD: CPT | Mod: CPTII,S$GLB,, | Performed by: FAMILY MEDICINE

## 2023-03-08 PROCEDURE — 3078F DIAST BP <80 MM HG: CPT | Mod: CPTII,S$GLB,, | Performed by: FAMILY MEDICINE

## 2023-03-08 PROCEDURE — 3074F PR MOST RECENT SYSTOLIC BLOOD PRESSURE < 130 MM HG: ICD-10-PCS | Mod: CPTII,S$GLB,, | Performed by: FAMILY MEDICINE

## 2023-03-08 PROCEDURE — G0009 ADMIN PNEUMOCOCCAL VACCINE: HCPCS | Mod: S$GLB,,, | Performed by: FAMILY MEDICINE

## 2023-03-08 PROCEDURE — 90677 PCV20 VACCINE IM: CPT | Mod: S$GLB,,, | Performed by: FAMILY MEDICINE

## 2023-03-08 PROCEDURE — 99999 PR PBB SHADOW E&M-EST. PATIENT-LVL V: CPT | Mod: PBBFAC,,, | Performed by: FAMILY MEDICINE

## 2023-03-08 PROCEDURE — 1159F MED LIST DOCD IN RCRD: CPT | Mod: CPTII,S$GLB,, | Performed by: FAMILY MEDICINE

## 2023-03-08 PROCEDURE — 3008F BODY MASS INDEX DOCD: CPT | Mod: CPTII,S$GLB,, | Performed by: FAMILY MEDICINE

## 2023-03-08 PROCEDURE — 90677 PNEUMOCOCCAL CONJUGATE VACCINE 20-VALENT: ICD-10-PCS | Mod: S$GLB,,, | Performed by: FAMILY MEDICINE

## 2023-03-08 PROCEDURE — 99999 PR PBB SHADOW E&M-EST. PATIENT-LVL V: ICD-10-PCS | Mod: PBBFAC,,, | Performed by: FAMILY MEDICINE

## 2023-03-08 PROCEDURE — 99214 PR OFFICE/OUTPT VISIT, EST, LEVL IV, 30-39 MIN: ICD-10-PCS | Mod: 25,S$GLB,, | Performed by: FAMILY MEDICINE

## 2023-03-08 NOTE — PROGRESS NOTES
Subjective:   Patient ID: Cem Meyers is a 62 y.o. female.  Chief Complaint:  Follow-up    Patient presents for follow up visit after evaluation by JEAN-PIERRE for chronic constipation and abdominal pain    Evaluated 2 weeks ago  KUB was unremarkable  Linzess 145 mcg daily prescribed  No side effects, but has not provided any relief  Continues to take Metamucil and use suppositories as needed  Also increasing fiber in diet and walking as much as possible    Last annual physical exam August 2022  Also followed by Neurology [facility], Psychiatry, pulmonology/Sleep Medicine, and Ophthalmology.    Medical History:  - Acquired hypothyroidism.  Last TSH normal.  Reports compliance with Synthroid 75 mcg daily.  No symptoms hypo or hyperthyroidism.   - Hyperlipidemia.  Last lipid panel with LDL close to goal on simvastatin 40 mg daily.  Reports compliance.  Denies side effects.  No chest pain or claudication.   - Major depressive disorder.  On Paxil 40 mg daily.  Reports compliance.  Denies side effects.  Symptoms well controlled.    - History of shingles.  Resulted with significant scarring, but otherwise healed.  Last visit recommended she obtain the shingles vaccine  - History of colon polyps.  Colonoscopy completed April 2022 and normal.  Recommend repeat colonoscopy in 5 years.  - Multiple sclerosis and Neuropathy followed by Neurology.    - Osteoporosis followed by Rheumatology.     Health maintenance needs include:  - Shingles vaccine series  - Prevnar 20  - Covid booster  - Tetanus vaccine  - Mammogram in May 2023    And  Review of Systems   Constitutional:  Negative for chills, fatigue, fever and unexpected weight change.   Respiratory:  Negative for cough, shortness of breath and wheezing.    Cardiovascular:  Negative for chest pain and leg swelling.   Gastrointestinal:  Positive for abdominal distention, abdominal pain and constipation. Negative for blood in stool, diarrhea, nausea and vomiting.  "  Genitourinary:  Negative for difficulty urinating.   Musculoskeletal:  Negative for myalgias.   Skin:  Negative for rash.   Neurological:  Negative for headaches.   Psychiatric/Behavioral:  Negative for sleep disturbance.      Objective:   /74 (BP Location: Left arm, Patient Position: Sitting, BP Method: Large (Manual))   Pulse 83   Temp 96.1 °F (35.6 °C) (Tympanic)   Ht 5' 3" (1.6 m)   Wt 78.3 kg (172 lb 9.9 oz)   SpO2 95%   BMI 30.58 kg/m²     Physical Exam  Vitals and nursing note reviewed.   Constitutional:       Appearance: Normal appearance. She is well-developed and normal weight.   Cardiovascular:      Rate and Rhythm: Normal rate and regular rhythm.   Pulmonary:      Effort: Pulmonary effort is normal.   Abdominal:      General: Bowel sounds are normal. There is no distension.      Palpations: Abdomen is soft.      Tenderness: There is no abdominal tenderness. There is no guarding or rebound.   Musculoskeletal:      Right lower leg: No edema.      Left lower leg: No edema.   Skin:     Findings: No rash.   Neurological:      Mental Status: She is alert. Mental status is at baseline.      Coordination: Coordination abnormal.      Gait: Gait abnormal.   Psychiatric:         Mood and Affect: Mood and affect normal.     Assessment:       ICD-10-CM ICD-9-CM   1. Chronic constipation  K59.09 564.00   2. Encounter for screening mammogram for malignant neoplasm of breast  Z12.31 V76.12   3. Immunosuppression  D84.9 279.9   4. Need for pneumococcal vaccination  Z23 V03.82     Plan:   Chronic constipation  Increase Linzess to 290 mcg daily  If improvement in symptoms, can call for refill of medication   If no improvement in symptoms, can discontinue Linzess  Continue eating high fiber diet, walking, increase fluid intake, and using metamucil daily  Discussed need to try and avoid daily laxatives   If needed can take MiraLax over-the-counter    RHM  Mammogram ordered and scheduled for May 2023   Prevnar " 20 vaccine today   Recommend tetanus booster, bivalent COVID booster, and shingles vaccine through pharmacy    Follow-up with any/all specialists scheduled     Return to clinic 6 months for annual physical exam or sooner if needed    Gabriela Davis, MS4    I hereby acknowledge that I am relying upon documentation authored by a medical student working under my supervision and further I hereby attest that I have verified the student documentation or findings by personally performing the physical exam and medical decision making activities of the Evaluation and Management service to be billed.  Keagan Calles

## 2023-03-16 ENCOUNTER — TELEPHONE (OUTPATIENT)
Dept: NEUROLOGY | Facility: CLINIC | Age: 63
End: 2023-03-16
Payer: MEDICARE

## 2023-03-16 ENCOUNTER — PATIENT MESSAGE (OUTPATIENT)
Dept: NEUROLOGY | Facility: CLINIC | Age: 63
End: 2023-03-16
Payer: MEDICARE

## 2023-03-16 NOTE — TELEPHONE ENCOUNTER
ASSESSMENT / PLAN:  · Patient needs colonoscopy with the help of anesthesia service.  · Patient is mild to moderate risk for anesthesia.   Adolfo Shepard MD, I want to thank you for allowing me to take part in their care.      PRESENT HISTORY:  Maddy Cat is a 76 year old who is a pleasant female.     Patient scheduled for colonoscopy. Anesthesia to sedate.    Patient Active Problem List   Diagnosis   • Essential hypertension   • History of thyroid nodule   • Hypothyroidism, postop   • Personal history of breast cancer   • Hyperlipidemia   • Personal history of malignant neoplasm of breast   • Tinnitus of both ears   • Osteopenia of the elderly       PHYSICAL EXAM:  Visit Vitals  /58   Pulse 71   Temp 98.8 °F (37.1 °C) (Tympanic)   Resp 18   Ht 5' 8\" (1.727 m)   Wt 57.1 kg   SpO2 98%   BMI 19.14 kg/m²     Pleasant female sitting in chair, with no signs of acute distress, appropriate mood in appearance.  Body mass index is 19.14 kg/m².  CENTRAL NERVOUS SYSTEM: The patient is awake, alert, oriented x3 with intact recent and remote memory. Intact cranial nerves.  EYES: No icterus noted on conjunctival exam. Pupils are equal and reactive to light. Corneal reflex present.  EAR AND NOSE AND THROAT: No lesion of the ears, nose or throat. Oral, nasal and tongue mucosa normal.  NECK: Supple. No masses. No thyroid enlargement. Trachea is centered. JVD negative.  LUNGS: Bilateral air entry is fair. No wheezing or rhonchi heard. Chest bilaterally symmetrical. No use of accessory muscles.  CARDIOVASCULAR SYSTEM: S1, S2 present. Faint systolic murmur. No peripheral edema.  ABDOMEN: Soft. Bowel sounds are present. No hepatosplenomegaly appreciated. No masses felt. No rebound, rigidity, guarding.  SKIN: Intact and warm. No jaundice. Full range of motion in arms and legs on limited exam.    LABS AND IMAGING STUDIES REIVIEWED IN EPIC.    Past Medical History:   Diagnosis Date   • Disorder of bone and cartilage, unspecified  LVM telling pt to call us back to schedule virtual appt with BB. Will also send pt a message through the portal.    2/7/12   • HTN (hypertension)    • Hyperlipidemia    • Malignant neoplasm (CMS/HCC)    • PONV (postoperative nausea and vomiting)    • Thyroid nodule     S/P resection       Past Surgical History:   Procedure Laterality Date   • Colonoscopy w/ biopsies and polypectomy  11/01/2007   • Colonoscopy w/ polypectomy  12/20/2012   • Hysterectomy       benign. w/incidental appendectomy; still has ovaries   • Thyroid surgery  11/19/2003    nodule resected; right side, benign   • Tonsillectomy and adenoidectomy         Current Facility-Administered Medications   Medication Dose Route Frequency Provider Last Rate Last Dose   • sodium chloride (PF) 0.9 % injection 2 mL  2 mL Injection 2 times per day Moe Scott MD       • sodium chloride (PF) 0.9 % injection 2 mL  2 mL Injection PRN Moe Scott MD       • lactated ringers infusion   Intravenous Continuous Moe Scott MD 50 mL/hr at 05/03/18 0658         ALLERGIES:   Allergen Reactions   • Amoxicillin HIVES   • Penicillins HIVES   • Sulfa Drugs Cross Reactors NAUSEA       Social History     Social History   • Marital status:      Spouse name: Anirudh   • Number of children: 4   • Years of education: 13     Occupational History   • beautician      retired age 61     Social History Main Topics   • Smoking status: Never Smoker   • Smokeless tobacco: Never Used   • Alcohol use 1.8 - 2.4 oz/week     3 - 4 Standard drinks or equivalent per week      Comment: social   • Drug use: No   • Sexual activity: Yes     Other Topics Concern   •  Service No   • Blood Transfusions No   • Caffeine Concern No   • Occupational Exposure No   • Hobby Hazards No   • Sleep Concern Yes   • Stress Concern No   • Weight Concern No   • Special Diet No   • Back Care No   • Exercise Yes   • Bike Helmet No   • Seat Belt Yes   • Self-Exams No     Social History Narrative    She is ,  Anirudh passed away August 2015. Lives alone in her house,no pets. 4 children live close to her.         Family History   Problem Relation Age of Onset   • Cancer Sister 68     breast cancer   • High cholesterol Sister    • Thyroid Sister    • Cancer Maternal Grandmother 95     breast mass questionable cancer   • Cancer Sister 55     cervical cancer   • Thyroid Brother    • High blood pressure Brother    • Thyroid Sister    • Hypertension Brother    • High blood pressure Mother    • High cholesterol Mother    • High cholesterol Son    • Thyroid Son    • Angioedema Neg Hx

## 2023-04-24 ENCOUNTER — OFFICE VISIT (OUTPATIENT)
Dept: NEUROLOGY | Facility: CLINIC | Age: 63
End: 2023-04-24
Payer: MEDICARE

## 2023-04-24 DIAGNOSIS — Z29.89 PROPHYLACTIC IMMUNOTHERAPY: ICD-10-CM

## 2023-04-24 DIAGNOSIS — G35 MS (MULTIPLE SCLEROSIS): Primary | ICD-10-CM

## 2023-04-24 DIAGNOSIS — Z71.89 COUNSELING REGARDING GOALS OF CARE: ICD-10-CM

## 2023-04-24 DIAGNOSIS — R26.9 GAIT DISTURBANCE: ICD-10-CM

## 2023-04-24 DIAGNOSIS — Z74.09 IMPAIRED MOBILITY AND ADLS: ICD-10-CM

## 2023-04-24 DIAGNOSIS — Z78.9 IMPAIRED MOBILITY AND ADLS: ICD-10-CM

## 2023-04-24 PROCEDURE — 99214 PR OFFICE/OUTPT VISIT, EST, LEVL IV, 30-39 MIN: ICD-10-PCS | Mod: 95,,, | Performed by: PSYCHIATRY & NEUROLOGY

## 2023-04-24 PROCEDURE — 1160F RVW MEDS BY RX/DR IN RCRD: CPT | Mod: CPTII,95,, | Performed by: PSYCHIATRY & NEUROLOGY

## 2023-04-24 PROCEDURE — 1159F PR MEDICATION LIST DOCUMENTED IN MEDICAL RECORD: ICD-10-PCS | Mod: CPTII,95,, | Performed by: PSYCHIATRY & NEUROLOGY

## 2023-04-24 PROCEDURE — 1160F PR REVIEW ALL MEDS BY PRESCRIBER/CLIN PHARMACIST DOCUMENTED: ICD-10-PCS | Mod: CPTII,95,, | Performed by: PSYCHIATRY & NEUROLOGY

## 2023-04-24 PROCEDURE — 99214 OFFICE O/P EST MOD 30 MIN: CPT | Mod: 95,,, | Performed by: PSYCHIATRY & NEUROLOGY

## 2023-04-24 PROCEDURE — 1159F MED LIST DOCD IN RCRD: CPT | Mod: CPTII,95,, | Performed by: PSYCHIATRY & NEUROLOGY

## 2023-04-24 NOTE — PROGRESS NOTES
The patient location is: home   The chief complaint leading to consultation is: MS    Visit type: audiovisual    Face to Face time with patient: 25  35 minutes of total time spent on the encounter, which includes face to face time and non-face to face time preparing to see the patient (eg, review of tests), Obtaining and/or reviewing separately obtained history, Documenting clinical information in the electronic or other health record, Independently interpreting results (not separately reported) and communicating results to the patient/family/caregiver, or Care coordination (not separately reported).     Each patient to whom he or she provides medical services by telemedicine is:  (1) informed of the relationship between the physician and patient and the respective role of any other health care provider with respect to management of the patient; and (2) notified that he or she may decline to receive medical services by telemedicine and may withdraw from such care at any time.    Subjective:          Patient ID: Cem Meyers is a 62 y.o. female who presents today for a routine video visit for MS.   Not seen since June of 2021.  After a few minutes we converted to audio only b/c of problems with video connection     MS HPI:  DMT: None  Side effects from DMT? No  Taking vitamin D3 as recommended? Yes -   She stopped Aubagio b/c she was having frequent infection and URI, and this problem improved once she stopped Aubagio.   She wants to go back on Rebif but is open to preference  Living in senior living facility   States her hands are getting more numb.   Takes gabapentin 800mg TID and baclofen 20/20/30    Medications:  Current Outpatient Medications   Medication Sig    baclofen (LIORESAL) 10 MG tablet TAKE 2 TABS ONE TIME DAILY IN THE MORNING, 2 TABS IN THE AFTERNOON, AND UP TO 3 TABS AT BEDTIME AS NEEDED    calcium citrate (CALCITRATE) 200 mg (950 mg) tablet Take 2 tablets by mouth once daily.    cholecalciferol,  vitamin D3, 100 mcg (4,000 unit) Cap Take 4,000 Units by mouth once daily.     denosumab (PROLIA SUBQ) Inject into the skin.    dicyclomine (BENTYL) 20 mg tablet     fish oil-omega-3 fatty acids 300-1,000 mg capsule Take 2 g by mouth once daily.    gabapentin (NEURONTIN) 800 MG tablet TAKE 1 TABLET THREE TIMES DAILY    glucosamine-chondroitin 500-400 mg tablet Take 1 tablet by mouth once daily.     levothyroxine (SYNTHROID) 75 MCG tablet TAKE 1 TABLET BEFORE BREAKFAST    linaCLOtide (LINZESS) 145 mcg Cap capsule Take 2 capsules (290 mcg total) by mouth before breakfast.    MAGNESIUM GLYCINATE ORAL     multivitamin capsule Take 1 capsule by mouth once daily.    paroxetine (PAXIL) 40 MG tablet Take 1 tablet (40 mg total) by mouth every morning.    simvastatin (ZOCOR) 40 MG tablet TAKE 1 TABLET EVERY DAY     No current facility-administered medications for this visit.       SOCIAL HISTORY  Social History     Tobacco Use    Smoking status: Never    Smokeless tobacco: Never   Substance Use Topics    Alcohol use: Yes     Alcohol/week: 1.0 standard drink     Types: 1 Glasses of wine per week    Drug use: No       Living arrangements - the patient lives alone.      REVIEW OF SYMPTOMS 6/2/2021   Do you feel abnormally tired on most days? Yes   Do you feel you generally sleep well? Yes   Do you have difficulty controlling your bladder?  No   Do you have difficulty controlling your bowels?  Yes   Do you have frequent muscle cramps, tightness or spasms in your limbs?  Yes   Do you have new visual symptoms?  No   Do you have worsening difficulty with your memory or thinking? No   Do you have worsening symptoms of anxiety or depression?  Yes   For patients who walk, Do you have more difficulty walking?  Yes   Have you fallen since your last visit?  No   For patients who use wheelchairs: Do you have any skin wounds or breakdown? Not Applicable   Do you have difficulty using your hands?  Yes   Do you have shooting or burning pain?  No   Do you have difficulty with sexual function?  -   If you are sexually active, are you using birth control? Y/N  N/A No   Do you often choke when swallowing liquids or solid food?  No   Do you experience worsening symptoms when overheated? Yes   Do you need any new equipment such as a wheelchair, walker or shower chair? Yes   Do you receive co-pay financial assistance for your principal MS medicine? No   Would you be interested in participating in an MS research trial in the future? No   For patients on Gilenya, Tecfidera, Aubagio, Rituxan, Ocrevus, Tysabri, Lemtrada or Methotrexate, are you aware that you should NOT receive live virus vaccines?  Yes   Do you feel you have adequate family/friend support?  Yes   Do you have health insurance?   Yes   Are you currently employed? No   Do you receive SSDI/SSI?  Yes   Do you use marijuana or cannabis products? No   Have you been diagnosed with a urinary tract infection since your last visit here? No   Have you been diagnosed with a respiratory tract infection since your last visit here? No   Have you been to the emergency room since your last visit here? No   Have you been hospitalized since your last visit here?  No            Objective:      Timed 25 Foot Walk: 1/29/2020 3/10/2021   Did patient wear an AFO? No No   Was assistive device used? No No   Time for 25 Foot Walk (seconds) 6.7 6.3   Time for 25 Foot Walk (seconds) - 6.6     Neurologic Exam  Deferred to to issues with video feed    Imaging:     Results for orders placed during the hospital encounter of 08/12/20    MRI Brain Demyelinating Without Contrast    Impression  Findings in the cerebral white matter which are typical for multiple sclerosis.  No new lesions identified.  No restricted diffusion..      Electronically signed by: Kyle Keith DO  Date:    08/12/2020  Time:    11:42    No results found for this or any previous visit.    No results found for this or any previous visit.    Results for orders  placed during the hospital encounter of 09/08/22    MRI Brain Demyelinating W W/O Contrast    Impression  Findings in the cerebral white matter which are typical for multiple sclerosis.  No new or enhancing lesions to suggest active disease.      Electronically signed by: Ye Sam MD  Date:    09/08/2022  Time:    15:26    Results for orders placed during the hospital encounter of 06/29/21    MRI Cervical Spine Demyelinating W W/O Contrast    Impression  1. No significant change in the appearance of the previously noted MS plaques.  No new MS plaques are identified.  No enhancement identified to suggest active demyelination.      Electronically signed by: Kyle Keith DO  Date:    06/29/2021  Time:    11:44    Results for orders placed during the hospital encounter of 06/29/21    MRI Thoracic Spine Demyelinating W W/O Contrast    Impression  1. Stable appearance of the previously noted MS plaques.  No new lesions are suggested.  No abnormal enhancement identified to suggest active demyelination.      Electronically signed by: Kyle Keith DO  Date:    06/29/2021  Time:    11:03        Labs:     Lab Results   Component Value Date    XCKJNKSR29JE 73 12/16/2020    ACROYPZV42LP 57 07/17/2019    GRNSGICL90ZG 57 11/29/2017     No results found for: JCVINDEX, JCVANTIBODY  No results found for: KJ0QPUSP, ABSOLUTECD3, VR8WYBTQ, ABSOLUTECD8, RY1XZGXL, ABSOLUTECD4, LABCD48  Lab Results   Component Value Date    WBC 4.55 09/28/2022    RBC 3.95 (L) 09/28/2022    HGB 12.2 09/28/2022    HCT 40.2 09/28/2022     (H) 09/28/2022    MCH 30.9 09/28/2022    MCHC 30.3 (L) 09/28/2022    RDW 13.2 09/28/2022     09/28/2022    MPV 11.0 09/28/2022    GRAN 1.8 09/28/2022    GRAN 40.4 09/28/2022    LYMPH 2.1 09/28/2022    LYMPH 46.4 09/28/2022    MONO 0.5 09/28/2022    MONO 11.2 09/28/2022    EOS 0.0 09/28/2022    BASO 0.04 09/28/2022    EOSINOPHIL 0.9 09/28/2022    BASOPHIL 0.9 09/28/2022     Sodium   Date Value Ref  Range Status   01/11/2023 138 136 - 145 mmol/L Final     Potassium   Date Value Ref Range Status   01/11/2023 4.3 3.5 - 5.1 mmol/L Final     Chloride   Date Value Ref Range Status   01/11/2023 104 95 - 110 mmol/L Final     CO2   Date Value Ref Range Status   01/11/2023 24 23 - 29 mmol/L Final     Glucose   Date Value Ref Range Status   01/11/2023 102 70 - 110 mg/dL Final     BUN   Date Value Ref Range Status   01/11/2023 12 8 - 23 mg/dL Final     Creatinine   Date Value Ref Range Status   01/11/2023 1.0 0.5 - 1.4 mg/dL Final     Calcium   Date Value Ref Range Status   01/11/2023 9.6 8.7 - 10.5 mg/dL Final     Total Protein   Date Value Ref Range Status   01/11/2023 7.1 6.0 - 8.4 g/dL Final     Albumin   Date Value Ref Range Status   01/11/2023 3.8 3.5 - 5.2 g/dL Final     Total Bilirubin   Date Value Ref Range Status   01/11/2023 0.2 0.1 - 1.0 mg/dL Final     Comment:     For infants and newborns, interpretation of results should be based  on gestational age, weight and in agreement with clinical  observations.    Premature Infant recommended reference ranges:  Up to 24 hours.............<8.0 mg/dL  Up to 48 hours............<12.0 mg/dL  3-5 days..................<15.0 mg/dL  6-29 days.................<15.0 mg/dL       Alkaline Phosphatase   Date Value Ref Range Status   01/11/2023 66 55 - 135 U/L Final     AST   Date Value Ref Range Status   01/11/2023 24 10 - 40 U/L Final     ALT   Date Value Ref Range Status   01/11/2023 14 10 - 44 U/L Final     Anion Gap   Date Value Ref Range Status   01/11/2023 10 8 - 16 mmol/L Final     eGFR if    Date Value Ref Range Status   06/17/2022 >60 >60 mL/min/1.73 m^2 Final     eGFR if non    Date Value Ref Range Status   06/17/2022 >60 >60 mL/min/1.73 m^2 Final     Comment:     Calculation used to obtain the estimated glomerular filtration  rate (eGFR) is the CKD-EPI equation.        Lab Results   Component Value Date    HEPBSAG Negative 07/17/2019     HEPBSAB Negative 07/17/2019    HEPBCAB Negative 07/17/2019           MS Impression and Plan:     NEURO MULTIPLE SCLEROSIS IMPRESSION:   MS Status:     Number of relapses in the past year?:  0    Clinical Progression:  Worsened    Type:  Progressive    MRI Progression:  Stable  Plan:     DMT:  Implement Disease Modifying Therapy    Implement Disease Modifying Therapy:  None and peginterferon beta-1a    Symptom Management:  Implement change in symptom management    Implement Change in Symptom Management:  Gait (home health PT and OT)     Home health PT and OT started  Will write letter attesting to importance of regular exercise at her gym to be given to apartment managers so that the shuttle will take her there 3x/week  After shared decision making will start Plegridy IM.  The rationale, side effects, risks and expectations of this medication was discussed. Will get baseline labs this week.   F/u Karoline [facility] in 3 mo virtually             Problem List Items Addressed This Visit          1 - High    MS (multiple sclerosis) - Primary (Chronic)    Relevant Orders    CBC Auto Differential    Hepatic Function Panel    Ambulatory referral/consult to Home Health       Unprioritized    Prophylactic immunotherapy     Other Visit Diagnoses       Impaired mobility and ADLs        Relevant Orders    Ambulatory referral/consult to Home Health    Counseling regarding goals of care        Gait disturbance                Rebeka Smith MD

## 2023-04-24 NOTE — Clinical Note
PLease write short letter stating that patient would benefit from going to the gym 3 times/ week b/c of her MS (can say that).  She will show this to her senior living facility management so that the shuttle will take her to the Gym that is a couple blocks away.  May want to call her for the detail.

## 2023-04-26 ENCOUNTER — TELEPHONE (OUTPATIENT)
Dept: PSYCHIATRY | Facility: CLINIC | Age: 63
End: 2023-04-26
Payer: MEDICARE

## 2023-04-27 ENCOUNTER — TELEPHONE (OUTPATIENT)
Dept: NEUROLOGY | Facility: CLINIC | Age: 63
End: 2023-04-27
Payer: MEDICARE

## 2023-04-27 ENCOUNTER — PATIENT MESSAGE (OUTPATIENT)
Dept: RHEUMATOLOGY | Facility: CLINIC | Age: 63
End: 2023-04-27
Payer: MEDICARE

## 2023-04-27 NOTE — TELEPHONE ENCOUNTER
Spoke to pt and scheduled her for labs on 5/3 at 10:45 AM at the [facility]. Also scheduled her for a virtual f/u with Karoline on 7/27 at 10:30 AM.

## 2023-04-27 NOTE — TELEPHONE ENCOUNTER
----- Message from Rebeka Smith MD sent at 4/24/2023 12:36 PM CDT -----  Labs on WEdnesday at [facility] in 3 mo

## 2023-04-28 ENCOUNTER — PATIENT MESSAGE (OUTPATIENT)
Dept: RHEUMATOLOGY | Facility: CLINIC | Age: 63
End: 2023-04-28
Payer: MEDICARE

## 2023-05-01 ENCOUNTER — PATIENT MESSAGE (OUTPATIENT)
Dept: NEUROLOGY | Facility: CLINIC | Age: 63
End: 2023-05-01
Payer: MEDICARE

## 2023-05-03 ENCOUNTER — PATIENT MESSAGE (OUTPATIENT)
Dept: INTERNAL MEDICINE | Facility: CLINIC | Age: 63
End: 2023-05-03
Payer: MEDICARE

## 2023-05-03 ENCOUNTER — HOSPITAL ENCOUNTER (OUTPATIENT)
Dept: RADIOLOGY | Facility: HOSPITAL | Age: 63
Discharge: HOME | End: 2023-05-03
Attending: FAMILY MEDICINE
Payer: MEDICARE

## 2023-05-03 VITALS — BODY MASS INDEX: 30.59 KG/M2 | WEIGHT: 172.63 LBS | HEIGHT: 63 IN

## 2023-05-03 DIAGNOSIS — Z12.31 ENCOUNTER FOR SCREENING MAMMOGRAM FOR MALIGNANT NEOPLASM OF BREAST: ICD-10-CM

## 2023-05-03 PROCEDURE — 77067 SCR MAMMO BI INCL CAD: CPT | Mod: TC

## 2023-05-03 PROCEDURE — 77067 MAMMO DIGITAL SCREENING BILAT WITH TOMO: ICD-10-PCS | Mod: 26,,, | Performed by: RADIOLOGY

## 2023-05-03 PROCEDURE — 77063 BREAST TOMOSYNTHESIS BI: CPT | Mod: 26,,, | Performed by: RADIOLOGY

## 2023-05-03 PROCEDURE — 77067 SCR MAMMO BI INCL CAD: CPT | Mod: 26,,, | Performed by: RADIOLOGY

## 2023-05-03 PROCEDURE — 77063 MAMMO DIGITAL SCREENING BILAT WITH TOMO: ICD-10-PCS | Mod: 26,,, | Performed by: RADIOLOGY

## 2023-05-04 ENCOUNTER — TELEPHONE (OUTPATIENT)
Dept: NEUROLOGY | Facility: CLINIC | Age: 63
End: 2023-05-04
Payer: MEDICARE

## 2023-05-04 DIAGNOSIS — G35 MS (MULTIPLE SCLEROSIS): Primary | ICD-10-CM

## 2023-05-04 NOTE — TELEPHONE ENCOUNTER
Cem Meyers (Key: [id number])    Start form for Plegridy submitted via [facility]    Labs  WBC 5.32  ALC 2729    AST 27  ALT 14    RX being sent to [facility] pending patient response to NXEt message

## 2023-05-04 NOTE — TELEPHONE ENCOUNTER
----- Message from Rebeka Smith MD sent at 4/24/2023 12:34 PM CDT -----  Ronnie -- let's start plegridy IM.   I'll get labs this Wednesday in [place].

## 2023-05-09 NOTE — TELEPHONE ENCOUNTER
Spoke to the patient regarding start form completion and latex allergy. Patient states she received email from [employer] and will complete so she can receive titration clips. Patient states her allergy to Latex is when she leaves Latex tape on her skin to long it causes her to itch. Advised I would let Dr. Smith know. Once I receive ok from Dr. Smith on Plegridy / allergy, I will send new RX to [facility].

## 2023-05-10 ENCOUNTER — TELEPHONE (OUTPATIENT)
Dept: PHARMACY | Facility: CLINIC | Age: 63
End: 2023-05-10
Payer: MEDICARE

## 2023-05-10 RX ORDER — PEGINTERFERON BETA-1A 125 UG/.5ML
INJECTION, SOLUTION INTRAMUSCULAR
Qty: 1 ML | Refills: 0 | Status: ACTIVE | OUTPATIENT
Start: 2023-05-10 | End: 2023-07-24

## 2023-05-10 RX ORDER — PEGINTERFERON BETA-1A 125 UG/.5ML
125 INJECTION, SOLUTION INTRAMUSCULAR
Qty: 1 ML | Refills: 4 | Status: ACTIVE | OUTPATIENT
Start: 2023-05-10 | End: 2023-12-08

## 2023-05-10 NOTE — TELEPHONE ENCOUNTER
Hello, this is Brenton Chaves, clinical pharmacist with [facility] that is part of your care team.  We have begun working on your prescription, Plegridy, that your doctor has sent us. Our next steps include:     Working with your insurance company to obtain approval for your medication  Working with you to ensure your medication is affordable     We will be calling you along the way with updates on your medication but if you have any concerns or receive information that you would like to discuss please reach us at [phone].    Welcome call outcome: Patient/caregiver reached    Patient receiving Plegridy start  tomorrow.

## 2023-05-22 DIAGNOSIS — Z00.00 ENCOUNTER FOR MEDICAL EXAMINATION TO ESTABLISH CARE: Primary | ICD-10-CM

## 2023-05-24 ENCOUNTER — HOSPITAL ENCOUNTER (OUTPATIENT)
Dept: CARDIOLOGY | Facility: HOSPITAL | Age: 63
Discharge: HOME | End: 2023-05-24
Attending: INTERNAL MEDICINE
Payer: MEDICARE

## 2023-05-24 ENCOUNTER — OFFICE VISIT (OUTPATIENT)
Dept: CARDIOLOGY | Facility: CLINIC | Age: 63
End: 2023-05-24
Payer: MEDICARE

## 2023-05-24 ENCOUNTER — PATIENT MESSAGE (OUTPATIENT)
Dept: PHARMACY | Facility: CLINIC | Age: 63
End: 2023-05-24
Payer: MEDICARE

## 2023-05-24 VITALS
WEIGHT: 171.31 LBS | OXYGEN SATURATION: 97 % | HEART RATE: 68 BPM | WEIGHT: 171.31 LBS | SYSTOLIC BLOOD PRESSURE: 122 MMHG | BODY MASS INDEX: 30.35 KG/M2 | DIASTOLIC BLOOD PRESSURE: 70 MMHG | BODY MASS INDEX: 30.34 KG/M2 | HEIGHT: 63 IN

## 2023-05-24 DIAGNOSIS — G35 MS (MULTIPLE SCLEROSIS): ICD-10-CM

## 2023-05-24 DIAGNOSIS — G47.33 OSA ON CPAP: ICD-10-CM

## 2023-05-24 DIAGNOSIS — Z00.00 ENCOUNTER FOR MEDICAL EXAMINATION TO ESTABLISH CARE: ICD-10-CM

## 2023-05-24 DIAGNOSIS — E78.2 MIXED HYPERLIPIDEMIA: ICD-10-CM

## 2023-05-24 DIAGNOSIS — I50.32 CHRONIC DIASTOLIC HEART FAILURE: Primary | ICD-10-CM

## 2023-05-24 DIAGNOSIS — E78.49 OTHER HYPERLIPIDEMIA: ICD-10-CM

## 2023-05-24 DIAGNOSIS — E03.8 OTHER SPECIFIED HYPOTHYROIDISM: ICD-10-CM

## 2023-05-24 PROCEDURE — 3008F PR BODY MASS INDEX (BMI) DOCUMENTED: ICD-10-PCS | Mod: CPTII,S$GLB,, | Performed by: INTERNAL MEDICINE

## 2023-05-24 PROCEDURE — 99214 PR OFFICE/OUTPT VISIT, EST, LEVL IV, 30-39 MIN: ICD-10-PCS | Mod: S$GLB,,, | Performed by: INTERNAL MEDICINE

## 2023-05-24 PROCEDURE — 3008F BODY MASS INDEX DOCD: CPT | Mod: CPTII,S$GLB,, | Performed by: INTERNAL MEDICINE

## 2023-05-24 PROCEDURE — 93010 ELECTROCARDIOGRAM REPORT: CPT | Mod: ,,, | Performed by: INTERNAL MEDICINE

## 2023-05-24 PROCEDURE — 3078F PR MOST RECENT DIASTOLIC BLOOD PRESSURE < 80 MM HG: ICD-10-PCS | Mod: CPTII,S$GLB,, | Performed by: INTERNAL MEDICINE

## 2023-05-24 PROCEDURE — 3074F SYST BP LT 130 MM HG: CPT | Mod: CPTII,S$GLB,, | Performed by: INTERNAL MEDICINE

## 2023-05-24 PROCEDURE — 1159F MED LIST DOCD IN RCRD: CPT | Mod: CPTII,S$GLB,, | Performed by: INTERNAL MEDICINE

## 2023-05-24 PROCEDURE — 3074F PR MOST RECENT SYSTOLIC BLOOD PRESSURE < 130 MM HG: ICD-10-PCS | Mod: CPTII,S$GLB,, | Performed by: INTERNAL MEDICINE

## 2023-05-24 PROCEDURE — 1160F PR REVIEW ALL MEDS BY PRESCRIBER/CLIN PHARMACIST DOCUMENTED: ICD-10-PCS | Mod: CPTII,S$GLB,, | Performed by: INTERNAL MEDICINE

## 2023-05-24 PROCEDURE — 99999 PR PBB SHADOW E&M-EST. PATIENT-LVL IV: CPT | Mod: PBBFAC,,, | Performed by: INTERNAL MEDICINE

## 2023-05-24 PROCEDURE — 1160F RVW MEDS BY RX/DR IN RCRD: CPT | Mod: CPTII,S$GLB,, | Performed by: INTERNAL MEDICINE

## 2023-05-24 PROCEDURE — 99999 PR PBB SHADOW E&M-EST. PATIENT-LVL IV: ICD-10-PCS | Mod: PBBFAC,,, | Performed by: INTERNAL MEDICINE

## 2023-05-24 PROCEDURE — 99214 OFFICE O/P EST MOD 30 MIN: CPT | Mod: S$GLB,,, | Performed by: INTERNAL MEDICINE

## 2023-05-24 PROCEDURE — 93005 ELECTROCARDIOGRAM TRACING: CPT

## 2023-05-24 PROCEDURE — 93010 EKG 12-LEAD: ICD-10-PCS | Mod: ,,, | Performed by: INTERNAL MEDICINE

## 2023-05-24 PROCEDURE — 1159F PR MEDICATION LIST DOCUMENTED IN MEDICAL RECORD: ICD-10-PCS | Mod: CPTII,S$GLB,, | Performed by: INTERNAL MEDICINE

## 2023-05-24 PROCEDURE — 3078F DIAST BP <80 MM HG: CPT | Mod: CPTII,S$GLB,, | Performed by: INTERNAL MEDICINE
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
## 2023-05-26 ENCOUNTER — DOCUMENTATION ONLY (OUTPATIENT)
Dept: NEUROLOGY | Facility: CLINIC | Age: 63
End: 2023-05-26
Payer: MEDICARE

## 2023-05-31 DIAGNOSIS — G35 MULTIPLE SCLEROSIS: ICD-10-CM

## 2023-05-31 DIAGNOSIS — M79.2 NEUROPATHIC PAIN: ICD-10-CM

## 2023-06-02 RX ORDER — GABAPENTIN 800 MG/1
TABLET ORAL
Qty: 270 TABLET | Refills: 1 | Status: SHIPPED | OUTPATIENT
Start: 2023-06-02 | End: 2023-11-20 | Stop reason: SDUPTHER

## 2023-06-07 ENCOUNTER — SPECIALTY PHARMACY (OUTPATIENT)
Dept: PHARMACY | Facility: CLINIC | Age: 63
End: 2023-06-07
Payer: MEDICARE

## 2023-06-08 ENCOUNTER — SPECIALTY PHARMACY (OUTPATIENT)
Dept: PHARMACY | Facility: CLINIC | Age: 63
End: 2023-06-08
Payer: MEDICARE

## 2023-06-08 DIAGNOSIS — G35 MS (MULTIPLE SCLEROSIS): Primary | Chronic | ICD-10-CM

## 2023-06-08 NOTE — TELEPHONE ENCOUNTER
Specialty Pharmacy - Initial Clinical Assessment    Specialty Medication Orders Linked to Encounter      Flowsheet Row Most Recent Value   Medication #1 peginterferon beta-1a (PLEGRIDY) 125 mcg/0.5 mL Syrg (Order#[id number], Rx#[id number])          Patient Diagnosis   G35 - MS (multiple sclerosis)    Subjective    Cem Meyers is a 62 y.o. female, who is followed by the specialty pharmacy service for management and education.    Recent Encounters       Date Type Provider Description    06/08/2023 Specialty Pharmacy Lindsay Alonso, Vira Initial Clinical Assessment    06/07/2023 Specialty Pharmacy Lindsay Alonso, Vira Referral Authorization    02/27/2023 Specialty Pharmacy Lindsay Alonso PharmD     10/03/2022 Specialty Pharmacy Brenton Chaves, Vira Referral Authorization    08/26/2022 Specialty Pharmacy Idania Woo, MaurilioD Refill Coordination            Current Outpatient Medications   Medication Sig    baclofen (LIORESAL) 10 MG tablet TAKE 2 TABS ONE TIME DAILY IN THE MORNING, 2 TABS IN THE AFTERNOON, AND UP TO 3 TABS AT BEDTIME AS NEEDED    calcium citrate (CALCITRATE) 200 mg (950 mg) tablet Take 2 tablets by mouth once daily.    cholecalciferol, vitamin D3, 100 mcg (4,000 unit) Cap Take 4,000 Units by mouth once daily.     denosumab (PROLIA SUBQ) Inject into the skin.    fish oil-omega-3 fatty acids 300-1,000 mg capsule Take 2 g by mouth once daily.    gabapentin (NEURONTIN) 800 MG tablet TAKE 1 TABLET THREE TIMES DAILY    glucosamine-chondroitin 500-400 mg tablet Take 1 tablet by mouth once daily.     levothyroxine (SYNTHROID) 75 MCG tablet TAKE 1 TABLET BEFORE BREAKFAST    MAGNESIUM GLYCINATE ORAL     multivitamin capsule Take 1 capsule by mouth once daily.    paroxetine (PAXIL) 40 MG tablet Take 1 tablet (40 mg total) by mouth every morning.    peginterferon beta-1a (PLEGRIDY) 125 mcg/0.5 mL Syrg Inject 0.5 mLs (125 mcg total) into the muscle every 14 (fourteen) days.    peginterferon beta-1a (PLEGRIDY)  125 mcg/0.5 mL Syrg Inject 63 mcg into the muscle on Day 1 using the yellow clip, then inject 94 mcg into the muscle on Day 15 using the purple clip, then inject 125 mcg into the muscle every 14 days starting on Day 29. Please use titration clips provided by [employer]    simvastatin (ZOCOR) 40 MG tablet TAKE 1 TABLET EVERY DAY   Last reviewed on 6/8/2023  1:33 PM by Lindsay Alonso, PharmD    Review of patient's allergies indicates:   Allergen Reactions    Latex, natural rubber Rash   Last reviewed on  6/8/2023 1:32 PM by Lindsay Alonso    Drug Interactions    Drug interactions evaluated: yes  Clinically relevant drug interactions identified: no  Provided the patient with educational material regarding drug interactions: not applicable         Adverse Effects    *All other systems reviewed and are negative       Assessment Questions - Documented Responses      Flowsheet Row Most Recent Value   Assessment    Medication Reconciliation completed for patient Yes   During the past 4 weeks, has patient missed any activities due to condition or medication? No   During the past 4 weeks, did patient have any of the following urgent care visits? None   Goals of Therapy Status Discussed (new start)   Status of the patients ability to self-administer: Is Able   All education points have been covered with patient? Yes, supplemental printed education provided   Welcome packet contents reviewed and discussed with patient? Yes   Assesment completed? Yes   Plan Therapy being initiated   Do you need to open a clinical intervention (i-vent)? No   Do you want to schedule first shipment? Yes   Medication #1 Assessment Info    Patient status New medication, Exisiting to [facility]   Is this medication appropriate for the patient? Yes   Is this medication effective? Not yet started          Refill Questions - Documented Responses      Flowsheet Row Most Recent Value   Patient Availability and HIPAA Verification    Does patient want to proceed with  "activity? Yes   HIPAA/medical authority confirmed? Yes   Relationship to patient of person spoken to? Self   Refill Screening Questions    When does the patient need to receive the medication? 06/12/23   Refill Delivery Questions    How will the patient receive the medication? MEDRx   When does the patient need to receive the medication? 06/12/23   Shipping Address Home   Address in [facility] confirmed and updated if neccessary? Yes   Expected Copay ($) 0   Is the patient able to afford the medication copay? Yes   Payment Method zero copay   Days supply of Refill 28   Supplies needed? No supplies needed   Refill activity completed? Yes   Refill activity plan Refill scheduled   Shipment/Pickup Date: 06/09/23            Objective    She has a past medical history of Back pain, Broken toe (6/2015), Chronic diastolic heart failure (5/8/2018), Closed left arm fracture, Colon polyp, Depression, Hyperlipidemia, Hypothyroid, Immunosuppression (1/28/2019), MS (multiple sclerosis), Neurogenic bladder (12/6/2012), Osteoporosis, Polyneuropathy, Sleep apnea, and Trouble in sleeping.    Tried/failed medications: Rebif, Ocrevus, Avonex, Aubagio    BP Readings from Last 4 Encounters:   05/24/23 122/70   03/08/23 120/74   02/24/23 118/82   02/24/23 126/78     Ht Readings from Last 4 Encounters:   05/24/23 5' 3" (1.6 m)   05/03/23 5' 3" (1.6 m)   03/08/23 5' 3" (1.6 m)   02/24/23 5' 3" (1.6 m)     Wt Readings from Last 4 Encounters:   05/24/23 77.7 kg (171 lb 4.8 oz)   05/24/23 77.7 kg (171 lb 4.8 oz)   05/03/23 78.3 kg (172 lb 9.9 oz)   03/08/23 78.3 kg (172 lb 9.9 oz)       The goals of Multiple Sclerosis treatment include:  Reducing frequency and severity of exacerbations  Managing symptoms to improve or maintain physical and psychological functioning and optimal well-being  Preventing or postponing long-term disability to maintain independence  Improving or maintaining quality of life  Maintaining optimal therapy " adherence  Minimizing and managing comorbidities and side effects    Goals of Therapy Status: Discussed (new start)    Assessment/Plan  Patient plans to start therapy on 06/12/23      Indication, dosage, appropriateness, effectiveness, safety and convenience of her specialty medication(s) were reviewed today.     Patient Education   Patient received education on the following:   Expectations and possible outcomes of therapy  Proper use, timely administration, and missed dose management  Duration of therapy  Side effects, including prevention, minimization, and management  Contraindications and safety precautions  New or changed medications, including prescribe and over the counter medications and supplements  Reviews recommended vaccinations, as appropriate  Storage, safe handling, and disposal        Tasks added this encounter   No tasks added.   Tasks due within next 3 months   6/9/2023 - Initial Clinical Assessment/Patient Education (Annual Reassessment)  6/7/2023 - Set up Initial Fill     Lindsay Alonso, PharmD  [facility]  [address] [place] [id number]  Phone: [phone]  Fax: [phone]

## 2023-06-16 ENCOUNTER — PATIENT MESSAGE (OUTPATIENT)
Dept: ADMINISTRATIVE | Facility: OTHER | Age: 63
End: 2023-06-16
Payer: MEDICARE

## 2023-06-26 ENCOUNTER — PATIENT MESSAGE (OUTPATIENT)
Dept: NEUROLOGY | Facility: CLINIC | Age: 63
End: 2023-06-26
Payer: MEDICARE

## 2023-06-30 ENCOUNTER — SPECIALTY PHARMACY (OUTPATIENT)
Dept: PHARMACY | Facility: CLINIC | Age: 63
End: 2023-06-30
Payer: MEDICARE

## 2023-06-30 NOTE — TELEPHONE ENCOUNTER
Pt stated she last injected on the morning of 6/29. Pt's next dose will be on 7/13. Okay for a callback. Pending refill callback to 7/6

## 2023-07-06 NOTE — TELEPHONE ENCOUNTER
Specialty Pharmacy - Refill Coordination    Specialty Medication Orders Linked to Encounter      Flowsheet Row Most Recent Value   Medication #1 peginterferon beta-1a (PLEGRIDY) 125 mcg/0.5 mL Syrg (Order#[id number], Rx#[id number])            Refill Questions - Documented Responses      Flowsheet Row Most Recent Value   Patient Availability and HIPAA Verification    Does patient want to proceed with activity? Yes   HIPAA/medical authority confirmed? Yes   Relationship to patient of person spoken to? Self   Refill Screening Questions    Changes to allergies? No   Changes to medications? No   New conditions since last clinic visit? No   Unplanned office visit, urgent care, ED, or hospital admission in the last 4 weeks? No   How does patient/caregiver feel medication is working? Good   Financial problems or insurance changes? No   How many doses of your specialty medications were missed in the last 4 weeks? 0   Would patient like to speak to a pharmacist? No   When does the patient need to receive the medication? 07/13/23   Refill Delivery Questions    How will the patient receive the medication? MEDRx   When does the patient need to receive the medication? 07/13/23   Shipping Address Home   Address in [facility] confirmed and updated if neccessary? Yes   Expected Copay ($) 0   Is the patient able to afford the medication copay? Yes   Payment Method zero copay   Days supply of Refill 28   Supplies needed? No supplies needed   Refill activity completed? Yes   Refill activity plan Refill scheduled   Shipment/Pickup Date: 07/11/23            Current Outpatient Medications   Medication Sig    baclofen (LIORESAL) 10 MG tablet TAKE 2 TABS ONE TIME DAILY IN THE MORNING, 2 TABS IN THE AFTERNOON, AND UP TO 3 TABS AT BEDTIME AS NEEDED    calcium citrate (CALCITRATE) 200 mg (950 mg) tablet Take 2 tablets by mouth once daily.    cholecalciferol, vitamin D3, 100 mcg (4,000 unit) Cap Take 4,000 Units by mouth once daily.      denosumab (PROLIA SUBQ) Inject into the skin.    fish oil-omega-3 fatty acids 300-1,000 mg capsule Take 2 g by mouth once daily.    gabapentin (NEURONTIN) 800 MG tablet TAKE 1 TABLET THREE TIMES DAILY    glucosamine-chondroitin 500-400 mg tablet Take 1 tablet by mouth once daily.     levothyroxine (SYNTHROID) 75 MCG tablet TAKE 1 TABLET BEFORE BREAKFAST    MAGNESIUM GLYCINATE ORAL     multivitamin capsule Take 1 capsule by mouth once daily.    paroxetine (PAXIL) 40 MG tablet Take 1 tablet (40 mg total) by mouth every morning.    peginterferon beta-1a (PLEGRIDY) 125 mcg/0.5 mL Syrg Inject 0.5 mLs (125 mcg total) into the muscle every 14 (fourteen) days.    peginterferon beta-1a (PLEGRIDY) 125 mcg/0.5 mL Syrg Inject 63 mcg into the muscle on Day 1 using the yellow clip, then inject 94 mcg into the muscle on Day 15 using the purple clip, then inject 125 mcg into the muscle every 14 days starting on Day 29. Please use titration clips provided by [employer]    simvastatin (ZOCOR) 40 MG tablet TAKE 1 TABLET EVERY DAY   Last reviewed on 6/8/2023  1:33 PM by Lindsay Alonso, PharmD    Review of patient's allergies indicates:   Allergen Reactions    Latex, natural rubber Rash    Last reviewed on  6/8/2023 1:32 PM by Lindsay Alonso      Tasks added this encounter   No tasks added.   Tasks due within next 3 months   7/6/2023 - Refill Coordination Outreach (1 time occurrence)     Lindsay Alonso, PharmD  [facility]  [address] [place] [address]  Phone: [phone]  Fax: [phone]

## 2023-07-21 ENCOUNTER — PATIENT MESSAGE (OUTPATIENT)
Dept: PSYCHIATRY | Facility: CLINIC | Age: 63
End: 2023-07-21
Payer: MEDICARE

## 2023-07-24 ENCOUNTER — OFFICE VISIT (OUTPATIENT)
Dept: RHEUMATOLOGY | Facility: CLINIC | Age: 63
End: 2023-07-24
Payer: MEDICARE

## 2023-07-24 ENCOUNTER — INFUSION (OUTPATIENT)
Dept: INFUSION THERAPY | Facility: HOSPITAL | Age: 63
End: 2023-07-24
Attending: PHYSICIAN ASSISTANT
Payer: MEDICARE

## 2023-07-24 VITALS
BODY MASS INDEX: 29.57 KG/M2 | WEIGHT: 166.88 LBS | HEIGHT: 63 IN | HEART RATE: 66 BPM | SYSTOLIC BLOOD PRESSURE: 112 MMHG | DIASTOLIC BLOOD PRESSURE: 80 MMHG

## 2023-07-24 DIAGNOSIS — Z51.81 MEDICATION MONITORING ENCOUNTER: ICD-10-CM

## 2023-07-24 DIAGNOSIS — M81.0 AGE-RELATED OSTEOPOROSIS WITHOUT CURRENT PATHOLOGICAL FRACTURE: Primary | ICD-10-CM

## 2023-07-24 DIAGNOSIS — M65.342 TRIGGER FINGER, LEFT RING FINGER: ICD-10-CM

## 2023-07-24 PROCEDURE — 99214 PR OFFICE/OUTPT VISIT, EST, LEVL IV, 30-39 MIN: ICD-10-PCS | Mod: S$GLB,,, | Performed by: PHYSICIAN ASSISTANT

## 2023-07-24 PROCEDURE — 3008F BODY MASS INDEX DOCD: CPT | Mod: CPTII,S$GLB,, | Performed by: PHYSICIAN ASSISTANT

## 2023-07-24 PROCEDURE — 96372 THER/PROPH/DIAG INJ SC/IM: CPT

## 2023-07-24 PROCEDURE — 3074F SYST BP LT 130 MM HG: CPT | Mod: CPTII,S$GLB,, | Performed by: PHYSICIAN ASSISTANT

## 2023-07-24 PROCEDURE — 3074F PR MOST RECENT SYSTOLIC BLOOD PRESSURE < 130 MM HG: ICD-10-PCS | Mod: CPTII,S$GLB,, | Performed by: PHYSICIAN ASSISTANT

## 2023-07-24 PROCEDURE — 63600175 PHARM REV CODE 636 W HCPCS: Mod: JZ,JG | Performed by: PHYSICIAN ASSISTANT

## 2023-07-24 PROCEDURE — 99999 PR PBB SHADOW E&M-EST. PATIENT-LVL IV: CPT | Mod: PBBFAC,,, | Performed by: PHYSICIAN ASSISTANT

## 2023-07-24 PROCEDURE — 3079F PR MOST RECENT DIASTOLIC BLOOD PRESSURE 80-89 MM HG: ICD-10-PCS | Mod: CPTII,S$GLB,, | Performed by: PHYSICIAN ASSISTANT

## 2023-07-24 PROCEDURE — 3008F PR BODY MASS INDEX (BMI) DOCUMENTED: ICD-10-PCS | Mod: CPTII,S$GLB,, | Performed by: PHYSICIAN ASSISTANT

## 2023-07-24 PROCEDURE — 99999 PR PBB SHADOW E&M-EST. PATIENT-LVL IV: ICD-10-PCS | Mod: PBBFAC,,, | Performed by: PHYSICIAN ASSISTANT

## 2023-07-24 PROCEDURE — 3079F DIAST BP 80-89 MM HG: CPT | Mod: CPTII,S$GLB,, | Performed by: PHYSICIAN ASSISTANT

## 2023-07-24 PROCEDURE — 99214 OFFICE O/P EST MOD 30 MIN: CPT | Mod: S$GLB,,, | Performed by: PHYSICIAN ASSISTANT

## 2023-07-24 RX ADMIN — DENOSUMAB 60 MG: 60 INJECTION SUBCUTANEOUS at 11:07

## 2023-07-24 NOTE — PROGRESS NOTES
Subjective:       Patient ID: Cem Meyers is a 62 y.o. female.    Chief Complaint: Osteoporosis    Cem Meyers  is a 62 y.o. female comes in today for routine follow-up of osteoporosis.  She has multiple sclerosis with left foot drop.  Has an AFO but does not use often because she finds it more difficult to ambulate with AFO than without.  She uses a cane when she remembers.  She is a fall risk.  Has failed Fosamax in the past.  Had severe myalgias with Reclast.  Currently on Prolia and tolerating it well.  No fractures since last office visit.      Today also complaining of left ring and middle fingers triggering.  Has had injections in the past and done okay with them.  States she is not having any pain.  It is more of just the locking and catching in the morning.    Current Tx:   Prolia - due today   Vit D3 - 2000 units daily  Calcium OTC - once daily  Previous Tx:  Fosamax - failed  Reclast - severe myalgias  GERD: none   Gait:  unsteady - uses cane w ambulation; left foot drop - has more difficulty w brace than ambulating without it  Dental:  none recent/planned  History of Fragility fracture: left wrist fracture 2013    Rheumatologic systems otherwise negative.      Current Outpatient Medications:     baclofen (LIORESAL) 10 MG tablet, TAKE 2 TABS ONE TIME DAILY IN THE MORNING, 2 TABS IN THE AFTERNOON, AND UP TO 3 TABS AT BEDTIME AS NEEDED, Disp: 630 tablet, Rfl: 3    calcium citrate (CALCITRATE) 200 mg (950 mg) tablet, Take 2 tablets by mouth once daily., Disp: , Rfl:     cholecalciferol, vitamin D3, 100 mcg (4,000 unit) Cap, Take 4,000 Units by mouth once daily. , Disp: , Rfl:     denosumab (PROLIA SUBQ), Inject into the skin., Disp: , Rfl:     fish oil-omega-3 fatty acids 300-1,000 mg capsule, Take 2 g by mouth once daily., Disp: , Rfl:     gabapentin (NEURONTIN) 800 MG tablet, TAKE 1 TABLET THREE TIMES DAILY, Disp: 270 tablet, Rfl: 1    glucosamine-chondroitin 500-400 mg tablet, Take 1 tablet by mouth  once daily. , Disp: , Rfl:     levothyroxine (SYNTHROID) 75 MCG tablet, TAKE 1 TABLET BEFORE BREAKFAST, Disp: 90 tablet, Rfl: 3    MAGNESIUM GLYCINATE ORAL, , Disp: , Rfl:     multivitamin capsule, Take 1 capsule by mouth once daily., Disp: , Rfl:     paroxetine (PAXIL) 40 MG tablet, Take 1 tablet (40 mg total) by mouth every morning., Disp: 90 tablet, Rfl: 3    peginterferon beta-1a (PLEGRIDY) 125 mcg/0.5 mL Syrg, Inject 0.5 mLs (125 mcg total) into the muscle every 14 (fourteen) days., Disp: 1 mL, Rfl: 4    simvastatin (ZOCOR) 40 MG tablet, TAKE 1 TABLET EVERY DAY, Disp: 90 tablet, Rfl: 1  No current facility-administered medications for this visit.  Past Medical History:   Diagnosis Date    Back pain     Broken toe 6/2015    left big toe    Chronic diastolic heart failure 5/8/2018    Closed left arm fracture     Colon polyp     Depression     Hyperlipidemia     Hypothyroid     Immunosuppression 1/28/2019    MS (multiple sclerosis)     Neurogenic bladder 12/6/2012    Osteoporosis     Polyneuropathy     Sleep apnea     Trouble in sleeping      Family History   Problem Relation Age of Onset    Pancreatic cancer Mother     Stomach cancer Mother     Cancer Mother         pancreatic, stomach    Hypertension Father     Heart disease Father         MI    Hearing loss Father     Lupus Maternal Aunt     Rheum arthritis Maternal Aunt     Rheum arthritis Sister     Melanoma Neg Hx      Social History     Socioeconomic History    Marital status:    Tobacco Use    Smoking status: Never    Smokeless tobacco: Never   Substance and Sexual Activity    Alcohol use: Yes     Alcohol/week: 1.0 standard drink     Types: 1 Glasses of wine per week    Drug use: No    Sexual activity: Not Currently     Partners: Male     Birth control/protection: Abstinence, Post-menopausal   Other Topics Concern    Are you pregnant or think you may be? No    Breast-feeding No     Social Determinants of Health     Financial Resource Strain: Low  "Risk     Difficulty of Paying Living Expenses: Not very hard   Food Insecurity: No Food Insecurity    Worried About Running Out of Food in the Last Year: Never true    Ran Out of Food in the Last Year: Never true   Transportation Needs: No Transportation Needs    Lack of Transportation (Medical): No    Lack of Transportation (Non-Medical): No   Physical Activity: Sufficiently Active    Days of Exercise per Week: 7 days    Minutes of Exercise per Session: 30 min   Stress: No Stress Concern Present    Feeling of Stress : Only a little   Social Connections: Unknown    Frequency of Communication with Friends and Family: More than three times a week    Frequency of Social Gatherings with Friends and Family: More than three times a week    Active Member of Clubs or Organizations: Yes    Attends Club or Organization Meetings: More than 4 times per year    Marital Status:    Housing Stability: Low Risk     Unable to Pay for Housing in the Last Year: No    Number of Places Lived in the Last Year: 1    Unstable Housing in the Last Year: No     Review of patient's allergies indicates:   Allergen Reactions    Latex, natural rubber Rash       Objective:   /80   Pulse 66   Ht 5' 3" (1.6 m)   Wt 75.7 kg (166 lb 14.2 oz)   BMI 29.56 kg/m²   Immunization History   Administered Date(s) Administered    COVID-19, MRNA, LN-S, PF (MODERNA FULL 0.5 ML DOSE) 03/16/2021, 04/13/2021, 11/18/2021, 06/01/2022    Hepatitis B (recombinant) Adjuvanted, 2 dose 02/05/2019    Influenza 10/02/2018    Influenza - Quadrivalent - PF *Preferred* (6 months and older) 10/03/2018, 10/03/2018, 09/26/2019, 09/26/2019, 10/01/2020, 10/06/2021, 09/14/2022    Influenza Split 10/18/2011, 10/15/2013    Pneumococcal Conjugate - 13 Valent 02/05/2019    Pneumococcal Conjugate - 20 Valent 03/08/2023    Tdap 09/07/2012       Physical Exam   Constitutional: She is oriented to person, place, and time. She appears well-developed and well-nourished.   HENT: "   Head: Normocephalic and atraumatic.   Mouth/Throat: Mucous membranes are normal. No oral lesions. No dental abscesses.   Pulmonary/Chest: Effort normal.   Neurological: She is alert and oriented to person, place, and time.   Skin: Skin is warm and dry. No rash noted.   Psychiatric: Her behavior is normal.   Nursing note and vitals reviewed.  Active triggering left ring finger  No TTP [id number] long nor ring fingers     Recent Results (from the past 336 hour(s))   Comprehensive Metabolic Panel    Collection Time: 07/24/23  9:44 AM   Result Value Ref Range    Sodium 140 136 - 145 mmol/L    Potassium 4.0 3.5 - 5.1 mmol/L    Chloride 105 95 - 110 mmol/L    CO2 25 23 - 29 mmol/L    Glucose 84 70 - 110 mg/dL    BUN 14 8 - 23 mg/dL    Creatinine 1.0 0.5 - 1.4 mg/dL    Calcium 10.1 8.7 - 10.5 mg/dL    Total Protein 7.5 6.0 - 8.4 g/dL    Albumin 3.8 3.5 - 5.2 g/dL    Total Bilirubin 0.5 0.1 - 1.0 mg/dL    Alkaline Phosphatase 67 55 - 135 U/L    AST 29 10 - 40 U/L    ALT 17 10 - 44 U/L    eGFR >60 >60 mL/min/1.73 m^2    Anion Gap 10 8 - 16 mmol/L       Lab Results   Component Value Date    TBGOLDPLUS Negative 06/03/2021      Lab Results   Component Value Date    HEPBCAB Negative 07/17/2019    HEPCAB Negative 01/25/2019        DEXA - I have personally reviewed results from 6/2022   FINDINGS:  The L1 to L4 vertebral bone mineral density is equal to 1.13 g/cm squared with a T score of -0.5.  There has been a 3.3% statistically significant change relative to the prior study.     The left femoral neck bone mineral density is equal to 0.801 g/cm squared with a T score of -1.7.  There has been  no significant change relative to the prior study.     There is a 6.6% risk of a major osteoporotic fracture and a 0.7% risk of hip fracture in the next 10 years (FRAX).     Impression:  Osteopenia    Assessment:     FINDINGS:  The L1 to L4 vertebral bone mineral density is equal to 1.13 g/cm squared with a T score of -0.5.  There has been a  3.3% statistically significant change relative to the prior study.     The left femoral neck bone mineral density is equal to 0.801 g/cm squared with a T score of -1.7.  There has been  no significant change relative to the prior study.     There is a 6.6% risk of a major osteoporotic fracture and a 0.7% risk of hip fracture in the next 10 years (FRAX).     Impression:  Osteopenia        Plan:     Cem was seen today for osteoporosis.    Diagnoses and all orders for this visit:    Age-related osteoporosis without current pathological fracture  -     Comprehensive Metabolic Panel; Standing  -     Vitamin D; Standing    Medication monitoring encounter    Trigger finger, left ring finger        Osteoporosis  Calcium 10.1 - WNL  GFR > 60  Vit D is pending  No contra indication to Prolia today  Check CMP in 10-14 days in pts w CKD  Patient instructed to notify the office if he/she has any new falls or new fractures  Discussed risks associated w Prolia including but not limited to hypocalcemia, ONJ, AFF  DEXA due 6/2024  Trigger finger left hand  Briefly discussed corticosteroid injection.  Patient deferred for now  If symptoms worsen happy to inject down the road.  Return to clinic: 6mos - CMP 1 week prior    Follow up in about 6 months (around 1/24/2024).    The patient understands, chooses and consents to this plan and accepts all the risks which include but are not limited to the risks mentioned above.     Disclaimer: This note was prepared using a voice recognition system and is likely to have sound alike errors within the text.

## 2023-07-26 ENCOUNTER — PATIENT MESSAGE (OUTPATIENT)
Dept: RHEUMATOLOGY | Facility: CLINIC | Age: 63
End: 2023-07-26
Payer: MEDICARE

## 2023-08-04 ENCOUNTER — SPECIALTY PHARMACY (OUTPATIENT)
Dept: PHARMACY | Facility: CLINIC | Age: 63
End: 2023-08-04
Payer: MEDICARE

## 2023-08-04 NOTE — TELEPHONE ENCOUNTER
Specialty Pharmacy - Refill Coordination    Specialty Medication Orders Linked to Encounter      Flowsheet Row Most Recent Value   Medication #1 peginterferon beta-1a (PLEGRIDY) 125 mcg/0.5 mL Syrg (Order#[id number], Rx#[id number])            Refill Questions - Documented Responses      Flowsheet Row Most Recent Value   Patient Availability and HIPAA Verification    Does patient want to proceed with activity? Yes   HIPAA/medical authority confirmed? Yes   Relationship to patient of person spoken to? Self   Refill Screening Questions    Changes to allergies? No   Changes to medications? No   New conditions since last clinic visit? No   Unplanned office visit, urgent care, ED, or hospital admission in the last 4 weeks? No   How does patient/caregiver feel medication is working? Good   Financial problems or insurance changes? No   How many doses of your specialty medications were missed in the last 4 weeks? 0   Would patient like to speak to a pharmacist? No   When does the patient need to receive the medication? 08/09/23   Refill Delivery Questions    How will the patient receive the medication? [facility]   When does the patient need to receive the medication? 08/09/23   Shipping Address Home   Address in [facility] confirmed and updated if neccessary? Yes   Expected Copay ($) 0   Is the patient able to afford the medication copay? Yes   Payment Method zero copay   Days supply of Refill 28   Supplies needed? No supplies needed   Refill activity completed? Yes   Refill activity plan Refill scheduled   Shipment/Pickup Date: 08/07/23            Current Outpatient Medications   Medication Sig    baclofen (LIORESAL) 10 MG tablet TAKE 2 TABS ONE TIME DAILY IN THE MORNING, 2 TABS IN THE AFTERNOON, AND UP TO 3 TABS AT BEDTIME AS NEEDED    calcium citrate (CALCITRATE) 200 mg (950 mg) tablet Take 2 tablets by mouth once daily.    cholecalciferol, vitamin D3, 100 mcg (4,000 unit) Cap Take 4,000 Units by mouth once daily.      denosumab (PROLIA SUBQ) Inject into the skin.    fish oil-omega-3 fatty acids 300-1,000 mg capsule Take 2 g by mouth once daily.    gabapentin (NEURONTIN) 800 MG tablet TAKE 1 TABLET THREE TIMES DAILY    glucosamine-chondroitin 500-400 mg tablet Take 1 tablet by mouth once daily.     levothyroxine (SYNTHROID) 75 MCG tablet TAKE 1 TABLET BEFORE BREAKFAST    MAGNESIUM GLYCINATE ORAL     multivitamin capsule Take 1 capsule by mouth once daily.    paroxetine (PAXIL) 40 MG tablet Take 1 tablet (40 mg total) by mouth every morning.    peginterferon beta-1a (PLEGRIDY) 125 mcg/0.5 mL Syrg Inject 0.5 mLs (125 mcg total) into the muscle every 14 (fourteen) days.    simvastatin (ZOCOR) 40 MG tablet TAKE 1 TABLET EVERY DAY   Last reviewed on 7/24/2023 10:56 AM by Mar Davey, RN    Review of patient's allergies indicates:   Allergen Reactions    Latex, natural rubber Rash    Last reviewed on  7/24/2023 10:56 AM by Mar Davey      Tasks added this encounter   No tasks added.   Tasks due within next 3 months   8/4/2023 - Refill Coordination Outreach (1 time occurrence)     Lindsay Alonso, PharmD  [facility]  [address] [place] [id number]  Phone: [phone]  Fax: [phone]

## 2023-08-14 ENCOUNTER — OFFICE VISIT (OUTPATIENT)
Dept: OPHTHALMOLOGY | Facility: CLINIC | Age: 63
End: 2023-08-14
Payer: MEDICARE

## 2023-08-14 ENCOUNTER — PATIENT MESSAGE (OUTPATIENT)
Dept: ADMINISTRATIVE | Facility: HOSPITAL | Age: 63
End: 2023-08-14
Payer: MEDICARE

## 2023-08-14 DIAGNOSIS — H40.013 OPEN ANGLE WITH BORDERLINE FINDINGS, LOW RISK, BILATERAL: Primary | ICD-10-CM

## 2023-08-14 DIAGNOSIS — Z86.69 HISTORY OF OPTIC NEURITIS: ICD-10-CM

## 2023-08-14 DIAGNOSIS — G35 MS (MULTIPLE SCLEROSIS): ICD-10-CM

## 2023-08-14 DIAGNOSIS — H26.9 CORTICAL CATARACT OF BOTH EYES: ICD-10-CM

## 2023-08-14 PROCEDURE — 92083 EXTENDED VISUAL FIELD XM: CPT | Mod: S$GLB,,, | Performed by: OPHTHALMOLOGY

## 2023-08-14 PROCEDURE — 92133 CPTRZD OPH DX IMG PST SGM ON: CPT | Mod: S$GLB,,, | Performed by: OPHTHALMOLOGY

## 2023-08-14 PROCEDURE — 99213 OFFICE O/P EST LOW 20 MIN: CPT | Mod: S$GLB,,, | Performed by: OPHTHALMOLOGY

## 2023-08-14 PROCEDURE — 92083 HUMPHREY VISUAL FIELD - OU - BOTH EYES: ICD-10-PCS | Mod: S$GLB,,, | Performed by: OPHTHALMOLOGY

## 2023-08-14 PROCEDURE — 99999 PR PBB SHADOW E&M-EST. PATIENT-LVL III: ICD-10-PCS | Mod: PBBFAC,,, | Performed by: OPHTHALMOLOGY

## 2023-08-14 PROCEDURE — 92133 POSTERIOR SEGMENT OCT OPTIC NERVE(OCULAR COHERENCE TOMOGRAPHY) - OU - BOTH EYES: ICD-10-PCS | Mod: S$GLB,,, | Performed by: OPHTHALMOLOGY

## 2023-08-14 PROCEDURE — 1160F RVW MEDS BY RX/DR IN RCRD: CPT | Mod: CPTII,S$GLB,, | Performed by: OPHTHALMOLOGY

## 2023-08-14 PROCEDURE — 1159F PR MEDICATION LIST DOCUMENTED IN MEDICAL RECORD: ICD-10-PCS | Mod: CPTII,S$GLB,, | Performed by: OPHTHALMOLOGY

## 2023-08-14 PROCEDURE — 99999 PR PBB SHADOW E&M-EST. PATIENT-LVL III: CPT | Mod: PBBFAC,,, | Performed by: OPHTHALMOLOGY

## 2023-08-14 PROCEDURE — 1159F MED LIST DOCD IN RCRD: CPT | Mod: CPTII,S$GLB,, | Performed by: OPHTHALMOLOGY

## 2023-08-14 PROCEDURE — 99213 PR OFFICE/OUTPT VISIT, EST, LEVL III, 20-29 MIN: ICD-10-PCS | Mod: S$GLB,,, | Performed by: OPHTHALMOLOGY

## 2023-08-14 PROCEDURE — 1160F PR REVIEW ALL MEDS BY PRESCRIBER/CLIN PHARMACIST DOCUMENTED: ICD-10-PCS | Mod: CPTII,S$GLB,, | Performed by: OPHTHALMOLOGY

## 2023-08-14 NOTE — PROGRESS NOTES
"SUBJECTIVE  Cem Meyers is 63 y.o. female  Corrected distance visual acuity was 20/20 in the right eye and 20/20 -1 in the left eye.   Chief Complaint   Patient presents with    Glaucoma     6 month follow up glaucoma with HVF 24-2, GOCT and Dilation. VA is doing well, no changes. No pain or irritation. Not using any gtts.          HPI     Glaucoma     Additional comments: 6 month follow up glaucoma with HVF 24-2, GOCT and   Dilation. VA is doing well, no changes. No pain or irritation. Not using   any gtts.           Comments    Referred by Self    1. MS x 1989 (sees Nat [facility]ORGE)  HX "Optic Neuritis" episodes - dark hashmarks in vision lasting several   hours when fatigued   2. COAG suspect OU(+FM HX COAG PGM)  GOCT changes OU - old optic neuritis more likely than LTG   3. Mild Cortical cataract            Last edited by Tunde Alonso on 8/14/2023  1:28 PM.         Assessment /Plan :  1. Open angle with borderline findings, low risk, bilateral No evidence of glaucoma at this time but based on risk factors recommend to continue monitoring.    Return to clinic in 1 year  or as needed.  With GOCT, Dilation, and HVF 24-2     2. Cortical cataract of both eyes  -- Condition stable, no therapeutic change required. Monitoring routinely.                 "

## 2023-08-17 ENCOUNTER — PATIENT OUTREACH (OUTPATIENT)
Dept: ADMINISTRATIVE | Facility: HOSPITAL | Age: 63
End: 2023-08-17
Payer: MEDICARE

## 2023-08-17 NOTE — PROGRESS NOTES
Epic CAMPAIGN: pt is due for Lipid panel, pt sent reply asking when she would like to be scheduled for the Fasting lab, waiting for reply.

## 2023-08-28 ENCOUNTER — TELEPHONE (OUTPATIENT)
Dept: INTERNAL MEDICINE | Facility: CLINIC | Age: 63
End: 2023-08-28
Payer: MEDICARE

## 2023-08-28 NOTE — TELEPHONE ENCOUNTER
Attempt to contact pt in regards to rescheduling appt due to PCP will be out of clinic; lvm for callback /LD

## 2023-09-02 DIAGNOSIS — E03.8 OTHER SPECIFIED HYPOTHYROIDISM: Chronic | ICD-10-CM

## 2023-09-02 DIAGNOSIS — F32.A DEPRESSION, UNSPECIFIED DEPRESSION TYPE: ICD-10-CM

## 2023-09-02 DIAGNOSIS — G35 MULTIPLE SCLEROSIS: ICD-10-CM

## 2023-09-02 NOTE — TELEPHONE ENCOUNTER
Care Due:                  Date            Visit Type   Department     Provider  --------------------------------------------------------------------------------                                MYCHART                              FOLLOWUP/OF  [facility]  Last Visit: 03-      [facility] VISIT   MEDICINE       Keagan Calles  Next Visit: None Scheduled  None         None Found                                                            Last  Test          Frequency    Reason                     Performed    Due Date  --------------------------------------------------------------------------------    Lipid Panel.  12 months..  simvastatin..............  08- 08-    [facility] Embedded Care Due Messages. Reference number: [id number].   9/02/2023 2:03:06 AM CDT

## 2023-09-03 RX ORDER — PAROXETINE HYDROCHLORIDE 40 MG/1
40 TABLET, FILM COATED ORAL EVERY MORNING
Qty: 90 TABLET | Refills: 1 | Status: SHIPPED | OUTPATIENT
Start: 2023-09-03

## 2023-09-03 NOTE — TELEPHONE ENCOUNTER
Refill Routing Note   Medication(s) are not appropriate for processing by [facility] for the following reason(s):      Required labs outdated    [facility] action(s):  Defer  Approve Care Due:  None identified     Medication Therapy Plan: FLOS      Appointments  past 12m or future 3m with PCP    Date Provider   Last Visit   3/8/2023 Keagan Calles MD   Next Visit   Visit date not found Keagan Calles MD   ED visits in past 90 days: 0        Note composed:1:48 AM 09/03/2023

## 2023-09-05 RX ORDER — LEVOTHYROXINE SODIUM 75 UG/1
TABLET ORAL
Qty: 90 TABLET | Refills: 3 | Status: SHIPPED | OUTPATIENT
Start: 2023-09-05

## 2023-09-06 ENCOUNTER — LAB VISIT (OUTPATIENT)
Dept: LAB | Facility: HOSPITAL | Age: 63
End: 2023-09-06
Attending: FAMILY MEDICINE
Payer: MEDICARE

## 2023-09-06 DIAGNOSIS — E78.49 OTHER HYPERLIPIDEMIA: ICD-10-CM

## 2023-09-06 PROCEDURE — 80061 LIPID PANEL: CPT | Performed by: FAMILY MEDICINE

## 2023-09-06 PROCEDURE — 36415 COLL VENOUS BLD VENIPUNCTURE: CPT | Performed by: FAMILY MEDICINE

## 2023-09-07 LAB
CHOLEST SERPL-MCNC: 179 MG/DL (ref 120–199)
CHOLEST/HDLC SERPL: 2.9 {RATIO} (ref 2–5)
HDLC SERPL-MCNC: 61 MG/DL (ref 40–75)
HDLC SERPL: 34.1 % (ref 20–50)
LDLC SERPL CALC-MCNC: 107.6 MG/DL (ref 63–159)
NONHDLC SERPL-MCNC: 118 MG/DL
TRIGL SERPL-MCNC: 52 MG/DL (ref 30–150)

## 2023-09-08 ENCOUNTER — TELEPHONE (OUTPATIENT)
Dept: RHEUMATOLOGY | Facility: CLINIC | Age: 63
End: 2023-09-08
Payer: MEDICARE

## 2023-10-04 ENCOUNTER — OFFICE VISIT (OUTPATIENT)
Dept: SURGERY | Facility: CLINIC | Age: 63
End: 2023-10-04
Payer: MEDICARE

## 2023-10-04 VITALS
HEART RATE: 95 BPM | BODY MASS INDEX: 30.11 KG/M2 | WEIGHT: 170 LBS | OXYGEN SATURATION: 95 % | SYSTOLIC BLOOD PRESSURE: 118 MMHG | RESPIRATION RATE: 18 BRPM | DIASTOLIC BLOOD PRESSURE: 83 MMHG

## 2023-10-04 DIAGNOSIS — K59.00 CONSTIPATION, UNSPECIFIED CONSTIPATION TYPE: Primary | ICD-10-CM

## 2023-10-04 PROCEDURE — 99214 OFFICE O/P EST MOD 30 MIN: CPT | Mod: 25,S$GLB,,

## 2023-10-04 PROCEDURE — 3008F PR BODY MASS INDEX (BMI) DOCUMENTED: ICD-10-PCS | Mod: CPTII,S$GLB,,

## 2023-10-04 PROCEDURE — 3074F SYST BP LT 130 MM HG: CPT | Mod: CPTII,S$GLB,,

## 2023-10-04 PROCEDURE — 1159F MED LIST DOCD IN RCRD: CPT | Mod: CPTII,S$GLB,,

## 2023-10-04 PROCEDURE — 1159F PR MEDICATION LIST DOCUMENTED IN MEDICAL RECORD: ICD-10-PCS | Mod: CPTII,S$GLB,,

## 2023-10-04 PROCEDURE — 99999 PR PBB SHADOW E&M-EST. PATIENT-LVL IV: ICD-10-PCS | Mod: PBBFAC,,,

## 2023-10-04 PROCEDURE — 46600 PR DIAG2STIC A2SCOPY: ICD-10-PCS | Mod: S$GLB,,,

## 2023-10-04 PROCEDURE — 99999 PR PBB SHADOW E&M-EST. PATIENT-LVL IV: CPT | Mod: PBBFAC,,,

## 2023-10-04 PROCEDURE — 46600 DIAGNOSTIC ANOSCOPY SPX: CPT | Mod: S$GLB,,,

## 2023-10-04 PROCEDURE — 3079F DIAST BP 80-89 MM HG: CPT | Mod: CPTII,S$GLB,,

## 2023-10-04 PROCEDURE — 3074F PR MOST RECENT SYSTOLIC BLOOD PRESSURE < 130 MM HG: ICD-10-PCS | Mod: CPTII,S$GLB,,

## 2023-10-04 PROCEDURE — 3079F PR MOST RECENT DIASTOLIC BLOOD PRESSURE 80-89 MM HG: ICD-10-PCS | Mod: CPTII,S$GLB,,

## 2023-10-04 PROCEDURE — 99214 PR OFFICE/OUTPT VISIT, EST, LEVL IV, 30-39 MIN: ICD-10-PCS | Mod: 25,S$GLB,,

## 2023-10-04 PROCEDURE — 3008F BODY MASS INDEX DOCD: CPT | Mod: CPTII,S$GLB,,

## 2023-10-04 NOTE — PROGRESS NOTES
History & Physical    SUBJECTIVE:     CC: constipation     History of Present Illness:  Patient is a 63 y.o. female presents with constipation. States that over the past year, has to digitize rectum to expel stool as she feels like it gets stuck in a pocket. Also feels like bowels are moving and she is constipated more than usual.  States that she has tried Linzess in the past and has seen GI years ago for similar symptoms. Did not find much relief in the past from Linzess.  Patient states that she has a bowel movement about 1 time a week, takes Metamucil daily which does help, does not take any stool softeners laxatives, denies hard stools and straining, drinks less than 64 oz of water daily.  Last colonoscopy April 2022 that was normal with repeat recommended in 5 years; patient with personal history of colon polyps.  Denies family history of colorectal cancer, IBD, polyps.  Patient denies any surgery on her abdomen.  In addition, complains of occasional incontinence to soft stool when she takes too much Metamucil; states that it has not happened in the past couple of months, when it did happen it was never a full bowel movement on herself.  Upon review of external notes, patient has complained about these symptoms since 2016. Attributes symptoms not improving to MS. No hx vaginal deliveries, no previous PFPT. Denies nausea, vomiting, fevers, chills, urinary incontinence, abdominal pain, rectal bleeding, rectal pain.    Chief Complaint   Patient presents with    Constipation       Review of patient's allergies indicates:   Allergen Reactions    Latex, natural rubber Rash       Current Outpatient Medications   Medication Sig Dispense Refill    baclofen (LIORESAL) 10 MG tablet TAKE 2 TABS ONE TIME DAILY IN THE MORNING, 2 TABS IN THE AFTERNOON, AND UP TO 3 TABS AT BEDTIME AS NEEDED 630 tablet 3    calcium citrate (CALCITRATE) 200 mg (950 mg) tablet Take 2 tablets by mouth once daily.      cholecalciferol, vitamin D3,  100 mcg (4,000 unit) Cap Take 4,000 Units by mouth once daily.       denosumab (PROLIA SUBQ) Inject into the skin.      fish oil-omega-3 fatty acids 300-1,000 mg capsule Take 2 g by mouth once daily.      gabapentin (NEURONTIN) 800 MG tablet TAKE 1 TABLET THREE TIMES DAILY 270 tablet 1    glucosamine-chondroitin 500-400 mg tablet Take 1 tablet by mouth once daily.       levothyroxine (SYNTHROID) 75 MCG tablet TAKE 1 TABLET BEFORE BREAKFAST 90 tablet 3    MAGNESIUM GLYCINATE ORAL       multivitamin capsule Take 1 capsule by mouth once daily.      paroxetine (PAXIL) 40 MG tablet TAKE 1 TABLET EVERY MORNING 90 tablet 1    peginterferon beta-1a (PLEGRIDY) 125 mcg/0.5 mL Syrg Inject 0.5 mLs (125 mcg total) into the muscle every 14 (fourteen) days. 1 mL 4    simvastatin (ZOCOR) 40 MG tablet TAKE 1 TABLET EVERY DAY 90 tablet 1    varicella-zoster gE-AS01B, PF, (SHINGRIX, PF,) 50 mcg/0.5 mL injection Inject into the muscle. 1 each 0     No current facility-administered medications for this visit.       Past Medical History:   Diagnosis Date    Back pain     Broken toe 6/2015    left big toe    Chronic diastolic heart failure 5/8/2018    Closed left arm fracture     Colon polyp     Depression     Hyperlipidemia     Hypothyroid     Immunosuppression 1/28/2019    MS (multiple sclerosis)     Neurogenic bladder 12/6/2012    Osteoporosis     Polyneuropathy     Sleep apnea     Trouble in sleeping      Past Surgical History:   Procedure Laterality Date    BREAST BIOPSY Left 10/28/2020    COLONOSCOPY N/A 09/01/2017    Procedure: COLONOSCOPY;  Surgeon: Andriy Villar MD;  Location: [facility] END[facility];  Service: Endoscopy;  Laterality: N/A;    COLONOSCOPY N/A 01/06/2021    Procedure: COLONOSCOPY;  Surgeon: Althea Casanova MD;  Location: [facility] ENDO;  Service: Endoscopy;  Laterality: N/A;    COLONOSCOPY N/A 04/25/2022    Procedure: COLONOSCOPY;  Surgeon: Cristiano Aaron MD;  Location: [facility];  Service: Endoscopy;  Laterality: N/A;     FRACTURE SURGERY Left 2013    wrist- [facility]    KNEE SURGERY Right 1989    in [place]    TONSILLECTOMY  1966     Family History   Problem Relation Age of Onset    Pancreatic cancer Mother     Stomach cancer Mother     Cancer Mother         pancreatic, stomach    Hypertension Father     Heart disease Father         MI    Hearing loss Father     Lupus Maternal Aunt     Rheum arthritis Maternal Aunt     Rheum arthritis Sister     Melanoma Neg Hx      Social History     Tobacco Use    Smoking status: Never    Smokeless tobacco: Never   Substance Use Topics    Alcohol use: Yes     Alcohol/week: 1.0 standard drink of alcohol     Types: 1 Glasses of wine per week    Drug use: No        Review of Systems:  Review of Systems   Constitutional:  Negative for activity change, appetite change, chills, fatigue, fever and unexpected weight change.   HENT:  Negative for congestion, ear pain, sore throat and trouble swallowing.    Eyes:  Negative for pain, redness and itching.   Respiratory:  Negative for cough, shortness of breath and wheezing.    Cardiovascular:  Negative for chest pain, palpitations and leg swelling.   Gastrointestinal:  Positive for constipation. Negative for abdominal distention, abdominal pain, anal bleeding, blood in stool, diarrhea, nausea, rectal pain and vomiting.   Endocrine: Negative for cold intolerance, heat intolerance and polyuria.   Genitourinary:  Negative for dysuria, flank pain, frequency and hematuria.   Musculoskeletal:  Negative for gait problem, joint swelling and neck pain.   Skin:  Negative for color change, rash and wound.   Allergic/Immunologic: Negative for environmental allergies and immunocompromised state.   Neurological:  Negative for dizziness, speech difficulty, weakness and numbness.   Psychiatric/Behavioral:  Negative for agitation, confusion and hallucinations.        OBJECTIVE:     Vital Signs (Most Recent)  Pulse: 95 (10/04/23 0944)  Resp: 18 (10/04/23 0944)  BP: 118/83 (10/04/23  0944)  SpO2: 95 % (10/04/23 0944)     77.1 kg (170 lb)     Physical Exam:  Physical Exam  Vitals reviewed. Exam conducted with a chaperone present.   Constitutional:       General: She is not in acute distress.     Appearance: Normal appearance. She is well-developed and normal weight. She is not ill-appearing or toxic-appearing.   HENT:      Head: Normocephalic and atraumatic.      Right Ear: External ear normal.      Left Ear: External ear normal.      Nose: Nose normal.   Cardiovascular:      Rate and Rhythm: Normal rate.   Pulmonary:      Effort: Pulmonary effort is normal. No respiratory distress.   Abdominal:      General: Abdomen is flat. There is no distension.      Palpations: Abdomen is soft.      Tenderness: There is no abdominal tenderness.   Genitourinary:     Comments: Anorectal: No external masses or lesions. No TTP. MARCIE with very small anterior rectocele, decreased global tone, no blood, and no palpable masses or lesions.   Musculoskeletal:         General: Normal range of motion.      Cervical back: Normal range of motion and neck supple.   Skin:     General: Skin is warm and dry.   Neurological:      Mental Status: She is alert and oriented to person, place, and time.   Psychiatric:         Mood and Affect: Mood normal.         Behavior: Behavior normal.     Anoscopy Procedure Note    Pre-procedure diagnosis: Constipation    Post-procedure diagnosis: Internal hemorrhoids    Procedure: Anoscopy    Surgeon: Lilli Manzanares PA-C    Assistant: Shahida Hester RN     Specimen: none    Findings: Anoscope inserted and all 4 quadrants examined. Moderately enlarged left lateral & mildly enlarged right anterior/right posterior internal hemorrhoids without evidence of recent bleeding. No other internal masses or lesions visualized.     Patient tolerated procedure well.        Diagnostic Results:  Colonoscopy 04/2022: reviewed    ASSESSMENT/PLAN:     63 y.o. female with constipation, stool feeling stuck, not  feeling emptying     -Patient with very small rectocele. However, unsure if problems are all from rectocele. Symptoms suggestive of constipation/pelvic floor dysfunction. Has chronic constipation, has tried linzess in the past and did not find it helped. Also with decreased rectal tone. Pt also with MS which may contribute to trouble emptying bowels. Recommended referral to GI for constipation medical management. Offered PFPT referral but pt deferred at this time. As for rectocele, can f/u with obgyn. Upon review of chart, has complained about this since 2016.  -Discussed causes and treatment of rectoceles. Recommend avoiding constipation and continuing fiber powder supplementation. Discussed that CRS can repair through transanal approach, but there is a higher success rate from transvaginal repair from OBGYN. Recommended discussing with OBGYN about perineal vs vaginal approach, although I believe her symptoms are more from constipation which she can take fiber & address with GI.    -Discussed that a minimum I would recommend behavioral, lifestyle and medication modifications to improve bowel habits. Usual bowel management handout given to patient. This includes a stool softener twice per day, fiber powder supplementation daily, drinking at least 64oz of water/day, avoiding straining with bowel movements, spending less than 5 min on toilet per bowel movement, eating a high fiber diet, using miralax as needed to achieve a bowel movement daily and using wet wipes to wipe after bowel movements when irritated.   -RTC PRN      Lilli Manzanares PA-C  Colon and Rectal Surgery  [facility]

## 2023-10-05 ENCOUNTER — TELEPHONE (OUTPATIENT)
Dept: OBSTETRICS AND GYNECOLOGY | Facility: CLINIC | Age: 63
End: 2023-10-05
Payer: MEDICARE

## 2023-10-05 NOTE — TELEPHONE ENCOUNTER
Please call pt and schedule her annual gyn visit with me at [facility] not the [place]  She is medicare but is high risk so can be seen yearly

## 2023-10-05 NOTE — TELEPHONE ENCOUNTER
----- Message from Lilli Manzanares PA-C sent at 10/4/2023  2:36 PM CDT -----  Hi, this pt saw Jennifer in the past & is trying to get reestablised with obgyn. Can we get her an appt? LMK if I need to put in a new referral. Thanks

## 2023-10-13 ENCOUNTER — OFFICE VISIT (OUTPATIENT)
Dept: OBSTETRICS AND GYNECOLOGY | Facility: CLINIC | Age: 63
End: 2023-10-13
Payer: MEDICARE

## 2023-10-13 ENCOUNTER — PATIENT MESSAGE (OUTPATIENT)
Dept: OBSTETRICS AND GYNECOLOGY | Facility: CLINIC | Age: 63
End: 2023-10-13
Payer: MEDICARE

## 2023-10-13 VITALS
BODY MASS INDEX: 30.59 KG/M2 | SYSTOLIC BLOOD PRESSURE: 124 MMHG | WEIGHT: 172.63 LBS | DIASTOLIC BLOOD PRESSURE: 82 MMHG | HEIGHT: 63 IN

## 2023-10-13 DIAGNOSIS — Z91.89 GYN EXAM FOR HIGH-RISK MEDICARE PATIENT: Primary | ICD-10-CM

## 2023-10-13 DIAGNOSIS — Z78.0 POSTMENOPAUSAL: ICD-10-CM

## 2023-10-13 DIAGNOSIS — Z01.419 ENCOUNTER FOR GYNECOLOGICAL EXAMINATION WITHOUT ABNORMAL FINDING: ICD-10-CM

## 2023-10-13 DIAGNOSIS — K59.09 CHRONIC CONSTIPATION: ICD-10-CM

## 2023-10-13 DIAGNOSIS — N81.6 RECTOCELE: ICD-10-CM

## 2023-10-13 DIAGNOSIS — G35 MS (MULTIPLE SCLEROSIS): ICD-10-CM

## 2023-10-13 PROCEDURE — 87624 HPV HI-RISK TYP POOLED RSLT: CPT | Performed by: NURSE PRACTITIONER

## 2023-10-13 PROCEDURE — 3079F PR MOST RECENT DIASTOLIC BLOOD PRESSURE 80-89 MM HG: ICD-10-PCS | Mod: CPTII,S$GLB,, | Performed by: NURSE PRACTITIONER

## 2023-10-13 PROCEDURE — 99999 PR PBB SHADOW E&M-EST. PATIENT-LVL IV: ICD-10-PCS | Mod: PBBFAC,,, | Performed by: NURSE PRACTITIONER

## 2023-10-13 PROCEDURE — 1160F RVW MEDS BY RX/DR IN RCRD: CPT | Mod: CPTII,S$GLB,, | Performed by: NURSE PRACTITIONER

## 2023-10-13 PROCEDURE — 1159F MED LIST DOCD IN RCRD: CPT | Mod: CPTII,S$GLB,, | Performed by: NURSE PRACTITIONER

## 2023-10-13 PROCEDURE — 3074F SYST BP LT 130 MM HG: CPT | Mod: CPTII,S$GLB,, | Performed by: NURSE PRACTITIONER

## 2023-10-13 PROCEDURE — 1159F PR MEDICATION LIST DOCUMENTED IN MEDICAL RECORD: ICD-10-PCS | Mod: CPTII,S$GLB,, | Performed by: NURSE PRACTITIONER

## 2023-10-13 PROCEDURE — 3079F DIAST BP 80-89 MM HG: CPT | Mod: CPTII,S$GLB,, | Performed by: NURSE PRACTITIONER

## 2023-10-13 PROCEDURE — G0101 CA SCREEN;PELVIC/BREAST EXAM: HCPCS | Mod: S$GLB,,, | Performed by: NURSE PRACTITIONER

## 2023-10-13 PROCEDURE — 1160F PR REVIEW ALL MEDS BY PRESCRIBER/CLIN PHARMACIST DOCUMENTED: ICD-10-PCS | Mod: CPTII,S$GLB,, | Performed by: NURSE PRACTITIONER

## 2023-10-13 PROCEDURE — 88175 CYTOPATH C/V AUTO FLUID REDO: CPT | Performed by: NURSE PRACTITIONER

## 2023-10-13 PROCEDURE — G0101 PR CA SCREEN;PELVIC/BREAST EXAM: ICD-10-PCS | Mod: S$GLB,,, | Performed by: NURSE PRACTITIONER

## 2023-10-13 PROCEDURE — 99999 PR PBB SHADOW E&M-EST. PATIENT-LVL IV: CPT | Mod: PBBFAC,,, | Performed by: NURSE PRACTITIONER

## 2023-10-13 PROCEDURE — 3074F PR MOST RECENT SYSTOLIC BLOOD PRESSURE < 130 MM HG: ICD-10-PCS | Mod: CPTII,S$GLB,, | Performed by: NURSE PRACTITIONER

## 2023-10-13 NOTE — PROGRESS NOTES
CC: Well woman exam    Cem Meyers is a 63 y.o. female  presents for well woman exam.  LMP: No LMP recorded. Patient is postmenopausal..    Due for annual well woman exam but pt thought was coming in for consult on rectocele  Pt has MS and long history of chronic constipation - pt has to digitally remove stool .      Was told by colo rectal to consult gyn for her rectocele - note attached here in chart:  Discussed causes and treatment of rectoceles. Recommend avoiding constipation and continuing fiber powder supplementation. Discussed that CRS can repair through transanal approach, but there is a higher success rate from transvaginal repair from OBGYN. Recommended discussing with OBGYN about perineal vs vaginal approach, although I believe her symptoms are more from constipation which she can take fiber & address with GI    Past Medical History:   Diagnosis Date    Back pain     Broken toe 2015    left big toe    Chronic diastolic heart failure 2018    Closed left arm fracture     Colon polyp     Depression     Hyperlipidemia     Hypothyroid     Immunosuppression 2019    MS (multiple sclerosis)     Neurogenic bladder 2012    Osteoporosis     Polyneuropathy     Sleep apnea     Trouble in sleeping      Past Surgical History:   Procedure Laterality Date    BREAST BIOPSY Left 10/28/2020    COLONOSCOPY N/A 2017    Procedure: COLONOSCOPY;  Surgeon: Andriy Villar MD;  Location: [facility];  Service: Endoscopy;  Laterality: N/A;    COLONOSCOPY N/A 2021    Procedure: COLONOSCOPY;  Surgeon: Althea Casanova MD;  Location: [facility];  Service: Endoscopy;  Laterality: N/A;    COLONOSCOPY N/A 2022    Procedure: COLONOSCOPY;  Surgeon: Cristiano Aaron MD;  Location: [facility];  Service: Endoscopy;  Laterality: N/A;    FRACTURE SURGERY Left     wrist- [facility]    KNEE SURGERY Right     in [place]    TONSILLECTOMY  1966     Social History     Socioeconomic History    Marital  status:    Tobacco Use    Smoking status: Never    Smokeless tobacco: Never   Substance and Sexual Activity    Alcohol use: Yes     Alcohol/week: 1.0 standard drink of alcohol     Types: 1 Glasses of wine per week    Drug use: No    Sexual activity: Not Currently     Partners: Male     Birth control/protection: Abstinence, Post-menopausal   Other Topics Concern    Are you pregnant or think you may be? No    Breast-feeding No     Social Determinants of Health     Financial Resource Strain: Low Risk  (10/6/2023)    Overall Financial Resource Strain (CARDIA)     Difficulty of Paying Living Expenses: Not very hard   Food Insecurity: No Food Insecurity (10/6/2023)    Hunger Vital Sign     Worried About Running Out of Food in the Last Year: Never true     Ran Out of Food in the Last Year: Never true   Transportation Needs: No Transportation Needs (10/6/2023)    PRAPARE - Transportation     Lack of Transportation (Medical): No     Lack of Transportation (Non-Medical): No   Physical Activity: Insufficiently Active (10/6/2023)    Exercise Vital Sign     Days of Exercise per Week: 6 days     Minutes of Exercise per Session: 20 min   Stress: No Stress Concern Present (10/6/2023)    [employer] [place] [employer] - Occupational Stress Questionnaire     Feeling of Stress : Only a little   Social Connections: Unknown (10/6/2023)    Social Connection and Isolation Panel [NHANES]     Frequency of Communication with Friends and Family: More than three times a week     Frequency of Social Gatherings with Friends and Family: More than three times a week     Active Member of Clubs or Organizations: Yes     Attends Club or Organization Meetings: More than 4 times per year     Marital Status:    Housing Stability: Low Risk  (10/6/2023)    Housing Stability Vital Sign     Unable to Pay for Housing in the Last Year: No     Number of Places Lived in the Last Year: 1     Unstable Housing in the Last Year: No  "    Family History   Problem Relation Age of Onset    Pancreatic cancer Mother     Stomach cancer Mother     Cancer Mother         pancreatic, stomach    Hypertension Father     Heart disease Father         MI    Hearing loss Father     Lupus Maternal Aunt     Rheum arthritis Maternal Aunt     Rheum arthritis Sister     Melanoma Neg Hx      OB History          0    Para   0    Term   0       0    AB   0    Living   0         SAB   0    IAB   0    Ectopic   0    Multiple   0    Live Births                     /82 (BP Location: Right arm, Patient Position: Sitting, BP Method: Medium (Manual))   Ht 5' 3" (1.6 m)   Wt 78.3 kg (172 lb 9.9 oz)   BMI 30.58 kg/m²       ROS:  GENERAL: Denies weight gain or weight loss. Feeling well overall.   SKIN: Denies rash or lesions.   HEAD: Denies head injury or headache.   NODES: Denies enlarged lymph nodes.   CHEST: Denies chest pain or shortness of breath.   CARDIOVASCULAR: Denies palpitations or left sided chest pain.   ABDOMEN: No abdominal pain, constipation, diarrhea, nausea, vomiting or rectal bleeding.   URINARY: No frequency, dysuria, hematuria, or burning on urination.  REPRODUCTIVE: See HPI.   BREASTS: The patient performs breast self-examination and denies pain, lumps, or nipple discharge.   HEMATOLOGIC: No easy bruisability or excessive bleeding.   MUSCULOSKELETAL: Denies joint pain or swelling.   NEUROLOGIC: Denies syncope or weakness.   PSYCHIATRIC: Denies depression, anxiety or mood swings.    PHYSICAL EXAM:  APPEARANCE: Well nourished, well developed, in no acute distress.  AFFECT: WNL, alert and oriented x 3  SKIN: No acne or hirsutism  NECK: Neck symmetric without masses or thyromegaly  NODES: No inguinal, cervical, axillary, or femoral lymph node enlargement  CHEST: Good respiratory effect  ABDOMEN: Soft.  No tenderness or masses.  No hepatosplenomegaly.  No hernias.  BREASTS: deferred - mmg done  - normal  PELVIC: Normal external " genitalia without lesions.  Normal hair distribution.  Adequate perineal body, normal urethral meatus.  Vagina atrophic without lesions or discharge.  Cervix pink, without lesions, discharge or tenderness.  Mild cystocele mild rectocele - does not move with valsalva .  Bimanual exam shows uterus to be normal size, regular, mobile and nontender.  Adnexa without masses or tenderness.    EXTREMITIES: No edema.  Physical Exam    1. GYN exam for high-risk Medicare patient  Liquid-Based Pap Smear, Screening    HPV High Risk Genotypes, PCR      2. Encounter for gynecological examination without abnormal finding  Liquid-Based Pap Smear, Screening    HPV High Risk Genotypes, PCR      3. Rectocele        4. Postmenopausal        5. MS (multiple sclerosis)  Liquid-Based Pap Smear, Screening    HPV High Risk Genotypes, PCR      6. Chronic constipation         AND PLAN:    Cem was seen today for well woman.    Diagnoses and all orders for this visit:    GYN exam for high-risk Medicare patient  -     Liquid-Based Pap Smear, Screening  -     HPV High Risk Genotypes, PCR    Encounter for gynecological examination without abnormal finding  -     Liquid-Based Pap Smear, Screening  -     HPV High Risk Genotypes, PCR    Rectocele    Postmenopausal    MS (multiple sclerosis)  -     Liquid-Based Pap Smear, Screening  -     HPV High Risk Genotypes, PCR    Chronic constipation     Pt to f/u with Dr. Don Lyons to discuss if rectocele repair is warranted     Patient was counseled today on A.C.S. Pap guidelines and recommendations for yearly pelvic exams, mammograms and monthly self breast exams; to see her PCP for other health maintenance.

## 2023-10-18 ENCOUNTER — PATIENT MESSAGE (OUTPATIENT)
Dept: NEUROLOGY | Facility: CLINIC | Age: 63
End: 2023-10-18
Payer: MEDICARE

## 2023-10-18 ENCOUNTER — TELEPHONE (OUTPATIENT)
Dept: NEUROLOGY | Facility: CLINIC | Age: 63
End: 2023-10-18
Payer: MEDICARE

## 2023-10-18 LAB
FINAL PATHOLOGIC DIAGNOSIS: NORMAL
HPV HR 12 DNA SPEC QL NAA+PROBE: NEGATIVE
HPV16 AG SPEC QL: NEGATIVE
HPV18 DNA SPEC QL NAA+PROBE: NEGATIVE
Lab: NORMAL

## 2023-10-18 NOTE — TELEPHONE ENCOUNTER
LVM for pt to call back and be scheduled with Karoline sometime soon next week for a virtual visit.

## 2023-10-19 NOTE — TELEPHONE ENCOUNTER
LVM telling pt to call us back to schedule a VV with AP next week. I will schedule pt and make her aware of her appt via [employer] message.

## 2023-10-24 NOTE — PROGRESS NOTES
Subjective:          Patient ID: Cem Meyers is a 63 y.o. female who presents today for a routine virtual visit for MS.  She was last seen in April 2023. The history has been provided by the patient.       The patient location is: her home   The chief complaint leading to consultation is: MS     Visit type: audiovisual    Face to Face time with patient: 16 minutes   30 minutes of total time spent on the encounter, which includes face to face time and non-face to face time preparing to see the patient (eg, review of tests), Obtaining and/or reviewing separately obtained history, Documenting clinical information in the electronic or other health record, Independently interpreting results (not separately reported) and communicating results to the patient/family/caregiver, or Care coordination (not separately reported).       Each patient to whom he or she provides medical services by telemedicine is:  (1) informed of the relationship between the physician and patient and the respective role of any other health care provider with respect to management of the patient; and (2) notified that he or she may decline to receive medical services by telemedicine and may withdraw from such care at any time.    MS HPI:  DMT: Plegridy--took it about 2 weeks ago, but not since. She has taken a total of 6-7 doses.   Side effects from DMT? Yes - see below   Taking vitamin D3 as recommended? Yes   She started having major balance issues after starting Plegridy, so severe that she couldn't walk from her bed to the bathroom. She denies any falls. This lasts for a few days after the injection. She had been pre-medicating with the last few injections, but this does not seem to help. Prior to pre-medicating, she was having flu-like side effects.   She would like to go back to [facility], but her insurance will not cover it.   She has been on Tysabri, Avonex, Rebif, Ocrevus, Aubagio, and Plegridy.   She gets UTIs every 6 months or so.   She  does not feel like her MS symptoms are worse. She does not feel like there is a continued sense of decline. She has felt generally stable.   She will be seeing a GI doctor tomorrow to discuss bowels.     Medications:  Current Outpatient Medications   Medication Sig    baclofen (LIORESAL) 10 MG tablet TAKE 2 TABS ONE TIME DAILY IN THE MORNING, 2 TABS IN THE AFTERNOON, AND UP TO 3 TABS AT BEDTIME AS NEEDED    calcium citrate (CALCITRATE) 200 mg (950 mg) tablet Take 2 tablets by mouth once daily.    cholecalciferol, vitamin D3, 100 mcg (4,000 unit) Cap Take 4,000 Units by mouth once daily.     denosumab (PROLIA SUBQ) Inject into the skin.    fish oil-omega-3 fatty acids 300-1,000 mg capsule Take 2 g by mouth once daily.    gabapentin (NEURONTIN) 800 MG tablet TAKE 1 TABLET THREE TIMES DAILY    levothyroxine (SYNTHROID) 75 MCG tablet TAKE 1 TABLET BEFORE BREAKFAST    multivitamin capsule Take 1 capsule by mouth once daily.    paroxetine (PAXIL) 40 MG tablet TAKE 1 TABLET EVERY MORNING    peginterferon beta-1a (PLEGRIDY) 125 mcg/0.5 mL Syrg Inject 0.5 mLs (125 mcg total) into the muscle every 14 (fourteen) days.    simvastatin (ZOCOR) 40 MG tablet TAKE 1 TABLET EVERY DAY    varicella-zoster [id number], PF, (SHINGRIX, PF,) 50 mcg/0.5 mL injection Inject into the muscle.       SOCIAL HISTORY  Social History     Tobacco Use    Smoking status: Never    Smokeless tobacco: Never   Substance Use Topics    Alcohol use: Yes     Alcohol/week: 1.0 standard drink of alcohol     Types: 1 Glasses of wine per week    Drug use: No       Living arrangements - the patient alone     ROS:      10/25/23    REVIEW OF SYMPTOMS   Do you feel abnormally tired on most days? Yes   Do you feel you generally sleep well? Yes--wears CPAP and sleeps well    Do you have difficulty controlling your bladder?  No   Do you have difficulty controlling your bowels?  Yes   Do you have frequent muscle cramps, tightness or spasms in your limbs?  Yes--a little more  "than she has in the past, but these are manageable; not particularly painful   Do you have new visual symptoms?  No   Do you have worsening difficulty with your memory or thinking? No--"still pretty bad." She manages her own medications and finances.    Do you have worsening symptoms of anxiety or depression?  Yes   For patients who walk, Do you have more difficulty walking?  Yes--she has been using her rollator for balance; she walks about 1/2 mile around her building daily    Have you fallen since your last visit?  No   For patients who use wheelchairs: Do you have any skin wounds or breakdown? Not Applicable   Do you have difficulty using your hands?  No    Do you have shooting or burning pain? No--just "gut pain"   If you are sexually active, are you using birth control? Y/N  N/A No   Do you often choke when swallowing liquids or solid food?  No   Do you experience worsening symptoms when overheated? Yes--feels weak when she is too hot, but generally avoids the heat    Do you need any new equipment such as a wheelchair, walker or shower chair? Yes--she has a rollator and shower chair    Do you receive co-pay financial assistance for your principal MS medicine? No   Would you be interested in participating in an MS research trial in the future? No   For patients on Gilenya, Tecfidera, Aubagio, Rituxan, Ocrevus, Tysabri, Lemtrada or Methotrexate, are you aware that you should NOT receive live virus vaccines?  Yes   Do you feel you have adequate family/friend support?  Yes   Do you have health insurance?   Yes   Are you currently employed? No   Do you receive SSDI/SSI?  Yes   Do you use marijuana or cannabis products? No   Have you been diagnosed with a urinary tract infection since your last visit here? No   Have you been diagnosed with a respiratory tract infection since your last visit here? No   Have you been to the emergency room since your last visit here? No   Have you been hospitalized since your last visit " here?  No            Objective:        1. 25 foot timed walk:      1/29/2020    12:00 AM 3/10/2021    12:00 AM   Timed 25 Foot Walk:   Did patient wear an AFO? No No   Was assistive device used? No No   Time for 25 Foot Walk (seconds) 6.7 6.3   Time for 25 Foot Walk (seconds)  6.6     Neurologic Exam    Deferred   Imaging:     Results for orders placed during the hospital encounter of 08/12/20    MRI Brain Demyelinating Without Contrast    Impression  Findings in the cerebral white matter which are typical for multiple sclerosis.  No new lesions identified.  No restricted diffusion..      Electronically signed by: Kyle Keith DO  Date:    08/12/2020  Time:    11:42        Results for orders placed during the hospital encounter of 09/08/22    MRI Brain Demyelinating W W/O Contrast    Impression  Findings in the cerebral white matter which are typical for multiple sclerosis.  No new or enhancing lesions to suggest active disease.      Electronically signed by: Ye Sam MD  Date:    09/08/2022  Time:    15:26    Labs:     Lab Results   Component Value Date    EHPUHZDC14TX 63 07/24/2023    CJRZORHM47WU 73 12/16/2020    JNAYJFSY78GV 57 07/17/2019     Lab Results   Component Value Date    WBC 5.32 05/03/2023    RBC 4.05 05/03/2023    HGB 12.5 05/03/2023    HCT 40.0 05/03/2023    MCV 99 (H) 05/03/2023    MCH 30.9 05/03/2023    MCHC 31.3 (L) 05/03/2023    RDW 12.7 05/03/2023     05/03/2023    MPV 10.6 05/03/2023    GRAN 2.0 05/03/2023    GRAN 37.4 (L) 05/03/2023    LYMPH 2.7 05/03/2023    LYMPH 51.3 (H) 05/03/2023    MONO 0.5 05/03/2023    MONO 9.2 05/03/2023    EOS 0.1 05/03/2023    BASO 0.04 05/03/2023    EOSINOPHIL 1.3 05/03/2023    BASOPHIL 0.8 05/03/2023     Sodium   Date Value Ref Range Status   07/24/2023 140 136 - 145 mmol/L Final     Potassium   Date Value Ref Range Status   07/24/2023 4.0 3.5 - 5.1 mmol/L Final     Chloride   Date Value Ref Range Status   07/24/2023 105 95 - 110 mmol/L Final      CO2   Date Value Ref Range Status   07/24/2023 25 23 - 29 mmol/L Final     Glucose   Date Value Ref Range Status   07/24/2023 84 70 - 110 mg/dL Final     BUN   Date Value Ref Range Status   07/24/2023 14 8 - 23 mg/dL Final     Creatinine   Date Value Ref Range Status   07/24/2023 1.0 0.5 - 1.4 mg/dL Final     Calcium   Date Value Ref Range Status   07/24/2023 10.1 8.7 - 10.5 mg/dL Final     Total Protein   Date Value Ref Range Status   07/24/2023 7.5 6.0 - 8.4 g/dL Final     Albumin   Date Value Ref Range Status   07/24/2023 3.8 3.5 - 5.2 g/dL Final     Total Bilirubin   Date Value Ref Range Status   07/24/2023 0.5 0.1 - 1.0 mg/dL Final     Comment:     For infants and newborns, interpretation of results should be based  on gestational age, weight and in agreement with clinical  observations.    Premature Infant recommended reference ranges:  Up to 24 hours.............<8.0 mg/dL  Up to 48 hours............<12.0 mg/dL  3-5 days..................<15.0 mg/dL  6-29 days.................<15.0 mg/dL       Alkaline Phosphatase   Date Value Ref Range Status   07/24/2023 67 55 - 135 U/L Final     AST   Date Value Ref Range Status   07/24/2023 29 10 - 40 U/L Final     ALT   Date Value Ref Range Status   07/24/2023 17 10 - 44 U/L Final     Anion Gap   Date Value Ref Range Status   07/24/2023 10 8 - 16 mmol/L Final     eGFR if    Date Value Ref Range Status   06/17/2022 >60 >60 mL/min/1.73 m^2 Final     eGFR if non    Date Value Ref Range Status   06/17/2022 >60 >60 mL/min/1.73 m^2 Final     Comment:     Calculation used to obtain the estimated glomerular filtration  rate (eGFR) is the CKD-EPI equation.        Lab Results   Component Value Date    HEPBSAG Negative 07/17/2019    HEPBSAB Negative 07/17/2019    HEPBCAB Negative 07/17/2019     MS Impression and Plan:     NEURO MULTIPLE SCLEROSIS IMPRESSION:   MS Status:     Number of relapses in the past year?:  0    Clinical Progression:   Clinically Stable    Clinical Progression comment:  She is generally feeling stable, but reports worsening balance for days after her Plegridy injections. She does not want to continue this medication at this time. Ideally, she would prefer to return to [facility], but her insurance does not cover this. She plans to look into changing her insurance plan. Other medications will be discussed with Dr. Smith, and I will reach out to the patient with more information.   Plan:     DMT comment:  She does not want to continue Plegridy. Continue Vitamin D.     Symptom Management:  No change in symptom management    MRI brain due now.     She will follow up in 3 months.         REBECCA Ryan, CNS

## 2023-10-25 ENCOUNTER — OFFICE VISIT (OUTPATIENT)
Dept: NEUROLOGY | Facility: CLINIC | Age: 63
End: 2023-10-25
Payer: MEDICARE

## 2023-10-25 DIAGNOSIS — Z29.89 PROPHYLACTIC IMMUNOTHERAPY: ICD-10-CM

## 2023-10-25 DIAGNOSIS — Z71.89 COUNSELING REGARDING GOALS OF CARE: ICD-10-CM

## 2023-10-25 DIAGNOSIS — G35 MULTIPLE SCLEROSIS: Primary | ICD-10-CM

## 2023-10-25 PROCEDURE — 99214 PR OFFICE/OUTPT VISIT, EST, LEVL IV, 30-39 MIN: ICD-10-PCS | Mod: 95,,, | Performed by: CLINICAL NURSE SPECIALIST

## 2023-10-25 PROCEDURE — 99214 OFFICE O/P EST MOD 30 MIN: CPT | Mod: 95,,, | Performed by: CLINICAL NURSE SPECIALIST

## 2023-10-25 NOTE — Clinical Note
She doesn't like Plegridy--feels like her balance is worse since starting it. Wants to go back to [facility], but insurance won't cover. She is thinking of switching her plan. I see that you wrote in your last note that we could consider [employer]. Can we discuss?

## 2023-10-26 ENCOUNTER — OFFICE VISIT (OUTPATIENT)
Dept: GASTROENTEROLOGY | Facility: CLINIC | Age: 63
End: 2023-10-26
Payer: MEDICARE

## 2023-10-26 DIAGNOSIS — G35 MS (MULTIPLE SCLEROSIS): Chronic | ICD-10-CM

## 2023-10-26 DIAGNOSIS — K58.1 IRRITABLE BOWEL SYNDROME WITH CONSTIPATION: Primary | ICD-10-CM

## 2023-10-26 PROCEDURE — 1159F PR MEDICATION LIST DOCUMENTED IN MEDICAL RECORD: ICD-10-PCS | Mod: CPTII,95,, | Performed by: NURSE PRACTITIONER

## 2023-10-26 PROCEDURE — 1159F MED LIST DOCD IN RCRD: CPT | Mod: CPTII,95,, | Performed by: NURSE PRACTITIONER

## 2023-10-26 PROCEDURE — 1160F PR REVIEW ALL MEDS BY PRESCRIBER/CLIN PHARMACIST DOCUMENTED: ICD-10-PCS | Mod: CPTII,95,, | Performed by: NURSE PRACTITIONER

## 2023-10-26 PROCEDURE — 99214 OFFICE O/P EST MOD 30 MIN: CPT | Mod: 95,,, | Performed by: NURSE PRACTITIONER

## 2023-10-26 PROCEDURE — 1160F RVW MEDS BY RX/DR IN RCRD: CPT | Mod: CPTII,95,, | Performed by: NURSE PRACTITIONER

## 2023-10-26 PROCEDURE — 99214 PR OFFICE/OUTPT VISIT, EST, LEVL IV, 30-39 MIN: ICD-10-PCS | Mod: 95,,, | Performed by: NURSE PRACTITIONER

## 2023-10-26 RX ORDER — LUBIPROSTONE 24 UG/1
24 CAPSULE ORAL 2 TIMES DAILY
Qty: 60 CAPSULE | Refills: 11 | Status: SHIPPED | OUTPATIENT
Start: 2023-10-26 | End: 2023-10-27 | Stop reason: SDUPTHER

## 2023-10-26 NOTE — PROGRESS NOTES
Clinic Consult:  [facility] Consultation Note    Reason for Consult:  The primary encounter diagnosis was Irritable bowel syndrome with constipation. A diagnosis of MS (multiple sclerosis) was also pertinent to this visit.    PCP: Keagan Calles   [address] [place] [address] [address] [id number]    The patient location is: [place]   The chief complaint leading to consultation is: constipation     Visit type: audiovisual    Face to Face time with patient: 10 minutes   15 minutes of total time spent on the encounter, which includes face to face time and non-face to face time preparing to see the patient (eg, review of tests), Obtaining and/or reviewing separately obtained history, Documenting clinical information in the electronic or other health record, Independently interpreting results (not separately reported) and communicating results to the patient/family/caregiver, or Care coordination (not separately reported).         Each patient to whom he or she provides medical services by telemedicine is:  (1) informed of the relationship between the physician and patient and the respective role of any other health care provider with respect to management of the patient; and (2) notified that he or she may decline to receive medical services by telemedicine and may withdraw from such care at any time.    Notes:       HPI:  This is a 63 y.o. female here for evaluation of constipation. Symptoms are chronic and have been present for many years. She has tried Linzess in the past but says she could not tolerate it. She also has MS which is likely contributing to her constipation.     Review of Systems   Constitutional:  Negative for fever and weight loss.   HENT:  Negative for sore throat.    Respiratory:  Negative for cough, shortness of breath and wheezing.    Cardiovascular:  Negative for chest pain and palpitations.   Gastrointestinal:  Positive for abdominal pain and constipation. Negative for  blood in stool, diarrhea, heartburn, melena, nausea and vomiting.   Skin:  Negative for itching and rash.       Medical History:  has a past medical history of Back pain, Broken toe (6/2015), Chronic diastolic heart failure (5/8/2018), Closed left arm fracture, Colon polyp, Depression, Hyperlipidemia, Hypothyroid, Immunosuppression (1/28/2019), MS (multiple sclerosis), Neurogenic bladder (12/6/2012), Osteoporosis, Polyneuropathy, Sleep apnea, and Trouble in sleeping.    Surgical History:  has a past surgical history that includes Knee surgery (Right, 1989); Fracture surgery (Left, 2013); Tonsillectomy (1966); Colonoscopy (N/A, 09/01/2017); Colonoscopy (N/A, 01/06/2021); Colonoscopy (N/A, 04/25/2022); and Breast biopsy (Left, 10/28/2020).    Family History: family history includes Cancer in her mother; Hearing loss in her father; Heart disease in her father; Hypertension in her father; Lupus in her maternal aunt; Pancreatic cancer in her mother; Rheum arthritis in her maternal aunt and sister; Stomach cancer in her mother..     Social History:  reports that she has never smoked. She has never used smokeless tobacco. She reports current alcohol use of about 1.0 standard drink of alcohol per week. She reports that she does not use drugs.    Allergies: Reviewed    Home Medications:   Current Outpatient Medications on File Prior to Visit   Medication Sig Dispense Refill    baclofen (LIORESAL) 10 MG tablet TAKE 2 TABS ONE TIME DAILY IN THE MORNING, 2 TABS IN THE AFTERNOON, AND UP TO 3 TABS AT BEDTIME AS NEEDED 630 tablet 3    calcium citrate (CALCITRATE) 200 mg (950 mg) tablet Take 2 tablets by mouth once daily.      cholecalciferol, vitamin D3, 100 mcg (4,000 unit) Cap Take 4,000 Units by mouth once daily.       denosumab (PROLIA SUBQ) Inject into the skin. Every 6 months      fish oil-omega-3 fatty acids 300-1,000 mg capsule Take 2 g by mouth once daily.      gabapentin (NEURONTIN) 800 MG tablet TAKE 1 TABLET THREE  TIMES DAILY 270 tablet 1    levothyroxine (SYNTHROID) 75 MCG tablet TAKE 1 TABLET BEFORE BREAKFAST 90 tablet 3    multivitamin capsule Take 1 capsule by mouth once daily.      paroxetine (PAXIL) 40 MG tablet TAKE 1 TABLET EVERY MORNING 90 tablet 1    peginterferon beta-1a (PLEGRIDY) 125 mcg/0.5 mL Syrg Inject 0.5 mLs (125 mcg total) into the muscle every 14 (fourteen) days. 1 mL 4    simvastatin (ZOCOR) 40 MG tablet TAKE 1 TABLET EVERY DAY 90 tablet 1     No current facility-administered medications on file prior to visit.       Physical Exam:  There were no vitals taken for this visit.  There is no height or weight on file to calculate BMI.  Physical Exam  Constitutional:       General: She is not in acute distress.  HENT:      Head: Normocephalic.   Neurological:      General: No focal deficit present.      Mental Status: She is alert.   Psychiatric:         Mood and Affect: Mood normal.         Judgment: Judgment normal.         Labs: Pertinent labs reviewed.    Assessment:  1. Irritable bowel syndrome with constipation    2. MS (multiple sclerosis)    Chronic constipation. MS is likely contributing factor.     Recommendations:   - Amitiza  - continue MS management per neuro     Irritable bowel syndrome with constipation  -     Ambulatory referral/consult to Gastroenterology  -     lubiprostone (AMITIZA) 24 MCG Cap; Take 1 capsule (24 mcg total) by mouth 2 (two) times daily.  Dispense: 60 capsule; Refill: 11    MS (multiple sclerosis)        Follow up if symptoms worsen or fail to improve.    Thank you so much for allowing me to participate in the care of GLENN Morgan

## 2023-10-27 ENCOUNTER — PATIENT MESSAGE (OUTPATIENT)
Dept: NEUROLOGY | Facility: CLINIC | Age: 63
End: 2023-10-27
Payer: MEDICARE

## 2023-10-27 ENCOUNTER — TELEPHONE (OUTPATIENT)
Dept: NEUROLOGY | Facility: CLINIC | Age: 63
End: 2023-10-27

## 2023-10-27 DIAGNOSIS — K58.1 IRRITABLE BOWEL SYNDROME WITH CONSTIPATION: ICD-10-CM

## 2023-10-27 DIAGNOSIS — G35 MULTIPLE SCLEROSIS: Primary | ICD-10-CM

## 2023-10-30 ENCOUNTER — TELEPHONE (OUTPATIENT)
Dept: NEUROLOGY | Facility: CLINIC | Age: 63
End: 2023-10-30
Payer: MEDICARE

## 2023-10-30 RX ORDER — LUBIPROSTONE 24 UG/1
24 CAPSULE ORAL 2 TIMES DAILY
Qty: 60 CAPSULE | Refills: 11 | Status: SHIPPED | OUTPATIENT
Start: 2023-10-30

## 2023-10-30 NOTE — TELEPHONE ENCOUNTER
"Scheduled pt for brain MRI at the next available appt that was most convenient for the pt, which is 11/22 at 12:30 PM. I also scheduled pt for lab on 11/6 at 12:30 PM at [facility] lab. I offered pt an appt with BB on 3/4 at 1:00 PM. Pt asked if appt was virtual and I told pt I believed that it is meant to be an in person. Pt responded "Well that will be a problem. Put me down for March and I will have to see if I can get transportation." I will check with [facility] to make sure that appt is meant to be in person. I informed pt that BB's March schedule is not open yet, but I will hold the slot on 3/4 at 1:00 PM for the pt. I informed pt she will be officially scheduled once BB's March schedule opens up on November 1st. Pt verbalized understanding.   "

## 2023-10-30 NOTE — TELEPHONE ENCOUNTER
----- Message from Karoline Hurst, REBECCA, CNS sent at 10/25/2023  5:30 PM CDT -----  MRI brain now  F/U with BB first available  Labs in 2 weeks

## 2023-11-01 NOTE — TELEPHONE ENCOUNTER
PHILLIPM telling pt she is scheduled for an in person appt with Dr. Smith on 3/4 at 1:00 PM. Let pt know it is important that she come for an in person appt since her last couple of appts have all been virtual. I told pt we could help her coordinate transportation if she cannot coordinate it herself. Left contact info,

## 2023-11-06 ENCOUNTER — LAB VISIT (OUTPATIENT)
Dept: LAB | Facility: HOSPITAL | Age: 63
End: 2023-11-06
Attending: CLINICAL NURSE SPECIALIST
Payer: MEDICARE

## 2023-11-06 DIAGNOSIS — G35 MULTIPLE SCLEROSIS: ICD-10-CM

## 2023-11-06 LAB
ALBUMIN SERPL BCP-MCNC: 3.6 G/DL (ref 3.5–5.2)
ALP SERPL-CCNC: 64 U/L (ref 55–135)
ALT SERPL W/O P-5'-P-CCNC: 16 U/L (ref 10–44)
AST SERPL-CCNC: 29 U/L (ref 10–40)
BASOPHILS # BLD AUTO: 0.04 K/UL (ref 0–0.2)
BASOPHILS NFR BLD: 0.7 % (ref 0–1.9)
BILIRUB DIRECT SERPL-MCNC: 0.1 MG/DL (ref 0.1–0.3)
BILIRUB SERPL-MCNC: 0.4 MG/DL (ref 0.1–1)
DIFFERENTIAL METHOD: ABNORMAL
EOSINOPHIL # BLD AUTO: 0.1 K/UL (ref 0–0.5)
EOSINOPHIL NFR BLD: 1.2 % (ref 0–8)
ERYTHROCYTE [DISTWIDTH] IN BLOOD BY AUTOMATED COUNT: 14.4 % (ref 11.5–14.5)
HCT VFR BLD AUTO: 37.5 % (ref 37–48.5)
HGB BLD-MCNC: 12 G/DL (ref 12–16)
IMM GRANULOCYTES # BLD AUTO: 0.01 K/UL (ref 0–0.04)
IMM GRANULOCYTES NFR BLD AUTO: 0.2 % (ref 0–0.5)
LYMPHOCYTES # BLD AUTO: 2.7 K/UL (ref 1–4.8)
LYMPHOCYTES NFR BLD: 45.8 % (ref 18–48)
MCH RBC QN AUTO: 31.4 PG (ref 27–31)
MCHC RBC AUTO-ENTMCNC: 32 G/DL (ref 32–36)
MCV RBC AUTO: 98 FL (ref 82–98)
MONOCYTES # BLD AUTO: 0.5 K/UL (ref 0.3–1)
MONOCYTES NFR BLD: 8.2 % (ref 4–15)
NEUTROPHILS # BLD AUTO: 2.6 K/UL (ref 1.8–7.7)
NEUTROPHILS NFR BLD: 43.9 % (ref 38–73)
NRBC BLD-RTO: 0 /100 WBC
PLATELET # BLD AUTO: 231 K/UL (ref 150–450)
PMV BLD AUTO: 10.7 FL (ref 9.2–12.9)
PROT SERPL-MCNC: 7.1 G/DL (ref 6–8.4)
RBC # BLD AUTO: 3.82 M/UL (ref 4–5.4)
WBC # BLD AUTO: 5.85 K/UL (ref 3.9–12.7)

## 2023-11-06 PROCEDURE — 85025 COMPLETE CBC W/AUTO DIFF WBC: CPT | Performed by: CLINICAL NURSE SPECIALIST

## 2023-11-06 PROCEDURE — 36415 COLL VENOUS BLD VENIPUNCTURE: CPT | Performed by: CLINICAL NURSE SPECIALIST

## 2023-11-06 PROCEDURE — 80076 HEPATIC FUNCTION PANEL: CPT | Performed by: CLINICAL NURSE SPECIALIST

## 2023-11-15 ENCOUNTER — TELEPHONE (OUTPATIENT)
Dept: NEUROLOGY | Facility: CLINIC | Age: 63
End: 2023-11-15
Payer: MEDICARE

## 2023-11-15 NOTE — TELEPHONE ENCOUNTER
Spoke with pt in regards to her MRI that was scheduled for 11/22. Pt advised she wanted to have her MRI done in [place] sometime next year before her follow up appointment with Dr. Smith. I advised pt that we don't schedule [place] MRI's so she would have to call them to schedule and I could just cancel the appointment. Pt verbalized understanding ; gave pt the contact info to call and reschedule

## 2023-11-16 ENCOUNTER — TELEPHONE (OUTPATIENT)
Dept: NEUROLOGY | Facility: CLINIC | Age: 63
End: 2023-11-16
Payer: MEDICARE

## 2023-11-16 NOTE — TELEPHONE ENCOUNTER
Called pt and let her know I received a message from the call center that she wanted to discuss her appt with Dr. Smith. Pt said she needed to r/s her MRIs because she cannot travel to [place] and she already got it figured out.

## 2023-11-16 NOTE — TELEPHONE ENCOUNTER
----- Message from Brianne Biggs RN sent at 11/14/2023  3:11 PM CST -----  Regarding: FW: pt call back  Contact: [phone]    ----- Message -----  From: Sanjana Fortune  Sent: 11/14/2023   2:01 PM CST  To: Sarah ARCINIEGA Staff  Subject: pt call back                                     Pt requesting  to speak with Dr. Smith in regards to an appt that was scheduled for her. Please give pt a call back thanks.

## 2023-11-17 RX ORDER — SIMVASTATIN 40 MG/1
TABLET, FILM COATED ORAL
Qty: 90 TABLET | Refills: 1 | Status: SHIPPED | OUTPATIENT
Start: 2023-11-17

## 2023-11-17 NOTE — TELEPHONE ENCOUNTER
Refill Decision Note   Cem Meyers  is requesting a refill authorization.  Brief Assessment and Rationale for Refill:  Approve     Medication Therapy Plan:       Medication Reconciliation Completed: No   Comments:     No Care Gaps recommended.     Note composed:10:07 AM 11/17/2023

## 2023-11-17 NOTE — TELEPHONE ENCOUNTER
No care due was identified.  Health [facility] Embedded Care Due Messages. Reference number: [id number].   11/17/2023 2:51:48 AM CST

## 2023-11-20 DIAGNOSIS — G35 MULTIPLE SCLEROSIS: ICD-10-CM

## 2023-11-20 DIAGNOSIS — M79.2 NEUROPATHIC PAIN: ICD-10-CM

## 2023-11-20 RX ORDER — GABAPENTIN 800 MG/1
800 TABLET ORAL 3 TIMES DAILY
Qty: 270 TABLET | Refills: 1 | Status: SHIPPED | OUTPATIENT
Start: 2023-11-20

## 2023-11-20 NOTE — TELEPHONE ENCOUNTER
----- Message from Brianne Biggs RN sent at 11/20/2023  1:34 PM CST -----  Regarding: FW: Refill  Contact: Pt [phone]    ----- Message -----  From: Jd Olson  Sent: 11/20/2023  10:00 AM CST  To: Sarah ARCINIEGA Staff  Subject: Refill                                           Rx Refill/Request    Is this a Refill or New Rx:  refill    Rx Name and Strength: gabapentin (NEURONTIN) 800 MG tablet     Preferred Pharmacy with phone number:    [facility] - [place], [place] - [address]  [address] [address]  Phone: [phone] Fax: [phone]      Additional Information:Pt states she would like a week supply of medication. Has 4 tablets available. Please call  [phone]

## 2023-11-21 ENCOUNTER — PATIENT MESSAGE (OUTPATIENT)
Dept: GASTROENTEROLOGY | Facility: CLINIC | Age: 63
End: 2023-11-21
Payer: MEDICARE

## 2023-12-08 RX ORDER — INTERFERON BETA-1A 8.8-22(6)
KIT SUBCUTANEOUS
Qty: 4.2 ML | Refills: 0 | Status: ACTIVE | OUTPATIENT
Start: 2023-12-08

## 2023-12-08 NOTE — TELEPHONE ENCOUNTER
[facility] new start  WBC 5.85  ALC 2679    AST 29  ALT 16    Start form:   Faxed to [facility]    Prescriptions:  Sent to [facility]

## 2023-12-13 ENCOUNTER — OFFICE VISIT (OUTPATIENT)
Dept: URGENT CARE | Facility: CLINIC | Age: 63
End: 2023-12-13
Payer: MEDICARE

## 2023-12-13 ENCOUNTER — HOSPITAL ENCOUNTER (OUTPATIENT)
Dept: RADIOLOGY | Facility: CLINIC | Age: 63
Discharge: HOME | End: 2023-12-13
Attending: NURSE PRACTITIONER
Payer: MEDICARE

## 2023-12-13 VITALS
OXYGEN SATURATION: 96 % | HEIGHT: 63 IN | BODY MASS INDEX: 31.1 KG/M2 | TEMPERATURE: 98 F | DIASTOLIC BLOOD PRESSURE: 78 MMHG | WEIGHT: 175.5 LBS | HEART RATE: 62 BPM | SYSTOLIC BLOOD PRESSURE: 115 MMHG | RESPIRATION RATE: 16 BRPM

## 2023-12-13 DIAGNOSIS — R14.0 ABDOMINAL BLOATING: ICD-10-CM

## 2023-12-13 DIAGNOSIS — R14.0 ABDOMINAL BLOATING: Primary | ICD-10-CM

## 2023-12-13 DIAGNOSIS — R10.9 ABDOMINAL DISCOMFORT: ICD-10-CM

## 2023-12-13 DIAGNOSIS — K59.01 SLOW TRANSIT CONSTIPATION: ICD-10-CM

## 2023-12-13 DIAGNOSIS — G35 MULTIPLE SCLEROSIS: ICD-10-CM

## 2023-12-13 PROCEDURE — 99214 PR OFFICE/OUTPT VISIT, EST, LEVL IV, 30-39 MIN: ICD-10-PCS | Mod: S$GLB,,, | Performed by: NURSE PRACTITIONER

## 2023-12-13 PROCEDURE — 99214 OFFICE O/P EST MOD 30 MIN: CPT | Mod: S$GLB,,, | Performed by: NURSE PRACTITIONER

## 2023-12-13 PROCEDURE — 74019 XR ABDOMEN FLAT AND ERECT: ICD-10-PCS | Mod: S$GLB,,, | Performed by: RADIOLOGY

## 2023-12-13 PROCEDURE — 74019 RADEX ABDOMEN 2 VIEWS: CPT | Mod: S$GLB,,, | Performed by: RADIOLOGY

## 2023-12-13 NOTE — PROGRESS NOTES
"Subjective:      Patient ID: Cem Meyers is a 63 y.o. female.    Vitals:  height is 5' 3" (1.6 m) and weight is 79.6 kg (175 lb 7.8 oz). Her oral temperature is 98.1 °F (36.7 °C). Her blood pressure is 115/78 and her pulse is 62. Her respiration is 16 and oxygen saturation is 96%.     Chief Complaint: Abdominal Pain    63 year old female presents for evaluation of abdominal cramping (twisting sensation in right lower abdomen), abdominal discomfort (right lower abdomen) and bloating x 1 week after eating steak last week on Monday. Reports sensitivity to red meat with infrequent consumption. C/O Constipation, small stools with straining, last BM yesterday morning with incomplete evacuation. Amitiza 24 mcg daily with some relief, increased dosage to 2 pills x few days with increased relief. Reports noted improvement with walking, sufficient hydration and metamucil nightly. History of MS    Abdominal Pain  This is a recurrent problem. The current episode started 1 to 4 weeks ago. The onset quality is sudden. The problem occurs intermittently. The problem has been gradually improving. The pain is located in the generalized abdominal region. The pain is at a severity of 4/10. The pain is mild. The quality of the pain is aching. The abdominal pain does not radiate. Associated symptoms include constipation. Pertinent negatives include no anorexia, arthralgias, belching, diarrhea, dysuria, fever, flatus, frequency, headaches, hematochezia, hematuria, melena, myalgias, nausea, vomiting or weight loss. The pain is aggravated by certain positions. The pain is relieved by Nothing. She has tried nothing for the symptoms.       Constitution: Negative. Negative for chills, sweating, fatigue and fever.   HENT: Negative.     Neck: neck negative.   Cardiovascular: Negative.    Eyes: Negative.    Respiratory: Negative.     Gastrointestinal:  Positive for abdominal pain, abdominal bloating and constipation. Negative for nausea, " vomiting, diarrhea and bright red blood in stool.   Endocrine: negative.   Genitourinary: Negative.  Negative for dysuria, frequency and hematuria.   Musculoskeletal: Negative.  Negative for joint pain and muscle ache.   Skin: Negative.    Allergic/Immunologic: Negative.    Neurological: Negative.  Negative for headaches.   Hematologic/Lymphatic: Negative.    Psychiatric/Behavioral: Negative.        Objective:     Physical Exam   Constitutional: She is oriented to person, place, and time. She appears well-developed. She is cooperative.  Non-toxic appearance. She does not appear ill. No distress. normalawake  HENT:   Head: Normocephalic and atraumatic.   Ears:   Right Ear: Hearing and external ear normal.   Left Ear: Hearing and external ear normal.   Nose: No rhinorrhea.   Mouth/Throat: Uvula is midline and mucous membranes are normal.   Eyes: Conjunctivae and lids are normal. Pupils are equal, round, and reactive to light. Right eye exhibits no discharge. Left eye exhibits no discharge. No scleral icterus. Extraocular movement intact   Neck: Trachea normal and phonation normal. Neck supple. No edema present. No erythema present. No neck rigidity present.   Cardiovascular: Normal rate, regular rhythm, normal heart sounds and normal pulses.   Pulmonary/Chest: Effort normal and breath sounds normal. No stridor. No respiratory distress. She has no decreased breath sounds. She has no wheezes. She has no rhonchi. She has no rales. She exhibits no tenderness.   Abdominal: Normal appearance and bowel sounds are normal. She exhibits no distension and no mass. Soft. flat abdomen There is abdominal tenderness (right lower abdomen greater than left lower abdomen) in the right lower quadrant and left lower quadrant. There is no rebound, no guarding, no tenderness at McBurney's point, negative Rosa's sign, no left CVA tenderness, negative Rovsing's sign and no right CVA tenderness. No hernia.   Musculoskeletal: Normal range of  motion.         General: No deformity. Normal range of motion.   Neurological: no focal deficit. She is alert and oriented to person, place, and time. She exhibits normal muscle tone. Coordination normal.   Skin: Skin is warm, dry, intact, not diaphoretic and not pale.   Psychiatric: Her speech is normal and behavior is normal. Mood, judgment and thought content normal.   Nursing note and vitals reviewed.    X-Ray Abdomen Flat And Erect    Result Date: 12/13/2023  EXAM: XR ABDOMEN FLAT AND ERECT CLINICAL HISTORY: [R14.0]-Abdominal distension (gaseous)./[R10.9]-Unspecified abdominal pain. COMPARISON STUDIES:  02/24/2023 FINDINGS: There is a nonobstructive bowel gas pattern .  Large amount of colonic stool. 4 mm right paramedian calcification right central pelvis not seen on comparison exam.  Small 3 mm left upper quadrant calcification, possibly at the upper pole of the left kidney. Low-grade degenerative changes lower lumbar spine.     Small right pelvic calcification not seen previously.  Distal right ureteral calculus is a consideration. Finalized on: 12/13/2023 11:33 AM By:  Blake Paris MD [facility]# [id number]      2023-12-13 11:35:[id number]    [facility]     Assessment:     1. Abdominal bloating    2. Abdominal discomfort    3. Slow transit constipation    4. Multiple sclerosis        Plan:     Patient presents for evaluation of abdominal pain and constipation. Decision to perform Abd xray to rule out obstruction, findings discussed with patient. Exam negative for cholecystitis/appendicitis/nephrolithiasis/UTI/pyelonephritis. Plan is to manage symptoms and prevent worsening, discussed with patient who verbalizes understanding.      Abdominal bloating  -     X-Ray Abdomen Flat And Erect; Future; Expected date: 12/13/2023    Abdominal discomfort  -     X-Ray Abdomen Flat And Erect; Future; Expected date: 12/13/2023    Slow transit constipation    Multiple sclerosis                Patient Instructions   Hydration/increase  fluids  Physical activity  Increase Fiber: fruits/veggies  Continue medications as directed  Citrate magnesium OTC x 1 for constipation  Signs and symptoms of worsening discussed  Follow up as needed/with worsening

## 2023-12-13 NOTE — PATIENT INSTRUCTIONS
Hydration/increase fluids  Physical activity  Increase Fiber: fruits/veggies  Continue medications as directed  Citrate magnesium OTC x 1 for constipation  Signs and symptoms of worsening discussed  Follow up as needed/with worsening

## 2023-12-27 ENCOUNTER — HOSPITAL ENCOUNTER (EMERGENCY)
Facility: HOSPITAL | Age: 63
Discharge: HOME | End: 2023-12-27
Attending: EMERGENCY MEDICINE
Payer: MEDICARE

## 2023-12-27 VITALS
WEIGHT: 176.88 LBS | TEMPERATURE: 98 F | RESPIRATION RATE: 18 BRPM | BODY MASS INDEX: 31.34 KG/M2 | OXYGEN SATURATION: 95 % | SYSTOLIC BLOOD PRESSURE: 118 MMHG | HEART RATE: 81 BPM | DIASTOLIC BLOOD PRESSURE: 78 MMHG

## 2023-12-27 DIAGNOSIS — R10.9 FLANK PAIN: ICD-10-CM

## 2023-12-27 DIAGNOSIS — J10.1 INFLUENZA A: Primary | ICD-10-CM

## 2023-12-27 LAB
ALBUMIN SERPL BCP-MCNC: 3.7 G/DL (ref 3.5–5.2)
ALP SERPL-CCNC: 65 U/L (ref 55–135)
ALT SERPL W/O P-5'-P-CCNC: 14 U/L (ref 10–44)
ANION GAP SERPL CALC-SCNC: 11 MMOL/L (ref 8–16)
AST SERPL-CCNC: 32 U/L (ref 10–40)
BASOPHILS # BLD AUTO: 0.03 K/UL (ref 0–0.2)
BASOPHILS NFR BLD: 0.5 % (ref 0–1.9)
BILIRUB SERPL-MCNC: 0.3 MG/DL (ref 0.1–1)
BILIRUB UR QL STRIP: NEGATIVE
BNP SERPL-MCNC: 51 PG/ML (ref 0–99)
BUN SERPL-MCNC: 11 MG/DL (ref 8–23)
CALCIUM SERPL-MCNC: 9.3 MG/DL (ref 8.7–10.5)
CHLORIDE SERPL-SCNC: 104 MMOL/L (ref 95–110)
CLARITY UR: CLEAR
CO2 SERPL-SCNC: 23 MMOL/L (ref 23–29)
COLOR UR: YELLOW
CREAT SERPL-MCNC: 1.1 MG/DL (ref 0.5–1.4)
DIFFERENTIAL METHOD: ABNORMAL
EOSINOPHIL # BLD AUTO: 0 K/UL (ref 0–0.5)
EOSINOPHIL NFR BLD: 0.4 % (ref 0–8)
ERYTHROCYTE [DISTWIDTH] IN BLOOD BY AUTOMATED COUNT: 13.4 % (ref 11.5–14.5)
EST. GFR  (NO RACE VARIABLE): 57 ML/MIN/1.73 M^2
GLUCOSE SERPL-MCNC: 100 MG/DL (ref 70–110)
GLUCOSE UR QL STRIP: NEGATIVE
HCT VFR BLD AUTO: 37.4 % (ref 37–48.5)
HGB BLD-MCNC: 12.3 G/DL (ref 12–16)
HGB UR QL STRIP: NEGATIVE
IMM GRANULOCYTES # BLD AUTO: 0.01 K/UL (ref 0–0.04)
IMM GRANULOCYTES NFR BLD AUTO: 0.2 % (ref 0–0.5)
INFLUENZA A, MOLECULAR: POSITIVE
INFLUENZA B, MOLECULAR: NEGATIVE
KETONES UR QL STRIP: NEGATIVE
LACTATE SERPL-SCNC: 0.6 MMOL/L (ref 0.5–2.2)
LACTATE SERPL-SCNC: 0.7 MMOL/L (ref 0.5–2.2)
LEUKOCYTE ESTERASE UR QL STRIP: NEGATIVE
LYMPHOCYTES # BLD AUTO: 1.2 K/UL (ref 1–4.8)
LYMPHOCYTES NFR BLD: 22.7 % (ref 18–48)
MCH RBC QN AUTO: 31.5 PG (ref 27–31)
MCHC RBC AUTO-ENTMCNC: 32.9 G/DL (ref 32–36)
MCV RBC AUTO: 96 FL (ref 82–98)
MONOCYTES # BLD AUTO: 0.7 K/UL (ref 0.3–1)
MONOCYTES NFR BLD: 13.3 % (ref 4–15)
NEUTROPHILS # BLD AUTO: 3.4 K/UL (ref 1.8–7.7)
NEUTROPHILS NFR BLD: 62.9 % (ref 38–73)
NITRITE UR QL STRIP: NEGATIVE
NRBC BLD-RTO: 0 /100 WBC
PH UR STRIP: 7 [PH] (ref 5–8)
PLATELET # BLD AUTO: 183 K/UL (ref 150–450)
PMV BLD AUTO: 9.9 FL (ref 9.2–12.9)
POTASSIUM SERPL-SCNC: 3.8 MMOL/L (ref 3.5–5.1)
PROT SERPL-MCNC: 7.5 G/DL (ref 6–8.4)
PROT UR QL STRIP: NEGATIVE
RBC # BLD AUTO: 3.91 M/UL (ref 4–5.4)
SARS-COV-2 RDRP RESP QL NAA+PROBE: NEGATIVE
SODIUM SERPL-SCNC: 138 MMOL/L (ref 136–145)
SP GR UR STRIP: 1.01 (ref 1–1.03)
SPECIMEN SOURCE: ABNORMAL
TROPONIN I SERPL DL<=0.01 NG/ML-MCNC: <0.006 NG/ML (ref 0–0.03)
URN SPEC COLLECT METH UR: NORMAL
UROBILINOGEN UR STRIP-ACNC: NEGATIVE EU/DL
WBC # BLD AUTO: 5.47 K/UL (ref 3.9–12.7)

## 2023-12-27 PROCEDURE — U0002 COVID-19 LAB TEST NON-CDC: HCPCS | Performed by: EMERGENCY MEDICINE

## 2023-12-27 PROCEDURE — 25500020 PHARM REV CODE 255: Performed by: EMERGENCY MEDICINE

## 2023-12-27 PROCEDURE — 85025 COMPLETE CBC W/AUTO DIFF WBC: CPT | Performed by: EMERGENCY MEDICINE

## 2023-12-27 PROCEDURE — 81003 URINALYSIS AUTO W/O SCOPE: CPT | Performed by: EMERGENCY MEDICINE

## 2023-12-27 PROCEDURE — 93010 ELECTROCARDIOGRAM REPORT: CPT | Mod: ,,, | Performed by: INTERNAL MEDICINE

## 2023-12-27 PROCEDURE — 93005 ELECTROCARDIOGRAM TRACING: CPT

## 2023-12-27 PROCEDURE — 80053 COMPREHEN METABOLIC PANEL: CPT | Performed by: EMERGENCY MEDICINE

## 2023-12-27 PROCEDURE — 93010 EKG 12-LEAD: ICD-10-PCS | Mod: ,,, | Performed by: INTERNAL MEDICINE

## 2023-12-27 PROCEDURE — 84484 ASSAY OF TROPONIN QUANT: CPT | Performed by: EMERGENCY MEDICINE

## 2023-12-27 PROCEDURE — 87502 INFLUENZA DNA AMP PROBE: CPT | Performed by: EMERGENCY MEDICINE

## 2023-12-27 PROCEDURE — 83605 ASSAY OF LACTIC ACID: CPT | Performed by: EMERGENCY MEDICINE

## 2023-12-27 PROCEDURE — 83880 ASSAY OF NATRIURETIC PEPTIDE: CPT | Performed by: EMERGENCY MEDICINE

## 2023-12-27 PROCEDURE — 99285 EMERGENCY DEPT VISIT HI MDM: CPT | Mod: 25

## 2023-12-27 PROCEDURE — 87040 BLOOD CULTURE FOR BACTERIA: CPT | Performed by: EMERGENCY MEDICINE

## 2023-12-27 PROCEDURE — 25000003 PHARM REV CODE 250: Performed by: EMERGENCY MEDICINE

## 2023-12-27 RX ORDER — OSELTAMIVIR PHOSPHATE 75 MG/1
75 CAPSULE ORAL 2 TIMES DAILY
Qty: 10 CAPSULE | Refills: 0 | Status: SHIPPED | OUTPATIENT
Start: 2023-12-27 | End: 2023-12-27

## 2023-12-27 RX ORDER — OSELTAMIVIR PHOSPHATE 75 MG/1
75 CAPSULE ORAL 2 TIMES DAILY
Qty: 10 CAPSULE | Refills: 0 | Status: SHIPPED | OUTPATIENT
Start: 2023-12-27 | End: 2024-01-01

## 2023-12-27 RX ORDER — ACETAMINOPHEN 500 MG
1000 TABLET ORAL
Status: COMPLETED | OUTPATIENT
Start: 2023-12-27 | End: 2023-12-27

## 2023-12-27 RX ADMIN — IOHEXOL 100 ML: 350 INJECTION, SOLUTION INTRAVENOUS at 10:12

## 2023-12-27 RX ADMIN — ACETAMINOPHEN 1000 MG: 500 TABLET ORAL at 07:12

## 2023-12-27 NOTE — ED PROVIDER NOTES
SCRIBE #1 NOTE: Janine MORRIS, mo scribing for, and in the presence of, Lukas Boston MD. I have scribed the entire note.       History     Chief Complaint   Patient presents with    Flank Pain     L flank x 3 days. Also reports fever    Dizziness     Review of patient's allergies indicates:   Allergen Reactions    Latex, natural rubber Rash         History of Present Illness     HPI    12/27/2023, 7:02 AM  History obtained from the patient      History of Present Illness: Cem Meyers is a 63 y.o. female patient with a PMHx of a HLD, MS who presents to the Emergency Department for evaluation of L sided flank pain which the pt states onset yesterday. Pt states that the pain does not radiate to any other areas. Symptoms are constant and moderate in severity. No mitigating or exacerbating factors reported. The pt states that she has also been experiencing cough and congestion for about a week now. She also complains of generalized myalgias. Patient denies any n/v, dysuria,  hematuria, vaginal discharge, difficulty urinating, vaginal pain and all other sxs at this time. Pt states that despite her MS diagnosis, she currently does not take medication for treatment and management of the disease. No further complaints or concerns at this time.       Arrival mode: EMS    PCP: Keagan Calles MD        Past Medical History:  Past Medical History:   Diagnosis Date    Back pain     Broken toe 6/2015    left big toe    Chronic diastolic heart failure 5/8/2018    Closed left arm fracture     Colon polyp     Depression     Hyperlipidemia     Hypothyroid     Immunosuppression 1/28/2019    MS (multiple sclerosis)     Neurogenic bladder 12/6/2012    Osteoporosis     Polyneuropathy     Sleep apnea     Trouble in sleeping        Past Surgical History:  Past Surgical History:   Procedure Laterality Date    BREAST BIOPSY Left 10/28/2020    COLONOSCOPY N/A 09/01/2017    Procedure: COLONOSCOPY;  Surgeon: Andriy Villar,  MD;  Location: [facility] ENDO;  Service: Endoscopy;  Laterality: N/A;    COLONOSCOPY N/A 01/06/2021    Procedure: COLONOSCOPY;  Surgeon: Althea Casanova MD;  Location: [facility] ENDO;  Service: Endoscopy;  Laterality: N/A;    COLONOSCOPY N/A 04/25/2022    Procedure: COLONOSCOPY;  Surgeon: Cristiano Aaron MD;  Location: [facility] ENDO;  Service: Endoscopy;  Laterality: N/A;    FRACTURE SURGERY Left 2013    wrist- [facility]    KNEE SURGERY Right 1989    in [place]    TONSILLECTOMY  1966         Family History:  Family History   Problem Relation Age of Onset    Pancreatic cancer Mother     Stomach cancer Mother     Cancer Mother         pancreatic, stomach    Hypertension Father     Heart disease Father         MI    Hearing loss Father     Lupus Maternal Aunt     Rheum arthritis Maternal Aunt     Rheum arthritis Sister     Melanoma Neg Hx        Social History:  Social History     Tobacco Use    Smoking status: Never    Smokeless tobacco: Never   Substance and Sexual Activity    Alcohol use: Yes     Alcohol/week: 1.0 standard drink of alcohol     Types: 1 Glasses of wine per week    Drug use: No    Sexual activity: Not Currently     Partners: Male     Birth control/protection: Abstinence, Post-menopausal        Review of Systems     Review of Systems   Constitutional:  Negative for fever.   HENT:  Positive for congestion. Negative for sore throat.    Respiratory:  Positive for cough. Negative for shortness of breath.    Cardiovascular:  Negative for chest pain.   Gastrointestinal:  Negative for nausea.   Genitourinary:  Positive for flank pain (L side). Negative for difficulty urinating, dysuria, frequency, hematuria, vaginal discharge and vaginal pain.   Musculoskeletal:  Positive for myalgias (generalized). Negative for back pain.   Skin:  Negative for rash.   Neurological:  Negative for weakness.   Hematological:  Does not bruise/bleed easily.   All other systems reviewed and are negative.     Physical Exam     Initial Vitals  [12/27/23 0628]   BP Pulse Resp Temp SpO2   (!) 151/90 85 20 (!) 101.5 °F (38.6 °C) (!) 94 %      MAP       --          Physical Exam  Nursing Notes and Vital Signs Reviewed.  Constitutional: Patient is in no acute distress. Well-developed and well-nourished.  Head: Atraumatic. Normocephalic.  Eyes: PERRL. EOM intact. Conjunctivae are not pale. No scleral icterus.  ENT: Mucous membranes are moist. Oropharynx is clear and symmetric.    Neck: Supple. Full ROM. No lymphadenopathy.  Cardiovascular: Regular rate. Regular rhythm. No murmurs, rubs, or gallops. Distal pulses are 2+ and symmetric.  Pulmonary/Chest: L lower lobe crackles.  Abdominal: Soft and non-distended.  There is no tenderness.  No rebound, guarding, or rigidity. Good bowel sounds.  Genitourinary: L CVA tenderness to percussion.   Musculoskeletal: Moves all extremities. No obvious deformities. No edema. No calf tenderness.  Skin: Warm and dry.  Neurological:  Alert, awake, and appropriate.  Normal speech.  No acute focal neurological deficits are appreciated.  Psychiatric: Normal affect. Good eye contact. Appropriate in content.     ED Course   Procedures  ED Vital Signs:  Vitals:    12/27/23 0628 12/27/23 0707 12/27/23 0720 12/27/23 1111   BP: (!) 151/90  114/63 117/78   Pulse: 85 93 90 78   Resp: 20  18 18   Temp: (!) 101.5 °F (38.6 °C)  98.9 °F (37.2 °C) 99.1 °F (37.3 °C)   TempSrc: Oral  Oral Oral   SpO2: (!) 94%  95%    Weight:  80.2 kg (176 lb 14.4 oz)      12/27/23 1135 12/27/23 1302   BP: 113/70 118/78   Pulse: 81    Resp: 18    Temp: 99 °F (37.2 °C) 98 °F (36.7 °C)   TempSrc: Oral Oral   SpO2:     Weight:         Abnormal Lab Results:  Labs Reviewed   INFLUENZA A & B BY MOLECULAR - Abnormal; Notable for the following components:       Result Value    Influenza A, Molecular Positive (*)     All other components within normal limits   CBC W/ AUTO DIFFERENTIAL - Abnormal; Notable for the following components:    RBC 3.91 (*)     MCH 31.5 (*)     All  other components within normal limits   COMPREHENSIVE METABOLIC PANEL - Abnormal; Notable for the following components:    eGFR 57 (*)     All other components within normal limits   CULTURE, BLOOD   CULTURE, BLOOD   LACTIC ACID, PLASMA   URINALYSIS, REFLEX TO URINE CULTURE    Narrative:     Specimen Source->Urine   TROPONIN I   B-TYPE NATRIURETIC PEPTIDE   SARS-COV-2 RNA AMPLIFICATION, QUAL   LACTIC ACID, PLASMA        All Lab Results:  Results for orders placed or performed during the hospital encounter of 12/27/23   Influenza A & B by Molecular    Specimen: Nasopharyngeal Swab   Result Value Ref Range    Influenza A, Molecular Positive (A) Negative    Influenza B, Molecular Negative Negative    Flu A & B Source Nasal swab    CBC auto differential   Result Value Ref Range    WBC 5.47 3.90 - 12.70 K/uL    RBC 3.91 (L) 4.00 - 5.40 M/uL    Hemoglobin 12.3 12.0 - 16.0 g/dL    Hematocrit 37.4 37.0 - 48.5 %    MCV 96 82 - 98 fL    MCH 31.5 (H) 27.0 - 31.0 pg    MCHC 32.9 32.0 - 36.0 g/dL    RDW 13.4 11.5 - 14.5 %    Platelets 183 150 - 450 K/uL    MPV 9.9 9.2 - 12.9 fL    Immature Granulocytes 0.2 0.0 - 0.5 %    Gran # (ANC) 3.4 1.8 - 7.7 K/uL    Immature Grans (Abs) 0.01 0.00 - 0.04 K/uL    Lymph # 1.2 1.0 - 4.8 K/uL    Mono # 0.7 0.3 - 1.0 K/uL    Eos # 0.0 0.0 - 0.5 K/uL    Baso # 0.03 0.00 - 0.20 K/uL    nRBC 0 0 /100 WBC    Gran % 62.9 38.0 - 73.0 %    Lymph % 22.7 18.0 - 48.0 %    Mono % 13.3 4.0 - 15.0 %    Eosinophil % 0.4 0.0 - 8.0 %    Basophil % 0.5 0.0 - 1.9 %    Differential Method Automated    Comprehensive metabolic panel   Result Value Ref Range    Sodium 138 136 - 145 mmol/L    Potassium 3.8 3.5 - 5.1 mmol/L    Chloride 104 95 - 110 mmol/L    CO2 23 23 - 29 mmol/L    Glucose 100 70 - 110 mg/dL    BUN 11 8 - 23 mg/dL    Creatinine 1.1 0.5 - 1.4 mg/dL    Calcium 9.3 8.7 - 10.5 mg/dL    Total Protein 7.5 6.0 - 8.4 g/dL    Albumin 3.7 3.5 - 5.2 g/dL    Total Bilirubin 0.3 0.1 - 1.0 mg/dL    Alkaline  Phosphatase 65 55 - 135 U/L    AST 32 10 - 40 U/L    ALT 14 10 - 44 U/L    eGFR 57 (A) >60 mL/min/1.73 m^2    Anion Gap 11 8 - 16 mmol/L   Lactic acid, plasma #1   Result Value Ref Range    Lactate (Lactic Acid) 0.6 0.5 - 2.2 mmol/L   Urinalysis, Reflex to Urine Culture Urine, Clean Catch    Specimen: Urine   Result Value Ref Range    Specimen UA Urine, Clean Catch     Color, UA Yellow Yellow, Straw, Karoline    Appearance, UA Clear Clear    pH, UA 7.0 5.0 - 8.0    Specific Gravity, UA 1.015 1.005 - 1.030    Protein, UA Negative Negative    Glucose, UA Negative Negative    Ketones, UA Negative Negative    Bilirubin (UA) Negative Negative    Occult Blood UA Negative Negative    Nitrite, UA Negative Negative    Urobilinogen, UA Negative <2.0 EU/dL    Leukocytes, UA Negative Negative   Troponin I   Result Value Ref Range    Troponin I <0.006 0.000 - 0.026 ng/mL   Brain natriuretic peptide   Result Value Ref Range    BNP 51 0 - 99 pg/mL   COVID-19 Rapid Screening   Result Value Ref Range    SARS-CoV-2 RNA, Amplification, Qual Negative Negative   Lactic acid, plasma #2   Result Value Ref Range    Lactate (Lactic Acid) 0.7 0.5 - 2.2 mmol/L     *Note: Due to a large number of results and/or encounters for the requested time period, some results have not been displayed. A complete set of results can be found in Results Review.        Imaging Results:  Imaging Results              CT Abdomen Pelvis With IV Contrast NO Oral Contrast (Final result)  Result time 12/27/23 10:38:54      Final result by Tony EscobarAdonis), MD (12/27/23 10:38:54)                   Impression:      No definite acute findings.  Possible constipation.    All CT scans at this facility use dose modulation, iterative reconstructions, and/or weight base dosing when appropriate to reduce radiation dose to as low as reasonably achievable      Electronically signed by: Tony Escobar MD  Date:    12/27/2023  Time:    10:38               Narrative:     EXAMINATION:  CT ABDOMEN PELVIS WITH IV CONTRAST    CLINICAL HISTORY:  Abdominal abscess/infection suspected;Left flank pain, fever;    TECHNIQUE:  Postcontrast images were obtained.    COMPARISON:  None    FINDINGS:  Lung bases are clear    The liver, the spleen, and the pancreas appear normal.    The gallbladder is unremarkable.  There is no bile duct dilatation.    The kidneys are normal.    The aorta and inferior vena cava are unremarkable.    There are no acute bowel abnormalities.  Large amount stool throughout the colon which could reflect constipation.  No evidence of appendicitis.  No evidence of diverticulitis.    Bladder is normal. No abnormal masses or fluid collections in the pelvis.    Skeletal structures are intact.  No acute skeletal findings.                                       X-Ray Chest AP Portable (Final result)  Result time 12/27/23 08:41:51      Final result by Riaz Enamorado MD (12/27/23 08:41:51)                   Impression:      No acute finding in the chest.      Electronically signed by: Riaz Enamorado  Date:    12/27/2023  Time:    08:41               Narrative:    EXAMINATION:  XR CHEST AP PORTABLE    CLINICAL HISTORY:  Sepsis;    FINDINGS:  Comparison: 01/05/2019.    Mediastinal silhouette is within normal limits.  The lungs are clear.  No pneumothorax or pleural effusion.  No acute osseous finding.                                       The EKG was ordered, reviewed, and independently interpreted by the ED provider.  Interpretation time: 9:42  Rate: 85 BPM  Rhythm: normal sinus rhythm  Interpretation: No acute ST changes. No STEMI.             The Emergency Provider reviewed the vital signs and test results, which are outlined above.     ED Discussion     12:18 PM: Reassessed pt at this time. Discussed with pt all pertinent ED information and results. Discussed pt dx and plan of tx. Gave pt all f/u and return to the ED instructions. All questions and concerns were addressed at  this time. Pt expresses understanding of information and instructions, and is comfortable with plan to discharge. Pt is stable for discharge.    I discussed with patient and/or family/caretaker that evaluation in the ED does not suggest any emergent or life threatening medical conditions requiring immediate intervention beyond what was provided in the ED, and I believe patient is safe for discharge.  Regardless, an unremarkable evaluation in the ED does not preclude the development or presence of a serious of life threatening condition. As such, patient was instructed to return immediately for any worsening or change in current symptoms.        Medical Decision Making  DDx includes Kidney stone, Pyelonephritis, Sepsis, Viral syndrome, PNA, Pleural effusion, pleuritis.     Amount and/or Complexity of Data Reviewed  Labs: ordered. Decision-making details documented in ED Course.  Radiology: ordered. Decision-making details documented in ED Course.  ECG/medicine tests: ordered and independent interpretation performed. Decision-making details documented in ED Course.    Risk  OTC drugs.  Prescription drug management.                ED Medication(s):  Medications   acetaminophen tablet 1,000 mg (1,000 mg Oral Given 12/27/23 0713)   iohexoL (OMNIPAQUE 350) injection 100 mL (100 mLs Intravenous Given 12/27/23 1010)       Discharge Medication List as of 12/27/2023 12:31 PM           Follow-up Information       Keagan Calles MD. Call in 2 days.    Specialty: Family Medicine  Why: For re-evaluation and further treatment, As needed  Contact information:  [id number]  [place] [address] [phone]               [facility] - Emergency Dept.. Go today.    Specialty: Emergency Medicine  Why: If symptoms worsen, For re-evaluation and further treatment, As needed  Contact information:  [id number] [facility] [address] [phone]                               Scribe Attestation:   Mere  #1: I performed the above scribed service and the documentation accurately describes the services I performed. I attest to the accuracy of the note.     Attending:   Physician Attestation Statement for Scribe #1: I, Lukas Boston MD, personally performed the services described in this documentation, as scribed by Janine Cooper, in my presence, and it is both accurate and complete.           Clinical Impression       ICD-10-CM ICD-9-CM   1. Influenza A  J10.1 487.1   2. Flank pain  R10.9 789.09       Disposition:   Disposition: Discharged  Condition: Stable        Lukas Boston MD  12/27/23 3917

## 2023-12-29 ENCOUNTER — TELEPHONE (OUTPATIENT)
Dept: INFUSION THERAPY | Facility: HOSPITAL | Age: 63
End: 2023-12-29
Payer: MEDICARE

## 2023-12-29 NOTE — TELEPHONE ENCOUNTER
Left message for pt to call Nancy in Rheumatology regarding her appts for lab, Ingrid and karis on 1/31. Will need to reschedule due to appts a different locations.

## 2023-12-31 ENCOUNTER — PATIENT MESSAGE (OUTPATIENT)
Dept: GASTROENTEROLOGY | Facility: CLINIC | Age: 63
End: 2023-12-31
Payer: MEDICARE

## 2024-01-01 LAB
BACTERIA BLD CULT: NORMAL
BACTERIA BLD CULT: NORMAL

## 2024-01-19 ENCOUNTER — TELEPHONE (OUTPATIENT)
Dept: NEUROLOGY | Facility: CLINIC | Age: 64
End: 2024-01-19
Payer: MEDICARE

## 2024-01-19 ENCOUNTER — TELEPHONE (OUTPATIENT)
Dept: OPHTHALMOLOGY | Facility: CLINIC | Age: 64
End: 2024-01-19
Payer: MEDICARE

## 2024-01-19 ENCOUNTER — TELEPHONE (OUTPATIENT)
Dept: NEUROLOGY | Facility: CLINIC | Age: 64
End: 2024-01-19

## 2024-01-19 DIAGNOSIS — G35 MULTIPLE SCLEROSIS: Primary | ICD-10-CM

## 2024-01-19 NOTE — TELEPHONE ENCOUNTER
Spoke with patient in regards to booking out appointment with [facility]. Told patient why we were booking out and patient expressed understanding. Rescheduled patient.

## 2024-01-19 NOTE — TELEPHONE ENCOUNTER
"----- Message from Brianne Biggs RN sent at 1/18/2024  3:12 PM CST -----  Regarding: FW: MRI order  Contact: [phone]  Do you want me to reorder MRI brain w/o contrast? Last imaging was 9/2022,MRI brain w/o contrast  was sched 11/2023 but cancelled, f/u appt 3/4/2024  ----- Message -----  From: Liv Majano  Sent: 1/18/2024   3:00 PM CST  To: Sarah [facility]IE[facility] Staff  Subject: order                                            Name Of Caller: self     Contact Preference?:  [phone]     What is the nature of the call?: would like to get another order for MRI     Additional Notes:    "Thank you for all that you do for our patients"          "

## 2024-01-22 ENCOUNTER — PATIENT MESSAGE (OUTPATIENT)
Dept: PSYCHIATRY | Facility: CLINIC | Age: 64
End: 2024-01-22
Payer: MEDICARE

## 2024-01-31 ENCOUNTER — OFFICE VISIT (OUTPATIENT)
Dept: RHEUMATOLOGY | Facility: CLINIC | Age: 64
End: 2024-01-31
Payer: MEDICARE

## 2024-01-31 ENCOUNTER — INFUSION (OUTPATIENT)
Dept: INFUSION THERAPY | Facility: HOSPITAL | Age: 64
End: 2024-01-31
Attending: INTERNAL MEDICINE
Payer: MEDICARE

## 2024-01-31 VITALS
SYSTOLIC BLOOD PRESSURE: 125 MMHG | WEIGHT: 177.69 LBS | DIASTOLIC BLOOD PRESSURE: 78 MMHG | HEART RATE: 68 BPM | HEIGHT: 63 IN | BODY MASS INDEX: 31.48 KG/M2

## 2024-01-31 DIAGNOSIS — Z87.310 HISTORY OF HEALED FRAGILITY FRACTURE: ICD-10-CM

## 2024-01-31 DIAGNOSIS — M81.0 AGE-RELATED OSTEOPOROSIS WITHOUT CURRENT PATHOLOGICAL FRACTURE: Primary | ICD-10-CM

## 2024-01-31 DIAGNOSIS — M65.342 TRIGGER FINGER, LEFT RING FINGER: ICD-10-CM

## 2024-01-31 DIAGNOSIS — Z51.81 MEDICATION MONITORING ENCOUNTER: ICD-10-CM

## 2024-01-31 PROCEDURE — 63600175 PHARM REV CODE 636 W HCPCS: Mod: JZ,JG | Performed by: PHYSICIAN ASSISTANT

## 2024-01-31 PROCEDURE — 99999 PR PBB SHADOW E&M-EST. PATIENT-LVL IV: CPT | Mod: PBBFAC,,, | Performed by: PHYSICIAN ASSISTANT

## 2024-01-31 PROCEDURE — 99214 OFFICE O/P EST MOD 30 MIN: CPT | Mod: S$GLB,,, | Performed by: PHYSICIAN ASSISTANT

## 2024-01-31 PROCEDURE — 96372 THER/PROPH/DIAG INJ SC/IM: CPT

## 2024-01-31 RX ADMIN — DENOSUMAB 60 MG: 60 INJECTION SUBCUTANEOUS at 12:01

## 2024-01-31 NOTE — PROGRESS NOTES
Subjective:       Patient ID: Cem Meyers is a 63 y.o. female.    Chief Complaint: Osteoporosis    Cem Meyers  is a 63 y.o. female comes in today for routine follow-up of osteoporosis, which was diagnosed when she had a fragility fracture of the distal radius.  No fractures since last visit.  Distal radius fracture occurred more than 10 years ago.  She is on Prolia and tolerating it well.  Previously failed Fosamax due to declining BMD scores.  Also failed Reclast due to severe myalgias.    She has multiple sclerosis with left foot drop.  Has an AFO but does not use often because she finds it more difficult to ambulate with AFO than without.  She uses a cane when she remembers.  She is a fall risk.     Has had issues with her fingers in the hands in the past.  They are not problematic for her.  When they become more uncomfortable she uses an over-the-counter splint which she found to has been helpful.    Current Tx:   Prolia - due today   Vit D3 - 5000 units daily  Calcium OTC - once daily  Previous Tx:  Fosamax - failed - falling BMD  Reclast - severe myalgias  GERD: none   Gait:  unsteady - uses cane w ambulation; left foot drop - has more difficulty w brace than ambulating without it  Invasive dental work:  none recent/planned  History of Fragility fracture: left wrist fracture 2013    Rheumatologic systems otherwise negative.      Current Outpatient Medications:     baclofen (LIORESAL) 10 MG tablet, TAKE 2 TABS ONE TIME DAILY IN THE MORNING, 2 TABS IN THE AFTERNOON, AND UP TO 3 TABS AT BEDTIME AS NEEDED, Disp: 630 tablet, Rfl: 3    calcium citrate (CALCITRATE) 200 mg (950 mg) tablet, Take 2 tablets by mouth once daily., Disp: , Rfl:     cholecalciferol, vitamin D3, 100 mcg (4,000 unit) Cap, Take 4,000 Units by mouth once daily. , Disp: , Rfl:     denosumab (PROLIA SUBQ), Inject into the skin. Every 6 months, Disp: , Rfl:     fish oil-omega-3 fatty acids 300-1,000 mg capsule, Take 2 g by mouth once daily.,  Disp: , Rfl:     gabapentin (NEURONTIN) 800 MG tablet, Take 1 tablet (800 mg total) by mouth 3 (three) times daily., Disp: 270 tablet, Rfl: 1    interferon beta-1a, albumin, (REBIF TITRATION PACK) 8.8mcg/0.2mL-22 mcg/0.5mL (6) Syrg, Inject 8.8 mcg into the skin three times weekly for week 1 and 2, then Inject 22 mcg into the skin three times weekly for weeks 3 and 4, then inject 44 mcg into the skin three times weekly for maintenance, Disp: 4.2 mL, Rfl: 0    interferon beta-1a, albumin, (REBIF) 44 mcg/0.5 mL injection, Inject 0.5 mLs (44 mcg total) into the skin 3 (three) times a week., Disp: 6 mL, Rfl: 3    levothyroxine (SYNTHROID) 75 MCG tablet, TAKE 1 TABLET BEFORE BREAKFAST, Disp: 90 tablet, Rfl: 3    lubiprostone (AMITIZA) 24 MCG Cap, Take 1 capsule (24 mcg total) by mouth 2 (two) times daily., Disp: 60 capsule, Rfl: 11    multivitamin capsule, Take 1 capsule by mouth once daily., Disp: , Rfl:     paroxetine (PAXIL) 40 MG tablet, TAKE 1 TABLET EVERY MORNING, Disp: 90 tablet, Rfl: 1    simvastatin (ZOCOR) 40 MG tablet, TAKE 1 TABLET EVERY DAY, Disp: 90 tablet, Rfl: 1  Past Medical History:   Diagnosis Date    Back pain     Broken toe 6/2015    left big toe    Chronic diastolic heart failure 5/8/2018    Closed left arm fracture     Colon polyp     Depression     Hyperlipidemia     Hypothyroid     Immunosuppression 1/28/2019    MS (multiple sclerosis)     Neurogenic bladder 12/6/2012    Osteoporosis     Polyneuropathy     Sleep apnea     Trouble in sleeping      Family History   Problem Relation Age of Onset    Pancreatic cancer Mother     Stomach cancer Mother     Cancer Mother         pancreatic, stomach    Hypertension Father     Heart disease Father         MI    Hearing loss Father     Lupus Maternal Aunt     Rheum arthritis Maternal Aunt     Rheum arthritis Sister     Melanoma Neg Hx      Social History     Socioeconomic History    Marital status:    Tobacco Use    Smoking status: Never    Smokeless  tobacco: Never   Substance and Sexual Activity    Alcohol use: Yes     Alcohol/week: 1.0 standard drink of alcohol     Types: 1 Glasses of wine per week    Drug use: No    Sexual activity: Not Currently     Partners: Male     Birth control/protection: Abstinence, Post-menopausal   Other Topics Concern    Are you pregnant or think you may be? No    Breast-feeding No     Social Determinants of Health     Financial Resource Strain: Low Risk  (10/25/2023)    Overall Financial Resource Strain (CARDIA)     Difficulty of Paying Living Expenses: Not very hard   Food Insecurity: No Food Insecurity (10/25/2023)    Hunger Vital Sign     Worried About Running Out of Food in the Last Year: Never true     Ran Out of Food in the Last Year: Never true   Transportation Needs: No Transportation Needs (10/25/2023)    PRAPARE - Transportation     Lack of Transportation (Medical): No     Lack of Transportation (Non-Medical): No   Physical Activity: Sufficiently Active (10/25/2023)    Exercise Vital Sign     Days of Exercise per Week: 7 days     Minutes of Exercise per Session: 30 min   Recent Concern: Physical Activity - Insufficiently Active (10/6/2023)    Exercise Vital Sign     Days of Exercise per Week: 6 days     Minutes of Exercise per Session: 20 min   Stress: No Stress Concern Present (10/25/2023)    [employer] - Occupational Stress Questionnaire     Feeling of Stress : Only a little   Social Connections: Unknown (10/25/2023)    Social Connection and Isolation Panel [NHANES]     Frequency of Communication with Friends and Family: More than three times a week     Frequency of Social Gatherings with Friends and Family: More than three times a week     Active Member of Clubs or Organizations: Yes     Attends Club or Organization Meetings: More than 4 times per year     Marital Status:    Housing Stability: Low Risk  (10/25/2023)    Housing Stability Vital Sign     Unable to Pay for Housing in  "the Last Year: No     Number of Places Lived in the Last Year: 1     Unstable Housing in the Last Year: No     Review of patient's allergies indicates:   Allergen Reactions    Latex, natural rubber Rash       Objective:   /78   Pulse 68   Ht 5' 3" (1.6 m)   Wt 80.6 kg (177 lb 11.1 oz)   BMI 31.48 kg/m²   Immunization History   Administered Date(s) Administered    COVID-19, MRNA, LN-S, PF (MODERNA FULL 0.5 ML DOSE) 03/16/2021, 04/13/2021, 11/18/2021, 06/01/2022    Hepatitis B (recombinant) Adjuvanted, 2 dose 02/05/2019    Influenza 10/02/2018, 12/26/2023    Influenza - Quadrivalent - PF *Preferred* (6 months and older) 10/03/2018, 10/03/2018, 09/26/2019, 09/26/2019, 10/01/2020, 10/06/2021, 09/14/2022    Influenza Split 10/18/2011, 10/15/2013    Pneumococcal Conjugate - 13 Valent 02/05/2019    Pneumococcal Conjugate - 20 Valent 03/08/2023    Tdap 09/07/2012    Zoster Recombinant 09/06/2023, 11/06/2023       Physical Exam   Constitutional: She is oriented to person, place, and time. She appears well-developed and well-nourished.   HENT:   Head: Normocephalic and atraumatic.   Mouth/Throat: Mucous membranes are normal. No oral lesions. No dental abscesses.   Pulmonary/Chest: Effort normal.   Neurological: She is alert and oriented to person, place, and time.   Skin: Skin is warm and dry. No rash noted.   Psychiatric: Her behavior is normal.   Nursing note and vitals reviewed.    Active triggering left ring finger  No TTP [id number] long nor ring fingers     Recent Results (from the past 336 hour(s))   Comprehensive Metabolic Panel    Collection Time: 01/31/24 10:39 AM   Result Value Ref Range    Sodium 141 136 - 145 mmol/L    Potassium 4.0 3.5 - 5.1 mmol/L    Chloride 106 95 - 110 mmol/L    CO2 28 23 - 29 mmol/L    Glucose 83 70 - 110 mg/dL    BUN 16 8 - 23 mg/dL    Creatinine 1.2 0.5 - 1.4 mg/dL    Calcium 10.0 8.7 - 10.5 mg/dL    Total Protein 7.7 6.0 - 8.4 g/dL    Albumin 3.8 3.5 - 5.2 g/dL    Total Bilirubin " 0.4 0.1 - 1.0 mg/dL    Alkaline Phosphatase 70 55 - 135 U/L    AST 33 10 - 40 U/L    ALT 15 10 - 44 U/L    eGFR 51 (A) >60 mL/min/1.73 m^2    Anion Gap 7 (L) 8 - 16 mmol/L       Lab Results   Component Value Date    TBGOLDPLUS Negative 06/03/2021      Lab Results   Component Value Date    HEPBCAB Negative 07/17/2019    HEPCAB Negative 01/25/2019        DEXA - I have personally reviewed results from 6/2022   FINDINGS:  The L1 to L4 vertebral bone mineral density is equal to 1.13 g/cm squared with a T score of -0.5.  There has been a 3.3% statistically significant change relative to the prior study.     The left femoral neck bone mineral density is equal to 0.801 g/cm squared with a T score of -1.7.  There has been  no significant change relative to the prior study.     There is a 6.6% risk of a major osteoporotic fracture and a 0.7% risk of hip fracture in the next 10 years (FRAX).     Impression:  Osteopenia    Assessment:     FINDINGS:  The L1 to L4 vertebral bone mineral density is equal to 1.13 g/cm squared with a T score of -0.5.  There has been a 3.3% statistically significant change relative to the prior study.     The left femoral neck bone mineral density is equal to 0.801 g/cm squared with a T score of -1.7.  There has been  no significant change relative to the prior study.     There is a 6.6% risk of a major osteoporotic fracture and a 0.7% risk of hip fracture in the next 10 years (FRAX).     Impression:  Osteopenia      Plan:     Cem was seen today for osteoporosis.    Diagnoses and all orders for this visit:    Age-related osteoporosis without current pathological fracture  -     DXA Bone Density Axial Skeleton 1 or more sites; Standing    Medication monitoring encounter  -     DXA Bone Density Axial Skeleton 1 or more sites; Standing    Trigger finger, left ring finger    History of healed fragility fracture  -     DXA Bone Density Axial Skeleton 1 or more sites; Standing    Osteoporosis  Scores  now in osteopenic range w low FRAX  Calcium 10.0 - WNL  GFR 51  Vit D is pending - c/w current supplementation for now.  No contra indication to Prolia today  Check CMP in 10-14 days in pts w CKD  Patient instructed to notify the office if he/she has any new falls or new fractures  Discussed risks associated w Prolia including but not limited to hypocalcemia, ONJ, AFF  DEXA due after 6/17/2024  Trigger finger   Briefly discussed corticosteroid injection.  Patient deferred for now  If symptoms worsen happy to inject down the road.  Drug therapy requiring intensive monitoring for toxicity  High Risk Medication Monitoring encounter  No current medication related issues, no evidence of toxicity  I ordered labs for toxicity monitoring, have personally reviewed the findings, and discussed them with the patient.  Pending labs will be sent via the portal  Return to clinic: 6mos CMP/Vit D + DEXA + me + prolia all same day    No follow-ups on file.    The patient understands, chooses and consents to this plan and accepts all the risks which include but are not limited to the risks mentioned above.     Disclaimer: This note was prepared using a voice recognition system and is likely to have sound alike errors within the text.

## 2024-01-31 NOTE — PLAN OF CARE
Patient tolerated Prolia well today; no adverse reaction noted.  POC reviewed with pt.  No questions or concerns voiced.  NAD noted upon discharge.  No significant new complaints voiced.   Has f/u appt(s) scheduled per MD request.    Problem: Adult Inpatient Plan of Care  Goal: Plan of Care Review  Outcome: Ongoing, Progressing  Goal: Patient-Specific Goal (Individualized)  Outcome: Ongoing, Progressing  Goal: Absence of Hospital-Acquired Illness or Injury  Outcome: Ongoing, Progressing  Intervention: Identify and Manage Fall Risk  Flowsheets (Taken 1/31/2024 1228)  Safety Promotion/Fall Prevention: in recliner, wheels locked  Goal: Optimal Comfort and Wellbeing  Outcome: Ongoing, Progressing  Intervention: Provide Person-Centered Care  Flowsheets (Taken 1/31/2024 1228)  Trust Relationship/Rapport:   care explained   reassurance provided   choices provided   thoughts/feelings acknowledged   emotional support provided   empathic listening provided   questions answered   questions encouraged

## 2024-02-23 ENCOUNTER — OFFICE VISIT (OUTPATIENT)
Dept: URGENT CARE | Facility: CLINIC | Age: 64
End: 2024-02-23
Payer: MEDICARE

## 2024-02-23 ENCOUNTER — OFFICE VISIT (OUTPATIENT)
Dept: PULMONOLOGY | Facility: CLINIC | Age: 64
End: 2024-02-23
Payer: MEDICARE

## 2024-02-23 VITALS
DIASTOLIC BLOOD PRESSURE: 85 MMHG | WEIGHT: 175 LBS | SYSTOLIC BLOOD PRESSURE: 127 MMHG | TEMPERATURE: 98 F | RESPIRATION RATE: 18 BRPM | OXYGEN SATURATION: 99 % | BODY MASS INDEX: 31.01 KG/M2 | HEIGHT: 63 IN | HEART RATE: 68 BPM

## 2024-02-23 VITALS
HEIGHT: 63 IN | HEART RATE: 88 BPM | SYSTOLIC BLOOD PRESSURE: 110 MMHG | BODY MASS INDEX: 30.51 KG/M2 | DIASTOLIC BLOOD PRESSURE: 76 MMHG | WEIGHT: 172.19 LBS | RESPIRATION RATE: 17 BRPM | OXYGEN SATURATION: 98 %

## 2024-02-23 DIAGNOSIS — R39.15 URINARY URGENCY: ICD-10-CM

## 2024-02-23 DIAGNOSIS — R30.0 DYSURIA: ICD-10-CM

## 2024-02-23 DIAGNOSIS — N30.00 ACUTE CYSTITIS WITHOUT HEMATURIA: Primary | ICD-10-CM

## 2024-02-23 DIAGNOSIS — I50.32 CHRONIC DIASTOLIC HEART FAILURE: ICD-10-CM

## 2024-02-23 DIAGNOSIS — E66.9 CLASS 1 OBESITY WITH SERIOUS COMORBIDITY AND BODY MASS INDEX (BMI) OF 30.0 TO 30.9 IN ADULT, UNSPECIFIED OBESITY TYPE: ICD-10-CM

## 2024-02-23 DIAGNOSIS — G47.33 OSA ON CPAP: Primary | Chronic | ICD-10-CM

## 2024-02-23 DIAGNOSIS — G35 MULTIPLE SCLEROSIS: ICD-10-CM

## 2024-02-23 DIAGNOSIS — F51.04 PSYCHOPHYSIOLOGICAL INSOMNIA: ICD-10-CM

## 2024-02-23 LAB
BILIRUB UR QL STRIP: NEGATIVE
GLUCOSE UR QL STRIP: NEGATIVE
KETONES UR QL STRIP: NEGATIVE
LEUKOCYTE ESTERASE UR QL STRIP: NEGATIVE
PH, POC UA: 6.5
POC BLOOD, URINE: NEGATIVE
POC NITRATES, URINE: POSITIVE
PROT UR QL STRIP: NEGATIVE
SP GR UR STRIP: 1.01 (ref 1–1.03)
UROBILINOGEN UR STRIP-ACNC: NORMAL (ref 0.1–1.1)

## 2024-02-23 PROCEDURE — 87086 URINE CULTURE/COLONY COUNT: CPT | Performed by: NURSE PRACTITIONER

## 2024-02-23 PROCEDURE — 99999 PR PBB SHADOW E&M-EST. PATIENT-LVL IV: CPT | Mod: PBBFAC,,, | Performed by: NURSE PRACTITIONER

## 2024-02-23 PROCEDURE — 87077 CULTURE AEROBIC IDENTIFY: CPT | Performed by: NURSE PRACTITIONER

## 2024-02-23 PROCEDURE — 81003 URINALYSIS AUTO W/O SCOPE: CPT | Mod: QW,S$GLB,, | Performed by: NURSE PRACTITIONER

## 2024-02-23 PROCEDURE — 87186 SC STD MICRODIL/AGAR DIL: CPT | Performed by: NURSE PRACTITIONER

## 2024-02-23 PROCEDURE — 87088 URINE BACTERIA CULTURE: CPT | Performed by: NURSE PRACTITIONER

## 2024-02-23 PROCEDURE — 99214 OFFICE O/P EST MOD 30 MIN: CPT | Mod: S$GLB,,, | Performed by: NURSE PRACTITIONER

## 2024-02-23 RX ORDER — SULFAMETHOXAZOLE AND TRIMETHOPRIM 800; 160 MG/1; MG/1
1 TABLET ORAL 2 TIMES DAILY
Qty: 10 TABLET | Refills: 0 | Status: SHIPPED | OUTPATIENT
Start: 2024-02-23 | End: 2024-02-28

## 2024-02-23 NOTE — ASSESSMENT & PLAN NOTE
Benefits and compliant with Auto CPAP 4-8 cm  AHI 7.7  Patient not willing for higher pressures  Continue Auto CPAP 4-8 cm   Dream wear mask   Updated supply order  HME: [facility]   Follow up yearly CPAP compliance download and supply order.

## 2024-02-23 NOTE — ASSESSMENT & PLAN NOTE
Encouraged calorie reduction and 30 minutes of exercise daily. Discussed impact of obesity on general health.  Walks about 3/4 mile daily outside of [facility]  Wt Readings from Last 9 Encounters:   02/23/24 78.1 kg (172 lb 2.9 oz)   02/23/24 79.4 kg (175 lb)   01/31/24 80.6 kg (177 lb 11.1 oz)   12/27/23 80.2 kg (176 lb 14.4 oz)   12/13/23 79.6 kg (175 lb 7.8 oz)   10/13/23 78.3 kg (172 lb 9.9 oz)   10/04/23 77.1 kg (170 lb)   07/24/23 75.7 kg (166 lb 14.2 oz)   05/24/23 77.7 kg (171 lb 4.8 oz)   Body mass index is 30.5 kg/m².

## 2024-02-23 NOTE — PROGRESS NOTES
Subjective:      Patient ID: Cem Meyers is a 63 y.o. female.    Chief Complaint: Sleep Apnea    HPI: Cem Meyers is here for follow up for MAYKEL with yearly CPAP complaince assessment.  She is on Auto CPAP of 4-8 cmH2O pressure  She is compliant with CPAP use. Complaince download today reveals 86.7% of days with greater than 4 hours of device use.   Patient reports benefit from CPAP use.  Patient reports no complaints.  Dream wear nasal mask is used.  Discussed AHI 7.7. Patient remains not willing for higher pressures she finds 4-8 comfortable and obtaining good sleep and benefit from CPAP use.   Obtaining supplies online when obtains.  Current not using [employer] for supplies.     Obtained [employer] dream station 2 related to recall.    Diagnosis in [id number] while in [place], told mild obstructive sleep apnea with treatment CPAP  8 cm.  Has not used CPAP since 2014, sporadic use from 1053-1004. Lost 30 lbs since last PSG.   PSG 3/17/2018 revealed AHI 7.4. Cpap titration 4/16/2018 revealed 7 cm optimal.  Patient was on 8 cm when on CPAP in 2014.     Compliance Summary  Min Pressure 4 cmH2O  Max Pressure 8 cmH2O  11/16/2023 - 2/13/2024 (90 days)  Days with Device Usage 88 days  Days without Device Usage 2 days  Percent Days with Device Usage 97.8%  Cumulative Usage 22 days 10 hrs. 49 mins. 45 secs.  Maximum Usage (1 Day) 9 hrs. 58 mins. 27 secs.  Average Usage (All Days) 5 hrs. 59 mins. 13 secs.  Average Usage (Days Used) 6 hrs. 7 mins. 23 secs.  Minimum Usage (1 Day) 1 hrs. 23 mins. 16 secs.  Percent of Days with Usage >= 4 Hours 86.7%  Percent of Days with Usage < 4 Hours 13.3%  Date Range  Total Blower Time 22 days 13 hrs. 2 mins. 57 secs.  Average AHI 7.7  Auto-CPAP Summary  Auto-CPAP Mean Pressure 6.3 cmH2O  Auto-CPAP Peak Average Pressure 8.0 cmH2O  Device Pressure <= 90% of Time 8.0 cmH2O  Average Time in Large Leak Per Day 3 secs.    [place] Sleepiness Scale       2/16/2024     2:16 PM 2/24/2023     2:00 PM  2/9/2022    12:00 PM 12/23/2020    11:00 AM 12/23/2019    10:00 AM 12/21/2018     8:00 AM 6/20/2018     8:00 AM   EPWORTH SLEEPINESS SCALE   Sitting and reading 1 0 1 0 1 0 0   Watching TV 1 0 1 1 1 0 0   Sitting, inactive in a public place (e.g. a theatre or a meeting) 1 0 1 0 0 0 0   As a passenger in a car for an hour without a break 1 1 1 1 1 0 0   Lying down to rest in the afternoon when circumstances permit 2 2 2 2 2 3 1   Sitting and talking to someone 0 0 0 0 0 0 0   Sitting quietly after a lunch without alcohol 1 1 1 1 1 1 1   In a car, while stopped for a few minutes in traffic 0 0 0 0 0 0 0   Total score 7 4 7 5 6 4 2     Previous Report Reviewed: lab reports and office notes     Past Medical History: The following portions of the patient's history were reviewed and updated as appropriate:   She  has a past surgical history that includes Knee surgery (Right, 1989); Fracture surgery (Left, 2013); Tonsillectomy (1966); Colonoscopy (N/A, 09/01/2017); Colonoscopy (N/A, 01/06/2021); Colonoscopy (N/A, 04/25/2022); and Breast biopsy (Left, 10/28/2020).  Her family history includes Cancer in her mother; Hearing loss in her father; Heart disease in her father; Hypertension in her father; Lupus in her maternal aunt; Pancreatic cancer in her mother; Rheum arthritis in her maternal aunt and sister; Stomach cancer in her mother.  She  reports that she has never smoked. She has never been exposed to tobacco smoke. She has never used smokeless tobacco. She reports current alcohol use of about 1.0 standard drink of alcohol per week. She reports that she does not use drugs.  She has a current medication list which includes the following prescription(s): baclofen, calcium citrate, cholecalciferol (vitamin d3), denosumab, fish oil-omega-3 fatty acids, gabapentin, rebif titration pack, interferon beta-1a (albumin), levothyroxine, lubiprostone, multivitamin, paroxetine, simvastatin, and sulfamethoxazole-trimethoprim  "800-160mg.  She is allergic to latex, natural rubber.    The following portions of the patient's history were reviewed and updated as appropriate: allergies, current medications, past family history, past medical history, past social history, past surgical history and problem list.    Review of Systems   Constitutional:  Negative for fever, chills, weight loss, weight gain, activity change, appetite change, fatigue and night sweats.   HENT:  Negative for postnasal drip, rhinorrhea, sinus pressure, voice change and congestion.    Eyes:  Negative for redness and itching.   Respiratory:  Negative for snoring, cough, sputum production, chest tightness, shortness of breath, wheezing, orthopnea, asthma nighttime symptoms, dyspnea on extertion, use of rescue inhaler and somnolence.    Cardiovascular: Negative.  Negative for chest pain, palpitations and leg swelling.   Genitourinary:  Negative for difficulty urinating and hematuria.   Endocrine:  Negative for cold intolerance and heat intolerance.    Musculoskeletal:  Negative for arthralgias, gait problem, joint swelling and myalgias.   Skin: Negative.    Gastrointestinal:  Negative for nausea, vomiting, abdominal pain and acid reflux.   Neurological:  Negative for dizziness, weakness, light-headedness and headaches.   Hematological:  Negative for adenopathy. No excessive bruising.   All other systems reviewed and are negative.     Objective:   /76   Pulse 88   Resp 17   Ht 5' 3" (1.6 m)   Wt 78.1 kg (172 lb 2.9 oz)   SpO2 98%   BMI 30.50 kg/m²   Physical Exam  Vitals reviewed.   Constitutional:       General: She is not in acute distress.     Appearance: She is well-developed. She is not ill-appearing or toxic-appearing.   HENT:      Head: Normocephalic.      Right Ear: External ear normal.      Left Ear: External ear normal.      Nose: Nose normal.      Mouth/Throat:      Pharynx: No oropharyngeal exudate.   Eyes:      Conjunctiva/sclera: Conjunctivae normal. "   Cardiovascular:      Rate and Rhythm: Normal rate and regular rhythm.      Heart sounds: Normal heart sounds.   Pulmonary:      Effort: Pulmonary effort is normal.      Breath sounds: Normal breath sounds. No stridor.   Abdominal:      Palpations: Abdomen is soft.   Musculoskeletal:         General: Normal range of motion.      Cervical back: Normal range of motion and neck supple.   Lymphadenopathy:      Cervical: No cervical adenopathy.   Skin:     General: Skin is warm and dry.   Neurological:      Mental Status: She is alert and oriented to person, place, and time.   Psychiatric:         Behavior: Behavior normal. Behavior is cooperative.         Thought Content: Thought content normal.         Judgment: Judgment normal.       Personal Diagnostic Review  CPAP download    Assessment:     1. MAYKEL on CPAP    2. Psychophysiological insomnia    3. Class 1 obesity with serious comorbidity and body mass index (BMI) of 30.0 to 30.9 in adult, unspecified obesity type    4. Chronic diastolic heart failure          Orders Placed This Encounter   Procedures    CPAP/BIPAP SUPPLIES     Benefits and compliant  90 day supply. 4 refills  HME: [facility]     Order Specific Question:   Length of need (1-99 months):     Answer:   99     Order Specific Question:   Choose ONE mask type and its corresponding cushions and/or pillows:     Answer:    Nasal Mask, 1 per 90 days:  Nasal Cushions, (6 per 90 days):  Nasal Pillows, (6 per 90 days)     Order Specific Question:   Choose EITHER Heated or Non-Heated Tubjing     Answer:    Non-Heated Tubing, 1 per 90 days     Order Specific Question:   Number of Days Needed:     Answer:   99     Order Specific Question:   All other supplies as needed as listed below:     Answer:    Headgear, 1 per 180 days     Order Specific Question:   All other supplies as needed as listed below:     Answer:    Chin Strap, 1 per 180 days     Order Specific Question:   All other  supplies as needed as listed below:     Answer:    Disposable Filter, 6 per 90 days     Order Specific Question:   All other supplies as needed as listed below:     Answer:    Humidifier Chamber, 1 per 180 days     Order Specific Question:   All other supplies as needed as listed below:     Answer:    Non-Disposable Filter, 1 per 180 days     Plan:     Problem List Items Addressed This Visit       MAYKEL on CPAP - Primary (Chronic)     Benefits and compliant with Auto CPAP 4-8 cm  AHI 7.7  Patient not willing for higher pressures  Continue Auto CPAP 4-8 cm   Dream wear mask   Updated supply order  HME: [facility]   Follow up yearly CPAP compliance download and supply order.         Relevant Orders    CPAP/BIPAP SUPPLIES    Psychophysiological insomnia     Improved with turning off electronics and resuming CPAP  No longer awakens during night, improved with CPAP          Class 1 obesity with serious comorbidity and body mass index (BMI) of 30.0 to 30.9 in adult     Encouraged calorie reduction and 30 minutes of exercise daily. Discussed impact of obesity on general health.  Walks about 3/4 mile daily outside of [facility]  Wt Readings from Last 9 Encounters:   02/23/24 78.1 kg (172 lb 2.9 oz)   02/23/24 79.4 kg (175 lb)   01/31/24 80.6 kg (177 lb 11.1 oz)   12/27/23 80.2 kg (176 lb 14.4 oz)   12/13/23 79.6 kg (175 lb 7.8 oz)   10/13/23 78.3 kg (172 lb 9.9 oz)   10/04/23 77.1 kg (170 lb)   07/24/23 75.7 kg (166 lb 14.2 oz)   05/24/23 77.7 kg (171 lb 4.8 oz)   Body mass index is 30.5 kg/m².           Chronic diastolic heart failure     Stable, managed by cardiology            Follow up in about 1 year (around 2/23/2025) for CPAP compliance dnld.

## 2024-02-23 NOTE — PATIENT INSTRUCTIONS
UTI Prevention education  Maintain hydration/Increase fluids  Complete Bactrim course as directed  Typical course and duration of illness discussed  Signs and symptoms of worsening discussed  Follow up as needed/with worsening

## 2024-02-23 NOTE — PROGRESS NOTES
"Subjective:      Patient ID: Cem Meyers is a 63 y.o. female.    Vitals:  height is 5' 3" (1.6 m) and weight is 79.4 kg (175 lb). Her tympanic temperature is 97.5 °F (36.4 °C). Her blood pressure is 127/85 and her pulse is 68. Her respiration is 18 and oxygen saturation is 99%.     Chief Complaint: Urinary Frequency    63 y.o. female presents for evaluation of burning with urination, urinary frequency, and urgency x 1 day. Symptom onset yesterday. Denies history of UTI/pyelonephritis. Drank cranberry juice. No OTC medications taken for relief.           Urinary Frequency   This is a new problem. The current episode started yesterday. The problem has been gradually improving. The quality of the pain is described as burning. The pain is at a severity of 5/10. The pain is mild. There has been no fever. She is Not sexually active. There is No history of pyelonephritis. Associated symptoms include frequency, hesitancy and urgency. Pertinent negatives include no behavior changes, chills, discharge, flank pain, hematuria, nausea, possible pregnancy, sweats, vomiting, weight loss, bubble bath use, constipation, rash or withholding. She has tried nothing for the symptoms. There is no history of catheterization, diabetes insipidus, diabetes mellitus, genitourinary reflux, hypertension, kidney stones, recurrent UTIs, a single kidney, STD, urinary stasis or a urological procedure.       Constitution: Negative for appetite change, chills, sweating, fatigue and fever.   HENT: Negative.     Neck: neck negative.   Cardiovascular: Negative.    Eyes: Negative.    Respiratory: Negative.     Gastrointestinal: Negative.  Negative for nausea, vomiting, constipation and diarrhea.   Endocrine: negative.   Genitourinary:  Positive for dysuria, frequency and urgency. Negative for flank pain, hematuria and history of kidney stones.   Skin: Negative.  Negative for rash.   Allergic/Immunologic: Negative.    Neurological:  Positive for " coordination disturbances (history of MS) and loss of balance (histroy of MS).   Hematologic/Lymphatic: Negative.    Psychiatric/Behavioral: Negative.        Objective:     Physical Exam   Constitutional: She is oriented to person, place, and time. She is cooperative.  Non-toxic appearance. She does not appear ill. No distress. awake  HENT:   Head: Normocephalic and atraumatic.   Ears:   Right Ear: Hearing and external ear normal.   Left Ear: Hearing and external ear normal.   Eyes: Conjunctivae are normal. Pupils are equal, round, and reactive to light. Right eye exhibits no discharge. Left eye exhibits no discharge. No scleral icterus. Extraocular movement intact   Neck: Neck supple.   Cardiovascular: Normal rate, regular rhythm and normal heart sounds.   Pulmonary/Chest: Effort normal and breath sounds normal. No stridor. No respiratory distress. She has no wheezes. She has no rhonchi. She has no rales. She exhibits no tenderness.   Abdominal: Normal appearance and bowel sounds are normal. She exhibits no distension. Soft. flat abdomen There is no abdominal tenderness. There is no rebound, no guarding, no tenderness at McBurney's point, negative Rosa's sign, no left CVA tenderness and no right CVA tenderness.   Neurological: no focal deficit. She is alert and oriented to person, place, and time. She displays weakness. Gait abnormal.   Skin: Skin is warm, dry and not diaphoretic.   Psychiatric: Her behavior is normal. Mood, judgment and thought content normal.   Nursing note and vitals reviewed.    Results for orders placed or performed in visit on 02/23/24   POCT Urinalysis, Dipstick, Automated, W/O Scope   Result Value Ref Range    POC Blood, Urine Negative Negative    POC Bilirubin, Urine Negative Negative    POC Urobilinogen, Urine NORMAL 0.1 - 1.1    POC Ketones, Urine Negative Negative    POC Protein, Urine Negative Negative    POC Nitrates, Urine Positive (A) Negative    POC Glucose, Urine Negative  Negative    pH, UA 6.5     POC Specific Gravity, Urine 1.015 1.003 - 1.029    POC Leukocytes, Urine Negative Negative     *Note: Due to a large number of results and/or encounters for the requested time period, some results have not been displayed. A complete set of results can be found in Results Review.       Assessment:     1. Acute cystitis without hematuria    2. Dysuria    3. Urinary urgency    4. Multiple sclerosis        Plan:       Acute cystitis without hematuria  -     CULTURE, URINE  -     sulfamethoxazole-trimethoprim 800-160mg (BACTRIM DS) 800-160 mg Tab; Take 1 tablet by mouth 2 (two) times daily. for 5 days  Dispense: 10 tablet; Refill: 0    Dysuria  -     POCT Urinalysis, Dipstick, Automated, W/O Scope    Urinary urgency    Multiple sclerosis        Patient presents with symptoms that are consistent with acute/non-recurrent UTI and (+) nitrates on UA. Decision to perform Urine Culture to identify bacterial agent and confirm antibiotic sensitivity. Exam negative for pyelonephritis/nephrolithiasis. Plan is to treat bacterial infection, manage symptoms and prevent worsening. Discussed with patient who verbalizes understanding.      Patient Instructions   UTI Prevention education  Maintain hydration/Increase fluids  Complete Bactrim course as directed  Typical course and duration of illness discussed  Signs and symptoms of worsening discussed  Follow up as needed/with worsening

## 2024-02-25 ENCOUNTER — TELEPHONE (OUTPATIENT)
Dept: URGENT CARE | Facility: CLINIC | Age: 64
End: 2024-02-25
Payer: MEDICARE

## 2024-02-25 LAB — BACTERIA UR CULT: ABNORMAL

## 2024-02-25 NOTE — TELEPHONE ENCOUNTER
Called to review culture results with patient.  She is feeling resolved.  Confirmed identity with 2 markers and advised that she has on a good antibiotic and to finish it.  She verbalizes understanding and appreciated the call

## 2024-02-28 ENCOUNTER — HOSPITAL ENCOUNTER (OUTPATIENT)
Dept: RADIOLOGY | Facility: HOSPITAL | Age: 64
Discharge: HOME | End: 2024-02-28
Attending: CLINICAL NURSE SPECIALIST
Payer: MEDICARE

## 2024-02-28 DIAGNOSIS — G35 MULTIPLE SCLEROSIS: ICD-10-CM

## 2024-02-28 PROCEDURE — 70551 MRI BRAIN STEM W/O DYE: CPT | Mod: 26,,, | Performed by: RADIOLOGY

## 2024-02-28 PROCEDURE — 70551 MRI BRAIN STEM W/O DYE: CPT | Mod: TC

## 2024-03-04 ENCOUNTER — OFFICE VISIT (OUTPATIENT)
Dept: NEUROLOGY | Facility: CLINIC | Age: 64
End: 2024-03-04
Payer: MEDICARE

## 2024-03-04 VITALS
HEIGHT: 63 IN | BODY MASS INDEX: 30.08 KG/M2 | SYSTOLIC BLOOD PRESSURE: 115 MMHG | HEART RATE: 71 BPM | WEIGHT: 169.75 LBS | DIASTOLIC BLOOD PRESSURE: 79 MMHG

## 2024-03-04 DIAGNOSIS — Z29.89 PROPHYLACTIC IMMUNOTHERAPY: ICD-10-CM

## 2024-03-04 DIAGNOSIS — G35 MS (MULTIPLE SCLEROSIS): Primary | ICD-10-CM

## 2024-03-04 DIAGNOSIS — Z71.89 COUNSELING REGARDING GOALS OF CARE: ICD-10-CM

## 2024-03-04 DIAGNOSIS — R26.9 GAIT DISTURBANCE: ICD-10-CM

## 2024-03-04 PROCEDURE — 99215 OFFICE O/P EST HI 40 MIN: CPT | Mod: S$GLB,,, | Performed by: PSYCHIATRY & NEUROLOGY

## 2024-03-04 PROCEDURE — 99999 PR PBB SHADOW E&M-EST. PATIENT-LVL IV: CPT | Mod: PBBFAC,,, | Performed by: PSYCHIATRY & NEUROLOGY

## 2024-03-04 NOTE — PROGRESS NOTES
Subjective:          Patient ID: Cem Meyers is a 63 y.o. female who presents today for a routine clinic visit for MS.      MS HPI:  DMT: interferon beta-1a SQ-- just restarted it last month; still doing titration; tolerating   Side effects from DMT? No  Taking vitamin D3 as recommended? Yes - 5,000 IU/day   Lives in a [facility] community -- not assisted living.   [facility]  Walks with cane. Trips over left foot; Has AFO, but does it much.   Walking for exercise 1/2 mile/ day.   Overall, feels better than she did 1 year ago.    Bladder / bowel control is good.   No recent relapses, and does not feel she's declining in the past year.    She's trying to exercise more.    Wants to lose 20#  Takes baclofen 20/20/30 every day; spasticity is controlled;   Gabapentin 800mg TID  Mood is stable on Paxil 40mg / day   Has had 4 UTIs over the past 1.5 years;   Went to the ER recently and diagnosed with flu in late December    Medications:  Current Outpatient Medications   Medication Sig    baclofen (LIORESAL) 10 MG tablet TAKE 2 TABS ONE TIME DAILY IN THE MORNING, 2 TABS IN THE AFTERNOON, AND UP TO 3 TABS AT BEDTIME AS NEEDED    calcium citrate (CALCITRATE) 200 mg (950 mg) tablet Take 2 tablets by mouth once daily.    cholecalciferol, vitamin D3, 100 mcg (4,000 unit) Cap Take 4,000 Units by mouth once daily.     denosumab (PROLIA SUBQ) Inject into the skin. Every 6 months    gabapentin (NEURONTIN) 800 MG tablet Take 1 tablet (800 mg total) by mouth 3 (three) times daily.    interferon beta-1a, albumin, (REBIF TITRATION PACK) 8.8mcg/0.2mL-22 mcg/0.5mL (6) Syrg Inject 8.8 mcg into the skin three times weekly for week 1 and 2, then Inject 22 mcg into the skin three times weekly for weeks 3 and 4, then inject 44 mcg into the skin three times weekly for maintenance    interferon beta-1a, albumin, (REBIF) 44 mcg/0.5 mL injection Inject 0.5 mLs (44 mcg total) into the skin 3 (three) times a week.    levothyroxine  (SYNTHROID) 75 MCG tablet TAKE 1 TABLET BEFORE BREAKFAST    lubiprostone (AMITIZA) 24 MCG Cap Take 1 capsule (24 mcg total) by mouth 2 (two) times daily.    multivitamin capsule Take 1 capsule by mouth once daily.    paroxetine (PAXIL) 40 MG tablet TAKE 1 TABLET EVERY MORNING    simvastatin (ZOCOR) 40 MG tablet TAKE 1 TABLET EVERY DAY    fish oil-omega-3 fatty acids 300-1,000 mg capsule Take 2 g by mouth once daily.     No current facility-administered medications for this visit.       SOCIAL HISTORY  Social History     Tobacco Use    Smoking status: Never     Passive exposure: Never    Smokeless tobacco: Never   Substance Use Topics    Alcohol use: Yes     Alcohol/week: 1.0 standard drink of alcohol     Types: 1 Glasses of wine per week    Drug use: No       Living arrangements - the patient lives in an [facility] community          3/3/2024     8:30 PM   REVIEW OF SYMPTOMS   Do you feel abnormally tired on most days? No   Do you feel you generally sleep well? Yes   Do you have difficulty controlling your bladder?  No   Do you have difficulty controlling your bowels?  No   Do you have frequent muscle cramps, tightness or spasms in your limbs?  No   Do you have new visual symptoms?  No   Do you have worsening difficulty with your memory or thinking? No   Do you have worsening symptoms of anxiety or depression?  No   For patients who walk, Do you have more difficulty walking?  No   Have you fallen since your last visit?  Yes   For patients who use wheelchairs: Do you have any skin wounds or breakdown? Not Applicable   Do you have difficulty using your hands?  No   Do you have shooting or burning pain? No   Do you have difficulty with sexual function?  No   If you are sexually active, are you using birth control? Y/N  N/A Not Applicable   Do you often choke when swallowing liquids or solid food?  No   Do you experience worsening symptoms when overheated? Yes   Do you need any new equipment such as a wheelchair, walker  or shower chair? No   Do you receive co-pay financial assistance for your principal MS medicine? Yes   Would you be interested in participating in an MS research trial in the future? No   For patients on Gilenya, Tecfidera, Aubagio, Rituxan, Ocrevus, Tysabri, Lemtrada or Methotrexate, are you aware that you should NOT receive live virus vaccines?  Not Applicable   Do you feel you have adequate family/friend support?  Yes   Do you have health insurance?   Yes   Are you currently employed? No   Do you receive SSDI/SSI?  Yes   Do you use marijuana or cannabis products? No   Have you been diagnosed with a urinary tract infection since your last visit here? Yes   Have you been diagnosed with a respiratory tract infection since your last visit here? No   Have you been to the emergency room since your last visit here? Yes   Have you been hospitalized since your last visit here?  No         3/3/2024     8:33 PM   FSS SCORE & INTERPRETATION   FSS SCORE  46   FSS SCORE INTERPRETATION May be suffering from fatigue         3/3/2024     8:32 PM   MS JAZMIN-D SCORE & INTERPRETATION   JAZMIN-D SCORE  3   JAZMIN-D INTERPRETATION  No indication of Depression         3/3/2024     8:31 PM   MS TOR-7 SCORE & INTERPRETATION   TOR-7 SCORE  0   TOR-7 SCORE INTERPRETATION Normal         3/3/2024     8:35 PM   PEQ MS MOS PAIN EFFECTS SCORE & INTERPRETATION   PES SCORE 15   PES SCORE INTERPRETATION Scores can range from 6-30.  Items are scaled so that higher scores indicate a greater impact of pain on a patients mood and behavior.          No data to display                  3/3/2024     8:36 PM   MS BLADDER CONTROL SCORE & INTERPRETATION   BLCS SCORE 7   BLCS SCORE INTERPRETATION  Scores can range from 0-22, with higher scores indicating greater bladder control problems.         3/3/2024     8:40 PM   MS BOWEL CONTROL SCORE & INTERPRETATION   BWCS SCORE 8   BWCS SCORE INTERPRETATION Scores can range from 0-26, with higher scores indicating greater  bowel control problems.         3/3/2024     8:37 PM   PEQ MS IMPACT OF VISUAL IMPAIRMENT SCORE & INTERPRETATION   HEIDI SCALE SCORE  0   HEIDI SCORE INTERPRETATION Scores can range from 0-15, with higher scores indicating greater impact of visual problems on daily activites.         3/3/2024     8:39 PM   MS PDQ SCORE & INTERPRETATION   PDQ RETROSPECTIVE MEMORY SUBSCALE 8   PDQ ATTENTION/CONCENTRATION SUBSCALE 7   PDQ PROSPECTIVE MEMORY SUBSCALE 10   PDQ PLANNING/ORGANIZATION SUBSCALE 9   PDQ TOTAL SCORE 34   PDQ SCORE INTERPRETATION Scores can range from 0-80, with higher scores indicating greater perceived cognitive impairment.         3/3/2024     8:42 PM   MSSS SCORE & INTERPRETATION   MSSS TANGIBLE SUPPORT SUBSCALE 75   MSSS EMOTIONAL/INFORMATIONAL SUPPORT SUBSCALE 62.5   MSSS AFFECTIONATE SUPPORT SUBSCALE 75   MSSS POSITIVE SOCIAL INTERACTION SUBSCALE 75   MSSS TOTAL SCORE 71.88   MSSS SCORE INTERPRETATION Scores can range from 0-100, with higher scores indicating greater perceived support.                Objective:            3/10/2021    12:00 AM 3/4/2024    12:01 AM   Timed 25 Foot Walk:   Did patient wear an AFO? No No   Was assistive device used? No    Assistive device used (debbie one):  Unilateral Assistance   Unilateral device used  Cane   Time for 25 Foot Walk (seconds) 6.3 10.45   Time for 25 Foot Walk (seconds) 6.6      MS: intact  CN: rafy ERNESTO; no dysarthria  Motor:4+/5 extensors LUE, left HF 3/5, left KF and DF 4-/5;   Reflexes: left reflex preponderance  Sens: moderate decrease in vibration all 4s.   Gait: spastic, wide based, unsteady, favoring LLE; uses cane    Imaging:     Results for orders placed during the hospital encounter of 02/28/24    MRI Brain Demyelinating Without Contrast    Impression  Similar appearance of demyelinating plaques within both cerebral hemispheres and the posterior fossa.  No definite new demyelinating plaques as compared to the prior MRI 09/08/2022.      Electronically  "signed by: Jean-Paul Castellon  Date:    02/28/2024  Time:    09:58    No results found for this or any previous visit.    No results found for this or any previous visit.    Results for orders placed during the hospital encounter of 09/08/22    MRI Brain Demyelinating W W/O Contrast    Impression  Findings in the cerebral white matter which are typical for multiple sclerosis.  No new or enhancing lesions to suggest active disease.      Electronically signed by: Ye Sam MD  Date:    09/08/2022  Time:    15:26    Results for orders placed during the hospital encounter of 06/29/21    MRI Cervical Spine Demyelinating W W/O Contrast    Impression  1. No significant change in the appearance of the previously noted MS plaques.  No new MS plaques are identified.  No enhancement identified to suggest active demyelination.      Electronically signed by: Kyle Keith DO  Date:    06/29/2021  Time:    11:44    Results for orders placed during the hospital encounter of 06/29/21    MRI Thoracic Spine Demyelinating W W/O Contrast    Impression  1. Stable appearance of the previously noted MS plaques.  No new lesions are suggested.  No abnormal enhancement identified to suggest active demyelination.      Electronically signed by: Kyle Keith DO  Date:    06/29/2021  Time:    11:03        Labs:     Lab Results   Component Value Date    ULUNFTTI03AE 67 01/31/2024    [id number] 63 07/24/2023    KNTBSORL52SY 73 12/16/2020     No results found for: "JCVINDEX", "JCVANTIBODY"  No results found for: "UT6BBTYW", "ABSOLUTECD3", "VQ5RMRQP", "ABSOLUTECD8", "IH9PDATE", "ABSOLUTECD4", "LABCD48"  Lab Results   Component Value Date    WBC 5.47 12/27/2023    RBC 3.91 (L) 12/27/2023    HGB 12.3 12/27/2023    HCT 37.4 12/27/2023    MCV 96 12/27/2023    MCH 31.5 (H) 12/27/2023    MCHC 32.9 12/27/2023    RDW 13.4 12/27/2023     12/27/2023    MPV 9.9 12/27/2023    GRAN 3.4 12/27/2023    GRAN 62.9 12/27/2023    LYMPH 1.2 12/27/2023 "    LYMPH 22.7 12/27/2023    MONO 0.7 12/27/2023    MONO 13.3 12/27/2023    EOS 0.0 12/27/2023    BASO 0.03 12/27/2023    EOSINOPHIL 0.4 12/27/2023    BASOPHIL 0.5 12/27/2023     Sodium   Date Value Ref Range Status   01/31/2024 141 136 - 145 mmol/L Final     Potassium   Date Value Ref Range Status   01/31/2024 4.0 3.5 - 5.1 mmol/L Final     Chloride   Date Value Ref Range Status   01/31/2024 106 95 - 110 mmol/L Final     CO2   Date Value Ref Range Status   01/31/2024 28 23 - 29 mmol/L Final     Glucose   Date Value Ref Range Status   01/31/2024 83 70 - 110 mg/dL Final     BUN   Date Value Ref Range Status   01/31/2024 16 8 - 23 mg/dL Final     Creatinine   Date Value Ref Range Status   01/31/2024 1.2 0.5 - 1.4 mg/dL Final     Calcium   Date Value Ref Range Status   01/31/2024 10.0 8.7 - 10.5 mg/dL Final     Total Protein   Date Value Ref Range Status   01/31/2024 7.7 6.0 - 8.4 g/dL Final     Albumin   Date Value Ref Range Status   01/31/2024 3.8 3.5 - 5.2 g/dL Final     Total Bilirubin   Date Value Ref Range Status   01/31/2024 0.4 0.1 - 1.0 mg/dL Final     Comment:     For infants and newborns, interpretation of results should be based  on gestational age, weight and in agreement with clinical  observations.    Premature Infant recommended reference ranges:  Up to 24 hours.............<8.0 mg/dL  Up to 48 hours............<12.0 mg/dL  3-5 days..................<15.0 mg/dL  6-29 days.................<15.0 mg/dL       Alkaline Phosphatase   Date Value Ref Range Status   01/31/2024 70 55 - 135 U/L Final     AST   Date Value Ref Range Status   01/31/2024 33 10 - 40 U/L Final     ALT   Date Value Ref Range Status   01/31/2024 15 10 - 44 U/L Final     Anion Gap   Date Value Ref Range Status   01/31/2024 7 (L) 8 - 16 mmol/L Final     eGFR if    Date Value Ref Range Status   06/17/2022 >60 >60 mL/min/1.73 m^2 Final     eGFR if non    Date Value Ref Range Status   06/17/2022 >60 >60  mL/min/1.73 m^2 Final     Comment:     Calculation used to obtain the estimated glomerular filtration  rate (eGFR) is the CKD-EPI equation.        Lab Results   Component Value Date    HEPBSAG Negative 07/17/2019    HEPBSAB Negative 07/17/2019    HEPBCAB Negative 07/17/2019           MS Impression and Plan:     NEURO MULTIPLE SCLEROSIS IMPRESSION:   Number of relapses in the past year?:  0  Clinical Progression:  Worsened  Type:  Progressive  MRI Progression:  Stable  MS Classification:  Secondarily Progressive MS  DMT:  No change in management  Symptom Management:  No change in symptom management (defers PT due to cost)   Timed walk worse-- not checking in 3 years b/c she's only done VV ;   SPMS  Still walking however  Encouraged she wear left AFO and continue to exercise  Labs next month   F/u Karoline Hurst [facility] in 6mo           Problem List Items Addressed This Visit          1 - High    MS (multiple sclerosis) - Primary (Chronic)    Relevant Orders    CBC Auto Differential    Hepatic Function Panel       Unprioritized    Prophylactic immunotherapy     Other Visit Diagnoses       Counseling regarding goals of care        Gait disturbance                Rebeka Smith MD    I spent a total of 40 minutes on the day of the visit.This includes face to face time and non-face to face time preparing to see the patient (eg, review of tests), obtaining and/or reviewing separately obtained history, documenting clinical information in the electronic or other health record, independently interpreting results and communicating results to the patient/family/caregiver, or care coordinator.

## 2024-03-19 ENCOUNTER — PATIENT MESSAGE (OUTPATIENT)
Dept: NEUROLOGY | Facility: CLINIC | Age: 64
End: 2024-03-19
Payer: MEDICARE

## 2024-03-26 ENCOUNTER — PATIENT MESSAGE (OUTPATIENT)
Dept: PSYCHIATRY | Facility: CLINIC | Age: 64
End: 2024-03-26
Payer: MEDICARE

## 2024-04-03 ENCOUNTER — PATIENT MESSAGE (OUTPATIENT)
Dept: PULMONOLOGY | Facility: CLINIC | Age: 64
End: 2024-04-03
Payer: MEDICARE

## 2024-04-06 ENCOUNTER — OFFICE VISIT (OUTPATIENT)
Dept: URGENT CARE | Facility: CLINIC | Age: 64
End: 2024-04-06
Payer: MEDICARE

## 2024-04-06 VITALS
BODY MASS INDEX: 31.75 KG/M2 | WEIGHT: 168.19 LBS | HEART RATE: 109 BPM | SYSTOLIC BLOOD PRESSURE: 131 MMHG | TEMPERATURE: 100 F | OXYGEN SATURATION: 96 % | DIASTOLIC BLOOD PRESSURE: 85 MMHG | HEIGHT: 61 IN | RESPIRATION RATE: 14 BRPM

## 2024-04-06 DIAGNOSIS — R09.81 NASAL CONGESTION: ICD-10-CM

## 2024-04-06 DIAGNOSIS — J06.9 VIRAL URI WITH COUGH: Primary | ICD-10-CM

## 2024-04-06 LAB
CTP QC/QA: YES
CTP QC/QA: YES
POC MOLECULAR INFLUENZA A AGN: NEGATIVE
POC MOLECULAR INFLUENZA B AGN: NEGATIVE
SARS-COV-2 AG RESP QL IA.RAPID: NEGATIVE

## 2024-04-06 PROCEDURE — 87811 SARS-COV-2 COVID19 W/OPTIC: CPT | Mod: QW,S$GLB,, | Performed by: FAMILY MEDICINE

## 2024-04-06 PROCEDURE — 87502 INFLUENZA DNA AMP PROBE: CPT | Mod: QW,S$GLB,, | Performed by: FAMILY MEDICINE

## 2024-04-06 PROCEDURE — 99213 OFFICE O/P EST LOW 20 MIN: CPT | Mod: S$GLB,,, | Performed by: FAMILY MEDICINE

## 2024-04-06 RX ORDER — BENZONATATE 200 MG/1
200 CAPSULE ORAL 3 TIMES DAILY PRN
Qty: 21 CAPSULE | Refills: 0 | Status: SHIPPED | OUTPATIENT
Start: 2024-04-06 | End: 2024-05-06

## 2024-04-06 NOTE — PROGRESS NOTES
"Subjective:      Patient ID: Cem Meyers is a 63 y.o. female.    Vitals:  height is 5' 1.46" (1.561 m) and weight is 76.3 kg (168 lb 3.4 oz). Her temperature is 99.9 °F (37.7 °C). Her blood pressure is 131/85 and her pulse is 109. Her respiration is 14 and oxygen saturation is 96%.     Chief Complaint: Nasal Congestion    64 yo female with onset of symptoms 04/03/24. Pt is c/o nasal congestion, post nasal drip, dizziness if moves, mild headaches, periodic productive cough with clear mucus production and fatigue. Everyone around is sick with something. Pt has taken an old rx cough suppressant,flonase once today which helped,and ibuprofen to alleviate symptoms.    Sinus Problem  This is a new problem. The current episode started in the past 7 days. The problem is unchanged. There has been no fever. Her pain is at a severity of 0/10. She is experiencing no pain. Associated symptoms include congestion, coughing, headaches, sinus pressure and sneezing. Pertinent negatives include no chills, ear pain, shortness of breath or sore throat. Past treatments include nothing. The treatment provided no relief.       Constitution: Positive for fatigue. Negative for appetite change, chills and fever.   HENT:  Positive for congestion, postnasal drip and sinus pressure. Negative for ear pain and sore throat.    Neck: neck negative.   Cardiovascular: Negative.    Eyes: Negative.    Respiratory:  Positive for cough and sputum production. Negative for shortness of breath.    Gastrointestinal: Negative.    Musculoskeletal:  Negative for muscle cramps.   Allergic/Immunologic: Positive for sneezing.   Neurological:  Positive for headaches.   Psychiatric/Behavioral: Negative.        Objective:     Physical Exam   Constitutional: She is oriented to person, place, and time. No distress.   HENT:   Head: Normocephalic.   Ears:   Right Ear: Tympanic membrane, external ear and ear canal normal.   Left Ear: Tympanic membrane, external ear and " ear canal normal.   Nose: Congestion present. No sinus tenderness. No epistaxis. Right sinus exhibits no maxillary sinus tenderness. Left sinus exhibits no maxillary sinus tenderness.      Comments: Clear congestion  Mouth/Throat: Mucous membranes are moist. No oropharyngeal exudate or posterior oropharyngeal erythema.   Eyes: Conjunctivae are normal. Pupils are equal, round, and reactive to light. Extraocular movement intact   Neck: Neck supple.   Cardiovascular: Normal rate, regular rhythm, normal heart sounds and normal pulses.   Pulmonary/Chest: Effort normal and breath sounds normal.         Comments: Periodic cough. Normal chest rise and fall.     Abdominal: Normal appearance.   Lymphadenopathy:     She has no cervical adenopathy.   Neurological: She is alert and oriented to person, place, and time.      Comments: No acute focal deficit   Skin: Skin is warm and no rash.         Comments: Normal turgor. No acute focal rash   Psychiatric: Her behavior is normal. Mood, judgment and thought content normal.   Nursing note and vitals reviewed.      Assessment:     1. Viral URI with cough    2. Nasal congestion        Plan:       Viral URI with cough    Nasal congestion  -     SARS Coronavirus 2 Antigen, POCT Manual Read  -     POCT Influenza A/B MOLECULAR    Other orders  -     benzonatate (TESSALON) 200 MG capsule; Take 1 capsule (200 mg total) by mouth 3 (three) times daily as needed for Cough.  Dispense: 21 capsule; Refill: 0      Results for orders placed or performed in visit on 04/06/24   SARS Coronavirus 2 Antigen, POCT Manual Read   Result Value Ref Range    SARS Coronavirus 2 Antigen Negative Negative     Acceptable Yes    POCT Influenza A/B MOLECULAR   Result Value Ref Range    POC Molecular Influenza A Ag Negative Negative, Not Reported    POC Molecular Influenza B Ag Negative Negative, Not Reported     Acceptable Yes      *Note: Due to a large number of results and/or  encounters for the requested time period, some results have not been displayed. A complete set of results can be found in Results Review.       SUMMARY: Clinically viral illness. Initial testing today negative. Contact precautions. Supportive measures. Flonase and Nasal saline spray. She can use Robitussin Honey which she likes. Benzonatate if needed for cough.  Immediate medical provider evaluation if continues. Tests may need to be repeated or other workup/treatment.         Patient Instructions   Thank you for allowing our team to take care of you today.  Your diagnosis is viral upper respiratory illness with cough.   Attached is a handout with more detail.   Sometimes secondary complications like sinus infection, ear infection, bronchitis can occur.   If any symptoms continue or worsen, get an immediate medical provider/ER evaluation.      SYMPTOM MEDICATIONS IF NEEDED AND APPROPRIATE:    You may gargle with warm salt water/peroxide 4 times a day as needed for irritated throat.     Drink plenty of fluids.    Make sure you are getting rest.    You can use cough drops or lozenges to soothe your sore throat and for cough     You can also take Elderberry and/or Emergen-C to help boost your immune system.    Benzonatate if needed for cough. Over the counter congestion medication if needed.     Flonase nasal spray can be used for nasal congestion. Two sprays each nostril daily.     Honey is a natural cough suppressant that can be used.    Make sure to stay well hydrated.    Use Nasal Saline Spray to keep nasal passages moist.    A Humidifier can be used to keep moisture in the air.       PAIN/DISCOMFORT:  Tylenol and Ibuprofen can be alternated every 4 hours if needed for aches or fever.     If your symptoms do not improve, get a medical provider evaluation. Followup here as needed. If your symptoms worsen, go to the emergency room.

## 2024-04-06 NOTE — PATIENT INSTRUCTIONS
Thank you for allowing our team to take care of you today.  Your diagnosis is viral upper respiratory illness with cough.   Attached is a handout with more detail.   Sometimes secondary complications like sinus infection, ear infection, bronchitis can occur.   If any symptoms continue or worsen, get an immediate medical provider/ER evaluation.      SYMPTOM MEDICATIONS IF NEEDED AND APPROPRIATE:    You may gargle with warm salt water/peroxide 4 times a day as needed for irritated throat.     Drink plenty of fluids.    Make sure you are getting rest.    You can use cough drops or lozenges to soothe your sore throat and for cough     You can also take Elderberry and/or Emergen-C to help boost your immune system.    Benzonatate if needed for cough. Over the counter congestion medication if needed.     Flonase nasal spray can be used for nasal congestion. Two sprays each nostril daily.     Honey is a natural cough suppressant that can be used.    Make sure to stay well hydrated.    Use Nasal Saline Spray to keep nasal passages moist.    A Humidifier can be used to keep moisture in the air.       PAIN/DISCOMFORT:  Tylenol and Ibuprofen can be alternated every 4 hours if needed for aches or fever.     If your symptoms do not improve, get a medical provider evaluation. Followup here as needed. If your symptoms worsen, go to the emergency room.

## 2024-04-17 ENCOUNTER — LAB VISIT (OUTPATIENT)
Dept: LAB | Facility: HOSPITAL | Age: 64
End: 2024-04-17
Attending: PSYCHIATRY & NEUROLOGY
Payer: MEDICARE

## 2024-04-17 DIAGNOSIS — G35 MS (MULTIPLE SCLEROSIS): ICD-10-CM

## 2024-04-17 LAB
ALBUMIN SERPL BCP-MCNC: 3.8 G/DL (ref 3.5–5.2)
ALP SERPL-CCNC: 75 U/L (ref 55–135)
ALT SERPL W/O P-5'-P-CCNC: 17 U/L (ref 10–44)
AST SERPL-CCNC: 31 U/L (ref 10–40)
BASOPHILS # BLD AUTO: 0.01 K/UL (ref 0–0.2)
BASOPHILS NFR BLD: 0.1 % (ref 0–1.9)
BILIRUB DIRECT SERPL-MCNC: 0.2 MG/DL (ref 0.1–0.3)
BILIRUB SERPL-MCNC: 0.4 MG/DL (ref 0.1–1)
DIFFERENTIAL METHOD BLD: ABNORMAL
EOSINOPHIL # BLD AUTO: 0 K/UL (ref 0–0.5)
EOSINOPHIL NFR BLD: 0.4 % (ref 0–8)
ERYTHROCYTE [DISTWIDTH] IN BLOOD BY AUTOMATED COUNT: 12.8 % (ref 11.5–14.5)
HCT VFR BLD AUTO: 41.3 % (ref 37–48.5)
HGB BLD-MCNC: 13.1 G/DL (ref 12–16)
IMM GRANULOCYTES # BLD AUTO: 0 K/UL (ref 0–0.04)
IMM GRANULOCYTES NFR BLD AUTO: 0 % (ref 0–0.5)
LYMPHOCYTES # BLD AUTO: 2.5 K/UL (ref 1–4.8)
LYMPHOCYTES NFR BLD: 36.7 % (ref 18–48)
MCH RBC QN AUTO: 30.7 PG (ref 27–31)
MCHC RBC AUTO-ENTMCNC: 31.7 G/DL (ref 32–36)
MCV RBC AUTO: 97 FL (ref 82–98)
MONOCYTES # BLD AUTO: 0.5 K/UL (ref 0.3–1)
MONOCYTES NFR BLD: 7.2 % (ref 4–15)
NEUTROPHILS # BLD AUTO: 3.8 K/UL (ref 1.8–7.7)
NEUTROPHILS NFR BLD: 55.6 % (ref 38–73)
NRBC BLD-RTO: 0 /100 WBC
PLATELET # BLD AUTO: 201 K/UL (ref 150–450)
PMV BLD AUTO: 10.8 FL (ref 9.2–12.9)
PROT SERPL-MCNC: 7.7 G/DL (ref 6–8.4)
RBC # BLD AUTO: 4.27 M/UL (ref 4–5.4)
WBC # BLD AUTO: 6.76 K/UL (ref 3.9–12.7)

## 2024-04-17 PROCEDURE — 36415 COLL VENOUS BLD VENIPUNCTURE: CPT | Performed by: PSYCHIATRY & NEUROLOGY

## 2024-04-17 PROCEDURE — 80076 HEPATIC FUNCTION PANEL: CPT | Performed by: PSYCHIATRY & NEUROLOGY

## 2024-04-17 PROCEDURE — 85025 COMPLETE CBC W/AUTO DIFF WBC: CPT | Performed by: PSYCHIATRY & NEUROLOGY

## 2024-05-02 ENCOUNTER — PATIENT MESSAGE (OUTPATIENT)
Dept: PSYCHIATRY | Facility: CLINIC | Age: 64
End: 2024-05-02
Payer: MEDICARE

## 2024-05-03 ENCOUNTER — HOSPITAL ENCOUNTER (OUTPATIENT)
Dept: RADIOLOGY | Facility: HOSPITAL | Age: 64
Discharge: HOME | End: 2024-05-03
Attending: FAMILY MEDICINE
Payer: MEDICARE

## 2024-05-03 VITALS — BODY MASS INDEX: 30.95 KG/M2 | WEIGHT: 168.19 LBS | HEIGHT: 62 IN

## 2024-05-03 DIAGNOSIS — Z12.31 ENCOUNTER FOR SCREENING MAMMOGRAM FOR BREAST CANCER: ICD-10-CM

## 2024-05-03 PROCEDURE — 77067 SCR MAMMO BI INCL CAD: CPT | Mod: 26,,, | Performed by: RADIOLOGY

## 2024-05-03 PROCEDURE — 77063 BREAST TOMOSYNTHESIS BI: CPT | Mod: TC

## 2024-05-03 PROCEDURE — 77063 BREAST TOMOSYNTHESIS BI: CPT | Mod: 26,,, | Performed by: RADIOLOGY

## 2024-05-06 ENCOUNTER — OFFICE VISIT (OUTPATIENT)
Dept: INTERNAL MEDICINE | Facility: CLINIC | Age: 64
End: 2024-05-06
Payer: MEDICARE

## 2024-05-06 ENCOUNTER — LAB VISIT (OUTPATIENT)
Dept: LAB | Facility: HOSPITAL | Age: 64
End: 2024-05-06
Attending: FAMILY MEDICINE
Payer: MEDICARE

## 2024-05-06 VITALS
HEART RATE: 91 BPM | OXYGEN SATURATION: 97 % | SYSTOLIC BLOOD PRESSURE: 128 MMHG | WEIGHT: 168.19 LBS | BODY MASS INDEX: 30.95 KG/M2 | HEIGHT: 62 IN | DIASTOLIC BLOOD PRESSURE: 82 MMHG

## 2024-05-06 DIAGNOSIS — Z86.39 PERSONAL HISTORY OF OTHER ENDOCRINE, NUTRITIONAL AND METABOLIC DISEASE: ICD-10-CM

## 2024-05-06 DIAGNOSIS — Z00.00 ANNUAL PHYSICAL EXAM: ICD-10-CM

## 2024-05-06 DIAGNOSIS — E78.49 OTHER HYPERLIPIDEMIA: ICD-10-CM

## 2024-05-06 DIAGNOSIS — Z86.010 HISTORY OF COLON POLYPS: ICD-10-CM

## 2024-05-06 DIAGNOSIS — M81.0 AGE-RELATED OSTEOPOROSIS WITHOUT CURRENT PATHOLOGICAL FRACTURE: ICD-10-CM

## 2024-05-06 DIAGNOSIS — E03.9 ACQUIRED HYPOTHYROIDISM: ICD-10-CM

## 2024-05-06 DIAGNOSIS — R25.2 SPASTICITY: ICD-10-CM

## 2024-05-06 DIAGNOSIS — G35 MULTIPLE SCLEROSIS: ICD-10-CM

## 2024-05-06 DIAGNOSIS — D84.9 IMMUNOSUPPRESSION: ICD-10-CM

## 2024-05-06 DIAGNOSIS — Z00.00 ANNUAL PHYSICAL EXAM: Primary | ICD-10-CM

## 2024-05-06 DIAGNOSIS — G35 MS (MULTIPLE SCLEROSIS): Chronic | ICD-10-CM

## 2024-05-06 DIAGNOSIS — F32.0 MAJOR DEPRESSIVE DISORDER, SINGLE EPISODE, MILD: ICD-10-CM

## 2024-05-06 LAB
CHOLEST SERPL-MCNC: 165 MG/DL (ref 120–199)
CHOLEST/HDLC SERPL: 2.9 {RATIO} (ref 2–5)
ESTIMATED AVG GLUCOSE: 114 MG/DL (ref 68–131)
HBA1C MFR BLD: 5.6 % (ref 4–5.6)
HDLC SERPL-MCNC: 56 MG/DL (ref 40–75)
HDLC SERPL: 33.9 % (ref 20–50)
LDLC SERPL CALC-MCNC: 94.8 MG/DL (ref 63–159)
NONHDLC SERPL-MCNC: 109 MG/DL
TRIGL SERPL-MCNC: 71 MG/DL (ref 30–150)
TSH SERPL DL<=0.005 MIU/L-ACNC: 1.53 UIU/ML (ref 0.4–4)

## 2024-05-06 PROCEDURE — 3044F HG A1C LEVEL LT 7.0%: CPT | Mod: CPTII,S$GLB,, | Performed by: FAMILY MEDICINE

## 2024-05-06 PROCEDURE — 80061 LIPID PANEL: CPT | Performed by: FAMILY MEDICINE

## 2024-05-06 PROCEDURE — 36415 COLL VENOUS BLD VENIPUNCTURE: CPT | Performed by: FAMILY MEDICINE

## 2024-05-06 PROCEDURE — 3008F BODY MASS INDEX DOCD: CPT | Mod: CPTII,S$GLB,, | Performed by: FAMILY MEDICINE

## 2024-05-06 PROCEDURE — 1160F RVW MEDS BY RX/DR IN RCRD: CPT | Mod: CPTII,S$GLB,, | Performed by: FAMILY MEDICINE

## 2024-05-06 PROCEDURE — 3079F DIAST BP 80-89 MM HG: CPT | Mod: CPTII,S$GLB,, | Performed by: FAMILY MEDICINE

## 2024-05-06 PROCEDURE — 84443 ASSAY THYROID STIM HORMONE: CPT | Performed by: FAMILY MEDICINE

## 2024-05-06 PROCEDURE — 99214 OFFICE O/P EST MOD 30 MIN: CPT | Mod: 25,S$GLB,, | Performed by: FAMILY MEDICINE

## 2024-05-06 PROCEDURE — 1159F MED LIST DOCD IN RCRD: CPT | Mod: CPTII,S$GLB,, | Performed by: FAMILY MEDICINE

## 2024-05-06 PROCEDURE — 83036 HEMOGLOBIN GLYCOSYLATED A1C: CPT | Performed by: FAMILY MEDICINE

## 2024-05-06 PROCEDURE — 3074F SYST BP LT 130 MM HG: CPT | Mod: CPTII,S$GLB,, | Performed by: FAMILY MEDICINE

## 2024-05-06 PROCEDURE — 99396 PREV VISIT EST AGE 40-64: CPT | Mod: 25,S$GLB,, | Performed by: FAMILY MEDICINE

## 2024-05-06 PROCEDURE — 99999 PR PBB SHADOW E&M-EST. PATIENT-LVL III: CPT | Mod: PBBFAC,,, | Performed by: FAMILY MEDICINE

## 2024-05-06 RX ORDER — PAROXETINE HYDROCHLORIDE 40 MG/1
40 TABLET, FILM COATED ORAL EVERY MORNING
Qty: 90 TABLET | Refills: 3 | Status: SHIPPED | OUTPATIENT
Start: 2024-05-06

## 2024-05-06 RX ORDER — PAROXETINE HYDROCHLORIDE 40 MG/1
40 TABLET, FILM COATED ORAL EVERY MORNING
Qty: 90 TABLET | Refills: 3 | Status: SHIPPED | OUTPATIENT
Start: 2024-05-06 | End: 2024-05-06 | Stop reason: SDUPTHER

## 2024-05-06 RX ORDER — SIMVASTATIN 40 MG/1
40 TABLET, FILM COATED ORAL DAILY
Qty: 90 TABLET | Refills: 3 | Status: SHIPPED | OUTPATIENT
Start: 2024-05-06

## 2024-05-06 RX ORDER — SIMVASTATIN 40 MG/1
40 TABLET, FILM COATED ORAL DAILY
Qty: 90 TABLET | Refills: 3 | Status: SHIPPED | OUTPATIENT
Start: 2024-05-06 | End: 2024-05-06 | Stop reason: SDUPTHER

## 2024-05-06 RX ORDER — LEVOTHYROXINE SODIUM 75 UG/1
75 TABLET ORAL
Qty: 90 TABLET | Refills: 3 | Status: SHIPPED | OUTPATIENT
Start: 2024-05-06 | End: 2025-05-06

## 2024-05-06 NOTE — PROGRESS NOTES
Subjective:   Patient ID: Cem Meyers is a 63 y.o. female.  Chief Complaint:  Follow-up    Presents for annual physical exam   Also followed by Neurology, Rheumatology, Psychiatry, Pulmonology/Sleep Medicine, and Ophthalmology.    Reviewed recent blood work  April 2024 CBC and LFTs normal/acceptable  January 2024 vitamin-D level normal     Medical History:  - Acquired hypothyroidism.  Last TSH normal.  Reports compliance with Synthroid 75 mcg daily.  No symptoms hypo or hyperthyroidism.  Needs repeat TSH  - Hyperlipidemia.  Last lipid panel with LDL close to goal on simvastatin 40 mg daily.  Reports compliance.  Denies side effects.  No chest pain or claudication.  Needs repeat lipid panel  - Major depressive disorder.  On Paxil 40 mg daily.  Reports compliance.  Denies side effects.  Symptoms well controlled.    - History of colon polyps.  Colonoscopy completed April 2022 and normal.  Due for repeat in April 2027.  - Multiple sclerosis and Neuropathy followed by Neurology.  Denies any significant decline over past 12 months.  - Osteoporosis followed by Rheumatology.     Health maintenance needs include:  - RSV vaccine  - Covid booster  - Tetanus vaccine    No new complaints or concerns today    Review of Systems   Constitutional:  Negative for activity change and unexpected weight change.   HENT:  Positive for rhinorrhea. Negative for hearing loss and trouble swallowing.    Eyes:  Positive for discharge. Negative for visual disturbance.   Respiratory:  Negative for chest tightness and wheezing.    Cardiovascular:  Negative for chest pain and palpitations.   Gastrointestinal:  Positive for constipation. Negative for blood in stool, diarrhea and vomiting.   Endocrine: Negative for polydipsia and polyuria.   Genitourinary:  Negative for difficulty urinating, dysuria, hematuria and menstrual problem.   Musculoskeletal:  Positive for arthralgias and neck pain. Negative for joint swelling.   Neurological:  Positive for  "headaches. Negative for weakness.   Psychiatric/Behavioral:  Negative for confusion and dysphoric mood.        Objective:   /82 (BP Location: Left arm, Patient Position: Sitting, BP Method: Large (Manual))   Pulse 91   Ht 5' 1.5" (1.562 m)   Wt 76.3 kg (168 lb 3.4 oz)   SpO2 97%   BMI 31.27 kg/m²     Physical Exam  Vitals and nursing note reviewed.   Constitutional:       Appearance: Normal appearance. She is well-developed and normal weight.   HENT:      Right Ear: Tympanic membrane and ear canal normal.      Left Ear: Tympanic membrane and ear canal normal.      Mouth/Throat:      Pharynx: Oropharynx is clear. Uvula midline.   Neck:      Thyroid: No thyroid mass, thyromegaly or thyroid tenderness.   Cardiovascular:      Rate and Rhythm: Normal rate and regular rhythm.      Heart sounds: Normal heart sounds.   Pulmonary:      Effort: Pulmonary effort is normal.      Breath sounds: Normal breath sounds.   Abdominal:      General: There is no distension.      Palpations: Abdomen is soft.      Tenderness: There is no abdominal tenderness.   Musculoskeletal:      Right lower leg: No edema.      Left lower leg: No edema.   Skin:     Findings: No rash.   Neurological:      Mental Status: She is alert and oriented to person, place, and time.      Coordination: Coordination abnormal.      Gait: Gait abnormal.   Psychiatric:         Mood and Affect: Mood and affect normal.       Assessment:       ICD-10-CM ICD-9-CM   1. Annual physical exam  Z00.00 V70.0   2. Personal history of other endocrine, nutritional and metabolic disease  Z86.39 V12.29   3. Acquired hypothyroidism  E03.9 244.9   4. Other hyperlipidemia  E78.49 272.4   5. Major depressive disorder, single episode, mild  F32.0 296.21   6. MS (multiple sclerosis)  G35 340   7. Immunosuppression  D84.9 279.9   8. Age-related osteoporosis without current pathological fracture  M81.0 733.01   9. History of colon polyps  Z86.010 V12.72     Plan:   Annual " physical exam  Personal history of other endocrine, nutritional and metabolic disease  -     Lipid Panel; Future; Expected date: 05/06/2024  -     TSH; Future; Expected date: 05/06/2024  -     Hemoglobin A1C; Future; Expected date: 05/06/2024  Blood pressure normal.  BMI 31.  Remainder exam stable.    Check labs.  Treat as indicated.    Colon cancer screening up-to-date   Breast cancer screening up-to-date   Repeat bone density scheduled later in the year   Recommend tetanus booster, RSV vaccine, and COVID booster through pharmacy  Other immunizations up-to-date     Acquired hypothyroidism  -     TSH; Future; Expected date: 05/06/2024  -     levothyroxine (SYNTHROID) 75 MCG tablet; Take 1 tablet (75 mcg total) by mouth before breakfast.  Dispense: 90 tablet; Refill: 3  Asymptomatic   Check TSH  Adjust Synthroid 75 mcg daily as needed     Other hyperlipidemia  -     Lipid Panel; Future; Expected date: 05/06/2024  -     simvastatin (ZOCOR) 40 MG tablet; Take 1 tablet (40 mg total) by mouth once daily.  Dispense: 90 tablet; Refill: 3  Asymptomatic   Check lipid panel   Adjust simvastatin 40 mg daily as needed     Major depressive disorder, single episode, mild  -     paroxetine (PAXIL) 40 MG tablet; Take 1 tablet (40 mg total) by mouth every morning.  Dispense: 90 tablet; Refill: 3  Mood and symptoms stable and well controlled   Continue current medication     MS (multiple sclerosis)  Immunosuppression  No significant decline in function over past year   Continue all current medications  Follow-up neurology as scheduled     Age-related osteoporosis without current pathological fracture  Continue per Rheumatology     History of colon polyps  Screening up-to-date   Due for repeat in April 2027     Return to clinic 1 year for annual physical exam or sooner as needed

## 2024-05-08 RX ORDER — BACLOFEN 10 MG/1
TABLET ORAL
Qty: 630 TABLET | Refills: 3 | Status: SHIPPED | OUTPATIENT
Start: 2024-05-08

## 2024-05-22 ENCOUNTER — OFFICE VISIT (OUTPATIENT)
Dept: CARDIOLOGY | Facility: CLINIC | Age: 64
End: 2024-05-22
Payer: MEDICARE

## 2024-05-22 VITALS
HEART RATE: 69 BPM | BODY MASS INDEX: 31.55 KG/M2 | SYSTOLIC BLOOD PRESSURE: 122 MMHG | WEIGHT: 167.13 LBS | OXYGEN SATURATION: 96 % | DIASTOLIC BLOOD PRESSURE: 82 MMHG | HEIGHT: 61 IN

## 2024-05-22 DIAGNOSIS — G35 MS (MULTIPLE SCLEROSIS): ICD-10-CM

## 2024-05-22 DIAGNOSIS — R26.9 NEUROLOGIC GAIT DYSFUNCTION: ICD-10-CM

## 2024-05-22 DIAGNOSIS — R07.9 CHEST PAIN, UNSPECIFIED TYPE: ICD-10-CM

## 2024-05-22 DIAGNOSIS — I50.32 CHRONIC DIASTOLIC HEART FAILURE: Primary | ICD-10-CM

## 2024-05-22 DIAGNOSIS — G47.33 OSA ON CPAP: ICD-10-CM

## 2024-05-22 DIAGNOSIS — E78.2 MIXED HYPERLIPIDEMIA: ICD-10-CM

## 2024-05-22 DIAGNOSIS — E03.8 OTHER SPECIFIED HYPOTHYROIDISM: ICD-10-CM

## 2024-05-22 PROCEDURE — 1159F MED LIST DOCD IN RCRD: CPT | Mod: CPTII,S$GLB,, | Performed by: INTERNAL MEDICINE

## 2024-05-22 PROCEDURE — 99999 PR PBB SHADOW E&M-EST. PATIENT-LVL IV: CPT | Mod: PBBFAC,,, | Performed by: INTERNAL MEDICINE

## 2024-05-22 PROCEDURE — 99214 OFFICE O/P EST MOD 30 MIN: CPT | Mod: S$GLB,,, | Performed by: INTERNAL MEDICINE

## 2024-05-22 PROCEDURE — 1160F RVW MEDS BY RX/DR IN RCRD: CPT | Mod: CPTII,S$GLB,, | Performed by: INTERNAL MEDICINE

## 2024-05-22 PROCEDURE — G2211 COMPLEX E/M VISIT ADD ON: HCPCS | Mod: S$GLB,,, | Performed by: INTERNAL MEDICINE

## 2024-05-22 PROCEDURE — 3079F DIAST BP 80-89 MM HG: CPT | Mod: CPTII,S$GLB,, | Performed by: INTERNAL MEDICINE

## 2024-05-22 PROCEDURE — 3044F HG A1C LEVEL LT 7.0%: CPT | Mod: CPTII,S$GLB,, | Performed by: INTERNAL MEDICINE

## 2024-05-22 PROCEDURE — 3008F BODY MASS INDEX DOCD: CPT | Mod: CPTII,S$GLB,, | Performed by: INTERNAL MEDICINE

## 2024-05-22 PROCEDURE — 3074F SYST BP LT 130 MM HG: CPT | Mod: CPTII,S$GLB,, | Performed by: INTERNAL MEDICINE

## 2024-05-22 NOTE — PROGRESS NOTES
Subjective:   Patient ID:  Cem Meyers is a 63 y.o. female who presents for cardiac consult of Annual Exam      The patient came in today for cardiac consult of Annual Exam    Referring Provider: Adonis Stubbs MD   Reason for consult: chest pain    Cem Meyers is a 63 y.o. female pt with current medical conditions HFpEF, HLD, hypothyroidism, MS, MAYKEL, depression, obesity presents for follow up CV evaluation.      5/24/23  She had recent neuro visit for MS - will start Peginterferon B - Plegridy. She lives in [facility] community since the [place]. Her father has Parkinsons.        5/22/24  Follow up from 5/2023.   BP and HR stable. BMI 31 - 167 lbs   Lipids mildly improving. She had Flu last winter.       2/5/18 - ECG - NSR, no ischemia    2D ECHO 2/7/18  CONCLUSIONS     1 - No wall motion abnormalities.     2 - Normal left ventricular systolic function (EF 60-65%).     3 - Impaired LV relaxation, normal LAP (grade 1 diastolic dysfunction).     4 - Normal right ventricular systolic function .     5 - The estimated PA systolic pressure is greater than 23 mmHg.         Past Medical History:   Diagnosis Date    Back pain     Broken toe 6/2015    left big toe    Chronic diastolic heart failure 5/8/2018    Closed left arm fracture     Colon polyp     Depression     Hyperlipidemia     Hypothyroid     Immunosuppression 1/28/2019    MS (multiple sclerosis)     Neurogenic bladder 12/6/2012    Osteoporosis     Polyneuropathy     Sleep apnea     Trouble in sleeping        Past Surgical History:   Procedure Laterality Date    BREAST BIOPSY Left 10/28/2020    COLONOSCOPY N/A 09/01/2017    Procedure: COLONOSCOPY;  Surgeon: Andriy Villar MD;  Location: [facility];  Service: Endoscopy;  Laterality: N/A;    COLONOSCOPY N/A 01/06/2021    Procedure: COLONOSCOPY;  Surgeon: Althea Casanova MD;  Location: [facility] ENDO;  Service: Endoscopy;  Laterality: N/A;    COLONOSCOPY N/A 04/25/2022    Procedure: COLONOSCOPY;  Surgeon:  Cristiano Aaron MD;  Location: [facility];  Service: Endoscopy;  Laterality: N/A;    FRACTURE SURGERY Left 2013    wrist- [facility]    KNEE SURGERY Right 1989    in [place]    TONSILLECTOMY  1966       Social History     Tobacco Use    Smoking status: Never     Passive exposure: Never    Smokeless tobacco: Never   Substance Use Topics    Alcohol use: Yes     Alcohol/week: 1.0 standard drink of alcohol     Types: 1 Glasses of wine per week    Drug use: No       Family History   Problem Relation Name Age of Onset    Pancreatic cancer Mother Brittnee Meyers     Stomach cancer Mother Brittnee Meyers     Cancer Mother Brittnee Meyers         pancreatic, stomach    Hypertension Father Don Meyers     Heart disease Father Don Meyers         MI    Hearing loss Father Don Meyers     Lupus Maternal Aunt      Rheum arthritis Maternal Aunt      Rheum arthritis Sister      Melanoma Neg Hx         Patient's Medications   New Prescriptions    No medications on file   Previous Medications    BACLOFEN (LIORESAL) 10 MG TABLET    TAKE 2 TABS ONE TIME DAILY IN THE MORNING, 2 TABS IN THE AFTERNOON, AND UP TO 3 TABS AT BEDTIME AS NEEDED    CALCIUM CITRATE (CALCITRATE) 200 MG (950 MG) TABLET    Take 2 tablets by mouth once daily.    CHOLECALCIFEROL, VITAMIN D3, 100 MCG (4,000 UNIT) CAP    Take 4,000 Units by mouth once daily.     DENOSUMAB (PROLIA SUBQ)    Inject into the skin. Every 6 months    GABAPENTIN (NEURONTIN) 800 MG TABLET    Take 1 tablet (800 mg total) by mouth 3 (three) times daily.    INTERFERON BETA-1A, ALBUMIN, (REBIF) 44 MCG/0.5 ML INJECTION    Inject 0.5 mLs (44 mcg total) into the skin 3 (three) times a week.    LEVOTHYROXINE (SYNTHROID) 75 MCG TABLET    Take 1 tablet (75 mcg total) by mouth before breakfast.    LUBIPROSTONE (AMITIZA) 24 MCG CAP    Take 1 capsule (24 mcg total) by mouth 2 (two) times daily.    MULTIVITAMIN CAPSULE    Take 1 capsule by mouth once daily.    PAROXETINE (PAXIL) 40 MG TABLET    Take 1 tablet (40 mg  "total) by mouth every morning.    SIMVASTATIN (ZOCOR) 40 MG TABLET    Take 1 tablet (40 mg total) by mouth once daily.   Modified Medications    No medications on file   Discontinued Medications    No medications on file       Review of Systems   Constitutional: Negative.    HENT: Negative.     Eyes: Negative.    Respiratory:  Negative for shortness of breath.    Cardiovascular:  Positive for chest pain. Negative for palpitations.   Gastrointestinal: Negative.    Genitourinary: Negative.    Musculoskeletal:  Positive for back pain, falls, joint pain and myalgias.   Skin: Negative.    Neurological:  Negative for dizziness and headaches.   Endo/Heme/Allergies: Negative.    Psychiatric/Behavioral: Negative.     All 12 systems otherwise negative.      Wt Readings from Last 3 Encounters:   05/22/24 75.8 kg (167 lb 1.7 oz)   05/06/24 76.3 kg (168 lb 3.4 oz)   05/03/24 76.3 kg (168 lb 3.4 oz)     Temp Readings from Last 3 Encounters:   04/06/24 99.9 °F (37.7 °C)   02/23/24 97.5 °F (36.4 °C) (Tympanic)   12/27/23 98 °F (36.7 °C) (Oral)     BP Readings from Last 3 Encounters:   05/22/24 122/82   05/06/24 128/82   04/06/24 131/85     Pulse Readings from Last 3 Encounters:   05/22/24 69   05/06/24 91   04/06/24 109       /82 (BP Location: Right arm, Patient Position: Sitting, BP Method: Medium (Manual))   Pulse 69   Ht 5' 1" (1.549 m)   Wt 75.8 kg (167 lb 1.7 oz)   SpO2 96%   BMI 31.57 kg/m²     Objective:   Physical Exam  Vitals and nursing note reviewed.   Constitutional:       General: She is not in acute distress.     Appearance: She is well-developed. She is not diaphoretic.   HENT:      Head: Normocephalic and atraumatic.      Nose: Nose normal.   Eyes:      General: No scleral icterus.     Conjunctiva/sclera: Conjunctivae normal.   Neck:      Thyroid: No thyromegaly.      Vascular: No JVD.   Cardiovascular:      Rate and Rhythm: Normal rate and regular rhythm.      Heart sounds: S1 normal and S2 normal. No " murmur heard.     No friction rub. No gallop. No S3 or S4 sounds.   Pulmonary:      Effort: Pulmonary effort is normal. No respiratory distress.      Breath sounds: Normal breath sounds. No stridor. No wheezing or rales.   Chest:      Chest wall: No tenderness.   Abdominal:      General: Bowel sounds are normal. There is no distension.      Palpations: Abdomen is soft. There is no mass.      Tenderness: There is no abdominal tenderness. There is no rebound.   Genitourinary:     Comments: Deferred  Musculoskeletal:         General: No tenderness or deformity. Normal range of motion.      Cervical back: Normal range of motion and neck supple.   Lymphadenopathy:      Cervical: No cervical adenopathy.   Skin:     General: Skin is warm and dry.      Coloration: Skin is not pale.      Findings: No erythema or rash.   Neurological:      Mental Status: She is alert and oriented to person, place, and time.      Motor: No abnormal muscle tone.      Coordination: Coordination normal.   Psychiatric:         Behavior: Behavior normal.         Thought Content: Thought content normal.         Judgment: Judgment normal.         Lab Results   Component Value Date     01/31/2024    K 4.0 01/31/2024     01/31/2024    CO2 28 01/31/2024    BUN 16 01/31/2024    CREATININE 1.2 01/31/2024    GLU 83 01/31/2024    HGBA1C 5.6 05/06/2024    MG 2.3 10/16/2018    AST 31 04/17/2024    ALT 17 04/17/2024    ALBUMIN 3.8 04/17/2024    PROT 7.7 04/17/2024    BILITOT 0.4 04/17/2024    WBC 6.76 04/17/2024    HGB 13.1 04/17/2024    HCT 41.3 04/17/2024    MCV 97 04/17/2024     04/17/2024    INR 1.0 08/21/2022    TSH 1.534 05/06/2024    CHOL 165 05/06/2024    HDL 56 05/06/2024    LDLCALC 94.8 05/06/2024    TRIG 71 05/06/2024    BNP 51 12/27/2023     Assessment:      1. Chronic diastolic heart failure    2. MS (multiple sclerosis)    3. Mixed hyperlipidemia    4. MAYKEL on CPAP    5. Other specified hypothyroidism    6. Chest pain,  unspecified type    7. Neurologic gait dysfunction    8. BMI 31.0-31.9,adult            Plan:   1. Chest pain, atypical    - 2D ECHO overall normal function without valve disease  - diastolic HF - euvolemic  - discussed stress test if more symptomatic/worse  - consider other causes of CP - MSK/esoph/pulm/anxiety    2. HLD  - cont statin    3. Hypothyrodism, TSH 1.35  - cont synthroid, needs to take in early AM empty stomach     4. MS with gait dysfunction   - cont meds per PCP/Neuro  - will start Peginterferon B - Plegridy.  - changed to Rebif     5. MAYKEL  - cont CPAP    6. Diastolic HF with edema   - pt euvolemic  - cont to monitor  - mild venous insuff    7. Obesity  - BMI 31 - 167 lbs   - cont weight loss     Visit today included increased complexity associated with the care of the episodic problem chest pain addressed and managing the longitudinal care of the patient due to the serious and/or complex managed problem(s) .      Thank you for allowing me to participate in this patient's care. Please do not hesitate to contact me with any questions or concerns. Consult note has been forwarded to the referral physician.

## 2024-05-28 DIAGNOSIS — Z00.00 ENCOUNTER FOR MEDICARE ANNUAL WELLNESS EXAM: ICD-10-CM

## 2024-05-30 ENCOUNTER — OFFICE VISIT (OUTPATIENT)
Dept: URGENT CARE | Facility: CLINIC | Age: 64
End: 2024-05-30
Payer: MEDICARE

## 2024-05-30 VITALS
TEMPERATURE: 99 F | HEIGHT: 62 IN | RESPIRATION RATE: 18 BRPM | DIASTOLIC BLOOD PRESSURE: 82 MMHG | BODY MASS INDEX: 30.71 KG/M2 | WEIGHT: 166.88 LBS | HEART RATE: 92 BPM | SYSTOLIC BLOOD PRESSURE: 123 MMHG | OXYGEN SATURATION: 96 %

## 2024-05-30 DIAGNOSIS — N39.0 URINARY TRACT INFECTION WITHOUT HEMATURIA, SITE UNSPECIFIED: Primary | ICD-10-CM

## 2024-05-30 DIAGNOSIS — R39.15 URINARY URGENCY: ICD-10-CM

## 2024-05-30 DIAGNOSIS — R39.15 URINARY URGENCY: Primary | ICD-10-CM

## 2024-05-30 LAB
BILIRUB UR QL STRIP: NEGATIVE
GLUCOSE UR QL STRIP: NEGATIVE
KETONES UR QL STRIP: NEGATIVE
LEUKOCYTE ESTERASE UR QL STRIP: NEGATIVE
PH, POC UA: 6.5
POC BLOOD, URINE: NEGATIVE
POC NITRATES, URINE: NEGATIVE
PROT UR QL STRIP: NEGATIVE
SP GR UR STRIP: 1.01 (ref 1–1.03)
UROBILINOGEN UR STRIP-ACNC: NORMAL (ref 0.1–1.1)

## 2024-05-30 PROCEDURE — 87086 URINE CULTURE/COLONY COUNT: CPT | Performed by: PHYSICIAN ASSISTANT

## 2024-05-30 PROCEDURE — 99213 OFFICE O/P EST LOW 20 MIN: CPT | Mod: S$GLB,,, | Performed by: PHYSICIAN ASSISTANT

## 2024-05-30 PROCEDURE — 81003 URINALYSIS AUTO W/O SCOPE: CPT | Mod: QW,S$GLB,, | Performed by: PHYSICIAN ASSISTANT

## 2024-05-30 RX ORDER — NITROFURANTOIN 25; 75 MG/1; MG/1
100 CAPSULE ORAL 2 TIMES DAILY
Qty: 10 CAPSULE | Refills: 0 | Status: SHIPPED | OUTPATIENT
Start: 2024-05-30 | End: 2024-06-04

## 2024-05-30 NOTE — PROGRESS NOTES
"Subjective:      Patient ID: Cem Meyers is a 63 y.o. female.    Vitals:  height is 5' 2.28" (1.582 m) and weight is 75.7 kg (166 lb 14.2 oz). Her oral temperature is 99 °F (37.2 °C). Her blood pressure is 123/82 and her pulse is 92. Her respiration is 18 and oxygen saturation is 96%.     Chief Complaint: Urinary Frequency (Urgency )    63 yr female present with frequent urination and urgency for about two or three days. Recent UTI in February, culture showed E.coli and pt was treated with Bactrim. Pt denies dysuria, hematuria, fevers/chills, flank pain.  Denies history of kidney stones.    Urinary Frequency   This is a new problem. The current episode started in the past 7 days. The problem has been gradually worsening. The pain is at a severity of 0/10. The patient is experiencing no pain. There has been no fever. Associated symptoms include frequency and urgency. Pertinent negatives include no chills, discharge, flank pain, hematuria, hesitancy, nausea, possible pregnancy, sweats, vomiting or constipation. She has tried nothing for the symptoms. The treatment provided no relief. There is no history of catheterization, diabetes insipidus, diabetes mellitus, kidney stones or a urological procedure.       Constitution: Negative for chills, sweating and fatigue.   Gastrointestinal:  Negative for abdominal pain, nausea, vomiting and constipation.   Genitourinary:  Positive for frequency and urgency. Negative for dysuria, urine decreased, flank pain, hematuria, history of kidney stones, vaginal discharge and vaginal odor.   Skin: Negative.    Neurological: Negative.       Objective:     Physical Exam   Constitutional: She appears well-developed.  Non-toxic appearance. She does not appear ill. No distress.   HENT:   Head: Normocephalic and atraumatic.   Ears:   Right Ear: External ear normal.   Left Ear: External ear normal.   Nose: Nose normal.   Eyes: Conjunctivae and EOM are normal.   Neck: Neck supple. "   Pulmonary/Chest: Effort normal and breath sounds normal.   Abdominal: Normal appearance. She exhibits no distension. Soft. flat abdomen There is no guarding.   Musculoskeletal: Normal range of motion.         General: Normal range of motion.   Neurological: no focal deficit. She is alert. She displays no weakness. Gait normal.   Skin: Skin is warm, dry, not diaphoretic, not pale and no rash.   Psychiatric: Her behavior is normal.       Assessment:     1. Urinary urgency        Plan:     Pt unable to give urine sample in clinic despite several attempts. She will try to obtain sample at home and bring back to clinic. Given supplies to collect at home.     Urinary urgency  -     POCT Urinalysis, Dipstick, Automated, W/O Scope

## 2024-05-30 NOTE — PATIENT INSTRUCTIONS
Please follow up with your Primary Care provider within 3 days if your signs and symptoms have not resolved. If your condition worsens, or you develop new symptoms, we recommend that you receive another evaluation at the Emergency Room immediately or contact your primary medical clinic to discuss your concerns.    You must understand that you have received an Urgent Care treatment only and that you may be released before all of your medical problems are known or treated. You, the patient, will arrange for follow up care as instructed.     RED FLAGS/WARNING SYMPTOMS DISCUSSED WITH PATIENT THAT WOULD WARRANT EMERGENT MEDICAL ATTENTION. PATIENT VERBALIZED UNDERSTANDING.

## 2024-05-31 LAB — BACTERIA UR CULT: NO GROWTH

## 2024-06-01 ENCOUNTER — PATIENT MESSAGE (OUTPATIENT)
Dept: ADMINISTRATIVE | Facility: HOSPITAL | Age: 64
End: 2024-06-01
Payer: MEDICARE

## 2024-06-01 ENCOUNTER — TELEPHONE (OUTPATIENT)
Dept: URGENT CARE | Facility: CLINIC | Age: 64
End: 2024-06-01
Payer: MEDICARE

## 2024-06-01 NOTE — TELEPHONE ENCOUNTER
spoke with patient after confirming her using 2 identifiers. She advises that her symptoms are much better on the Macrobid. Advised that the culture shows no bacterial growth but that she should probably finish the antibiotics. She verbalizes understanding of and agreement with the plan

## 2024-06-07 ENCOUNTER — OFFICE VISIT (OUTPATIENT)
Dept: URGENT CARE | Facility: CLINIC | Age: 64
End: 2024-06-07
Payer: MEDICARE

## 2024-06-07 VITALS
HEART RATE: 83 BPM | WEIGHT: 166 LBS | RESPIRATION RATE: 14 BRPM | SYSTOLIC BLOOD PRESSURE: 109 MMHG | HEIGHT: 62 IN | OXYGEN SATURATION: 95 % | TEMPERATURE: 97 F | DIASTOLIC BLOOD PRESSURE: 70 MMHG | BODY MASS INDEX: 30.55 KG/M2

## 2024-06-07 DIAGNOSIS — M25.461 EFFUSION OF BURSA OF RIGHT KNEE: ICD-10-CM

## 2024-06-07 DIAGNOSIS — M25.461 PAIN AND SWELLING OF RIGHT KNEE: Primary | ICD-10-CM

## 2024-06-07 DIAGNOSIS — M25.561 PAIN AND SWELLING OF RIGHT KNEE: Primary | ICD-10-CM

## 2024-06-07 PROCEDURE — 99213 OFFICE O/P EST LOW 20 MIN: CPT | Mod: S$GLB,,, | Performed by: PHYSICIAN ASSISTANT

## 2024-06-07 NOTE — PROGRESS NOTES
"Subjective:      Patient ID: Cem Meyers is a 63 y.o. female.    Vitals:  height is 5' 2" (1.575 m) and weight is 75.3 kg (166 lb). Her tympanic temperature is 97.1 °F (36.2 °C). Her blood pressure is 109/70 and her pulse is 83. Her respiration is 14 and oxygen saturation is 95%.     Chief Complaint: Knee Pain (Right knee)    Patient is 64 y/o female who presents with right knee pain and swelling that has been ongoing for a few days. She states the pain is worse when weight bearing or transitioning to standing position. She denies any trauma or injury mechanism. Reports history of arthritis. Denies using any OTC meds for the pain. She has tried a knee brace but reports it is too tight and uncomfortable. Hx of right knee arthroscopy in 1989.     Knee Pain   The incident occurred 3 to 5 days ago. The incident occurred at home. The injury mechanism is unknown. The pain is present in the right knee. The quality of the pain is described as shooting. The pain has been Constant since onset. Pertinent negatives include no inability to bear weight, loss of motion, loss of sensation, muscle weakness, numbness or tingling. Treatments tried: knee brace. The treatment provided no relief.       Constitution: Negative.   Cardiovascular: Negative.    Respiratory: Negative.     Musculoskeletal:  Positive for joint pain, joint swelling and arthritis. Negative for pain and trauma.   Skin: Negative.    Neurological:  Negative for numbness and tingling.      Objective:     Physical Exam   Constitutional: She is oriented to person, place, and time. She does not appear ill. No distress.   HENT:   Head: Normocephalic and atraumatic.   Eyes: Conjunctivae are normal.   Cardiovascular: Normal rate and normal pulses.   Pulmonary/Chest: Effort normal.   Musculoskeletal: Normal range of motion.         General: Swelling and tenderness (Medial aspect of knee) present. No deformity or signs of injury. Normal range of motion.      Right knee: She " exhibits swelling and effusion (infrapatellar). She exhibits normal range of motion, no ecchymosis, no LCL laxity, no bony tenderness and no MCL laxity.      Left knee: Normal.      Left lower leg: No edema.        Legs:    Neurological: She is alert and oriented to person, place, and time. She has normal motor skills and normal sensation. Gait (antalgic gait 2/2 pain) abnormal.   Skin: Skin is warm and dry.   Psychiatric: Her behavior is normal.   Nursing note and vitals reviewed.      Assessment:     1. Pain and swelling of right knee    2. Effusion of bursa of right knee        Plan:       Pain and swelling of right knee  -     KNEE BRACE FOR HOME USE    Effusion of bursa of right knee  -     KNEE BRACE FOR HOME USE          Medical Decision Making:   Initial Assessment:   Patient is well appearing and in no acute distress. Knee pain x 3 days with no injury. Physical exam is significant for localized swelling and infrapatellar effuison to the right knee. Patient reports pain when she initially goes to stand up and with walking.   Differential Diagnosis:   Effusion, arthritis, sprain, bursitis, meniscal injury     Urgent Care Management:  No injury but offered to do x-ray of the knee in clinic today.  Patient declined would like to do conservative treatment before having x-ray done.  She reports a knee brace she has at home is too tight and not comfortable to wear; given adjustable brace in clinic.  Patient advised to use knee brace for prn for support. Advised to ice throughout the day in 10 - 15 minute increments. Take ibuprofen/tylenol for pain or fever per label instructions. Elevate to help reduce swelling. If symptoms do not improve recommend to f/u with PCP or orthopedic for further evaluation. Patient verbalized understanding and is in agreement with plan.

## 2024-06-07 NOTE — PATIENT INSTRUCTIONS
Please follow up with your Primary Care provider or an Orthopedic specialist within 7 days if your signs and symptoms have not resolved. If your condition worsens, or you develop new symptoms, we recommend that you receive another evaluation or contact your primary medical clinic to discuss your concerns.    You must understand that you have received an Urgent Care treatment only and that you may be released before all of your medical problems are known or treated. You, the patient, will arrange for follow up care as instructed.

## 2024-06-14 ENCOUNTER — PATIENT MESSAGE (OUTPATIENT)
Dept: RHEUMATOLOGY | Facility: CLINIC | Age: 64
End: 2024-06-14
Payer: MEDICARE

## 2024-06-18 ENCOUNTER — HOSPITAL ENCOUNTER (OUTPATIENT)
Dept: RADIOLOGY | Facility: HOSPITAL | Age: 64
Discharge: HOME | End: 2024-06-18
Attending: PHYSICIAN ASSISTANT
Payer: MEDICARE

## 2024-06-18 ENCOUNTER — OFFICE VISIT (OUTPATIENT)
Dept: INTERNAL MEDICINE | Facility: CLINIC | Age: 64
End: 2024-06-18
Payer: MEDICARE

## 2024-06-18 VITALS
DIASTOLIC BLOOD PRESSURE: 80 MMHG | WEIGHT: 165.13 LBS | TEMPERATURE: 97 F | HEART RATE: 88 BPM | SYSTOLIC BLOOD PRESSURE: 120 MMHG | OXYGEN SATURATION: 96 % | BODY MASS INDEX: 30.39 KG/M2 | HEIGHT: 62 IN

## 2024-06-18 DIAGNOSIS — G35 MS (MULTIPLE SCLEROSIS): Chronic | ICD-10-CM

## 2024-06-18 DIAGNOSIS — M79.661 RIGHT CALF PAIN: ICD-10-CM

## 2024-06-18 DIAGNOSIS — M25.561 CHRONIC PAIN OF RIGHT KNEE: ICD-10-CM

## 2024-06-18 DIAGNOSIS — G89.29 CHRONIC PAIN OF RIGHT KNEE: ICD-10-CM

## 2024-06-18 DIAGNOSIS — G89.29 CHRONIC PAIN OF RIGHT KNEE: Primary | ICD-10-CM

## 2024-06-18 DIAGNOSIS — M25.561 CHRONIC PAIN OF RIGHT KNEE: Primary | ICD-10-CM

## 2024-06-18 PROCEDURE — 73560 X-RAY EXAM OF KNEE 1 OR 2: CPT | Mod: TC,LT

## 2024-06-18 PROCEDURE — 99999 PR PBB SHADOW E&M-EST. PATIENT-LVL V: CPT | Mod: PBBFAC,,, | Performed by: PHYSICIAN ASSISTANT

## 2024-06-18 PROCEDURE — 3008F BODY MASS INDEX DOCD: CPT | Mod: CPTII,S$GLB,, | Performed by: PHYSICIAN ASSISTANT

## 2024-06-18 PROCEDURE — 93971 EXTREMITY STUDY: CPT | Mod: 26,RT,, | Performed by: STUDENT IN AN ORGANIZED HEALTH CARE EDUCATION/TRAINING PROGRAM

## 2024-06-18 PROCEDURE — 3079F DIAST BP 80-89 MM HG: CPT | Mod: CPTII,S$GLB,, | Performed by: PHYSICIAN ASSISTANT

## 2024-06-18 PROCEDURE — 1159F MED LIST DOCD IN RCRD: CPT | Mod: CPTII,S$GLB,, | Performed by: PHYSICIAN ASSISTANT

## 2024-06-18 PROCEDURE — 1160F RVW MEDS BY RX/DR IN RCRD: CPT | Mod: CPTII,S$GLB,, | Performed by: PHYSICIAN ASSISTANT

## 2024-06-18 PROCEDURE — 73560 X-RAY EXAM OF KNEE 1 OR 2: CPT | Mod: 26,59,LT, | Performed by: RADIOLOGY

## 2024-06-18 PROCEDURE — 73562 X-RAY EXAM OF KNEE 3: CPT | Mod: 26,RT,, | Performed by: RADIOLOGY

## 2024-06-18 PROCEDURE — 3074F SYST BP LT 130 MM HG: CPT | Mod: CPTII,S$GLB,, | Performed by: PHYSICIAN ASSISTANT

## 2024-06-18 PROCEDURE — 99214 OFFICE O/P EST MOD 30 MIN: CPT | Mod: S$GLB,,, | Performed by: PHYSICIAN ASSISTANT

## 2024-06-18 PROCEDURE — 3044F HG A1C LEVEL LT 7.0%: CPT | Mod: CPTII,S$GLB,, | Performed by: PHYSICIAN ASSISTANT

## 2024-06-18 PROCEDURE — 93971 EXTREMITY STUDY: CPT | Mod: TC,RT

## 2024-06-18 NOTE — PROGRESS NOTES
Subjective:       Patient ID: Cem Meyers is a 63 y.o. female.    Chief Complaint: Knee Pain (Right knee)      Knee Pain   Incident onset: 6 months, pain started. There was no injury mechanism. The pain is present in the right knee. The quality of the pain is described as aching. The pain has been Constant since onset. Pertinent negatives include no inability to bear weight, loss of motion, loss of sensation or muscle weakness. The symptoms are aggravated by movement and weight bearing. She has tried ice and rest (ASA, Neoprene wrap which was too tight per pt) for the symptoms. The treatment provided no relief.   She has MS and weakness on left leg, ambulates with walking cane, favors right leg.    Review of Systems   Constitutional:  Positive for activity change. Negative for unexpected weight change.   HENT:  Negative for hearing loss, rhinorrhea and trouble swallowing.    Eyes:  Negative for discharge and visual disturbance.   Respiratory:  Negative for chest tightness and wheezing.    Cardiovascular:  Negative for chest pain and palpitations.   Gastrointestinal:  Negative for blood in stool, constipation, diarrhea and vomiting.   Endocrine: Negative for polydipsia and polyuria.   Genitourinary:  Negative for difficulty urinating, dysuria, hematuria and menstrual problem.   Musculoskeletal:  Positive for arthralgias and joint swelling. Negative for neck pain.   Neurological:  Negative for weakness and headaches.   Psychiatric/Behavioral:  Negative for confusion and dysphoric mood.        Objective:      Physical Exam  Vitals and nursing note reviewed.   Constitutional:       General: She is not in acute distress.     Appearance: She is well-developed.   HENT:      Head: Normocephalic and atraumatic.   Eyes:      General: Lids are normal. No scleral icterus.     Extraocular Movements: Extraocular movements intact.      Conjunctiva/sclera: Conjunctivae normal.   Cardiovascular:      Pulses: Normal pulses.       Comments: +Yudy's sign on right calf  Pulmonary:      Effort: Pulmonary effort is normal.   Musculoskeletal:      Right knee: Effusion present. Normal range of motion. Tenderness present over the medial joint line and lateral joint line. Normal pulse.      Right lower leg: No edema.      Left lower leg: No edema.   Neurological:      Mental Status: She is alert.      Cranial Nerves: No cranial nerve deficit.   Psychiatric:         Mood and Affect: Mood and affect normal.         Assessment:       1. Chronic pain of right knee    2. Right calf pain    3. MS (multiple sclerosis)        Plan:   1. Chronic pain of right knee  -     X-ray Knee Ortho Right; Future; Expected date: 06/18/2024    2. Right calf pain  -     US Lower Extremity Veins Right; Future; Expected date: 06/18/2024    3. MS (multiple sclerosis)  Overview:  Stable on treatment, followed by Rebeka Smith MD and Rimma Rouse PA-C Neurology, [facility]  MRI 8/21/2020: Impression: Findings in the cerebral white matter which are typical for multiple sclerosis.  No new lesions identified.  No restricted diffusion..        R/O DVT  XR today  Ice and elevation    X-ray Knee Ortho Right  Narrative: EXAM:  XR KNEE ORTHO RIGHT    CLINICAL HISTORY:  Chronic right knee pain    FINDINGS:  3 views.    Right knee: Moderate medial and lateral joint space narrowing with marginal spurring.  Patellofemoral compartment appears unremarkable no fractures or dislocations.  No joint effusions.    Mild medial joint space narrowing of the left knee.  Impression:  Moderate degenerative joint disease of the medial lateral compartments of the right knee.    Finalized on: 6/18/2024 11:40 AM By:  Filiberto Escobar MD  [facility]# [id number]      2024-06-18 11:42:[id number]    NADJA

## 2024-06-27 ENCOUNTER — PATIENT MESSAGE (OUTPATIENT)
Dept: INTERNAL MEDICINE | Facility: CLINIC | Age: 64
End: 2024-06-27

## 2024-06-27 ENCOUNTER — OFFICE VISIT (OUTPATIENT)
Dept: INTERNAL MEDICINE | Facility: CLINIC | Age: 64
End: 2024-06-27
Payer: MEDICARE

## 2024-06-27 VITALS
BODY MASS INDEX: 28.24 KG/M2 | HEART RATE: 76 BPM | HEIGHT: 64 IN | WEIGHT: 165.38 LBS | SYSTOLIC BLOOD PRESSURE: 110 MMHG | DIASTOLIC BLOOD PRESSURE: 84 MMHG | OXYGEN SATURATION: 96 %

## 2024-06-27 DIAGNOSIS — G35 MS (MULTIPLE SCLEROSIS): Chronic | ICD-10-CM

## 2024-06-27 DIAGNOSIS — E78.5 HYPERLIPIDEMIA, UNSPECIFIED HYPERLIPIDEMIA TYPE: ICD-10-CM

## 2024-06-27 DIAGNOSIS — D84.9 IMMUNOSUPPRESSION: ICD-10-CM

## 2024-06-27 DIAGNOSIS — G89.29 CHRONIC PAIN OF RIGHT KNEE: Primary | ICD-10-CM

## 2024-06-27 DIAGNOSIS — F32.5 MAJOR DEPRESSIVE DISORDER IN FULL REMISSION, UNSPECIFIED WHETHER RECURRENT: ICD-10-CM

## 2024-06-27 DIAGNOSIS — M85.80 OSTEOPENIA, UNSPECIFIED LOCATION: ICD-10-CM

## 2024-06-27 DIAGNOSIS — Z00.00 ENCOUNTER FOR MEDICARE ANNUAL WELLNESS EXAM: Primary | ICD-10-CM

## 2024-06-27 DIAGNOSIS — E03.9 ACQUIRED HYPOTHYROIDISM: Chronic | ICD-10-CM

## 2024-06-27 DIAGNOSIS — I50.32 CHRONIC DIASTOLIC HEART FAILURE: ICD-10-CM

## 2024-06-27 DIAGNOSIS — Z99.89 DEPENDENCE ON OTHER ENABLING MACHINES AND DEVICES: ICD-10-CM

## 2024-06-27 DIAGNOSIS — G47.33 OSA ON CPAP: Chronic | ICD-10-CM

## 2024-06-27 DIAGNOSIS — M25.561 CHRONIC PAIN OF RIGHT KNEE: Primary | ICD-10-CM

## 2024-06-27 DIAGNOSIS — G62.9 POLYNEUROPATHY: ICD-10-CM

## 2024-06-27 PROCEDURE — 99999 PR PBB SHADOW E&M-EST. PATIENT-LVL V: CPT | Mod: PBBFAC,,, | Performed by: NURSE PRACTITIONER

## 2024-06-27 NOTE — PROGRESS NOTES
"  Cem Meyers presented for a  Medicare AWV and comprehensive Health Risk Assessment today. The following components were reviewed and updated:    Medical history  Family History  Social history  Allergies and Current Medications  Health Risk Assessment  Health Maintenance  Care Team         ** See Completed Assessments for Annual Wellness Visit within the encounter summary.**         The following assessments were completed:  Living Situation  CAGE  Depression Screening  Timed Get Up and Go  Whisper Test  Cognitive Function Screening  Nutrition Screening  ADL Screening  PAQ Screening      Opioid documentation:      Patient does not have a current opioid prescription.         Vitals:    06/27/24 1045   BP: 110/84   Pulse: 76   SpO2: 96%   Weight: 75 kg (165 lb 5.5 oz)   Height: 5' 4" (1.626 m)     Body mass index is 28.38 kg/m².  Physical Exam  Vitals and nursing note reviewed.   Constitutional:       Appearance: She is well-developed.   HENT:      Head: Normocephalic.   Cardiovascular:      Rate and Rhythm: Normal rate and regular rhythm.      Heart sounds: Normal heart sounds.   Pulmonary:      Effort: Pulmonary effort is normal. No respiratory distress.      Breath sounds: Normal breath sounds.   Abdominal:      Palpations: Abdomen is soft. There is no mass.      Tenderness: There is abdominal tenderness in the right lower quadrant.      Comments: Reports RLQ tenderness is chronic in nature and providers are aware   Musculoskeletal:         General: Normal range of motion.   Skin:     General: Skin is warm and dry.   Neurological:      Mental Status: She is alert and oriented to person, place, and time.      Motor: No abnormal muscle tone.   Psychiatric:         Speech: Speech normal.         Behavior: Behavior normal.               Diagnoses and health risks identified today and associated recommendations/orders:    1. Encounter for Medicare annual wellness exam  - Ambulatory Referral/Consult to Enhanced Annual " Wellness Visit (eAWV)  She will discuss vaccines with neurologist.   Reports cardiac conditions are stable.    Encouraged healthy diet and exercise as tolerated  Reports stress but is not problematic at this time. Patient knows to follow up with PCP if becomes problematic.      2. Chronic diastolic heart failure  Stable. Continue current treatment plan as previously prescribed with your  cardiologist.     3. MS (multiple sclerosis)  Rebif  Reports memory difficulties. Normal cognitive function screening.     Advised to follow up with neurologist for further evaluation and recommendations. Patient expressed understanding.      4. Immunosuppression  See #3  Continue current treatment plan as previously prescribed with your  neurologist.     5. Major depressive disorder in full remission, unspecified whether recurrent  PHQ 2-0  Stable and controlled. Continue current treatment plan as previously prescribed with your PCP.      6. Polyneuropathy  Reports chronic n/t BUE and BLE  Continue current treatment plan as previously prescribed with your  pcp and neurologist.     7. Acquired hypothyroidism  Stable and controlled. Continue current treatment plan as previously prescribed with your PCP.      8. MAYKEL on CPAP  Continue current treatment plan as previously prescribed with your  pulmonologist.          9. Osteopenia, unspecified location  Dexa 6/22  Continue current treatment plan as previously prescribed with your  rheumatologist.     10. Hyperlipidemia, unspecified hyperlipidemia type  Stable. Continue current treatment plan as previously prescribed with your  pcp and cardiologist.     11. Dependence on other enabling machines and devices  Normal timed get up and go test. Reports 2+ falls in the last 12 months. Uses a cane to aid with ambulation.   Fall precautions reviewed with patient. Advised to follow up with PCP/neurologist for further recommendations. Patient expressed understanding.         Provided Cem with a  5-10 year written screening schedule and personal prevention plan. Recommendations were developed using the USPSTF age appropriate recommendations. Education, counseling, and referrals were provided as needed. After Visit Summary , available on [email or web address],  which includes a list of additional screenings\tests needed.    Follow up in about 1 year (around 6/27/2025) for awv.    Coco Ronquillo, NP  I offered to discuss advanced care planning, including how to pick a person who would make decisions for you if you were unable to make them for yourself, called a health care power of , and what kind of decisions you might make such as use of life sustaining treatments such as ventilators and tube feeding when faced with a life limiting illness recorded on a living will that they will need to know. (How you want to be cared for as you near the end of your natural life)     X  Patient is unwilling to engage in a discussion regarding advance directives at this time.

## 2024-06-27 NOTE — PATIENT INSTRUCTIONS
Counseling and Referral of Other Preventative  (Italic type indicates deductible and co-insurance are waived)    Patient Name: Cem Meyers  Today's Date: 6/27/2024    Health Maintenance       Date Due Completion Date    RSV Vaccine (Age 60+ and Pregnant patients) (1 - 1-dose 60+ series) Never done ---    TETANUS VACCINE 09/07/2022 9/7/2012    COVID-19 Vaccine (5 - 2023-24 season) 09/01/2023 6/1/2022    DEXA Scan 06/17/2024 6/17/2022    Mammogram 05/03/2025 5/3/2024    Override on 10/24/2012: (N/S)    Override on 10/18/2011: Done    Lipid Panel 05/06/2025 5/6/2024    Colorectal Cancer Screening 04/25/2027 4/25/2022    Hemoglobin A1c (Diabetic Prevention Screening) 05/06/2027 5/6/2024    Cervical Cancer Screening 10/13/2028 10/13/2023        No orders of the defined types were placed in this encounter.    The following information is provided to all patients.  This information is to help you find resources for any of the problems found today that may be affecting your health:                  Living healthy guide: www.[facility].[place].gov      Understanding Diabetes: www.diabetes.org      Eating healthy: www.cdc.gov/healthyweight      CDC home safety checklist: www.cdc.gov/steadi/patient.html      Agency on Aging: www.goea.[place].[facility]      Alcoholics anonymous (AA): www.aa.org      Physical Activity: www.geraldo.nih.gov/gx6azsu      Tobacco use: www.quitwithusla.org

## 2024-07-25 ENCOUNTER — PATIENT MESSAGE (OUTPATIENT)
Dept: PSYCHIATRY | Facility: CLINIC | Age: 64
End: 2024-07-25
Payer: MEDICARE

## 2024-07-25 ENCOUNTER — TELEPHONE (OUTPATIENT)
Dept: ORTHOPEDICS | Facility: CLINIC | Age: 64
End: 2024-07-25
Payer: MEDICARE

## 2024-07-25 NOTE — TELEPHONE ENCOUNTER
I attempted to call the pt to get her scheduled from a referral from the W. She did not answer but left a message to call back if she wants to get scheduled.

## 2024-08-05 ENCOUNTER — PATIENT MESSAGE (OUTPATIENT)
Dept: PSYCHIATRY | Facility: CLINIC | Age: 64
End: 2024-08-05
Payer: MEDICARE

## 2024-08-07 ENCOUNTER — INFUSION (OUTPATIENT)
Dept: INFUSION THERAPY | Facility: HOSPITAL | Age: 64
End: 2024-08-07
Attending: INTERNAL MEDICINE
Payer: MEDICARE

## 2024-08-07 ENCOUNTER — OFFICE VISIT (OUTPATIENT)
Dept: RHEUMATOLOGY | Facility: CLINIC | Age: 64
End: 2024-08-07
Payer: MEDICARE

## 2024-08-07 VITALS
BODY MASS INDEX: 28.24 KG/M2 | HEIGHT: 64 IN | WEIGHT: 165.38 LBS | HEART RATE: 74 BPM | SYSTOLIC BLOOD PRESSURE: 109 MMHG | DIASTOLIC BLOOD PRESSURE: 74 MMHG

## 2024-08-07 DIAGNOSIS — M81.0 AGE-RELATED OSTEOPOROSIS WITHOUT CURRENT PATHOLOGICAL FRACTURE: Primary | ICD-10-CM

## 2024-08-07 PROCEDURE — 3074F SYST BP LT 130 MM HG: CPT | Mod: CPTII,S$GLB,, | Performed by: STUDENT IN AN ORGANIZED HEALTH CARE EDUCATION/TRAINING PROGRAM

## 2024-08-07 PROCEDURE — 3078F DIAST BP <80 MM HG: CPT | Mod: CPTII,S$GLB,, | Performed by: STUDENT IN AN ORGANIZED HEALTH CARE EDUCATION/TRAINING PROGRAM

## 2024-08-07 PROCEDURE — 99214 OFFICE O/P EST MOD 30 MIN: CPT | Mod: S$GLB,,, | Performed by: STUDENT IN AN ORGANIZED HEALTH CARE EDUCATION/TRAINING PROGRAM

## 2024-08-07 PROCEDURE — 96372 THER/PROPH/DIAG INJ SC/IM: CPT

## 2024-08-07 PROCEDURE — 3044F HG A1C LEVEL LT 7.0%: CPT | Mod: CPTII,S$GLB,, | Performed by: STUDENT IN AN ORGANIZED HEALTH CARE EDUCATION/TRAINING PROGRAM

## 2024-08-07 PROCEDURE — 63600175 PHARM REV CODE 636 W HCPCS: Mod: JZ,JG | Performed by: STUDENT IN AN ORGANIZED HEALTH CARE EDUCATION/TRAINING PROGRAM

## 2024-08-07 PROCEDURE — 99999 PR PBB SHADOW E&M-EST. PATIENT-LVL III: CPT | Mod: PBBFAC,,, | Performed by: STUDENT IN AN ORGANIZED HEALTH CARE EDUCATION/TRAINING PROGRAM

## 2024-08-07 PROCEDURE — 3008F BODY MASS INDEX DOCD: CPT | Mod: CPTII,S$GLB,, | Performed by: STUDENT IN AN ORGANIZED HEALTH CARE EDUCATION/TRAINING PROGRAM

## 2024-08-07 RX ADMIN — DENOSUMAB 60 MG: 60 INJECTION SUBCUTANEOUS at 02:08

## 2024-08-19 ENCOUNTER — LAB VISIT (OUTPATIENT)
Dept: LAB | Facility: HOSPITAL | Age: 64
End: 2024-08-19
Attending: PSYCHIATRY & NEUROLOGY
Payer: MEDICARE

## 2024-08-19 DIAGNOSIS — G35 MS (MULTIPLE SCLEROSIS): ICD-10-CM

## 2024-08-19 LAB
ALBUMIN SERPL BCP-MCNC: 3.6 G/DL (ref 3.5–5.2)
ALP SERPL-CCNC: 76 U/L (ref 55–135)
ALT SERPL W/O P-5'-P-CCNC: 18 U/L (ref 10–44)
AST SERPL-CCNC: 33 U/L (ref 10–40)
BASOPHILS # BLD AUTO: 0.03 K/UL (ref 0–0.2)
BASOPHILS NFR BLD: 0.5 % (ref 0–1.9)
BILIRUB DIRECT SERPL-MCNC: 0.1 MG/DL (ref 0.1–0.3)
BILIRUB SERPL-MCNC: 0.3 MG/DL (ref 0.1–1)
DIFFERENTIAL METHOD BLD: ABNORMAL
EOSINOPHIL # BLD AUTO: 0 K/UL (ref 0–0.5)
EOSINOPHIL NFR BLD: 0.7 % (ref 0–8)
ERYTHROCYTE [DISTWIDTH] IN BLOOD BY AUTOMATED COUNT: 12.8 % (ref 11.5–14.5)
HCT VFR BLD AUTO: 37.3 % (ref 37–48.5)
HGB BLD-MCNC: 12 G/DL (ref 12–16)
IMM GRANULOCYTES # BLD AUTO: 0 K/UL (ref 0–0.04)
IMM GRANULOCYTES NFR BLD AUTO: 0 % (ref 0–0.5)
LYMPHOCYTES # BLD AUTO: 2.8 K/UL (ref 1–4.8)
LYMPHOCYTES NFR BLD: 49.7 % (ref 18–48)
MCH RBC QN AUTO: 31.3 PG (ref 27–31)
MCHC RBC AUTO-ENTMCNC: 32.2 G/DL (ref 32–36)
MCV RBC AUTO: 97 FL (ref 82–98)
MONOCYTES # BLD AUTO: 0.5 K/UL (ref 0.3–1)
MONOCYTES NFR BLD: 8.1 % (ref 4–15)
NEUTROPHILS # BLD AUTO: 2.3 K/UL (ref 1.8–7.7)
NEUTROPHILS NFR BLD: 41 % (ref 38–73)
NRBC BLD-RTO: 0 /100 WBC
PLATELET # BLD AUTO: 226 K/UL (ref 150–450)
PLATELET BLD QL SMEAR: ABNORMAL
PMV BLD AUTO: 10.5 FL (ref 9.2–12.9)
PROT SERPL-MCNC: 6.9 G/DL (ref 6–8.4)
RBC # BLD AUTO: 3.83 M/UL (ref 4–5.4)
WBC # BLD AUTO: 5.71 K/UL (ref 3.9–12.7)

## 2024-08-19 PROCEDURE — 85025 COMPLETE CBC W/AUTO DIFF WBC: CPT | Performed by: PSYCHIATRY & NEUROLOGY

## 2024-08-19 PROCEDURE — 80076 HEPATIC FUNCTION PANEL: CPT | Performed by: PSYCHIATRY & NEUROLOGY

## 2024-08-19 PROCEDURE — 36415 COLL VENOUS BLD VENIPUNCTURE: CPT | Performed by: PSYCHIATRY & NEUROLOGY

## 2024-08-21 ENCOUNTER — OFFICE VISIT (OUTPATIENT)
Dept: DERMATOLOGY | Facility: CLINIC | Age: 64
End: 2024-08-21
Payer: MEDICARE

## 2024-08-21 DIAGNOSIS — L21.9 SEBORRHEIC DERMATITIS: Primary | ICD-10-CM

## 2024-08-21 PROCEDURE — 1159F MED LIST DOCD IN RCRD: CPT | Mod: CPTII,95,, | Performed by: STUDENT IN AN ORGANIZED HEALTH CARE EDUCATION/TRAINING PROGRAM

## 2024-08-21 PROCEDURE — 3044F HG A1C LEVEL LT 7.0%: CPT | Mod: CPTII,95,, | Performed by: STUDENT IN AN ORGANIZED HEALTH CARE EDUCATION/TRAINING PROGRAM

## 2024-08-21 PROCEDURE — 99214 OFFICE O/P EST MOD 30 MIN: CPT | Mod: 95,,, | Performed by: STUDENT IN AN ORGANIZED HEALTH CARE EDUCATION/TRAINING PROGRAM

## 2024-08-21 PROCEDURE — 1160F RVW MEDS BY RX/DR IN RCRD: CPT | Mod: CPTII,95,, | Performed by: STUDENT IN AN ORGANIZED HEALTH CARE EDUCATION/TRAINING PROGRAM

## 2024-08-21 PROCEDURE — G2211 COMPLEX E/M VISIT ADD ON: HCPCS | Mod: 95,,, | Performed by: STUDENT IN AN ORGANIZED HEALTH CARE EDUCATION/TRAINING PROGRAM

## 2024-08-21 RX ORDER — TRIAMCINOLONE ACETONIDE 0.25 MG/G
CREAM TOPICAL
Qty: 80 G | Refills: 1 | Status: SHIPPED | OUTPATIENT
Start: 2024-08-21

## 2024-08-21 RX ORDER — KETOCONAZOLE 20 MG/G
CREAM TOPICAL 2 TIMES DAILY
Qty: 60 G | Refills: 1 | Status: SHIPPED | OUTPATIENT
Start: 2024-08-21

## 2024-08-21 NOTE — PROGRESS NOTES
The patient location is: home  The chief complaint leading to consultation is: rash on the face    Visit type: audiovisual    Face to Face time with patient: 10mins  10 minutes of total time spent on the encounter, which includes face to face time and non-face to face time preparing to see the patient (eg, review of tests), Obtaining and/or reviewing separately obtained history, Documenting clinical information in the electronic or other health record, Independently interpreting results (not separately reported) and communicating results to the patient/family/caregiver, or Care coordination (not separately reported).         Each patient to whom he or she provides medical services by telemedicine is:  (1) informed of the relationship between the physician and patient and the respective role of any other health care provider with respect to management of the patient; and (2) notified that he or she may decline to receive medical services by telemedicine and may withdraw from such care at any time.    History of Present Illness: The patient presents with chief complaint of peeling rash.  Location: on the face, mostly around the nose and mouth  Duration: ongoing for months  Signs/Symptoms: reports having red, thick flaking and greasy scaling with itching and irritation  Prior treatments: none      ROS: Otherwise negative    PE: const/neuro - AAOx3, NAD          Skin -               A/P: 1) Seborrheic dermatitis  -     ketoconazole (NIZORAL) 2 % cream; Apply topically 2 (two) times daily.  Dispense: 60 g; Refill: 1  -     triamcinolone acetonide 0.025% (KENALOG) 0.025 % cream; AAA bid  Dispense: 80 g; Refill: 1

## 2024-08-21 NOTE — PROGRESS NOTES
Subjective:          Patient ID: Cem Meyers is a 64 y.o. female who presents today for a routine virtual visit for MS.  She was last seen in March 2024. The history has been provided by the patient.     The patient location is: her home   The chief complaint leading to consultation is: MS     Visit type: audiovisual    Face to Face time with patient: 16 minutes   30 minutes of total time spent on the encounter, which includes face to face time and non-face to face time preparing to see the patient (eg, review of tests), Obtaining and/or reviewing separately obtained history, Documenting clinical information in the electronic or other health record, Independently interpreting results (not separately reported) and communicating results to the patient/family/caregiver, or Care coordination (not separately reported).       Each patient to whom he or she provides medical services by telemedicine is:  (1) informed of the relationship between the physician and patient and the respective role of any other health care provider with respect to management of the patient; and (2) notified that he or she may decline to receive medical services by telemedicine and may withdraw from such care at any time.      MS HPI:  DMT: Rebif   Side effects from DMT? No  Taking vitamin D3 as recommended? Yes--5000 units daily   She denies any recent infections.   She continues to walk 1/2 mile daily.   She denies any sense of worsening or relapse.    She feels like she has more good days than bad days.   She works at [employer].     Medications:  Current Outpatient Medications   Medication Sig    baclofen (LIORESAL) 10 MG tablet TAKE 2 TABS ONE TIME DAILY IN THE MORNING, 2 TABS IN THE AFTERNOON, AND UP TO 3 TABS AT BEDTIME AS NEEDED    calcium citrate (CALCITRATE) 200 mg (950 mg) tablet Take 2 tablets by mouth once daily.    cholecalciferol, vitamin D3, 100 mcg (4,000 unit) Cap Take 4,000 Units by mouth once daily.     denosumab  (PROLIA SUBQ) Inject into the skin. Every 6 months    gabapentin (NEURONTIN) 800 MG tablet Take 1 tablet (800 mg total) by mouth 3 (three) times daily.    ketoconazole (NIZORAL) 2 % cream Apply topically 2 (two) times daily.    levothyroxine (SYNTHROID) 75 MCG tablet Take 1 tablet (75 mcg total) by mouth before breakfast.    lubiprostone (AMITIZA) 24 MCG Cap Take 1 capsule (24 mcg total) by mouth 2 (two) times daily.    multivitamin capsule Take 1 capsule by mouth once daily.    paroxetine (PAXIL) 40 MG tablet Take 1 tablet (40 mg total) by mouth every morning.    simvastatin (ZOCOR) 40 MG tablet Take 1 tablet (40 mg total) by mouth once daily.    triamcinolone acetonide 0.025% (KENALOG) 0.025 % cream AAA bid    interferon beta-1a, albumin, (REBIF) 44 mcg/0.5 mL injection Inject 0.5 mLs (44 mcg total) into the skin 3 (three) times a week.     No current facility-administered medications for this visit.       SOCIAL HISTORY  Social History     Tobacco Use    Smoking status: Never     Passive exposure: Never    Smokeless tobacco: Never   Substance Use Topics    Alcohol use: Yes     Comment: occasional    Drug use: No       Living arrangements - the patient lives in a [facility] community    ROS:      9/4/24   REVIEW OF SYMPTOMS   Do you feel abnormally tired on most days? No--energy level is pretty good; naps when needed    Do you feel you generally sleep well? Yes--generally sleeps well; wears CPAP, which has helped   Do you have difficulty controlling your bladder?  Yes--she has some frequency, but feels like she is emptying completely. She does not want medication. She denies any UTIs since March.    Do you have difficulty controlling your bowels?  No--takes Amitiza as needed--just a few times a month    Do you have frequent muscle cramps, tightness or spasms in your limbs?  No--legs had increased spasms, but she thinks this was because she was walking more than usual. Baclofen helps.    Do you have new visual  "symptoms?  No--sees eye doctor annually    Do you have worsening difficulty with your memory or thinking? No--"not really," manages appts, finances, and medications   Do you have worsening symptoms of anxiety or depression?  No--gets worried about finances   For patients who walk, Do you have more difficulty walking?  No--feels like her walking may have declined because she sits a lot. She feels better when she is more active.    Have you fallen since your last visit?  Yes--"probably." Her shoes catch on the carpet, so she is trying to not wear these certain shoes as much. She generally feels better when wearing shoes as opposed to going barefoot though.    For patients who use wheelchairs: Do you have any skin wounds or breakdown? Not Applicable   Do you have difficulty using your hands?  No--trigger finger    Do you have shooting or burning pain? No   Do you have difficulty with sexual function?  Not assessed    If you are sexually active, are you using birth control? Y/N  N/A Not Applicable   Do you often choke when swallowing liquids or solid food?  No   Do you experience worsening symptoms when overheated? Yes--she becomes weak when overheated    Do you need any new equipment such as a wheelchair, walker or shower chair? No--she has a cane, walker, shower chair    Do you receive co-pay financial assistance for your principal MS medicine? Yes--free drug program    Would you be interested in participating in an MS research trial in the future? No   For patients on Gilenya, Tecfidera, Aubagio, Rituxan, Ocrevus, Tysabri, Lemtrada or Methotrexate, are you aware that you should NOT receive live virus vaccines?  Not Applicable   Do you feel you have adequate family/friend support?  Yes   Do you have health insurance?   Yes   Are you currently employed? Yes    Do you receive SSDI/SSI?  Yes   Do you use marijuana or cannabis products? No   Have you been diagnosed with a urinary tract infection since your last visit " here? Nothing significant    Have you been diagnosed with a respiratory tract infection since your last visit here? No   Have you been to the emergency room since your last visit here? No    Have you been hospitalized since your last visit here?  No              Objective:        1. 25 foot timed walk:      3/10/2021    12:00 AM 3/4/2024    12:01 AM   Timed 25 Foot Walk:   Did patient wear an AFO? No No   Was assistive device used? No    Assistive device used (debbie one):  Unilateral Assistance   Unilateral device used  Cane   Time for 25 Foot Walk (seconds) 6.3 10.45   Time for 25 Foot Walk (seconds) 6.6        Neurologic Exam    Deferred     Imaging:     Results for orders placed during the hospital encounter of 02/28/24    MRI Brain Demyelinating Without Contrast    Impression  Similar appearance of demyelinating plaques within both cerebral hemispheres and the posterior fossa.  No definite new demyelinating plaques as compared to the prior MRI 09/08/2022.      Electronically signed by: Jean-Paul Castellon  Date:    02/28/2024  Time:    09:58    Results were discussed with the patient  Labs:     Lab Results   Component Value Date    QQQGMCFB58KR 86 08/07/2024    [id number] 67 01/31/2024    [id number] 63 07/24/2023       Lab Results   Component Value Date    WBC 5.71 08/19/2024    RBC 3.83 (L) 08/19/2024    HGB 12.0 08/19/2024    HCT 37.3 08/19/2024    MCV 97 08/19/2024    MCH 31.3 (H) 08/19/2024    MCHC 32.2 08/19/2024    RDW 12.8 08/19/2024     08/19/2024    MPV 10.5 08/19/2024    GRAN 2.3 08/19/2024    GRAN 41.0 08/19/2024    LYMPH 2.8 08/19/2024    LYMPH 49.7 (H) 08/19/2024    MONO 0.5 08/19/2024    MONO 8.1 08/19/2024    EOS 0.0 08/19/2024    BASO 0.03 08/19/2024    EOSINOPHIL 0.7 08/19/2024    BASOPHIL 0.5 08/19/2024     Sodium   Date Value Ref Range Status   08/07/2024 138 136 - 145 mmol/L Final     Potassium   Date Value Ref Range Status   08/07/2024 4.0 3.5 - 5.1 mmol/L Final     Chloride   Date  Value Ref Range Status   08/07/2024 103 95 - 110 mmol/L Final     CO2   Date Value Ref Range Status   08/07/2024 25 23 - 29 mmol/L Final     Glucose   Date Value Ref Range Status   08/07/2024 80 70 - 110 mg/dL Final     BUN   Date Value Ref Range Status   08/07/2024 17 8 - 23 mg/dL Final     Creatinine   Date Value Ref Range Status   08/07/2024 1.3 0.5 - 1.4 mg/dL Final     Calcium   Date Value Ref Range Status   08/07/2024 10.2 8.7 - 10.5 mg/dL Final     Total Protein   Date Value Ref Range Status   08/19/2024 6.9 6.0 - 8.4 g/dL Final     Albumin   Date Value Ref Range Status   08/19/2024 3.6 3.5 - 5.2 g/dL Final     Total Bilirubin   Date Value Ref Range Status   08/19/2024 0.3 0.1 - 1.0 mg/dL Final     Comment:     For infants and newborns, interpretation of results should be based  on gestational age, weight and in agreement with clinical  observations.    Premature Infant recommended reference ranges:  Up to 24 hours.............<8.0 mg/dL  Up to 48 hours............<12.0 mg/dL  3-5 days..................<15.0 mg/dL  6-29 days.................<15.0 mg/dL       Alkaline Phosphatase   Date Value Ref Range Status   08/19/2024 76 55 - 135 U/L Final     AST   Date Value Ref Range Status   08/19/2024 33 10 - 40 U/L Final     ALT   Date Value Ref Range Status   08/19/2024 18 10 - 44 U/L Final     Anion Gap   Date Value Ref Range Status   08/07/2024 10 8 - 16 mmol/L Final     eGFR if    Date Value Ref Range Status   06/17/2022 >60 >60 mL/min/1.73 m^2 Final     eGFR if non    Date Value Ref Range Status   06/17/2022 >60 >60 mL/min/1.73 m^2 Final     Comment:     Calculation used to obtain the estimated glomerular filtration  rate (eGFR) is the CKD-EPI equation.        Lab Results   Component Value Date    HEPBSAG Negative 07/17/2019    HEPBSAB Negative 07/17/2019    HEPBCAB Negative 07/17/2019           MS Impression and Plan:     NEURO MULTIPLE SCLEROSIS IMPRESSION:   Number of relapses  in the past year?:  0  Clinical Progression comment:  She denies any obvious worsening, except walk may have declined due to her sitting more often.  She will be examined at the next visit.   MS Classification:  Secondarily Progressive MS  DMT:  No change in management  DMT comment:  Continue Rebif and vitamin D. Her next safety labs are due in February.       MRI brain is due in February.   She will follow up with Dr. Smith in person in February.  The visit today is associated with current or anticipated ongoing medical care related to this patient's single serious condition/complex condition of multiple sclerosis.        REBECCA Ryan, CNS    Problem List Items Addressed This Visit       Prophylactic immunotherapy     Other Visit Diagnoses       Multiple sclerosis    -  Primary    Relevant Medications    interferon beta-1a, albumin, (REBIF) 44 mcg/0.5 mL injection    Other Relevant Orders    MRI Brain Demyelinating Without Contrast    CBC auto differential    Hepatic function panel    Counseling regarding goals of care        Gait disturbance

## 2024-08-22 DIAGNOSIS — G35 MULTIPLE SCLEROSIS: ICD-10-CM

## 2024-08-22 DIAGNOSIS — M79.2 NEUROPATHIC PAIN: ICD-10-CM

## 2024-08-22 RX ORDER — GABAPENTIN 800 MG/1
800 TABLET ORAL 3 TIMES DAILY
Qty: 270 TABLET | Refills: 1 | Status: SHIPPED | OUTPATIENT
Start: 2024-08-22

## 2024-09-04 ENCOUNTER — OFFICE VISIT (OUTPATIENT)
Dept: NEUROLOGY | Facility: CLINIC | Age: 64
End: 2024-09-04
Payer: MEDICARE

## 2024-09-04 DIAGNOSIS — Z71.89 COUNSELING REGARDING GOALS OF CARE: ICD-10-CM

## 2024-09-04 DIAGNOSIS — Z29.89 PROPHYLACTIC IMMUNOTHERAPY: ICD-10-CM

## 2024-09-04 DIAGNOSIS — R26.9 GAIT DISTURBANCE: ICD-10-CM

## 2024-09-04 DIAGNOSIS — G35 MULTIPLE SCLEROSIS: Primary | ICD-10-CM

## 2024-09-04 PROCEDURE — 99214 OFFICE O/P EST MOD 30 MIN: CPT | Mod: 95,,, | Performed by: CLINICAL NURSE SPECIALIST

## 2024-09-04 PROCEDURE — 3044F HG A1C LEVEL LT 7.0%: CPT | Mod: CPTII,95,, | Performed by: CLINICAL NURSE SPECIALIST

## 2024-09-04 PROCEDURE — 1159F MED LIST DOCD IN RCRD: CPT | Mod: CPTII,95,, | Performed by: CLINICAL NURSE SPECIALIST

## 2024-09-04 PROCEDURE — G2211 COMPLEX E/M VISIT ADD ON: HCPCS | Mod: 95,,, | Performed by: CLINICAL NURSE SPECIALIST

## 2024-09-05 ENCOUNTER — TELEPHONE (OUTPATIENT)
Dept: NEUROLOGY | Facility: CLINIC | Age: 64
End: 2024-09-05
Payer: MEDICARE

## 2024-09-05 NOTE — TELEPHONE ENCOUNTER
1st attempt to contact pt in regards to getting her scheduled for MRIs and f/u, reminder has been placed in chart to schedule f/u for Feb.

## 2024-09-05 NOTE — TELEPHONE ENCOUNTER
----- Message from Karoline Hurst, REBECCA, CNS sent at 9/4/2024  9:51 AM CDT -----  MRI and labs in February   F/u with BB in February

## 2024-09-06 ENCOUNTER — TELEPHONE (OUTPATIENT)
Dept: NEUROLOGY | Facility: CLINIC | Age: 64
End: 2024-09-06
Payer: MEDICARE

## 2024-09-06 NOTE — TELEPHONE ENCOUNTER
Spoke with pt in regards to getting her scheduled for labs and MRI. Pt is scheduled for labs 9/9/24 and [place] [facility] phone number was given to pt to schedule MRI in [place].

## 2024-09-09 ENCOUNTER — LAB VISIT (OUTPATIENT)
Dept: LAB | Facility: HOSPITAL | Age: 64
End: 2024-09-09
Attending: CLINICAL NURSE SPECIALIST
Payer: MEDICARE

## 2024-09-09 DIAGNOSIS — G35 MULTIPLE SCLEROSIS: ICD-10-CM

## 2024-09-09 LAB
ALBUMIN SERPL BCP-MCNC: 3.6 G/DL (ref 3.5–5.2)
ALP SERPL-CCNC: 70 U/L (ref 55–135)
ALT SERPL W/O P-5'-P-CCNC: 19 U/L (ref 10–44)
AST SERPL-CCNC: 34 U/L (ref 10–40)
BASOPHILS # BLD AUTO: 0.03 K/UL (ref 0–0.2)
BASOPHILS NFR BLD: 0.6 % (ref 0–1.9)
BILIRUB DIRECT SERPL-MCNC: 0.1 MG/DL (ref 0.1–0.3)
BILIRUB SERPL-MCNC: 0.3 MG/DL (ref 0.1–1)
DIFFERENTIAL METHOD BLD: ABNORMAL
EOSINOPHIL # BLD AUTO: 0 K/UL (ref 0–0.5)
EOSINOPHIL NFR BLD: 0.8 % (ref 0–8)
ERYTHROCYTE [DISTWIDTH] IN BLOOD BY AUTOMATED COUNT: 12.9 % (ref 11.5–14.5)
HCT VFR BLD AUTO: 39.4 % (ref 37–48.5)
HGB BLD-MCNC: 12.6 G/DL (ref 12–16)
IMM GRANULOCYTES # BLD AUTO: 0 K/UL (ref 0–0.04)
IMM GRANULOCYTES NFR BLD AUTO: 0 % (ref 0–0.5)
LYMPHOCYTES # BLD AUTO: 2.4 K/UL (ref 1–4.8)
LYMPHOCYTES NFR BLD: 48.6 % (ref 18–48)
MCH RBC QN AUTO: 31.7 PG (ref 27–31)
MCHC RBC AUTO-ENTMCNC: 32 G/DL (ref 32–36)
MCV RBC AUTO: 99 FL (ref 82–98)
MONOCYTES # BLD AUTO: 0.5 K/UL (ref 0.3–1)
MONOCYTES NFR BLD: 10.3 % (ref 4–15)
NEUTROPHILS # BLD AUTO: 1.9 K/UL (ref 1.8–7.7)
NEUTROPHILS NFR BLD: 39.7 % (ref 38–73)
NRBC BLD-RTO: 0 /100 WBC
PLATELET # BLD AUTO: 209 K/UL (ref 150–450)
PMV BLD AUTO: 10.9 FL (ref 9.2–12.9)
PROT SERPL-MCNC: 7.2 G/DL (ref 6–8.4)
RBC # BLD AUTO: 3.97 M/UL (ref 4–5.4)
WBC # BLD AUTO: 4.86 K/UL (ref 3.9–12.7)

## 2024-09-09 PROCEDURE — 36415 COLL VENOUS BLD VENIPUNCTURE: CPT | Performed by: CLINICAL NURSE SPECIALIST

## 2024-09-09 PROCEDURE — 80076 HEPATIC FUNCTION PANEL: CPT | Performed by: CLINICAL NURSE SPECIALIST

## 2024-09-09 PROCEDURE — 85025 COMPLETE CBC W/AUTO DIFF WBC: CPT | Performed by: CLINICAL NURSE SPECIALIST

## 2024-11-11 ENCOUNTER — OFFICE VISIT (OUTPATIENT)
Dept: URGENT CARE | Facility: CLINIC | Age: 64
End: 2024-11-11
Payer: MEDICARE

## 2024-11-11 VITALS
WEIGHT: 167 LBS | RESPIRATION RATE: 18 BRPM | HEIGHT: 64 IN | DIASTOLIC BLOOD PRESSURE: 88 MMHG | SYSTOLIC BLOOD PRESSURE: 129 MMHG | OXYGEN SATURATION: 95 % | TEMPERATURE: 98 F | HEART RATE: 65 BPM | BODY MASS INDEX: 28.51 KG/M2

## 2024-11-11 DIAGNOSIS — R35.0 FREQUENCY OF URINATION: Primary | ICD-10-CM

## 2024-11-11 DIAGNOSIS — N89.8 VAGINAL DISCHARGE: ICD-10-CM

## 2024-11-11 LAB
BACTERIAL VAGINOSIS DNA: NOT DETECTED
BILIRUBIN, UA POC OHS: NEGATIVE
BLOOD, UA POC OHS: NEGATIVE
CANDIDA GLABRATA/KRUSEI: NOT DETECTED
CANDIDA RRNA VAG QL PROBE: NOT DETECTED
CLARITY, UA POC OHS: CLEAR
COLOR, UA POC OHS: YELLOW
GLUCOSE, UA POC OHS: NEGATIVE
KETONES, UA POC OHS: NEGATIVE
LEUKOCYTES, UA POC OHS: NEGATIVE
NITRITE, UA POC OHS: NEGATIVE
PH, UA POC OHS: 6.5
PROTEIN, UA POC OHS: NEGATIVE
SPECIFIC GRAVITY, UA POC OHS: 1.01
TRICHOMONAS VAGINALIS: NOT DETECTED
UROBILINOGEN, UA POC OHS: 0.2

## 2024-11-11 PROCEDURE — 87086 URINE CULTURE/COLONY COUNT: CPT | Performed by: PHYSICIAN ASSISTANT

## 2024-11-11 PROCEDURE — 81003 URINALYSIS AUTO W/O SCOPE: CPT | Mod: QW,S$GLB,, | Performed by: PHYSICIAN ASSISTANT

## 2024-11-11 PROCEDURE — 0352U VAGINOSIS SCREEN BY DNA PROBE: CPT | Performed by: PHYSICIAN ASSISTANT

## 2024-11-11 PROCEDURE — 99213 OFFICE O/P EST LOW 20 MIN: CPT | Mod: S$GLB,,, | Performed by: PHYSICIAN ASSISTANT

## 2024-11-11 NOTE — PROGRESS NOTES
"Subjective:      Patient ID: Cem Meyers is a 64 y.o. female.    Vitals:  height is 5' 4" (1.626 m) and weight is 75.8 kg (167 lb). Her oral temperature is 98 °F (36.7 °C). Her blood pressure is 129/88 and her pulse is 65. Her respiration is 18 and oxygen saturation is 95%.     Chief Complaint: Vaginal Discharge    Pt c/o vaginal discharge yellow, frequency of urination, "heaviness of abdomen" onset 2-3 days. Pt has not taken any medication for treatment.  She states not similar to previous UTIs.  She is not sexually active at this time.  States she struggles with constipation as well.  Last BM greater than 3-4 days ago.  Currently taking Metamucil and Amitiza as needed.  Up to date on C-scope.  She hasn't seen GI in over a year.    Vaginal Discharge  The patient's primary symptoms include vaginal discharge. The patient's pertinent negatives include no genital itching, genital lesions, genital odor, genital rash, missed menses, pelvic pain or vaginal bleeding. This is a new problem. The current episode started yesterday. The problem occurs constantly. The problem has been unchanged. The patient is experiencing no pain. She is not pregnant. Associated symptoms include constipation and frequency. Pertinent negatives include no abdominal pain, anorexia, back pain, chills, diarrhea, discolored urine, dysuria, fever, flank pain, headaches, hematuria, joint pain, joint swelling, nausea, painful intercourse, rash, sore throat, urgency or vomiting. The vaginal discharge was yellow. There has been no bleeding. She has not been passing clots. She has not been passing tissue. She has tried nothing for the symptoms. The treatment provided no relief. She is not sexually active. No, her partner does not have an STD. She uses abstinence for contraception. She is postmenopausal.       Constitution: Negative for chills and fever.   HENT:  Negative for sore throat.    Gastrointestinal:  Positive for constipation. Negative for " abdominal pain, nausea, vomiting and diarrhea.   Genitourinary:  Positive for frequency and vaginal discharge. Negative for dysuria, urgency, flank pain, hematuria, missed menses and pelvic pain.   Musculoskeletal:  Negative for back pain.   Skin:  Negative for rash.   Neurological:  Negative for headaches.      Objective:     Physical Exam   Constitutional: She is oriented to person, place, and time. She appears well-developed.   HENT:   Head: Normocephalic and atraumatic.   Ears:   Right Ear: External ear normal.   Left Ear: External ear normal.   Nose: Nose normal. No nasal deformity. No epistaxis.   Mouth/Throat: Oropharynx is clear and moist and mucous membranes are normal.   Eyes: Lids are normal.   Neck: Trachea normal and phonation normal. Neck supple.   Cardiovascular: Normal pulses.   Pulmonary/Chest: Effort normal.   Abdominal: Normal appearance and bowel sounds are normal. She exhibits no distension. Soft. flat abdomen There is no abdominal tenderness. There is no left CVA tenderness and no right CVA tenderness.   Neurological: She is alert and oriented to person, place, and time.   Skin: Skin is warm, dry and intact.   Psychiatric: Her speech is normal and behavior is normal.   Nursing note and vitals reviewed.      Assessment:     1. Frequency of urination    2. Vaginal discharge        Plan:       Frequency of urination  -     POCT Urinalysis(Instrument)  -     Urine culture    Vaginal discharge  -     Vaginosis Screen by DNA Probe; Future; Expected date: 11/11/2024      Results for orders placed or performed in visit on 11/11/24   POCT Urinalysis(Instrument)    Collection Time: 11/11/24 11:00 AM   Result Value Ref Range    Color, POC UA Yellow Yellow, Straw, Colorless    Clarity, POC UA Clear Clear    Glucose, POC UA Negative Negative    Bilirubin, POC UA Negative Negative    Ketones, POC UA Negative Negative    Spec Grav POC UA 1.015 1.005 - 1.030    Blood, POC UA Negative Negative    pH, POC UA 6.5  5.0 - 8.0    Protein, POC UA Negative Negative    Urobilinogen, POC UA 0.2 <=1.0    Nitrite, POC UA Negative Negative    WBC, POC UA Negative Negative     *Note: Due to a large number of results and/or encounters for the requested time period, some results have not been displayed. A complete set of results can be found in Results Review.       Will send urine for a urine culture.  Vaginosis screen ordered.  We will notify patient and tx appropriately once these results are available.  Follow up with GI recommended.  RTC as needed or go to the ER with new or worsening symptoms.

## 2024-11-12 LAB
BACTERIA UR CULT: NORMAL
BACTERIA UR CULT: NORMAL

## 2024-11-16 ENCOUNTER — TELEPHONE (OUTPATIENT)
Dept: URGENT CARE | Facility: CLINIC | Age: 64
End: 2024-11-16
Payer: MEDICARE

## 2024-11-18 DIAGNOSIS — Z51.81 MEDICATION MONITORING ENCOUNTER: Primary | ICD-10-CM

## 2024-11-19 ENCOUNTER — TELEPHONE (OUTPATIENT)
Dept: URGENT CARE | Facility: CLINIC | Age: 64
End: 2024-11-19
Payer: MEDICARE

## 2024-11-19 NOTE — TELEPHONE ENCOUNTER
Returned pt's call. Verified pt name and  and informed pt that urine culture was negative. Pt states that she is feeling better and symptoms are gone.

## 2024-12-03 ENCOUNTER — PATIENT MESSAGE (OUTPATIENT)
Dept: NEUROLOGY | Facility: CLINIC | Age: 64
End: 2024-12-03
Payer: MEDICARE

## 2024-12-03 DIAGNOSIS — G35 MULTIPLE SCLEROSIS: Primary | ICD-10-CM

## 2024-12-05 ENCOUNTER — LAB VISIT (OUTPATIENT)
Dept: LAB | Facility: HOSPITAL | Age: 64
End: 2024-12-05
Attending: CLINICAL NURSE SPECIALIST
Payer: MEDICARE

## 2024-12-05 DIAGNOSIS — G35 MULTIPLE SCLEROSIS: ICD-10-CM

## 2024-12-05 LAB
ALBUMIN SERPL BCP-MCNC: 3.8 G/DL (ref 3.5–5.2)
ALP SERPL-CCNC: 68 U/L (ref 40–150)
ALT SERPL W/O P-5'-P-CCNC: 24 U/L (ref 10–44)
ANION GAP SERPL CALC-SCNC: 9 MMOL/L (ref 8–16)
AST SERPL-CCNC: 36 U/L (ref 10–40)
BASOPHILS # BLD AUTO: 0.03 K/UL (ref 0–0.2)
BASOPHILS NFR BLD: 0.7 % (ref 0–1.9)
BILIRUB SERPL-MCNC: 0.4 MG/DL (ref 0.1–1)
BUN SERPL-MCNC: 14 MG/DL (ref 8–23)
CALCIUM SERPL-MCNC: 10 MG/DL (ref 8.7–10.5)
CHLORIDE SERPL-SCNC: 105 MMOL/L (ref 95–110)
CO2 SERPL-SCNC: 27 MMOL/L (ref 23–29)
CREAT SERPL-MCNC: 0.9 MG/DL (ref 0.5–1.4)
DIFFERENTIAL METHOD BLD: ABNORMAL
EOSINOPHIL # BLD AUTO: 0 K/UL (ref 0–0.5)
EOSINOPHIL NFR BLD: 0.9 % (ref 0–8)
ERYTHROCYTE [DISTWIDTH] IN BLOOD BY AUTOMATED COUNT: 13 % (ref 11.5–14.5)
EST. GFR  (NO RACE VARIABLE): >60 ML/MIN/1.73 M^2
GLUCOSE SERPL-MCNC: 79 MG/DL (ref 70–110)
HCT VFR BLD AUTO: 41.1 % (ref 37–48.5)
HGB BLD-MCNC: 12.6 G/DL (ref 12–16)
IMM GRANULOCYTES # BLD AUTO: 0 K/UL (ref 0–0.04)
IMM GRANULOCYTES NFR BLD AUTO: 0 % (ref 0–0.5)
LYMPHOCYTES # BLD AUTO: 2.3 K/UL (ref 1–4.8)
LYMPHOCYTES NFR BLD: 51.1 % (ref 18–48)
MCH RBC QN AUTO: 31.1 PG (ref 27–31)
MCHC RBC AUTO-ENTMCNC: 30.7 G/DL (ref 32–36)
MCV RBC AUTO: 102 FL (ref 82–98)
MONOCYTES # BLD AUTO: 0.4 K/UL (ref 0.3–1)
MONOCYTES NFR BLD: 9.3 % (ref 4–15)
NEUTROPHILS # BLD AUTO: 1.7 K/UL (ref 1.8–7.7)
NEUTROPHILS NFR BLD: 38 % (ref 38–73)
NRBC BLD-RTO: 0 /100 WBC
PLATELET # BLD AUTO: 209 K/UL (ref 150–450)
PMV BLD AUTO: 11.1 FL (ref 9.2–12.9)
POTASSIUM SERPL-SCNC: 4.4 MMOL/L (ref 3.5–5.1)
PROT SERPL-MCNC: 7.6 G/DL (ref 6–8.4)
RBC # BLD AUTO: 4.05 M/UL (ref 4–5.4)
SODIUM SERPL-SCNC: 141 MMOL/L (ref 136–145)
WBC # BLD AUTO: 4.54 K/UL (ref 3.9–12.7)

## 2024-12-05 PROCEDURE — 36415 COLL VENOUS BLD VENIPUNCTURE: CPT | Performed by: CLINICAL NURSE SPECIALIST

## 2024-12-05 PROCEDURE — 85025 COMPLETE CBC W/AUTO DIFF WBC: CPT | Performed by: CLINICAL NURSE SPECIALIST

## 2024-12-05 PROCEDURE — 80053 COMPREHEN METABOLIC PANEL: CPT | Performed by: CLINICAL NURSE SPECIALIST

## 2024-12-09 ENCOUNTER — PATIENT MESSAGE (OUTPATIENT)
Dept: NEUROLOGY | Facility: CLINIC | Age: 64
End: 2024-12-09
Payer: MEDICARE

## 2024-12-12 ENCOUNTER — PATIENT MESSAGE (OUTPATIENT)
Dept: NEUROLOGY | Facility: CLINIC | Age: 64
End: 2024-12-12
Payer: MEDICARE

## 2024-12-16 ENCOUNTER — PATIENT MESSAGE (OUTPATIENT)
Dept: PSYCHIATRY | Facility: CLINIC | Age: 64
End: 2024-12-16
Payer: MEDICARE

## 2025-01-08 ENCOUNTER — TELEPHONE (OUTPATIENT)
Dept: NEUROLOGY | Facility: CLINIC | Age: 65
End: 2025-01-08
Payer: MEDICARE

## 2025-01-13 ENCOUNTER — TELEPHONE (OUTPATIENT)
Dept: NEUROLOGY | Facility: CLINIC | Age: 65
End: 2025-01-13
Payer: MEDICARE

## 2025-01-15 ENCOUNTER — TELEPHONE (OUTPATIENT)
Dept: NEUROLOGY | Facility: CLINIC | Age: 65
End: 2025-01-15
Payer: MEDICARE

## 2025-01-15 ENCOUNTER — PATIENT MESSAGE (OUTPATIENT)
Dept: NEUROLOGY | Facility: CLINIC | Age: 65
End: 2025-01-15
Payer: MEDICARE

## 2025-01-16 ENCOUNTER — OFFICE VISIT (OUTPATIENT)
Dept: NEUROLOGY | Facility: CLINIC | Age: 65
End: 2025-01-16
Payer: MEDICARE

## 2025-01-16 DIAGNOSIS — M62.838 MUSCLE SPASM: ICD-10-CM

## 2025-01-16 DIAGNOSIS — Z71.89 COUNSELING REGARDING GOALS OF CARE: ICD-10-CM

## 2025-01-16 DIAGNOSIS — G35 MULTIPLE SCLEROSIS: Primary | ICD-10-CM

## 2025-01-16 PROCEDURE — 98006 SYNCH AUDIO-VIDEO EST MOD 30: CPT | Mod: 95,,, | Performed by: CLINICAL NURSE SPECIALIST

## 2025-01-16 PROCEDURE — 1159F MED LIST DOCD IN RCRD: CPT | Mod: CPTII,95,, | Performed by: CLINICAL NURSE SPECIALIST

## 2025-01-16 NOTE — Clinical Note
MI--going to hold DMT right now. She has MRIs next month and will see you in May. See my note and let me know what you think.

## 2025-01-21 ENCOUNTER — HOSPITAL ENCOUNTER (EMERGENCY)
Facility: HOSPITAL | Age: 65
Discharge: HOME | End: 2025-01-21
Attending: EMERGENCY MEDICINE
Payer: MEDICARE

## 2025-01-21 VITALS
BODY MASS INDEX: 29.44 KG/M2 | DIASTOLIC BLOOD PRESSURE: 86 MMHG | SYSTOLIC BLOOD PRESSURE: 132 MMHG | OXYGEN SATURATION: 97 % | TEMPERATURE: 99 F | HEART RATE: 88 BPM | WEIGHT: 171.5 LBS | RESPIRATION RATE: 16 BRPM
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
## 2025-01-21 DIAGNOSIS — S01.81XA CHIN LACERATION, INITIAL ENCOUNTER: ICD-10-CM

## 2025-01-21 DIAGNOSIS — R42 DIZZINESS: ICD-10-CM

## 2025-01-21 LAB
ALBUMIN SERPL BCP-MCNC: 3.8 G/DL (ref 3.5–5.2)
ALP SERPL-CCNC: 64 U/L (ref 40–150)
ALT SERPL W/O P-5'-P-CCNC: 16 U/L (ref 10–44)
ANION GAP SERPL CALC-SCNC: 12 MMOL/L (ref 8–16)
AST SERPL-CCNC: 31 U/L (ref 10–40)
BASOPHILS # BLD AUTO: 0.04 K/UL (ref 0–0.2)
BASOPHILS NFR BLD: 0.5 % (ref 0–1.9)
BILIRUB SERPL-MCNC: 0.3 MG/DL (ref 0.1–1)
BUN SERPL-MCNC: 17 MG/DL (ref 8–23)
CALCIUM SERPL-MCNC: 9.5 MG/DL (ref 8.7–10.5)
CHLORIDE SERPL-SCNC: 107 MMOL/L (ref 95–110)
CO2 SERPL-SCNC: 22 MMOL/L (ref 23–29)
CREAT SERPL-MCNC: 0.9 MG/DL (ref 0.5–1.4)
DIFFERENTIAL METHOD BLD: ABNORMAL
EOSINOPHIL # BLD AUTO: 0 K/UL (ref 0–0.5)
EOSINOPHIL NFR BLD: 0.5 % (ref 0–8)
ERYTHROCYTE [DISTWIDTH] IN BLOOD BY AUTOMATED COUNT: 13.2 % (ref 11.5–14.5)
EST. GFR  (NO RACE VARIABLE): >60 ML/MIN/1.73 M^2
GLUCOSE SERPL-MCNC: 76 MG/DL (ref 70–110)
HCT VFR BLD AUTO: 39.7 % (ref 37–48.5)
HCV AB SERPL QL IA: NEGATIVE
HEP C VIRUS HOLD SPECIMEN: NORMAL
HGB BLD-MCNC: 12.5 G/DL (ref 12–16)
HIV 1+2 AB+HIV1 P24 AG SERPL QL IA: NEGATIVE
IMM GRANULOCYTES # BLD AUTO: 0.01 K/UL (ref 0–0.04)
IMM GRANULOCYTES NFR BLD AUTO: 0.1 % (ref 0–0.5)
LYMPHOCYTES # BLD AUTO: 1.9 K/UL (ref 1–4.8)
LYMPHOCYTES NFR BLD: 25.2 % (ref 18–48)
MCH RBC QN AUTO: 32 PG (ref 27–31)
MCHC RBC AUTO-ENTMCNC: 31.5 G/DL (ref 32–36)
MCV RBC AUTO: 102 FL (ref 82–98)
MONOCYTES # BLD AUTO: 0.6 K/UL (ref 0.3–1)
MONOCYTES NFR BLD: 7.5 % (ref 4–15)
NEUTROPHILS # BLD AUTO: 5 K/UL (ref 1.8–7.7)
NEUTROPHILS NFR BLD: 66.2 % (ref 38–73)
NRBC BLD-RTO: 0 /100 WBC
PLATELET # BLD AUTO: 189 K/UL (ref 150–450)
PMV BLD AUTO: 9.8 FL (ref 9.2–12.9)
POCT GLUCOSE: 70 MG/DL (ref 70–110)
POTASSIUM SERPL-SCNC: 3.8 MMOL/L (ref 3.5–5.1)
PROT SERPL-MCNC: 7.4 G/DL (ref 6–8.4)
RBC # BLD AUTO: 3.91 M/UL (ref 4–5.4)
SODIUM SERPL-SCNC: 141 MMOL/L (ref 136–145)
TROPONIN I SERPL DL<=0.01 NG/ML-MCNC: <0.006 NG/ML (ref 0–0.03)
WBC # BLD AUTO: 7.61 K/UL (ref 3.9–12.7)

## 2025-01-21 PROCEDURE — 86803 HEPATITIS C AB TEST: CPT | Performed by: EMERGENCY MEDICINE

## 2025-01-21 PROCEDURE — 82962 GLUCOSE BLOOD TEST: CPT

## 2025-01-21 PROCEDURE — 84484 ASSAY OF TROPONIN QUANT: CPT | Performed by: NURSE PRACTITIONER

## 2025-01-21 PROCEDURE — 99285 EMERGENCY DEPT VISIT HI MDM: CPT | Mod: 25

## 2025-01-21 PROCEDURE — 85025 COMPLETE CBC W/AUTO DIFF WBC: CPT | Performed by: NURSE PRACTITIONER

## 2025-01-21 PROCEDURE — 12013 RPR F/E/E/N/L/M 2.6-5.0 CM: CPT

## 2025-01-21 PROCEDURE — 80053 COMPREHEN METABOLIC PANEL: CPT | Performed by: NURSE PRACTITIONER

## 2025-01-21 PROCEDURE — 87389 HIV-1 AG W/HIV-1&-2 AB AG IA: CPT | Performed by: EMERGENCY MEDICINE

## 2025-01-21 PROCEDURE — 93005 ELECTROCARDIOGRAM TRACING: CPT

## 2025-01-21 PROCEDURE — 93010 ELECTROCARDIOGRAM REPORT: CPT | Mod: ,,, | Performed by: INTERNAL MEDICINE

## 2025-01-21 RX ORDER — LIDOCAINE HYDROCHLORIDE 10 MG/ML
10 INJECTION, SOLUTION EPIDURAL; INFILTRATION; INTRACAUDAL; PERINEURAL
Status: DISCONTINUED | OUTPATIENT
Start: 2025-01-21 | End: 2025-01-21 | Stop reason: HOSPADM

## 2025-01-21 RX ORDER — CEPHALEXIN 500 MG/1
500 CAPSULE ORAL 4 TIMES DAILY
Qty: 20 CAPSULE | Refills: 0 | Status: SHIPPED | OUTPATIENT
Start: 2025-01-21 | End: 2025-01-26

## 2025-01-22 LAB
OHS QRS DURATION: 82 MS
OHS QTC CALCULATION: 441 MS

## 2025-01-22 NOTE — ED PROVIDER NOTES
Encounter Date: 1/21/2025       History     Chief Complaint   Patient presents with    Fall     Pt with history of MS states she got dizzy several hours prior to arrival, causing her to fall and strike her chin against a table.  She c/o laceration to her chin but denies other complaints.     64-year-old female presents emergency department for a fall after she had a dizzy spell several hours prior to arrival.  Patient reports history of MS and reports she has intermittent dizzy spells.  Patient reports striking her chin causing a laceration.  Patient denies any fever, chills, chest pain, shortness of breath, back pain, abdominal pain, nausea, vomiting, and all other concerns at this time.  Patient's Tdap is up-to-date.        Review of patient's allergies indicates:   Allergen Reactions    Latex, natural rubber Rash     Past Medical History:   Diagnosis Date    Back pain     Broken toe 6/2015    left big toe    Chronic diastolic heart failure 5/8/2018    Closed left arm fracture     Colon polyp     Depression     Hyperlipidemia     Hypothyroid     Immunosuppression 1/28/2019    MS (multiple sclerosis)     Neurogenic bladder 12/6/2012    Osteoporosis     Polyneuropathy     Sleep apnea     Trouble in sleeping      Past Surgical History:   Procedure Laterality Date    BREAST BIOPSY Left 10/28/2020    COLONOSCOPY N/A 09/01/2017    Procedure: COLONOSCOPY;  Surgeon: Andriy Villar MD;  Location: [facility];  Service: Endoscopy;  Laterality: N/A;    COLONOSCOPY N/A 01/06/2021    Procedure: COLONOSCOPY;  Surgeon: Althea Casanova MD;  Location: [facility];  Service: Endoscopy;  Laterality: N/A;    COLONOSCOPY N/A 04/25/2022    Procedure: COLONOSCOPY;  Surgeon: Cristiano Aaron MD;  Location: [facility];  Service: Endoscopy;  Laterality: N/A;    FRACTURE SURGERY Left 2013    wrist- [facility]    KNEE SURGERY Right 1989    in [place]    TONSILLECTOMY  1966     Family History   Problem Relation Name Age of Onset    Pancreatic  cancer Mother Brittnee Meyers     Stomach cancer Mother Brittnee Meyers     Cancer Mother Brittnee Meyers         pancreatic, stomach    Hypertension Father Don Meyers     Heart disease Father Don Meyers         MI    Hearing loss Father Don Meyers     Parkinsonism Father Don Meyers     Rheum arthritis Sister      Lupus Maternal Aunt      Rheum arthritis Maternal Aunt      Melanoma Neg Hx       Social History     Tobacco Use    Smoking status: Never     Passive exposure: Never    Smokeless tobacco: Never   Substance Use Topics    Alcohol use: Yes     Comment: occasional    Drug use: No     Review of Systems   Constitutional:  Negative for fever.   HENT:  Negative for sore throat.    Respiratory:  Negative for shortness of breath.    Cardiovascular:  Negative for chest pain.   Gastrointestinal:  Negative for abdominal pain, nausea and vomiting.   Genitourinary:  Negative for dysuria.   Musculoskeletal:  Negative for back pain.   Skin:  Positive for wound. Negative for rash.   Neurological:  Positive for dizziness. Negative for weakness.   Hematological:  Does not bruise/bleed easily.       Physical Exam     Initial Vitals [01/21/25 1433]   BP Pulse Resp Temp SpO2   (!) 151/67 91 16 98.5 °F (36.9 °C) 96 %      MAP       --         Physical Exam    Nursing note and vitals reviewed.  Constitutional: She appears well-developed and well-nourished. She is not diaphoretic. No distress.   HENT:   Head: Normocephalic and atraumatic.   Eyes: Right eye exhibits no discharge. Left eye exhibits no discharge.   Neck: Neck supple.   Normal range of motion.  Cardiovascular:  Normal rate.           Pulmonary/Chest: No respiratory distress.   Abdominal: She exhibits no distension.   Musculoskeletal:         General: Normal range of motion.      Cervical back: Normal range of motion and neck supple.     Neurological: She is alert and oriented to person, place, and time. She has normal strength.   Skin: Skin is warm and dry.   3 cm  laceration noted to the left chin   Psychiatric: She has a normal mood and affect. Her behavior is normal. Thought content normal.         ED Course   Lac Repair    Date/Time: 1/21/2025 7:18 PM    Performed by: Lukas Resendiz NP  Authorized by: Amy Resendiz NP    Consent:     Consent obtained:  Verbal    Consent given by:  Patient    Alternatives discussed:  No treatment  Universal protocol:     Procedure explained and questions answered to patient or proxy's satisfaction: yes      Imaging studies available: yes    Anesthesia:     Anesthesia method:  Local infiltration    Local anesthetic:  Lidocaine 1% w/o epi  Laceration details:     Location: Left chin.    Wound length (cm): 3.  Treatment:     Area cleansed with:  Povidone-iodine    Amount of cleaning:  Standard  Skin repair:     Repair method:  Sutures    Suture size:  5-0    Suture material:  Prolene    Suture technique:  Simple interrupted    Number of sutures: 5.  Approximation:     Approximation:  Close  Repair type:     Repair type:  Simple    Labs Reviewed   CBC W/ AUTO DIFFERENTIAL - Abnormal       Result Value    WBC 7.61      RBC 3.91 (*)     Hemoglobin 12.5      Hematocrit 39.7       (*)     MCH 32.0 (*)     MCHC 31.5 (*)     RDW 13.2      Platelets 189      MPV 9.8      Immature Granulocytes 0.1      Gran # (ANC) 5.0      Immature Grans (Abs) 0.01      Lymph # 1.9      Mono # 0.6      Eos # 0.0      Baso # 0.04      nRBC 0      Gran % 66.2      Lymph % 25.2      Mono % 7.5      Eosinophil % 0.5      Basophil % 0.5      Differential Method Automated      Narrative:     Release to patient->Immediate   COMPREHENSIVE METABOLIC PANEL - Abnormal    Sodium 141      Potassium 3.8      Chloride 107      CO2 22 (*)     Glucose 76      BUN 17      Creatinine 0.9      Calcium 9.5      Total Protein 7.4      Albumin 3.8      Total Bilirubin 0.3      Alkaline Phosphatase 64      AST 31      ALT 16      eGFR >60      Anion Gap 12       Narrative:     Release to patient->Immediate   HEPATITIS C ANTIBODY    Hepatitis C Ab Negative      Narrative:     Release to patient->Immediate   HEP C VIRUS HOLD SPECIMEN    HEP C Virus Hold Specimen Hold for HCV sendout      Narrative:     Release to patient->Immediate   HIV 1 / 2 ANTIBODY    HIV 1/2 Ag/Ab Negative      Narrative:     Release to patient->Immediate   TROPONIN I    Troponin I <0.006      Narrative:     Release to patient->Immediate   POCT GLUCOSE    POCT Glucose 70            Imaging Results              X-Ray Chest 1 View (Final result)  Result time 01/21/25 15:36:00      Final result by Robert Fabian MD (01/21/25 15:36:00)                   Impression:      No acute abnormality.      Electronically signed by: Robert Fabian  Date:    01/21/2025  Time:    15:36               Narrative:    EXAMINATION:  XR CHEST 1 VIEW    CLINICAL HISTORY:  Dizziness and giddiness    TECHNIQUE:  Single frontal view of the chest was performed.    COMPARISON:  None    FINDINGS:  Mild subsegmental atelectasis.The lungs are clear, with normal appearance of pulmonary vasculature and no pleural effusion or pneumothorax.    The cardiac silhouette is normal in size. The hilar and mediastinal contours are unremarkable.    Bones are intact.                                       CT Head Without Contrast (Final result)  Result time 01/21/25 15:15:30      Final result by Robert Fabian MD (01/21/25 15:15:30)                   Impression:      No acute abnormality.    Atrophy and chronic white matter changes    All CT scans   are performed using dose optimization techniques including the following: automated exposure control; adjustment of the mA and/or kV; use of iterative reconstruction technique.  Dose modulation was employed for ALARA by means of: Automated exposure control; adjustment of the mA and/or kV according to patient size (this includes techniques or standardized protocols for targeted exams where dose is matched  to indication/reason for exam; i.e. extremities or head); and/or use of iterative reconstructive technique.      Electronically signed by: Robert Caren  Date:    01/21/2025  Time:    15:15               Narrative:    EXAMINATION:  CT HEAD WITHOUT CONTRAST    CLINICAL HISTORY:  Facial trauma, blunt;    TECHNIQUE:  Low dose axial CT images obtained throughout the head without intravenous contrast. Sagittal and coronal reconstructions were performed.    COMPARISON:  None.    FINDINGS:  Atrophy and chronic white matter changes.    No extra-axial blood or fluid collections.    No parenchymal mass, hemorrhage, edema or major vascular distribution infarct.    Skull/extracranial contents (limited evaluation): No fracture. Mastoid air cells and paranasal sinuses are essentially clear.                                       Medications - No data to display  Medical Decision Making  Differential diagnosis  Fracture, contusion, laceration, abrasion, avulsion, CVA    Amount and/or Complexity of Data Reviewed  Labs: ordered.  Radiology: ordered.  Discussion of management or test interpretation with external provider(s): I discussed with patient that the evaluation in the emergency department does not suggest any emergent or life threatening medical condition requiring immediate intervention beyond what was provided in the ED, and I believe patient is safe for discharge.  Regardless, an unremarkable evaluation in the ED does not preclude the development or presence of a serious of life threatening condition. As such, patient was instructed to return immediately for any worsening or change in current symptoms. I also discussed the results of my evaluation and diagnosis with patient and she concurs with the evaluation and management plan.  Detailed written and verbal instructions provided to patient and she expressed a verbal understanding of the discharge instructions and overall management plan. Reiterated the importance of  medication administration and safety and advised patient to follow up with primary care provider in 3-5 days or sooner if needed.  Also advised patient to return to the ER for any complications.       Risk  Prescription drug management.                                      Clinical Impression:  Final diagnoses:  [R42] Dizziness  [W19.XXXA] Fall, initial encounter (Primary)  [S01.81XA] Chin laceration, initial encounter          ED Disposition Condition    Discharge Stable          ED Prescriptions       Medication Sig Dispense Start Date End Date Auth. Provider    cephALEXin (KEFLEX) 500 MG capsule Take 1 capsule (500 mg total) by mouth 4 (four) times daily. for 5 days 20 capsule 1/21/2025 1/26/2025 Lukas Resendiz, JOSIAH          Follow-up Information       Follow up With Specialties Details Why Contact Info    pcp of choice  In 1 week For wound re-check, For suture removal     [facility] - Emergency Dept. Emergency Medicine  If symptoms worsen [id number] [facility] [address] [phone]             Lukas Resendiz, JOSIAH  01/21/25 8413

## 2025-01-28 ENCOUNTER — PATIENT MESSAGE (OUTPATIENT)
Dept: NEUROLOGY | Facility: CLINIC | Age: 65
End: 2025-01-28
Payer: MEDICARE

## 2025-01-29 ENCOUNTER — OFFICE VISIT (OUTPATIENT)
Dept: URGENT CARE | Facility: CLINIC | Age: 65
End: 2025-01-29
Payer: MEDICARE

## 2025-01-29 VITALS
HEART RATE: 73 BPM | BODY MASS INDEX: 30.3 KG/M2 | DIASTOLIC BLOOD PRESSURE: 78 MMHG | RESPIRATION RATE: 16 BRPM | HEIGHT: 63 IN | TEMPERATURE: 99 F | OXYGEN SATURATION: 97 % | WEIGHT: 171 LBS | SYSTOLIC BLOOD PRESSURE: 105 MMHG

## 2025-01-29 DIAGNOSIS — Z48.02 VISIT FOR SUTURE REMOVAL: Primary | ICD-10-CM

## 2025-01-29 PROCEDURE — 99024 POSTOP FOLLOW-UP VISIT: CPT | Mod: S$GLB,,, | Performed by: NURSE PRACTITIONER

## 2025-01-29 PROCEDURE — 15853 REMOVAL SUTR/STAPL XREQ ANES: CPT | Mod: S$GLB,,, | Performed by: NURSE PRACTITIONER

## 2025-01-29 NOTE — PATIENT INSTRUCTIONS
Effective Scar Prevention Tips After Sutures are Removed  Scarring is a natural part of the wound-healing process, but there are ways to prevent it from becoming too noticeable.  Here are some pointers for preventing scars after sutures are removed:  1. Keep the wound moist  Keeping the wound moist can help prevent scarring by promoting skin regeneration. Consider using a thin layer of Vaseline or a topical cream to keep the wound moist.   Try fragrance free vitamin E cream to help moisturize your skin. Vitamin E oil might help add extra moisture to your skin and help soften your scar so its not as prominent  2. Protect the wound from the sun  Exposure to the suns harmful UV rays can darken the scar and make it more noticeable. Protecting the wound from the sun by covering it with clothing or using sunscreen with an SPF of 30 or higher is essential.  3. Avoid picking at scabs or dry skin  Picking at scabs or dry skin can damage the tissue and delay the healing process, leading to a more noticeable scar. It is important to avoid touching the wound and let it heal naturally.  4. Consider using scar prevention products  There are many over-the-counter products available that can help prevent scarring, such as silicone sheets or gels. These products work by creating a barrier that helps prevent scarring.    When should I seek immediate care?  Your wound splits open or is starting to come apart.  You suddenly cannot move your injured joint.  You have sudden numbness around your wound.  You see red streaks coming from your wound.  When should I contact my healthcare provider?  You have a fever and chills.  Your wound is red, warm, swollen, or leaking pus.  There is a bad smell coming from your wound.  You have increased pain in the wound area.  You have questions or concerns about your condition or care.  Follow up with Your PCP as needed.

## 2025-01-29 NOTE — PROGRESS NOTES
"Subjective:      Patient ID: Cem Meyers is a 64 y.o. female.    Vitals:  height is 5' 3" (1.6 m) and weight is 77.6 kg (171 lb). Her oral temperature is 99 °F (37.2 °C). Her blood pressure is 105/78 and her pulse is 73. Her respiration is 16 and oxygen saturation is 97%.     Chief Complaint: Suture / Staple Removal    Cem Meyers is a 64 year old female whom presents to urgent care for removal of sutures  from a wound on her chin that happened approximately  8 days ago. Sutures were placed at [facility] ED. Patient denies any issues with the laceration.     Suture / Staple Removal  The sutures were placed 7 to 10 days ago. She tried regular soap and water washings and antibiotic ointment use since the wound repair. The treatment provided significant relief. Her temperature was unmeasured prior to arrival. There has been no drainage from the wound. There is no redness present. There is no swelling present. There is no pain present. She has no difficulty moving the affected extremity or digit.     ROS   Objective:     Physical Exam   Skin:            Assessment:     1. Visit for suture removal        Plan:       Visit for suture removal  -     Suture Removal                    Patient Instructions     Effective Scar Prevention Tips After Sutures are Removed  Scarring is a natural part of the wound-healing process, but there are ways to prevent it from becoming too noticeable.  Here are some pointers for preventing scars after sutures are removed:  1. Keep the wound moist  Keeping the wound moist can help prevent scarring by promoting skin regeneration. Consider using a thin layer of Vaseline or a topical cream to keep the wound moist.   Try fragrance free vitamin E cream to help moisturize your skin. Vitamin E oil might help add extra moisture to your skin and help soften your scar so its not as prominent  2. Protect the wound from the sun  Exposure to the suns harmful UV rays can darken the scar and " make it more noticeable. Protecting the wound from the sun by covering it with clothing or using sunscreen with an SPF of 30 or higher is essential.  3. Avoid picking at scabs or dry skin  Picking at scabs or dry skin can damage the tissue and delay the healing process, leading to a more noticeable scar. It is important to avoid touching the wound and let it heal naturally.  4. Consider using scar prevention products  There are many over-the-counter products available that can help prevent scarring, such as silicone sheets or gels. These products work by creating a barrier that helps prevent scarring.    When should I seek immediate care?  Your wound splits open or is starting to come apart.  You suddenly cannot move your injured joint.  You have sudden numbness around your wound.  You see red streaks coming from your wound.  When should I contact my healthcare provider?  You have a fever and chills.  Your wound is red, warm, swollen, or leaking pus.  There is a bad smell coming from your wound.  You have increased pain in the wound area.  You have questions or concerns about your condition or care.  Follow up with Your PCP as needed.

## 2025-01-29 NOTE — PROCEDURES
Suture Removal    Date/Time: 1/29/2025 2:30 PM  Location procedure was performed: [facility]    Performed by: Anna Pérez NP  Authorized by: Anna Pérez NP  Body area: head/neck  Location details: chin  Description of findings: Well healed laceration   Wound Appearance: clean, well healed, normal color and no drainage  Sutures Removed: 5  Staples Removed: 0  Post-removal: no dressing applied  Complications: No  Estimated blood loss (mL): 0  Patient tolerance: Patient tolerated the procedure well with no immediate complications

## 2025-01-31 ENCOUNTER — TELEPHONE (OUTPATIENT)
Dept: NEUROLOGY | Facility: CLINIC | Age: 65
End: 2025-01-31
Payer: MEDICARE

## 2025-02-03 ENCOUNTER — HOSPITAL ENCOUNTER (OUTPATIENT)
Dept: RADIOLOGY | Facility: HOSPITAL | Age: 65
Discharge: HOME | End: 2025-02-03
Attending: CLINICAL NURSE SPECIALIST
Payer: MEDICARE

## 2025-02-03 DIAGNOSIS — G35 MULTIPLE SCLEROSIS: ICD-10-CM

## 2025-02-03 PROCEDURE — 70551 MRI BRAIN STEM W/O DYE: CPT | Mod: 26,,, | Performed by: RADIOLOGY

## 2025-02-03 PROCEDURE — 70551 MRI BRAIN STEM W/O DYE: CPT | Mod: TC

## 2025-02-05 ENCOUNTER — TELEPHONE (OUTPATIENT)
Dept: NEUROLOGY | Facility: CLINIC | Age: 65
End: 2025-02-05
Payer: MEDICARE

## 2025-02-06 DIAGNOSIS — M79.2 NEUROPATHIC PAIN: ICD-10-CM

## 2025-02-06 DIAGNOSIS — G35 MULTIPLE SCLEROSIS: ICD-10-CM

## 2025-02-06 RX ORDER — GABAPENTIN 800 MG/1
800 TABLET ORAL 3 TIMES DAILY
Qty: 270 TABLET | Refills: 3 | Status: SHIPPED | OUTPATIENT
Start: 2025-02-06

## 2025-03-07 ENCOUNTER — PATIENT MESSAGE (OUTPATIENT)
Dept: PSYCHIATRY | Facility: CLINIC | Age: 65
End: 2025-03-07
Payer: MEDICARE

## 2025-03-13 ENCOUNTER — PATIENT MESSAGE (OUTPATIENT)
Dept: RHEUMATOLOGY | Facility: CLINIC | Age: 65
End: 2025-03-13
Payer: MEDICARE

## 2025-03-14 ENCOUNTER — OFFICE VISIT (OUTPATIENT)
Dept: PULMONOLOGY | Facility: CLINIC | Age: 65
End: 2025-03-14
Payer: MEDICARE

## 2025-03-14 VITALS
HEART RATE: 88 BPM | BODY MASS INDEX: 28.44 KG/M2 | HEIGHT: 63 IN | OXYGEN SATURATION: 96 % | WEIGHT: 160.5 LBS | RESPIRATION RATE: 17 BRPM | DIASTOLIC BLOOD PRESSURE: 76 MMHG | SYSTOLIC BLOOD PRESSURE: 106 MMHG

## 2025-03-14 DIAGNOSIS — G47.33 OSA ON CPAP: Primary | Chronic | ICD-10-CM

## 2025-03-14 PROCEDURE — 99999 PR PBB SHADOW E&M-EST. PATIENT-LVL V: CPT | Mod: PBBFAC,,, | Performed by: NURSE PRACTITIONER

## 2025-03-14 NOTE — PROGRESS NOTES
"Subjective:      Patient ID: Cem Meyers is a 64 y.o. female.    Chief Complaint: Sleep Apnea    HPI  Presents for sleep apnea on AutoPAP therapy. Patient states improved symptoms with use of AutoPAP. Sleeping more soundly. Waking up feeling more refreshed. Improved daytime sleepiness. Patient states she is benefiting from use of the AutoPAP.   She has been taking care of her father in the hospital so missed some nights, otherwise wears nightly.  Her humidifier is broken. She is interested in new machine.      Problem List[1]  /76 (Patient Position: Sitting)   Pulse 88   Resp 17   Ht 5' 3" (1.6 m)   Wt 72.8 kg (160 lb 7.9 oz)   SpO2 96%   BMI 28.43 kg/m²   Body mass index is 28.43 kg/m².    Review of Systems   Constitutional: Negative.    HENT: Negative.     Respiratory: Negative.     Cardiovascular: Negative.    Musculoskeletal: Negative.    Gastrointestinal: Negative.    Neurological: Negative.    Psychiatric/Behavioral: Negative.       Objective:      Physical Exam  Constitutional:       Appearance: Normal appearance. She is well-developed.   HENT:      Head: Normocephalic and atraumatic.      Nose: Nose normal.   Cardiovascular:      Rate and Rhythm: Normal rate and regular rhythm.      Heart sounds: No murmur heard.     No gallop.   Pulmonary:      Effort: Pulmonary effort is normal.      Breath sounds: Normal breath sounds.   Abdominal:      Palpations: Abdomen is soft.      Tenderness: There is no abdominal tenderness.   Musculoskeletal:         General: Normal range of motion.      Cervical back: Normal range of motion and neck supple.   Skin:     General: Skin is warm and dry.   Neurological:      Mental Status: She is alert and oriented to person, place, and time.   Psychiatric:         Mood and Affect: Mood normal.         Behavior: Behavior normal.       Personal Diagnostic Review      3/11/2025     8:54 AM   EPWORTH SLEEPINESS SCALE   Sitting and reading 0   Watching TV 1   Sitting, " Patient presents to Urgent Care with momElisa for complaints of sore throat, headache, chills, and fevers. Symptoms started yesterday.    Can leave detailed message on   mobile phone:    Mobile [phone]       Patient and visitor wearing mask? Yes    Myself mask, face shield and gloves   inactive in a public place (e.g. a theatre or a meeting) 1   As a passenger in a car for an hour without a break 0   Lying down to rest in the afternoon when circumstances permit 1   Sitting and talking to someone 0   Sitting quietly after a lunch without alcohol 2   In a car, while stopped for a few minutes in traffic 0   Total score 5        Patient-reported      Compliance Information 12/14/2024 - 3/13/2025 Compliance Summary 12/14/2024 - 3/13/2025 (90 days) Days with Device Usage 83 days Days without Device Usage 7 days Percent Days with Device Usage 92.2% Cumulative Usage 21 days 22 hrs. 34 mins. 52 secs. Maximum Usage (1 Day) 13 hrs. 39 mins. 10 secs. Average Usage (All Days) 5 hrs. 51 mins. 3 secs. Average Usage (Days Used) 6 hrs. 20 mins. 39 secs. Minimum Usage (1 Day) 20 mins. 36 secs. Percent of Days with Usage >= 4 Hours 84.4% Percent of Days with Usage < 4 Hours 15.6% Date Range Total Blower Time 22 days 23 mins. 19 secs. Average AHI 4.9 Auto-CPAP Summary Auto-CPAP Mean Pressure 5.6 cmH2O Auto-CPAP Peak Average Pressure 8.0 cmH2O Device Pressure <= 90% of Time 8.0 cmH2O Average Time in Large Leak Per Day 0 secs.      Assessment:       1. MAYKEL on CPAP        Encounter Medications[2]  Orders Placed This Encounter   Procedures    CPAP FOR HOME USE     Length of need (1-99 months)::   99     Type ()::   Auto CPAP     Auto CPAP pressure setting range (cmH20)::   4-12     Fulfillment Priority::   Level 4:  all others     Humidification ()::   Heated     Choose ONE mask type and its corresponding cushions and/or pillows::    Nasal Mask, 1 per 90 days:  Nasal Cushions, (6 per 90 days):  Nasal Pillows, (6 per 90 days)     Choose EITHER Heated or Non-Heated Tubjing:    Heated Tubing, 1 per 6 months     All other supplies as needed as listed below::    Headgear, 1 per 180 days     All other supplies as needed as listed below::    Disposable Filter, 6 per 90 days     All other  supplies as needed as listed below::    Non-Disposable Filter, 1 per 180 days     All other supplies as needed as listed below::    Humidifier Chamber, 1 per 180 days     All other supplies as needed as listed below::    Chin Strap, 1 per 180 days     Plan:     AutoPAP replacement  and follow up in 10 weeks with download of data card and review of symptoms.  (Humidifier broken)  1. MAYKEL on CPAP  Overview:  Diagnosis in 9823-7460 while in [place], told mild obstructive sleep apnea with treatment CPAP  8 cm.  Has not used CPAP since 2014, sporadic use from 9123-4606. Lost 30 lbs since last PSG.   PSG 3/17/2018 revealed AHI 7.4. Cpap titration 4/16/2018 revealed 7 cm optimal.  Patient was on 8 cm when on CPAP in 2014, she recall liking that air flow and request resuming 8 cm.   4/20/2018 resume CPAP 8 cm   On auto CPAP 4-8 cm, Dreamwear nasal mask.   HME: [facility]       Orders:  -     CPAP FOR HOME USE                        Elizabeth LeJeune, ACNP, ANP         [1]   Patient Active Problem List  Diagnosis    Neurologic gait dysfunction    MS (multiple sclerosis)    Acquired hypothyroidism    Other hyperlipidemia    Acquired pes planus of both feet    Osteoporosis    MAYKEL on CPAP    Major depressive disorder, single episode, mild    Polyneuropathy    History of colon polyps    Muscle spasms of both lower extremities    Psychophysiological insomnia    Chronic diastolic heart failure    Immunosuppression    Bursal cyst of olecranon    Prophylactic immunotherapy    Class 1 obesity with serious comorbidity and body mass index (BMI) of 30.0 to 30.9 in adult    Stenosing tenosynovitis of finger of right hand    Spasticity    Neuropathic pain    Decreased strength, endurance, and mobility   [2]   Outpatient Encounter Medications as of 3/14/2025   Medication Sig Dispense Refill    baclofen (LIORESAL) 10 MG tablet TAKE 2 TABS ONE TIME DAILY IN THE MORNING, 2 TABS IN THE AFTERNOON, AND UP TO 3 TABS AT BEDTIME AS NEEDED  630 tablet 3    calcium citrate (CALCITRATE) 200 mg (950 mg) tablet Take 2 tablets by mouth once daily.      cholecalciferol, vitamin D3, 100 mcg (4,000 unit) Cap Take 4,000 Units by mouth once daily.       denosumab (PROLIA SUBQ) Inject into the skin. Every 6 months      gabapentin (NEURONTIN) 800 MG tablet TAKE 1 TABLET BY MOUTH 3 TIMES  DAILY 270 tablet 3    ketoconazole (NIZORAL) 2 % cream Apply topically 2 (two) times daily. 60 g 1    levothyroxine (SYNTHROID) 75 MCG tablet Take 1 tablet (75 mcg total) by mouth before breakfast. 90 tablet 3    lubiprostone (AMITIZA) 24 MCG Cap Take 1 capsule (24 mcg total) by mouth 2 (two) times daily. 60 capsule 11    multivitamin capsule Take 1 capsule by mouth once daily.      paroxetine (PAXIL) 40 MG tablet Take 1 tablet (40 mg total) by mouth every morning. 90 tablet 3    simvastatin (ZOCOR) 40 MG tablet Take 1 tablet (40 mg total) by mouth once daily. 90 tablet 3    triamcinolone acetonide 0.025% (KENALOG) 0.025 % cream AAA bid 80 g 1     No facility-administered encounter medications on file as of 3/14/2025.

## 2025-03-26 ENCOUNTER — OFFICE VISIT (OUTPATIENT)
Dept: URGENT CARE | Facility: CLINIC | Age: 65
End: 2025-03-26
Payer: MEDICARE

## 2025-03-26 ENCOUNTER — HOSPITAL ENCOUNTER (OUTPATIENT)
Dept: RADIOLOGY | Facility: CLINIC | Age: 65
Discharge: HOME | End: 2025-03-26
Attending: NURSE PRACTITIONER
Payer: MEDICARE

## 2025-03-26 VITALS
HEIGHT: 63 IN | TEMPERATURE: 98 F | DIASTOLIC BLOOD PRESSURE: 69 MMHG | BODY MASS INDEX: 28.35 KG/M2 | SYSTOLIC BLOOD PRESSURE: 123 MMHG | WEIGHT: 160 LBS | OXYGEN SATURATION: 95 % | RESPIRATION RATE: 18 BRPM | HEART RATE: 68 BPM

## 2025-03-26 DIAGNOSIS — K59.00 CONSTIPATION, UNSPECIFIED CONSTIPATION TYPE: ICD-10-CM

## 2025-03-26 DIAGNOSIS — N39.0 URINARY TRACT INFECTION WITHOUT HEMATURIA, SITE UNSPECIFIED: ICD-10-CM

## 2025-03-26 DIAGNOSIS — R35.89 POLYURIA: Primary | ICD-10-CM

## 2025-03-26 DIAGNOSIS — R10.84 GENERALIZED ABDOMINAL PAIN: ICD-10-CM

## 2025-03-26 LAB
BILIRUBIN, UA POC OHS: NEGATIVE
BLOOD, UA POC OHS: NEGATIVE
CLARITY, UA POC OHS: CLEAR
COLOR, UA POC OHS: YELLOW
GLUCOSE, UA POC OHS: NEGATIVE
KETONES, UA POC OHS: NEGATIVE
LEUKOCYTES, UA POC OHS: ABNORMAL
NITRITE, UA POC OHS: NEGATIVE
PH, UA POC OHS: 5.5
PROTEIN, UA POC OHS: NEGATIVE
SPECIFIC GRAVITY, UA POC OHS: 1.02
UROBILINOGEN, UA POC OHS: 0.2

## 2025-03-26 PROCEDURE — 99214 OFFICE O/P EST MOD 30 MIN: CPT | Mod: S$GLB,,, | Performed by: NURSE PRACTITIONER

## 2025-03-26 PROCEDURE — 74019 RADEX ABDOMEN 2 VIEWS: CPT | Mod: S$GLB,,, | Performed by: RADIOLOGY

## 2025-03-26 PROCEDURE — 81003 URINALYSIS AUTO W/O SCOPE: CPT | Mod: QW,S$GLB,, | Performed by: NURSE PRACTITIONER

## 2025-03-26 RX ORDER — NITROFURANTOIN 25; 75 MG/1; MG/1
100 CAPSULE ORAL 2 TIMES DAILY
Qty: 14 CAPSULE | Refills: 0 | Status: SHIPPED | OUTPATIENT
Start: 2025-03-26 | End: 2025-04-02

## 2025-03-26 RX ORDER — SYRING-NEEDL,DISP,INSUL,0.3 ML 29 G X1/2"
296 SYRINGE, EMPTY DISPOSABLE MISCELLANEOUS ONCE
Qty: 1 EACH | Refills: 0 | Status: SHIPPED | OUTPATIENT
Start: 2025-03-26 | End: 2025-03-26

## 2025-03-26 NOTE — PROGRESS NOTES
"Subjective:      Patient ID: Cem Meyers is a 64 y.o. female.    Vitals:  height is 5' 3" (1.6 m) and weight is 72.6 kg (160 lb). Her oral temperature is 98 °F (36.7 °C). Her blood pressure is 123/69 and her pulse is 68. Her respiration is 18 and oxygen saturation is 95%.     Chief Complaint: Abdominal Pain (Suspects bladder infection)    Abdomen pain around the waistband area & increased urination. MS flare up as well. Has noticed discomfort for about a week.     Abdominal Pain  This is a new problem. The current episode started in the past 7 days. The onset quality is sudden. The problem occurs constantly. The problem has been gradually worsening. The pain is located in the RLQ, RUQ and suprapubic region. The pain is at a severity of 2/10. The pain is mild. The quality of the pain is cramping. Associated symptoms include constipation and headaches. Pertinent negatives include no anorexia, diarrhea, dysuria, flatus, nausea, vomiting or weight loss. Nothing aggravates the pain. The pain is relieved by Nothing.       Gastrointestinal:  Positive for abdominal pain and constipation. Negative for nausea, vomiting and diarrhea.   Genitourinary:  Negative for dysuria.   Neurological:  Positive for headaches.      Objective:     Physical Exam   Constitutional: She is oriented to person, place, and time. She appears well-developed. She is cooperative.  Non-toxic appearance. She does not appear ill. normal and well-groomedawake  HENT:   Head: Normocephalic and atraumatic.   Ears:   Right Ear: Hearing and external ear normal.   Left Ear: Hearing and external ear normal.   Nose: Nose normal. No rhinorrhea or purulent discharge.   Mouth/Throat: Uvula is midline, oropharynx is clear and moist and mucous membranes are normal.   Eyes: Conjunctivae and lids are normal. vision grossly intact periorbital hyperpigmentation  Neck: Trachea normal. Neck supple.   Cardiovascular: Normal rate, regular rhythm and normal heart sounds. "   Pulmonary/Chest: Effort normal and breath sounds normal. No respiratory distress. She has no decreased breath sounds. She has no wheezes.   Abdominal: Normal appearance. She exhibits no distension and no mass. Soft. Bowel sounds are decreased. There is abdominal tenderness in the right upper quadrant, suprapubic area and left upper quadrant. There is no rebound, no guarding, no tenderness at McBurney's point, negative Rosa's sign, no left CVA tenderness and no right CVA tenderness.     Musculoskeletal: Normal range of motion.         General: Normal range of motion.   Neurological: She is alert and oriented to person, place, and time. She has normal strength.   Skin: Skin is warm, dry, intact, not diaphoretic and not pale.   Psychiatric: Her speech is normal and behavior is normal. Judgment and thought content normal.   Nursing note and vitals reviewed.    Assessment:     1. Polyuria    2. Urinary tract infection without hematuria, site unspecified    3. Constipation, unspecified constipation type    4. Generalized abdominal pain        Plan:     Results for orders placed or performed in visit on 03/26/25   POCT Urinalysis(Instrument)    Collection Time: 03/26/25 12:54 PM   Result Value Ref Range    Color, POC UA Yellow Yellow, Straw, Colorless    Clarity, POC UA Clear Clear    Glucose, POC UA Negative Negative    Bilirubin, POC UA Negative Negative    Ketones, POC UA Negative Negative    Spec Grav POC UA 1.020 1.005 - 1.030    Blood, POC UA Negative Negative    pH, POC UA 5.5 5.0 - 8.0    Protein, POC UA Negative Negative    Urobilinogen, POC UA 0.2 <=1.0    Nitrite, POC UA Negative Negative    WBC, POC UA Small (A) Negative     *Note: Due to a large number of results and/or encounters for the requested time period, some results have not been displayed. A complete set of results can be found in Results Review.     Type of Interpretation: Outside Written Report.  Radiology Procedure Done: Abdomen X-Ray - Supine  & Erect.  Interpretation: Details      Reading Physician Reading Date Result Priority  Shawn Wang MD  [phone]  3/26/2025     Narrative & Impression  EXAM: XR ABDOMEN FLAT AND ERECT     CLINICAL INDICATION:  Constipation     TECHNIQUE: 3 views of the abdomen and pelvis (lower chest not included)     PRIORS:   None     FINDINGS:   Marked increased fecal burden is present without evidence of obstruction.  Bones normal for age.  Calcifications in the bilateral buttocks region probably injection granulomas.        Impression:    Marked increased fecal burden consistent with clinical constipation     Finalized on: 3/26/2025 1:11 PM By:  Shawn Wang MD  [facility]# [id number]      2025-03-26 13:13:52.762     [facility]      Exam Ended: 03/26/25 13:06 CDT Last Resulted: 03/26/25 13:11 CDT            Polyuria  -     POCT Urinalysis(Instrument)    Urinary tract infection without hematuria, site unspecified  -     nitrofurantoin, macrocrystal-monohydrate, (MACROBID) 100 MG capsule; Take 1 capsule (100 mg total) by mouth 2 (two) times daily. for 7 days  Dispense: 14 capsule; Refill: 0    Constipation, unspecified constipation type  -     X-Ray Abdomen Flat And Erect; Future; Expected date: 03/26/2025  -     magnesium citrate solution; Take 296 mLs by mouth once. for 1 dose  Dispense: 1 each; Refill: 0    Generalized abdominal pain  -     X-Ray Abdomen Flat And Erect; Future; Expected date: 03/26/2025

## 2025-03-26 NOTE — PATIENT INSTRUCTIONS
May use magnesium citrate for constipation.      Constipation Discharge Instructions, Adult   What care is needed at home?   Ask your doctor what you need to do when you go home. Make sure you ask questions if you do not understand what the doctor says. This way you will know what you need to do.  Eat high-fiber foods. These include whole grains, fruits, and vegetables.  Drink plenty of water and other fluids each day. This helps to keep your bowel movements soft.  Set a regular schedule to try and have a bowel movement. Do not ignore the urge to have a bowel movement.  Give yourself plenty of time to have a bowel movement.  Be active. Walk, garden, or do something active for 30 minutes or more on most days of the week.  Sitting in a warm bath can help you relax and feel like you can have a bowel movement.  Talk to your regular doctor before you use laxatives or enemas regularly.        Urinary Tract Infection Discharge Instructions, Adult   What care is needed at home?   Ask your doctor what you need to do when you go home. Make sure you ask questions if you do not understand what the doctor says. This way you will know what you need to do.  Take your drugs as ordered by your doctor.  Unless your doctor has told you otherwise, drink at least 8 to 10 glasses of water or water-based drinks each day. Do not include drinks with caffeine, like coffee or tea.  Do not hold back your urine. Go to the bathroom every 2 to 3 hours.    Urgent Care Discharge Information    If you have been discharged from the clinic prior to your point of care test results being completed, please make sure to check your [facility] account.  If there is a change in treatment, we will communicate with you through here.  If your test is positive, and medications are ordered, these will be sent to your preferred pharmacy.   If your test is negative, no further steps needed. If you do not hear from us or have questions, please call the clinic.        -  You must understand that you have received an Urgent Care treatment only and that you may be released before all of your medical problems are known or treated.   - You, the patient, will arrange for follow up care as instructed with your primary care provider or recommended specialist.   -  Please arrange follow up with your primary medical clinic as soon as possible.   - If your condition worsens or fails to improve we recommend that you receive another evaluation at the ER immediately or contact your PCP to discuss your concerns, or return here.   - Please do not drive or make any important decisions for 24 hours if you have received any pain medications, sedatives or mood altering drugs during your visit.    WE CANNOT RULE OUT ALL POSSIBLE CAUSES OF YOUR SYMPTOMS IN THE URGENT CARE SETTING.  PLEASE GO TO THE ER IF YOU FEELS YOUR CONDITION IS WORSENING OR YOU WOULD LIKE EMERGENT EVALUATION.  GO TO THE EMERGENCY DEPARTMENT FOR ANY NEW OR WORSENING SYMPTOMS INCLUDING:  WORSENING ABDOMINAL PAIN, DARK/BLACK/BLOODY BOWEL MOVEMENTS, VOMITING BLOOD, HARD ABDOMEN, FEVER, CHEST PAIN, SHORTNESS OF BREATH, LOSS OF CONSCIOUSNESS, OR ANY OTHER CONCERN.

## 2025-03-26 NOTE — PROGRESS NOTES
"Subjective:      Patient ID: Cem Meyers is a 64 y.o. female.    Vitals:  height is 5' 3" (1.6 m) and weight is 72.6 kg (160 lb).     Chief Complaint: Abdominal Pain    Pt c/o stomach pain onset a week ago. Pt states it feels like a MS flare. Pt has been dizzy and falling down recently starting last week.  Pt has not taken any medication for treatment.     Abdominal Pain  This is a new problem. The current episode started in the past 7 days. The problem occurs constantly. The problem has been unchanged. The pain is at a severity of 2/10. The pain is mild. The quality of the pain is dull, aching and a sensation of fullness. The abdominal pain does not radiate. Pertinent negatives include no anorexia, arthralgias, belching, constipation, diarrhea, dysuria, fever, flatus, frequency, headaches, hematochezia, hematuria, melena, myalgias, nausea, vomiting or weight loss.     Constitution: Negative for fever.   Gastrointestinal:  Positive for abdominal pain. Negative for nausea, vomiting, constipation, diarrhea and bright red blood in stool.   Genitourinary:  Negative for dysuria, frequency and hematuria.   Musculoskeletal:  Negative for joint pain and muscle ache.   Neurological:  Negative for headaches.    Objective:     Physical Exam    Assessment:     No diagnosis found.    Plan:       There are no diagnoses linked to this encounter.                  "

## 2025-03-27 ENCOUNTER — PATIENT MESSAGE (OUTPATIENT)
Dept: NEUROLOGY | Facility: CLINIC | Age: 65
End: 2025-03-27
Payer: MEDICARE

## 2025-03-27 ENCOUNTER — HOSPITAL ENCOUNTER (EMERGENCY)
Facility: HOSPITAL | Age: 65
Discharge: HOME | End: 2025-03-27
Attending: EMERGENCY MEDICINE
Payer: MEDICARE

## 2025-03-27 VITALS
WEIGHT: 161.81 LBS | BODY MASS INDEX: 28.66 KG/M2 | RESPIRATION RATE: 18 BRPM | SYSTOLIC BLOOD PRESSURE: 166 MMHG | DIASTOLIC BLOOD PRESSURE: 80 MMHG | OXYGEN SATURATION: 100 % | HEART RATE: 80 BPM | TEMPERATURE: 98 F

## 2025-03-27 DIAGNOSIS — K59.00 CONSTIPATION: ICD-10-CM

## 2025-03-27 LAB
ABSOLUTE EOSINOPHIL (OHS): 0.03 K/UL
ABSOLUTE MONOCYTE (OHS): 0.72 K/UL (ref 0.3–1)
ABSOLUTE NEUTROPHIL COUNT (OHS): 3.77 K/UL (ref 1.8–7.7)
ALBUMIN SERPL BCP-MCNC: 4 G/DL (ref 3.5–5.2)
ALP SERPL-CCNC: 106 UNIT/L (ref 40–150)
ALT SERPL W/O P-5'-P-CCNC: 18 UNIT/L (ref 10–44)
ANION GAP (OHS): 8 MMOL/L (ref 8–16)
AST SERPL-CCNC: 42 UNIT/L (ref 11–45)
BASOPHILS # BLD AUTO: 0.05 K/UL
BASOPHILS NFR BLD AUTO: 0.7 %
BILIRUB SERPL-MCNC: 0.4 MG/DL (ref 0.1–1)
BILIRUB UR QL STRIP.AUTO: NEGATIVE
BUN SERPL-MCNC: 14 MG/DL (ref 8–23)
CALCIUM SERPL-MCNC: 10.6 MG/DL (ref 8.7–10.5)
CHLORIDE SERPL-SCNC: 103 MMOL/L (ref 95–110)
CLARITY UR: CLEAR
CO2 SERPL-SCNC: 24 MMOL/L (ref 23–29)
COLOR UR AUTO: YELLOW
CREAT SERPL-MCNC: 1.2 MG/DL (ref 0.5–1.4)
ERYTHROCYTE [DISTWIDTH] IN BLOOD BY AUTOMATED COUNT: 12.5 % (ref 11.5–14.5)
GFR SERPLBLD CREATININE-BSD FMLA CKD-EPI: 51 ML/MIN/1.73/M2
GLUCOSE SERPL-MCNC: 84 MG/DL (ref 70–110)
GLUCOSE UR QL STRIP: NEGATIVE
HCT VFR BLD AUTO: 42 % (ref 37–48.5)
HGB BLD-MCNC: 13.6 GM/DL (ref 12–16)
HGB UR QL STRIP: NEGATIVE
IMM GRANULOCYTES # BLD AUTO: 0.02 K/UL (ref 0–0.04)
IMM GRANULOCYTES NFR BLD AUTO: 0.3 % (ref 0–0.5)
KETONES UR QL STRIP: NEGATIVE
LEUKOCYTE ESTERASE UR QL STRIP: NEGATIVE
LYMPHOCYTES # BLD AUTO: 2.25 K/UL (ref 1–4.8)
MCH RBC QN AUTO: 31.9 PG (ref 27–50)
MCHC RBC AUTO-ENTMCNC: 32.4 G/DL (ref 32–36)
MCV RBC AUTO: 98 FL (ref 82–98)
NITRITE UR QL STRIP: NEGATIVE
NUCLEATED RBC (/100WBC) (OHS): 0 /100 WBC
PH UR STRIP: 7 [PH]
PLATELET # BLD AUTO: 197 K/UL (ref 150–450)
PMV BLD AUTO: 9.9 FL (ref 9.2–12.9)
POTASSIUM SERPL-SCNC: 4.6 MMOL/L (ref 3.5–5.1)
PROT SERPL-MCNC: 8 GM/DL (ref 6–8.4)
PROT UR QL STRIP: NEGATIVE
RBC # BLD AUTO: 4.27 M/UL (ref 4–5.4)
RELATIVE EOSINOPHIL (OHS): 0.4 %
RELATIVE LYMPHOCYTE (OHS): 32.9 % (ref 18–48)
RELATIVE MONOCYTE (OHS): 10.5 % (ref 4–15)
RELATIVE NEUTROPHIL (OHS): 55.2 % (ref 38–73)
SODIUM SERPL-SCNC: 135 MMOL/L (ref 136–145)
SP GR UR STRIP: 1.01
UROBILINOGEN UR STRIP-ACNC: NEGATIVE EU/DL
WBC # BLD AUTO: 6.84 K/UL (ref 3.9–12.7)

## 2025-03-27 PROCEDURE — 85025 COMPLETE CBC W/AUTO DIFF WBC: CPT | Performed by: EMERGENCY MEDICINE

## 2025-03-27 PROCEDURE — 96360 HYDRATION IV INFUSION INIT: CPT

## 2025-03-27 PROCEDURE — 25000003 PHARM REV CODE 250: Performed by: EMERGENCY MEDICINE

## 2025-03-27 PROCEDURE — 82040 ASSAY OF SERUM ALBUMIN: CPT | Performed by: EMERGENCY MEDICINE

## 2025-03-27 PROCEDURE — 25500020 PHARM REV CODE 255: Performed by: EMERGENCY MEDICINE

## 2025-03-27 PROCEDURE — 99285 EMERGENCY DEPT VISIT HI MDM: CPT | Mod: 25

## 2025-03-27 PROCEDURE — 81003 URINALYSIS AUTO W/O SCOPE: CPT | Performed by: EMERGENCY MEDICINE

## 2025-03-27 RX ADMIN — SODIUM CHLORIDE 1000 ML: 9 INJECTION, SOLUTION INTRAVENOUS at 12:03

## 2025-03-27 RX ADMIN — IOHEXOL 100 ML: 350 INJECTION, SOLUTION INTRAVENOUS at 05:03

## 2025-03-27 NOTE — ED NOTES
Pt diagnosed yesterday w/ constipation, pt took prescribed mag citrate and still unable to void. Pt reports feeling full and bloated, pain spasms sometimes occurring in lower abd. Pt reports  that she's been having trouble w/ constipation for months.

## 2025-03-27 NOTE — ED PROVIDER NOTES
Encounter Date: 3/27/2025    SCRIBE #1 NOTE: I, Jhonbuzz Calles Jr., am scribing for, and in the presence of,  John Lawler DO. I have scribed the following portions of the note - Other sections scribed: ED Discussion.   SCRIBE #2 NOTE: I, Louie Arun, am scribing for, and in the presence of,  Sammi Haddad DO. I have scribed the remaining portions of the note not scribed by Scribe #1.     History     Chief Complaint   Patient presents with    Constipation     Reports constipation x 1 week. Reports took mag citrate but only able to pass stool with manual extraction. Reports lower abd pain. Sent from .     This patient has a history of constipation for years presenting with lower abdominal pain described as spasms for the past week.  Denies any nausea or vomiting.  States his tried to take 2 bottles of magnesium citrate and has been manually disimpact herself.  Denies any fevers/chills.  Denies any other acute complaints.        Review of patient's allergies indicates:   Allergen Reactions    Latex, natural rubber Rash     Past Medical History:   Diagnosis Date    Back pain     Broken toe 6/2015    left big toe    Chronic diastolic heart failure 5/8/2018    Closed left arm fracture     Colon polyp     Depression     Hyperlipidemia     Hypothyroid     Immunosuppression 1/28/2019    MS (multiple sclerosis)     Neurogenic bladder 12/6/2012    Osteoporosis     Polyneuropathy     Sleep apnea     Trouble in sleeping      Past Surgical History:   Procedure Laterality Date    BREAST BIOPSY Left 10/28/2020    COLONOSCOPY N/A 09/01/2017    Procedure: COLONOSCOPY;  Surgeon: Andriy Villar MD;  Location: [facility] ENDO;  Service: Endoscopy;  Laterality: N/A;    COLONOSCOPY N/A 01/06/2021    Procedure: COLONOSCOPY;  Surgeon: Althea Casanova MD;  Location: [facility] ENDO;  Service: Endoscopy;  Laterality: N/A;    COLONOSCOPY N/A 04/25/2022    Procedure: COLONOSCOPY;  Surgeon: Cristiano Aaron MD;  Location: [facility]  ENDO;  Service: Endoscopy;  Laterality: N/A;    FRACTURE SURGERY Left 2013    wrist- [facility]    KNEE SURGERY Right 1989    in [place]    TONSILLECTOMY  1966     Family History   Problem Relation Name Age of Onset    Pancreatic cancer Mother Brittnee Meyers     Stomach cancer Mother Brittnee Meyers     Cancer Mother Brittnee Meyers         pancreatic, stomach    Hypertension Father Don Meyers     Heart disease Father Don Meyers         MI    Hearing loss Father Don Meyers     Parkinsonism Father Don Meyers     Rheum arthritis Sister      Lupus Maternal Aunt      Rheum arthritis Maternal Aunt      Melanoma Neg Hx       Social History[1]  Review of Systems   Gastrointestinal:  Positive for abdominal pain and constipation.       Physical Exam     Initial Vitals [03/27/25 1140]   BP Pulse Resp Temp SpO2   124/67 90 (!) 90 99 °F (37.2 °C) 95 %      MAP       --         Physical Exam    Constitutional: She appears well-developed and well-nourished. No distress.   Cardiovascular:  Normal rate and regular rhythm.           No murmur heard.  Pulmonary/Chest: Breath sounds normal. She has no wheezes.   Abdominal: Abdomen is soft. She exhibits no distension. There is no abdominal tenderness.   Genitourinary:    Genitourinary Comments: Good rectal tone, there is no stool noted in the rectal vault     Musculoskeletal:         General: Normal range of motion.     Neurological: She is alert and oriented to person, place, and time.   Skin: Skin is warm and dry. No rash noted.         ED Course   Procedures  Labs Reviewed   COMPREHENSIVE METABOLIC PANEL - Abnormal       Result Value    Sodium 135 (*)     Potassium 4.6      Chloride 103      CO2 24      Glucose 84      BUN 14      Creatinine 1.2      Calcium 10.6 (*)     Protein Total 8.0      Albumin 4.0      Bilirubin Total 0.4            AST 42      ALT 18      Anion Gap 8      eGFR 51 (*)    URINALYSIS, REFLEX TO URINE CULTURE - Normal    Color, UA Yellow      Appearance, UA Clear       pH, UA 7.0      Spec Grav UA 1.010      Protein, UA Negative      Glucose, UA Negative      Ketones, UA Negative      Bilirubin, UA Negative      Blood, UA Negative      Nitrites, UA Negative      Urobilinogen, UA Negative      Leukocyte Esterase, UA Negative     CBC WITH DIFFERENTIAL - Normal    WBC 6.84      RBC 4.27      HGB 13.6      HCT 42.0      MCV 98      MCH 31.9      MCHC 32.4      RDW 12.5      Platelet Count 197      MPV 9.9      Nucleated RBC 0      Neut % 55.2      Lymph % 32.9      Mono % 10.5      Eos % 0.4      Basophil % 0.7      Imm Grans % 0.3      Neut # 3.77      Lymph # 2.25      Mono # 0.72      Eos # 0.03      Baso # 0.05      Imm Grans # 0.02     CBC W/ AUTO DIFFERENTIAL    Narrative:     The following orders were created for panel order CBC W/ AUTO DIFFERENTIAL.  Procedure                               Abnormality         Status                     ---------                               -----------         ------                     CBC with Differential[[id number]]       Normal              Final result                 Please view results for these tests on the individual orders.          Imaging Results              CT Abdomen Pelvis With IV Contrast Routine Oral Contrast (Final result)  Result time 03/27/25 17:51:47      Final result by Travis Marquez MD (03/27/25 17:51:47)                   Impression:      Fluid throughout the large bowel which is mildly distended but demonstrates no evidence of definite obstruction or stricture could reflect diarrheal state.  There is mild narrowing of the anorectal junction.  Recommend right visualization.    All CT scans at [this location] are performed using dose modulation techniques as appropriate to a performed exam including the following: automated exposure control; adjustment of the mA and/or kV according to patient size (this includes techniques or standardized protocols for targeted exams where dose is matched to indication /  reason for exam; i.e. extremities or head); use of iterative reconstruction technique.    Finalized on: 3/27/2025 5:51 PM By:  Travis Marquez MD  [facility]# [id number]      2025-03-27 17:53:52.590     [facility]               Narrative:    EXAM:  CT ABDOMEN PELVIS WITH IV CONTRAST    CLINICAL HISTORY: Bowel obstruction suspected.    TECHNIQUE: Routine contrast-enhanced CT protocol of the abdomen and pelvis was performed before and after the intravenous administration of  100 mL of Omni 300 contrast during venous and delayed phase.  Oral contrast was administered..    COMPARISON: 12/27/2023    FINDINGS:    No concerning abnormality involves the lung bases.    The liver, spleen, pancreas, and adrenal glands are not unusual in contrast-enhanced CT appearance.    The gallbladder and bile ducts are within normal limits.     The kidneys enhance homogeneously. Thekidneys, ureters, and bladder are unremarkable. No evidence of hydronephrosis.    Stomach is collapsed which limits evaluation.  There is thickening of the rugal folds within the stomach which could reflect gastritis.  The small bowel unremarkable.  No evidence of bowel obstruction or acute inflammatory process.  Fluid-filled large bowel loops with fluid levels could reflect diarrheal state.  Mild thickening at the anorectal junction.  No free fluid, free air, or abscess.  Appendix is not definitely seen.  No inflammatory changes.  Shotty nodes are seen within the mesentery and retroperitoneum.    No aortic aneurysm identified.  No pathologically enlarged lymph nodes.  SPECT postoperative changes within the abdominal wall with multiple calcifications.    The reproductive organs are not unusual in CT appearance.    No acute or suspicious osseous lesion is evident.  Mild facet arthropathy lower lumbar spine.                                         Medications   sodium chloride 0.9% bolus 1,000 mL 1,000 mL (0 mLs Intravenous Stopped 3/27/25 1412)   iohexoL (OMNIPAQUE 350)  injection 100 mL (100 mLs Intravenous Given 3/27/25 9333)           ED Discussion:    4:00 PM: Dr. Lawler transfers care of patient to Dr. Haddad pending lab and imaging results.     4:33 PM: Re-evaluated pt. Patient is resting comfortably and is in no acute distress.  Pt states she had a bowel movement just piror to reevaluation. Discussed with pt and/or family/caretaker all pertinent results. Discussed with pt and/or family/caretaker any concerns expressed at this time. Answered all questions. Pt and/or family/caretaker express understanding at this time.    6:32 PM: Reassessed pt at this time. Discussed with patient and/or family/caretaker all pertinent ED information and results. Discussed pt dx and plan of tx. Gave the patient all f/u and return to the ED instructions. All questions and concerns were addressed at this time. Patient and/or family/caretaker expresses understanding of information and instructions, and is comfortable with plan to discharge. Pt is stable for discharge.     I discussed with patient and/or family/caretaker that evaluation in the ED does not suggest any emergent or life threatening medical conditions requiring immediate intervention beyond what was provided in the ED, and I believe patient is safe for discharge.  Regardless, an unremarkable evaluation in the ED does not preclude the development or presence of a serious of life threatening condition. As such, I instructed that the patient is to return immediately for any worsening or change in current symptoms.          Medical Decision Making  Differential diagnosis Small-bowel obstruction, obstipation, diverticulitis, UTI    ED course: IV established.  UA negative.  Sodium 135.  Calcium 10.6.  CBC normal.  CT abdomen pelvis with IV contrast: Fluid throughout the large bowel which is mildly distended but no evidence of any definite obstruction or stricture.  Could row flat diarrhea state.  Mild narrowing of the anorectal junction.   This was discussed with patient.  Recommended outpatient sigmoidoscopy.  Patient verbalized understanding.  Of note, patient is able to have a large bowel movement in the.  Return precautions given.    Amount and/or Complexity of Data Reviewed  Labs: ordered. Decision-making details documented in ED Course.  Radiology: ordered. Decision-making details documented in ED Course.    Risk  Prescription drug management.            Scribe Attestation:   Scribe #1: I performed the above scribed service and the documentation accurately describes the services I performed. I attest to the accuracy of the note.  Scribe #2: I performed the above scribed service and the documentation accurately describes the services I performed. I attest to the accuracy of the note.    Attending Attestation:           Physician Attestation for Scribe:  Physician Attestation Statement for Scribe #1: I, John Lawler DO, reviewed documentation, as scribed by Jhon Calles Jr. in my presence, and it is both accurate and complete.   Physician Attestation Statement for Scribe #2: I, Sammi Haddad DO, reviewed documentation, as scribed by Louie Dias in my presence, and it is both accurate and complete. I also acknowledge and confirm the content of the note done by Scribe #1.          ED Course as of 03/28/25 1342   Thu Mar 27, 2025   1828 Patient reports bowel movement fallen ED. discussed findings of thickened anal rectal region.  Recommend outpatient follow up with Gastroenterology.  Patient verbalized understanding.  All questions answered [LB]      ED Course User Index  [LB] Sammi Haddad DO                     Disposition:   Disposition: Discharged  Condition: Stable          Clinical Impression:  Final diagnoses:  [K59.00] Constipation          ED Disposition Condition    Discharge Stable          ED Prescriptions    None       Follow-up Information       Follow up With Specialties Details Why Contact Info    Keagan Calles  [facility]  Return to emergency department for: Nausea, vomiting, severe stomach pain, fever, bloody stool, or other concerns [id number]  [place] [address]  [phone]      [facility] GASTROENTEROLOGY Gastroenterology  For possible sigmoidoscopy. [address] [facility] [address]  [phone]                 [1]   Social History  Tobacco Use    Smoking status: Never     Passive exposure: Never    Smokeless tobacco: Never   Substance Use Topics    Alcohol use: Yes     Comment: occasional    Drug use: No        Sammi Haddad, DO  03/28/25 1342

## 2025-03-28 ENCOUNTER — PATIENT OUTREACH (OUTPATIENT)
Facility: OTHER | Age: 65
End: 2025-03-28
Payer: MEDICARE

## 2025-03-28 NOTE — PROGRESS NOTES
Patient was seen in the ED on 3/27/25 for Constipation. Phoned patient to assist with Post ED Discharge Navigation. Patient declined assistance scheduling a PCP follow-up appointment. Patient declined assistance scheduling a follow-up using the ED provided referral at this time.  Shruthi Gilman

## 2025-04-14 NOTE — TELEPHONE ENCOUNTER
Initiated [employer] through [employer], and also faxed the SMN/PAN to [employer] at [phone].    Nursing notes reviewed and accepted.    Shruthi Timmons is a 15 year old male who presents for well exam.  Patient presents with Mother.    Concerns raised today include: aggressive behavior at school, currently in the middle of changing schools. Inattentive and hyperactive.    SOCIAL HISTORY:   School: 9th grade.  Hobbies / Activities: none  Future Plans: no plans  Tobacco: Denies  Alcohol: Denies  Illicit drugs or Homeopathic medicines: Denies    DEVELOPMENT:  Major Depression Disorder Screen: (symptoms ongoing for 2 weeks)  Depression Screen  More Data Synopsis  PHQ2/9 Numeric Score  More data may exist         4/14/2025 1/16/2023   PHQ 2/9 Numeric Score   Peds PHQ 2 Score 0 0   Peds PHQ 2 Interpretation No further screening needed No further screening needed   Peds PHQ 9 Score 2 0   Peds PHQ 9 Interpretation Minimal Depression -   Thoughts that you would be better off dead or of hurting yourself in some way? Not at all Not at all   If you reported any problems, how difficult have these problems made it to do your work, take care of things at home, or get along with other people? Not difficult at all Not difficult at all     DEPRESSION ASSESSMENT/PLAN:  Depression screening is negative no further plan needed.       FAMILY HISTORY:   Negative for athletic cardiac events    PAST HISTORY:  Past medical, surgical, and family histories reviewed and updated.    REVIEW OF SYSTEMS:  General: Feeling well, no fever/chills, recent rapid weight changes  Skin: No new or changes in existing moles or lesions. No rashes.  Neurological: No dizziness, no headaches, numbness/tingling  Respiratory: No upper repiratory infection symptoms, no cough, wheeze, shortness of breath, or difficulty in breathing.  Nodes: No noted lymphadenopathy, swollen glands  Cardiac: No chest pain, palpitations, skipped beats. No prior hx of murmur. No hx of syncope.  GastrointestinaI: No emesis, diarrhea, constipation, or blood in  stools  Genitourinary: No polyuria or dysuria, urgency, or frequency  Musculoskeletal: No joint swelling/warmth, no recent injuries  Hematologic: No easy bruising or bleeding  Psychological: No depressive symptoms    PHYSICAL EXAM:  Blood pressure 118/62, pulse (!) 59, height 5' 8.5\" (1.74 m), weight 67.6 kg (149 lb), SpO2 99%.    General: Well appearing male, no acute distress.  Skin: No lesions or rashes noted.  Head/Ears/Nose/Throat: Pupils equal, round, and reactive to light. Extraocular movement intact, no conjunctival injection. No scleral icterus. Tympanic membranes with normal landmarks bilaterally. Oropharynx with moist mucus membranes, clear.  Neck: Supple  Nodes: No adenopathy  Lungs: Clear to auscultation. No wheezes, rales, rhonchi. Normal work of breathing.  Cardiac: Regular rate and rhythm, normal S1S2, no murmur appreciated.  Abdomen: Soft, nontender, nondistended. Normoactive bowel sounds. No organomegaly or masses.  Genital: Marcelo 4 male without external lesions. Testes without abnormalities.  No hernia.  Extremities: Full range of motion bilaterally upper and lower extremities. Warm, well perfused. No clubbing/cyanosis/edema.  Back: No scoliosis  Neurological: Grossly intact. Strength 5/5 bilaterally. Normal tone. Gait nonataxic, toe/heelwalking intact.    ASSESSMENT:   Well 15 year old male adolescent.  Well adolescent visit (Primary)  - CBC with Automated Differential; Future  - Lipid Panel With Reflex; Future        Behavior/Guidance:  Tobacco, alcohol, & drug avoidance: not DISCUSSED  Sexual activity & avoidance / safe sex: not DISCUSSED  Puberty: DISCUSSED  Self testicular examination: not DISCUSSED  Accident prevention: DISCUSSED  School achievement: DISCUSSED  Family/Peer relationships: DISCUSSED  Regular physical activity: DISCUSSED  Healthy food choices: DISCUSSED    PLAN:  Anticipatory guidance sheet   WIAA participation okay  Immunizations are up to date  Return to clinic in 1-2 years  for well child exam or as needed for illness/concerns.

## 2025-04-23 ENCOUNTER — OFFICE VISIT (OUTPATIENT)
Dept: URGENT CARE | Facility: CLINIC | Age: 65
End: 2025-04-23
Payer: MEDICARE

## 2025-04-23 VITALS
HEIGHT: 63 IN | DIASTOLIC BLOOD PRESSURE: 70 MMHG | WEIGHT: 161.81 LBS | OXYGEN SATURATION: 96 % | HEART RATE: 87 BPM | SYSTOLIC BLOOD PRESSURE: 106 MMHG | TEMPERATURE: 98 F | BODY MASS INDEX: 28.67 KG/M2 | RESPIRATION RATE: 18 BRPM

## 2025-04-23 DIAGNOSIS — R50.9 FEVER, UNSPECIFIED FEVER CAUSE: Primary | ICD-10-CM

## 2025-04-23 LAB
BILIRUBIN, UA POC OHS: NEGATIVE
BLOOD, UA POC OHS: NEGATIVE
CLARITY, UA POC OHS: CLEAR
COLOR, UA POC OHS: YELLOW
CTP QC/QA: YES
CTP QC/QA: YES
GLUCOSE, UA POC OHS: NEGATIVE
KETONES, UA POC OHS: NEGATIVE
LEUKOCYTES, UA POC OHS: NEGATIVE
NITRITE, UA POC OHS: NEGATIVE
PH, UA POC OHS: 5.5
POC MOLECULAR INFLUENZA A AGN: NEGATIVE
POC MOLECULAR INFLUENZA B AGN: NEGATIVE
PROTEIN, UA POC OHS: NEGATIVE
SARS CORONAVIRUS 2 ANTIGEN: NEGATIVE
SPECIFIC GRAVITY, UA POC OHS: >=1.03
UROBILINOGEN, UA POC OHS: 0.2

## 2025-04-23 PROCEDURE — 81003 URINALYSIS AUTO W/O SCOPE: CPT | Mod: QW,S$GLB,, | Performed by: FAMILY MEDICINE

## 2025-04-23 PROCEDURE — 99213 OFFICE O/P EST LOW 20 MIN: CPT | Mod: S$GLB,,, | Performed by: FAMILY MEDICINE

## 2025-04-23 PROCEDURE — 87811 SARS-COV-2 COVID19 W/OPTIC: CPT | Mod: QW,S$GLB,, | Performed by: FAMILY MEDICINE

## 2025-04-23 PROCEDURE — 87502 INFLUENZA DNA AMP PROBE: CPT | Mod: QW,S$GLB,, | Performed by: FAMILY MEDICINE

## 2025-04-23 NOTE — PATIENT INSTRUCTIONS
Thank you for allowing our team to take care of you today.  You were evaluated today for fever.  Testing today has been negative.  Definite cause of the fever is unclear but with the improvement of temperature and decrease of weakness, these are positive signs. It may have been an acute viral syndrome which is clearing.  Continue to monitor to see if the fever returns or weakness increases.  You have had looser stool today which may be from stomach virus but if this continues and there's increased pain or blood in stool or vomiting, get an evaluation before your upcoming gastroenterologist appointment.   Continue to stay hydrated and rest. Stick with bland foods (limiting dairy and heavy spicy/seasoned, fried/oily, tomato based) right now and fiber rich foods.   Continue your regular medications.  If any new symptoms or return of symptoms, get an immediate medical provider evaluation.  Followup here as needed.

## 2025-04-23 NOTE — PROGRESS NOTES
"Subjective:      Patient ID: Cem Meyers is a 64 y.o. female.    Vitals:  height is 5' 3" (1.6 m) and weight is 73.4 kg (161 lb 13.1 oz). Her oral temperature is 98.3 °F (36.8 °C). Her blood pressure is 106/70 and her pulse is 87. Her respiration is 18 and oxygen saturation is 96%.     Chief Complaint: Fever    65 yo female. Patient started feeling off 2 to 3 days ago. Then the next day, she felt weak & fell in the bathroom because her legs were weak. Patient realized she had a fever which causes her to get weak because it complicates her MS symptoms. Patient is alternating advil & tylenol. Last motrin was taken at 5:00 am. Temperature is better. Patient was having nasal congestion as well, but has resolved. Patient is also taking vitamin C. She is tired but thinks her going back and forth to the nursing home visiting her father who has Parkinson's has contributed. She had some looser stool today. Has history of chronic abdominal pain and constipation. No nausea or vomiting. No cough or congestion or sore throat. No body aches or chills. No sob or cp. Patient wanted to make sure she does not have anything contagious.     Fever   This is a new problem. The current episode started 2 days ago. The problem occurs constantly. She has not experienced a heat injury.The maximum temperature noted was 102 to 102.9 F. The temperature was taken using an oral thermometer. Associated symptoms include abdominal pain (patient has constipation), congestion (resolved), diarrhea and sleepiness. Pertinent negatives include no coughing, ear pain, headaches, nausea, rash, sore throat or vomiting. She has tried acetaminophen and NSAIDs for the symptoms. The treatment provided mild relief.       Constitution: Positive for fatigue and fever. Negative for appetite change and chills.   HENT:  Positive for congestion (resolved). Negative for ear pain, postnasal drip and sore throat.    Cardiovascular: Negative.    Eyes: Negative.  "   Respiratory: Negative.  Negative for cough.    Gastrointestinal:  Positive for abdominal pain (patient has constipation), constipation and diarrhea. Negative for nausea and vomiting.   Genitourinary: Negative.    Skin:  Negative for rash.   Neurological:  Negative for headaches.      Objective:     Physical Exam   Constitutional: She is oriented to person, place, and time. No distress.   HENT:   Head: Normocephalic and atraumatic.   Ears:   Right Ear: Tympanic membrane, external ear and ear canal normal. Tympanic membrane is not perforated.   Left Ear: Tympanic membrane, external ear and ear canal normal. Tympanic membrane is not perforated.   Nose: Nose normal. No rhinorrhea, sinus tenderness or congestion. No epistaxis.   Mouth/Throat: Mucous membranes are moist. No oropharyngeal exudate or posterior oropharyngeal erythema. Oropharynx is clear.   Eyes: Conjunctivae are normal. Pupils are equal, round, and reactive to light.   Neck: Neck supple.   Cardiovascular: Normal rate, regular rhythm, normal heart sounds and normal pulses.   Pulmonary/Chest: Effort normal and breath sounds normal.   Abdominal: Normal appearance and bowel sounds are normal. She exhibits no distension. Soft. There is no abdominal tenderness. There is no left CVA tenderness and no right CVA tenderness.   Musculoskeletal:      Comments: Decreased motor strength generalized but no focal deficit appreciated.    Lymphadenopathy:     She has no cervical adenopathy.   Neurological: no focal deficit. She is alert and oriented to person, place, and time. Gait abnormal.   Skin: Skin is warm.         Comments: Normal turgor   Psychiatric: Her behavior is normal. Mood, judgment and thought content normal.   Nursing note and vitals reviewed.      Assessment:     1. Fever, unspecified fever cause        Plan:       Fever, unspecified fever cause  -     POCT Influenza A/B MOLECULAR  -     SARS Coronavirus 2 Antigen, POCT Manual Read  -     POCT  Urinalysis(Instrument)      Results for orders placed or performed in visit on 04/23/25   POCT Influenza A/B MOLECULAR    Collection Time: 04/23/25 10:42 AM   Result Value Ref Range    POC Molecular Influenza A Ag Negative Negative    POC Molecular Influenza B Ag Negative Negative     Acceptable Yes    SARS Coronavirus 2 Antigen, POCT Manual Read    Collection Time: 04/23/25 10:49 AM   Result Value Ref Range    SARS Coronavirus 2 Antigen Negative Negative, Presumptive Negative     Acceptable Yes    POCT Urinalysis(Instrument)    Collection Time: 04/23/25 11:18 AM   Result Value Ref Range    Color, POC UA Yellow Yellow, Straw, Colorless    Clarity, POC UA Clear Clear    Glucose, POC UA Negative Negative    Bilirubin, POC UA Negative Negative    Ketones, POC UA Negative Negative    Spec Grav POC UA >=1.030 1.005 - 1.030    Blood, POC UA Negative Negative    pH, POC UA 5.5 5.0 - 8.0    Protein, POC UA Negative Negative    Urobilinogen, POC UA 0.2 <=1.0    Nitrite, POC UA Negative Negative    WBC, POC UA Negative Negative     *Note: Due to a large number of results and/or encounters for the requested time period, some results have not been displayed. A complete set of results can be found in Results Review.               Review of patient's allergies indicates:   Allergen Reactions    Latex, natural rubber Rash         SUMMARY:  Fever seems to have improved. Congestion cleared. Did have looser stools which may be contributing to a viral syndrome. Says no history of diverticulitis and her abdomen pains are no different than what she normally has. Testing today negative. Supportive measures. Monitor for return or worsening or symptoms. If occurs, get an immediate medial provider evaluation. She has an upcoming gi appt. Followup here as needed.     Patient Instructions   Thank you for allowing our team to take care of you today.  You were evaluated today for fever.  Testing today has been  negative.  Definite cause of the fever is unclear but with the improvement of temperature and decrease of weakness, these are positive signs. It may have been an acute viral syndrome which is clearing.  Continue to monitor to see if the fever returns or weakness increases.  You have had looser stool today which may be from stomach virus but if this continues and there's increased pain or blood in stool or vomiting, get an evaluation before your upcoming gastroenterologist appointment.   Continue to stay hydrated and rest. Stick with bland foods (limiting dairy and heavy spicy/seasoned, fried/oily, tomato based) right now and fiber rich foods.   Continue your regular medications.  If any new symptoms or return of symptoms, get an immediate medical provider evaluation.  Followup here as needed.

## 2025-04-24 ENCOUNTER — TELEPHONE (OUTPATIENT)
Dept: RHEUMATOLOGY | Facility: CLINIC | Age: 65
End: 2025-04-24
Payer: MEDICARE

## 2025-04-28 DIAGNOSIS — F32.0 MAJOR DEPRESSIVE DISORDER, SINGLE EPISODE, MILD: ICD-10-CM

## 2025-04-29 ENCOUNTER — OFFICE VISIT (OUTPATIENT)
Dept: GASTROENTEROLOGY | Facility: CLINIC | Age: 65
End: 2025-04-29
Payer: MEDICARE

## 2025-04-29 VITALS
HEART RATE: 66 BPM | WEIGHT: 159.81 LBS | DIASTOLIC BLOOD PRESSURE: 68 MMHG | SYSTOLIC BLOOD PRESSURE: 106 MMHG | HEIGHT: 63 IN | BODY MASS INDEX: 28.32 KG/M2

## 2025-04-29 DIAGNOSIS — K59.04 CHRONIC IDIOPATHIC CONSTIPATION: Primary | ICD-10-CM

## 2025-04-29 DIAGNOSIS — G35 MS (MULTIPLE SCLEROSIS): ICD-10-CM

## 2025-04-29 DIAGNOSIS — E03.9 ACQUIRED HYPOTHYROIDISM: ICD-10-CM

## 2025-04-29 DIAGNOSIS — E78.49 OTHER HYPERLIPIDEMIA: ICD-10-CM

## 2025-04-29 PROCEDURE — 1159F MED LIST DOCD IN RCRD: CPT | Mod: CPTII,S$GLB,, | Performed by: NURSE PRACTITIONER

## 2025-04-29 PROCEDURE — 3074F SYST BP LT 130 MM HG: CPT | Mod: CPTII,S$GLB,, | Performed by: NURSE PRACTITIONER

## 2025-04-29 PROCEDURE — 1160F RVW MEDS BY RX/DR IN RCRD: CPT | Mod: CPTII,S$GLB,, | Performed by: NURSE PRACTITIONER

## 2025-04-29 PROCEDURE — 99999 PR PBB SHADOW E&M-EST. PATIENT-LVL IV: CPT | Mod: PBBFAC,,, | Performed by: NURSE PRACTITIONER

## 2025-04-29 PROCEDURE — 3078F DIAST BP <80 MM HG: CPT | Mod: CPTII,S$GLB,, | Performed by: NURSE PRACTITIONER

## 2025-04-29 PROCEDURE — 99214 OFFICE O/P EST MOD 30 MIN: CPT | Mod: S$GLB,,, | Performed by: NURSE PRACTITIONER

## 2025-04-29 PROCEDURE — 3008F BODY MASS INDEX DOCD: CPT | Mod: CPTII,S$GLB,, | Performed by: NURSE PRACTITIONER

## 2025-04-29 RX ORDER — LUBIPROSTONE 24 UG/1
24 CAPSULE ORAL 2 TIMES DAILY
Qty: 180 CAPSULE | Refills: 3 | Status: SHIPPED | OUTPATIENT
Start: 2025-04-29

## 2025-04-29 NOTE — TELEPHONE ENCOUNTER
Care Due:                  Date            Visit Type   Department     Provider  --------------------------------------------------------------------------------                                MYCHART                              ANNUAL                              CHECKUP/PHY  [facility] INTERNAL  Last Visit: 05-      [facility]       Keagan Calles  Next Visit: None Scheduled  None         None Found                                                            Last  Test          Frequency    Reason                     Performed    Due Date  --------------------------------------------------------------------------------    Lipid Panel.  12 months..  simvastatin..............  05- 05-    TSH.........  12 months..  levothyroxine............  05- 05-    [employer] Embedded Care Due Messages. Reference number: [id number].   4/28/2025 9:03:23 PM CDT

## 2025-04-29 NOTE — PROGRESS NOTES
Clinic Follow Up:  [facility] Follow Up Note    Reason for Follow Up:  The primary encounter diagnosis was Chronic idiopathic constipation. A diagnosis of MS (multiple sclerosis) was also pertinent to this visit.    PCP: Keagan Calles   [address] [place] [address]  / [place] [id number]    HPI:  This is a 64 y.o. female here for follow up of constipation.   This is chronic for her. She also has a MS.   Previously on Linzess and was ineffective. She was then started on Amitiza and it worked well for her. She developed lower abdominal heaviness so discontinued it. She has been having significant constipation. She as been taking stool softeners and fiber as well as other OTC laxatives. She does admit to not drinking as much water as she should.     Review of Systems   Constitutional:  Negative for activity change and appetite change.        As per interval history above   Respiratory:  Negative for cough and shortness of breath.    Cardiovascular:  Negative for chest pain.   Gastrointestinal:  Positive for abdominal pain and constipation. Negative for diarrhea and vomiting.   Skin:  Negative for color change and rash.       Medical History:  Past Medical History:   Diagnosis Date    Back pain     Broken toe 6/2015    left big toe    Chronic diastolic heart failure 5/8/2018    Closed left arm fracture     Colon polyp     Depression     Hyperlipidemia     Hypothyroid     Immunosuppression 1/28/2019    MS (multiple sclerosis)     Neurogenic bladder 12/6/2012    Osteoporosis     Polyneuropathy     Sleep apnea     Trouble in sleeping        Surgical History:   Past Surgical History:   Procedure Laterality Date    BREAST BIOPSY Left 10/28/2020    COLONOSCOPY N/A 09/01/2017    Procedure: COLONOSCOPY;  Surgeon: Andriy Villar MD;  Location: [facility];  Service: Endoscopy;  Laterality: N/A;    COLONOSCOPY N/A 01/06/2021    Procedure: COLONOSCOPY;  Surgeon: Althea Casanova MD;  Location: [facility];  " Service: Endoscopy;  Laterality: N/A;    COLONOSCOPY N/A 04/25/2022    Procedure: COLONOSCOPY;  Surgeon: Cristiano Aaron MD;  Location: [facility];  Service: Endoscopy;  Laterality: N/A;    FRACTURE SURGERY Left 2013    wrist- [facility]    KNEE SURGERY Right 1989    in [place]    TONSILLECTOMY  1966       Family History:   Family History   Problem Relation Name Age of Onset    Pancreatic cancer Mother Brittnee Meyers     Stomach cancer Mother Brittnee Meyers     Cancer Mother Brittnee Meyers         pancreatic, stomach    Hypertension Father Don CUNNINGHAM Amar     Heart disease Father Don Meyers         MI    Hearing loss Father Don Meyers     Parkinsonism Father Don Meyers     Rheum arthritis Sister      Lupus Maternal Aunt      Rheum arthritis Maternal Aunt      Melanoma Neg Hx         Social History:   Social History[1]    Allergies:   Review of patient's allergies indicates:   Allergen Reactions    Latex, natural rubber Rash       Home Medications:  Medications Ordered Prior to Encounter[2]    /68 (BP Location: Right arm, Patient Position: Sitting)   Pulse 66   Ht 5' 3" (1.6 m)   Wt 72.5 kg (159 lb 13.3 oz)   BMI 28.31 kg/m²   Body mass index is 28.31 kg/m².  Physical Exam    Labs: Pertinent labs reviewed.  CRC Screening:     Assessment:   1. Chronic idiopathic constipation - previously controlled on Amtiza. Currently not on medication and is not controlled.    2. MS (multiple sclerosis) - likely contributing to constipation      Recommendations:   - restart Amitiza  - increase water intake  - continue MS management with neurology     Chronic idiopathic constipation  -     Ambulatory referral/consult to Gastroenterology  -     lubiprostone (AMITIZA) 24 MCG Cap; Take 1 capsule (24 mcg total) by mouth 2 (two) times daily.  Dispense: 180 capsule; Refill: 3    MS (multiple sclerosis)    Return to Clinic:  Follow up if symptoms worsen or fail to improve.    Thank you for the opportunity to participate in the care of " this patient.  GLENN Cochran             [1]   Social History  Tobacco Use    Smoking status: Never     Passive exposure: Never    Smokeless tobacco: Never   Substance Use Topics    Alcohol use: Yes     Comment: occasional    Drug use: No   [2]   Current Outpatient Medications on File Prior to Visit   Medication Sig Dispense Refill    baclofen (LIORESAL) 10 MG tablet TAKE 2 TABS ONE TIME DAILY IN THE MORNING, 2 TABS IN THE AFTERNOON, AND UP TO 3 TABS AT BEDTIME AS NEEDED 630 tablet 3    calcium citrate (CALCITRATE) 200 mg (950 mg) tablet Take 2 tablets by mouth once daily.      cholecalciferol, vitamin D3, 100 mcg (4,000 unit) Cap Take 4,000 Units by mouth once daily.       denosumab (PROLIA SUBQ) Inject into the skin. Every 6 months      gabapentin (NEURONTIN) 800 MG tablet TAKE 1 TABLET BY MOUTH 3 TIMES  DAILY 270 tablet 3    ketoconazole (NIZORAL) 2 % cream Apply topically 2 (two) times daily. 60 g 1    levothyroxine (SYNTHROID) 75 MCG tablet Take 1 tablet (75 mcg total) by mouth before breakfast. 90 tablet 3    multivitamin capsule Take 1 capsule by mouth once daily.      paroxetine (PAXIL) 40 MG tablet Take 1 tablet (40 mg total) by mouth every morning. 90 tablet 3    simvastatin (ZOCOR) 40 MG tablet Take 1 tablet (40 mg total) by mouth once daily. 90 tablet 3    triamcinolone acetonide 0.025% (KENALOG) 0.025 % cream AAA bid 80 g 1    [DISCONTINUED] lubiprostone (AMITIZA) 24 MCG Cap Take 1 capsule (24 mcg total) by mouth 2 (two) times daily. 60 capsule 11     No current facility-administered medications on file prior to visit.

## 2025-04-30 NOTE — TELEPHONE ENCOUNTER
No care due was identified.  [facility] Embedded Care Due Messages. Reference number: [id number].   4/29/2025 9:09:29 PM CDT

## 2025-05-05 ENCOUNTER — TELEPHONE (OUTPATIENT)
Dept: RHEUMATOLOGY | Facility: CLINIC | Age: 65
End: 2025-05-05
Payer: MEDICARE

## 2025-05-05 ENCOUNTER — PATIENT MESSAGE (OUTPATIENT)
Dept: INTERNAL MEDICINE | Facility: CLINIC | Age: 65
End: 2025-05-05
Payer: MEDICARE

## 2025-05-05 NOTE — TELEPHONE ENCOUNTER
----- Message from Anahi Lujan sent at 10/27/2023  4:34 PM CDT -----  Prescription AMITIZA ,not received at pharmacy ,Please resend [facility] - [place] - [address].     Spoke with spoke, she is not having palpitations at this time.  States she feels ok, her legs hurt. Current /77 HR 75.   Denies SOB, chest pains, does not feel like she is in A-fib.   Upcoming consult with Dr Romano, and follow up with Dr Schwartz

## 2025-05-05 NOTE — TELEPHONE ENCOUNTER
Left message for patient to call us back. Explained we can send a referral to [facility], and [facility].

## 2025-05-05 NOTE — TELEPHONE ENCOUNTER
----- Message from Shreya sent at 5/5/2025  9:32 AM CDT -----  Regarding: Return Call  Contact: patient  Type:  Patient Returning CallWho Called:Cem Who Left Message for Patient: Cem Does the patient know what this is regarding?:the patient proliaWould the patient rather a call back or a response via [facility]? Call back Best Call Back Number: [phone] (home) Additional Information:Thanks,SAMANTHA

## 2025-05-06 RX ORDER — LEVOTHYROXINE SODIUM 75 UG/1
75 TABLET ORAL
Qty: 90 TABLET | Refills: 3 | OUTPATIENT
Start: 2025-05-06

## 2025-05-06 RX ORDER — SIMVASTATIN 40 MG/1
40 TABLET, FILM COATED ORAL
Qty: 90 TABLET | Refills: 3 | OUTPATIENT
Start: 2025-05-06

## 2025-05-06 RX ORDER — PAROXETINE HYDROCHLORIDE 40 MG/1
40 TABLET, FILM COATED ORAL EVERY MORNING
Qty: 90 TABLET | Refills: 3 | OUTPATIENT
Start: 2025-05-06

## 2025-05-08 ENCOUNTER — TELEPHONE (OUTPATIENT)
Dept: RHEUMATOLOGY | Facility: CLINIC | Age: 65
End: 2025-05-08
Payer: MEDICARE

## 2025-05-08 ENCOUNTER — HOSPITAL ENCOUNTER (OUTPATIENT)
Dept: RADIOLOGY | Facility: HOSPITAL | Age: 65
Discharge: HOME | End: 2025-05-08
Attending: FAMILY MEDICINE
Payer: MEDICARE

## 2025-05-08 VITALS — HEIGHT: 63 IN | BODY MASS INDEX: 28.32 KG/M2 | WEIGHT: 159.81 LBS

## 2025-05-08 DIAGNOSIS — M81.0 AGE-RELATED OSTEOPOROSIS WITHOUT CURRENT PATHOLOGICAL FRACTURE: Primary | Chronic | ICD-10-CM

## 2025-05-08 DIAGNOSIS — M81.0 AGE-RELATED OSTEOPOROSIS WITHOUT CURRENT PATHOLOGICAL FRACTURE: Primary | ICD-10-CM

## 2025-05-08 DIAGNOSIS — Z12.31 ENCOUNTER FOR SCREENING MAMMOGRAM FOR BREAST CANCER: ICD-10-CM

## 2025-05-08 PROCEDURE — 77063 BREAST TOMOSYNTHESIS BI: CPT | Mod: 26,,, | Performed by: RADIOLOGY

## 2025-05-08 PROCEDURE — 77063 BREAST TOMOSYNTHESIS BI: CPT | Mod: TC

## 2025-05-08 PROCEDURE — 77067 SCR MAMMO BI INCL CAD: CPT | Mod: 26,,, | Performed by: RADIOLOGY

## 2025-05-08 NOTE — TELEPHONE ENCOUNTER
External referral ordered   Please fax to [facility] Rheumatology Department

## 2025-05-08 NOTE — TELEPHONE ENCOUNTER
Notified pt that rheum referral was sent to [facility] to [phone] w/ conf. Pt verbalized understanding and was grateful for the call-DD

## 2025-05-08 NOTE — TELEPHONE ENCOUNTER
----- Message from Gale sent at 5/8/2025  8:26 AM CDT -----  Contact: eCm  ..Type:  Patient Requesting CallWho Called: Anaes the patient know what this is regarding?: pt wants to be referred to [facility] Would the patient rather a call back or a response via [facility]? Call Best Call Back Number: .[phone] (home) Additional Information:

## 2025-05-09 ENCOUNTER — RESULTS FOLLOW-UP (OUTPATIENT)
Dept: INTERNAL MEDICINE | Facility: CLINIC | Age: 65
End: 2025-05-09

## 2025-05-12 ENCOUNTER — OFFICE VISIT (OUTPATIENT)
Dept: NEUROLOGY | Facility: CLINIC | Age: 65
End: 2025-05-12
Payer: MEDICARE

## 2025-05-12 VITALS
BODY MASS INDEX: 28.14 KG/M2 | WEIGHT: 158.81 LBS | HEIGHT: 63 IN | DIASTOLIC BLOOD PRESSURE: 84 MMHG | SYSTOLIC BLOOD PRESSURE: 131 MMHG | HEART RATE: 82 BPM

## 2025-05-12 DIAGNOSIS — M62.838 MUSCLE SPASM: ICD-10-CM

## 2025-05-12 DIAGNOSIS — G35 MULTIPLE SCLEROSIS: Primary | ICD-10-CM

## 2025-05-12 DIAGNOSIS — R29.6 FREQUENT FALLS: ICD-10-CM

## 2025-05-12 DIAGNOSIS — M54.50 CHRONIC BILATERAL LOW BACK PAIN WITHOUT SCIATICA: ICD-10-CM

## 2025-05-12 DIAGNOSIS — Z71.89 COUNSELING REGARDING GOALS OF CARE: ICD-10-CM

## 2025-05-12 DIAGNOSIS — G89.29 CHRONIC BILATERAL LOW BACK PAIN WITHOUT SCIATICA: ICD-10-CM

## 2025-05-12 DIAGNOSIS — R26.9 GAIT DISTURBANCE: ICD-10-CM

## 2025-05-12 PROCEDURE — 3008F BODY MASS INDEX DOCD: CPT | Mod: CPTII,S$GLB,, | Performed by: PSYCHIATRY & NEUROLOGY

## 2025-05-12 PROCEDURE — 99215 OFFICE O/P EST HI 40 MIN: CPT | Mod: S$GLB,,, | Performed by: PSYCHIATRY & NEUROLOGY

## 2025-05-12 PROCEDURE — 3075F SYST BP GE 130 - 139MM HG: CPT | Mod: CPTII,S$GLB,, | Performed by: PSYCHIATRY & NEUROLOGY

## 2025-05-12 PROCEDURE — 1159F MED LIST DOCD IN RCRD: CPT | Mod: CPTII,S$GLB,, | Performed by: PSYCHIATRY & NEUROLOGY

## 2025-05-12 PROCEDURE — 99999 PR PBB SHADOW E&M-EST. PATIENT-LVL IV: CPT | Mod: PBBFAC,,, | Performed by: PSYCHIATRY & NEUROLOGY

## 2025-05-12 PROCEDURE — 3079F DIAST BP 80-89 MM HG: CPT | Mod: CPTII,S$GLB,, | Performed by: PSYCHIATRY & NEUROLOGY

## 2025-05-12 PROCEDURE — G2211 COMPLEX E/M VISIT ADD ON: HCPCS | Mod: S$GLB,,, | Performed by: PSYCHIATRY & NEUROLOGY

## 2025-05-12 PROCEDURE — 1160F RVW MEDS BY RX/DR IN RCRD: CPT | Mod: CPTII,S$GLB,, | Performed by: PSYCHIATRY & NEUROLOGY

## 2025-05-12 NOTE — PROGRESS NOTES
Subjective:          Patient ID: Cem Meyers is a 64 y.o. female who presents today for a routine  visit for MS.      NEURO MULTIPLE SCLEROISIS SUMMARY:   Principle neurological diagnosis:  MS  Disease type at diagnosis:  Relapsing-Remitting MS  Disease type currently:  Secondarily Progressive MS  Current Therapy:  None  Last MRI Brain:  2/3/2025  Last MRI C-Spine:  6/29/2021  Last MRI T-Spine:  6/29/2021       MS HPI:  DMT: None  Taking vitamin D3 as recommended? Yes - 4000IU   She has been on Avonex, Copaxone, Tysabri, Rebif, Aubagio, Plegridy, Novantrone, and Ocrevus.   Having increased stress lately. Her father went to a SNF after his left hip fracture, then fell and broke his right hip. Goes to visit him regularly.  Feels she is more irritable and fatigued  Continues to have numbness of bilateral shin and foot--noticing it more lately due to trying to get more done  Fell and hit her chin in 1/2025, got stitches. Has had 2-3 other falls due to her shoes catching on carpet  Had fever in 4/2025 with congestion when she also developed difficulty with walking that got better with treating the fever.  Developed low back pain a few months ago. Wears a back brace that doesn't quite reach her low back.  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    cholecalciferol, vitamin D3, 100 mcg (4,000 unit) Cap Take 4,000 Units by mouth once daily.     denosumab (PROLIA SUBQ) Inject into the skin. Every 6 months    gabapentin (NEURONTIN) 800 MG tablet TAKE 1 TABLET BY MOUTH 3 TIMES  DAILY    ketoconazole (NIZORAL) 2 % cream Apply topically 2 (two) times daily.    lubiprostone (AMITIZA) 24 MCG Cap Take 1 capsule (24 mcg total) by mouth 2 (two)  times daily.    multivitamin capsule Take 1 capsule by mouth once daily.    paroxetine (PAXIL) 40 MG tablet Take 1 tablet (40 mg total) by mouth every morning.    simvastatin (ZOCOR) 40 MG tablet Take 1 tablet (40 mg total) by mouth once daily.    triamcinolone acetonide 0.025% (KENALOG) 0.025 % cream AAA bid    levothyroxine (SYNTHROID) 75 MCG tablet Take 1 tablet (75 mcg total) by mouth before breakfast.     No current facility-administered medications for this visit.       SOCIAL HISTORY  Social History[1]    Living arrangements - the patient lives alone.            5/5/2025     9:48 AM   REVIEW OF SYMPTOMS   Do you feel abnormally tired on most days? Yes   Do you feel you generally sleep well? Yes   Do you have difficulty controlling your bladder?  No   Do you have difficulty controlling your bowels?  No   Do you have frequent muscle cramps, tightness or spasms in your limbs?  No   Do you have new visual symptoms?  No   Do you have worsening difficulty with your memory or thinking? Yes   Do you have worsening symptoms of anxiety or depression?  No   For patients who walk, Do you have more difficulty walking?  Yes   Have you fallen since your last visit?  Yes   For patients who use wheelchairs: Do you have any skin wounds or breakdown? Not Applicable   Do you have difficulty using your hands?  Yes   Do you have shooting or burning pain? No   Do you have difficulty with sexual function?  No   If you are sexually active, are you using birth control? Y/N  N/A Not Applicable   Do you often choke when swallowing liquids or solid food?  No   Do you experience worsening symptoms when overheated? Yes   Do you need any new equipment such as a wheelchair, walker or shower chair? Yes   Do you receive co-pay financial assistance for your principal MS medicine? Not Applicable   Would you be interested in participating in an MS research trial in the future? Not Applicable   For patients on Gilenya, Tecfidera, Aubagio, Rituxan,  Ocrevus, Tysabri, Lemtrada or Methotrexate, are you aware that you should NOT receive live virus vaccines?  Not Applicable   Do you feel you have adequate family/friend support?  Yes   Do you have health insurance?   Yes   Are you currently employed? Yes   Do you receive SSDI/SSI?  Not Applicable   Do you use marijuana or cannabis products? Yes   How often? Monthly   Have you been diagnosed with a urinary tract infection since your last visit here? Yes   Have you been diagnosed with a respiratory tract infection since your last visit here? No   Have you been to the emergency room since your last visit here? Yes   Have you been hospitalized since your last visit here?  No           5/5/2025     9:56 AM   FSS SCORE & INTERPRETATION   FSS SCORE  40    FSS SCORE INTERPRETATION May be suffering from fatigue        Patient-reported         5/5/2025     9:54 AM   MS JAZMIN-D SCORE & INTERPRETATION   JAZMIN-D SCORE  21    JAZMIN-D INTERPRETATION  Mild to moderate Depression        Patient-reported         5/5/2025     9:50 AM   MS TOR-7 SCORE & INTERPRETATION   TOR-7 SCORE  3    TOR-7 SCORE INTERPRETATION Normal        Patient-reported         5/5/2025     9:56 AM   PEQ MS MOS PAIN EFFECTS SCORE & INTERPRETATION   PES SCORE 8    PES SCORE INTERPRETATION Scores can range from 6-30.  Items are scaled so that higher scores indicate a greater impact of pain on a patients mood and behavior.        Patient-reported          No data to display                  5/5/2025     9:58 AM   MS BLADDER CONTROL SCORE & INTERPRETATION   BLCS SCORE 6    BLCS SCORE INTERPRETATION  Scores can range from 0-22, with higher scores indicating greater bladder control problems.        Patient-reported         5/5/2025    10:01 AM   MS BOWEL CONTROL SCORE & INTERPRETATION   BWCS SCORE 6    BWCS SCORE INTERPRETATION Scores can range from 0-26, with higher scores indicating greater bowel control problems.        Patient-reported         5/5/2025     9:58 AM    PEQ MS IMPACT OF VISUAL IMPAIRMENT SCORE & INTERPRETATION   HEIDI SCALE SCORE  0    HEIDI SCORE INTERPRETATION Scores can range from 0-15, with higher scores indicating greater impact of visual problems on daily activites.        Patient-reported         5/5/2025    10:00 AM   MS PDQ SCORE & INTERPRETATION   PDQ RETROSPECTIVE MEMORY SUBSCALE 8    PDQ ATTENTION/CONCENTRATION SUBSCALE 6    PDQ PROSPECTIVE MEMORY SUBSCALE 8    PDQ PLANNING/ORGANIZATION SUBSCALE 11    PDQ TOTAL SCORE 33    PDQ SCORE INTERPRETATION Scores can range from 0-80, with higher scores indicating greater perceived cognitive impairment.        Patient-reported         5/5/2025    10:03 AM   MSSS SCORE & INTERPRETATION   MSSS TANGIBLE SUPPORT SUBSCALE 50    MSSS EMOTIONAL/INFORMATIONAL SUPPORT SUBSCALE 87.5    MSSS AFFECTIONATE SUPPORT SUBSCALE 83.33    MSSS POSITIVE SOCIAL INTERACTION SUBSCALE 100    MSSS TOTAL SCORE 80.21    MSSS SCORE INTERPRETATION Scores can range from 0-100, with higher scores indicating greater perceived support.        Patient-reported               Objective:              3/4/2024     1:00 PM 5/12/2025     1:40 PM   Timed 25 Foot Walk:   Did patient wear an AFO? No No   Was assistive device used?  Yes   Assistive device used (debbie one): Unilateral Assistance Bilateral Assistance   Unilateral device used Cane    Bilateral device used  Walker/Rollator   Time for 25 Foot Walk (seconds) 10.45 6.1   Time for 25 Foot Walk (seconds)  6              No data to display                       No data to display                 Neurological Exam  Mental Status  Awake, alert and oriented to person, place and time. Speech is normal. Attention and concentration are normal. Fund of knowledge is appropriate for level of education.    Cranial Nerves  CN III, IV, VI: Extraocular movements intact bilaterally.  CN V:  Right: Facial sensation is normal.  Left: Facial sensation is normal on the left.  CN VII:  Right: There is no facial  weakness.  Left: There is no facial weakness.  CN VIII:  Right: Hearing is normal.  Left: Hearing is normal.  CN IX, X: Palate elevates symmetrically  CN XI:  Right: Trapezius strength is normal.  Left: Trapezius strength is normal.  CN XII: Tongue midline without atrophy or fasciculations.    Motor  Normal muscle bulk throughout.  5/5 throughout BUE and RLE. 4/5 L hip flexion and knee flexion; 5/5 L knee extension.        Imaging:     Results for orders placed during the hospital encounter of 02/03/25    MRI Brain Demyelinating Without Contrast    Impression  No acute abnormality or detrimental change      Electronically signed by: Jed Vazquez MD  Date:    02/03/2025  Time:    08:05    No results found for this or any previous visit.    No results found for this or any previous visit.    Results for orders placed during the hospital encounter of 09/08/22    MRI Brain Demyelinating W W/O Contrast    Impression  Findings in the cerebral white matter which are typical for multiple sclerosis.  No new or enhancing lesions to suggest active disease.      Electronically signed by: Ye Sam MD  Date:    09/08/2022  Time:    15:26    Results for orders placed during the hospital encounter of 06/29/21    MRI Cervical Spine Demyelinating W W/O Contrast    Impression  1. No significant change in the appearance of the previously noted MS plaques.  No new MS plaques are identified.  No enhancement identified to suggest active demyelination.      Electronically signed by: Kyle Keith DO  Date:    06/29/2021  Time:    11:44    Results for orders placed during the hospital encounter of 06/29/21    MRI Thoracic Spine Demyelinating W W/O Contrast    Impression  1. Stable appearance of the previously noted MS plaques.  No new lesions are suggested.  No abnormal enhancement identified to suggest active demyelination.      Electronically signed by: Kyle Keith DO  Date:    06/29/2021  Time:    11:03        Labs:  "    Lab Results   Component Value Date    [id number] 86 08/07/2024    OANBZRRZ46JE 67 01/31/2024    [id number] 63 07/24/2023     No results found for: "JCVINDEX", "JCVANTIBODY"  @resufast(NEUROLGTCHN:3)   No results found for: "MS2QGRVJ", "ABSOLUTECD3", "WV0RYUIV", "ABSOLUTECD8", "UU5ONVLX", "ABSOLUTECD4", "LABCD48"  Lab Results   Component Value Date    WBC 6.84 03/27/2025    RBC 4.27 03/27/2025    HGB 13.6 03/27/2025    HCT 42.0 03/27/2025    MCV 98 03/27/2025    MCH 31.9 03/27/2025    MCHC 32.4 03/27/2025    RDW 12.5 03/27/2025     03/27/2025    MPV 9.9 03/27/2025    GRAN 5.0 01/21/2025    GRAN 66.2 01/21/2025    LYMPH 32.9 03/27/2025    LYMPH 2.25 03/27/2025    MONO 10.5 03/27/2025    MONO 0.72 03/27/2025    EOS 0.4 03/27/2025    EOS 0.03 03/27/2025    BASO 0.04 01/21/2025    EOSINOPHIL 0.5 01/21/2025    BASOPHIL 0.7 03/27/2025    BASOPHIL 0.05 03/27/2025     Sodium   Date Value Ref Range Status   03/27/2025 135 (L) 136 - 145 mmol/L Final   01/21/2025 141 136 - 145 mmol/L Final     Potassium   Date Value Ref Range Status   03/27/2025 4.6 3.5 - 5.1 mmol/L Final   01/21/2025 3.8 3.5 - 5.1 mmol/L Final     Chloride   Date Value Ref Range Status   03/27/2025 103 95 - 110 mmol/L Final   01/21/2025 107 95 - 110 mmol/L Final     CO2   Date Value Ref Range Status   03/27/2025 24 23 - 29 mmol/L Final   01/21/2025 22 (L) 23 - 29 mmol/L Final     Glucose   Date Value Ref Range Status   03/27/2025 84 70 - 110 mg/dL Final   01/21/2025 76 70 - 110 mg/dL Final     BUN   Date Value Ref Range Status   03/27/2025 14 8 - 23 mg/dL Final     Creatinine   Date Value Ref Range Status   03/27/2025 1.2 0.5 - 1.4 mg/dL Final     Calcium   Date Value Ref Range Status   03/27/2025 10.6 (H) 8.7 - 10.5 mg/dL Final   01/21/2025 9.5 8.7 - 10.5 mg/dL Final     Protein Total   Date Value Ref Range Status   03/27/2025 8.0 6.0 - 8.4 gm/dL Final     Total Protein   Date Value Ref Range Status   01/21/2025 7.4 6.0 - 8.4 g/dL Final "     Albumin   Date Value Ref Range Status   03/27/2025 4.0 3.5 - 5.2 g/dL Final   01/21/2025 3.8 3.5 - 5.2 g/dL Final     Total Bilirubin   Date Value Ref Range Status   01/21/2025 0.3 0.1 - 1.0 mg/dL Final     Comment:     For infants and newborns, interpretation of results should be based  on gestational age, weight and in agreement with clinical  observations.    Premature Infant recommended reference ranges:  Up to 24 hours.............<8.0 mg/dL  Up to 48 hours............<12.0 mg/dL  3-5 days..................<15.0 mg/dL  6-29 days.................<15.0 mg/dL       Bilirubin Total   Date Value Ref Range Status   03/27/2025 0.4 0.1 - 1.0 mg/dL Final     Comment:     For infants and newborns, interpretation of results should be based   on gestational age, weight and in agreement with clinical   observations.    Premature Infant recommended reference ranges:   0-24 hours:  <8.0 mg/dL   24-48 hours: <12.0 mg/dL   3-5 days:    <15.0 mg/dL   6-29 days:   <15.0 mg/dL     Alkaline Phosphatase   Date Value Ref Range Status   01/21/2025 64 40 - 150 U/L Final     ALP   Date Value Ref Range Status   03/27/2025 106 40 - 150 unit/L Final     AST   Date Value Ref Range Status   03/27/2025 42 11 - 45 unit/L Final   01/21/2025 31 10 - 40 U/L Final     ALT   Date Value Ref Range Status   03/27/2025 18 10 - 44 unit/L Final   01/21/2025 16 10 - 44 U/L Final     Anion Gap   Date Value Ref Range Status   03/27/2025 8 8 - 16 mmol/L Final     eGFR if    Date Value Ref Range Status   06/17/2022 >60 >60 mL/min/1.73 m^2 Final     eGFR if non    Date Value Ref Range Status   06/17/2022 >60 >60 mL/min/1.73 m^2 Final     Comment:     Calculation used to obtain the estimated glomerular filtration  rate (eGFR) is the CKD-EPI equation.        Lab Results   Component Value Date    HEPBSAG Negative 07/17/2019    HEPBSAB Negative 07/17/2019    HEPBCAB Negative 07/17/2019           MS Impression and Plan:     NEURO  MULTIPLE SCLEROSIS IMPRESSION:   Number of relapses in the past year?:  0  Clinical Progression:  Clinically Stable  MRI Progression:  Stable  MS Classification:  Secondarily Progressive MS  Current DMT: none  DMT:  No change in management  DMT comment:  Discussed tolebrutinib; FDA approval expected in Sept  She's open to this option  Will see her in October for VV to discuss  Symptom Management:  Implement change in symptom management  Implement Change in Symptom Management:  Gait  Implement Change in Symptom Management comment: Refer to PT for falls and lumbago  Refer to ortho for lumbago         Problem List Items Addressed This Visit    None  Visit Diagnoses         Multiple sclerosis    -  Primary    Relevant Orders    Ambulatory referral/consult to Physical/Occupational Therapy      Chronic bilateral low back pain without sciatica        Relevant Orders    Ambulatory referral/consult to Physical/Occupational Therapy    Ambulatory referral/consult to Orthopedics      Frequent falls        Relevant Orders    Ambulatory referral/consult to Physical/Occupational Therapy      Counseling regarding goals of care          Gait disturbance          Muscle spasm                Rebeka Smith MD    I spent a total of 40 minutes on the day of the visit.This includes face to face time and non-face to face time preparing to see the patient (eg, review of tests), obtaining and/or reviewing separately obtained history, documenting clinical information in the electronic or other health record, independently interpreting results and communicating results to the patient/family/caregiver, or care coordinator.  Visit today included increased complexity associated with the care of the episodic problem : chronic immunotherapy; addressed and managing the longitudinal care of the patient due to the serious and/or complex managed problem(s) MS.         [1]   Social History  Tobacco Use    Smoking status: Never     Passive exposure:  Never    Smokeless tobacco: Never   Substance Use Topics    Alcohol use: Yes     Comment: occasional    Drug use: No

## 2025-05-13 ENCOUNTER — OFFICE VISIT (OUTPATIENT)
Dept: URGENT CARE | Facility: CLINIC | Age: 65
End: 2025-05-13
Payer: MEDICARE

## 2025-05-13 ENCOUNTER — PATIENT MESSAGE (OUTPATIENT)
Dept: PSYCHIATRY | Facility: CLINIC | Age: 65
End: 2025-05-13
Payer: MEDICARE

## 2025-05-13 VITALS
BODY MASS INDEX: 28.18 KG/M2 | SYSTOLIC BLOOD PRESSURE: 106 MMHG | WEIGHT: 159.06 LBS | TEMPERATURE: 98 F | OXYGEN SATURATION: 96 % | HEIGHT: 63 IN | DIASTOLIC BLOOD PRESSURE: 80 MMHG | RESPIRATION RATE: 20 BRPM | HEART RATE: 92 BPM

## 2025-05-13 DIAGNOSIS — H60.391 ACUTE INFECTION OF RIGHT PINNA: Primary | ICD-10-CM
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
## 2025-05-13 PROCEDURE — 99213 OFFICE O/P EST LOW 20 MIN: CPT | Mod: S$GLB,,, | Performed by: PHYSICIAN ASSISTANT

## 2025-05-13 RX ORDER — PREDNISONE 20 MG/1
20 TABLET ORAL DAILY
Qty: 5 TABLET | Refills: 0 | Status: SHIPPED | OUTPATIENT
Start: 2025-05-13 | End: 2025-05-18

## 2025-05-13 RX ORDER — CEPHALEXIN 500 MG/1
500 CAPSULE ORAL EVERY 12 HOURS
Qty: 14 CAPSULE | Refills: 0 | Status: SHIPPED | OUTPATIENT
Start: 2025-05-13 | End: 2025-05-20

## 2025-05-13 NOTE — PROGRESS NOTES
"Subjective:      Patient ID: Cem Meyers is a 64 y.o. female.    Vitals:  height is 5' 3" (1.6 m) and weight is 72.1 kg (159 lb 1 oz). Her tympanic temperature is 98 °F (36.7 °C). Her blood pressure is 106/80 and her pulse is 92. Her respiration is 20 and oxygen saturation is 96%.     Chief Complaint: Insect Bite    Onset sx 5/10/2025 insects attacked her right ear while outside on Saturday. Pt is just itching severely with draining of the ear and the ear appears to be swollen and red. Pt has used itch stopping cream to help alleviate with no relief.     Insect Bite  This is a new problem. The current episode started yesterday. The problem occurs constantly. The problem has been unchanged. Associated symptoms include a visual change. Nothing aggravates the symptoms. Treatments tried: itch cream. The treatment provided no relief.       Skin:  Negative for erythema.      Objective:     Physical Exam   Constitutional: She is oriented to person, place, and time. She appears well-developed.   HENT:   Head: Normocephalic and atraumatic. Head is without abrasion, without contusion and without laceration.       Ears:   Right Ear: External ear normal.   Left Ear: External ear normal.   Nose: Nose normal.   Mouth/Throat: Oropharynx is clear and moist and mucous membranes are normal.   Eyes: Conjunctivae, EOM and lids are normal. Pupils are equal, round, and reactive to light.   Neck: Trachea normal and phonation normal. Neck supple.   Cardiovascular: Normal rate, regular rhythm and normal heart sounds.   Pulmonary/Chest: Effort normal and breath sounds normal. No stridor. No respiratory distress.   Musculoskeletal: Normal range of motion.         General: Normal range of motion.   Neurological: She is alert and oriented to person, place, and time.   Skin: Skin is warm, dry, intact and no rash. Capillary refill takes less than 2 seconds. No abrasion, No burn, No bruising, No erythema and No ecchymosis   Psychiatric: Her " speech is normal and behavior is normal. Judgment and thought content normal.   Nursing note and vitals reviewed.      Assessment:     1. Acute infection of right pinna    2. Bug bite with infection, initial encounter        Plan:       Acute infection of right pinna  -     cephALEXin (KEFLEX) 500 MG capsule; Take 1 capsule (500 mg total) by mouth every 12 (twelve) hours. for 7 days  Dispense: 14 capsule; Refill: 0  -     predniSONE (DELTASONE) 20 MG tablet; Take 1 tablet (20 mg total) by mouth once daily. for 5 days  Dispense: 5 tablet; Refill: 0    Bug bite with infection, initial encounter

## 2025-05-23 ENCOUNTER — OFFICE VISIT (OUTPATIENT)
Dept: CARDIOLOGY | Facility: CLINIC | Age: 65
End: 2025-05-23
Payer: MEDICARE

## 2025-05-23 VITALS
DIASTOLIC BLOOD PRESSURE: 82 MMHG | WEIGHT: 162.94 LBS | BODY MASS INDEX: 28.86 KG/M2 | OXYGEN SATURATION: 94 % | HEART RATE: 69 BPM | SYSTOLIC BLOOD PRESSURE: 128 MMHG

## 2025-05-23 DIAGNOSIS — E78.49 OTHER HYPERLIPIDEMIA: ICD-10-CM

## 2025-05-23 DIAGNOSIS — E03.8 OTHER SPECIFIED HYPOTHYROIDISM: ICD-10-CM

## 2025-05-23 DIAGNOSIS — I50.32 CHRONIC DIASTOLIC HEART FAILURE: Primary | ICD-10-CM

## 2025-05-23 DIAGNOSIS — R09.89 CAROTID BRUIT, UNSPECIFIED LATERALITY: ICD-10-CM

## 2025-05-23 DIAGNOSIS — R07.9 CHEST PAIN, UNSPECIFIED TYPE: ICD-10-CM

## 2025-05-23 DIAGNOSIS — G47.33 OSA ON CPAP: ICD-10-CM

## 2025-05-23 DIAGNOSIS — G35 MS (MULTIPLE SCLEROSIS): ICD-10-CM

## 2025-05-23 DIAGNOSIS — R26.9 NEUROLOGIC GAIT DYSFUNCTION: ICD-10-CM

## 2025-05-23 DIAGNOSIS — E78.2 MIXED HYPERLIPIDEMIA: ICD-10-CM

## 2025-05-23 PROCEDURE — 99999 PR PBB SHADOW E&M-EST. PATIENT-LVL III: CPT | Mod: PBBFAC,,, | Performed by: INTERNAL MEDICINE

## 2025-05-23 RX ORDER — SIMVASTATIN 40 MG/1
40 TABLET, FILM COATED ORAL DAILY
Qty: 90 TABLET | Refills: 3 | Status: SHIPPED | OUTPATIENT
Start: 2025-05-23

## 2025-05-23 NOTE — PROGRESS NOTES
Subjective:   Patient ID:  Cem Meyers is a 64 y.o. female who presents for cardiac consult of Annual Exam      The patient came in today for cardiac consult of Annual Exam    Referring Provider: Adonis Stubbs MD   Reason for consult: chest pain    Cem Meyers is a 64 y.o. female pt with current medical conditions HFpEF, HLD, hypothyroidism, MS, MAYKEL, depression, obesity presents for follow up CV evaluation.      5/24/23  She had recent neuro visit for MS - will start Peginterferon B - Plegridy. She lives in [facility] [place] since [place]. Her father has Parkinsons.      5/22/24  Follow up from 5/2023.   BP and HR stable. BMI 31 - 167 lbs   Lipids mildly improving. She had Flu last winter.     5/23/25  BP and HR stable. BMI 28 - 162 lbs  Needs repeat lipids.   Her father fell recently broke both hips.   She is off all meds for MS  Has RLS uses compression stockings    FH - father - Parkinsons    2D ECHO 2/7/18  CONCLUSIONS     1 - No wall motion abnormalities.     2 - Normal left ventricular systolic function (EF 60-65%).     3 - Impaired LV relaxation, normal LAP (grade 1 diastolic dysfunction).     4 - Normal right ventricular systolic function .     5 - The estimated PA systolic pressure is greater than 23 mmHg.         Past Medical History:   Diagnosis Date    Back pain     Broken toe 6/2015    left big toe    Chronic diastolic heart failure 5/8/2018    Closed left arm fracture     Colon polyp     Depression     Hyperlipidemia     Hypothyroid     Immunosuppression 1/28/2019    MS (multiple sclerosis)     Neurogenic bladder 12/6/2012    Osteoporosis     Polyneuropathy     Sleep apnea     Trouble in sleeping        Past Surgical History:   Procedure Laterality Date    BREAST BIOPSY Left 10/28/2020    COLONOSCOPY N/A 09/01/2017    Procedure: COLONOSCOPY;  Surgeon: Andriy Villar MD;  Location: [facility];  Service: Endoscopy;  Laterality: N/A;    COLONOSCOPY N/A 01/06/2021    Procedure: COLONOSCOPY;   Surgeon: Althea Casanova MD;  Location: [facility] ENDO;  Service: Endoscopy;  Laterality: N/A;    COLONOSCOPY N/A 04/25/2022    Procedure: COLONOSCOPY;  Surgeon: Cristiano Aaron MD;  Location: [facility] ENDO;  Service: Endoscopy;  Laterality: N/A;    FRACTURE SURGERY Left 2013    wrist- [facility]    KNEE SURGERY Right [place]    TONSILLECTOMY  1966       Social History     Tobacco Use    Smoking status: Never     Passive exposure: Never    Smokeless tobacco: Never   Substance Use Topics    Alcohol use: Yes     Comment: occasional    Drug use: No       Family History   Problem Relation Name Age of Onset    Pancreatic cancer Mother Brittnee Meyers     Stomach cancer Mother Brittnee Meyers     Cancer Mother Brittnee Meyers         pancreatic, stomach    Hypertension Father Don Meyers     Heart disease Father Don Meyers         MI    Hearing loss Father Don Meyers     Parkinsonism Father Don Meyers     Rheum arthritis Sister      Lupus Maternal Aunt      Rheum arthritis Maternal Aunt      Melanoma Neg Hx         Patient's Medications   New Prescriptions    No medications on file   Previous Medications    BACLOFEN (LIORESAL) 10 MG TABLET    TAKE 2 TABS ONE TIME DAILY IN THE MORNING, 2 TABS IN THE AFTERNOON, AND UP TO 3 TABS AT BEDTIME AS NEEDED    CALCIUM CITRATE (CALCITRATE) 200 MG (950 MG) TABLET    Take 2 tablets by mouth once daily.    CHOLECALCIFEROL, VITAMIN D3, 100 MCG (4,000 UNIT) CAP    Take 4,000 Units by mouth once daily.     DENOSUMAB (PROLIA SUBQ)    Inject into the skin. Every 6 months    GABAPENTIN (NEURONTIN) 800 MG TABLET    TAKE 1 TABLET BY MOUTH 3 TIMES  DAILY    KETOCONAZOLE (NIZORAL) 2 % CREAM    Apply topically 2 (two) times daily.    LEVOTHYROXINE (SYNTHROID) 75 MCG TABLET    Take 1 tablet (75 mcg total) by mouth before breakfast.    LUBIPROSTONE (AMITIZA) 24 MCG CAP    Take 1 capsule (24 mcg total) by mouth 2 (two) times daily.    MULTIVITAMIN CAPSULE    Take 1 capsule by mouth once daily.     PAROXETINE (PAXIL) 40 MG TABLET    Take 1 tablet (40 mg total) by mouth every morning.    TRIAMCINOLONE ACETONIDE 0.025% (KENALOG) 0.025 % CREAM    AAA bid   Modified Medications    Modified Medication Previous Medication    SIMVASTATIN (ZOCOR) 40 MG TABLET simvastatin (ZOCOR) 40 MG tablet       Take 1 tablet (40 mg total) by mouth once daily.    Take 1 tablet (40 mg total) by mouth once daily.   Discontinued Medications    No medications on file       Review of Systems   Constitutional: Negative.    HENT: Negative.     Eyes: Negative.    Respiratory:  Negative for shortness of breath.    Cardiovascular:  Positive for chest pain. Negative for palpitations.   Gastrointestinal: Negative.    Genitourinary: Negative.    Musculoskeletal:  Positive for back pain, falls, joint pain and myalgias.   Skin: Negative.    Neurological:  Negative for dizziness and headaches.   Endo/Heme/Allergies: Negative.    Psychiatric/Behavioral: Negative.     All 12 systems otherwise negative.      Wt Readings from Last 3 Encounters:   05/23/25 73.9 kg (162 lb 14.7 oz)   05/13/25 72.1 kg (159 lb 1 oz)   05/12/25 72 kg (158 lb 13.5 oz)     Temp Readings from Last 3 Encounters:   05/13/25 98 °F (36.7 °C) (Tympanic)   04/23/25 98.3 °F (36.8 °C) (Oral)   03/27/25 98.3 °F (36.8 °C)     BP Readings from Last 3 Encounters:   05/23/25 128/82   05/13/25 106/80   05/12/25 131/84     Pulse Readings from Last 3 Encounters:   05/23/25 69   05/13/25 92   05/12/25 82       /82 (BP Location: Right arm, Patient Position: Sitting)   Pulse 69   Wt 73.9 kg (162 lb 14.7 oz)   SpO2 (!) 94%   BMI 28.86 kg/m²     Objective:   Physical Exam  Vitals and nursing note reviewed.   Constitutional:       General: She is not in acute distress.     Appearance: She is well-developed. She is not diaphoretic.   HENT:      Head: Normocephalic and atraumatic.      Nose: Nose normal.   Eyes:      General: No scleral icterus.     Conjunctiva/sclera: Conjunctivae normal.    Neck:      Thyroid: No thyromegaly.      Vascular: No JVD.   Cardiovascular:      Rate and Rhythm: Normal rate and regular rhythm.      Heart sounds: S1 normal and S2 normal. No murmur heard.     No friction rub. No gallop. No S3 or S4 sounds.   Pulmonary:      Effort: Pulmonary effort is normal. No respiratory distress.      Breath sounds: Normal breath sounds. No stridor. No wheezing or rales.   Chest:      Chest wall: No tenderness.   Abdominal:      General: Bowel sounds are normal. There is no distension.      Palpations: Abdomen is soft. There is no mass.      Tenderness: There is no abdominal tenderness. There is no rebound.   Genitourinary:     Comments: Deferred  Musculoskeletal:         General: No tenderness or deformity. Normal range of motion.      Cervical back: Normal range of motion and neck supple.   Lymphadenopathy:      Cervical: No cervical adenopathy.   Skin:     General: Skin is warm and dry.      Coloration: Skin is not pale.      Findings: No erythema or rash.   Neurological:      Mental Status: She is alert and oriented to person, place, and time.      Motor: No abnormal muscle tone.      Coordination: Coordination normal.   Psychiatric:         Behavior: Behavior normal.         Thought Content: Thought content normal.         Judgment: Judgment normal.         Lab Results   Component Value Date     (L) 03/27/2025     01/21/2025    K 4.6 03/27/2025    K 3.8 01/21/2025     03/27/2025     01/21/2025    CO2 24 03/27/2025    CO2 22 (L) 01/21/2025    BUN 14 03/27/2025    CREATININE 1.2 03/27/2025    GLU 84 03/27/2025    GLU 76 01/21/2025    HGBA1C 5.6 05/06/2024    MG 2.3 10/16/2018    AST 42 03/27/2025    AST 31 01/21/2025    ALT 18 03/27/2025    ALT 16 01/21/2025    ALBUMIN 4.0 03/27/2025    ALBUMIN 3.8 01/21/2025    PROT 8.0 03/27/2025    PROT 7.4 01/21/2025    BILITOT 0.4 03/27/2025    BILITOT 0.3 01/21/2025    WBC 6.84 03/27/2025    HGB 13.6 03/27/2025    HGB 12.5  01/21/2025    HCT 42.0 03/27/2025    HCT 39.7 01/21/2025    MCV 98 03/27/2025     (H) 01/21/2025     03/27/2025     01/21/2025    INR 1.0 08/21/2022    TSH 1.534 05/06/2024    CHOL 165 05/06/2024    HDL 56 05/06/2024    LDLCALC 94.8 05/06/2024    TRIG 71 05/06/2024    BNP 51 12/27/2023     Assessment:      1. Chronic diastolic heart failure    2. MAYKEL on CPAP    3. Other specified hypothyroidism    4. MS (multiple sclerosis)    5. Mixed hyperlipidemia    6. Chest pain, unspecified type    7. Neurologic gait dysfunction    8. Other hyperlipidemia    9. BMI 28.0-28.9,adult    10. Carotid bruit, unspecified laterality          Plan:   1. Chest pain, atypical    - 2D ECHO overall normal function without valve disease  - diastolic HF - euvolemic  - discussed stress test if more symptomatic/worse  - consider other causes of CP - MSK/esoph/pulm/anxiety    2. HLD  - cont statin  - order carotid u/s     3. Hypothyrodism, TSH 1.35  - cont synthroid, needs to take in early AM empty stomach     4. MS with gait dysfunction   - cont meds per PCP/Neuro  - will start Peginterferon B - Plegridy.  - changed to Rebif  - off treatment    5. MAYKEL, has RLS   - cont CPAP    6. Diastolic HF with edema   - pt euvolemic  - cont to monitor  - mild venous insuff    7. Obesity  - BMI 31 - 167 lbs  --> 162 lbs   - cont weight loss     Visit today included increased complexity associated with the care of the episodic problem chest pain addressed and managing the longitudinal care of the patient due to the serious and/or complex managed problem(s) .      Thank you for allowing me to participate in this patient's care. Please do not hesitate to contact me with any questions or concerns. Consult note has been forwarded to the referral physician.

## 2025-05-26 ENCOUNTER — OFFICE VISIT (OUTPATIENT)
Dept: ORTHOPEDICS | Facility: CLINIC | Age: 65
End: 2025-05-26
Payer: MEDICARE

## 2025-05-26 ENCOUNTER — HOSPITAL ENCOUNTER (OUTPATIENT)
Dept: RADIOLOGY | Facility: HOSPITAL | Age: 65
Discharge: HOME | End: 2025-05-26
Attending: PHYSICIAN ASSISTANT
Payer: MEDICARE

## 2025-05-26 VITALS — HEIGHT: 63 IN | BODY MASS INDEX: 28.87 KG/M2 | WEIGHT: 162.94 LBS

## 2025-05-26 DIAGNOSIS — M54.50 CHRONIC BILATERAL LOW BACK PAIN WITHOUT SCIATICA: ICD-10-CM

## 2025-05-26 DIAGNOSIS — D84.9 IMMUNOSUPPRESSION: ICD-10-CM

## 2025-05-26 DIAGNOSIS — G89.29 CHRONIC BILATERAL LOW BACK PAIN WITHOUT SCIATICA: ICD-10-CM

## 2025-05-26 DIAGNOSIS — M79.2 NEUROPATHIC PAIN: ICD-10-CM

## 2025-05-26 DIAGNOSIS — M47.816 SPONDYLOSIS OF LUMBAR REGION WITHOUT MYELOPATHY OR RADICULOPATHY: ICD-10-CM

## 2025-05-26 DIAGNOSIS — R29.898 WEAKNESS OF LEFT LEG: ICD-10-CM

## 2025-05-26 DIAGNOSIS — M47.816 LUMBAR FACET JOINT SYNDROME: Primary | ICD-10-CM

## 2025-05-26 DIAGNOSIS — G35 MS (MULTIPLE SCLEROSIS): Chronic | ICD-10-CM

## 2025-05-26 PROCEDURE — 3008F BODY MASS INDEX DOCD: CPT | Mod: CPTII,S$GLB,, | Performed by: PHYSICIAN ASSISTANT

## 2025-05-26 PROCEDURE — 72110 X-RAY EXAM L-2 SPINE 4/>VWS: CPT | Mod: TC,PO

## 2025-05-26 PROCEDURE — 1160F RVW MEDS BY RX/DR IN RCRD: CPT | Mod: CPTII,S$GLB,, | Performed by: PHYSICIAN ASSISTANT

## 2025-05-26 PROCEDURE — 99999 PR PBB SHADOW E&M-EST. PATIENT-LVL IV: CPT | Mod: PBBFAC,,, | Performed by: PHYSICIAN ASSISTANT

## 2025-05-26 PROCEDURE — 1159F MED LIST DOCD IN RCRD: CPT | Mod: CPTII,S$GLB,, | Performed by: PHYSICIAN ASSISTANT

## 2025-05-26 PROCEDURE — 72110 X-RAY EXAM L-2 SPINE 4/>VWS: CPT | Mod: 26,,, | Performed by: RADIOLOGY

## 2025-05-26 PROCEDURE — 99203 OFFICE O/P NEW LOW 30 MIN: CPT | Mod: S$GLB,,, | Performed by: PHYSICIAN ASSISTANT

## 2025-05-26 NOTE — PROGRESS NOTES
Michael Astudillo PA-C  Orthopedic Surgery / Sports Medicine  [facility]      PATIENT ID: Cem Meyers  YOB: 1960  MRN: [id number]    CHIEF COMPLAINT: Back Pain      REFERRED BY: Rebeka Smith    HISTORY OF PRESENT ILLNESS:   Cem Meyers is a 64 y.o. female  with 1 month onset of right-greater-than-left low back pain  History of Present Illness      Patient presents with low back pain that started one month ago with no prior history of back issues. Pain is primarily on the right side and does not radiate to the hip or down the leg. She reports aching pain when standing stationary for long periods. Her left side feels tighter than her right side, describing it as feeling asymmetrical. Pain is relieved when bending forward, stating it provides a stretching sensation, but she experiences mild discomfort when leaning back. She denies any accident or injury precipitating the pain onset.    She describes the pain as irritating. 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    She has some pre-existing numbness and tingling, which she attributes to her baseline condition (likely multiple sclerosis). She denies change in her baseline left leg weakness and no new leg weakness in the right leg. She reports a history of arthritis in her right knee that was previously operated on with cartilage removal per the patient. She also mentions having an atypical gait pattern which she believes may contribute to her current symptoms.    She denies history of diabetes.    No acute axillary injury that preceded the pain other than her working [employer] games and having to stand for the majority of the game.  She tried a back brace unfortunately not significantly helpful.  Now the season has ended she is not working in the games she has noticed since some improvement in her symptoms.  Alleviated with  "sitting and resting.  Denies any radiating pain in the legs.  Denies any bowel or bladder incontinence.  No nausea vomiting fever chills weight loss weight gain.  No other issues or other complaints at this time.    PREVIOUS TREATMENTS:  Patient has been using a back brace during work activities, which provided temporary relief for her symptoms.  She takes baclofen and gabapentin for other diagnoses  No history of physical therapy for her spine.  Recent physical therapy order to [place] from her physician managing her multiple sclerosis    SURGICAL HISTORY:  She has a history of right knee surgery     MEDICAL HISTORY:  Patient has a likely diagnosis of multiple sclerosis. She also has arthritis affecting her right knee.    FAMILY HISTORY:  Family history is not significant for arthritis among family members.        Patient was queried and this is the extent of the patients current complaints today.      PAST MEDICAL HISTORY:   Estimated body mass index is 28.86 kg/m² as calculated from the following:    Height as of this encounter: 5' 3" (1.6 m).    Weight as of this encounter: 73.9 kg (162 lb 14.7 oz).  Past Medical History:   Diagnosis Date    Back pain     Broken toe 6/2015    left big toe    Chronic diastolic heart failure 5/8/2018    Closed left arm fracture     Colon polyp     Depression     Hyperlipidemia     Hypothyroid     Immunosuppression 1/28/2019    MS (multiple sclerosis)     Neurogenic bladder 12/6/2012    Osteoporosis     Polyneuropathy     Sleep apnea     Trouble in sleeping      Past Surgical History:   Procedure Laterality Date    BREAST BIOPSY Left 10/28/2020    COLONOSCOPY N/A 09/01/2017    Procedure: COLONOSCOPY;  Surgeon: Andriy Villar MD;  Location: [facility];  Service: Endoscopy;  Laterality: N/A;    COLONOSCOPY N/A 01/06/2021    Procedure: COLONOSCOPY;  Surgeon: Althea Casanova MD;  Location: [facility];  Service: Endoscopy;  Laterality: N/A;    COLONOSCOPY N/A 04/25/2022    " Procedure: COLONOSCOPY;  Surgeon: Cristiano Aaron MD;  Location: [facility];  Service: Endoscopy;  Laterality: N/A;    FRACTURE SURGERY Left 2013    wrist- [facility]    KNEE SURGERY Right 1989    in [place]    TONSILLECTOMY  1966     Family History   Problem Relation Name Age of Onset    Pancreatic cancer Mother Brittnee Meyers     Stomach cancer Mother Brittnee Meyers     Cancer Mother Brittnee Meyers         pancreatic, stomach    Hypertension Father Don Meyers     Heart disease Father Don Meyers         MI    Hearing loss Father Don Meyers     Parkinsonism Father Don Meyers     Rheum arthritis Sister      Lupus Maternal Aunt      Rheum arthritis Maternal Aunt      Melanoma Neg Hx       Social History[1]  Medication List with Changes/Refills   Current Medications    BACLOFEN (LIORESAL) 10 MG TABLET    TAKE 2 TABS ONE TIME DAILY IN THE MORNING, 2 TABS IN THE AFTERNOON, AND UP TO 3 TABS AT BEDTIME AS NEEDED    CALCIUM CITRATE (CALCITRATE) 200 MG (950 MG) TABLET    Take 2 tablets by mouth once daily.    CHOLECALCIFEROL, VITAMIN D3, 100 MCG (4,000 UNIT) CAP    Take 4,000 Units by mouth once daily.     DENOSUMAB (PROLIA SUBQ)    Inject into the skin. Every 6 months    GABAPENTIN (NEURONTIN) 800 MG TABLET    TAKE 1 TABLET BY MOUTH 3 TIMES  DAILY    KETOCONAZOLE (NIZORAL) 2 % CREAM    Apply topically 2 (two) times daily.    LEVOTHYROXINE (SYNTHROID) 75 MCG TABLET    Take 1 tablet (75 mcg total) by mouth before breakfast.    LUBIPROSTONE (AMITIZA) 24 MCG CAP    Take 1 capsule (24 mcg total) by mouth 2 (two) times daily.    MULTIVITAMIN CAPSULE    Take 1 capsule by mouth once daily.    PAROXETINE (PAXIL) 40 MG TABLET    Take 1 tablet (40 mg total) by mouth every morning.    SIMVASTATIN (ZOCOR) 40 MG TABLET    Take 1 tablet (40 mg total) by mouth once daily.    TRIAMCINOLONE ACETONIDE 0.025% (KENALOG) 0.025 % CREAM    AAA bid     Review of patient's allergies indicates:   Allergen Reactions    Latex, natural rubber Rash      Review of Systems   Constitutional: Negative for chills, fever, night sweats, weight gain and weight loss.   Respiratory:  Negative for shortness of breath.    Skin:  Negative for rash and suspicious lesions.   Musculoskeletal:  Positive for arthritis (lower back and right knee), back pain, joint pain (lower back), muscle weakness (Chronic left lower extremity) and stiffness (lower back).   Gastrointestinal:  Negative for bowel incontinence, nausea and vomiting.   Genitourinary:  Negative for bladder incontinence.   Neurological:  Negative for numbness, paresthesias and sensory change.   Psychiatric/Behavioral:  Negative for altered mental status.        PHYSICAL EXAM:   GENERAL: Well appearing, appropriate for stated age, no acute distress, well nourished.  CARDIOVASCULAR:  No obvious cyanosis, extremities warm and well perfused.  PULMONARY: Normal respiratory effort, even and unlabored respirations.  NEURO: Awake, alert, and oriented x 3. NAD.  PSYCH: Mood & affect are appropriate.  SKIN: No readily visible rashes or skin breakdown.  HEENT: Head is normocephalic and atraumatic.        General Musculoskeletal Exam   Gait: antalgic     Back (L-Spine & T-Spine) / Neck (C-Spine) Exam     Tenderness Posterior midline palpation reveals tenderness of the Lower L-Spine. Right paramedian tenderness of the Lower L-Spine.     Back (L-Spine & T-Spine) Range of Motion   Extension:  abnormal   Flexion:  normal   Lateral bend right:  normal   Lateral bend left:  normal   Rotation right:  normal   Rotation left:  normal     Spinal Sensation   Right Side Sensation  L-Spine Level: normal  Left Side Sensation  L-Spine Level: normal    Back (L-Spine & T-Spine) Tests   Right Side Tests  Femoral Stretch: negative  Left Side Tests  Femoral Stretch: negative    Other   She has scoliosis .    Comments:  Chronic left lower extremity weakness secondary to multiple sclerosis  No change in baseline weakness of the left lower extremity  since the onset of low back pain      Muscle Strength   Right Lower Extremity   Hip Abduction: 5/5   Hip Flexion: 5/5   Hip Extensors: 5/5  Quadriceps:  5/5   Hamstrin/5   Anterior tibial:  5/5   Gastrocsoleus:  5/5   EHL:  5/5  Left Lower Extremity   Hip Abduction: 4/5   Hip Flexion: 4/5   Hip Extensors: 4/5  Quadriceps:  4/5   Hamstrin/5   Anterior tibial:  4/5   Gastrocsoleus:  4/5   EHL:  4/5    Reflexes     Left Side  Achilles:  1+  Quadriceps:  1+    Right Side   Achilles:  2+  Quadriceps:  2+    Vascular Exam     Right Pulses  Dorsalis Pedis:      2+  Posterior Tibial:      2+        Left Pulses  Dorsalis Pedis:      2+  Posterior Tibial:      2+            IMAGING:  Relevant imaging results reviewed and interpreted by me, discussed with the patient and / or family today.     Five view x-ray of the lumbar spine performed at today's office visit AP, lateral, spot, bilateral oblique shows decreased bone density throughout the lumbar spine but no obvious compression deformity.  Reasonable lumbar lordosis.  He has a mild thoracolumbar scoliosis approximately 10°.  Disc space is relatively well-maintained.  There is lumbar facet arthrosis and spondylosis noted at the lower lumbar spine.  No malalignment consistent with spondylolisthesis or retrolisthesis    ASSESSMENT:    Encounter Diagnoses   Name Primary?    Lumbar facet joint syndrome Yes    Chronic bilateral low back pain without sciatica     Spondylosis of lumbar region without myelopathy or radiculopathy     Weakness of left leg     Immunosuppression     MS (multiple sclerosis)     Neuropathic pain       One month onset of low back pain right-greater-than-left with mild thoracolumbar scoliosis and lower lumbar spondylosis likely related to lumbar facet syndrome without radiculopathy    PLAN / MEDICAL DECISION MAKIN.  Medications:    Over-the-counter medications as needed for pain control, we will avoid NSAIDs secondary to past medical  history  Currently takes baclofen and gabapentin for other diagnoses.  Okay to continue   2.  PT/OT:   [facility] [address].  Evaluation and treatment for acute low back pain as well as help for core restrengthening, help with flexibility of the lumbar spine, as well as chronic left lower extremity secondary to MS.  3.  Advanced Imaging:   None   4.  Injections:   None   5.  Referral:    Physical therapy   6.  Return to clinic:    As-needed if pain worsens   Imaging needed at next follow-up:  None  If no improvement by next office visit:  Candidate for MRI scan lumbar spine and referral to spine specialist for possible lumbar RFA versus LUIS ENRIQUE.        I discussed worrisome and red flag signs and symptoms with the patient. The patient expressed understanding and agreed to alert me immediately or to go to the emergency room if they experience any of these.   Treatment plan was developed with input from the patient/family, and they expressed understanding and agreement with the plan. All questions were answered today.           Michael Astudillo PA-C  Sports Medicine Physician Assistant     Disclaimer: This note was prepared using a voice recognition system and is likely to have sound alike errors within the text.          [1]   Social History  Socioeconomic History    Marital status:    Tobacco Use    Smoking status: Never     Passive exposure: Never    Smokeless tobacco: Never   Substance and Sexual Activity    Alcohol use: Yes     Comment: occasional    Drug use: No    Sexual activity: Not Currently     Partners: Male     Birth control/protection: Abstinence, Post-menopausal   Other Topics Concern    Are you pregnant or think you may be? No    Breast-feeding No     Social Drivers of Health     Financial Resource Strain: Medium Risk (5/23/2025)    Overall Financial Resource Strain (CARDIA)     Difficulty of Paying Living Expenses: Somewhat hard   Food  Insecurity: No Food Insecurity (5/23/2025)    Hunger Vital Sign     Worried About Running Out of Food in the Last Year: Never true     Ran Out of Food in the Last Year: Never true   Transportation Needs: No Transportation Needs (5/23/2025)    PRAPARE - Transportation     Lack of Transportation (Medical): No     Lack of Transportation (Non-Medical): No   Physical Activity: Insufficiently Active (5/23/2025)    Exercise Vital Sign     Days of Exercise per Week: 4 days     Minutes of Exercise per Session: 30 min   Stress: Stress Concern Present (5/23/2025)    [employer] [place] [employer] - Occupational Stress Questionnaire     Feeling of Stress : To some extent   Housing Stability: Low Risk  (5/23/2025)    Housing Stability Vital Sign     Unable to Pay for Housing in the Last Year: No     Number of Times Moved in the Last Year: 0     Homeless in the Last Year: No

## 2025-05-26 NOTE — PATIENT INSTRUCTIONS
Assessment:  Cem Meyres is a 64 y.o. female with acute Low back pain (R>L) for the past month    Encounter Diagnoses   Name Primary?    Lumbar facet joint syndrome Yes    Chronic bilateral low back pain without sciatica     Spondylosis of lumbar region without myelopathy or radiculopathy     Weakness of left leg     Immunosuppression     MS (multiple sclerosis)     Neuropathic pain         Plan:  [facility] and treat for low back pain, h/o MS with Left sided weakness  OTC meds for pain control, continue baclofen and gabapentin, will avoid NSAIDs due to PMH    If no improvement by next visit, options include:  MRI lumbar spine  Refer to spine/pain mgmt for lumbar MBB/RFA vs LUIS ENRIQUE    Treatment plan was developed with input from the patient/family, and they expressed understanding and agreement with the plan. All questions were answered today.    Follow-up: prn or sooner if there are any problems between now and then.    Disclaimer: This note was prepared using a voice recognition system and is likely to have sound alike errors within the text.

## 2025-05-27 ENCOUNTER — OFFICE VISIT (OUTPATIENT)
Dept: GASTROENTEROLOGY | Facility: CLINIC | Age: 65
End: 2025-05-27
Payer: MEDICARE

## 2025-05-27 VITALS
SYSTOLIC BLOOD PRESSURE: 105 MMHG | HEIGHT: 63 IN | WEIGHT: 160.06 LBS | BODY MASS INDEX: 28.36 KG/M2 | HEART RATE: 83 BPM | DIASTOLIC BLOOD PRESSURE: 72 MMHG

## 2025-05-27 DIAGNOSIS — K58.1 IRRITABLE BOWEL SYNDROME WITH CONSTIPATION: Primary | ICD-10-CM

## 2025-05-27 DIAGNOSIS — G35 MS (MULTIPLE SCLEROSIS): ICD-10-CM

## 2025-05-27 PROCEDURE — 99213 OFFICE O/P EST LOW 20 MIN: CPT | Mod: S$GLB,,, | Performed by: NURSE PRACTITIONER

## 2025-05-27 PROCEDURE — 3078F DIAST BP <80 MM HG: CPT | Mod: CPTII,S$GLB,, | Performed by: NURSE PRACTITIONER

## 2025-05-27 PROCEDURE — 1160F RVW MEDS BY RX/DR IN RCRD: CPT | Mod: CPTII,S$GLB,, | Performed by: NURSE PRACTITIONER

## 2025-05-27 PROCEDURE — 1159F MED LIST DOCD IN RCRD: CPT | Mod: CPTII,S$GLB,, | Performed by: NURSE PRACTITIONER

## 2025-05-27 PROCEDURE — 99999 PR PBB SHADOW E&M-EST. PATIENT-LVL III: CPT | Mod: PBBFAC,,, | Performed by: NURSE PRACTITIONER

## 2025-05-27 PROCEDURE — 3008F BODY MASS INDEX DOCD: CPT | Mod: CPTII,S$GLB,, | Performed by: NURSE PRACTITIONER

## 2025-05-27 PROCEDURE — 3074F SYST BP LT 130 MM HG: CPT | Mod: CPTII,S$GLB,, | Performed by: NURSE PRACTITIONER

## 2025-05-27 NOTE — PROGRESS NOTES
Clinic Follow Up:  [facility] Follow Up Note    Reason for Follow Up:  The primary encounter diagnosis was Irritable bowel syndrome with constipation. A diagnosis of MS (multiple sclerosis) was also pertinent to this visit.    PCP: Keagan Calles       HPI:  This is a 64 y.o. female here for follow up of   She is does have improvement on Amitiza but does have to digitally disimpact often. She has a bowel movement every 2-3 days. She feels like it get stuck to the side.     Review of Systems   Constitutional:  Negative for activity change and appetite change.        As per interval history above   Respiratory:  Negative for cough and shortness of breath.    Cardiovascular:  Negative for chest pain.   Gastrointestinal:  Positive for abdominal pain and constipation. Negative for diarrhea and vomiting.   Skin:  Negative for color change and rash.       Medical History:  Past Medical History:   Diagnosis Date    Back pain     Broken toe 6/2015    left big toe    Chronic diastolic heart failure 5/8/2018    Closed left arm fracture     Colon polyp     Depression     Hyperlipidemia     Hypothyroid     Immunosuppression 1/28/2019    MS (multiple sclerosis)     Neurogenic bladder 12/6/2012    Osteoporosis     Polyneuropathy     Sleep apnea     Trouble in sleeping        Surgical History:   Past Surgical History:   Procedure Laterality Date    BREAST BIOPSY Left 10/28/2020    COLONOSCOPY N/A 09/01/2017    Procedure: COLONOSCOPY;  Surgeon: Andriy Villar MD;  Location: [facility];  Service: Endoscopy;  Laterality: N/A;    COLONOSCOPY N/A 01/06/2021    Procedure: COLONOSCOPY;  Surgeon: Althea Casanova MD;  Location: [facility];  Service: Endoscopy;  Laterality: N/A;    COLONOSCOPY N/A 04/25/2022    Procedure: COLONOSCOPY;  Surgeon: Cristiano Aaron MD;  Location: [facility];  Service: Endoscopy;  Laterality: N/A;    FRACTURE SURGERY Left 2013    wrist- [facility]    KNEE SURGERY Right 1989    in [place]     "TONSILLECTOMY  1966       Family History:   Family History   Problem Relation Name Age of Onset    Pancreatic cancer Mother Brittnee Meyers     Stomach cancer Mother Brittnee Meyers     Cancer Mother Brittnee Meyers         pancreatic, stomach    Hypertension Father Don Meyers     Heart disease Father Don Meyers         MI    Hearing loss Father Don Meyers     Parkinsonism Father Don Meyers     Rheum arthritis Sister      Lupus Maternal Aunt      Rheum arthritis Maternal Aunt      Melanoma Neg Hx         Social History:   Social History[1]    Allergies:   Review of patient's allergies indicates:   Allergen Reactions    Latex, natural rubber Rash       Home Medications:  Medications Ordered Prior to Encounter[2]    /72 (BP Location: Right arm, Patient Position: Sitting)   Pulse 83   Ht 5' 3" (1.6 m)   Wt 72.6 kg (160 lb 0.9 oz)   BMI 28.35 kg/m²   Body mass index is 28.35 kg/m².  Physical Exam  Constitutional:       General: She is not in acute distress.  HENT:      Head: Normocephalic.   Neurological:      General: No focal deficit present.      Mental Status: She is alert.   Psychiatric:         Mood and Affect: Mood normal.         Judgment: Judgment normal.         Labs: Pertinent labs reviewed.  CRC Screening:     Assessment:   1. Irritable bowel syndrome with constipation    2. MS (multiple sclerosis)    Patient with constipation. Likely pelvic floor dysfunction 2/2 MS. Has had some significant improvements on Amitiza.     Recommendations:   - continue Amitiza  - anorectal manometry.     Irritable bowel syndrome with constipation  -     MANOMETRY, ANORECTAL; Future; Expected date: 05/27/2025    MS (multiple sclerosis)  -     MANOMETRY, ANORECTAL; Future; Expected date: 05/27/2025    Return to Clinic:  Follow up to be determined after results/ procedure(s).    Thank you for the opportunity to participate in the care of this patient.  GLENN Cochran             [1]   Social History  Tobacco Use "    Smoking status: Never     Passive exposure: Never    Smokeless tobacco: Never   Substance Use Topics    Alcohol use: Yes     Comment: occasional    Drug use: No   [2]   Current Outpatient Medications on File Prior to Visit   Medication Sig Dispense Refill    baclofen (LIORESAL) 10 MG tablet TAKE 2 TABS ONE TIME DAILY IN THE MORNING, 2 TABS IN THE AFTERNOON, AND UP TO 3 TABS AT BEDTIME AS NEEDED 630 tablet 3    calcium citrate (CALCITRATE) 200 mg (950 mg) tablet Take 2 tablets by mouth once daily.      cholecalciferol, vitamin D3, 100 mcg (4,000 unit) Cap Take 4,000 Units by mouth once daily.       denosumab (PROLIA SUBQ) Inject into the skin. Every 6 months      gabapentin (NEURONTIN) 800 MG tablet TAKE 1 TABLET BY MOUTH 3 TIMES  DAILY 270 tablet 3    ketoconazole (NIZORAL) 2 % cream Apply topically 2 (two) times daily. 60 g 1    lubiprostone (AMITIZA) 24 MCG Cap Take 1 capsule (24 mcg total) by mouth 2 (two) times daily. 180 capsule 3    multivitamin capsule Take 1 capsule by mouth once daily.      paroxetine (PAXIL) 40 MG tablet Take 1 tablet (40 mg total) by mouth every morning. 90 tablet 3    simvastatin (ZOCOR) 40 MG tablet Take 1 tablet (40 mg total) by mouth once daily. 90 tablet 3    triamcinolone acetonide 0.025% (KENALOG) 0.025 % cream AAA bid 80 g 1    levothyroxine (SYNTHROID) 75 MCG tablet Take 1 tablet (75 mcg total) by mouth before breakfast. 90 tablet 3     No current facility-administered medications on file prior to visit.

## 2025-05-28 ENCOUNTER — PATIENT MESSAGE (OUTPATIENT)
Dept: ENDOSCOPY | Facility: HOSPITAL | Age: 65
End: 2025-05-28
Payer: MEDICARE

## 2025-05-31 ENCOUNTER — OFFICE VISIT (OUTPATIENT)
Dept: URGENT CARE | Facility: CLINIC | Age: 65
End: 2025-05-31
Payer: MEDICARE

## 2025-05-31 VITALS
TEMPERATURE: 98 F | HEART RATE: 84 BPM | RESPIRATION RATE: 20 BRPM | HEIGHT: 63 IN | OXYGEN SATURATION: 96 % | WEIGHT: 161.63 LBS | SYSTOLIC BLOOD PRESSURE: 111 MMHG | BODY MASS INDEX: 28.64 KG/M2 | DIASTOLIC BLOOD PRESSURE: 67 MMHG

## 2025-05-31 DIAGNOSIS — K59.09 CHRONIC CONSTIPATION WITH OVERFLOW: Primary | ICD-10-CM

## 2025-05-31 PROCEDURE — 99213 OFFICE O/P EST LOW 20 MIN: CPT | Mod: S$GLB,,,

## 2025-05-31 NOTE — PROGRESS NOTES
"Subjective:      Patient ID: Cem Meyers is a 64 y.o. female.    Vitals:  height is 5' 3" (1.6 m) and weight is 73.3 kg (161 lb 9.6 oz). Her oral temperature is 98.1 °F (36.7 °C). Her blood pressure is 111/67 and her pulse is 84. Her respiration is 20 and oxygen saturation is 96%.     Chief Complaint: Abdominal Pain (Worried I have a blockage. I feel the need to defecate. I thought I was passing gas but it was a bunch of liquid. - Entered by patient) and Constipation    Pt presents with abdominal discomfort and tightness for the past week. Pt states she has not had a "normal" bowel movement for the past week. This morning she digitally removed some stool, and was concerned as she had diarrhea following. She thought maybe this meant she had impacted bowel. Pt has hx of chronic constipation, sees GI and takes Amitiza Rx, when she remembers to do so. She said her GI doctor reminded her to take it, and that is when she was able to have some relief. Pt states it works well for her when she takes it, but not when she doesn't. Educated patient to follow GI instructions and stay on this medication. Explained what true constipation is, versus symptoms of bowel impaction, and educated to go to ER with any severe abdominal pain.     Abdominal Pain  This is a new problem. The current episode started in the past 7 days. The onset quality is gradual. The problem occurs constantly. The problem has been gradually worsening. The pain is located in the generalized abdominal region. The pain is at a severity of 0/10. The patient is experiencing no pain (discomfort and tightness). The quality of the pain is aching and a sensation of fullness. The abdominal pain does not radiate. Associated symptoms include constipation and flatus. Pertinent negatives include no anorexia, arthralgias, belching, diarrhea, dysuria, fever, frequency, headaches, hematochezia, hematuria, melena, myalgias, nausea, vomiting or weight loss. Nothing aggravates " the pain. The pain is relieved by Nothing. Treatments tried: 3 stool softners daily,  amatiza,  fluids. The treatment provided no relief.   Constipation  Associated symptoms include flatus. Pertinent negatives include no abdominal pain, anorexia, diarrhea, fever, hematochezia, melena, nausea, vomiting or weight loss.       Constitution: Negative for fever.   HENT:  Negative for congestion.    Cardiovascular:  Negative for chest pain and palpitations.   Respiratory:  Negative for cough.    Gastrointestinal:  Positive for constipation. Negative for abdominal pain, nausea, vomiting, diarrhea and bright red blood in stool.   Genitourinary:  Negative for dysuria, frequency and hematuria.   Musculoskeletal:  Negative for joint pain and muscle ache.   Skin:  Negative for rash.   Neurological:  Negative for headaches.      Objective:     Physical Exam   Constitutional: She is oriented to person, place, and time.   HENT:   Head: Normocephalic and atraumatic.   Eyes: Pupils are equal, round, and reactive to light.   Cardiovascular: Normal rate.   Pulmonary/Chest: Effort normal.   Abdominal: Normal appearance. She exhibits no distension and no mass. Soft. Bowel sounds are increased. There is no abdominal tenderness. There is no rebound and no guarding. No hernia.   Musculoskeletal:      Comments: Left-sided weakness, LLE. No Hx stroke, uses cane    Neurological: She is alert and oriented to person, place, and time.   Skin: Skin is warm and dry.   Psychiatric: Mood normal.       Assessment:     1. Chronic constipation with overflow          Plan:       Chronic constipation with overflow          Medical Decision Making:   Urgent Care Management:  Continue taking Rx constipation medication. Follow up with GI as needed. Go to ED with any severe abdominal pain and rigid-ness, vomiting, nausea, and/or fever

## 2025-06-02 ENCOUNTER — TELEPHONE (OUTPATIENT)
Dept: URGENT CARE | Facility: CLINIC | Age: 65
End: 2025-06-02
Payer: MEDICARE

## 2025-06-02 DIAGNOSIS — G35 MULTIPLE SCLEROSIS: ICD-10-CM

## 2025-06-02 DIAGNOSIS — R25.2 SPASTICITY: ICD-10-CM

## 2025-06-03 RX ORDER — BACLOFEN 10 MG/1
TABLET ORAL
Qty: 630 TABLET | Refills: 3 | Status: SHIPPED | OUTPATIENT
Start: 2025-06-03

## 2025-06-06 ENCOUNTER — HOSPITAL ENCOUNTER (OUTPATIENT)
Dept: ENDOSCOPY | Facility: HOSPITAL | Age: 65
Discharge: HOME | End: 2025-06-06
Attending: NURSE PRACTITIONER
Payer: MEDICARE

## 2025-06-06 VITALS
TEMPERATURE: 98 F | HEART RATE: 83 BPM | OXYGEN SATURATION: 98 % | DIASTOLIC BLOOD PRESSURE: 59 MMHG | SYSTOLIC BLOOD PRESSURE: 117 MMHG | RESPIRATION RATE: 18 BRPM

## 2025-06-06 DIAGNOSIS — K58.1 IRRITABLE BOWEL SYNDROME WITH CONSTIPATION: ICD-10-CM

## 2025-06-06 DIAGNOSIS — G35 MS (MULTIPLE SCLEROSIS): ICD-10-CM

## 2025-06-06 NOTE — PLAN OF CARE
Anorectal manometry performed. No difficulties or distress noted following catheter removal. Discharged to home.

## 2025-06-19 ENCOUNTER — PATIENT MESSAGE (OUTPATIENT)
Dept: PSYCHIATRY | Facility: CLINIC | Age: 65
End: 2025-06-19
Payer: MEDICARE

## 2025-06-23 DIAGNOSIS — Z00.00 ENCOUNTER FOR MEDICARE ANNUAL WELLNESS EXAM: ICD-10-CM

## 2025-06-26 ENCOUNTER — PATIENT MESSAGE (OUTPATIENT)
Dept: GASTROENTEROLOGY | Facility: CLINIC | Age: 65
End: 2025-06-26
Payer: MEDICARE

## 2025-06-26 DIAGNOSIS — M62.89 PELVIC FLOOR DYSFUNCTION: Primary | ICD-10-CM

## 2025-06-26 DIAGNOSIS — G35 MS (MULTIPLE SCLEROSIS): ICD-10-CM

## 2025-06-26 DIAGNOSIS — K58.1 IRRITABLE BOWEL SYNDROME WITH CONSTIPATION: ICD-10-CM

## 2025-06-27 ENCOUNTER — PATIENT MESSAGE (OUTPATIENT)
Dept: ORTHOPEDICS | Facility: CLINIC | Age: 65
End: 2025-06-27
Payer: MEDICARE

## 2025-06-27 ENCOUNTER — PATIENT MESSAGE (OUTPATIENT)
Dept: NEUROLOGY | Facility: CLINIC | Age: 65
End: 2025-06-27
Payer: MEDICARE

## 2025-06-28 ENCOUNTER — PATIENT MESSAGE (OUTPATIENT)
Dept: INTERNAL MEDICINE | Facility: CLINIC | Age: 65
End: 2025-06-28
Payer: MEDICARE

## 2025-06-28 ENCOUNTER — PATIENT MESSAGE (OUTPATIENT)
Dept: NEUROLOGY | Facility: CLINIC | Age: 65
End: 2025-06-28
Payer: MEDICARE

## 2025-06-28 DIAGNOSIS — F32.0 MAJOR DEPRESSIVE DISORDER, SINGLE EPISODE, MILD: ICD-10-CM

## 2025-06-30 ENCOUNTER — PATIENT MESSAGE (OUTPATIENT)
Dept: INTERNAL MEDICINE | Facility: CLINIC | Age: 65
End: 2025-06-30
Payer: MEDICARE

## 2025-06-30 ENCOUNTER — PATIENT MESSAGE (OUTPATIENT)
Dept: ORTHOPEDICS | Facility: CLINIC | Age: 65
End: 2025-06-30
Payer: MEDICARE

## 2025-06-30 ENCOUNTER — PATIENT MESSAGE (OUTPATIENT)
Dept: CARDIOLOGY | Facility: HOSPITAL | Age: 65
End: 2025-06-30
Payer: MEDICARE

## 2025-06-30 DIAGNOSIS — F32.0 MAJOR DEPRESSIVE DISORDER, SINGLE EPISODE, MILD: ICD-10-CM

## 2025-06-30 RX ORDER — PAROXETINE 40 MG/1
40 TABLET, FILM COATED ORAL EVERY MORNING
Qty: 90 TABLET | Refills: 0 | Status: SHIPPED | OUTPATIENT
Start: 2025-06-30 | End: 2025-07-01 | Stop reason: SDUPTHER

## 2025-06-30 RX ORDER — PAROXETINE 40 MG/1
40 TABLET, FILM COATED ORAL EVERY MORNING
Qty: 90 TABLET | Refills: 0 | Status: SHIPPED | OUTPATIENT
Start: 2025-06-30 | End: 2025-06-30 | Stop reason: SDUPTHER

## 2025-06-30 NOTE — TELEPHONE ENCOUNTER
Refill Decision Note   Cem Mira  is requesting a refill authorization.  Brief Assessment and Rationale for Refill:  Approve     Medication Therapy Plan:         Comments:     Note composed:11:03 AM 06/30/2025

## 2025-06-30 NOTE — TELEPHONE ENCOUNTER
No care due was identified.  [facility] Embedded Care Due Messages. Reference number: [id number].   6/30/2025 11:04:59 AM CDT

## 2025-06-30 NOTE — TELEPHONE ENCOUNTER
Refill Decision Note   Cem Mira  is requesting a refill authorization.  Brief Assessment and Rationale for Refill:  Approve     Medication Therapy Plan:         Comments:     Note composed:11:08 AM 06/30/2025

## 2025-07-01 ENCOUNTER — PATIENT MESSAGE (OUTPATIENT)
Dept: INTERNAL MEDICINE | Facility: CLINIC | Age: 65
End: 2025-07-01
Payer: MEDICARE

## 2025-07-01 ENCOUNTER — CLINICAL SUPPORT (OUTPATIENT)
Dept: REHABILITATION | Facility: HOSPITAL | Age: 65
End: 2025-07-01
Payer: MEDICARE

## 2025-07-01 DIAGNOSIS — M62.89 PELVIC FLOOR DYSFUNCTION: ICD-10-CM

## 2025-07-01 DIAGNOSIS — K58.1 IRRITABLE BOWEL SYNDROME WITH CONSTIPATION: ICD-10-CM

## 2025-07-01 DIAGNOSIS — K59.04 CHRONIC IDIOPATHIC CONSTIPATION: ICD-10-CM

## 2025-07-01 DIAGNOSIS — G35 MS (MULTIPLE SCLEROSIS): ICD-10-CM

## 2025-07-01 PROCEDURE — 97530 THERAPEUTIC ACTIVITIES: CPT | Mod: PN

## 2025-07-01 PROCEDURE — 97161 PT EVAL LOW COMPLEX 20 MIN: CPT | Mod: PN

## 2025-07-01 RX ORDER — PAROXETINE 40 MG/1
40 TABLET, FILM COATED ORAL EVERY MORNING
Qty: 90 TABLET | Refills: 3 | Status: SHIPPED | OUTPATIENT
Start: 2025-07-01 | End: 2026-07-01

## 2025-07-01 NOTE — PATIENT INSTRUCTIONS
"  ([employer])    Initially it may be useful to practice squeezing at different layers of your pelvic floor muscles to help you find them, as each layer plays an important role in pelvic floor function. Eventually you want to be doing your kegel contractions to include squeezing at all 3 layers, and this should become one smooth movement.    To isolate layer 1: think about closing your openings (around vagina and anus) and nodding the clitoris    To isolate layer 2: imagine a drawstring in your urethra that you are pulling up inside or that you are telescoping the urethra in on itself.     To isolate layer 3: pull your tailbone up and forward towards your pubic bone    When putting it all together, it can be helpful to think about closing your openings and lifting up and in.      So when practicing this at home:   1. Inhale and let the muscles relax   2. Exhale and perform a kegel (closing your openings and lifting up and in)   3. Hold for 5 seconds without holding your breath   4. Inhale and release. Drop these muscles away fully before repeating   5. Perform 10-20x/day spread throughout the day     ___________________________        _________________________    Home Exercise Program: 07/01/2025    360 Breathing Technique          Inhale long, slow and deep. You should feel as if your lower ribs are expanding in all directions like the way an umbrella opens. You should feel the belly, back and sides gently expand and you may notice a relaxation in the pelvic floor.     Continue to breathe like this for 1-2 minutes. Repeat 1-2 times/day.     URINARY URGE CONTROL   What to do when you "gotta go, gotta go"     FREEZE, BREATHE, SQUEEZE, repeat  Do this when you experience a strong urge to urinate and feel like you are going to leak to stop yourself from leaking.      FIRST - FREEZE: Do your best not to panic or rush to the toilet! You are more likely to leak if you do. Stop whatever you are doing, stand or " "sit quietly, and stay as calm as possible.     SECOND - BREATHE: Relax and take a few good deep breaths, letting it out slowly. This helps you further calm the nervous system and settles your bladder. Try thinking of something else to distract yourself from the urge (example: list categories of items like animals, fruits, cars, etc.)     THIRD - SQUEEZE: Do 5-10 "Quick Flicks" (quick LIFT and squeeze of pelvic floor muscles, followed by a full DROP). Pelvic floor contractions send a message to the bladder to relax and hold urine. Continue doing your best to remain calm and distract yourself from the urge.     FINALLY - REPEAT: Repeat this as many times as you need to on the way to the bathroom (i.e., every time the urge comes back). We only want to be walking calmly to the bathroom once the urge has passed. When you get inside and close the door behind you, repeat this process one last time so that you have enough time to get to the toilet and pull your pants down without rushing.        Remember......"Control your bladder before it controls YOU!"       "

## 2025-07-02 RX ORDER — LUBIPROSTONE 24 UG/1
24 CAPSULE ORAL 2 TIMES DAILY
Qty: 60 CAPSULE | Refills: 11 | Status: SHIPPED | OUTPATIENT
Start: 2025-07-02

## 2025-07-02 NOTE — PROGRESS NOTES
Outpatient Rehab    Physical Therapy Evaluation    Patient Name: Cem Meyers  MRN: [id number]  YOB: 1960  Encounter Date: 7/1/2025    Therapy Diagnosis:   Encounter Diagnoses   Name Primary?    Irritable bowel syndrome with constipation     MS (multiple sclerosis)     Pelvic floor dysfunction      Physician: Fela Chopra NP    Physician Orders: Eval and Treat  Medical Diagnosis: Irritable bowel syndrome with constipation  MS (multiple sclerosis)  Pelvic floor dysfunction  Surgical Diagnosis: Not applicable for this Episode   Surgical Date: Not applicable for this Episode  Days Since Last Surgery: Not applicable for this Episode    Visit # / Visits Authorized:  1 / 1  Insurance Authorization Period: 6/26/2025 to 6/26/2026  Date of Evaluation: 7/1/2025  Plan of Care Certification: 7/1/2025 to 9/9/2025     Time In:   10:30  Time Out:  11:20  Total Time (in minutes):   50  Total Billable Time (in minutes):  50    Intake Outcome Measure for FOTO Survey    Therapist reviewed FOTO scores for Cem Meyers on 7/1/2025.   FOTO report - see Media section or FOTO account episode details.     Intake Score:  %    Precautions:       Subjective   History of Present Illness  Cem is a 64 y.o. female who reports to physical therapy with a chief concern of constipation.     The patient reports a medical diagnosis of Irritable bowel syndrome with constipation (K58.1), MS (multiple sclerosis) (G35), Pelvic floor dysfunction (M62.89).            History of Present Condition/Illness: Patient reports that her doctor recommended pelvic floor therapy for her constipation, however it has improved. She reports that she has been taking Amitiza everyday and stool softeners as needed.     Pain          Location: lower back         Bladder/Bowel Habits and Symptoms  Bladder Habits  Bladder Emptying: Complete  Ability to Delay Urination: Less than 10 minutes  Daytime Frequency of Urination: every 1-2 hours  Nighttime  Frequency of Urination: 2-3  Just in Case Voiding: Yes  Estimated Fluid Intake: 1-3 bottles of water/day, coffee, soda    Urinary Symptoms  Urine Leakage Amount: Small  Frequency of Urinary Incontinence: Once per week    Bowel Habits  [place] Stool Form Scale: Type 6  Frequency of Bowel Movements: 1 time per day  Bowel Straining/Pushing: Occasionally  Pain with Bowel Movements: Never  Hemorrhoids: Absent    Bowel Symptoms  Bowel Incontinence Issues: Fecal - liquid, Fecal - solid  Frequency of Fecal Leakage: Multiple times per month  Amount of Fecal Leakage: Small  Additional Bowel Symptoms Details: Small amount of leakage with urgency     Bladder or Bowel Leakage Protection  Protection for Leakage: None        Treatment History  Treatments  Previously Received Treatments: No  Currently Receiving Treatments: No    Personal Factors  Details on the patient's current diet include: regular                Living Arrangements  Living Situation  Housing: Other (Comment)  Other Housing Details: longterm community  Living Arrangements: Alone        Employment  Employment Status: Retired          Past Medical History/Physical Systems Review:   Cem Meyers  has a past medical history of Back pain, Broken toe, Chronic diastolic heart failure, Closed left arm fracture, Colon polyp, Depression, Hyperlipidemia, Hypothyroid, Immunosuppression, MS (multiple sclerosis), Neurogenic bladder, Osteoporosis, Polyneuropathy, Sleep apnea, and Trouble in sleeping.    Cem Meyers  has a past surgical history that includes Knee surgery (Right, 1989); Fracture surgery (Left, 2013); Tonsillectomy (1966); Colonoscopy (N/A, 09/01/2017); Colonoscopy (N/A, 01/06/2021); Colonoscopy (N/A, 04/25/2022); and Breast biopsy (Left, 10/28/2020).    Cem has a current medication list which includes the following prescription(s): baclofen, calcium citrate, cholecalciferol (vitamin d3), denosumab, gabapentin, ketoconazole, levothyroxine, lubiprostone,  multivitamin, paroxetine, simvastatin, and triamcinolone acetonide 0.025%.    Review of patient's allergies indicates:   Allergen Reactions    Latex, natural rubber Rash        Objective   Pelvic External Observations  Consent for Examination: Yes, Chaperone Present: No    Pelvic Assessment  Perineal Skin Quality: Unremarkable  Volitional Contraction: Substitution observed  Volitional Relaxation: Present  Volitional Bearing Down: Present  Pelvic Floor Cough Reaction: Elevation          Pelvic Floor Palpation  Pelvic Floor Exams Performed: External Pelvic and Internal Vaginal                                           Pelvic Floor Special Tests  Single Digit Intravaginal Assessment  Intravaginal Pelvic Floor Strength: 2  Intravaginal Pelvic Floor Endurance (sec): 2  Intravaginal Pelvic Floor Repetitions: 1  Intravaginal Pelvic Floor Co-Contraction Substitution: Present  Intravaginal Pelvic Floor Relaxation Response: Normal  Anterior Vaginal Wall Laxity: none noted  Posterior Vaginal Wall Laxity: none noted  Right Vaginal Sensation: Intact  Left Vaginal Sensation: Intact             Treatment:  Therapeutic Activity  TA 1: reviewed HEP - see in Patient Instructions    Time Entry(in minutes):  PT Evaluation (Low) Time Entry: 24  Therapeutic Activity Time Entry: 24    Assessment & Plan   Assessment  Cem presents with a condition of Low complexity.   Presentation of Symptoms: Stable  Will Comorbidities Impact Care: Yes  See PMHx    Functional Limitations: Other (Comment)  Other Functional Limitations: pelvic floor dysfunction, urinary and bowel incontinence  Impairments: Abnormal coordination, Abnormal muscle firing, Abnormal muscle tone, Impaired physical strength    Patient Goal for Therapy (PT): improve urinary and bowel incontinence  Prognosis: Good    Plan  From a physical therapy perspective, the patient would benefit from: Skilled Rehab Services    Planned therapy interventions include: Therapeutic exercise,  Therapeutic activities, Neuromuscular re-education, Manual therapy, and ADLs/IADLs.    Planned modalities to include: Biofeedback, Cryotherapy (cold pack), Electrical stimulation - attended, Electrical stimulation - passive/unattended, Thermotherapy (hot pack), and Ultrasound.        Visit Frequency: 1 times Per Week for 10 Weeks.       This plan was discussed with Patient.   Discussion participants: Agreed Upon Plan of Care             The patient's spiritual, cultural, and educational needs were considered, and the patient is agreeable to the plan of care and goals.     Education  Education was done with Patient. The patient's learning style includes Demonstration and Listening. The patient Demonstrates understanding and Verbalizes understanding.                 Goals:   Active       LONG TERM GOALS       Pt will report a 50% reduction in frequency of leakage to demonstrate improved pelvic floor coordination needed for continence with ADLs.       Start:  07/02/25    Expected End:  09/09/25            Pt will be able to correctly and consistently explain urge control strategies to demonstrate understanding of these strategies and decrease likelihood of leakage.       Start:  07/02/25    Expected End:  09/09/25             Pt will be compliant with considerations for fluid intake and dietary factors for improving stool quality for improved voiding of stool.       Start:  07/02/25    Expected End:  09/09/25               SHORT TERM GOALS       Pt will demonstrate excellent knowledge and adherence to HEP to facilitate optimal recovery.        Start:  07/02/25    Expected End:  08/13/25            Pt will demonstrate proper PFM contraction, relaxation, and lengthening coordinated with TA and breath for improved muscle coordination needed for functional activity.        Start:  07/02/25    Expected End:  08/13/25            Pt will report a 25% reduction in frequency of leakage to demonstrate improved pelvic floor  coordination needed for continence with ADLs.       Start:  07/02/25    Expected End:  08/13/25                Melissa Liu PT

## 2025-07-07 ENCOUNTER — PATIENT MESSAGE (OUTPATIENT)
Dept: PULMONOLOGY | Facility: CLINIC | Age: 65
End: 2025-07-07
Payer: MEDICARE

## 2025-07-10 DIAGNOSIS — R29.898 WEAKNESS OF LEFT LEG: ICD-10-CM

## 2025-07-10 DIAGNOSIS — G35 MS (MULTIPLE SCLEROSIS): ICD-10-CM

## 2025-07-10 DIAGNOSIS — D84.9 IMMUNOSUPPRESSION: ICD-10-CM

## 2025-07-10 DIAGNOSIS — G89.29 CHRONIC BILATERAL LOW BACK PAIN WITHOUT SCIATICA: ICD-10-CM

## 2025-07-10 DIAGNOSIS — M47.816 SPONDYLOSIS OF LUMBAR REGION WITHOUT MYELOPATHY OR RADICULOPATHY: ICD-10-CM

## 2025-07-10 DIAGNOSIS — M47.816 LUMBAR FACET JOINT SYNDROME: Primary | ICD-10-CM

## 2025-07-10 DIAGNOSIS — M79.2 NEUROPATHIC PAIN: ICD-10-CM

## 2025-07-10 DIAGNOSIS — M54.50 CHRONIC BILATERAL LOW BACK PAIN WITHOUT SCIATICA: ICD-10-CM

## 2025-07-17 ENCOUNTER — CLINICAL SUPPORT (OUTPATIENT)
Dept: REHABILITATION | Facility: HOSPITAL | Age: 65
End: 2025-07-17
Attending: PHYSICIAN ASSISTANT
Payer: MEDICARE

## 2025-07-17 DIAGNOSIS — M62.89 PELVIC FLOOR DYSFUNCTION: Primary | ICD-10-CM

## 2025-07-17 PROCEDURE — 97110 THERAPEUTIC EXERCISES: CPT | Mod: PN

## 2025-07-17 PROCEDURE — 97112 NEUROMUSCULAR REEDUCATION: CPT | Mod: PN

## 2025-07-17 PROCEDURE — 97164 PT RE-EVAL EST PLAN CARE: CPT | Mod: PN

## 2025-07-17 NOTE — PROGRESS NOTES
Outpatient Rehab    Physical Therapy Progress Note : Updated Plan of Care    Patient Name: Izzy Meyers  MRN: [id number]  YOB: 1960  Encounter Date: 7/17/2025    Therapy Diagnosis:   Encounter Diagnosis   Name Primary?    Pelvic floor dysfunction Yes     Physician: Michael Astudillo P*    Physician Orders: Eval and Treat  Medical Diagnosis: Chronic bilateral low back pain without sciatica  MS (multiple sclerosis)  Weakness of left leg  Surgical Diagnosis: Not applicable for this Episode   Surgical Date: Not applicable for this Episode  Days Since Last Surgery: Not applicable for this Episode    Visit # / Visits Authorized:  1 / 1  Insurance Authorization Period: 7/10/2025 to 12/31/2025  Date of Evaluation: 7/1/2025   Plan of Care Certification: 7/2/2025 to 9/9/2025      Time In:   11:15  Time Out:  11:55  Total Time (in minutes):   40  Total Billable Time (in minutes):  40    FOTO:  Intake Score: Not applicable for this Episode%  Survey Score 2: Not applicable for this Episode%  Survey Score 3: Not applicable for this Episode%    Precautions:       Subjective   Patient reports that she went to the [facility] and exercised on the ellipitical. She reports that the next day, she had an increase in back pain. She reports that the pain was on the left side. She states the pain has improved..  Pain reported as 0/10.      Objective   Posture    Scoliosis is observed. The patient's scoliosis type is Thoracic.               Lumbar Range of Motion   Flexion: 100%, Extension: 75%, Side bending: to knee joints, Rotation: WNL      Hip Range of Motion    Limited hip internal rotation noted LLE.               Hip Strength - Planes of Motion   Right Strength Right Pain Left Strength Left  Pain   Flexion (L2) 4   2+     Extension 4   3     ABduction 4   3     ADduction           Internal Rotation 4   4     External Rotation 4   4         Knee Strength   Right Strength Right Pain Left Strength Left  Pain   Flexion (S2)  "4+   4-     Prone Flexion           Extension (L3) 4+   4+                   Treatment:  Izzy received therapeutic exercises to develop  strength, endurance, ROM, and flexibility for 10 minutes including:     Nustep 5 minutes   Supine LTRs x20 each   Supine SKTC 10" x10     Izzy participated in neuromuscular re-education activities to develop Coordination, Control, Posture, Proprioception, Kinesthetic, and Sense for 10 minutes including:     Inclined quad sets 5" hold 2x10 each   Supine TA contractions + ppt 2x10    Izzy participated in dynamic functional therapeutic activities to improve functional performance for 05 minutes, including:    Reviewed HEP - see in patient instructions        Time Entry(in minutes):       Assessment & Plan     Assessment: Patient presents to therapy with a new lower back referral. Patient demonstrates limitations with functional movement and performing exercises at home to benefit her physical activity. Patient demonstrates weakness throughout core and hip musculature. Patient will benefit from skilled physical therapy.       Plan  From a physical therapy perspective, the patient would benefit from: Skilled Rehab Services    Planned therapy interventions include: Therapeutic exercise, Therapeutic activities, Neuromuscular re-education, Manual therapy, and ADLs/IADLs.    Planned modalities to include: Biofeedback, Cryotherapy (cold pack), Electrical stimulation - attended, Electrical stimulation - passive/unattended, Thermotherapy (hot pack), and Ultrasound.        Visit Frequency: 2 times Per Week for 8 Weeks.       This plan was discussed with Patient.   Discussion participants: Agreed Upon Plan of Care             The patient will continue to benefit from skilled outpatient physical therapy in order to address the deficits listed in the problem list on the initial evaluation, provide patient and family education, and maximize the patients level of independence in the home and " community environments.     The patient's spiritual, cultural, and educational needs were considered, and the patient is agreeable to the plan of care and goals.           Goals:   Active       LONG TERM GOALS       Pt will report a 50% reduction in frequency of leakage to demonstrate improved pelvic floor coordination needed for continence with ADLs.       Start:  07/02/25    Expected End:  09/09/25            Pt will be able to correctly and consistently explain urge control strategies to demonstrate understanding of these strategies and decrease likelihood of leakage.       Start:  07/02/25    Expected End:  09/09/25             Pt will be compliant with considerations for fluid intake and dietary factors for improving stool quality for improved voiding of stool.       Start:  07/02/25    Expected End:  09/09/25               Leisure activities       Patient will demonstrate ability to perform 5 minutes on the elliptical without an increase in pain.        Start:  07/17/25    Expected End:  09/11/25               SHORT TERM GOALS       Pt will demonstrate excellent knowledge and adherence to HEP to facilitate optimal recovery.        Start:  07/02/25    Expected End:  08/13/25            Pt will demonstrate proper PFM contraction, relaxation, and lengthening coordinated with TA and breath for improved muscle coordination needed for functional activity.        Start:  07/02/25    Expected End:  08/13/25            Pt will report a 25% reduction in frequency of leakage to demonstrate improved pelvic floor coordination needed for continence with ADLs.       Start:  07/02/25    Expected End:  08/13/25               Strength       Patient will achieve bilateral hip abduction strength of 4/5       Start:  07/17/25    Expected End:  09/11/25                Melissa Liu, PT

## 2025-07-22 ENCOUNTER — LAB VISIT (OUTPATIENT)
Dept: LAB | Facility: HOSPITAL | Age: 65
End: 2025-07-22
Attending: FAMILY MEDICINE
Payer: MEDICARE

## 2025-07-22 ENCOUNTER — OFFICE VISIT (OUTPATIENT)
Dept: INTERNAL MEDICINE | Facility: CLINIC | Age: 65
End: 2025-07-22
Payer: MEDICARE

## 2025-07-22 VITALS
HEART RATE: 75 BPM | WEIGHT: 158.94 LBS | BODY MASS INDEX: 28.16 KG/M2 | HEIGHT: 63 IN | SYSTOLIC BLOOD PRESSURE: 118 MMHG | DIASTOLIC BLOOD PRESSURE: 72 MMHG | OXYGEN SATURATION: 95 %

## 2025-07-22 DIAGNOSIS — F32.0 MAJOR DEPRESSIVE DISORDER, SINGLE EPISODE, MILD: ICD-10-CM

## 2025-07-22 DIAGNOSIS — M81.0 AGE-RELATED OSTEOPOROSIS WITHOUT CURRENT PATHOLOGICAL FRACTURE: ICD-10-CM

## 2025-07-22 DIAGNOSIS — G62.9 POLYNEUROPATHY: ICD-10-CM

## 2025-07-22 DIAGNOSIS — E78.49 OTHER HYPERLIPIDEMIA: ICD-10-CM

## 2025-07-22 DIAGNOSIS — Z00.00 ANNUAL PHYSICAL EXAM: ICD-10-CM

## 2025-07-22 DIAGNOSIS — R94.4 DECREASED CALCULATED GFR: ICD-10-CM

## 2025-07-22 DIAGNOSIS — E03.9 ACQUIRED HYPOTHYROIDISM: ICD-10-CM

## 2025-07-22 DIAGNOSIS — G35 MS (MULTIPLE SCLEROSIS): Chronic | ICD-10-CM

## 2025-07-22 DIAGNOSIS — Z86.0100 HISTORY OF COLON POLYPS: ICD-10-CM

## 2025-07-22 DIAGNOSIS — M79.2 NEUROPATHIC PAIN: ICD-10-CM

## 2025-07-22 DIAGNOSIS — Z00.00 ANNUAL PHYSICAL EXAM: Primary | ICD-10-CM

## 2025-07-22 PROBLEM — R53.1 DECREASED STRENGTH, ENDURANCE, AND MOBILITY: Status: RESOLVED | Noted: 2021-06-22 | Resolved: 2025-07-22

## 2025-07-22 PROBLEM — I50.32 CHRONIC DIASTOLIC HEART FAILURE: Status: RESOLVED | Noted: 2018-05-08 | Resolved: 2025-07-22

## 2025-07-22 PROBLEM — Z74.09 DECREASED STRENGTH, ENDURANCE, AND MOBILITY: Status: RESOLVED | Noted: 2021-06-22 | Resolved: 2025-07-22

## 2025-07-22 PROBLEM — E66.811 CLASS 1 OBESITY WITH SERIOUS COMORBIDITY AND BODY MASS INDEX (BMI) OF 30.0 TO 30.9 IN ADULT: Status: RESOLVED | Noted: 2019-12-23 | Resolved: 2025-07-22

## 2025-07-22 PROBLEM — Z29.89 PROPHYLACTIC IMMUNOTHERAPY: Status: RESOLVED | Noted: 2019-11-05 | Resolved: 2025-07-22

## 2025-07-22 PROBLEM — D84.9 IMMUNOSUPPRESSION: Status: RESOLVED | Noted: 2019-01-28 | Resolved: 2025-07-22

## 2025-07-22 PROBLEM — R68.89 DECREASED STRENGTH, ENDURANCE, AND MOBILITY: Status: RESOLVED | Noted: 2021-06-22 | Resolved: 2025-07-22

## 2025-07-22 PROBLEM — M65.841 STENOSING TENOSYNOVITIS OF FINGER OF RIGHT HAND: Status: RESOLVED | Noted: 2020-05-13 | Resolved: 2025-07-22

## 2025-07-22 PROBLEM — N18.31 STAGE 3A CHRONIC KIDNEY DISEASE: Status: ACTIVE | Noted: 2025-07-22

## 2025-07-22 PROBLEM — M62.838 MUSCLE SPASMS OF BOTH LOWER EXTREMITIES: Status: RESOLVED | Noted: 2018-01-04 | Resolved: 2025-07-22

## 2025-07-22 PROBLEM — M71.329 BURSAL CYST OF OLECRANON: Status: RESOLVED | Noted: 2019-06-05 | Resolved: 2025-07-22

## 2025-07-22 PROBLEM — R25.2 SPASTICITY: Status: RESOLVED | Noted: 2020-11-10 | Resolved: 2025-07-22

## 2025-07-22 LAB
ALBUMIN SERPL BCP-MCNC: 4 G/DL (ref 3.5–5.2)
ALP SERPL-CCNC: 160 UNIT/L (ref 40–150)
ALT SERPL W/O P-5'-P-CCNC: 16 UNIT/L (ref 10–44)
ANION GAP (OHS): 10 MMOL/L (ref 8–16)
AST SERPL-CCNC: 29 UNIT/L (ref 11–45)
BILIRUB SERPL-MCNC: 0.3 MG/DL (ref 0.1–1)
BUN SERPL-MCNC: 15 MG/DL (ref 8–23)
CALCIUM SERPL-MCNC: 10 MG/DL (ref 8.7–10.5)
CHLORIDE SERPL-SCNC: 106 MMOL/L (ref 95–110)
CHOLEST SERPL-MCNC: 189 MG/DL (ref 120–199)
CHOLEST/HDLC SERPL: 3.1 {RATIO} (ref 2–5)
CO2 SERPL-SCNC: 24 MMOL/L (ref 23–29)
CREAT SERPL-MCNC: 1.1 MG/DL (ref 0.5–1.4)
EAG (OHS): 103 MG/DL (ref 68–131)
ERYTHROCYTE [DISTWIDTH] IN BLOOD BY AUTOMATED COUNT: 13.2 % (ref 11.5–14.5)
GFR SERPLBLD CREATININE-BSD FMLA CKD-EPI: 57 ML/MIN/1.73/M2
GLUCOSE SERPL-MCNC: 99 MG/DL (ref 70–110)
HBA1C MFR BLD: 5.2 % (ref 4–5.6)
HCT VFR BLD AUTO: 40.8 % (ref 37–48.5)
HDLC SERPL-MCNC: 61 MG/DL (ref 40–75)
HDLC SERPL: 32.3 % (ref 20–50)
HGB BLD-MCNC: 13.1 GM/DL (ref 12–16)
LDLC SERPL CALC-MCNC: 118.6 MG/DL (ref 63–159)
MCH RBC QN AUTO: 31.6 PG (ref 27–31)
MCHC RBC AUTO-ENTMCNC: 32.1 G/DL (ref 32–36)
MCV RBC AUTO: 98 FL (ref 82–98)
NONHDLC SERPL-MCNC: 128 MG/DL
PLATELET # BLD AUTO: 224 K/UL (ref 150–450)
PMV BLD AUTO: 10.5 FL (ref 9.2–12.9)
POTASSIUM SERPL-SCNC: 4.1 MMOL/L (ref 3.5–5.1)
PROT SERPL-MCNC: 7.7 GM/DL (ref 6–8.4)
RBC # BLD AUTO: 4.15 M/UL (ref 4–5.4)
SODIUM SERPL-SCNC: 140 MMOL/L (ref 136–145)
TRIGL SERPL-MCNC: 47 MG/DL (ref 30–150)
TSH SERPL-ACNC: 3.73 UIU/ML (ref 0.4–4)
WBC # BLD AUTO: 5.63 K/UL (ref 3.9–12.7)

## 2025-07-22 PROCEDURE — 3078F DIAST BP <80 MM HG: CPT | Mod: CPTII,S$GLB,, | Performed by: FAMILY MEDICINE

## 2025-07-22 PROCEDURE — 82040 ASSAY OF SERUM ALBUMIN: CPT

## 2025-07-22 PROCEDURE — 3074F SYST BP LT 130 MM HG: CPT | Mod: CPTII,S$GLB,, | Performed by: FAMILY MEDICINE

## 2025-07-22 PROCEDURE — 80061 LIPID PANEL: CPT

## 2025-07-22 PROCEDURE — 3008F BODY MASS INDEX DOCD: CPT | Mod: CPTII,S$GLB,, | Performed by: FAMILY MEDICINE

## 2025-07-22 PROCEDURE — 99396 PREV VISIT EST AGE 40-64: CPT | Mod: S$GLB,,, | Performed by: FAMILY MEDICINE

## 2025-07-22 PROCEDURE — 83036 HEMOGLOBIN GLYCOSYLATED A1C: CPT

## 2025-07-22 PROCEDURE — 84443 ASSAY THYROID STIM HORMONE: CPT

## 2025-07-22 PROCEDURE — 36415 COLL VENOUS BLD VENIPUNCTURE: CPT

## 2025-07-22 PROCEDURE — 99999 PR PBB SHADOW E&M-EST. PATIENT-LVL III: CPT | Mod: PBBFAC,,, | Performed by: FAMILY MEDICINE

## 2025-07-22 PROCEDURE — 85027 COMPLETE CBC AUTOMATED: CPT

## 2025-07-22 PROCEDURE — 99214 OFFICE O/P EST MOD 30 MIN: CPT | Mod: 25,S$GLB,, | Performed by: FAMILY MEDICINE

## 2025-07-22 RX ORDER — SIMVASTATIN 40 MG/1
40 TABLET, FILM COATED ORAL DAILY
Qty: 90 TABLET | Refills: 3 | Status: SHIPPED | OUTPATIENT
Start: 2025-07-22

## 2025-07-22 RX ORDER — LEVOTHYROXINE SODIUM 75 UG/1
75 TABLET ORAL
Qty: 90 TABLET | Refills: 3 | Status: SHIPPED | OUTPATIENT
Start: 2025-07-22 | End: 2026-07-22

## 2025-07-22 RX ORDER — PAROXETINE 40 MG/1
40 TABLET, FILM COATED ORAL EVERY MORNING
Qty: 90 TABLET | Refills: 3 | Status: SHIPPED | OUTPATIENT
Start: 2025-07-22 | End: 2026-07-22

## 2025-07-22 NOTE — PROGRESS NOTES
Subjective:   Patient ID: Cem Meyers is a 64 y.o. female.  Chief Complaint:  Medication Refill and Annual Exam    Synthroid Presents for annual physical exam   Also followed by Neurology, Rheumatology, Psychiatry, Pulmonology/Sleep Medicine, and Ophthalmology.     Last annual exam May 2024  TSH normal. Current thyroid supplement adequate.  Lipid panel with LDL less than 100. At goal on current medication.  A1c normal. No pre diabetes diabetes.    Last labs March 2025  CBC normal white cell count, red cell count, platelet level   CMP with sodium 135, calcium 10.6, and GFR 51     Medical History:  - Acquired hypothyroidism.  Last TSH normal.  Reports compliance with Synthroid 75 mcg daily.  No symptoms hypo or hyperthyroidism.  Needs repeat TSH  - Hyperlipidemia.  Last lipid panel with LDL close to goal on simvastatin 40 mg daily.  Reports compliance.  Denies side effects.  No chest pain or claudication.  Needs repeat lipid panel  - Major depressive disorder.  On Paxil 40 mg daily.  Reports compliance.  Denies side effects.  Symptoms well controlled.  Needs refill.  - History of colon polyps.  Colonoscopy completed April 2022 and normal.  Due for repeat in April 2027.  - Multiple sclerosis and Neuropathy followed by Neurology.  Denies any significant decline over past 12 months.  Currently off any immunosuppressive therapy.  On gabapentin for neuropathy.  - Osteoporosis followed by Rheumatology.  Previously on Prolia.  Has missed most recent dose.  Plans to reestablish with Rheumatology in [employer] to obtain medication.     Health maintenance needs include:  - RSV vaccine  - Covid booster     No new complaints or concerns today      Review of Systems   Constitutional:  Positive for activity change. Negative for appetite change, chills, fatigue, fever and unexpected weight change.   HENT:  Negative for congestion, dental problem, ear pain, hearing loss, postnasal drip, rhinorrhea, sinus pressure, sore throat and  "trouble swallowing.    Eyes:  Negative for discharge and visual disturbance.   Respiratory:  Negative for cough, chest tightness, shortness of breath and wheezing.    Cardiovascular:  Negative for chest pain, palpitations and leg swelling.   Gastrointestinal:  Negative for abdominal distention, abdominal pain, blood in stool, constipation, diarrhea, nausea and vomiting.   Endocrine: Negative for polydipsia, polyphagia and polyuria.   Genitourinary:  Negative for difficulty urinating, dysuria, flank pain, hematuria, menstrual problem and pelvic pain.   Musculoskeletal:  Positive for gait problem. Negative for arthralgias, joint swelling, myalgias and neck pain.   Skin:  Negative for rash.   Neurological:  Negative for dizziness, syncope, weakness, light-headedness and headaches.   Hematological:  Negative for adenopathy.   Psychiatric/Behavioral:  Negative for agitation, behavioral problems, confusion, decreased concentration, dysphoric mood, hallucinations and sleep disturbance. The patient is not nervous/anxious and is not hyperactive.        Objective:   /72 (BP Location: Right arm, Patient Position: Sitting)   Pulse 75   Ht 5' 3" (1.6 m)   Wt 72.1 kg (158 lb 15.2 oz)   SpO2 95%   BMI 28.16 kg/m²     Physical Exam  Vitals and nursing note reviewed.   Constitutional:       Appearance: Normal appearance. She is well-developed and overweight.   HENT:      Right Ear: Tympanic membrane and ear canal normal.      Left Ear: Tympanic membrane and ear canal normal.      Mouth/Throat:      Pharynx: Oropharynx is clear. Uvula midline.   Eyes:      Conjunctiva/sclera: Conjunctivae normal.      Right eye: Right conjunctiva is not injected.      Left eye: Left conjunctiva is not injected.   Neck:      Thyroid: No thyroid mass, thyromegaly or thyroid tenderness.   Cardiovascular:      Rate and Rhythm: Normal rate and regular rhythm.      Heart sounds: Normal heart sounds.   Pulmonary:      Effort: Pulmonary effort is " normal.      Breath sounds: Normal breath sounds.   Abdominal:      General: There is no distension.      Palpations: Abdomen is soft.      Tenderness: There is no abdominal tenderness.   Musculoskeletal:      Right lower leg: No edema.      Left lower leg: No edema.   Skin:     Findings: No rash.   Neurological:      Mental Status: She is alert and oriented to person, place, and time.      Coordination: Coordination abnormal.      Gait: Gait abnormal.   Psychiatric:         Attention and Perception: Attention and perception normal.         Mood and Affect: Mood and affect normal.         Speech: Speech normal.         Behavior: Behavior normal. Behavior is cooperative.         Thought Content: Thought content normal.         Cognition and Memory: Cognition and memory normal.         Judgment: Judgment normal.       Assessment:       ICD-10-CM ICD-9-CM   1. Annual physical exam  Z00.00 V70.0   2. Major depressive disorder, single episode, mild  F32.0 296.21   3. Other hyperlipidemia  E78.49 272.4   4. Acquired hypothyroidism  E03.9 244.9   5. Age-related osteoporosis without current pathological fracture  M81.0 733.01   6. MS (multiple sclerosis)  G35 340   7. Polyneuropathy  G62.9 356.9   8. Neuropathic pain  M79.2 729.2   9. Decreased calculated GFR  R94.4 794.4   10. History of colon polyps  Z86.0100 V12.72     Plan:   Annual physical exam  -     CBC Without Differential; Future; Expected date: 07/22/2025  -     Comprehensive Metabolic Panel; Future; Expected date: 07/22/2025  -     Lipid Panel; Future; Expected date: 07/22/2025  -     TSH; Future; Expected date: 07/22/2025  -     Hemoglobin A1C; Future; Expected date: 07/22/2025  Blood pressure normal.  BMI 28.  Overall exam stable.    Check labs.  Treat as indicated.    Colon cancer screening up-to-date   Breast cancer screening up-to-date   Bone density up-to-date   Recommend RSV vaccine through pharmacy   Declines COVID vaccine   Other immunizations  up-to-date     Major depressive disorder, single episode, mild  -     paroxetine (PAXIL) 40 MG tablet; Take 1 tablet (40 mg total) by mouth every morning.  Dispense: 90 tablet; Refill: 3  Stable, no side effects, mood and symptoms well controlled on present medication .  Continue current medication     Other hyperlipidemia  -     Lipid Panel; Future; Expected date: 07/22/2025  -     simvastatin (ZOCOR) 40 MG tablet; Take 1 tablet (40 mg total) by mouth once daily.  Dispense: 90 tablet; Refill: 3  Asymptomatic.    Check lipid panel.    Adjust simvastatin 40 mg daily as needed.      Acquired hypothyroidism  -     TSH; Future; Expected date: 07/22/2025  -     levothyroxine (SYNTHROID) 75 MCG tablet; Take 1 tablet (75 mcg total) by mouth before breakfast.  Dispense: 90 tablet; Refill: 3  Asymptomatic   Check TSH   Adjust Synthroid 75 mcg daily as needed     Age-related osteoporosis without current pathological fracture  Reestablish with Rheumatology as planned to resume Prolia injections     MS (multiple sclerosis)  Polyneuropathy  Neuropathic pain  No significant decline over past 12 months   Continue per Neurology     Decreased calculated GFR  -     Comprehensive Metabolic Panel; Future; Expected date: 07/22/2025  Recheck renal function panel today     History of colon polyps  Colon cancer screening up-to-date until you from 2027    Return to clinic 1 year for annual exam or sooner as needed

## 2025-07-23 ENCOUNTER — OFFICE VISIT (OUTPATIENT)
Dept: INTERNAL MEDICINE | Facility: CLINIC | Age: 65
End: 2025-07-23
Payer: MEDICARE

## 2025-07-23 VITALS — BODY MASS INDEX: 28.64 KG/M2 | HEIGHT: 63 IN | RESPIRATION RATE: 20 BRPM | WEIGHT: 161.63 LBS

## 2025-07-23 DIAGNOSIS — G62.9 POLYNEUROPATHY: ICD-10-CM

## 2025-07-23 DIAGNOSIS — E78.49 OTHER HYPERLIPIDEMIA: Chronic | ICD-10-CM

## 2025-07-23 DIAGNOSIS — F51.04 PSYCHOPHYSIOLOGICAL INSOMNIA: ICD-10-CM

## 2025-07-23 DIAGNOSIS — Z74.09 OTHER REDUCED MOBILITY: ICD-10-CM

## 2025-07-23 DIAGNOSIS — G35 MS (MULTIPLE SCLEROSIS): Chronic | ICD-10-CM

## 2025-07-23 DIAGNOSIS — F32.0 MAJOR DEPRESSIVE DISORDER, SINGLE EPISODE, MILD: Chronic | ICD-10-CM

## 2025-07-23 DIAGNOSIS — G47.33 OSA ON CPAP: Chronic | ICD-10-CM

## 2025-07-23 DIAGNOSIS — Z00.00 ENCOUNTER FOR MEDICARE ANNUAL WELLNESS EXAM: Primary | ICD-10-CM

## 2025-07-23 DIAGNOSIS — M62.89 PELVIC FLOOR DYSFUNCTION: ICD-10-CM

## 2025-07-23 DIAGNOSIS — R29.6 FREQUENT FALLS: ICD-10-CM

## 2025-07-23 DIAGNOSIS — E03.9 ACQUIRED HYPOTHYROIDISM: Chronic | ICD-10-CM

## 2025-07-23 DIAGNOSIS — M79.2 NEUROPATHIC PAIN: ICD-10-CM

## 2025-07-23 DIAGNOSIS — R26.9 ABNORMALITY OF GAIT AND MOBILITY: ICD-10-CM

## 2025-07-23 DIAGNOSIS — N18.31 STAGE 3A CHRONIC KIDNEY DISEASE: ICD-10-CM

## 2025-07-23 DIAGNOSIS — Z86.0100 HISTORY OF COLON POLYPS: ICD-10-CM

## 2025-07-23 DIAGNOSIS — M81.0 AGE-RELATED OSTEOPOROSIS WITHOUT CURRENT PATHOLOGICAL FRACTURE: ICD-10-CM

## 2025-07-23 PROCEDURE — 99999 PR PBB SHADOW E&M-EST. PATIENT-LVL V: CPT | Mod: PBBFAC,,, | Performed by: NURSE PRACTITIONER

## 2025-07-23 NOTE — PROGRESS NOTES
"  Cem Meyers presented for a  Medicare AWV and comprehensive Health Risk Assessment today. The following components were reviewed and updated:    Medical history  Family History  Social history  Allergies and Current Medications  Health Risk Assessment  Health Maintenance  Care Team         ** See Completed Assessments for Annual Wellness Visit within the encounter summary.**         The following assessments were completed:  Living Situation  CAGE  Depression Screening  Timed Get Up and Go  Whisper Test  Cognitive Function Screening  Nutrition Screening  ADL Screening  PAQ Screening      Opioid documentation:      Patient does not have a current opioid prescription.   {Stop here if answer is 'does not'. (This text will automatically delete.) :[id number]}     Vitals:    07/23/25 1533   Resp: 20   Weight: 73.3 kg (161 lb 9.6 oz)   Height: 5' 3" (1.6 m)     Body mass index is 28.63 kg/m².  Physical Exam  Constitutional:       General: She is not in acute distress.     Appearance: She is not ill-appearing or diaphoretic.   HENT:      Mouth/Throat:      Mouth: Mucous membranes are moist.   Pulmonary:      Effort: Pulmonary effort is normal. No respiratory distress.   Skin:     General: Skin is warm and dry.   Neurological:      Mental Status: She is alert and oriented to person, place, and time. Mental status is at baseline.   Psychiatric:         Mood and Affect: Mood normal.         Behavior: Behavior normal.         Thought Content: Thought content normal.         Judgment: Judgment normal.           Diagnoses and health risks identified today and associated recommendations/orders:    1. Encounter for Medicare annual wellness exam  ***  - Referral to Enhanced Annual Wellness Visit (eAWV) W+1  - Ambulatory referral/consult to Pharmacy Assistance; Future    2. Major depressive disorder, single episode, mild  ***    3. MS (multiple sclerosis)  ***    4. Stage 3a chronic kidney disease  ***    5. Polyneuropathy  ***    6. " Neuropathic pain  ***    7. Other hyperlipidemia  ***    8. Pelvic floor dysfunction  ***    9. Age-related osteoporosis without current pathological fracture  ***    10. Acquired hypothyroidism  ***    11. History of colon polyps  ***    12. Psychophysiological insomnia  ***    13. MAYKEL on CPAP  ***    14. Frequent falls  ***    15. Abnormality of gait and mobility  ***    16. Other reduced mobility  ***      Provided Cem with a 5-10 year written screening schedule and personal prevention plan. Recommendations were developed using the USPSTF age appropriate recommendations. Education, counseling, and referrals were provided as needed. After Visit Summary printed and given to patient which includes a list of additional screenings\tests needed.    Follow up in about 1 year (around 7/23/2026) for Annual wellness visit.    MAIA Shine

## 2025-07-23 NOTE — PROGRESS NOTES
Arnoldo     Ms. Meyers's case has been assigned to Mami Pollock @[phone]. Provider may review progress notes by typing pharmacy patient assistance in Epic search box.      What happens next? Assigned advocate will review your patients chart and research available options.  Patient and Provider may be asked to provide specific documentation to facilitate the PAP process. Failure to provide the requested documentation will delay assistance.    Please note: epic chart must reflect a current order for the requested medication written by an [facility] provider to begin PAP process.   Please note: all requests are subject to program availability and patient eligibility verification.   Please note: each program has it's own unique eligibility criteria (e.g., income limits, insurance status medical condition, residency).Therefore eligibility is determined by the specific program being applied to not by the Pharmacy Patient Assistance Team.         Thank you,   [facility]  [address] [facility] [id number]  [place] [address]  Fax: [phone]  Email: [email or web address]

## 2025-07-23 NOTE — PATIENT INSTRUCTIONS
Counseling and Referral of Other Preventative  (Italic type indicates deductible and co-insurance are waived)    Patient Name: Cem Meyers  Today's Date: 7/23/2025    Health Maintenance       Date Due Completion Date    RSV Vaccine (Age 60+ and Pregnant patients) (1 - Risk 60-74 years 1-dose series) Never done ---    Influenza Vaccine (1) 09/01/2025 10/14/2024    Mammogram 05/08/2026 5/8/2025    Override on 10/24/2012: (N/S)    Override on 10/18/2011: Done    Lipid Panel 07/22/2026 7/22/2025    DEXA Scan 08/07/2026 8/7/2024    Colorectal Cancer Screening 04/25/2027 4/25/2022    Hemoglobin A1c (Diabetic Prevention Screening) 07/22/2028 7/22/2025    Cervical Cancer Screening 10/13/2028 10/13/2023    TETANUS VACCINE 06/27/2034 6/27/2024        Orders Placed This Encounter   Procedures    Ambulatory referral/consult to Pharmacy Assistance     The following information is provided to all patients.  This information is to help you find resources for any of the problems found today that may be affecting your health:                  Living healthy guide: www.[facility].[place].gov      Understanding Diabetes: www.diabetes.org      Eating healthy: www.cdc.gov/healthyweight      CDC home safety checklist: www.cdc.gov/steadi/patient.html      Agency on Aging: www.goea.[place].[facility]      Alcoholics anonymous (AA): www.aa.org      Physical Activity: www.geraldo.nih.gov/kc4lhdk      Tobacco use: www.quitwithusla.org

## 2025-07-23 NOTE — PROGRESS NOTES
"  Cem Meyers presented for a  Medicare AWV and comprehensive Health Risk Assessment today. The following components were reviewed and updated:    Medical history  Family History  Social history  Allergies and Current Medications  Health Risk Assessment  Health Maintenance  Care Team         ** See Completed Assessments for Annual Wellness Visit within the encounter summary.**         The following assessments were completed:  Living Situation  CAGE  Depression Screening  Timed Get Up and Go  Whisper Test  Cognitive Function Screening  Nutrition Screening  ADL Screening  PAQ Screening      Opioid documentation:      Patient does not have a current opioid prescription.        Vitals:    07/23/25 1533   Resp: 20   Weight: 73.3 kg (161 lb 9.6 oz)   Height: 5' 3" (1.6 m)     Body mass index is 28.63 kg/m².  Physical Exam  Constitutional:       General: She is not in acute distress.     Appearance: She is not ill-appearing or diaphoretic.   HENT:      Mouth/Throat:      Mouth: Mucous membranes are moist.   Pulmonary:      Effort: Pulmonary effort is normal. No respiratory distress.   Skin:     General: Skin is warm and dry.   Neurological:      Mental Status: She is alert and oriented to person, place, and time. Mental status is at baseline.   Psychiatric:         Mood and Affect: Mood normal.         Behavior: Behavior normal.         Thought Content: Thought content normal.         Judgment: Judgment normal.           Diagnoses and health risks identified today and associated recommendations/orders:    1. Encounter for Medicare annual wellness exam  I offered to discuss advanced care planning, including how to pick a person who would make decisions for you if you were unable to make them for yourself, called a health care power of , and what kind of decisions you might make such as use of life sustaining treatments such as ventilators and tube feeding when faced with a life limiting illness recorded on a living " will that they will need to know. (How you want to be cared for as you near the end of your natural life)     X  Patient has advanced directives written and agrees to provide copies to the institution.   - Referral to Enhanced Annual Wellness Visit (eAWV) W+1  - Ambulatory referral/consult to Pharmacy Assistance; Future- amitiza    2. Major depressive disorder, single episode, mild  Monitor  Stable  Continue home paxil    3. MS (multiple sclerosis), Other reduced mobility, Abnormality of gait and mobility, Frequent falls  Monitor  Follow up as needed, directed  Fall precautions  Chair yoga encouraged     4. Stage 3a chronic kidney disease  Monitor  Follow up with PCP  Dx discussed with patient    5. Polyneuropathy, Neuropathic pain, Other reduced mobility, Abnormality of gait and mobility, Frequent falls  Monitor  Follow up as needed, directed   Fall precautions  Chair yoga encouraged  Continue home gabapentin, baclofen    6. MAYKEL on CPAP   Monitor  Follow up as  directed    Continue home CPAP    7. Other hyperlipidemia  Monitor  Follow up with PCP  Continue home zocor    8. Pelvic floor dysfunction  Monitor  Follow up as needed, directed      9. Age-related osteoporosis without current pathological fracture  Monitor  Follow up with PCP, Rheumatology  Continue home calcium, vitamin D, prolia    10. Acquired hypothyroidism  Monitor  Follow up with PCP  Continue home synthroid    11. History of colon polyps   Monitor  Follow up with GI as directed    12. Psychophysiological insomnia   Monitor  Follow up as directed                                            Provided Cem with a 5-10 year written screening schedule and personal prevention plan. Recommendations were developed using the USPSTF age appropriate recommendations. Education, counseling, and referrals were provided as needed. After Visit Summary printed and given to patient which includes a list of additional screenings\tests needed.    Follow up in about 1  year (around 7/23/2026) for Annual wellness visit.    Linette Farley, MAIA

## 2025-07-30 ENCOUNTER — PATIENT MESSAGE (OUTPATIENT)
Dept: PSYCHIATRY | Facility: CLINIC | Age: 65
End: 2025-07-30
Payer: MEDICARE

## 2025-08-01 ENCOUNTER — PATIENT MESSAGE (OUTPATIENT)
Dept: PSYCHIATRY | Facility: CLINIC | Age: 65
End: 2025-08-01
Payer: MEDICARE

## 2025-08-04 ENCOUNTER — CLINICAL SUPPORT (OUTPATIENT)
Dept: REHABILITATION | Facility: HOSPITAL | Age: 65
End: 2025-08-04
Payer: MEDICARE

## 2025-08-04 DIAGNOSIS — M53.86 DECREASED ROM OF INTERVERTEBRAL DISCS OF LUMBAR SPINE: Primary | ICD-10-CM

## 2025-08-04 PROCEDURE — 97112 NEUROMUSCULAR REEDUCATION: CPT | Mod: PN

## 2025-08-04 PROCEDURE — 97110 THERAPEUTIC EXERCISES: CPT | Mod: PN

## 2025-08-04 NOTE — PROGRESS NOTES
"  Outpatient Rehab    Physical Therapy Visit    Patient Name: Izzy Meyers  MRN: [id number]  YOB: 1960  Encounter Date: 8/4/2025    Therapy Diagnosis:   Encounter Diagnosis   Name Primary?    Decreased ROM of intervertebral discs of lumbar spine Yes     Physician: Michael Astudillo P*    Physician Orders: Eval and Treat  Medical Diagnosis: Chronic bilateral low back pain without sciatica  MS (multiple sclerosis)  Weakness of left leg  Surgical Diagnosis: Not applicable for this Episode   Surgical Date: Not applicable for this Episode  Days Since Last Surgery: Not applicable for this Episode    Visit # / Visits Authorized:  1 / 10  Insurance Authorization Period: 7/16/2025 to 12/31/2025  Date of Evaluation: 8/4/2025  Plan of Care Certification:       Time In:   9:50  Time Out:  10:30  Total Time (in minutes):   40  Total Billable Time (in minutes):  40    FOTO:  Intake Score (%): Not applicable for this Episode  Survey Score 2 (%): Not applicable for this Episode  Survey Score 3 (%): Not applicable for this Episode    Precautions:         Subjective   Patient reports increased pain and stiffness when she wakes up in the morning. She reports that it may be her mattress..  Pain reported as 0/10. lower back    Objective        Objective Measures updated at progress report unless specified. PN due next visit.     Treatment:   Izzy received therapeutic exercises to develop strength, endurance, ROM, flexibility, and core stabilization for 12 minutes including:     Nustep 5 minutes  Supine LTR with ball 2 minutes  Supine DKTC with ball 2 minutes   Supine piriformis stretch 3x30" each         Izzy participated in neuromuscular re-education activities to develop Coordination, Control, Down training, Posture, and Proprioception for 28 minutes including:     Supine posterior pelvic tilts x30   Supine bridges + ppt 2x10   SL clamshells RLE only YTB 3x10   Supine TA contractions   Supine hip adduction ball " squeeze 2x10  Leaning hip extension 2x10  Standing rows 10# 2x10 - cues for upright posture awareness       Time Entry(in minutes):       Assessment & Plan   Assessment: Izzy presents to therapy today with reports of lower back pain in the morning and improvements in pain with posture awareness. Focused on thoracolumbar mobility as tolerated. Introduced scapular strengthening to improve posture and awareness. Verbal cues needed for upright trunk position with standing scapular retraction. Continue progressing in POC as tolerated.   Evaluation/Treatment Tolerance: Patient tolerated treatment well    The patient will continue to benefit from skilled outpatient physical therapy in order to address the deficits listed in the problem list on the initial evaluation, provide patient and family education, and maximize the patients level of independence in the home and community environments.     The patient's spiritual, cultural, and educational needs were considered, and the patient is agreeable to the plan of care and goals.           Plan:  cont per POC    Goals:     LONG TERM GOALS         Pt will report a 50% reduction in frequency of leakage to demonstrate improved pelvic floor coordination needed for continence with ADLs.         Start:  07/02/25    Expected End:  09/09/25              Pt will be able to correctly and consistently explain urge control strategies to demonstrate understanding of these strategies and decrease likelihood of leakage.         Start:  07/02/25    Expected End:  09/09/25               Pt will be compliant with considerations for fluid intake and dietary factors for improving stool quality for improved voiding of stool.         Start:  07/02/25    Expected End:  09/09/25                 Leisure activities         Patient will demonstrate ability to perform 5 minutes on the elliptical without an increase in pain.          Start:  07/17/25    Expected End:  09/11/25                 SHORT  TERM GOALS         Pt will demonstrate excellent knowledge and adherence to HEP to facilitate optimal recovery.          Start:  07/02/25    Expected End:  08/13/25              Pt will demonstrate proper PFM contraction, relaxation, and lengthening coordinated with TA and breath for improved muscle coordination needed for functional activity.          Start:  07/02/25    Expected End:  08/13/25              Pt will report a 25% reduction in frequency of leakage to demonstrate improved pelvic floor coordination needed for continence with ADLs.         Start:  07/02/25    Expected End:  08/13/25                 Strength         Patient will achieve bilateral hip abduction strength of 4/5         Start:  07/17/25    Expected End:  09/11/25                Melissa Liu, PT

## 2025-08-07 ENCOUNTER — OFFICE VISIT (OUTPATIENT)
Dept: URGENT CARE | Facility: CLINIC | Age: 65
End: 2025-08-07
Payer: MEDICARE

## 2025-08-07 ENCOUNTER — CLINICAL SUPPORT (OUTPATIENT)
Dept: REHABILITATION | Facility: HOSPITAL | Age: 65
End: 2025-08-07
Payer: MEDICARE

## 2025-08-07 VITALS
TEMPERATURE: 97 F | HEART RATE: 69 BPM | SYSTOLIC BLOOD PRESSURE: 116 MMHG | DIASTOLIC BLOOD PRESSURE: 70 MMHG | OXYGEN SATURATION: 95 % | RESPIRATION RATE: 16 BRPM

## 2025-08-07 DIAGNOSIS — J00 NASOPHARYNGITIS: Primary | ICD-10-CM

## 2025-08-07 DIAGNOSIS — Z11.52 ENCOUNTER FOR SCREENING FOR COVID-19: ICD-10-CM

## 2025-08-07 DIAGNOSIS — R09.81 SINUS CONGESTION: ICD-10-CM

## 2025-08-07 DIAGNOSIS — M53.86 DECREASED ROM OF INTERVERTEBRAL DISCS OF LUMBAR SPINE: Primary | ICD-10-CM

## 2025-08-07 DIAGNOSIS — J02.9 SORE THROAT: ICD-10-CM

## 2025-08-07 LAB
CTP QC/QA: YES
SARS-COV+SARS-COV-2 AG RESP QL IA.RAPID: NEGATIVE

## 2025-08-07 PROCEDURE — 97110 THERAPEUTIC EXERCISES: CPT | Mod: PN

## 2025-08-07 PROCEDURE — 97112 NEUROMUSCULAR REEDUCATION: CPT | Mod: PN

## 2025-08-07 NOTE — PATIENT INSTRUCTIONS
General Instructions for Upper Respiratory Infection (URI):    What is a URI?   An upper respiratory infection (URI), also known as the common cold, is an acute infection of the head and chest. Generally, it affects the nose, throat, airways, sinuses and/or ears. URIs are typically caused by a virus and are among the most common diagnoses in Urgent Care.   While COVID and Flu are viruses most commonly tested for in Urgent Care, there are many other viruses that can cause similar symptoms such as: Respiratory Syncytial virus (RSV), Rhinovirus, Adenovirus, Enterovirus, Dar-Barr virus (EBV), human meta pneumovirus, Parvovirus B19 (Fifth's disease).     How long will it last?   Probably longer than youd like. Symptoms usually worsen during the first 3-5 days, followed by gradual improvement. Most URIs resolve within 10-14 days, even without treatment. People with URIs are most contagious during the first 2-3 days of symptoms and rarely after 1 week. However, a person can remain contagious for several days after symptoms subside.     Treatment   Antibiotics only work on bacterial infections, and will not treat a virus. Because URIs usually are caused by viruses, antibiotics are not an effective treatment.  If taken when not needed, antibiotics can be harmful and can expose you to unnecessary side effects such as allergic reaction (rash, anaphylaxis), yeast infection, nausea, vomiting, diarrhea, Clostridioides difficile (C. diff), immune dysregulation, longer illness and antibiotic resistance. Fortunately, there are many options that help alleviate symptoms when used as directed. The treatments below can help you feel better while your body's own defenses are fighting the virus.    Fever and/or Pain:    Use a pain reliever, such as acetaminophen (Tylenol) or Ibuprofen (Advil). Avoid NSAIDs (Ibuprofen, Aleve, Motrin, Aspirin) if you are pregnant, have advanced kidney disease or history of stomach ulcers/bleeding.      "Dosages listed below are recommended for the average size adult       Acetaminophen (Tylenol): 500mg-650mg every 4 hours, not to exceed 4000mg in 24 hours       Ibuprofen (Advil, Motrin): 400mg-600mg every 6 hours (take with food or eat at least 30 minutes before taking medication)    Nasal congestion, post nasal dripping, or sinus pressure:    Use an oral decongestant, such as pseudoephedrine or Mucinex D to reduce nasal and sinus congestion. Decongestants are available at pharmacies. Decongestants should not be used by individuals with certain medical conditions such as hypertension (high blood pressure) or any history of heart palpitations. If you DO have Hypertension or palpitations, it is safe to take Coricidin HBP for relief of sinus symptoms.   Nasal sprays, such as fluticasone (Flonase), can help relief sneezing, runny nose and nasal congestion, and may take up to one week of consistent use to manage symptoms.     Nasal rinsing with saline nasal spray or salt water (e.g., neti pot) can help relieve nasal dryness.    Use a warm mist humidifier or take a steamy shower to increase moisture in the air and soothe oral and nasal tissues that become irritated with dry air.    Non-drowsy antihistamines during the day, such as Zyrtec, Claritin, Allegra may help with runny nose, post nasal drip, and sneezing. Benadryl can be used at night as needed, unless you are already taking a "night-time" cold medication.   Vicks vapor rub may be helpful to use at night.    Zantac will help if there is reflux from the post nasal drip and helpful to take at night.    Sore throat:    Use a pain reliever, such as acetaminophen (Tylenol) or Ibuprofen (Advil).    Gargle with salt water (1/2 tsp of salt dissolved in 8 oz of warm water). Salt water can be used as often as you like.    Throat lozenges or throat sprays (Cepacol lozenges or Chloroseptic spray) may bring some relief by increasing saliva production and lubricating your " "throat.    Drink plenty of fluids (e.g., water, diluted juice, tea) to soothe your throat and to stay well hydrated. Honey/lemon water or warm tea may be soothing.   A mixture of Maalox and Benadryl, often referred to as "magic mouthwash," is commonly used to relieve mouth pain and sores. The mixture is typically made by combining equal parts of liquid Benadryl (diphenhydramine) and Maalox (aluminum hydroxide/magnesium hydroxide). It's used as a mouthwash, swishing and spitting it out, or dabbed onto sores, depending on the patient's age and ability to swish.  Avoid eat/drinking for 30 minutes after use.    Coughing:    Coughing often occurs during the later stages of a URI. It may be dry or produce phlegm or mucus.    Medications that contain dextromethorphan (helps to suppress a cough) and/or Guaifenesin (thins mucus and relieves chest congestion). Examples that include both are Robitussin DM, Mucinex DM, Delsym. Guaifenesin works best when you drink plenty of water.     Tea with honey, when taken regularly, can soothe a sore throat and help suppress a cough.    Cough drops   Vicks vapor rub and/or humidifier at night    Take care with medications    Be familiar with the individual ingredients in every medication you take, so you treat your symptoms appropriately.    Watch for ingredient overlap between products (e.g., acetaminophen, ibuprofen, pseudoephedrine). Dont accidentally take too much of any ingredient.    Dont take medications longer than recommended.    Follow any specific instructions given by your health care provider. This is especially important if you have an underlying health condition.    If you have questions or concerns, ask a pharmacist for assistance or consult with your health care provider. Note: generic medications contain the same active ingredients as name brands.     Strengthen your immune system   Make sure you eat well (e.g., a healthy, balanced variety of foods).    Avoid alcohol " as it can directly suppress various immune responses.    Stay away from cigarettes, and second hand smoke.    Rest as much as possible and get plenty of sleep (at least 8 hours).     Cold-eeze (Zinc), Elderberry, Emergen-C, may help to reduce the duration of URI symptoms if taken early.    Reduce risk of spreading URI to others    URI infections are contagious; help reduce the spread.    Wash your hands frequently and/or use a hand , especially after touching your face or covering cough.    Cover your mouth when coughing or sneezing.    Avoid sharing items like cups and lip balm.     When to seek help    Sometimes upper respiratory infections become worse instead of better. Secondary bacterial infections or other complications can develop that require further treatment. Dont delay seeking medical evaluation if you experience any of the following symptoms:    A fever greater than 101°F for longer than 3 days.    Breathing problems like feeling short of breath, having pain or tightness in your chest, or wheezing (high pitched whistling sounds when you breath in)    A cough that is painful, worsening, or lasting longer than two weeks.    A bad sore throat that lasts longer than three days, or worsens.    Very swollen glands in your neck that arent going away    Pain in your face or teeth that is not improving after a few days of decongestant use    A headache that lasts longer than a few days or is very severe    A new rash    Persistent abdominal pain    Inability to tolerate oral fluids without vomiting   Severe weakness, dizziness, or passing out   Significant drowsiness or confusion     Please follow up with your primary care provider if your signs and symptoms have not resolved after 7-10 days or sooner if symptoms worsen.   If your condition worsens or fails to improve we recommend that you receive another evaluation at the emergency  room immediately or contact your primary medical clinic to discuss  your concerns.   You must understand that you have received Urgent Care treatment only and that you may be released before all of  your medical problems are known or treated. You, the patient, are responsible to arrange for follow up care as instructed.    More information    Medicine Plus: nlm.nih.gov/medlineplus/ commoncold.html    Centers for Disease Control &Prevention: cdc.gov/getsmart/community/ materials-references/print-materials/ hcp/adult-tract-infection.pdf

## 2025-08-07 NOTE — PROGRESS NOTES
Subjective:      Patient ID: Cem Meyers is a 65 y.o. female.    Vitals:  tympanic temperature is 97.2 °F (36.2 °C). Her blood pressure is 116/70 and her pulse is 69. Her respiration is 16 and oxygen saturation is 95%.     Chief Complaint: Sore Throat    Pt presents to clinic and states she has had a scratchy throat and sinus congestion for the past 4 days. Denies fever, fatigue, body aches, ear pain, or cough. Pt has not taken any meds for symptoms.  States covid has been going around area she lives and she wanted to rule it out.     Sore Throat   This is a new problem. The current episode started in the past 7 days. The problem has been unchanged. Neither side of throat is experiencing more pain than the other. There has been no fever. The pain is at a severity of 0/10. The patient is experiencing no pain. Associated symptoms include congestion. Pertinent negatives include no abdominal pain, coughing, diarrhea, drooling, ear discharge, ear pain, headaches, hoarse voice, plugged ear sensation, neck pain, shortness of breath, stridor, swollen glands, trouble swallowing or vomiting. She has had no exposure to strep or mono.     Constitution: Negative.   HENT:  Positive for congestion and sore throat. Negative for ear pain, ear discharge, drooling and trouble swallowing.    Neck: Negative for neck pain.   Cardiovascular: Negative.    Respiratory:  Negative for cough, shortness of breath and stridor.    Gastrointestinal:  Negative for abdominal pain, vomiting and diarrhea.   Musculoskeletal: Negative.    Skin:  Negative for rash.   Neurological:  Negative for headaches.      Objective:     Physical Exam   Constitutional: She appears well-developed.  Non-toxic appearance. She does not appear ill. No distress.   HENT:   Head: Normocephalic and atraumatic.   Ears:   Right Ear: External ear normal. impacted cerumen  Left Ear: Tympanic membrane, external ear and ear canal normal.   Nose: Nose normal.   Mouth/Throat: Uvula  is midline and mucous membranes are normal. Posterior oropharyngeal erythema and cobblestoning present. No oropharyngeal exudate or posterior oropharyngeal edema.   Eyes: Conjunctivae and EOM are normal.   Neck: Neck supple.   Pulmonary/Chest: Effort normal and breath sounds normal.   Abdominal: Normal appearance.   Musculoskeletal: Normal range of motion.         General: Normal range of motion.   Lymphadenopathy:     She has no cervical adenopathy.   Neurological: no focal deficit. She is alert. She displays no weakness. Gait normal.   Skin: Skin is warm, dry, not diaphoretic, not pale and no rash.   Psychiatric: Her behavior is normal.       Assessment:     1. Nasopharyngitis    2. Sore throat    3. Sinus congestion        Plan:     COVID negative.  Rest and drink plenty of fluids.  Recommend OTC medications as needed for cold symptoms. Supportive care for sore throat (salt water gargles, lozenges, chloraseptic spray, cough drops, warm tea, etc.). Continue to monitor for fever. Tylenol or Ibuprofen prn for pain or fever. F/u with PCP or rtc if symptoms worsen or fail to improve.         Nasopharyngitis    Sore throat  -     SARS Coronavirus 2 Antigen, POCT Manual Read    Sinus congestion  -     SARS Coronavirus 2 Antigen, POCT Manual Read

## 2025-08-08 NOTE — PROGRESS NOTES
"  Outpatient Rehab    Physical Therapy Visit    Patient Name: Izzy Meyers  MRN: [id number]  YOB: 1960  Encounter Date: 8/7/2025    Therapy Diagnosis:   Encounter Diagnosis   Name Primary?    Decreased ROM of intervertebral discs of lumbar spine Yes     Physician: Michael Astudillo P*    Physician Orders: Eval and Treat  Medical Diagnosis: Chronic bilateral low back pain without sciatica  MS (multiple sclerosis)  Weakness of left leg  Surgical Diagnosis: Not applicable for this Episode   Surgical Date: Not applicable for this Episode  Days Since Last Surgery: Not applicable for this Episode    Visit # / Visits Authorized:  2 / 10  Insurance Authorization Period: 7/16/2025 to 12/31/2025  Date of Evaluation: 8/4/2025  Plan of Care Certification:       Time In:   9:00  Time Out:  9:45  Total Time (in minutes):   45  Total Billable Time (in minutes):  45    FOTO:  Intake Score (%): Not applicable for this Episode  Survey Score 2 (%): Not applicable for this Episode  Survey Score 3 (%): Not applicable for this Episode    Precautions:         Subjective   Patient reports increased lower back pain following walking 1 mile with her rollator at home..  Pain reported as 1/10. lower back    Objective        Objective Measures updated at progress report unless specified. PN due next visit.     Treatment:   Izzy received therapeutic exercises to develop strength, endurance, ROM, flexibility, and core stabilization for 10 minutes including:     Nustep 5 minutes  Shuttle 5 bands 2 minutes DL, 4 bands SL RLE 1 min, 2 bands SL LLE 1 min    Not today:  Supine LTR with ball 2 minutes  Supine DKTC with ball 2 minutes   Supine piriformis stretch 3x30" each         Izzy participated in neuromuscular re-education activities to develop Coordination, Control, Down training, Posture, and Proprioception for 35 minutes including:     Supine posterior pelvic tilts x30   SL clamshells no band  2x10 RLE, x10 LLE  SL reverse " "clamshells no band 2x10 RLE, x10 LLE  Supine bridges + ppt 2x10   SL openbooks x15 each   Leaning hip extension 2x10 each   STS 20" box 2x10   Walking farmers carry 1 lap 7.5# in right UE  1 lap with rollator   Lumbar extension 40# 2x10    Not today:  Supine TA contractions   Supine hip adduction ball squeeze 2x10  Standing rows 10# 2x10 - cues for upright posture awareness       Time Entry(in minutes):       Assessment & Plan   Assessment: Patient presents to therapy today with reports of lower back pain following walking. Educated patient on posture awareness when walking with assistive devices. Focused on lateral trunk strengthening and flexibility as tolerated. Improvements in active right hip range of motion noted since last visit.   Evaluation/Treatment Tolerance: Patient tolerated treatment well    The patient will continue to benefit from skilled outpatient physical therapy in order to address the deficits listed in the problem list on the initial evaluation, provide patient and family education, and maximize the patients level of independence in the home and community environments.     The patient's spiritual, cultural, and educational needs were considered, and the patient is agreeable to the plan of care and goals.     Education  Education was done with Patient. The patient's learning style includes Demonstration and Listening. The patient Demonstrates understanding and Verbalizes understanding.                 Plan: cont per POCcont per POC    Goals:     LONG TERM GOALS         Pt will report a 50% reduction in frequency of leakage to demonstrate improved pelvic floor coordination needed for continence with ADLs.         Start:  07/02/25    Expected End:  09/09/25              Pt will be able to correctly and consistently explain urge control strategies to demonstrate understanding of these strategies and decrease likelihood of leakage.         Start:  07/02/25    Expected End:  09/09/25               " Pt will be compliant with considerations for fluid intake and dietary factors for improving stool quality for improved voiding of stool.         Start:  07/02/25    Expected End:  09/09/25                 Leisure activities         Patient will demonstrate ability to perform 5 minutes on the elliptical without an increase in pain.          Start:  07/17/25    Expected End:  09/11/25                 SHORT TERM GOALS         Pt will demonstrate excellent knowledge and adherence to HEP to facilitate optimal recovery.          Start:  07/02/25    Expected End:  08/13/25              Pt will demonstrate proper PFM contraction, relaxation, and lengthening coordinated with TA and breath for improved muscle coordination needed for functional activity.          Start:  07/02/25    Expected End:  08/13/25              Pt will report a 25% reduction in frequency of leakage to demonstrate improved pelvic floor coordination needed for continence with ADLs.         Start:  07/02/25    Expected End:  08/13/25                 Strength         Patient will achieve bilateral hip abduction strength of 4/5         Start:  07/17/25    Expected End:  09/11/25                Melissa Liu, PT

## 2025-08-11 ENCOUNTER — CLINICAL SUPPORT (OUTPATIENT)
Dept: REHABILITATION | Facility: HOSPITAL | Age: 65
End: 2025-08-11
Payer: MEDICARE

## 2025-08-11 DIAGNOSIS — M53.86 DECREASED ROM OF INTERVERTEBRAL DISCS OF LUMBAR SPINE: Primary | ICD-10-CM

## 2025-08-11 PROBLEM — Z74.09 DECREASED STRENGTH, ENDURANCE, AND MOBILITY: Status: RESOLVED | Noted: 2021-06-22 | Resolved: 2025-08-11

## 2025-08-11 PROBLEM — R53.1 DECREASED STRENGTH, ENDURANCE, AND MOBILITY: Status: RESOLVED | Noted: 2021-06-22 | Resolved: 2025-08-11

## 2025-08-11 PROBLEM — R68.89 DECREASED STRENGTH, ENDURANCE, AND MOBILITY: Status: RESOLVED | Noted: 2021-06-22 | Resolved: 2025-08-11

## 2025-08-11 PROCEDURE — 97110 THERAPEUTIC EXERCISES: CPT | Mod: PN

## 2025-08-11 PROCEDURE — 97112 NEUROMUSCULAR REEDUCATION: CPT | Mod: PN

## 2025-08-14 ENCOUNTER — CLINICAL SUPPORT (OUTPATIENT)
Dept: REHABILITATION | Facility: HOSPITAL | Age: 65
End: 2025-08-14
Payer: MEDICARE

## 2025-08-14 DIAGNOSIS — M53.86 DECREASED ROM OF INTERVERTEBRAL DISCS OF LUMBAR SPINE: Primary | ICD-10-CM

## 2025-08-14 PROCEDURE — 97112 NEUROMUSCULAR REEDUCATION: CPT | Mod: PN

## 2025-08-14 PROCEDURE — 97110 THERAPEUTIC EXERCISES: CPT | Mod: PN

## 2025-08-15 ENCOUNTER — OFFICE VISIT (OUTPATIENT)
Dept: OPHTHALMOLOGY | Facility: CLINIC | Age: 65
End: 2025-08-15
Payer: MEDICARE

## 2025-08-15 DIAGNOSIS — H26.9 CORTICAL CATARACT OF BOTH EYES: ICD-10-CM

## 2025-08-15 DIAGNOSIS — H40.013 OPEN ANGLE WITH BORDERLINE FINDINGS AND LOW GLAUCOMA RISK IN BOTH EYES: Primary | ICD-10-CM

## 2025-08-15 PROCEDURE — 99999 PR PBB SHADOW E&M-EST. PATIENT-LVL III: CPT | Mod: PBBFAC,,, | Performed by: OPHTHALMOLOGY

## 2025-08-18 ENCOUNTER — OFFICE VISIT (OUTPATIENT)
Dept: RHEUMATOLOGY | Facility: CLINIC | Age: 65
End: 2025-08-18
Payer: MEDICARE

## 2025-08-18 ENCOUNTER — LAB VISIT (OUTPATIENT)
Dept: LAB | Facility: HOSPITAL | Age: 65
End: 2025-08-18
Payer: MEDICARE

## 2025-08-18 VITALS
DIASTOLIC BLOOD PRESSURE: 73 MMHG | BODY MASS INDEX: 28.51 KG/M2 | SYSTOLIC BLOOD PRESSURE: 104 MMHG | WEIGHT: 160.94 LBS | HEART RATE: 76 BPM

## 2025-08-18 DIAGNOSIS — M81.0 AGE-RELATED OSTEOPOROSIS WITHOUT CURRENT PATHOLOGICAL FRACTURE: Primary | ICD-10-CM

## 2025-08-18 DIAGNOSIS — M81.0 AGE-RELATED OSTEOPOROSIS WITHOUT CURRENT PATHOLOGICAL FRACTURE: ICD-10-CM

## 2025-08-18 LAB
ALBUMIN SERPL BCP-MCNC: 4.1 G/DL (ref 3.5–5.2)
ALP SERPL-CCNC: 161 UNIT/L (ref 40–150)
ALT SERPL W/O P-5'-P-CCNC: 24 UNIT/L (ref 0–55)
ANION GAP (OHS): 8 MMOL/L (ref 8–16)
AST SERPL-CCNC: 35 UNIT/L (ref 0–50)
BILIRUB SERPL-MCNC: 0.4 MG/DL (ref 0.1–1)
BUN SERPL-MCNC: 14 MG/DL (ref 8–23)
CALCIUM SERPL-MCNC: 10.2 MG/DL (ref 8.7–10.5)
CHLORIDE SERPL-SCNC: 109 MMOL/L (ref 95–110)
CO2 SERPL-SCNC: 25 MMOL/L (ref 23–29)
CREAT SERPL-MCNC: 1 MG/DL (ref 0.5–1.4)
GFR SERPLBLD CREATININE-BSD FMLA CKD-EPI: >60 ML/MIN/1.73/M2
GLUCOSE SERPL-MCNC: 86 MG/DL (ref 70–110)
POTASSIUM SERPL-SCNC: 4.3 MMOL/L (ref 3.5–5.1)
PROT SERPL-MCNC: 7.8 GM/DL (ref 6–8.4)
SODIUM SERPL-SCNC: 142 MMOL/L (ref 136–145)

## 2025-08-18 PROCEDURE — 84075 ASSAY ALKALINE PHOSPHATASE: CPT

## 2025-08-18 PROCEDURE — 36415 COLL VENOUS BLD VENIPUNCTURE: CPT

## 2025-08-18 PROCEDURE — 3074F SYST BP LT 130 MM HG: CPT | Mod: CPTII,S$GLB,,

## 2025-08-18 PROCEDURE — 99999 PR PBB SHADOW E&M-EST. PATIENT-LVL IV: CPT | Mod: PBBFAC,,,

## 2025-08-18 PROCEDURE — 3044F HG A1C LEVEL LT 7.0%: CPT | Mod: CPTII,S$GLB,,

## 2025-08-18 PROCEDURE — 1160F RVW MEDS BY RX/DR IN RCRD: CPT | Mod: CPTII,S$GLB,,

## 2025-08-18 PROCEDURE — 99214 OFFICE O/P EST MOD 30 MIN: CPT | Mod: S$GLB,,,

## 2025-08-18 PROCEDURE — 3078F DIAST BP <80 MM HG: CPT | Mod: CPTII,S$GLB,,

## 2025-08-18 PROCEDURE — 1159F MED LIST DOCD IN RCRD: CPT | Mod: CPTII,S$GLB,,

## 2025-08-18 PROCEDURE — G2211 COMPLEX E/M VISIT ADD ON: HCPCS | Mod: S$GLB,,,

## 2025-08-18 PROCEDURE — 3008F BODY MASS INDEX DOCD: CPT | Mod: CPTII,S$GLB,,

## 2025-08-19 ENCOUNTER — TELEPHONE (OUTPATIENT)
Dept: RHEUMATOLOGY | Facility: CLINIC | Age: 65
End: 2025-08-19
Payer: MEDICARE

## 2025-08-21 ENCOUNTER — CLINICAL SUPPORT (OUTPATIENT)
Dept: REHABILITATION | Facility: HOSPITAL | Age: 65
End: 2025-08-21
Payer: MEDICARE

## 2025-08-21 DIAGNOSIS — M53.86 DECREASED ROM OF INTERVERTEBRAL DISCS OF LUMBAR SPINE: Primary | ICD-10-CM

## 2025-08-21 PROCEDURE — 97112 NEUROMUSCULAR REEDUCATION: CPT | Mod: PN

## 2025-08-21 PROCEDURE — 97110 THERAPEUTIC EXERCISES: CPT | Mod: PN

## 2025-09-04 ENCOUNTER — TELEPHONE (OUTPATIENT)
Dept: INFUSION THERAPY | Facility: HOSPITAL | Age: 65
End: 2025-09-04
Payer: MEDICARE

## 2025-09-05 DIAGNOSIS — K59.04 CHRONIC IDIOPATHIC CONSTIPATION: ICD-10-CM

## 2025-09-05 RX ORDER — LUBIPROSTONE 0.02 MG/1
24 CAPSULE ORAL 2 TIMES DAILY
Qty: 60 CAPSULE | Refills: 11 | OUTPATIENT
Start: 2025-09-05